# Patient Record
Sex: MALE | Race: WHITE | NOT HISPANIC OR LATINO | Employment: OTHER | URBAN - METROPOLITAN AREA
[De-identification: names, ages, dates, MRNs, and addresses within clinical notes are randomized per-mention and may not be internally consistent; named-entity substitution may affect disease eponyms.]

---

## 2017-05-09 NOTE — PROGRESS NOTES
"  SUBJECTIVE:                                                    Augie Powell is a 81 year old male who presents to clinic today to establish care and  for the following health issues:    Mr. Powell has a history of foot drop and reports intermittent bilateral knee ache with right thigh tenderness with palpation. He wonders if this could be due to arthritis. He admittedly would like to avoid any \"uneccassary\" medications.      Problem list and histories reviewed & adjusted, as indicated.  Additional history: as documented    Current Outpatient Prescriptions   Medication Sig Dispense Refill     magnesium hydroxide (MILK OF MAGNESIA) 400 MG/5ML suspension Take 30 mLs by mouth daily as needed for constipation 105 mL 2     oxyCODONE (ROXICODONE) 5 MG immediate release tablet Take 1-2 tablets (5-10 mg) by mouth every 4 hours as needed for moderate to severe pain 40 tablet 0     AmLODIPine Besylate (NORVASC PO) Take 5 mg by mouth daily.         Benazepril HCl (LOTENSIN PO) Take 20 mg by mouth daily.         Sildenafil Citrate (VIAGRA PO) Take 100 mg by mouth daily.         No Known Allergies    Reviewed and updated as needed this visit by clinical staff       Reviewed and updated as needed this visit by Provider         ROS:  Constitutional, HEENT, cardiovascular, pulmonary, gi and gu systems are negative, except as otherwise noted.    This document serves as a record of the services and decisions personally performed and made by Eusebio Oliveira MD. It was created on his/her behalf by Ayana Crocker, a trained medical scribe. The creation of this document is based the provider's statements to the medical scribe.  Martinaibtonio Crocker 1:33 PM, May 10, 2017     OBJECTIVE:                                                    /69 (BP Location: Right arm, Patient Position: Chair, Cuff Size: Adult Large)  Pulse 66  Temp 98.6  F (37  C) (Oral)  Ht 1.727 m (5' 8\")  Wt 88.9 kg (196 lb)  SpO2 96%  BMI 29.8 " kg/m2  Body mass index is 29.8 kg/(m^2).  Extremities he has a drop foot with a brace in place related to his failed back surgery. His right knee was normal. Left knee has some patellar crepitation, ligaments intac tno tpan along the medial line       ASSESSMENT/PLAN:                                                    1. Benign essential hypertension    2. Degenerative spondylolisthesis    3. Renal cell cancer, unspecified laterality (H)    4. CKD (chronic kidney disease) stage 3, GFR 30-59 ml/min    5. Hyperlipidemia LDL goal <130    Follow up with fasting labs    Eusebio Oliveira MD  [facility]    The information in this document, created by the medical scribe for me, accurately reflects the services I personally performed and the decisions made by me. I have reviewed and approved this document for accuracy prior to leaving the patient care area.  Eusebio Oliveira MD  1:34 PM, 05/10/17

## 2017-05-10 ENCOUNTER — OFFICE VISIT (OUTPATIENT)
Dept: FAMILY MEDICINE | Facility: CLINIC | Age: 82
End: 2017-05-10
Payer: COMMERCIAL

## 2017-05-10 VITALS
OXYGEN SATURATION: 96 % | WEIGHT: 196 LBS | HEIGHT: 68 IN | SYSTOLIC BLOOD PRESSURE: 132 MMHG | BODY MASS INDEX: 29.7 KG/M2 | DIASTOLIC BLOOD PRESSURE: 69 MMHG | TEMPERATURE: 98.6 F | HEART RATE: 66 BPM

## 2017-05-10 DIAGNOSIS — C64.9 RENAL CELL CANCER, UNSPECIFIED LATERALITY (H): ICD-10-CM

## 2017-05-10 DIAGNOSIS — I10 BENIGN ESSENTIAL HYPERTENSION: Primary | ICD-10-CM

## 2017-05-10 DIAGNOSIS — E78.5 HYPERLIPIDEMIA LDL GOAL <130: ICD-10-CM

## 2017-05-10 DIAGNOSIS — N18.30 CKD (CHRONIC KIDNEY DISEASE) STAGE 3, GFR 30-59 ML/MIN (H): ICD-10-CM

## 2017-05-10 DIAGNOSIS — M43.10 DEGENERATIVE SPONDYLOLISTHESIS: ICD-10-CM

## 2017-05-10 PROCEDURE — 99203 OFFICE O/P NEW LOW 30 MIN: CPT | Performed by: INTERNAL MEDICINE

## 2017-05-10 NOTE — MR AVS SNAPSHOT
"              After Visit Summary   5/10/2017    Augie Powell    MRN: [id number]           Patient Information     Date Of Birth          1935        Visit Information        Provider Department      5/10/2017 1:00 PM Eusebio Oliveira MD [facility]        Today's Diagnoses     Benign essential hypertension    -  1    Degenerative spondylolisthesis        Renal cell cancer, unspecified laterality (H)        CKD (chronic kidney disease) stage 3, GFR 30-59 ml/min        Hyperlipidemia LDL goal <130           Follow-ups after your visit        Your next 10 appointments already scheduled     Jun 01, 2017  9:00 AM CDT   PHYSICAL with Eusebio Oliveira MD   [facility]    [address] [facility] [address] [phone]              Who to contact     If you have questions or need follow up information about today's clinic visit or your schedule please contact [facility] directly at [phone].  Normal or non-critical lab and imaging results will be communicated to you by Dream[employer], letter or phone within 4 business days after the clinic has received the results. If you do not hear from us within 7 days, please contact the clinic through Dreamisehart or phone. If you have a critical or abnormal lab result, we will notify you by phone as soon as possible.  Submit refill requests through [employer] or call your pharmacy and they will forward the refill request to us. Please allow 3 business days for your refill to be completed.          Additional Information About Your Visit        Dreamisehart Information     [employer] lets you send messages to your doctor, view your test results, renew your prescriptions, schedule appointments and more. To sign up, go to [email or web address] . Click on \"Log in\" on the left side of the screen, which will take you to the Welcome page. Then click on \"Sign up Now\" on the right side of the page.     You will be asked to enter " "the access code listed below, as well as some personal information. Please follow the directions to create your username and password.     Your access code is: [email or web address]  Expires: 2017  5:39 AM     Your access code will  in 90 days. If you need help or a new code, please call your [facility] clinic or [phone].        Care EveryWhere ID     This is your Care EveryWhere ID. This could be used by other organizations to access your [facility] medical records  [id number]        Your Vitals Were     Pulse Temperature Height Pulse Oximetry BMI (Body Mass Index)       66 98.6  F (37  C) (Oral) 5' 8\" (1.727 m) 96% 29.8 kg/m2        Blood Pressure from Last 3 Encounters:   05/10/17 132/69   14 116/64   10/22/12 118/73    Weight from Last 3 Encounters:   05/10/17 196 lb (88.9 kg)   14 189 lb (85.7 kg)   10/22/12 185 lb (83.9 kg)              Today, you had the following     No orders found for display       Primary Care Provider Office Phone # Fax #    Merlin Emery Brown, [phone]       [facility] [address]  [address] [address]        Thank you!     Thank you for choosing [facility]  for your care. Our goal is always to provide you with excellent care. Hearing back from our patients is one way we can continue to improve our services. Please take a few minutes to complete the written survey that you may receive in the mail after your visit with us. Thank you!             Your Updated Medication List - Protect others around you: Learn how to safely use, store and throw away your medicines at www.disposemymeds.org.          This list is accurate as of: 5/10/17 11:59 PM.  Always use your most recent med list.                   Brand Name Dispense Instructions for use    LOTENSIN PO      Take 20 mg by mouth daily.       magnesium hydroxide 400 MG/5ML suspension    MILK OF MAGNESIA    105 mL    Take 30 mLs by mouth daily as needed for constipation       " NORVASC PO      Take 5 mg by mouth daily.       oxyCODONE 5 MG IR tablet    ROXICODONE    40 tablet    Take 1-2 tablets (5-10 mg) by mouth every 4 hours as needed for moderate to severe pain       VIAGRA PO      Take 100 mg by mouth daily.

## 2017-05-10 NOTE — NURSING NOTE
"Chief Complaint   Patient presents with     Establish Care       Initial /69 (BP Location: Right arm, Patient Position: Chair, Cuff Size: Adult Large)  Pulse 66  Temp 98.6  F (37  C) (Oral)  Ht 5' 8\" (1.727 m)  Wt 196 lb (88.9 kg)  SpO2 96%  BMI 29.8 kg/m2 Estimated body mass index is 29.8 kg/(m^2) as calculated from the following:    Height as of this encounter: 5' 8\" (1.727 m).    Weight as of this encounter: 196 lb (88.9 kg).  Medication Reconciliation: complete   Collette Rogers- CMA      "

## 2017-05-17 DIAGNOSIS — I10 BENIGN ESSENTIAL HYPERTENSION: Primary | ICD-10-CM

## 2017-05-17 RX ORDER — BENAZEPRIL HYDROCHLORIDE 20 MG/1
20 TABLET ORAL DAILY
Qty: 90 TABLET | Refills: 3 | Status: SHIPPED | OUTPATIENT
Start: 2017-05-17 | End: 2017-05-25

## 2017-05-17 RX ORDER — AMLODIPINE BESYLATE 5 MG/1
5 TABLET ORAL DAILY
Qty: 90 TABLET | Refills: 3 | Status: SHIPPED | OUTPATIENT
Start: 2017-05-17 | End: 2017-05-25

## 2017-05-17 NOTE — TELEPHONE ENCOUNTER
Reason for Call:  Medication or medication refill: Amoldipine 5 mg/ benazepril HCI 20 mg     Do you use a [facility]?  Name of the pharmacy and phone number for the current request:  [facility] [phone] Fax number  Id # [id number]    Name of the medication requested:     Other request:     Can we leave a detailed message on this number? YES    Phone number patient can be reached at: Home number on file [phone] (home)    Best Time: any    Call taken on 5/17/2017 at 9:12 AM by Jonna Foster

## 2017-05-17 NOTE — TELEPHONE ENCOUNTER
Pending Prescriptions:                       Disp   Refills    amLODIPine (NORVASC) 5 MG tablet          90 tab*3            Sig: Take 1 tablet (5 mg) by mouth daily    benazepril (LOTENSIN) 20 MG tablet        90 tab*3            Sig: Take 1 tablet (20 mg) by mouth daily          Last Written Prescription Date:    Last Fill Quantity: ,   # refills:   Last Office Visit with [facility] or [facility] prescribing provider: 05/10/17  Future Office visit:    Next 5 appointments (look out 90 days)     Jun 01, 2017  9:00 AM CDT   [facility]    [address] [facility] [address]   [phone]                   Routing refill request to provider for review/approval because:  Medication is reported/historical

## 2017-05-17 NOTE — TELEPHONE ENCOUNTER
Routing refill request to provider for review/approval because:  Medication is reported/historical  Routing to PCP: Please see pended medication. BOTH are reported HISTORICAL    Liz Mtz RN

## 2017-05-25 DIAGNOSIS — I10 BENIGN ESSENTIAL HYPERTENSION: ICD-10-CM

## 2017-05-25 RX ORDER — BENAZEPRIL HYDROCHLORIDE 20 MG/1
20 TABLET ORAL DAILY
Qty: 90 TABLET | Refills: 3 | Status: SHIPPED | OUTPATIENT
Start: 2017-05-25 | End: 2018-04-23

## 2017-05-25 RX ORDER — AMLODIPINE BESYLATE 5 MG/1
5 TABLET ORAL DAILY
Qty: 90 TABLET | Refills: 3 | Status: SHIPPED | OUTPATIENT
Start: 2017-05-25 | End: 2018-04-23

## 2017-05-25 NOTE — TELEPHONE ENCOUNTER
Pending Prescriptions:                       Disp   Refills    benazepril (LOTENSIN) 20 MG tablet        90 tab*3            Sig: Take 1 tablet (20 mg) by mouth daily    amLODIPine (NORVASC) 5 MG tablet          90 tab*3            Sig: Take 1 tablet (5 mg) by mouth daily        Last Written Prescription Date: 5/17/2017  Last Fill Quantity: 90,  refills: 3  Last Office Visit with [facility], [facility] or [facility] prescribing provider: 5/10/2017  Next 5 appointments (look out 90 days)     May 31, 2017 10:00 AM CDT   PHYSICAL with Eusebio [facility]ira MD   [facility]    [address] [facility] [address] [address]   [phone]                   Potassium   Date Value Ref Range Status   02/29/2016 5.0 3.5 - 5.3 mmol/L Final     Creatinine   Date Value Ref Range Status   02/29/2016 1.97 (H) 0.70 - 1.11 mg/dL Final     BP Readings from Last 3 Encounters:   05/10/17 132/69   12/04/14 116/64   10/22/12 118/73

## 2017-05-25 NOTE — TELEPHONE ENCOUNTER
Reason for Call:  Medication or medication refill:    Do you use a [facility]?  Name of the [facility] [address]    Name of the medication requested: did not remember the names one is 5 mg and the other is 20 mg for his blood pressure medicine  [facility] [place] [address]     Other request: please call him when the prescription has been called in     Can we leave a detailed message on this number? YES    Phone number patient can be reached at: Home number on file [phone] (home)    Best Time: anytime    Call taken on 5/25/2017 at 9:18 AM by Kelly Jennings

## 2018-04-23 DIAGNOSIS — I10 BENIGN ESSENTIAL HYPERTENSION: ICD-10-CM

## 2018-04-24 NOTE — TELEPHONE ENCOUNTER
"Requested Prescriptions   Pending Prescriptions Disp Refills     benazepril (LOTENSIN) 20 MG tablet [Pharmacy Med Name: BENAZEPRIL HCL 20 MG Tablet] 90 tablet 3    Last Written Prescription Date:  05/25/17  Last Fill Quantity: 90,  # refills: 3   Last office visit: 5/10/2017 with prescribing provider:  pcp   Future Office Visit:     Sig: TAKE 1 TABLET EVERY DAY    ACE Inhibitors (Including Combos) Protocol Failed    4/23/2018  6:02 PM       Failed - Normal serum creatinine on file in past 12 months    Recent Labs   Lab Test 02/29/16   CR  1.97*            Failed - Normal serum potassium on file in past 12 months    Recent Labs   Lab Test 02/29/16   POTASSIUM  5.0            Passed - Blood pressure under 140/90 in past 12 months    BP Readings from Last 3 Encounters:   05/10/17 132/69   12/04/14 116/64   10/22/12 118/73                Passed - Recent (12 mo) or future (30 days) visit within the authorizing provider's specialty    Patient had office visit in the last 12 months or has a visit in the next 30 days with authorizing provider or within the authorizing provider's specialty.  See \"Patient Info\" tab in inbasket, or \"Choose Columns\" in Meds & Orders section of the refill encounter.           Passed - Patient is age 18 or older        amLODIPine (NORVASC) 5 MG tablet [Pharmacy Med Name: AMLODIPINE BESYLATE 5 MG Tablet] 90 tablet 3    Last Written Prescription Date:  05/25/17  Last Fill Quantity: 90,  # refills: 3   Last office visit: 5/10/2017 with prescribing provider:  pcp   Future Office Visit:     Sig: TAKE 1 TABLET EVERY DAY    Calcium Channel Blockers Protocol  Failed    4/23/2018  6:02 PM       Failed - Normal serum creatinine on file in past 12 months    Recent Labs   Lab Test 02/29/16   CR  1.97*            Passed - Blood pressure under 140/90 in past 12 months    BP Readings from Last 3 Encounters:   05/10/17 132/69   12/04/14 116/64   10/22/12 118/73                Passed - Recent (12 mo) or future " "(30 days) visit within the authorizing provider's specialty    Patient had office visit in the last 12 months or has a visit in the next 30 days with authorizing provider or within the authorizing provider's specialty.  See \"Patient Info\" tab in inbasket, or \"Choose Columns\" in Meds & Orders section of the refill encounter.           Passed - Patient is age 18 or older          "

## 2018-04-24 NOTE — TELEPHONE ENCOUNTER
Routing refill request to provider for review/approval because:  Labs out of range:  Creatinine   Labs not current:  Due for yearly creatinine and potassium  Pended 1 month if you approve  Due for yearly appt May 2018  Shelbie PLASENCIA RN

## 2018-04-27 RX ORDER — AMLODIPINE BESYLATE 5 MG/1
TABLET ORAL
Qty: 30 TABLET | Refills: 0 | Status: SHIPPED | OUTPATIENT
Start: 2018-04-27 | End: 2018-11-05 | Stop reason: DRUGHIGH

## 2018-04-27 RX ORDER — BENAZEPRIL HYDROCHLORIDE 20 MG/1
TABLET ORAL
Qty: 30 TABLET | Refills: 0 | Status: SHIPPED | OUTPATIENT
Start: 2018-04-27 | End: 2019-02-15

## 2018-09-09 ENCOUNTER — APPOINTMENT (OUTPATIENT)
Dept: GENERAL RADIOLOGY | Facility: CLINIC | Age: 83
End: 2018-09-09
Attending: EMERGENCY MEDICINE
Payer: MEDICARE

## 2018-09-09 ENCOUNTER — HOSPITAL ENCOUNTER (EMERGENCY)
Facility: CLINIC | Age: 83
Discharge: HOME | End: 2018-09-09
Attending: EMERGENCY MEDICINE | Admitting: EMERGENCY MEDICINE
Payer: MEDICARE

## 2018-09-09 VITALS
TEMPERATURE: 98.6 F | OXYGEN SATURATION: 99 % | WEIGHT: 190 LBS | SYSTOLIC BLOOD PRESSURE: 149 MMHG | BODY MASS INDEX: 27.2 KG/M2 | DIASTOLIC BLOOD PRESSURE: 82 MMHG | RESPIRATION RATE: 12 BRPM | HEIGHT: 70 IN

## 2018-09-09 DIAGNOSIS — I10 BENIGN ESSENTIAL HYPERTENSION: ICD-10-CM

## 2018-09-09 DIAGNOSIS — R06.09 DYSPNEA ON EXERTION: ICD-10-CM

## 2018-09-09 LAB
ANION GAP SERPL CALCULATED.3IONS-SCNC: 10 MMOL/L (ref 3–14)
BASOPHILS # BLD AUTO: 0 10E9/L (ref 0–0.2)
BASOPHILS NFR BLD AUTO: 0.2 %
BUN SERPL-MCNC: 39 MG/DL (ref 7–30)
CALCIUM SERPL-MCNC: 8.7 MG/DL (ref 8.5–10.1)
CHLORIDE SERPL-SCNC: 106 MMOL/L (ref 94–109)
CO2 SERPL-SCNC: 22 MMOL/L (ref 20–32)
CREAT SERPL-MCNC: 2.24 MG/DL (ref 0.66–1.25)
DIFFERENTIAL METHOD BLD: NORMAL
EOSINOPHIL # BLD AUTO: 0.2 10E9/L (ref 0–0.7)
EOSINOPHIL NFR BLD AUTO: 2.8 %
ERYTHROCYTE [DISTWIDTH] IN BLOOD BY AUTOMATED COUNT: 12.8 % (ref 10–15)
GFR SERPL CREATININE-BSD FRML MDRD: ABNORMAL ML/MIN/1.7M2
GLUCOSE SERPL-MCNC: 101 MG/DL (ref 70–99)
HCT VFR BLD AUTO: 41.2 % (ref 40–53)
HGB BLD-MCNC: 14.3 G/DL (ref 13.3–17.7)
IMM GRANULOCYTES # BLD: 0 10E9/L (ref 0–0.4)
IMM GRANULOCYTES NFR BLD: 0.5 %
INTERPRETATION ECG - MUSE: NORMAL
LYMPHOCYTES # BLD AUTO: 1.5 10E9/L (ref 0.8–5.3)
LYMPHOCYTES NFR BLD AUTO: 17.6 %
MCH RBC QN AUTO: 31.9 PG (ref 26.5–33)
MCHC RBC AUTO-ENTMCNC: 34.7 G/DL (ref 31.5–36.5)
MCV RBC AUTO: 92 FL (ref 78–100)
MONOCYTES # BLD AUTO: 0.6 10E9/L (ref 0–1.3)
MONOCYTES NFR BLD AUTO: 7.8 %
NEUTROPHILS # BLD AUTO: 5.9 10E9/L (ref 1.6–8.3)
NEUTROPHILS NFR BLD AUTO: 71.1 %
NRBC # BLD AUTO: 0 10*3/UL
NRBC BLD AUTO-RTO: 0 /100
PLATELET # BLD AUTO: 176 10E9/L (ref 150–450)
POTASSIUM SERPL-SCNC: 4.7 MMOL/L (ref 3.4–5.3)
RBC # BLD AUTO: 4.48 10E12/L (ref 4.4–5.9)
SODIUM SERPL-SCNC: 138 MMOL/L (ref 133–144)
TROPONIN I SERPL-MCNC: <0.015 UG/L (ref 0–0.04)
WBC # BLD AUTO: 8.2 10E9/L (ref 4–11)

## 2018-09-09 PROCEDURE — 93005 ELECTROCARDIOGRAM TRACING: CPT

## 2018-09-09 PROCEDURE — 80048 BASIC METABOLIC PNL TOTAL CA: CPT | Performed by: EMERGENCY MEDICINE

## 2018-09-09 PROCEDURE — 85025 COMPLETE CBC W/AUTO DIFF WBC: CPT | Performed by: EMERGENCY MEDICINE

## 2018-09-09 PROCEDURE — 99285 EMERGENCY DEPT VISIT HI MDM: CPT | Mod: 25

## 2018-09-09 PROCEDURE — 71046 X-RAY EXAM CHEST 2 VIEWS: CPT

## 2018-09-09 PROCEDURE — 84484 ASSAY OF TROPONIN QUANT: CPT | Performed by: EMERGENCY MEDICINE

## 2018-09-09 ASSESSMENT — ENCOUNTER SYMPTOMS
FATIGUE: 1
DYSURIA: 0
NAUSEA: 0
SHORTNESS OF BREATH: 1
COUGH: 0
FEVER: 0
ABDOMINAL PAIN: 0
CHILLS: 0

## 2018-09-09 NOTE — ED AVS SNAPSHOT
Emergency Department    [address] [place] [address]    Phone:  [phone]    Fax:  [phone]                                       Augie Powell   MRN: [id number]    Department:   Emergency Department   Date of Visit:  9/9/2018           After Visit Summary Signature Page     I have received my discharge instructions, and my questions have been answered. I have discussed any challenges I see with this plan with the nurse or doctor.    ..........................................................................................................................................  Patient/Patient Representative Signature      ..........................................................................................................................................  Patient Representative Print Name and Relationship to Patient    ..................................................               ................................................  Date                                            Time    ..........................................................................................................................................  Reviewed by Signature/Title    ...................................................              ..............................................  Date                                                            Time          22EPIC Rev 08/18

## 2018-09-09 NOTE — ED PROVIDER NOTES
History     Chief Complaint:  Hypertension     HPI   Augie Powell is a 82 year old male who presents to the emergency department for evaluation of hypertension. The patient states that while doing his daily walk Friday morning with his wife he became more fatigued and shortness of breath then usual. He had this feeling once before and it was related to his blood pressure, so the patient checked his blood pressure and found it to be elevated. He has been checking it daily since and found it to be elevated at 170's systolic above his baseline which is around 125-135 typically. He also notes a feeling of chest pressure that comes and goes but has been present since before this recent increase in his blood pressure and believes it may be due stress. He denies any recent changes to his blood pressure medication and has not missed any dose. He has no history of MI. He denies a cough, dysuria, chest pain, nausea, chills, abdominal pain, dysuria, fever or leg swelling.     Allergies:  No known Drug Allergies      Medications:      amlodipine (NORVASC) 5 MG tablet  benazepril (LOTENSIN) 20 MG tablet  magnesium hydroxide (MILK OF MAGNESIA) 400 MG/5ML suspension  oxyCODONE (ROXICODONE) 5 MG immediate release tablet  Sildenafil Citrate (VIAGRA PO)    Past Medical History:    Hyperlipidemia  Hypertension  Renal disease  Spondylosis with myelopathy, lumbar region     Past Surgical History:    Back surgery  Cholecystectomy  Lumbar decompression  Hernia repair  Laminectomy lumbar fusion  Phacoemulsification clear cornea with standard intraocular lens implant  Right nephrectomy     Family History:    History reviewed. No pertinent family history.     Social History:  Marital Status:   [2]  Social History   Substance Use Topics     Smoking status: Former Smoker     Smokeless tobacco: Never Used     Alcohol use Yes      Comment: 3 per week per H & P        Review of Systems   Constitutional: Positive for fatigue.  "Negative for chills and fever.   Respiratory: Positive for shortness of breath. Negative for cough.    Cardiovascular: Negative for chest pain and leg swelling.   Gastrointestinal: Negative for abdominal pain and nausea.   Genitourinary: Negative for dysuria.   All other systems reviewed and are negative.        Physical Exam   First Vitals:  BP: 175/69  Heart Rate: 74  Temp: 98.6  F (37  C)  Resp: 16  Height: 177.8 cm (5' 10\")  Weight: 86.2 kg (190 lb)  SpO2: 100 %      Physical Exam  Nursing note and vitals reviewed.  Constitutional:  Oriented to person, place, and time.      Appears well-developed and well-nourished.   HENT:   Head:    Atraumatic.   Mouth/Throat:   Oropharynx is clear and moist. No oropharyngeal exudate.   Eyes:    EOM are normal. Pupils are equal, round, and reactive to light.   Neck:    Normal range of motion. Neck supple.      No tracheal deviation present. No thyromegaly present.   Cardiovascular:  Normal rate, regular rhythm, normal heart sounds and      intact distal pulses.  Exam reveals no gallop and no friction rub.       No murmur heard.  Pulmonary/Chest: Effort normal and breath sounds normal.      No respiratory distress. No wheezes. No rales.      Exhibits no tenderness.   Abdominal:   Soft. Bowel sounds are normal. Exhibits no distension and      no mass. There is no tenderness.      There is no rebound and no guarding.   Musculoskeletal:  Exhibits no edema.   Lymphadenopathy:  No cervical adenopathy.   Neurological:   Alert and oriented to person, place, and time. GCS 15.  CN 2-12 intact.  and proximal upper extremity strength strong and equal.  Bilateral lower extremity strength strong and equal, including strong dorsiflexion and plantarflexion strength.  Sensation intact and equal to the face, arms and legs.  No facial droop or weakness. Normal speech.  Follows commands and answers questions normally.    Skin:    Skin is warm and dry. No rash noted. No pallor.      Emergency " Department Course     ECG:  ECG taken at 1209, ECG read at 1214  Sinus rhythm with occasional PVC  Normal ECG  Rate 64 bpm. IA interval 174 ms. QRS duration 92 ms. QT/QTc 394/406 ms. P-R-T axes 53 45 53.    Imaging:  Radiology findings were communicated with the patient who voiced understanding of the findings.    Chest XR,  PA & LAT  Final Result  IMPRESSION: Cardiac silhouette is unchanged. Pulmonary vasculature is  distinct. No definite focal airspace opacities. No pleural effusion or  pneumothorax. There are degenerative changes in the spine.    ROSELINE CORDOVA MD  Reading per radiology     Laboratory:  Laboratory findings were communicated with the patient who voiced understanding of the findings.    Troponin: <0.015   CBC: WBC 8.2, HGB 14.3,   BMP: Na 138, K+ 4.7, Cl 106, CO2 22, glucose 101, BUN 39, Ca 8.7, o/w WNL (Creatinine 2.24)    Emergency Department Course:  Nursing notes and vitals reviewed.  I performed an exam of the patient as documented above.   I discussed the treatment plan with the patient. They expressed understanding of this plan and consented to discharge. They will be discharged home with instructions for care and follow up. In addition, the patient will return to the emergency department if their symptoms persist, worsen, if new symptoms arise or if there is any concern.  All questions were answered.     I personally reviewed the imaging and labs results with the patient and answered all related questions prior to discharge.  Impression & Plan      Medical Decision Making:  This patient has been under increased anxiety recently per his own report and has had some increased blood pressure readings over the past 2 days. His blood pressure improved significantly here in the emergency department and was almost normal so I did not feel that any medication change of his blood pressure medicine was indicated. Regarding his dyspnea on exertion that occurred 2 days ago I discussed with him  that I ordered an out patient stress echo for him to have this week and he was told to avoid exercising until after the stress echo. His ECG did not show any acute ischemic changes and his troponin was normal so I felt that he could be safely discharged hoe as well as the fact he has not been having any chest pain or pressure. He was instructed to return if he develops any chest pain or trouble breathing again and to check his blood pressure regularly, record the values and follow up with his primary care doctor this week in clinic to recheck his blood pressure and discuss further medication management. I did not feel there was any concern for aortic dissection, PE, pneumonia or pneumothorax and I felt he could be safely discharged home.    Diagnosis:    ICD-10-CM    1. Dyspnea on exertion R06.09 Exercise Stress Echocardiogram   2. Benign essential hypertension I10      Disposition:   Discharged     Scribe Disclosure:  I, Hardy Zamarripa, am serving as a scribe at 12:52 PM on 9/9/2018 to document services personally performed by Tamika Sauer MD, based on my observations and the provider's statements to me.      EMERGENCY DEPARTMENT       Tamika Sauer MD  09/09/18 [id number]

## 2018-09-09 NOTE — ED AVS SNAPSHOT
Emergency Department    [address] [facility] [address]    Phone:  [phone]    Fax:  [phone]                                       Augie Powell   MRN: [id number]    Department:   Emergency Department   Date of Visit:  9/9/2018           Patient Information     Date Of Birth          1935        Your diagnoses for this visit were:     Dyspnea on exertion     Benign essential hypertension        You were seen by Tamika Sauer MD.      Follow-up Information     Follow up with Eusebio Oliveira MD.    Specialty:  Internal Medicine    Why:  Monday or Tuesday in clinic for follow-up and to recheck your blood pressure    Contact information:    [address] [address] [phone]          Follow up with  Emergency Department.    Specialty:  EMERGENCY MEDICINE    Why:  if you develop chest pain or trouble breathing.    Contact information:    [address] [facility] [place] [address] [phone]        Discharge Instructions       Avoid exercising until after you have your stress echo.    Discharge References/Attachments     DYSPNEA, UNDERSTANDING AND CARING FOR, HOSPICE (ENGLISH)    CHOICES, LOW-SALT (ENGLISH)    HIGH BLOOD PRESSURE, ESTABLISHED, OUT OF CONTROL (ENGLISH)      24 Hour Appointment Hotline       To make an appointment at any [facility], call [phone] ([phone]). If you don't have a family doctor or clinic, we will help you find one. [facility] are conveniently located to serve the needs of you and your family.          ED Discharge Orders     Exercise Stress Echocardiogram       Administration of IV contrast will be tailored to this examination per the appropriate written protocol listed in the Echocardiography department Protocol Book, or by the supervising Cardiologist. This may result in an order change.    Use of contrast is at the discretion of the supervising Cardiologist.                      Review of your medicines      Our records show that you are taking the medicines listed below. If these are incorrect, please call your family doctor or clinic.        Dose / Directions Last dose taken    amLODIPine 5 MG tablet   Commonly known as:  NORVASC   Quantity:  30 tablet        TAKE 1 TABLET EVERY DAY   Refills:  0        benazepril 20 MG tablet   Commonly known as:  LOTENSIN   Quantity:  30 tablet        TAKE 1 TABLET EVERY DAY   Refills:  0        magnesium hydroxide 400 MG/5ML suspension   Commonly known as:  MILK OF MAGNESIA   Dose:  30 mL   Quantity:  105 mL        Take 30 mLs by mouth daily as needed for constipation   Refills:  2        oxyCODONE IR 5 MG tablet   Commonly known as:  ROXICODONE   Dose:  5-10 mg   Quantity:  40 tablet        Take 1-2 tablets (5-10 mg) by mouth every 4 hours as needed for moderate to severe pain   Refills:  0        VIAGRA PO   Dose:  100 mg        Take 100 mg by mouth daily.   Refills:  0                Procedures and tests performed during your visit     Basic metabolic panel    CBC with platelets + differential    Cardiac Continuous Monitoring    Chest XR,  PA & LAT    EKG 12 lead    Troponin I (now)      Orders Needing Specimen Collection     None      Pending Results     Date and Time Order Name Status Description    9/9/2018 1205 Troponin I (now) In process     9/9/2018 1205 Basic metabolic panel In process     9/9/2018 1204 EKG 12 lead Preliminary             Pending Culture Results     No orders found from 9/7/2018 to 9/10/2018.            Pending Results Instructions     If you had any lab results that were not finalized at the time of your Discharge, you can call the ED Lab Result RN at [phone]. You will be contacted by this team for any positive Lab results or changes in treatment. The nurses are available 7 days a week from 10A to 6:30P.  You can leave a message 24 hours per day and they will return your call.        Test Results From Your Hospital  Stay        9/9/2018 12:46 PM      Component Results     Component Value Ref Range & Units Status    WBC 8.2 4.0 - 11.0 10e9/L Final    RBC Count 4.48 4.4 - 5.9 10e12/L Final    Hemoglobin 14.3 13.3 - 17.7 g/dL Final    Hematocrit 41.2 40.0 - 53.0 % Final    MCV 92 78 - 100 fl Final    MCH 31.9 26.5 - 33.0 pg Final    MCHC 34.7 31.5 - 36.5 g/dL Final    RDW 12.8 10.0 - 15.0 % Final    Platelet Count 176 150 - 450 10e9/L Final    Diff Method Automated Method  Final    % Neutrophils 71.1 % Final    % Lymphocytes 17.6 % Final    % Monocytes 7.8 % Final    % Eosinophils 2.8 % Final    % Basophils 0.2 % Final    % Immature Granulocytes 0.5 % Final    Nucleated RBCs 0 0 /100 Final    Absolute Neutrophil 5.9 1.6 - 8.3 10e9/L Final    Absolute Lymphocytes 1.5 0.8 - 5.3 10e9/L Final    Absolute Monocytes 0.6 0.0 - 1.3 10e9/L Final    Absolute Eosinophils 0.2 0.0 - 0.7 10e9/L Final    Absolute Basophils 0.0 0.0 - 0.2 10e9/L Final    Abs Immature Granulocytes 0.0 0 - 0.4 10e9/L Final    Absolute Nucleated RBC 0.0  Final         9/9/2018 12:41 PM         9/9/2018 12:41 PM         9/9/2018 12:50 PM      Narrative     CHEST TWO VIEWS   9/9/2018 12:27 PM     HISTORY: Check for congestive heart failure, shortness of breath and  high blood pressure.     COMPARISON: 10/4/2010.        Impression     IMPRESSION: Cardiac silhouette is unchanged. Pulmonary vasculature is  distinct. No definite focal airspace opacities. No pleural effusion or  pneumothorax. There are degenerative changes in the spine.    ROSELINE CORDOVA MD                Clinical Quality Measure: Blood Pressure Screening     Your blood pressure was checked while you were in the emergency department today. The last reading we obtained was  BP: 149/82 . Please read the guidelines below about what these numbers mean and what you should do about them.  If your systolic blood pressure (the top number) is less than 120 and your diastolic blood pressure (the bottom number) is less  "than 80, then your blood pressure is normal. There is nothing more that you need to do about it.  If your systolic blood pressure (the top number) is 120-139 or your diastolic blood pressure (the bottom number) is 80-89, your blood pressure may be higher than it should be. You should have your blood pressure rechecked within a year by a primary care provider.  If your systolic blood pressure (the top number) is 140 or greater or your diastolic blood pressure (the bottom number) is 90 or greater, you may have high blood pressure. High blood pressure is treatable, but if left untreated over time it can put you at risk for heart attack, stroke, or kidney failure. You should have your blood pressure rechecked by a primary care provider within the next 4 weeks.  If your provider in the emergency department today gave you specific instructions to follow-up with your doctor or provider even sooner than that, you should follow that instruction and not wait for up to 4 weeks for your follow-up visit.        Thank you for choosing [facility]       Thank you for choosing [facility] for your care. Our goal is always to provide you with excellent care. Hearing back from our patients is one way we can continue to improve our services. Please take a few minutes to complete the written survey that you may receive in the mail after you visit with us. Thank you!        [employer] Information     [employer] lets you send messages to your doctor, view your test results, renew your prescriptions, schedule appointments and more. To sign up, go to [email or web address] . Click on \"Log in\" on the left side of the screen, which will take you to the Welcome page. Then click on \"Sign up Now\" on the right side of the page.     You will be asked to enter the access code listed below, as well as some personal information. Please follow the directions to create your username and password.     Your access code is: [email or web address]  Expires: 12/8/2018  2:32 " PM     Your access code will  in 90 days. If you need help or a new code, please call your [facility] clinic or [phone].        Care EveryWhere ID     This is your Care EveryWhere ID. This could be used by other organizations to access your [facility] medical records  [id number]        Equal Access to Services     MAX CHAUDHARY : Alvin mills Sojose, waaxda luqadaha, qaybta kaalmaisrael villa, dejan horn. So [facility] [phone].    ATENCIÓN: Si habla español, tiene a nichole disposición servicios gratuitos de asistencia lingüística. Llame al [phone].    We comply with applicable federal civil rights laws and [place] laws. We do not discriminate on the basis of race, color, national origin, age, disability, sex, sexual orientation, or gender identity.            After Visit Summary       This is your record. Keep this with you and show to your community pharmacist(s) and doctor(s) at your next visit.

## 2018-09-10 ENCOUNTER — TRANSFERRED RECORDS (OUTPATIENT)
Dept: HEALTH INFORMATION MANAGEMENT | Facility: CLINIC | Age: 83
End: 2018-09-10

## 2018-09-10 LAB — EJECTION FRACTION: 63

## 2018-09-17 ENCOUNTER — OFFICE VISIT (OUTPATIENT)
Dept: FAMILY MEDICINE | Facility: CLINIC | Age: 83
End: 2018-09-17
Payer: COMMERCIAL

## 2018-09-17 ENCOUNTER — TELEPHONE (OUTPATIENT)
Dept: FAMILY MEDICINE | Facility: CLINIC | Age: 83
End: 2018-09-17

## 2018-09-17 VITALS
HEIGHT: 70 IN | DIASTOLIC BLOOD PRESSURE: 60 MMHG | HEART RATE: 61 BPM | OXYGEN SATURATION: 97 % | BODY MASS INDEX: 27.77 KG/M2 | WEIGHT: 194 LBS | SYSTOLIC BLOOD PRESSURE: 130 MMHG

## 2018-09-17 DIAGNOSIS — N18.4 CKD (CHRONIC KIDNEY DISEASE) STAGE 4, GFR 15-29 ML/MIN (H): ICD-10-CM

## 2018-09-17 DIAGNOSIS — I10 BENIGN ESSENTIAL HYPERTENSION: Primary | ICD-10-CM

## 2018-09-17 DIAGNOSIS — C64.9 RENAL CELL CANCER, UNSPECIFIED LATERALITY (H): ICD-10-CM

## 2018-09-17 PROCEDURE — 36415 COLL VENOUS BLD VENIPUNCTURE: CPT | Performed by: INTERNAL MEDICINE

## 2018-09-17 PROCEDURE — 80048 BASIC METABOLIC PNL TOTAL CA: CPT | Performed by: INTERNAL MEDICINE

## 2018-09-17 PROCEDURE — 99214 OFFICE O/P EST MOD 30 MIN: CPT | Performed by: INTERNAL MEDICINE

## 2018-09-17 NOTE — TELEPHONE ENCOUNTER
Reason for call:  Patient reporting a symptom    Symptom or request: high blood pressure      Duration (how long have symptoms been present): 1 week     Have you been treated for this before? Yes    Additional comments: TRANSFERRED TO RN    Phone Number patient can be reached at:  Home number on file [phone] (home)    Best Time:  ANYTIME    Can we leave a detailed message on this number:  YES    Call taken on 9/17/2018 at 9:23 AM by Gail Mendiola

## 2018-09-17 NOTE — TELEPHONE ENCOUNTER
Pt calls in with report of /77-     S: Pt continue    B: Pt states normally in 130's     Pt visited Er on 9/9- was increased to amlodipine 10mg (from 5mg). He felt this dropped his BP too low, so only took 5mg this morning, 1 hour ago.     A:   Pt states he is having increased leg weakness and fatigue noted for the last week and a half.   Onset: Week ago Friday while out for a walk   Description: Noticed he     Feels a HA when pressures are elevated  Denies worst HA he's ever had in his life    Denies unilateral weakness    Denies CP   Denies SOB    R: OV with PCP scheduled for today at 11:30. If develops CP, SOB, worsened weakness - go to ER.

## 2018-09-17 NOTE — PROGRESS NOTES
SUBJECTIVE:   Augie Powell is a 82 year old male who presents to clinic today for the following health issues:  ER follow-up for accelerated hypertension and acute dyspnea  Hypertension Recheck    [place] they didn't walk as far as normal, stopping often to sit on benches. As they started walking back to the car he started to feel weaker and weaker. He runs around the 150, systolic, sometimes gets down to 120. His blood pressure has been up and down since this episode. He went in to the ER on Sunday Sept 9th. He had a stress test in the ER and it was normal. Wanted to be sure nothing was going on. Started checking his BP several times this last week. When he first sits down to take his BP at home he gets anxiety and the 1st reading is high, takes it 3 times. On tuesday he increased the Amlodipine to 2 tablets, but then they started getting much lower than normal, so he went back to the one tablet. He has an associated headache that starts in his neck and radiates into the temples.   He didn't recall any reason he would have been anxious about the Friday that he had the episode. He cannot think of any explanation for the episode on Friday. He has checked the accuracy of his blood pressure cuff against the blood pressure machine at [facility]. It was 139/65, and this was after some walking. He does not think that he is retaining more liquid. They had company at their house the week before this. His wife does not think that he was drinking very heavily with his guest.   His wife also notices that he seems to be more mentally foggy lately. He does have some knee and back pain when he walks, but doesn't think that it is enough for him to get high blood pressure from it.       Problem list and histories reviewed & adjusted, as indicated.  Additional history: as documented    Patient Active Problem List   Diagnosis     Degenerative spondylolisthesis      Spondylolisthesis     Benign essential hypertension     Renal cell cancer, unspecified laterality (H)     CKD (chronic kidney disease) stage 3, GFR 30-59 ml/min     Hyperlipidemia LDL goal <130     Past Surgical History:   Procedure Laterality Date     BACK SURGERY       CHOLECYSTECTOMY       DECOMPRESSION LUMBAR TWO LEVELS  12/1/2014    Procedure: DECOMPRESSION LUMBAR TWO LEVELS;  Surgeon: Remy Harmon MD;  Location: [facility] OR     HERNIA REPAIR       LAMINECTOMY, FUSION LUMBAR ONE LEVEL, COMBINED N/A 12/1/2014    Procedure: COMBINED LAMINECTOMY, FUSION LUMBAR ONE LEVEL;  Surgeon: Remy Harmon MD;  Location: [facility] OR     PHACOEMULSIFICATION CLEAR CORNEA WITH STANDARD INTRAOCULAR LENS IMPLANT  10/22/2012    Procedure: PHACOEMULSIFICATION CLEAR CORNEA WITH STANDARD INTRAOCULAR LENS IMPLANT;  RIGHT EYE PHACOEMULSIFICATION CLEAR CORNEA WITH STANDARD INTRAOCULAR LENS IMPLANT ;  Surgeon: Grupo Granger MD;  Location: [facility]     right nephrectomy[         Social History   Substance Use Topics     Smoking status: Former Smoker     Smokeless tobacco: Never Used     Alcohol use Yes      Comment: 3 per week per H & P     History reviewed. No pertinent family history.      Current Outpatient Prescriptions   Medication Sig Dispense Refill     amLODIPine (NORVASC) 5 MG tablet TAKE 1 TABLET EVERY DAY 30 tablet 0     benazepril (LOTENSIN) 20 MG tablet TAKE 1 TABLET EVERY DAY 30 tablet 0     No Known Allergies  BP Readings from Last 3 Encounters:   09/17/18 150/71   09/09/18 149/82   05/10/17 132/69    Wt Readings from Last 3 Encounters:   09/17/18 88 kg (194 lb)   09/09/18 86.2 kg (190 lb)   05/10/17 88.9 kg (196 lb)         Reviewed and updated as needed this visit by clinical staff       Reviewed and updated as needed this visit by Provider       ROS:  Constitutional, HEENT, cardiovascular, pulmonary, gi and gu systems are negative, except as otherwise noted.    This document serves as a record of the services and  "decisions personally performed and made by Eusebio Oliveira MD. It was created on his behalf by Makenzie Benoit, a trained medical scribe. The creation of this document is based on the provider's statements to the medical scribe.  Makenzie Cy September 17, 2018 12:10 PM    OBJECTIVE:     /71 (BP Location: Left arm, Patient Position: Chair, Cuff Size: Adult Large)  Pulse 61  Ht 1.778 m (5' 10\")  Wt 88 kg (194 lb)  SpO2 97%  BMI 27.84 kg/m2  Body mass index is 27.84 kg/(m^2).  Neck was supple without adenopathy or thyromegaly his carotids were normal without bruits  Chest clear to auscultation and percussion  Cardiovascular S1 and S2 are physiologic with grade 2/6 systolic ejection border at the LUSB  Abdomen bowel sounds were normal. There is no palpable mass or organomegaly  Extremities nontender with 2+ pretibial edema Right>Left  Leg brace intact.   Pulses pedal pulses are as described otherwise his pulses are bilaterally symmetrical throughout without bruits  Skin without significant abnormality    Diagnostic Test Results:  No results found for this or any previous visit (from the past 24 hour(s)).     Calibrated home Blood pressure cuff with manual cuff.  Office Cuff  134/65  130/60  Home Cuff  129/66  133/63    ASSESSMENT/PLAN:     1. Benign essential hypertension  Recommended he resume his walking regimen and usual activities. Check blood pressure once a day, but at different times of the day, alternate mornings, afternoons. He does not need to continue to take the 2 tablets of amlodipine. Call if blood pressure gets consistently over 160.   - Basic metabolic panel    2. Renal cell cancer, unspecified laterality (H)  Recheck labs today.   - Basic metabolic panel    3. CKD (chronic kidney disease) stage 4, GFR 15-29 ml/min (H)  As above.   - Basic metabolic panel       Eusebio Oliveira MD  [facility]  The information in this document, created by the medical scribe for me, accurately reflects the " services I personally performed and the decisions made by me. I have reviewed and approved this document for accuracy prior to leaving the patient care area.  Eusebio Oliveira MD  1:11 PM September 17, 2018

## 2018-09-17 NOTE — MR AVS SNAPSHOT
"              After Visit Summary   9/17/2018    Augie Powell    MRN: [id number]           Patient Information     Date Of Birth          1935        Visit Information        Provider Department      9/17/2018 11:30 AM Eusebio Oliveira MD [facility]        Today's Diagnoses     Benign essential hypertension    -  1    Renal cell cancer, unspecified laterality (H)        CKD (chronic kidney disease) stage 4, GFR 15-29 ml/min (H)          Care Instructions    Follow up in I month. Continue to take your blood pressures at home.     Can also start a low dose, baby aspirin regimen, 81 mg daily.               Follow-ups after your visit        Follow-up notes from your care team     Return in about 1 month (around 10/17/2018) for BP/HTN Recheck.      Who to contact     If you have questions or need follow up information about today's clinic visit or your schedule please contact [facility] directly at [phone].  Normal or non-critical lab and imaging results will be communicated to you by [employer], letter or phone within 4 business days after the clinic has received the results. If you do not hear from us within 7 days, please contact the clinic through [employer] or phone. If you have a critical or abnormal lab result, we will notify you by phone as soon as possible.  Submit refill requests through [employer] or call your pharmacy and they will forward the refill request to us. Please allow 3 business days for your refill to be completed.          Additional Information About Your Visit        [employer] Information     [employer] lets you send messages to your doctor, view your test results, renew your prescriptions, schedule appointments and more. To sign up, go to [email or web address] . Click on \"Log in\" on the left side of the screen, which will take you to the Welcome page. Then click on \"Sign up Now\" on the right side of the page.     You will be asked to enter the access code listed " "below, as well as some personal information. Please follow the directions to create your username and password.     Your access code is: [email or web address]  Expires: 2018  2:32 PM     Your access code will  in 90 days. If you need help or a new code, please call your [facility] clinic or [phone].        Care EveryWhere ID     This is your Care EveryWhere ID. This could be used by other organizations to access your [facility] medical records  [id number]        Your Vitals Were     Pulse Height Pulse Oximetry BMI (Body Mass Index)          61 5' 10\" (1.778 m) 97% 27.84 kg/m2         Blood Pressure from Last 3 Encounters:   18 130/60   18 149/82   05/10/17 132/69    Weight from Last 3 Encounters:   18 194 lb (88 kg)   18 190 lb (86.2 kg)   05/10/17 196 lb (88.9 kg)              We Performed the Following     Basic metabolic panel          Today's Medication Changes          These changes are accurate as of 18 11:59 PM.  If you have any questions, ask your nurse or doctor.               Stop taking these medicines if you haven't already. Please contact your care team if you have questions.     magnesium hydroxide 400 MG/5ML suspension   Commonly known as:  MILK OF MAGNESIA   Stopped by:  Eusebio Oliveira MD           oxyCODONE IR 5 MG tablet   Commonly known as:  ROXICODONE   Stopped by:  Eusebio Oliveira MD           VIAGRA PO   Stopped by:  Eusebio Oliveira MD                    Primary Care Provider Office Phone # Fax #    Eusebio Oliveira [phone] [address] [facility] [address]  [facility] [address]        Equal Access to Services     ESTRELLA CHAUDHARY : Hadii aristides Alas, waj carlosda lucandice, qaybta kaalmada allen, dejan horn. So [facility] [phone].    ATENCIÓN: Si habla español, tiene a nichole disposición servicios gratuitos de asistencia lingüística. Llmodesta al [phone].    We comply with applicable federal civil rights laws " and [place] laws. We do not discriminate on the basis of race, color, national origin, age, disability, sex, sexual orientation, or gender identity.            Thank you!     Thank you for choosing [facility]  for your care. Our goal is always to provide you with excellent care. Hearing back from our patients is one way we can continue to improve our services. Please take a few minutes to complete the written survey that you may receive in the mail after your visit with us. Thank you!             Your Updated Medication List - Protect others around you: Learn how to safely use, store and throw away your medicines at www.disposemymeds.org.          This list is accurate as of 9/17/18 11:59 PM.  Always use your most recent med list.                   Brand Name Dispense Instructions for use Diagnosis    amLODIPine 5 MG tablet    NORVASC    30 tablet    TAKE 1 TABLET EVERY DAY    Benign essential hypertension       benazepril 20 MG tablet    LOTENSIN    30 tablet    TAKE 1 TABLET EVERY DAY    Benign essential hypertension

## 2018-09-17 NOTE — LETTER
[place] [address] [facility]  [address]  [place]  [address]  Tel: [phone]    September 25, 2018    Augie KALEY Powell  [address] [facility] [address]        Dear Mr. Powell,    The results of your recent labs were within normal limits.      Resulted Orders   Basic metabolic panel   Result Value Ref Range    Sodium 137 133 - 144 mmol/L    Potassium 5.1 3.4 - 5.3 mmol/L    Chloride 107 94 - 109 mmol/L    Carbon Dioxide 21 20 - 32 mmol/L    Anion Gap 9 3 - 14 mmol/L    Glucose 92 70 - 99 mg/dL    Urea Nitrogen 40 (H) 7 - 30 mg/dL    Creatinine 2.21 (H) 0.66 - 1.25 mg/dL    GFR Estimate 29 (L) >60 mL/min/1.7m2      Comment:      Non  GFR Calc    GFR Estimate If Black 35 (L) >60 mL/min/1.7m2      Comment:       GFR Calc    Calcium 8.9 8.5 - 10.1 mg/dL          If you have any further questions or problems, please contact our office.      Sincerely,    Eusebio Oliveira MD / florian

## 2018-09-17 NOTE — PATIENT INSTRUCTIONS
Follow up in I month. Continue to take your blood pressures at home.     Can also start a low dose, baby aspirin regimen, 81 mg daily.

## 2018-09-18 LAB
ANION GAP SERPL CALCULATED.3IONS-SCNC: 9 MMOL/L (ref 3–14)
BUN SERPL-MCNC: 40 MG/DL (ref 7–30)
CALCIUM SERPL-MCNC: 8.9 MG/DL (ref 8.5–10.1)
CHLORIDE SERPL-SCNC: 107 MMOL/L (ref 94–109)
CO2 SERPL-SCNC: 21 MMOL/L (ref 20–32)
CREAT SERPL-MCNC: 2.21 MG/DL (ref 0.66–1.25)
GFR SERPL CREATININE-BSD FRML MDRD: 29 ML/MIN/1.7M2
GLUCOSE SERPL-MCNC: 92 MG/DL (ref 70–99)
POTASSIUM SERPL-SCNC: 5.1 MMOL/L (ref 3.4–5.3)
SODIUM SERPL-SCNC: 137 MMOL/L (ref 133–144)

## 2018-09-24 ENCOUNTER — TELEPHONE (OUTPATIENT)
Dept: FAMILY MEDICINE | Facility: CLINIC | Age: 83
End: 2018-09-24

## 2018-09-24 NOTE — TELEPHONE ENCOUNTER
Reason for Call:  Request for results:    Name of test or procedure: labs    Date of test of procedure: 9/17    Location of the test or procedure: [facility]    OK to leave the result message on voice mail or with a family member? YES    Phone number Patient can be reached at:  Home number on file [phone] (home)    Additional comments: please call to review    Call taken on 9/24/2018 at 2:24 PM by Chantel Herron.

## 2018-09-26 ENCOUNTER — OFFICE VISIT (OUTPATIENT)
Dept: FAMILY MEDICINE | Facility: CLINIC | Age: 83
End: 2018-09-26
Payer: COMMERCIAL

## 2018-09-26 VITALS
HEIGHT: 70 IN | OXYGEN SATURATION: 96 % | DIASTOLIC BLOOD PRESSURE: 58 MMHG | BODY MASS INDEX: 27.92 KG/M2 | TEMPERATURE: 98.2 F | SYSTOLIC BLOOD PRESSURE: 127 MMHG | HEART RATE: 60 BPM | WEIGHT: 195 LBS

## 2018-09-26 DIAGNOSIS — I10 BENIGN ESSENTIAL HYPERTENSION: Primary | ICD-10-CM

## 2018-09-26 DIAGNOSIS — I25.728 CORONARY ARTERY DISEASE OF AUTOLOGOUS BYPASS GRAFT WITH STABLE ANGINA PECTORIS (H): ICD-10-CM

## 2018-09-26 DIAGNOSIS — C64.9 RENAL CELL CANCER, UNSPECIFIED LATERALITY (H): ICD-10-CM

## 2018-09-26 DIAGNOSIS — N18.4 CKD (CHRONIC KIDNEY DISEASE) STAGE 4, GFR 15-29 ML/MIN (H): ICD-10-CM

## 2018-09-26 DIAGNOSIS — E78.5 HYPERLIPIDEMIA LDL GOAL <130: ICD-10-CM

## 2018-09-26 DIAGNOSIS — M43.10 SPONDYLOLISTHESIS, UNSPECIFIED SPINAL REGION: ICD-10-CM

## 2018-09-26 PROCEDURE — 93000 ELECTROCARDIOGRAM COMPLETE: CPT | Performed by: INTERNAL MEDICINE

## 2018-09-26 PROCEDURE — 99214 OFFICE O/P EST MOD 30 MIN: CPT | Performed by: INTERNAL MEDICINE

## 2018-09-26 RX ORDER — ISOSORBIDE MONONITRATE 30 MG/1
30 TABLET, EXTENDED RELEASE ORAL DAILY
Qty: 30 TABLET | Refills: 1 | Status: ON HOLD | OUTPATIENT
Start: 2018-09-26 | End: 2018-10-02

## 2018-09-26 RX ORDER — NITROGLYCERIN 0.4 MG/1
TABLET SUBLINGUAL
Qty: 25 TABLET | Refills: 3 | Status: SHIPPED | OUTPATIENT
Start: 2018-09-26 | End: 2020-01-30

## 2018-09-26 RX ORDER — ISOSORBIDE MONONITRATE 30 MG/1
30 TABLET, EXTENDED RELEASE ORAL DAILY
Qty: 30 TABLET | Refills: 1 | Status: ON HOLD | OUTPATIENT
Start: 2018-09-26 | End: 2018-09-29

## 2018-09-26 NOTE — MR AVS SNAPSHOT
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)        Renal cell cancer, unspecified laterality (H)        Spondylolisthesis, unspecified spinal region        Hyperlipidemia LDL goal <130        CKD (chronic kidney disease) stage 4, GFR 15-29 ml/min (H)           Follow-ups after your visit        Future tests that were ordered for you today     Open Future Orders        Priority Expected Expires Ordered    **Basic metabolic panel FUTURE 1yr Routine 8/27/2019 9/26/2019 9/26/2018    **CBC with platelets FUTURE 1yr Routine 8/27/2019 9/26/2019 9/26/2018            Who to contact     If you have questions or need follow up information about today's clinic visit or your schedule please contact [facility] directly at [phone].  Normal or non-critical lab and imaging results will be communicated to you by [employer], letter or phone within 4 business days after the clinic has received the results. If you do not hear from us within 7 days, please contact the clinic through [employer] or phone. If you have a critical or abnormal lab result, we will notify you by phone as soon as possible.  Submit refill requests through [employer] or call your pharmacy and they will forward the refill request to us. Please allow 3 business days for your refill to be completed.          Additional Information About Your Visit        MyChart Information     [employer] lets you send messages to your doctor, view your test results, renew your prescriptions, schedule appointments and more. To sign up, go to [email or web address] . Click on \"Log in\" on the left side of " "the screen, which will take you to the Welcome page. Then click on \"Sign up Now\" on the right side of the page.     You will be asked to enter the access code listed below, as well as some personal information. Please follow the directions to create your username and password.     Your access code is: [email or web address]  Expires: 2018  2:32 PM     Your access code will  in 90 days. If you need help or a new code, please call your [facility] clinic or [phone].        Care EveryWhere ID     This is your Care EveryWhere ID. This could be used by other organizations to access your [facility] medical records  [id number]        Your Vitals Were     Pulse Temperature Height Pulse Oximetry BMI (Body Mass Index)       60 98.2  F (36.8  C) (Oral) 5' 10\" (1.778 m) 96% 27.98 kg/m2        Blood Pressure from Last 3 Encounters:   18 127/58   18 130/60   18 149/82    Weight from Last 3 Encounters:   18 195 lb (88.5 kg)   18 194 lb (88 kg)   18 190 lb (86.2 kg)              We Performed the Following     EKG 12-lead complete w/read - Clinics          Today's Medication Changes          These changes are accurate as of 18 11:59 PM.  If you have any questions, ask your nurse or doctor.               Start taking these medicines.        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 If symptoms persist 5 minutes after 1st dose call 911.   Quantity:  25 tablet   Refills:  3       * Notice:  This list has 2 medication(s) that are the same as other medications prescribed for you. Read the directions carefully, and ask your doctor or other care provider to review them with you.         Where to get your medicines      These medications were sent to [facility] - [place], [place] - [address], [address]  [address], [address], [facility] [address]     Phone:  [phone]     isosorbide mononitrate 30 MG 24 hr tablet    nitroGLYcerin 0.4 MG sublingual tablet         These medications were sent to [facility] - [facility] [address]  [address], [facility] [address]     Phone:  [phone]     isosorbide mononitrate 30 MG 24 hr tablet                Primary Care Provider Office Phone # Fax #    Eusebio Oliveira [phone] [address]  [facility] [address]        Equal Access to Services     MAX CHAUDHARY AH: Hadii aad ku hadasho Soomaali, waaxda luqadaha, qaybta kaalmada adeegyada, waxay idiin hayaan seble khmanjit de la cruz . [facility] [phone].    ATENCIÓN: Si habla español, tiene a nichole disposición servicios gratuitos de asistencia lingüística. [facility] [phone].    We comply with applicable federal civil rights laws and [place] laws. We do not discriminate on the basis of race, color, national origin, age, disability, sex, sexual orientation, or gender identity.            Thank you!     Thank you for choosing [facility]  for your care. Our goal is always to provide you with excellent care. Hearing back from our patients is one way we  can continue to improve our services. Please take a few minutes to complete the written survey that you may receive in the mail after your visit with us. Thank you!             Your Updated Medication List - Protect others around you: Learn how to safely use, store and throw away your medicines at www.disposemymeds.org.          This list is accurate as of 9/26/18 11:59 PM.  Always use your most recent med list.                   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 If symptoms persist 5 minutes after 1st dose call 911.    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 Read the directions carefully, and ask your doctor or other care provider to review them with you.

## 2018-09-26 NOTE — PROGRESS NOTES
SUBJECTIVE:   Augie Powell is a 82 year old male who presents to clinic today for the following health issues:    Hypertension Follow-up           Outpatient blood pressures are being checked at home.  Results are 160 or higher over 99 .            Low Salt Diet: no added salt    Amount of exercise or physical activity: 6-7 days/week for an average of 30-45 minutes    Problems taking medications regularly: No    Medication side effects: none    Diet: regular (no restrictions)    Augie is concerned of his elevated BP. He takes home records regularly with results of 160 or higher over 99. He goes to the bathroom every 2-3 hours at night, denies SOB, and sleeps on one pillow.     Chest Pain  Augie reports feeling a burning, heavy pressure when he was walking yesterday (9/25/2018). He states that the symptoms occur when he walks and that they go away when he stops or sits. He has tried walking at different times of the day but symptoms are recurrent regardless of time. He also reports that at the end of his walks with his wife, that his symptoms go away. Denies acid tastes.    Right Calf  Augie complains of a right calf pain that may have been caused from the lack of exercise.     Problem list and histories reviewed & adjusted, as indicated.  Additional history: as documented    Current Outpatient Prescriptions   Medication Sig Dispense Refill     amLODIPine (NORVASC) 5 MG tablet TAKE 1 TABLET EVERY DAY 30 tablet 0     benazepril (LOTENSIN) 20 MG tablet TAKE 1 TABLET EVERY DAY 30 tablet 0     No Known Allergies    Reviewed and updated as needed this visit by clinical staff       Reviewed and updated as needed this visit by Provider         ROS:  CONSTITUTIONAL: NEGATIVE for fever, chills, change in weight  INTEGUMENTARY/SKIN: NEGATIVE for worrisome rashes, moles or lesions  EYES: NEGATIVE for vision changes or irritation  ENT/MOUTH: NEGATIVE for ear, mouth and throat problems  RESP: NEGATIVE for significant cough  "or SOB  BREAST: NEGATIVE for masses, tenderness or discharge  CV: NEGATIVE for palpitations or peripheral edema; POSITIVE for chest pain  GI: NEGATIVE for nausea, abdominal pain, heartburn, or change in bowel habits  : NEGATIVE for frequency, dysuria, or hematuria  MUSCULOSKELETAL: NEGATIVE for significant arthralgias or myalgia; POSITIVE for left leg swelling  NEURO: NEGATIVE for weakness, dizziness or paresthesias  ENDOCRINE: NEGATIVE for temperature intolerance, skin/hair changes  HEME: NEGATIVE for bleeding problems  PSYCHIATRIC: NEGATIVE for changes in mood or affect    This document serves as a record of the services and decisions personally performed and made by Eusebio Oliveira MD. It was created on his behalf by Kal Garcia, a trained medical scribe. The creation of this document is based the provider's statements to the medical scribe.  Scribe Kal Garcia 12:14 PM, September 26, 2018    OBJECTIVE:   /58 (BP Location: Left arm, Cuff Size: Adult Large)  Pulse 60  Temp 98.2  F (36.8  C) (Oral)  Ht 1.778 m (5' 10\")  Wt 88.5 kg (195 lb)  SpO2 96%  BMI 27.98 kg/m2  Body mass index is 27.98 kg/(m^2).  Exam  HEENT funduscopic exam was within normal limits tympanic membranes are normal nose and throat were clear  Neck was supple without adenopathy thyromegaly or masses  Chest clear to auscultation and percussion  Cardiovascular S1 and S2 are physiologic without murmurs or gallop rhythm was regular  Abdomen bowel sounds are normal there are no palpable masses organomegaly or tenderness   Extremities were nontender without any edema  Deep tendon reflexes were intact  Skin was clear  Pulses were two over 4+ bilaterally symmetrical throughout      ASSESSMENT/PLAN:   1. 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 If symptoms persist 5 minutes after 1st dose call 911.  Dispense: 25 tablet; Refill: 3  - isosorbide mononitrate (IMDUR) 30 MG 24 hr tablet; Take 1 tablet (30 mg) by mouth daily  Dispense: 30 tablet; Refill: 1    2. Benign essential hypertension  - Basic metabolic panel  - CBC with platelets    3. Renal cell cancer, unspecified laterality (H)    4. Spondylolisthesis, unspecified spinal region    5. Hyperlipidemia LDL goal <130    6. CKD (chronic kidney disease) stage 4, GFR 15-29 ml/min (H)      Eusebio Oliveira MD  [facility]    The information in this document, created by the medical scribe for me, accurately reflects the services I personally performed and the decisions made by me. I have reviewed and approved this document for accuracy prior to leaving the patient care area.  1:12 PM, 09/26/18

## 2018-09-28 ENCOUNTER — HOSPITAL ENCOUNTER (INPATIENT)
Facility: CLINIC | Age: 83
LOS: 4 days | Discharge: HOME | DRG: 247 | End: 2018-10-02
Attending: EMERGENCY MEDICINE | Admitting: INTERNAL MEDICINE
Payer: MEDICARE

## 2018-09-28 ENCOUNTER — TELEPHONE (OUTPATIENT)
Dept: FAMILY MEDICINE | Facility: CLINIC | Age: 83
End: 2018-09-28

## 2018-09-28 ENCOUNTER — APPOINTMENT (OUTPATIENT)
Dept: GENERAL RADIOLOGY | Facility: CLINIC | Age: 83
DRG: 247 | End: 2018-09-28
Attending: EMERGENCY MEDICINE
Payer: MEDICARE

## 2018-09-28 DIAGNOSIS — I21.4 NSTEMI (NON-ST ELEVATED MYOCARDIAL INFARCTION) (H): ICD-10-CM

## 2018-09-28 DIAGNOSIS — Z98.61 STATUS POST PERCUTANEOUS TRANSLUMINAL CORONARY ANGIOPLASTY: Primary | ICD-10-CM

## 2018-09-28 LAB
ANION GAP SERPL CALCULATED.3IONS-SCNC: 7 MMOL/L (ref 3–14)
BASOPHILS # BLD AUTO: 0 10E9/L (ref 0–0.2)
BASOPHILS NFR BLD AUTO: 0.2 %
BUN SERPL-MCNC: 38 MG/DL (ref 7–30)
CALCIUM SERPL-MCNC: 8.6 MG/DL (ref 8.5–10.1)
CHLORIDE SERPL-SCNC: 104 MMOL/L (ref 94–109)
CO2 SERPL-SCNC: 26 MMOL/L (ref 20–32)
CREAT SERPL-MCNC: 2.37 MG/DL (ref 0.66–1.25)
DIFFERENTIAL METHOD BLD: ABNORMAL
EOSINOPHIL # BLD AUTO: 0.2 10E9/L (ref 0–0.7)
EOSINOPHIL NFR BLD AUTO: 2.2 %
ERYTHROCYTE [DISTWIDTH] IN BLOOD BY AUTOMATED COUNT: 12.9 % (ref 10–15)
GFR SERPL CREATININE-BSD FRML MDRD: 26 ML/MIN/1.7M2
GLUCOSE SERPL-MCNC: 110 MG/DL (ref 70–99)
HCT VFR BLD AUTO: 38.4 % (ref 40–53)
HGB BLD-MCNC: 13.4 G/DL (ref 13.3–17.7)
IMM GRANULOCYTES # BLD: 0 10E9/L (ref 0–0.4)
IMM GRANULOCYTES NFR BLD: 0.4 %
INTERPRETATION ECG - MUSE: NORMAL
LYMPHOCYTES # BLD AUTO: 1.9 10E9/L (ref 0.8–5.3)
LYMPHOCYTES NFR BLD AUTO: 18.7 %
MCH RBC QN AUTO: 32.1 PG (ref 26.5–33)
MCHC RBC AUTO-ENTMCNC: 34.9 G/DL (ref 31.5–36.5)
MCV RBC AUTO: 92 FL (ref 78–100)
MONOCYTES # BLD AUTO: 0.7 10E9/L (ref 0–1.3)
MONOCYTES NFR BLD AUTO: 7 %
NEUTROPHILS # BLD AUTO: 7.2 10E9/L (ref 1.6–8.3)
NEUTROPHILS NFR BLD AUTO: 71.5 %
NRBC # BLD AUTO: 0 10*3/UL
NRBC BLD AUTO-RTO: 0 /100
NT-PROBNP SERPL-MCNC: 4282 PG/ML (ref 0–1800)
PLATELET # BLD AUTO: 168 10E9/L (ref 150–450)
POTASSIUM SERPL-SCNC: 5 MMOL/L (ref 3.4–5.3)
RBC # BLD AUTO: 4.18 10E12/L (ref 4.4–5.9)
SODIUM SERPL-SCNC: 137 MMOL/L (ref 133–144)
TROPONIN I SERPL-MCNC: 2.75 UG/L (ref 0–0.04)
WBC # BLD AUTO: 10.1 10E9/L (ref 4–11)

## 2018-09-28 PROCEDURE — 25000128 H RX IP 250 OP 636: Performed by: EMERGENCY MEDICINE

## 2018-09-28 PROCEDURE — 96365 THER/PROPH/DIAG IV INF INIT: CPT

## 2018-09-28 PROCEDURE — 99285 EMERGENCY DEPT VISIT HI MDM: CPT | Mod: 25

## 2018-09-28 PROCEDURE — 25000132 ZZH RX MED GY IP 250 OP 250 PS 637: Mod: GY | Performed by: EMERGENCY MEDICINE

## 2018-09-28 PROCEDURE — 84484 ASSAY OF TROPONIN QUANT: CPT | Performed by: EMERGENCY MEDICINE

## 2018-09-28 PROCEDURE — 80048 BASIC METABOLIC PNL TOTAL CA: CPT | Performed by: EMERGENCY MEDICINE

## 2018-09-28 PROCEDURE — A9270 NON-COVERED ITEM OR SERVICE: HCPCS | Mod: GY | Performed by: EMERGENCY MEDICINE

## 2018-09-28 PROCEDURE — 99223 1ST HOSP IP/OBS HIGH 75: CPT | Mod: AI | Performed by: INTERNAL MEDICINE

## 2018-09-28 PROCEDURE — 21000001 ZZH R&B HEART CARE

## 2018-09-28 PROCEDURE — 85025 COMPLETE CBC W/AUTO DIFF WBC: CPT | Performed by: EMERGENCY MEDICINE

## 2018-09-28 PROCEDURE — 93005 ELECTROCARDIOGRAM TRACING: CPT

## 2018-09-28 PROCEDURE — 83880 ASSAY OF NATRIURETIC PEPTIDE: CPT | Performed by: EMERGENCY MEDICINE

## 2018-09-28 PROCEDURE — 71046 X-RAY EXAM CHEST 2 VIEWS: CPT

## 2018-09-28 RX ORDER — ASPIRIN 81 MG/1
324 TABLET, CHEWABLE ORAL ONCE
Status: COMPLETED | OUTPATIENT
Start: 2018-09-28 | End: 2018-09-28

## 2018-09-28 RX ADMIN — ASPIRIN 81 MG 324 MG: 81 TABLET ORAL at 21:46

## 2018-09-28 RX ADMIN — HEPARIN SODIUM 950 UNITS/HR: 10000 INJECTION, SOLUTION INTRAVENOUS at 23:25

## 2018-09-28 RX ADMIN — Medication 4000 UNITS: at 23:26

## 2018-09-28 ASSESSMENT — ENCOUNTER SYMPTOMS: SHORTNESS OF BREATH: 1

## 2018-09-28 NOTE — IP AVS SNAPSHOT
MRN:[id number]                      After Visit Summary   9/28/2018    Augie Powell    MRN: [id number]           Thank you!     Thank you for choosing [facility] for your care. Our goal is always to provide you with excellent care. Hearing back from our patients is one way we can continue to improve our services. Please take a few minutes to complete the written survey that you may receive in the mail after you visit with us. Thank you!        Patient Information     Date Of Birth          1935        Designated Caregiver       Most Recent Value    Caregiver    Will someone help with your care after discharge? yes    Name of designated caregiver Khushboo     Phone number of caregiver [phone]    Caregiver address [address] [id number]      About your hospital stay     You were admitted on:  September 28, 2018 You last received care in the:  [facility]    You were discharged on:  October 2, 2018        Reason for your hospital stay       You were admitted with chest pain and underwent Angiogram.                  Who to Call     For medical emergencies, please call 911.  For non-urgent questions about your medical care, please call your primary care provider or clinic, [phone]          Attending Provider     Provider Specialty    Sakshi Pena MD Emergency Medicine    [facility], Alessio Mansfield MD Internal Medicine       Primary Care Provider Office Phone # Fax #    Eusebio Oliveira [phone]       When to contact your care team       Call your primary doctor if you have any of the following:  increased shortness of breath, increased pain.                  After Care Instructions     Activity       Your activity upon discharge: activity as tolerated and no driving for today.  Outpatient cardiac rehab.            Diet       Low-salt, low-fat diet.            Discharge Instructions       Aggressive incentive  spirometry.  Avoid Nephrotoxic drugs            Monitor and record       blood pressure daily  pulse daily, review on provider visit for optimization of medical therapy.                  Follow-up Appointments     Follow-up and recommended labs and tests        Follow-up with PCP in 1 week [post hospital discharge follow-up].  Check kidney function tests in 1 week, optimize dose of ace inhibitor.  Monitor liver function tests in 3 months/symptomatic while on statin therapy.  Consider neurocognitive assessment.                  Your next 10 appointments already scheduled     Oct 02, 2018  2:10 PM CDT   LAB with [facility]   [facility] AT [place] ([facility])    [address] [place] [address] [address]  [facility] [address]   [phone]           Please do not eat 10-12 hours before your appointment if you are coming in fasting for labs on lipids, cholesterol, or glucose (sugar). This does not apply to pregnant women. Water, hot tea and black coffee (with nothing added) are okay. Do not drink other fluids, diet soda or chew gum.            Oct 03, 2018 11:00 AM CDT   CTA ANGIOGRAM CORONARY ARTERY with SCICT1   [facility] (Cardiovascular Imaging at [facility])    [address] [place] [address]  [address]  [facility] [address]   [phone]           How do I prepare for my exam? (Food and drink instructions) Follow the instructions below: The day before your exam, drink extra fluids at least six 8-ounce glasses (unless your doctor wants you to restrict your fluids). No caffeine and no smoking the day of the test.  Do not eat or drink for 2 hours before your exam. You may take your morning medicines with small sips of water.  How do I prepare for my exam? (Other instructions) Follow the instructions below: If you take Viagra, Levitra or Cialis, stop taking it for 48 hours before your test.  For patients with diabetes: You may need a blood test (creatinine test)  within 30 days of your exam; the Cardiologist/Radiologist may require you to get this test prior to your exam  If you are diabetic and take oral hypoglycemics, do not take them on the day of your test.  What should I wear: Please wear loose clothing, such as a sweat suit or jogging clothes. Avoid snaps, zippers and other metal. We may ask you to undress and put on a hospital gown.  How long does the exam take: Please allow two hours for this test.  What should I bring: Bring any scans or X-rays taken at other hospitals, if similar tests were done. Also bring a list of your medicines, including vitamins, minerals and over-the-counter drugs. It is safest to leave personal items at home.  Do I need a : No  is needed.  What do I need to tell my doctor? Be sure to tell your doctor: * If you have any allergies, including any reaction to contrast. * If you are breastfeeding or there is a chance you are pregnant.  What should I do after the exam: No restrictions, You may resume normal activities.  What is this test: This test looks at your heart and heart arteries. A CT (computed tomography) scan is a series of pictures that allows us to look inside your body. Our scanner creates images of the body in cross sections, much like slices of bread. This helps us see any problems more clearly. Before the scan, we will give you contrast fluid (X-ray dye) through an IV (small needle in your arm). The contrast helps your blood vessels show up more clearly on the pictures.  Who should I call with questions: If you have any questions, please call the Imaging Department where you will have your exam. Directions, parking instructions, and other information is available on our website, [email or web address].            Oct 09, 2018  1:00 PM CDT   Office Visit with Eusebio Oliveira MD   [facility]    [address] [facility] [address] [phone]           Bring a current list of  meds and any records pertaining to this visit. For Physicals, please bring immunization records and any forms needing to be filled out. Please arrive 10 minutes early to complete paperwork.            Oct 09, 2018  2:45 PM CDT   Cardiac Evaluation with RH CARDIAC REHAB 4   [facility] ([facility])    [address], [address]  [facility] [id number] [phone]            Oct 12, 2018  2:10 PM CDT   LAB with [facility] LAB   [facility] AT [place] ([facility])    [address] [place] [address] [address]  [facility] [id number] [phone]           Please do not eat 10-12 hours before your appointment if you are coming in fasting for labs on lipids, cholesterol, or glucose (sugar). This does not apply to pregnant women. Water, hot tea and black coffee (with nothing added) are okay. Do not drink other fluids, diet soda or chew gum.            Oct 12, 2018  3:10 PM CDT   Return Visit with Ofe Wheatley PA-C   [facility] ([facility])    [address]  [facility] [id number] [phone] OPT 2              Additional Services     CARDIAC REHAB REFERRAL       Patient may choose their preference of the site for Cardiac Rehab.            CARDIAC REHAB REFERRAL       If you have not heard from the scheduling office within 2 business days, please call [phone] for all locations, with the exception of [place], please call [phone] and [place], please call [phone].    Please be aware that coverage of these services is subject to the terms and limitations of your health insurance plan.  Call member services at your health plan with any benefit or coverage questions.            Med Therapy Manage Referral       Your provider has referred you to: **[facility] Scheduling (numerous locations) [phone]    [email or web address]  [facility]: [facility] [place] [facility] [place] [phone]   [email or web address]    Reason for Referral: NSTEMI with chronic med changes    The [facility] Management department will contact you to schedule an appointment.  You may also schedule the appointment by calling [phone].  For [place] [facility] [place] patients, please call [phone] to confirm/schedule your appointment on the next business day.    This service is designed to help you get the most from your medications.  A specially trained Pharmacist will work closely with you and your providers to solve any questions, concerns, issues or problems related to your medications.    Please bring all of your prescription and non-prescription medications (such as vitamins, over-the-counter medications, and herbals) or a detailed medication list to your appointment.    If you have a glucose meter or other home monitoring information, please also bring this to your appointment (i.e. blood glucose log, blood pressure log, pain log, etc.).            Follow-Up with Cardiac Advanced Practice Provider           Follow-Up with Cardiologist                 Future tests that were ordered for you     Basic metabolic panel                 Further instructions from your care team         Discharge Instructions for Coronary Angioplasty and Stenting  During your angioplasty, a doctor inserts a thin tube called a catheter into a blood vessel in your groin or wrist. The catheter is pushed through your blood vessel to a blocked area in one of your heart s arteries. The doctor inflates a tiny balloon at the tip of the catheter and stretches the blocked vessel so blood can flow freely. The balloon is then deflated and removed with the catheter. The doctor may also insert a metal mesh tube called a stent in the blocked vessel. The stent helps the vessel stay  open. You may get several stents if you have blockages in more than one of your arteries.  Home care    Ask someone to drive you to your appointments for the next few days.    Rest for 2 to 3 days after the procedure. Most people are able to go back to normal activity within a few days.    Take your temperature and check your incision for signs of infection every day for a week. Signs of infection include redness, swelling, drainage, or warmth. It is normal to have a small bruise or bump where the catheter was inserted.    Take your medicines exactly as directed. Don t skip doses. It is important to take aspirin or other similar medicines for as long as your doctor advises. If you were also prescribed clopidogrel, prasugrel, or ticagrelor, it is very important to take these medicines, as well. These medicines prevent clots that could cause a heart attack. If you have a problem with any of your medicines, call healthcare provider right away. Call your provider right away if you have extra bleeding, but go to the emergency room if the bleeding can't be controlled.    Unless told otherwise, drink plenty of fluids to help flush your body of the dye that was used during your angioplasty. Let your healthcare provider know if the color of your urine changes and doesn't return to normal color.    Eat a healthy diet that is low in fat, salt, and cholesterol. Ask your healthcare team for menus and other diet information.    Exercise according to your healthcare team's recommendation. Depending on your case, your team may recommend you start a cardiac rehabilitation program. Cardiac rehab is an exercise program in which trained healthcare staff monitor your progress and stress on your heart while you exercise. Ask how to enroll if your team recommends this program.    Don't swim or take a bath for 5 to 7 days. You may shower the day after the procedure. This keeps the incision site from getting wet and infected until the skin  and artery can heal.  Follow-up care    Make a follow-up appointment as directed by our staff. Follow-up appointments are usually scheduled for 2 to 4 weeks after an angioplasty or coronary stent procedure.    Have a yearly checkup to make sure you are still doing well and not having any new symptoms.    Don't wait for a follow-up appointment if your medicines are not working or you are having heart-related symptoms.     When to call your healthcare provider  Call your healthcare provider right away if you have any of the following:    Chest pain or a return of the symptoms you had prior to the angioplasty    Constant or increasing pain or numbness in your leg, or if your leg looks blue or feels cold    Fever above 100.4 F (38.0 C) or other signs of infection (redness, swelling, drainage, or warmth at the incision site of the leg or wrist)    Shortness of breath    Bleeding, bruising, or a large swelling where the catheter (tube) was inserted    Blood in your urine    Black or tarry stools    Feeling faint    Difficulty speaking or weakness in any muscle   Date Last Reviewed: 10/1/2016    6888-7614 [employer]. [address] [place] [address]. All rights reserved. This information is not intended as a substitute for professional medical care. Always follow your healthcare professional's instructions.            Pending Results     Date and Time Order Name Status Description    10/1/2018 1521 EKG 12-lead, tracing only  Preliminary     9/29/2018 0712 EKG 12-lead, tracing only Preliminary             Statement of Approval     Ordered          10/02/18 1232  I have reviewed and agree with all the recommendations and orders detailed in this document.  EFFECTIVE NOW     Approved and electronically signed by:  Magnolia Rodriguez MD             Admission Information     Date & Time Department Dept. Phone    9/28/2018 [facility] [phone]      Your Vitals Were      "Blood Pressure Pulse Temperature Respirations Height Weight    142/69 58 98.2  F (36.8  C) (Oral) 16 1.778 m (5' 10\") 86.5 kg (190 lb 12.8 oz)    Pulse Oximetry BMI (Body Mass Index)                98% 27.38 kg/m2          [facility] [employer] [facility] Information     [facility] [employer] [facility] lets you send messages to your doctor, view your test results, renew your prescriptions, schedule appointments and more. To sign up, go to [email or web address] . Click on \"Log in\" on the left side of the screen, which will take you to the Welcome page. Then click on \"Sign up Now\" on the right side of the page.     You will be asked to enter the access code listed below, as well as some personal information. Please follow the directions to create your username and password.     Your access code is: [email or web address]  Expires: 2018  2:32 PM     Your access code will  in 90 days. If you need help or a new code, please call your [facility] clinic or [phone].        Care EveryWhere ID     This is your Care EveryWhere ID. This could be used by other organizations to access your [facility] medical records  [id number]        Equal Access to Services     MAX CHAUDHARY AH: Alvin Alas, waj carlosda prashant, qaybta kaalmada adehussainyada, dejan horn. So [facility] [phone].    ATENCIÓN: Si habla español, tiene a nichole disposición servicios gratuitos de asistencia lingüística. Llame al [phone].    We comply with applicable federal civil rights laws and [place] laws. We do not discriminate on the basis of race, color, national origin, age, disability, sex, sexual orientation, or gender identity.               Review of your medicines      START taking        Dose / Directions    aspirin 81 MG EC tablet   Used for:  Status post percutaneous transluminal coronary angioplasty        Dose:  81 mg   Start taking on:  10/3/2018   Take 1 tablet (81 mg) by mouth daily   Quantity:  30 tablet   Refills:  0       atorvastatin 40 MG tablet "   Commonly known as:  LIPITOR   Used for:  NSTEMI (non-ST elevated myocardial infarction) (H)        Dose:  40 mg   Take 1 tablet (40 mg) by mouth every evening   Quantity:  30 tablet   Refills:  0       clopidogrel 75 MG tablet   Commonly known as:  PLAVIX   Used for:  NSTEMI (non-ST elevated myocardial infarction) (H)        Dose:  75 mg   Start taking on:  10/3/2018   Take 1 tablet (75 mg) by mouth daily   Quantity:  30 tablet   Refills:  0       metoprolol tartrate 25 MG tablet   Commonly known as:  LOPRESSOR   Used for:  NSTEMI (non-ST elevated myocardial infarction) (H)        Dose:  12.5 mg   Take 0.5 tablets (12.5 mg) by mouth 2 times daily   Quantity:  30 tablet   Refills:  0         CONTINUE these medicines which have NOT CHANGED        Dose / Directions    amLODIPine 5 MG tablet   Commonly known as:  NORVASC   Used for:  Benign essential hypertension        TAKE 1 TABLET EVERY DAY   Quantity:  30 tablet   Refills:  0       benazepril 20 MG tablet   Commonly known as:  LOTENSIN   Used for:  Benign essential hypertension        TAKE 1 TABLET EVERY DAY   Quantity:  30 tablet   Refills:  0       CALCIUM PO        Dose:  1 tablet   Take 1 tablet by mouth daily   Refills:  0       MAGNESIUM PO        Dose:  1 tablet   Take 1 tablet by mouth daily   Refills:  0       nitroGLYcerin 0.4 MG sublingual tablet   Commonly known as:  NITROSTAT   Used for:  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 If symptoms persist 5 minutes after 1st dose call 911.   Quantity:  25 tablet   Refills:  3         STOP taking     isosorbide mononitrate 30 MG 24 hr tablet   Commonly known as:  IMDUR                Where to get your medicines      These medications were sent to [facility] - [address]  [address], [place] [address]     Phone:  [phone]     aspirin  81 MG EC tablet    atorvastatin 40 MG tablet    clopidogrel 75 MG tablet    metoprolol tartrate 25 MG tablet                Protect others around you: Learn how to safely use, store and throw away your medicines at www.disposemymeds.org.             Medication List: This is a list of all your medications and when to take them. Check marks below indicate your daily home schedule. Keep this list as a reference.      Medications           Morning Afternoon Evening Bedtime As Needed    amLODIPine 5 MG tablet   Commonly known as:  NORVASC   TAKE 1 TABLET EVERY DAY   Last time this was given:  5 mg on 10/2/2018  8:59 AM   Next Dose Due:  Tomorrow 10/3/18                                   aspirin 81 MG EC tablet   Take 1 tablet (81 mg) by mouth daily   Start taking on:  10/3/2018   Last time this was given:  81 mg on 10/2/2018  9:03 AM   Next Dose Due:  Tomorrow 10/3/18                                   atorvastatin 40 MG tablet   Commonly known as:  LIPITOR   Take 1 tablet (40 mg) by mouth every evening   Last time this was given:  40 mg on 10/1/2018  8:50 PM   Next Dose Due:  Today 10/2/18                                   benazepril 20 MG tablet   Commonly known as:  LOTENSIN   TAKE 1 TABLET EVERY DAY   Next Dose Due:  Per routine                                  CALCIUM PO   Take 1 tablet by mouth daily   Next Dose Due:  Per routine                                  clopidogrel 75 MG tablet   Commonly known as:  PLAVIX   Take 1 tablet (75 mg) by mouth daily   Start taking on:  10/3/2018   Last time this was given:  75 mg on 10/2/2018  8:59 AM   Next Dose Due:  Tomorrow 10/3/18                                   MAGNESIUM PO   Take 1 tablet by mouth daily   Next Dose Due:  Return to routine                                metoprolol tartrate 25 MG tablet   Commonly known as:  LOPRESSOR   Take 0.5 tablets (12.5 mg) by mouth 2 times daily   Last time this was given:  12.5 mg on 10/2/2018  9:01 AM   Next Dose Due:  Today  10/2/18                                      nitroGLYcerin 0.4 MG sublingual tablet   Commonly known as:  NITROSTAT   For chest pain place 1 tablet under the tongue every 5 minutes for 3 doses. If symptoms persist 5 minutes after 1st dose call 911.   Next Dose Due:  With chest pain.                                             More Information        Coronary Angiography     The catheter can be placed into the groin, arm, or wrist.   Angiography is a special type of X-ray that lets your doctor view your coronary arteries to see if the blood vessels to your heart are narrowed or blocked.   Before the procedure    Tell your doctor what medicines you take and any allergies you may have.    Tell your doctor if you've had a reaction to contrast dye or have had any kidney problems.    Don t eat or drink anything for at least 6 to 8 hours before the procedure. You will likely be told not to have anything after midnight, the night before the procedure.    A nurse will place an IV catheter in your vein to give fluids, and medicine to relieve pain and help you feel less anxious.    He or she will clean your skin and, if necessary, shave the area where the catheter will be inserted.  During the procedure    Your doctor will place a long, thin tube called a catheter inside an artery in your groin or arm and guide it into your heart.    He or she will inject a contrast dye through the catheter into your blood vessels or heart chambers.    X-rays are taken to show images of the inside of your heart and coronary arteries.  After the procedure      Your doctor or nurse will tell you how long to lie down and keep the insertion site still.    If the insertion site was in your groin, you may need to lie down with your leg still for several hours. If bleeding occurs, a nurse will apply pressure to the area to control it.    A nurse will check your blood pressure and the insertion site frequently to make sure you remain stable after the  procedure.    You may be asked to drink fluid to help flush the contrast liquid out of your system.    Have someone drive you home from the hospital.    If your doctor uses angioplasty to treat a blocked artery, you will stay the night in the hospital.    It s normal to find a small bruise or lump at the insertion site. The lump may be the collagen plug or stitch that you feel, or a small bruise. These common side effects should disappear within a few weeks.  When to call your healthcare provider  Contact your healthcare provider right away if you have any of these:    Chest pain    Pain, swelling, redness, bleeding, or drainage at the insertion site    Severe pain, coldness, or a bluish color in the leg or arm that held the catheter    Fever over 100.4 F (38 C)   Date Last Reviewed: 6/1/2016 2000-2017 [employer]. [address] [place] [address]. All rights reserved. This information is not intended as a substitute for professional medical care. Always follow your healthcare professional's instructions.                Clopidogrel Bisulfate Oral tablet  What is this medicine?  CLOPIDOGREL (kloh PID oh grel) helps to prevent blood clots. This medicine is used to prevent heart attack, stroke, or other vascular events in people who are at high risk.  This medicine may be used for other purposes; ask your health care provider or pharmacist if you have questions.  What should I tell my health care provider before I take this medicine?  They need to know if you have any of the following conditions:    bleeding disorder    bleeding in the brain    planned surgery    stomach or intestinal ulcers    stroke or transient ischemic attack    an unusual or allergic reaction to clopidogrel, other medicines, foods, dyes, or preservatives    pregnant or trying to get pregnant    breast-feeding  How should I use this medicine?  Take this medicine by mouth with a drink of water. Follow the directions on the  prescription label. You may take this medicine with or without food. Take your medicine at regular intervals. Do not take your medicine more often than directed.  Talk to your pediatrician regarding the use of this medicine in children. Special care may be needed.  Overdosage: If you think you have taken too much of this medicine contact a poison control center or emergency room at once.  NOTE: This medicine is only for you. Do not share this medicine with others.  What if I miss a dose?  If you miss a dose, take it as soon as you can. If it is almost time for your next dose, take only that dose. Do not take double or extra doses.  What may interact with this medicine?    aspirin    blood thinners like cilostazol, enoxaparin, ticlopidine, and warfarin    certain medicines for depression like citalopram, fluoxetine, and fluvoxamine    certain medicines for fungal infections like ketoconazole, fluconazole, and voriconazole    certain medicines for HIV infection like delavirdine, efavirenz, and etravirine    certain medicines for seizures like felbamate, oxcarbazepine, and phenytoin    chloramphenicol    fluvastatin    isoniazid, INH    medicines for inflammation like ibuprofen and naproxen    modafinil    nicardipine    over-the counter supplements like echinacea, feverfew, fish oil, garlic, sree, ginkgo, green tea, horse chestnut    quinine    stomach acid blockers like cimetidine, omeprazole, and esomeprazole    tamoxifen    tolbutamide    topiramate    torsemide  This list may not describe all possible interactions. Give your health care provider a list of all the medicines, herbs, non-prescription drugs, or dietary supplements you use. Also tell them if you smoke, drink alcohol, or use illegal drugs. Some items may interact with your medicine.  What should I watch for while using this medicine?  Visit your doctor or health care professional for regular check ups. Do not stop taking your medicine unless your  doctor tells you to.  Notify your doctor or health care professional and seek emergency treatment if you develop breathing problems; changes in vision; chest pain; severe, sudden headache; pain, swelling, warmth in the leg; trouble speaking; sudden numbness or weakness of the face, arm or leg. These can be signs that your condition has gotten worse.  If you are going to have surgery or dental work, tell your doctor or health care professional that you are taking this medicine.  Certain genetic factors may reduce the effect of this medicine. Your doctor may use genetic tests to determine treatment.  What side effects may I notice from receiving this medicine?  Side effects that you should report to your doctor or health care professional as soon as possible:    allergic reactions like skin rash, itching or hives, swelling of the face, lips, or tongue    breathing problems    changes in vision    fever    signs and symptoms of bleeding such as bloody or black, tarry stools; red or dark-brown urine; spitting up blood or brown material that looks like coffee grounds; red spots on the skin; unusual bruising or bleeding from the eye, gums, or nose    sudden weakness    unusual bleeding or bruising  Side effects that usually do not require medical attention (report to your doctor or health care professional if they continue or are bothersome):    constipation or diarrhea    headache    pain in back or joints    stomach upset  This list may not describe all possible side effects. Call your doctor for medical advice about side effects. You may report side effects to FDA at 8-188-FDA-1877.  Where should I keep my medicine?  Keep out of the reach of children.  Store at room temperature of 59 to 86 degrees F (15 to 30 degrees C). Throw away any unused medicine after the expiration date.  NOTE:This sheet is a summary. It may not cover all possible information. If you have questions about this medicine, talk to your doctor,  pharmacist, or health care provider. Copyright  2016 Gold Standard

## 2018-09-28 NOTE — TELEPHONE ENCOUNTER
Reason for call:  Patient reporting a symptom    Symptom or request: heart     Duration (how long have symptoms been present): during the evening (3weeks with heart)  Has been in to ec in the last month.  Dr. Oliveira is [facility].    Have you been treated for this before?  NO    Additional comments:  PT is experiencing chest discomfort, higher /73, constant burning in chest,       Phone Number patient can be reached at:  Home number on file [phone] (home)    Best Time:  ANy     Can we leave a detailed message on this number:  YES    Call taken on 9/28/2018 at 8:16 AM by Sima Park

## 2018-09-28 NOTE — TELEPHONE ENCOUNTER
"  Pt reports having constant burning in chest since yesterday, \"Burning right in the center, upper part of chest.\"  Reports headache yesterday.  Before yesterday, it would only happen if walking or doing something, now it's happening constantly.  During night last night it was terrible. Nothing relieved it/no position change.  Still having pain this morning. Has not eaten anything, has not tried anything for heartburn.  Does report mild weakness.  Denies SOB, nausea, vomiting, dizziness, any referred pain,    Pt has nitroglycerin on hand, and advised to try one, pt had no relief within 5 min.  As sx going on 2 days and more burning, pt has been evaluated for this, advised pt to ER.  Pt will go to ER, and has wife with him.   Raisa Rayo RN           "

## 2018-09-28 NOTE — IP AVS SNAPSHOT
[facility]    [address] [place] [address], [address]    [place] [address]    Phone:  [phone]                                       After Visit Summary   9/28/2018    Augie Powell    MRN: [id number]           After Visit Summary Signature Page     I have received my discharge instructions, and my questions have been answered. I have discussed any challenges I see with this plan with the nurse or doctor.    ..........................................................................................................................................  Patient/Patient Representative Signature      ..........................................................................................................................................  Patient Representative Print Name and Relationship to Patient    ..................................................               ................................................  Date                                   Time    ..........................................................................................................................................  Reviewed by Signature/Title    ...................................................              ..............................................  Date                                               Time          22EPIC Rev 08/18

## 2018-09-29 ENCOUNTER — APPOINTMENT (OUTPATIENT)
Dept: CARDIOLOGY | Facility: CLINIC | Age: 83
DRG: 247 | End: 2018-09-29
Attending: INTERNAL MEDICINE
Payer: MEDICARE

## 2018-09-29 ENCOUNTER — APPOINTMENT (OUTPATIENT)
Dept: ULTRASOUND IMAGING | Facility: CLINIC | Age: 83
DRG: 247 | End: 2018-09-29
Attending: INTERNAL MEDICINE
Payer: MEDICARE

## 2018-09-29 PROBLEM — I24.9 ACS (ACUTE CORONARY SYNDROME) (H): Status: ACTIVE | Noted: 2018-09-29

## 2018-09-29 LAB
ALBUMIN UR-MCNC: NORMAL MG/DL
ANION GAP SERPL CALCULATED.3IONS-SCNC: 7 MMOL/L (ref 3–14)
APPEARANCE UR: NORMAL
BILIRUB UR QL STRIP: NORMAL
BUN SERPL-MCNC: 37 MG/DL (ref 7–30)
CALCIUM SERPL-MCNC: 8.3 MG/DL (ref 8.5–10.1)
CHLORIDE SERPL-SCNC: 109 MMOL/L (ref 94–109)
CHOLEST SERPL-MCNC: 171 MG/DL
CO2 SERPL-SCNC: 25 MMOL/L (ref 20–32)
COLOR UR AUTO: NORMAL
CREAT SERPL-MCNC: 2.25 MG/DL (ref 0.66–1.25)
CREAT UR-MCNC: NORMAL MG/DL
ERYTHROCYTE [DISTWIDTH] IN BLOOD BY AUTOMATED COUNT: 12.8 % (ref 10–15)
GFR SERPL CREATININE-BSD FRML MDRD: 28 ML/MIN/1.7M2
GLUCOSE SERPL-MCNC: 105 MG/DL (ref 70–99)
GLUCOSE UR STRIP-MCNC: NORMAL MG/DL
HBA1C MFR BLD: 5.5 % (ref 0–5.6)
HCT VFR BLD AUTO: 38.9 % (ref 40–53)
HDLC SERPL-MCNC: 40 MG/DL
HGB BLD-MCNC: 13.5 G/DL (ref 13.3–17.7)
HGB UR QL STRIP: NORMAL
KETONES UR STRIP-MCNC: NORMAL MG/DL
LDLC SERPL CALC-MCNC: 118 MG/DL
LEUKOCYTE ESTERASE UR QL STRIP: NORMAL
LMWH PPP CHRO-ACNC: 0.25 IU/ML
LMWH PPP CHRO-ACNC: 0.44 IU/ML
LMWH PPP CHRO-ACNC: 0.53 IU/ML
MCH RBC QN AUTO: 31.6 PG (ref 26.5–33)
MCHC RBC AUTO-ENTMCNC: 34.7 G/DL (ref 31.5–36.5)
MCV RBC AUTO: 91 FL (ref 78–100)
NITRATE UR QL: NORMAL
NONHDLC SERPL-MCNC: 131 MG/DL
PH UR STRIP: NORMAL PH (ref 5–7)
PLATELET # BLD AUTO: 190 10E9/L (ref 150–450)
POTASSIUM SERPL-SCNC: 4.7 MMOL/L (ref 3.4–5.3)
PROT UR-MCNC: NORMAL G/L
PROT/CREAT 24H UR: NORMAL G/G CR (ref 0–0.2)
RBC # BLD AUTO: 4.27 10E12/L (ref 4.4–5.9)
RBC #/AREA URNS AUTO: NORMAL /HPF (ref 0–2)
SODIUM SERPL-SCNC: 141 MMOL/L (ref 133–144)
SOURCE: NORMAL
SP GR UR STRIP: NORMAL (ref 1–1.03)
TRIGL SERPL-MCNC: 67 MG/DL
TROPONIN I SERPL-MCNC: 3.37 UG/L (ref 0–0.04)
TROPONIN I SERPL-MCNC: 3.41 UG/L (ref 0–0.04)
TSH SERPL DL<=0.005 MIU/L-ACNC: 1.82 MU/L (ref 0.4–4)
UROBILINOGEN UR STRIP-MCNC: NORMAL MG/DL (ref 0–2)
WBC # BLD AUTO: 11 10E9/L (ref 4–11)
WBC #/AREA URNS AUTO: NORMAL /HPF

## 2018-09-29 PROCEDURE — 25000128 H RX IP 250 OP 636

## 2018-09-29 PROCEDURE — A9270 NON-COVERED ITEM OR SERVICE: HCPCS | Mod: GY | Performed by: INTERNAL MEDICINE

## 2018-09-29 PROCEDURE — 93005 ELECTROCARDIOGRAM TRACING: CPT

## 2018-09-29 PROCEDURE — 36415 COLL VENOUS BLD VENIPUNCTURE: CPT | Performed by: INTERNAL MEDICINE

## 2018-09-29 PROCEDURE — 93306 TTE W/DOPPLER COMPLETE: CPT | Mod: 26 | Performed by: INTERNAL MEDICINE

## 2018-09-29 PROCEDURE — 25000132 ZZH RX MED GY IP 250 OP 250 PS 637: Mod: GY | Performed by: INTERNAL MEDICINE

## 2018-09-29 PROCEDURE — 84443 ASSAY THYROID STIM HORMONE: CPT | Performed by: INTERNAL MEDICINE

## 2018-09-29 PROCEDURE — 81001 URINALYSIS AUTO W/SCOPE: CPT | Performed by: INTERNAL MEDICINE

## 2018-09-29 PROCEDURE — 85520 HEPARIN ASSAY: CPT | Performed by: INTERNAL MEDICINE

## 2018-09-29 PROCEDURE — 36415 COLL VENOUS BLD VENIPUNCTURE: CPT | Performed by: HOSPITALIST

## 2018-09-29 PROCEDURE — 93010 ELECTROCARDIOGRAM REPORT: CPT | Performed by: INTERNAL MEDICINE

## 2018-09-29 PROCEDURE — 85027 COMPLETE CBC AUTOMATED: CPT | Performed by: INTERNAL MEDICINE

## 2018-09-29 PROCEDURE — 93306 TTE W/DOPPLER COMPLETE: CPT

## 2018-09-29 PROCEDURE — 85520 HEPARIN ASSAY: CPT | Performed by: HOSPITALIST

## 2018-09-29 PROCEDURE — 85520 HEPARIN ASSAY: CPT | Performed by: EMERGENCY MEDICINE

## 2018-09-29 PROCEDURE — 80048 BASIC METABOLIC PNL TOTAL CA: CPT | Performed by: INTERNAL MEDICINE

## 2018-09-29 PROCEDURE — 99222 1ST HOSP IP/OBS MODERATE 55: CPT | Mod: 25 | Performed by: INTERNAL MEDICINE

## 2018-09-29 PROCEDURE — 84484 ASSAY OF TROPONIN QUANT: CPT | Performed by: INTERNAL MEDICINE

## 2018-09-29 PROCEDURE — 25000128 H RX IP 250 OP 636: Performed by: INTERNAL MEDICINE

## 2018-09-29 PROCEDURE — 21000001 ZZH R&B HEART CARE

## 2018-09-29 PROCEDURE — 99233 SBSQ HOSP IP/OBS HIGH 50: CPT | Performed by: HOSPITALIST

## 2018-09-29 PROCEDURE — 83036 HEMOGLOBIN GLYCOSYLATED A1C: CPT | Performed by: INTERNAL MEDICINE

## 2018-09-29 PROCEDURE — 80061 LIPID PANEL: CPT | Performed by: INTERNAL MEDICINE

## 2018-09-29 PROCEDURE — 25500064 ZZH RX 255 OP 636: Performed by: INTERNAL MEDICINE

## 2018-09-29 PROCEDURE — 76775 US EXAM ABDO BACK WALL LIM: CPT

## 2018-09-29 RX ORDER — BISACODYL 10 MG
10 SUPPOSITORY, RECTAL RECTAL DAILY PRN
Status: DISCONTINUED | OUTPATIENT
Start: 2018-09-29 | End: 2018-10-02 | Stop reason: HOSPADM

## 2018-09-29 RX ORDER — ONDANSETRON 4 MG/1
4 TABLET, ORALLY DISINTEGRATING ORAL EVERY 6 HOURS PRN
Status: DISCONTINUED | OUTPATIENT
Start: 2018-09-29 | End: 2018-10-02 | Stop reason: HOSPADM

## 2018-09-29 RX ORDER — ATORVASTATIN CALCIUM 40 MG/1
40 TABLET, FILM COATED ORAL EVERY EVENING
Status: DISCONTINUED | OUTPATIENT
Start: 2018-09-29 | End: 2018-10-02 | Stop reason: HOSPADM

## 2018-09-29 RX ORDER — ONDANSETRON 2 MG/ML
4 INJECTION INTRAMUSCULAR; INTRAVENOUS EVERY 6 HOURS PRN
Status: DISCONTINUED | OUTPATIENT
Start: 2018-09-29 | End: 2018-10-02 | Stop reason: HOSPADM

## 2018-09-29 RX ORDER — POTASSIUM CHLORIDE 1500 MG/1
20 TABLET, EXTENDED RELEASE ORAL
Status: DISCONTINUED | OUTPATIENT
Start: 2018-09-29 | End: 2018-10-01 | Stop reason: HOSPADM

## 2018-09-29 RX ORDER — LIDOCAINE 40 MG/G
CREAM TOPICAL
Status: DISCONTINUED | OUTPATIENT
Start: 2018-09-29 | End: 2018-10-01

## 2018-09-29 RX ORDER — ACETAMINOPHEN 325 MG/1
650 TABLET ORAL EVERY 4 HOURS PRN
Status: DISCONTINUED | OUTPATIENT
Start: 2018-09-29 | End: 2018-10-01

## 2018-09-29 RX ORDER — NITROGLYCERIN 80 MG/1
1 PATCH TRANSDERMAL EVERY MORNING
Status: DISCONTINUED | OUTPATIENT
Start: 2018-09-29 | End: 2018-10-02

## 2018-09-29 RX ORDER — SODIUM CHLORIDE 9 MG/ML
INJECTION, SOLUTION INTRAVENOUS CONTINUOUS
Status: DISCONTINUED | OUTPATIENT
Start: 2018-09-29 | End: 2018-10-02

## 2018-09-29 RX ORDER — NITROGLYCERIN 0.4 MG/1
0.4 TABLET SUBLINGUAL EVERY 5 MIN PRN
Status: DISCONTINUED | OUTPATIENT
Start: 2018-09-29 | End: 2018-10-01

## 2018-09-29 RX ORDER — AMLODIPINE BESYLATE 5 MG/1
5 TABLET ORAL DAILY
Status: DISCONTINUED | OUTPATIENT
Start: 2018-09-29 | End: 2018-10-02 | Stop reason: HOSPADM

## 2018-09-29 RX ORDER — ASPIRIN 81 MG/1
81 TABLET, CHEWABLE ORAL DAILY
Status: DISCONTINUED | OUTPATIENT
Start: 2018-09-29 | End: 2018-09-30

## 2018-09-29 RX ORDER — LORAZEPAM 0.5 MG/1
0.5 TABLET ORAL EVERY 8 HOURS PRN
Status: DISCONTINUED | OUTPATIENT
Start: 2018-09-29 | End: 2018-10-02 | Stop reason: HOSPADM

## 2018-09-29 RX ORDER — AMOXICILLIN 250 MG
2 CAPSULE ORAL 2 TIMES DAILY PRN
Status: DISCONTINUED | OUTPATIENT
Start: 2018-09-29 | End: 2018-10-02 | Stop reason: HOSPADM

## 2018-09-29 RX ORDER — NALOXONE HYDROCHLORIDE 0.4 MG/ML
.1-.4 INJECTION, SOLUTION INTRAMUSCULAR; INTRAVENOUS; SUBCUTANEOUS
Status: DISCONTINUED | OUTPATIENT
Start: 2018-09-29 | End: 2018-10-01

## 2018-09-29 RX ORDER — AMOXICILLIN 250 MG
1 CAPSULE ORAL 2 TIMES DAILY PRN
Status: DISCONTINUED | OUTPATIENT
Start: 2018-09-29 | End: 2018-10-02 | Stop reason: HOSPADM

## 2018-09-29 RX ORDER — ACETAMINOPHEN 650 MG/1
650 SUPPOSITORY RECTAL EVERY 4 HOURS PRN
Status: DISCONTINUED | OUTPATIENT
Start: 2018-09-29 | End: 2018-10-02 | Stop reason: HOSPADM

## 2018-09-29 RX ORDER — MORPHINE SULFATE 2 MG/ML
2-4 INJECTION, SOLUTION INTRAMUSCULAR; INTRAVENOUS
Status: DISCONTINUED | OUTPATIENT
Start: 2018-09-29 | End: 2018-10-02 | Stop reason: HOSPADM

## 2018-09-29 RX ORDER — PRAVASTATIN SODIUM 40 MG
40 TABLET ORAL EVERY EVENING
Status: DISCONTINUED | OUTPATIENT
Start: 2018-09-29 | End: 2018-09-29

## 2018-09-29 RX ADMIN — NITROGLYCERIN 1 PATCH: 0.4 PATCH TRANSDERMAL at 07:09

## 2018-09-29 RX ADMIN — ASPIRIN 325 MG: 325 TABLET, DELAYED RELEASE ORAL at 13:45

## 2018-09-29 RX ADMIN — Medication 12.5 MG: at 08:43

## 2018-09-29 RX ADMIN — Medication 12.5 MG: at 19:57

## 2018-09-29 RX ADMIN — HUMAN ALBUMIN MICROSPHERES AND PERFLUTREN 9 ML: 10; .22 INJECTION, SOLUTION INTRAVENOUS at 15:00

## 2018-09-29 RX ADMIN — ASPIRIN 81 MG 81 MG: 81 TABLET ORAL at 08:43

## 2018-09-29 RX ADMIN — HEPARIN SODIUM 750 UNITS/HR: 10000 INJECTION, SOLUTION INTRAVENOUS at 14:17

## 2018-09-29 RX ADMIN — SODIUM CHLORIDE: 9 INJECTION, SOLUTION INTRAVENOUS at 01:09

## 2018-09-29 RX ADMIN — ATORVASTATIN CALCIUM 40 MG: 40 TABLET, FILM COATED ORAL at 19:57

## 2018-09-29 RX ADMIN — HEPARIN SODIUM 750 UNITS/HR: 10000 INJECTION, SOLUTION INTRAVENOUS at 21:48

## 2018-09-29 RX ADMIN — AMLODIPINE BESYLATE 5 MG: 5 TABLET ORAL at 08:43

## 2018-09-29 RX ADMIN — SODIUM CHLORIDE: 9 INJECTION, SOLUTION INTRAVENOUS at 13:41

## 2018-09-29 ASSESSMENT — ACTIVITIES OF DAILY LIVING (ADL)
WHICH_OF_THE_ABOVE_FUNCTIONAL_RISKS_HAD_A_RECENT_ONSET_OR_CHANGE?: AMBULATION
TOILETING: 0 - INDEPENDENT
FALL_HISTORY_WITHIN_LAST_SIX_MONTHS: NO
CHANGE_IN_FUNCTIONAL_STATUS_SINCE_ONSET_OF_CURRENT_ILLNESS/INJURY: NO
TOILETING: 0-->INDEPENDENT
BATHING: 0-->INDEPENDENT
COGNITION: 0 - NO COGNITION ISSUES REPORTED
ADLS_ACUITY_SCORE: 10
SWALLOWING: 0 - SWALLOWS FOODS/LIQUIDS WITHOUT DIFFICULTY
EATING: 0 - INDEPENDENT
TRANSFERRING: 0 - INDEPENDENT
RETIRED_COMMUNICATION: 0-->UNDERSTANDS/COMMUNICATES WITHOUT DIFFICULTY
AMBULATION: 1 - ASSISTIVE EQUIPMENT
ADLS_ACUITY_SCORE: 10
ADLS_ACUITY_SCORE: 10
COMMUNICATION: 0 - UNDERSTANDS/COMMUNICATES WITHOUT DIFFICULTY
DRESS: 0-->INDEPENDENT
TRANSFERRING: 0-->INDEPENDENT
ADLS_ACUITY_SCORE: 10
AMBULATION: 1-->ASSISTIVE EQUIPMENT
SWALLOWING: 0-->SWALLOWS FOODS/LIQUIDS WITHOUT DIFFICULTY
RETIRED_EATING: 0-->INDEPENDENT
ADLS_ACUITY_SCORE: 10
DRESS: 0 - INDEPENDENT
BATHING: 0 - INDEPENDENT

## 2018-09-29 ASSESSMENT — PAIN DESCRIPTION - DESCRIPTORS: DESCRIPTORS: BURNING

## 2018-09-29 NOTE — PROGRESS NOTES
New admission around midnight.   Pt A/Ox4.  Transfers SBA.  VSS.  Denies pain.  Tele SR.  LS dim.  MCKEON.  Trace edema.  Trop 2.747 & 3.373.  Pt denies CP.  On heparin gtt.  Cardiology to consult.  Plan to have Echo today.  Nephrology to consult-pt has one kidney.  Cr 2.37.  NS 75ml/hr.  Pt voiding w/o difficulty.

## 2018-09-29 NOTE — CONSULTS
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    Patient reports intermittent burning substernal chest pain for the last 2 weeks which can occur with activity and abates with rest.  He denies associated nausea, diaphoresis.  He reports associated shortness of breath.  He does report that the symptoms have become to increase and he did note one episode which occurred at rest.    The patient was evaluated in the emergency department weeks ago due to elevated blood pressure and had a negative stress test for his report.    In the emergency department, patient was noted to have anterior and lateral T wave inversions.  Troponin was elevated at 2.7 and increased to 3.4.  BNP was 4200.  Creatinine was 2.25 which is near his baseline.  He received IV heparin and aspirin.  Due to the resting chest pain he received a nitroglycerin patch which has remained in place and his home Imdur has been held.   He was admitted for further evaluation.    He denies any chest pain today or shortness of breath.   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     Spondylosis with myelopathy, lumbar region        Past Surgical History   Past Surgical History:   Procedure Laterality Date     BACK SURGERY       CHOLECYSTECTOMY       DECOMPRESSION LUMBAR TWO LEVELS  12/1/2014    Procedure: DECOMPRESSION LUMBAR TWO LEVELS;  Surgeon: Remy Harmon MD;  Location: [facility] OR     HERNIA REPAIR       LAMINECTOMY, FUSION LUMBAR ONE LEVEL, COMBINED N/A  12/1/2014    Procedure: COMBINED LAMINECTOMY, FUSION LUMBAR ONE LEVEL;  Surgeon: Remy Harmon MD;  Location: [facility] OR     PHACOEMULSIFICATION CLEAR CORNEA WITH STANDARD INTRAOCULAR LENS IMPLANT  10/22/2012    Procedure: PHACOEMULSIFICATION CLEAR CORNEA WITH STANDARD INTRAOCULAR LENS IMPLANT;  RIGHT EYE PHACOEMULSIFICATION CLEAR CORNEA WITH STANDARD INTRAOCULAR LENS IMPLANT ;  Surgeon: Grupo Granger MD;  Location:  [facility]     right nephrectomy[         Prior to Admission Medications   Prior to Admission Medications   Prescriptions Last Dose Informant Patient Reported? Taking?   CALCIUM PO 9/27/2018 at Unknown time  Yes Yes   Sig: Take 1 tablet by mouth daily   MAGNESIUM PO 9/27/2018  Yes Yes   Sig: Take 1 tablet by mouth daily   amLODIPine (NORVASC) 5 MG tablet 9/28/2018 at am Self No Yes   Sig: TAKE 1 TABLET EVERY DAY   benazepril (LOTENSIN) 20 MG tablet 9/28/2018 at am Self No Yes   Sig: TAKE 1 TABLET EVERY DAY   isosorbide mononitrate (IMDUR) 30 MG 24 hr tablet 9/28/2018 at am Self No Yes   Sig: Take 1 tablet (30 mg) by mouth daily   nitroGLYcerin (NITROSTAT) 0.4 MG sublingual tablet 9/28/2018 at time Self No Yes   Sig: For chest pain place 1 tablet under the tongue every 5 minutes for 3 doses. If symptoms persist 5 minutes after 1st dose call 911.      Facility-Administered Medications: None     Allergies   No Known Allergies    Social History   Social History     Social History     Marital status:      Spouse name: N/A     Number of children: N/A     Years of education: N/A     Occupational History     Not on file.     Social History Main Topics     Smoking status: Former Smoker     Smokeless tobacco: Never Used     Alcohol use 1.2 oz/week     2 Shots of liquor per week     Drug use: No     Sexual activity: Not Currently     Other Topics Concern     Not on file     Social History Narrative       Family History   No family history on file.    Review of Systems   All systems reviewed and negative  except as noted in HPI    Physical Exam   Vital Signs with Ranges  Temp:  [98.1  F (36.7  C)-98.6  F (37  C)] 98.1  F (36.7  C)  Pulse:  [61-74] 61  Heart Rate:  [67-72] 71  Resp:  [10-18] 16  BP: (120-138)/(55-75) 130/61  SpO2:  [95 %-97 %] 96 %  192 lbs 3.2 oz    Constitutional: No apparent distress.   Eyes: No xanthelasma or conjunctivitis  HEENT: Moist oral mucosa  Respiratory: Clear to auscultation bilaterally. No crackles or wheezes.  Cardiovascular: Regular rate and rhythm. Normal S1 and S2.  2 out of 6 systolic ejection murmur. No extra heart sounds. No JVD.  Normal carotid upstrokes  Lymph/Hematologic: No purpura or petechiae.  Skin: No stasis dermatitis. No major rashes.  Extremities: No peripheral edema.  Neurologic: Moving all extremities. No facial assymmetry.  Psychiatric: Alert and oriented. Answers questions appropriately..    Data  creatinine 2.2    Assessment & Plan   Augie Powell is a 82 year old male who was admitted on 9/28/2018 with NSTEMI.    #1 NSTEMI  #2 CKD, stage III  -Continue aspirin, heparin gtt  -Coronary angiogram +/-PCI Monday, risks and benefits discussed  -TTE pending  -Plavix load pending results of TTE  -Continue pravastatin 40 mg daily, metoprolol 12.5 BID and nitroglycerin patch  -Hold benzapril and Imdur    #2 Hypertension  -Continue amlodipine 5 mg daily      Amparo Nava MD    Code Status    Full Code

## 2018-09-29 NOTE — PHARMACY-ADMISSION MEDICATION HISTORY
Admission medication history interview status for the 9/28/2018  admission is complete. See EPIC admission navigator for prior to admission medications     Medication history source reliability:Good    Actions taken by pharmacist (provider contacted, etc): Interviewed patient, checked SureScripts for medication strengths.      Additional medication history information not noted on PTA med list : Patient states that he takes calcium and magnesium tablets daily, OTC, but is unsure of the strength.     Medication reconciliation/reorder completed by provider prior to medication history? Yes    Time spent in this activity: 15 minutes    Prior to Admission medications    Medication Sig Last Dose Taking? Auth Provider   amLODIPine (NORVASC) 5 MG tablet TAKE 1 TABLET EVERY DAY 9/28/2018 at am Yes Eusebio Oliveira MD   benazepril (LOTENSIN) 20 MG tablet TAKE 1 TABLET EVERY DAY 9/28/2018 at am Yes Eusebio Oliveira MD   CALCIUM PO Take 1 tablet by mouth daily 9/27/2018 at Unknown time Yes Unknown, Entered By History   isosorbide mononitrate (IMDUR) 30 MG 24 hr tablet Take 1 tablet (30 mg) by mouth daily 9/28/2018 at am Yes Eusebio Oliveira MD   MAGNESIUM PO Take 1 tablet by mouth daily 9/27/2018 Yes Unknown, Entered By History   nitroGLYcerin (NITROSTAT) 0.4 MG sublingual tablet For chest pain place 1 tablet under the tongue every 5 minutes for 3 doses. If symptoms persist 5 minutes after 1st dose call 911. 9/28/2018 at time Yes Eusebio Oliveira MD

## 2018-09-29 NOTE — PLAN OF CARE
Problem: Cardiac Disease Comorbidity  Goal: Cardiac Disease  Patient comorbidity will be monitored for signs and symptoms of Cardiac Disease.  Problems will be absent, minimized or managed by discharge/transition of care.  Outcome: No Change  BP controlled

## 2018-09-29 NOTE — ED NOTES
[facility]  ED Nurse Handoff Report    ED Chief complaint: Chest Pain (chest pain for a couple weeks, in ED once when first started and doctoring with primary since; pain not getting better and seems to be getting worse, has shortness of breath with activity)      ED Diagnosis:   Final diagnoses:   NSTEMI (non-ST elevated myocardial infarction) (H)       Code Status: Full Code    Allergies: No Known Allergies    Activity level - Baseline/Home:  Independent    Activity Level - Current:   Independent     Needed?: No    Isolation: No  Infection: Not Applicable  Bariatric?: No    Vital Signs:   Vitals:    09/28/18 2200 09/28/18 2210 09/28/18 2315 09/28/18 2327   BP:   129/67    Pulse:    74   Resp:   10 14   Temp:       TempSrc:       SpO2: 96% 95% 96% 95%   Weight:       Height:           Cardiac Rhythm: ,        Pain level: 0-10 Pain Scale: 4    Is this patient confused?: No   [place] - Suicide Severity Rating Scale Completed?  Yes  If yes, what color did the patient score?  White    Patient Report: Initial Complaint: Pt. Was having pain for a week, episode last night was the worst. Pt. States it lasts moments to minutes. Dyspnea on exertion.  Focused Assessment: A/Ox3. Denies dyspnea at this time, pedal edema noted, HR sinus with occ PVC. Chest pain waxes and wains.  Tests Performed: labs, xray  Abnormal Results:   Results for orders placed or performed during the hospital encounter of 09/28/18   XR Chest 2 Views    Narrative    CHEST TWO VIEWS    9/28/2018 9:31 PM     HISTORY: Exertional dyspnea.     COMPARISON: 9/9/2018      Impression    IMPRESSION: Lungs are well-inflated and clear. No evidence of edema.  No pleural effusion. Heart size is normal.    DASHA NORIEGA MD   Nt probnp inpatient (BNP)   Result Value Ref Range    N-Terminal Pro BNP Inpatient 4282 (H) 0 - 1800 pg/mL   Troponin I   Result Value Ref Range    Troponin I ES 2.747 (HH) 0.000 - 0.045 ug/L   Basic metabolic panel    Result Value Ref Range    Sodium 137 133 - 144 mmol/L    Potassium 5.0 3.4 - 5.3 mmol/L    Chloride 104 94 - 109 mmol/L    Carbon Dioxide 26 20 - 32 mmol/L    Anion Gap 7 3 - 14 mmol/L    Glucose 110 (H) 70 - 99 mg/dL    Urea Nitrogen 38 (H) 7 - 30 mg/dL    Creatinine 2.37 (H) 0.66 - 1.25 mg/dL    GFR Estimate 26 (L) >60 mL/min/1.7m2    GFR Estimate If Black 32 (L) >60 mL/min/1.7m2    Calcium 8.6 8.5 - 10.1 mg/dL   CBC with platelets differential   Result Value Ref Range    WBC 10.1 4.0 - 11.0 10e9/L    RBC Count 4.18 (L) 4.4 - 5.9 10e12/L    Hemoglobin 13.4 13.3 - 17.7 g/dL    Hematocrit 38.4 (L) 40.0 - 53.0 %    MCV 92 78 - 100 fl    MCH 32.1 26.5 - 33.0 pg    MCHC 34.9 31.5 - 36.5 g/dL    RDW 12.9 10.0 - 15.0 %    Platelet Count 168 150 - 450 10e9/L    Diff Method Automated Method     % Neutrophils 71.5 %    % Lymphocytes 18.7 %    % Monocytes 7.0 %    % Eosinophils 2.2 %    % Basophils 0.2 %    % Immature Granulocytes 0.4 %    Nucleated RBCs 0 0 /100    Absolute Neutrophil 7.2 1.6 - 8.3 10e9/L    Absolute Lymphocytes 1.9 0.8 - 5.3 10e9/L    Absolute Monocytes 0.7 0.0 - 1.3 10e9/L    Absolute Eosinophils 0.2 0.0 - 0.7 10e9/L    Absolute Basophils 0.0 0.0 - 0.2 10e9/L    Abs Immature Granulocytes 0.0 0 - 0.4 10e9/L    Absolute Nucleated RBC 0.0    EKG 12-lead, tracing only   Result Value Ref Range    Interpretation ECG Click View Image link to view waveform and result      Treatments provided: Heparin, ASA    Family Comments: Wife at bedside    OBS brochure/video discussed/provided to patient/family: N/A              Name of person given brochure if not patient: NA              Relationship to patient: NA    ED Medications:   Medications   heparin infusion 25,000 units in 0.45% NaCl 250 mL (950 Units/hr Intravenous New Bag 9/28/18 2325)   aspirin chewable tablet 324 mg (324 mg Oral Given 9/28/18 2146)   heparin Loading Dose bolus dose from infusion pump 4,000 Units (4,000 Units Intravenous Given 9/28/18 2326)        Drips infusing?:  Yes    For the majority of the shift this patient was Green.   Interventions performed were updated on plan of care.    Severe Sepsis OR Septic Shock Diagnosis Present: No    To be done/followed up on inpatient unit:  None    ED NURSE PHONE NUMBER: RN #[phone]

## 2018-09-29 NOTE — PROGRESS NOTES
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    Non-ST elevated MI.  Dyspnea on exertion.  Patient presented with intermittent substernal chest pain for 2 weeks with activity and improves with rest.  Associated shortness of breath.   EKG which showed normal sinus rhythm without any acute ischemic changes.  However, an EKG from his clinic a couple days ago did show diffuse T-wave inversions in leads V2 through V5 as well as I and aVL.  Chest x-ray no acute pathology.  ProBNP 4200, creatinine 2.25.  Glucose 110, hemoglobin 13.4.  Troponin 2.747 > 3.373 > 3.408.  Hemoglobin A1c 5.5, TSH 1.82.    Loaded with aspirin 325 mg once, started on 81 mg aspirin daily [9/28].  Started on metoprolol 12.5 mg twice daily [9/29]  Started on 40 mg of atorvastatin at bedtime [9/29]  Started on IV heparin drip [9/28]  As needed nitro, nitro patch ordered.  Echocardiogram pending.  Cardiology recommend possible angiogram Monday.    Hyperlipidemia.  , HDL 40, triglycerides 67.  Started on 40 mg atorvastatin at bedtime.    Hypertension.    Continue PTA amlodipine 5 mg oral daily  Started on metoprolol 12.5 mg twice daily [9/29]  Hold PTA benazepril 20 mg oral daily.  Hold PTA imdur 30 mg oral daily until cardiology assessment.      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   Baseline creatinine around 2.25.  GFR around 30.  Renal ultrasound showed Cysts in the LEFT kidney, otherwise normal renal ultrasound following RIGHT nephrectomy.  Hold PTA benazepril.  Gentle hydration with NS at 50 mL/h.  Monitor renal function periodically.  Avoid nephrotoxic drugs.  Strict input-output monitoring.  Nephrology  consult requested given patient possibly will undergo angiogram.    Acute anxiety from acute illness.  Patient expressing anxiety regarding upcoming procedures, chest pain.  As needed 0.5 mg Ativan every 8 hours ordered.  Supportive care.    History of spondylosis, back surgery including laminectomy.  Patient currently denies any back pain.  As needed Tylenol, warm compress.    Active Diet Order      Combination Diet Low Saturated Fat Na <2400mg Diet; No Caffeine for 24 hours (once tests completed, may have caffeine)    DVT Prophylaxis:  Sequential compression device, IV heparin.  Code Status: Full Code  Disposition: Expected discharge likely 2-3 days pending clinical improvement    Patient, his wife by the bedside, interdisciplinary team involved in care and agrees with plan.  Total time - 35 min. More than 50% of time spent in direct patient care, care coordination and formalizing plan of care.     Magnolia Rodriguez MD        Interval History:      Patient appears comfortable sitting up in chair.  Denies any chest pain at this time.  Had shortness of breath on exertion, none on rest at this time.  No palpitations.  No nausea vomiting.  No abdominal pain.  Tolerating oral diet.         Physical Exam:        Physical Exam   Temp:  [98.1  F (36.7  C)-98.6  F (37  C)] 98.1  F (36.7  C)  Pulse:  [61-74] 61  Heart Rate:  [67-72] 71  Resp:  [10-18] 16  BP: (120-138)/(55-75) 130/61  SpO2:  [95 %-97 %] 96 %    Intake/Output Summary (Last 24 hours) at 09/29/18 0731  Last data filed at 09/29/18 0600   Gross per 24 hour   Intake           666.29 ml   Output              900 ml   Net          -233.71 ml     Admission Weight: 89 kg (196 lb 3.4 oz)  Current Weight: 87.2 kg (192 lb 3.2 oz)    PHYSICAL EXAM  GENERAL: Patient is anxious. Alert and oriented.  HEART: Regular rate and rhythm. S1S2. No murmurs  LUNGS: Bilateral slightly decreased breath sounds.  No wheezing no crackles.  ABDOMEN: Soft, no abdominal tenderness, bowel  sounds heard   NEURO: Moving all extremities.  EXTREMITIES: trace pedal edema. 2+ peripheral pulses.  SKIN: Warm, dry. No rash or bruising.  PSYCHIATRY Cooperative       Medications:          amLODIPine  5 mg Oral Daily     aspirin  81 mg Oral Daily     metoprolol tartrate  12.5 mg Oral BID     nitroGLYcerin  1 patch Transdermal QAM     nitroGLYcerin   Transdermal Q8H     nitroGLYcerin   Transdermal QPM     pravastatin  40 mg Oral QPM     sodium chloride (PF)  3 mL Intracatheter Q8H     acetaminophen, acetaminophen, bisacodyl, HOLD MEDICATION, lidocaine 4%, lidocaine (buffered or not buffered), magnesium hydroxide, morphine, naloxone, nitroGLYcerin, ondansetron **OR** ondansetron, - MEDICATION INSTRUCTIONS -, Reason ACE/ARB/ARNI order not selected, senna-docusate **OR** senna-docusate, sodium chloride (PF)         Data:      All new lab and imaging data was reviewed.

## 2018-09-29 NOTE — ED NOTES
DATE:  9/28/2018   TIME OF RECEIPT FROM LAB:  2201  LAB TEST:  Troponin  LAB VALUE:  2.747  RESULTS GIVEN WITH READ-BACK TO (PROVIDER):  Dr. Pena  TIME LAB VALUE REPORTED TO PROVIDER:   2202

## 2018-09-29 NOTE — H&P
Admitted:     09/28/2018      DATE OF ADMISSION: 09/28/2018      PRIMARY CARE PROVIDER: Eusebio Oliveira MD      CHIEF COMPLAINT:  Chest pain, dyspnea on exertion.      HISTORY OF PRESENT ILLNESS:  Augie Powell is a very pleasant 82-year-old  gentleman with prior history of hyperlipidemia and family history of heart disease including a father who had bypass in his early 60s, who himself has not had any documented history of cardiac events in the past, presents to [facility] with substernal chest burning pain.      The patient was seen here about 3 weeks ago due to elevated blood pressure.  After his admission, he went on and apparently had an exercise treadmill stress test that he apparently passed at [facility] [place]. Six days ago, the patient started having substernal burning pain that sounded like he was having reflux disease.  Over the last 6 days, the patient has had progressive and worsening burning pain with activity.  This would be constant while he was doing something active and would dissipate when at rest, and there has been some times when he has had rest pain as well.  It was particularly bad last night.  He presents to [facility] for further evaluation.  Of note, the patient was seen in clinic 2 days ago, at which time Dr. Oliveira started him on Imdur.        In the Emergency Department, the patient had an EKG which showed normal sinus rhythm without any acute ischemic changes.  However, an EKG from his clinic a couple days ago did show diffuse T-wave inversions in leads V2 through V5 as well as I and aVL.  He has isolated Q-wave and some widening QRS complexes in the inferior leads.  Prior to coming here, the patient did end up taking some Zantac and some milk and did seem to help.  Currently, the patient is chest pain-free.      In the emergency room, the patient was seen by Dr. Pena.  The patient was afebrile, vital signs were otherwise stable.   Electrolytes were unremarkable.  Potassium is borderline elevated at 5.  BUN 38, creatinine 2.37 with a calculated GFR of 26, noting the patient has had prior right nephrectomy and has only one functioning kidney.  Calcium is 8.6.  ProBNP 4200.  Troponin 2.747.  Glucose 110.  White count 10.1, hemoglobin 13.4, platelets 168,000.  A 2-view chest x-ray showed lungs well inflated and clear.  No evidence of edema, no effusion.  Heart size is normal.  The patient was started on intravenous heparin and a chewable aspirin and is being admitted for acute coronary syndrome.      PAST MEDICAL HISTORY:   1.  Hypertension.   2.  Chronic kidney disease stage 3-4, with history of right nephrectomy.   3.  History of childhood rheumatic fever.   4.  Spondylosis with myelopathy, lumbar region.      PAST SURGICAL HISTORY:   1.  Back surgery including multiple laminectomies.    2.  Cholecystectomy.     3.  Decompression of lumbar spine at 2 levels.    4.  Hernia repair.   5.  Phacoemulsification of the right eye.   6.  Nephrectomy.      FAMILY HISTORY:  Father had coronary disease at age 60.      SOCIAL HISTORY:  . Was a business owner.  About 2 drinks per week.  Remote history of tobacco in his teen years, but quit about 60 years ago.  HE IS FULL CODE.      ALLERGIES:  NO KNOWN DRUG ALLERGIES.      CURRENT MEDICATIONS:   1.  Norvasc 5 mg once daily.   2.  Lotensin 20 mg once a day.   3.  Imdur 30 mg daily (this was just started yesterday).     4.  The patient has recently started taking baby aspirin and prior to that was taking full-dose aspirin on an intermittent basis.      REVIEW OF SYSTEMS:  A 10-point review of systems reviewed.  Positive for shortness of breath now with chest pain, mild leg swelling.  Denies any orthopnea, PND, black or bloody stools, confusion, syncope or presyncope, or tachycardia.  Denies any weight loss or easy bruising.  Otherwise, 10-point review of systems reviewed and otherwise negative.       PHYSICAL EXAMINATION:   VITAL SIGNS:  Temperature 98.6, heart rate 74, respiration 18, blood pressure 138/56, sats 97% on room air.   GENERAL:  The patient is an 82-year-old  gentleman who is pleasant, cooperative, in no distress.   HEENT:  Pupils equal.  Sclerae are anicteric.  Mucous membranes are moist.   NECK:  Veins not distended.   LUNGS:  Clear to auscultation.   CARDIOVASCULAR:  S1, S2, regular rate and rhythm.  He has a 2/6 systolic murmur.   ABDOMEN:  Soft, nontender, normoactive bowel sounds.   EXTREMITIES:  He has +1 pitting edema of lower extremities.   NEUROLOGIC:  He is grossly nonfocal and cranial nerves grossly intact.      LABORATORY AND STUDIES:  As dictated in history of present illness.      ASSESSMENT:  Augie Powell is an 82-year-old who had a childhood history of rheumatic heart disease, family history of heart disease, history of hypertension but no documented hyperlipidemia, who presented with hypertension about 3 weeks ago and had a negative stress exercise treadmill about 2 weeks ago, now with a 6-day history of intermittent chest burning with diffuse T-wave inversions in the anterior leads a couple days ago.  He comes in with worsening of what sounds like unstable angina, ruled in with positive troponin, and is being admitted as inpatient for further treatment.      PLAN:   1.  Acute coronary syndrome.  The patient will be intravenous heparin.  We will start him on low-dose metoprolol and start him on statin.  Check lipid profile.  Get echocardiogram. Obtain a formal Cardiology consult.  It will be challenging as the patient has a unilateral kidney and stage 3-4 chronic kidney disease.  The patient's IV fluids will be managed by our nephrologist who will be consulted.  For now,  the patient is currently chest pain-free, but does have intermittent chest discomfort that comes and goes, so will put a nitropatch on him and hold Imdur.   2.  Chronic kidney disease with history of  prior nephrectomy.  The patient has stage 3-4 chronic kidney disease.  He is on benazepril chronically.  We are going to hold that in light of starting him on beta blockers.  We will obtain an echocardiogram to look at his heart for any structural heart disease.  We will obtain a formal Nephrology consultation.  Monitor I's and O's and kidney function, and optimize him for possible dye load challenge.   3.  Hypertension.  The patient is currently on Norvasc.  We will continue him on that.  Hold benazepril.  Start him on Toprol-XL.   4.  History of back surgery including laminectomy.  Currently, he is pain-free.  We will treat him with Tylenol as needed.   5.  Deep venous thrombosis prophylaxis:  The patient is anticoagulated with IV heparin.      CODE STATUS:  FULL.         EFREN TAVERA MD             D: 2018   T: 2018   MT:       Name:     TERI BUCHANAN   MRN:      [id number]        Account:      [id number]   :      1935        Admitted:     2018                   Document: [id number]       cc: Eusebio Oliveira MD

## 2018-09-29 NOTE — PLAN OF CARE
Problem: Patient Care Overview  Goal: Plan of Care/Patient Progress Review  OT/CR: orders rec'd and chart reviewed. Pt admitted due to unstable angina and elevated troponins, will hold OT/CR today, until cardiology consult and POC established.

## 2018-09-29 NOTE — PLAN OF CARE
Problem: Patient Care Overview  Goal: Plan of Care/Patient Progress Review  Outcome: No Change  Neuro:intact, r foot drop  CV/Rhythm:0/10 CP, echo done  Resp/02:RA  GI/Diet:cardiac diet  :voiding, UA sent  Skin/Incisions/Sites:intact  Pulses/CMS:intact  Edema:trace  Activity/Falls Risk:sba, fall risk  Lines/Drains/IVs:PIV infusing  Labs/BGM:trops 3.4 H, Cr 2.2  Test/Procedures:echo results pending, Renal US done, L with cysts  VS/Pain:stable, 0/10 pain  DC Plan:angio on monday  Other:wife at bedside, pt c/o anxiety PRN ativan

## 2018-09-29 NOTE — PLAN OF CARE
Problem: Renal Insufficiency Comorbidity  Goal: Renal Insufficiency  Patient comorbidity will be monitored for signs and symptoms of Renal Insufficiency (Chronic) condition.  Problems will be absent, minimized or managed by discharge/transition of care.  Outcome: Improving  Renal US, renal consult, UA and urine protein sent, labs

## 2018-09-29 NOTE — ED PROVIDER NOTES
"  History     Chief Complaint:  Chest Pain     The history is provided by the patient.      Augie Powell is an 82 year old male with a history of hypertension, hyperlipidemia,and CKD s/p nephrectomy who presents for evaluation of chest pain. Patient was seen in this ER about 3 weeks ago for exertional dyspnea and hypertension. Notes indicate he was denying chest pain at that time. However, an outpatient stress test was ordered. Patient states he did have a stress test that was negative through [facility]. He has been seeing his primary care provider including a visit 2 days ago for ongoing exertional dyspnea and now exertional chest pain. Patient reports pain is substernal burning in nature with a pressure sensation as well and was primarily with exertion and resolved after a couple minutes of rest. However, in the last 2 days he has had the same chest pain at rest. He notes mild radiation to the left side of his chest today. This morning he took Zantac and Mylanta with mild relief in pain. It then seemed to slowly dissipate throughout the day such that at rest, currently, he has no chest pain. However, he notified his PCP about chest pain at rest and was prompted to visit the ER. He did have exertional chest pain walking to triage from his car. He notes exertional dyspnea with his exertional chest pain. He notes he has no known cardiac disease and does not follow with cardiology.    Cardiac/PE/DVT Risk Factors:  History of hypertension - Positive  History of hyperlipidemia - Positive  History of diabetes - Negative  History of smoking - Positive  Personal history of PE/DVT - Negative  Family history of heart complications - Positive - Patient tells me his dad had \"heart problems, but I'm not sure which ones\"  Recent travel - Negative  Recent surgery - Negative  Other immobilizations - Negative  Cancer - Negative     Allergies:  No Known Drug Allergies     Medications:    Norvasc   Lotensin " "  Imdur  Nitrostat     Past Medical History:    Hyperlipidemia   Hypertension   Renal disease   Spondylosis with myelopathy, lumbar region     Past Surgical History:    Back surgery  Cholecystectomy  Decompression lumbar two levels   Hernia repair  Laminectomy, fusion lumbar one level, combined  Phacoemulsification clear cornea with standard intraocular lens implant, right  Right nephrectomy     Family History:    Patient tells me his dad had \"heart problems, but I'm not sure which ones\"    Social History:  The patient was accompanied to the ED by wife.  Smoking Status: Former Smoker  Smokeless Tobacco: Never Used  Alcohol Use: Positive   Occasionally    Marital Status:       Review of Systems   Respiratory: Positive for shortness of breath.    Cardiovascular: Positive for chest pain.   All other systems reviewed and are negative.    Physical Exam     Patient Vitals for the past 24 hrs:   BP Temp Temp src Pulse Heart Rate Resp SpO2 Height Weight   09/28/18 2327 - - - 74 72 14 95 % - -   09/28/18 2315 129/67 - - - 67 10 96 % - -   09/28/18 2210 - - - - - - 95 % - -   09/28/18 2200 - - - - - - 96 % - -   09/28/18 2151 - - - - - - 97 % - -   09/28/18 2150 138/66 - - - - - - - -   09/28/18 1950 120/55 98.6  F (37  C) Oral 71 - 18 96 % 1.778 m (5' 10\") 89 kg (196 lb 3.4 oz)     Physical Exam  General: Well-developed and well-nourished. Well appearing elderly  man. Cooperative.  Head:  Atraumatic.  Eyes:  Conjunctivae, lids, and sclerae are normal.  ENT:    Normal nose. Moist mucous membranes.  Neck:  Supple. Normal range of motion.  CV:  Regular rate and rhythm. Normal heart sounds with no murmurs, rubs, or gallops detected. No chest wall tenderness.  Resp:  No respiratory distress. Clear to auscultation bilaterally without decreased breath sounds, wheezing, rales, or rhonchi.  GI:  Soft. Non-distended. Non-tender.    MS:  Normal ROM. No bilateral lower extremity edema.  Skin:  Warm. Non-diaphoretic. No " pallor.  Neuro:  Awake. A&Ox3. Normal strength.  Psych: Normal mood and affect. Normal speech.  Vitals reviewed.    Emergency Department Course     EKG  Indication: Chest Pain   Time: 1942  Rate 69 bpm. OR interval 168. QRS duration 96. QT/QTc 352/377.   Normal sinus rhythm  Normal ECG   No acute ST changes.  ST-T wave changes V2-V5 resolved/improved as compared to prior 9/26/18/. EKG unchanged compared to 9/9/18.      Imaging:  Radiology findings were communicated with the patient who voiced understanding of the findings.    XR Chest 2 Views  Lungs are well-inflated and clear. No evidence of edema.  No pleural effusion. Heart size is normal.  DASHA NORIEGA MD  Reading per radiology     Laboratory:  Laboratory findings were communicated with the patient who voiced understanding of the findings.    Nt probnp inpatient (BNP): 4282(H)  Troponin (Collected 2120): 2.747(HH)   CBC: WBC 10.1, HGB 13.4,   BMP: Glucose 110(H), BUN 38(H), Creatinine 2.37(H), GFR Estimate 26(L) o/w WNL     Interventions:  2146 Aspirin 324 mg Oral   2326 heparin 4,000 Units IV     Emergency Department Course:    1942 EKG taken as noted above.     2044 Nursing notes and vitals reviewed.    2059 I performed an exam on him as documented above.     2120 IV was inserted and blood was drawn for laboratory testing, results above.    2131 He was sent for a XR while in the emergency department, results above.     2258 I spoke with Dr. Villa of the hospitalist service regarding his presentation, findings, and plan of care.    0020 I personally reviewed the lab and imaging results with him and answered all related questions prior to admit.    Impression & Plan      Medical Decision Making:  Augie is an 82 year old man who presents with now resolved substernal chest pain.  Patient states he has had intermittent exertional chest pain and dyspnea for at least 3 weeks.  He was initially evaluated in the emergency department and arranged for  outpatient stress testing which he states he completed through an outside institution and was negative.  I tried to obtain these results unsuccessfully. He is coming in today because he is having chest pain now at rest which started last night and into this morning though he is currently denying active chest pain.  His exam is unremarkable.  EKG performed today is similarly unremarkable without acute ST changes or arrhythmias.  Similar EKG was reviewed from prior ER visit 3 weeks ago.  Interestingly, patient has an abnormal EKG 2 days ago as an outpatient with notable ST to T wave changes in V2 through V5.  Patient has had a right-sided nephrectomy with resultant chronic kidney disease and creatinine is at baseline today at 2.37.  His workup is most notable for a significantly elevated troponin of 2.747 for which he was given aspirin and started on a heparin infusion.  Although his BNP is elevated at 4282, his chest x-ray reveals no evidence of edema and his chest x-ray is otherwise unremarkable without pneumothorax or pneumonia.  No indication for urgent diuresis. No evidence of aortic dissection. No concern for or risk factors for PE. Likely chest pain and troponin elevation are due to ACS. Patient will require admission for his NSTEMI for further troponin trending and cardiology evaluation.  I have discussed the results of his laboratory and imaging studies with him as well as this plan.  All his questions were answered and he verbalized understanding.  I discussed patient's case with Dr. Villa, hospitalist, who accepts admission and has no further orders.    Diagnosis:    ICD-10-CM    1. NSTEMI (non-ST elevated myocardial infarction) (H) I21.4      Disposition:   He will be admitted into the care of Dr. Villa.    Scribe Disclosure:  PHUC, Leanne Rivera, am serving as a scribe at 8:52 PM on 9/28/2018 to document services personally performed by Sakshi Pena MD based on my observations and the provider's  statements to me.       EMERGENCY DEPARTMENT       Sakshi Pena MD  09/29/18 4050

## 2018-09-29 NOTE — CONSULTS
[facility]    Nephrology Consultation     Date of Admission:  9/28/2018    Assessment & Plan      Augie Powell is a 82 year old male who was admitted on 9/28/2018.     1) NSTEMI:  CKD will add a layer of complexity to evaluating his CAD.    2) CKD 3-4:  Presume mostly due to R nephrectomy.  He has been on benazepril 20, which is a good choice in one with CKD and reduced renal mass.  Wise to hold for now given decline in GFR and need for beta blockade given new Dx CAD.  Prior baseline was gfr 30.  It is a little lower now. Slightly better this AM.  Needs evaluation.      3) HTN:  Controlled.    4) Anemia:  HG 13.5.  So no need for JEN.    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    Plan:    Agree with holding ACEi for now  Renal US  UA and U P/C ratio  PTH and Vit D        Augie Sanchez MD  [facility] - Nephrology  [phone]    Reason for Consult      I was asked to see the patient for A/CKD.    Primary Care Physician      Eusebio Oliveira    Chief Complaint      Chest pain & MCKEON.     History is obtained from the patient and chart review.      History of Present Illness      Augie Powell is a 82 year old male who presents with A/CKD.      Mr. Powell was admitted via ED last night after presenting with chest pain and MCKEON.  He has had a pattern of chest pain and dyspnea that has been accelerating over the last few weeks.  Initially only with exertion.    He apparently passed a stress test of late.  However on day of admission he was having pain at rest, prompting the ER visit.     He was found to have a normal appearing EKG, but his troponin was up to 2.747 so he was admitted with NSTEMI.  Trop up further to 3.408 today.    He had R nephrectomy in 2010 by Dr. Melo for renal cell carcinoma. No known recurrence.      He has CKD 3-4 as a result. Baseline cr/gfr was 2/30 in 2016.    Now 2.37/26 on admission.      Past Medical History   I have reviewed this patient's medical history and  updated it with pertinent information if needed.   Past Medical History:   Diagnosis Date     Hyperlipidemia      Hypertension      Renal disease     CRD--creatinine in upper ones to 2, right kdney removed 2000 for canncer     Spondylosis with myelopathy, lumbar region        Past Surgical History   I have reviewed this patient's surgical history and updated it with pertinent information if needed.  Past Surgical History:   Procedure Laterality Date     BACK SURGERY       CHOLECYSTECTOMY       DECOMPRESSION LUMBAR TWO LEVELS  12/1/2014    Procedure: DECOMPRESSION LUMBAR TWO LEVELS;  Surgeon: Remy Harmon MD;  Location: [facility] OR     HERNIA REPAIR       LAMINECTOMY, FUSION LUMBAR ONE LEVEL, COMBINED N/A 12/1/2014    Procedure: COMBINED LAMINECTOMY, FUSION LUMBAR ONE LEVEL;  Surgeon: Remy Harmon MD;  Location: [facility] OR     PHACOEMULSIFICATION CLEAR CORNEA WITH STANDARD INTRAOCULAR LENS IMPLANT  10/22/2012    Procedure: PHACOEMULSIFICATION CLEAR CORNEA WITH STANDARD INTRAOCULAR LENS IMPLANT;  RIGHT EYE PHACOEMULSIFICATION CLEAR CORNEA WITH STANDARD INTRAOCULAR LENS IMPLANT ;  Surgeon: Grupo Granger MD;  Location: [facility] EC     right nephrectomy[         Prior to Admission Medications   Prior to Admission Medications   Prescriptions Last Dose Informant Patient Reported? Taking?   CALCIUM PO 9/27/2018 at Unknown time  Yes Yes   Sig: Take 1 tablet by mouth daily   MAGNESIUM PO 9/27/2018  Yes Yes   Sig: Take 1 tablet by mouth daily   amLODIPine (NORVASC) 5 MG tablet 9/28/2018 at am Self No Yes   Sig: TAKE 1 TABLET EVERY DAY   benazepril (LOTENSIN) 20 MG tablet 9/28/2018 at am Self No Yes   Sig: TAKE 1 TABLET EVERY DAY   isosorbide mononitrate (IMDUR) 30 MG 24 hr tablet 9/28/2018 at am Self No Yes   Sig: Take 1 tablet (30 mg) by mouth daily   nitroGLYcerin (NITROSTAT) 0.4 MG sublingual tablet 9/28/2018 at time Self No Yes   Sig: For chest pain place 1 tablet under the tongue every 5 minutes for 3 doses. If  symptoms persist 5 minutes after 1st dose call 911.      Facility-Administered Medications: None     Allergies   No Known Allergies    Social History   I have reviewed this patient's social history and updated it with pertinent information if needed. Augie Powell  reports that he has quit smoking. He has never used smokeless tobacco. He reports that he drinks about 1.2 oz of alcohol per week  He reports that he does not use illicit drugs.    Family History   I have reviewed this patient's family history and updated it with pertinent information if needed.   Father had CAD with CABG at age 60.    Review of Systems   The 10 point Review of Systems is negative other than noted in the HPI.    Physical Exam   Temp: 97.5  F (36.4  C) Temp src: Oral BP: 113/64 Pulse: 61 Heart Rate: 64 Resp: 16 SpO2: 95 % O2 Device: None (Room air)    Vital Signs with Ranges  Temp:  [97.5  F (36.4  C)-98.6  F (37  C)] 97.5  F (36.4  C)  Pulse:  [61-74] 61  Heart Rate:  [64-72] 64  Resp:  [10-18] 16  BP: (113-138)/(55-75) 113/64  SpO2:  [95 %-97 %] 95 %  192 lbs 3.2 oz    GENERAL: healthy, alert, NAD  HEENT:  Normocephalic. No gross abnormalities.  Pupils equal.  MMM.  Dentition is ok.  CV: RRR, no murmurs, no clicks, gallops, or rubs, no edema, no carotid bruits  RESP: Clear bilaterally with good efforts  GI: Abdomen soft/nt/nd, BS normal. No masses, organomegaly  MUSCULOSKELETAL: extremities nl - no gross deformities noted  SKIN: no suspicious lesions or rashes, dry to touch  NEURO:  Strength normal and symmetric.   PSYCH: mood good, affect appropriate  LYMPH: No palpable ant/post cervical and supraclavicular adenopathy    Data   BMP  Recent Labs  Lab 09/29/18  0605 09/28/18 2120    137   POTASSIUM 4.7 5.0   CHLORIDE 109 104   RICCARDO 8.3* 8.6   CO2 25 26   BUN 37* 38*   CR 2.25* 2.37*   * 110*     Phos@LABRCNTIPR(phos:4)  CBC)  Recent Labs  Lab 09/29/18  0110 09/28/18 2120   WBC 11.0 10.1   HGB 13.5 13.4   HCT 38.9* 38.4*    MCV 91 92    168     No lab results found in last 7 days.    Invalid input(s): BILIRUBININDIRECT  No lab results found in last 7 days.  No results found for: D2VIT, D3VIT, DTOT    Recent Labs  Lab 09/29/18  0110   HGB 13.5   HCT 38.9*   MCV 91     No results for input(s): PTHI in the last 168 hours.

## 2018-09-30 LAB
ALBUMIN SERPL-MCNC: 3.3 G/DL (ref 3.4–5)
ALBUMIN UR-MCNC: NEGATIVE MG/DL
ALP SERPL-CCNC: 41 U/L (ref 40–150)
ALT SERPL W P-5'-P-CCNC: 26 U/L (ref 0–70)
ANION GAP SERPL CALCULATED.3IONS-SCNC: 8 MMOL/L (ref 3–14)
APPEARANCE UR: CLEAR
AST SERPL W P-5'-P-CCNC: 26 U/L (ref 0–45)
BILIRUB SERPL-MCNC: 0.5 MG/DL (ref 0.2–1.3)
BILIRUB UR QL STRIP: NEGATIVE
BUN SERPL-MCNC: 38 MG/DL (ref 7–30)
CALCIUM SERPL-MCNC: 8.1 MG/DL (ref 8.5–10.1)
CHLORIDE SERPL-SCNC: 109 MMOL/L (ref 94–109)
CK SERPL-CCNC: 164 U/L (ref 30–300)
CO2 SERPL-SCNC: 24 MMOL/L (ref 20–32)
COLOR UR AUTO: NORMAL
CREAT SERPL-MCNC: 2.06 MG/DL (ref 0.66–1.25)
CREAT UR-MCNC: 41 MG/DL
GFR SERPL CREATININE-BSD FRML MDRD: 31 ML/MIN/1.7M2
GLUCOSE SERPL-MCNC: 103 MG/DL (ref 70–99)
GLUCOSE UR STRIP-MCNC: NEGATIVE MG/DL
HGB BLD-MCNC: 12.2 G/DL (ref 13.3–17.7)
HGB UR QL STRIP: NEGATIVE
KETONES UR STRIP-MCNC: NEGATIVE MG/DL
LEUKOCYTE ESTERASE UR QL STRIP: NEGATIVE
LMWH PPP CHRO-ACNC: 0.28 IU/ML
NITRATE UR QL: NEGATIVE
PH UR STRIP: 6.5 PH (ref 5–7)
PHOSPHATE SERPL-MCNC: 3.2 MG/DL (ref 2.5–4.5)
POTASSIUM SERPL-SCNC: 4.7 MMOL/L (ref 3.4–5.3)
PROT SERPL-MCNC: 6.4 G/DL (ref 6.8–8.8)
PROT UR-MCNC: 0.07 G/L
PROT/CREAT 24H UR: 0.18 G/G CR (ref 0–0.2)
RBC #/AREA URNS AUTO: <1 /HPF (ref 0–2)
SODIUM SERPL-SCNC: 141 MMOL/L (ref 133–144)
SOURCE: NORMAL
SP GR UR STRIP: 1.01 (ref 1–1.03)
SQUAMOUS #/AREA URNS AUTO: <1 /HPF (ref 0–1)
UROBILINOGEN UR STRIP-MCNC: NORMAL MG/DL (ref 0–2)
WBC #/AREA URNS AUTO: <1 /HPF (ref 0–5)

## 2018-09-30 PROCEDURE — A9270 NON-COVERED ITEM OR SERVICE: HCPCS | Mod: GY | Performed by: INTERNAL MEDICINE

## 2018-09-30 PROCEDURE — 84156 ASSAY OF PROTEIN URINE: CPT | Performed by: INTERNAL MEDICINE

## 2018-09-30 PROCEDURE — 99232 SBSQ HOSP IP/OBS MODERATE 35: CPT | Performed by: HOSPITALIST

## 2018-09-30 PROCEDURE — 82550 ASSAY OF CK (CPK): CPT | Performed by: HOSPITALIST

## 2018-09-30 PROCEDURE — 85018 HEMOGLOBIN: CPT | Performed by: HOSPITALIST

## 2018-09-30 PROCEDURE — 85520 HEPARIN ASSAY: CPT | Performed by: HOSPITALIST

## 2018-09-30 PROCEDURE — 25000132 ZZH RX MED GY IP 250 OP 250 PS 637: Mod: GY | Performed by: HOSPITALIST

## 2018-09-30 PROCEDURE — 84100 ASSAY OF PHOSPHORUS: CPT | Performed by: HOSPITALIST

## 2018-09-30 PROCEDURE — A9270 NON-COVERED ITEM OR SERVICE: HCPCS | Mod: GY | Performed by: HOSPITALIST

## 2018-09-30 PROCEDURE — 36415 COLL VENOUS BLD VENIPUNCTURE: CPT | Performed by: HOSPITALIST

## 2018-09-30 PROCEDURE — 25000132 ZZH RX MED GY IP 250 OP 250 PS 637: Mod: GY | Performed by: INTERNAL MEDICINE

## 2018-09-30 PROCEDURE — 99232 SBSQ HOSP IP/OBS MODERATE 35: CPT | Performed by: INTERNAL MEDICINE

## 2018-09-30 PROCEDURE — 80053 COMPREHEN METABOLIC PANEL: CPT | Performed by: HOSPITALIST

## 2018-09-30 PROCEDURE — 25000128 H RX IP 250 OP 636: Performed by: HOSPITALIST

## 2018-09-30 PROCEDURE — 21000001 ZZH R&B HEART CARE

## 2018-09-30 PROCEDURE — 81001 URINALYSIS AUTO W/SCOPE: CPT | Performed by: INTERNAL MEDICINE

## 2018-09-30 RX ORDER — ASPIRIN 81 MG/1
81 TABLET, CHEWABLE ORAL DAILY
Status: DISCONTINUED | OUTPATIENT
Start: 2018-10-02 | End: 2018-10-02

## 2018-09-30 RX ORDER — LIDOCAINE 40 MG/G
CREAM TOPICAL
Status: DISCONTINUED | OUTPATIENT
Start: 2018-09-30 | End: 2018-10-01 | Stop reason: HOSPADM

## 2018-09-30 RX ORDER — CLOPIDOGREL BISULFATE 75 MG/1
75 TABLET ORAL DAILY
Status: DISCONTINUED | OUTPATIENT
Start: 2018-10-01 | End: 2018-10-02 | Stop reason: HOSPADM

## 2018-09-30 RX ORDER — SACCHAROMYCES BOULARDII 250 MG
250 CAPSULE ORAL 2 TIMES DAILY
Status: DISCONTINUED | OUTPATIENT
Start: 2018-09-30 | End: 2018-10-02 | Stop reason: HOSPADM

## 2018-09-30 RX ORDER — CLOPIDOGREL 300 MG/1
600 TABLET, FILM COATED ORAL ONCE
Status: COMPLETED | OUTPATIENT
Start: 2018-09-30 | End: 2018-09-30

## 2018-09-30 RX ADMIN — SODIUM CHLORIDE: 9 INJECTION, SOLUTION INTRAVENOUS at 09:02

## 2018-09-30 RX ADMIN — LORAZEPAM 0.5 MG: 0.5 TABLET ORAL at 03:43

## 2018-09-30 RX ADMIN — ASPIRIN 325 MG: 325 TABLET, DELAYED RELEASE ORAL at 09:03

## 2018-09-30 RX ADMIN — NITROGLYCERIN 1 PATCH: 0.4 PATCH TRANSDERMAL at 08:02

## 2018-09-30 RX ADMIN — Medication 250 MG: at 20:09

## 2018-09-30 RX ADMIN — Medication 12.5 MG: at 08:03

## 2018-09-30 RX ADMIN — ASPIRIN 81 MG 81 MG: 81 TABLET ORAL at 08:02

## 2018-09-30 RX ADMIN — CLOPIDOGREL BISULFATE 600 MG: 300 TABLET, FILM COATED ORAL at 06:59

## 2018-09-30 RX ADMIN — Medication 12.5 MG: at 20:09

## 2018-09-30 RX ADMIN — AMLODIPINE BESYLATE 5 MG: 5 TABLET ORAL at 08:01

## 2018-09-30 RX ADMIN — ATORVASTATIN CALCIUM 40 MG: 40 TABLET, FILM COATED ORAL at 20:09

## 2018-09-30 RX ADMIN — Medication 250 MG: at 12:30

## 2018-09-30 ASSESSMENT — ACTIVITIES OF DAILY LIVING (ADL)
ADLS_ACUITY_SCORE: 13
ADLS_ACUITY_SCORE: 12
ADLS_ACUITY_SCORE: 13
ADLS_ACUITY_SCORE: 10

## 2018-09-30 NOTE — PLAN OF CARE
Problem: Patient Care Overview  Goal: Plan of Care/Patient Progress Review  Outcome: No Change  Awaiting angio for tomorrow  Neuro:intact, right foot drop  CV/Rhythm:0/10 pain  Resp/02:RA  GI/Diet:diarrhea this am, probiotic, yogurt  :voiding  Skin/Incisions/Sites:intact  Pulses/CMS:intact  Edema:none  Activity/Falls Risk:sba, walker  Lines/Drains/IVs:piv  Labs/BGM:monitor Cr, renal following  Test/Procedures:angio tomorrow  VS/Pain:stable, 0/10 pain  DC Plan:home after angio  Other:family at bedside, anxious

## 2018-09-30 NOTE — PLAN OF CARE
Problem: Renal Insufficiency Comorbidity  Goal: Renal Insufficiency  Patient comorbidity will be monitored for signs and symptoms of Renal Insufficiency (Chronic) condition.  Problems will be absent, minimized or managed by discharge/transition of care.   Outcome: Improving  Cr improving, renal following, IVF pre angio

## 2018-09-30 NOTE — PROGRESS NOTES
[facility]  Cardiology Progress Note     Interval History   No acute events overnight. Telemetry without alarms. Patient denies pain or dyspnea.    Physical Exam   Vital Signs with Ranges  Temp:  [97.5  F (36.4  C)-98.6  F (37  C)] 98.4  F (36.9  C)  Pulse:  [61] 61  Heart Rate:  [52-64] 64  Resp:  [16] 16  BP: (110-138)/(57-76) 138/76  SpO2:  [95 %-97 %] 97 %  I/O last 3 completed shifts:  In: 2206.19 [P.O.:1080; I.V.:1126.19]  Out: 1500 [Urine:1500]  Vitals:    09/28/18 1950 09/29/18 0012 09/30/18 0123   Weight: 89 kg (196 lb 3.4 oz) 87.2 kg (192 lb 3.2 oz) 86.4 kg (190 lb 6.4 oz)       Constitutional: No apparent distress.   Respiratory: Clear to auscultation bilaterally. No crackles or wheezes.  Cardiovascular: Regular rate and rhythm. Normal S1 and S2. No murmurs. No extra heart sounds. No JVD.   Extremities: Trace bilateral peripheral edema.  Psychiatric: Alert and oriented. Answers questions appropriately.     Data   Cr 2.06 from 2.2, Hgb 12.2    Assessment & Plan   Augie Powell is a 82 year old male who was admitted on 9/28/2018 with NSTEMI.     #1 NSTEMI  #2 CKD, stage III (cr 2)  -Continue aspirin, heparin gtt  -Plavix loaded and then 75 mg daily  -Coronary angiogram +/-PCI Monday, risks and benefits discussed- consent obtained  -TTE apical, distal anteroseptal and inferoseptal hypokinesis, EF 50%  -Continue pravastatin 40 mg daily, metoprolol 12.5 BID and nitroglycerin patch  -Hold benzapril and Imdur       #2 Hypertension  -Continue amlodipine 5 mg daily    Amparo Nava MD    Text Page

## 2018-09-30 NOTE — PLAN OF CARE
Up walking in hallway after dinner.  Wife will be staying the night in the pt's room.  Reinforced that there is Ativan if needed for relaxation.  Alyce RAZA ([phone])

## 2018-09-30 NOTE — PLAN OF CARE
Problem: Patient Care Overview  Goal: Plan of Care/Patient Progress Review  Outcome: No Change  Pt A&Ox4.  VSS on RA.  Tele: SB.  Denies chest pain.  R foot drop.  Up w/SBA and walker.  Voiding adequately.  PIV infusing.  Hep 10a recheck in AM.  Cardiac diet.  Lung sounds diminished.  Plan for angio on Monday.  Nursing to continue to monitor.

## 2018-09-30 NOTE — PLAN OF CARE
Problem: Patient Care Overview  Goal: Plan of Care/Patient Progress Review  OT/CR: Per chart review, pt with scheduled angio for Monday. Most appropriate to hold OT/CR eval until post angio.

## 2018-09-30 NOTE — PLAN OF CARE
Problem: Patient Care Overview  Goal: Plan of Care/Patient Progress Review  Outcome: No Change  VSS. Tele SR. Up with SBA and walker, ambulated in downing x1 this evening. Pt has right foot drop and doesn't have his brace. His wife will bring it to hospital tomorrow. Denies CP. Nitroglycerin patch in place. C/o mild MCKEON. Pt's wife at bedside and will stay the night tonight. Plan for angio on Monday, pt would like to watch videos tomorrow. Heparin gtt continues at 750U/hr, 10a recheck in the AM.

## 2018-09-30 NOTE — PROGRESS NOTES
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    Non-ST elevated MI.  Dyspnea on exertion.  Patient presented with intermittent substernal chest pain for 2 weeks with activity and improves with rest.  Associated shortness of breath.   EKG which showed normal sinus rhythm without any acute ischemic changes.  However, an EKG from his clinic a couple days ago did show diffuse T-wave inversions in leads V2 through V5 as well as I and aVL.  Chest x-ray no acute pathology.  ProBNP 4200, creatinine 2.25.  Glucose 110, hemoglobin 13.4.  Troponin 2.747 > 3.373 > 3.408.  Hemoglobin A1c 5.5, TSH 1.82.  Echo . Left ventricular basal function is relatively preserved compared to the mid and apex. [place] is akinetic. Hypokinesis of the inferior septum, anterior septum, inferior and anterior walls (mid and apex). Suggests multivessel CAD. Visually estimated LVEF 40-45%.  No significant valve disease.    Loaded with aspirin 325 mg once and continued on 81 mg aspirin daily [9/28].  Continued on metoprolol 12.5 mg twice daily [9/29]  Continue on 40 mg of atorvastatin at bedtime [9/29]  Continued on IV heparin drip [9/28]  As needed nitro, nitro patch ordered.  Cardiology recommend possible angiogram Monday.    Hyperlipidemia.  , HDL 40, triglycerides 67.  Continued on 40 mg atorvastatin at bedtime.    Hypertension.    Continued PTA amlodipine 5 mg oral daily  Continued on metoprolol 12.5 mg twice daily [9/29]  Hold PTA benazepril 20 mg oral daily.  Hold PTA imdur 30 mg oral daily until cardiology assessment.      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   Baseline creatinine around 2.25.  GFR around  30.  Renal ultrasound showed cysts in the left kidney, otherwise normal renal ultrasound following right nephrectomy.  Hold PTA benazepril.  Gentle hydration with NS at 50 mL/h.  Monitor renal function periodically.  Avoid nephrotoxic drugs.  Strict input-output monitoring.  Nephrology consult requested given patient possibly will undergo angiogram, recommend urine analysis and urine protein creatinine ratio, parathormone and vitamin D levels.    Acute anxiety from acute illness.  Patient expressing anxiety regarding upcoming procedures, chest pain.  As needed 0.5 mg Ativan every 8 hours ordered.  Supportive care.  Had 2 episodes of soft, moderate amount of stool.  No blood - Ordered probiotic, encourage yogurt/soft diet.    History of spondylosis, back surgery including laminectomy.  Patient currently denies any back pain.  As needed Tylenol, warm compress.      Active Diet Order      Combination Diet Low Saturated Fat Na <2400mg Diet; No Caffeine for 24 hours (once tests completed, may have caffeine)    DVT Prophylaxis:  Sequential compression device, IV heparin.  Code Status: Full Code  Disposition: Expected discharge likely 2-3 days pending clinical improvement    Patient, his wife by the bedside, interdisciplinary team involved in care and agrees with plan.  Total time - 25 min. More than 50% of time spent in direct patient care, care coordination and formalizing plan of care.     Magnolia Rodriguez MD        Interval History:      Patient appears comfortable sitting up in chair.    Denies any chest pain at this time.  Had shortness of breath on exertion, none on rest at this time.    No palpitations.  Appears less anxious today.  No nausea vomiting.  No abdominal pain.  Tolerating oral diet.  Had 2 episodes of soft, moderate amount of stool.  No blood         Physical Exam:        Physical Exam   Temp:  [97.5  F (36.4  C)-98.6  F (37  C)] 98.3  F (36.8  C)  Heart Rate:  [52-64] 58  Resp:  [16] 16  BP:  (110-138)/(57-76) 129/59  SpO2:  [95 %-97 %] 96 %    Intake/Output Summary (Last 24 hours) at 09/29/18 0731  Last data filed at 09/29/18 0600   Gross per 24 hour   Intake           666.29 ml   Output              900 ml   Net          -233.71 ml     Admission Weight: 89 kg (196 lb 3.4 oz)  Current Weight: 87.2 kg (192 lb 3.2 oz)    PHYSICAL EXAM  GENERAL: Patient appears comfortable. Alert and oriented.  HEART: Regular rate and rhythm. S1S2. No murmurs  LUNGS: Bilateral slightly decreased breath sounds.  No wheezing no crackles.  ABDOMEN: Soft, no abdominal tenderness, bowel sounds heard   NEURO: Moving all extremities.  EXTREMITIES: trace pedal edema. 2+ peripheral pulses.         Medications:          amLODIPine  5 mg Oral Daily     aspirin  81 mg Oral Daily     aspirin  325 mg Oral Daily     atorvastatin  40 mg Oral QPM     metoprolol tartrate  12.5 mg Oral BID     nitroGLYcerin  1 patch Transdermal QAM     nitroGLYcerin   Transdermal Q8H     nitroGLYcerin   Transdermal QPM     sodium chloride (PF)  3 mL Intracatheter Q8H     acetaminophen, acetaminophen, bisacodyl, HOLD MEDICATION, lidocaine 4%, lidocaine (buffered or not buffered), LORazepam, magnesium hydroxide, morphine, naloxone, nitroGLYcerin, ondansetron **OR** ondansetron, - MEDICATION INSTRUCTIONS -, potassium chloride, Reason ACE/ARB/ARNI order not selected, senna-docusate **OR** senna-docusate, sodium chloride (PF)         Data:      All new lab and imaging data was reviewed.

## 2018-09-30 NOTE — PLAN OF CARE
Problem: Cardiac Disease Comorbidity  Goal: Cardiac Disease  Patient comorbidity will be monitored for signs and symptoms of Cardiac Disease.  Problems will be absent, minimized or managed by discharge/transition of care.   Outcome: No Change  Angio in am

## 2018-10-01 ENCOUNTER — APPOINTMENT (OUTPATIENT)
Dept: CARDIOLOGY | Facility: CLINIC | Age: 83
DRG: 247 | End: 2018-10-01
Attending: INTERNAL MEDICINE
Payer: MEDICARE

## 2018-10-01 LAB
ANION GAP SERPL CALCULATED.3IONS-SCNC: 6 MMOL/L (ref 3–14)
BUN SERPL-MCNC: 30 MG/DL (ref 7–30)
CALCIUM SERPL-MCNC: 8.4 MG/DL (ref 8.5–10.1)
CHLORIDE SERPL-SCNC: 111 MMOL/L (ref 94–109)
CHOLEST SERPL-MCNC: 124 MG/DL
CO2 SERPL-SCNC: 25 MMOL/L (ref 20–32)
CREAT SERPL-MCNC: 1.95 MG/DL (ref 0.66–1.25)
DEPRECATED CALCIDIOL+CALCIFEROL SERPL-MC: 50 UG/L (ref 20–75)
ERYTHROCYTE [DISTWIDTH] IN BLOOD BY AUTOMATED COUNT: 12.9 % (ref 10–15)
GFR SERPL CREATININE-BSD FRML MDRD: 33 ML/MIN/1.7M2
GLUCOSE SERPL-MCNC: 102 MG/DL (ref 70–99)
HCT VFR BLD AUTO: 35.4 % (ref 40–53)
HDLC SERPL-MCNC: 39 MG/DL
HGB BLD-MCNC: 11.8 G/DL (ref 13.3–17.7)
IRON SATN MFR SERPL: 37 % (ref 15–46)
IRON SERPL-MCNC: 90 UG/DL (ref 35–180)
KCT BLD-ACNC: 217 SEC (ref 75–150)
KCT BLD-ACNC: 335 SEC (ref 75–150)
LDLC SERPL CALC-MCNC: 62 MG/DL
LMWH PPP CHRO-ACNC: 0.21 IU/ML
MCH RBC QN AUTO: 31.1 PG (ref 26.5–33)
MCHC RBC AUTO-ENTMCNC: 33.3 G/DL (ref 31.5–36.5)
MCV RBC AUTO: 93 FL (ref 78–100)
NONHDLC SERPL-MCNC: 85 MG/DL
PLATELET # BLD AUTO: 151 10E9/L (ref 150–450)
POTASSIUM SERPL-SCNC: 4.6 MMOL/L (ref 3.4–5.3)
PTH-INTACT SERPL-MCNC: 31 PG/ML (ref 12–64)
RBC # BLD AUTO: 3.8 10E12/L (ref 4.4–5.9)
SODIUM SERPL-SCNC: 142 MMOL/L (ref 133–144)
TIBC SERPL-MCNC: 242 UG/DL (ref 240–430)
TRIGL SERPL-MCNC: 117 MG/DL
WBC # BLD AUTO: 7.3 10E9/L (ref 4–11)

## 2018-10-01 PROCEDURE — 25000128 H RX IP 250 OP 636

## 2018-10-01 PROCEDURE — 25000128 H RX IP 250 OP 636: Performed by: INTERNAL MEDICINE

## 2018-10-01 PROCEDURE — 027034Z DILATION OF CORONARY ARTERY, ONE ARTERY WITH DRUG-ELUTING INTRALUMINAL DEVICE, PERCUTANEOUS APPROACH: ICD-10-PCS | Performed by: INTERNAL MEDICINE

## 2018-10-01 PROCEDURE — 25000132 ZZH RX MED GY IP 250 OP 250 PS 637: Mod: GY | Performed by: INTERNAL MEDICINE

## 2018-10-01 PROCEDURE — 83550 IRON BINDING TEST: CPT | Performed by: INTERNAL MEDICINE

## 2018-10-01 PROCEDURE — 99152 MOD SED SAME PHYS/QHP 5/>YRS: CPT | Mod: GC | Performed by: INTERNAL MEDICINE

## 2018-10-01 PROCEDURE — 27211089 ZZH KIT ACIST INJECTOR CR3

## 2018-10-01 PROCEDURE — C1887 CATHETER, GUIDING: HCPCS

## 2018-10-01 PROCEDURE — 85027 COMPLETE CBC AUTOMATED: CPT | Performed by: INTERNAL MEDICINE

## 2018-10-01 PROCEDURE — C1874 STENT, COATED/COV W/DEL SYS: HCPCS

## 2018-10-01 PROCEDURE — 36415 COLL VENOUS BLD VENIPUNCTURE: CPT | Performed by: INTERNAL MEDICINE

## 2018-10-01 PROCEDURE — 93010 ELECTROCARDIOGRAM REPORT: CPT | Performed by: INTERNAL MEDICINE

## 2018-10-01 PROCEDURE — C1769 GUIDE WIRE: HCPCS

## 2018-10-01 PROCEDURE — C9600 PERC DRUG-EL COR STENT SING: HCPCS | Mod: LD

## 2018-10-01 PROCEDURE — 4A033BC MEASUREMENT OF ARTERIAL PRESSURE, CORONARY, PERCUTANEOUS APPROACH: ICD-10-PCS | Performed by: INTERNAL MEDICINE

## 2018-10-01 PROCEDURE — 25000128 H RX IP 250 OP 636: Performed by: HOSPITALIST

## 2018-10-01 PROCEDURE — 27210787 ZZH MANIFOLD CR2

## 2018-10-01 PROCEDURE — B2111ZZ FLUOROSCOPY OF MULTIPLE CORONARY ARTERIES USING LOW OSMOLAR CONTRAST: ICD-10-PCS | Performed by: INTERNAL MEDICINE

## 2018-10-01 PROCEDURE — 80048 BASIC METABOLIC PNL TOTAL CA: CPT | Performed by: INTERNAL MEDICINE

## 2018-10-01 PROCEDURE — 93458 L HRT ARTERY/VENTRICLE ANGIO: CPT | Mod: 26 | Performed by: INTERNAL MEDICINE

## 2018-10-01 PROCEDURE — 4A023N7 MEASUREMENT OF CARDIAC SAMPLING AND PRESSURE, LEFT HEART, PERCUTANEOUS APPROACH: ICD-10-PCS | Performed by: INTERNAL MEDICINE

## 2018-10-01 PROCEDURE — 93571 IV DOP VEL&/PRESS C FLO 1ST: CPT

## 2018-10-01 PROCEDURE — 27210759 ZZH DEVICE INFLATION CR6

## 2018-10-01 PROCEDURE — 80061 LIPID PANEL: CPT | Performed by: INTERNAL MEDICINE

## 2018-10-01 PROCEDURE — 40001058

## 2018-10-01 PROCEDURE — A9270 NON-COVERED ITEM OR SERVICE: HCPCS | Mod: GY | Performed by: INTERNAL MEDICINE

## 2018-10-01 PROCEDURE — 82306 VITAMIN D 25 HYDROXY: CPT | Performed by: INTERNAL MEDICINE

## 2018-10-01 PROCEDURE — 27210946 ZZH KIT HC TOTES DISP CR8

## 2018-10-01 PROCEDURE — 99152 MOD SED SAME PHYS/QHP 5/>YRS: CPT

## 2018-10-01 PROCEDURE — C1725 CATH, TRANSLUMIN NON-LASER: HCPCS

## 2018-10-01 PROCEDURE — A9270 NON-COVERED ITEM OR SERVICE: HCPCS | Mod: GY | Performed by: HOSPITALIST

## 2018-10-01 PROCEDURE — 25000132 ZZH RX MED GY IP 250 OP 250 PS 637: Mod: GY | Performed by: HOSPITALIST

## 2018-10-01 PROCEDURE — 21000001 ZZH R&B HEART CARE

## 2018-10-01 PROCEDURE — 93005 ELECTROCARDIOGRAM TRACING: CPT

## 2018-10-01 PROCEDURE — 93458 L HRT ARTERY/VENTRICLE ANGIO: CPT

## 2018-10-01 PROCEDURE — 27210914 ZZH SHEATH CR8

## 2018-10-01 PROCEDURE — 83540 ASSAY OF IRON: CPT | Performed by: INTERNAL MEDICINE

## 2018-10-01 PROCEDURE — 93571 IV DOP VEL&/PRESS C FLO 1ST: CPT | Mod: 26 | Performed by: INTERNAL MEDICINE

## 2018-10-01 PROCEDURE — 25000125 ZZHC RX 250: Performed by: INTERNAL MEDICINE

## 2018-10-01 PROCEDURE — 40000852 ZZH STATISTIC HEART CATH LAB OR EP LAB

## 2018-10-01 PROCEDURE — 99153 MOD SED SAME PHYS/QHP EA: CPT

## 2018-10-01 PROCEDURE — 27210795 ZZH PAD DEFIB QUICK CR4

## 2018-10-01 PROCEDURE — 85520 HEPARIN ASSAY: CPT | Performed by: INTERNAL MEDICINE

## 2018-10-01 PROCEDURE — 83970 ASSAY OF PARATHORMONE: CPT | Performed by: INTERNAL MEDICINE

## 2018-10-01 PROCEDURE — 99232 SBSQ HOSP IP/OBS MODERATE 35: CPT | Performed by: HOSPITALIST

## 2018-10-01 PROCEDURE — 99232 SBSQ HOSP IP/OBS MODERATE 35: CPT | Mod: 25 | Performed by: INTERNAL MEDICINE

## 2018-10-01 PROCEDURE — 85347 COAGULATION TIME ACTIVATED: CPT

## 2018-10-01 PROCEDURE — 27210856 ZZH ACCESS HEART CATH CR2

## 2018-10-01 PROCEDURE — 92928 PRQ TCAT PLMT NTRAC ST 1 LES: CPT | Mod: LD | Performed by: INTERNAL MEDICINE

## 2018-10-01 RX ORDER — CLOPIDOGREL 300 MG/1
300-600 TABLET, FILM COATED ORAL
Status: DISCONTINUED | OUTPATIENT
Start: 2018-10-01 | End: 2018-10-01 | Stop reason: HOSPADM

## 2018-10-01 RX ORDER — NALOXONE HYDROCHLORIDE 0.4 MG/ML
0.4 INJECTION, SOLUTION INTRAMUSCULAR; INTRAVENOUS; SUBCUTANEOUS EVERY 5 MIN PRN
Status: DISCONTINUED | OUTPATIENT
Start: 2018-10-01 | End: 2018-10-01 | Stop reason: HOSPADM

## 2018-10-01 RX ORDER — FENTANYL CITRATE 50 UG/ML
25-50 INJECTION, SOLUTION INTRAMUSCULAR; INTRAVENOUS
Status: DISCONTINUED | OUTPATIENT
Start: 2018-10-01 | End: 2018-10-01 | Stop reason: HOSPADM

## 2018-10-01 RX ORDER — DIPHENHYDRAMINE HYDROCHLORIDE 50 MG/ML
25-50 INJECTION INTRAMUSCULAR; INTRAVENOUS
Status: DISCONTINUED | OUTPATIENT
Start: 2018-10-01 | End: 2018-10-01 | Stop reason: HOSPADM

## 2018-10-01 RX ORDER — IOPAMIDOL 755 MG/ML
115 INJECTION, SOLUTION INTRAVASCULAR ONCE
Status: COMPLETED | OUTPATIENT
Start: 2018-10-01 | End: 2018-10-01

## 2018-10-01 RX ORDER — BUPIVACAINE HYDROCHLORIDE 2.5 MG/ML
1-10 INJECTION, SOLUTION EPIDURAL; INFILTRATION; INTRACAUDAL
Status: DISCONTINUED | OUTPATIENT
Start: 2018-10-01 | End: 2018-10-01 | Stop reason: HOSPADM

## 2018-10-01 RX ORDER — PROTAMINE SULFATE 10 MG/ML
1-5 INJECTION, SOLUTION INTRAVENOUS
Status: DISCONTINUED | OUTPATIENT
Start: 2018-10-01 | End: 2018-10-01 | Stop reason: HOSPADM

## 2018-10-01 RX ORDER — NALOXONE HYDROCHLORIDE 0.4 MG/ML
.1-.4 INJECTION, SOLUTION INTRAMUSCULAR; INTRAVENOUS; SUBCUTANEOUS
Status: DISCONTINUED | OUTPATIENT
Start: 2018-10-01 | End: 2018-10-02 | Stop reason: HOSPADM

## 2018-10-01 RX ORDER — ATROPINE SULFATE 0.1 MG/ML
0.5 INJECTION INTRAVENOUS EVERY 5 MIN PRN
Status: ACTIVE | OUTPATIENT
Start: 2018-10-01 | End: 2018-10-02

## 2018-10-01 RX ORDER — EPINEPHRINE 1 MG/ML
0.3 INJECTION, SOLUTION, CONCENTRATE INTRAVENOUS
Status: DISCONTINUED | OUTPATIENT
Start: 2018-10-01 | End: 2018-10-01 | Stop reason: HOSPADM

## 2018-10-01 RX ORDER — CALCIUM CARBONATE 500 MG/1
1000 TABLET, CHEWABLE ORAL
Status: DISCONTINUED | OUTPATIENT
Start: 2018-10-01 | End: 2018-10-02 | Stop reason: HOSPADM

## 2018-10-01 RX ORDER — POTASSIUM CHLORIDE 7.45 MG/ML
10 INJECTION INTRAVENOUS
Status: DISCONTINUED | OUTPATIENT
Start: 2018-10-01 | End: 2018-10-01 | Stop reason: HOSPADM

## 2018-10-01 RX ORDER — ENALAPRILAT 1.25 MG/ML
1.25-2.5 INJECTION INTRAVENOUS
Status: DISCONTINUED | OUTPATIENT
Start: 2018-10-01 | End: 2018-10-01 | Stop reason: HOSPADM

## 2018-10-01 RX ORDER — PRASUGREL 10 MG/1
10-60 TABLET, FILM COATED ORAL
Status: DISCONTINUED | OUTPATIENT
Start: 2018-10-01 | End: 2018-10-01 | Stop reason: HOSPADM

## 2018-10-01 RX ORDER — DEXTROSE MONOHYDRATE 25 G/50ML
12.5-5 INJECTION, SOLUTION INTRAVENOUS EVERY 30 MIN PRN
Status: DISCONTINUED | OUTPATIENT
Start: 2018-10-01 | End: 2018-10-01 | Stop reason: HOSPADM

## 2018-10-01 RX ORDER — PHENYLEPHRINE HCL IN 0.9% NACL 1 MG/10 ML
20-100 SYRINGE (ML) INTRAVENOUS
Status: DISCONTINUED | OUTPATIENT
Start: 2018-10-01 | End: 2018-10-01 | Stop reason: HOSPADM

## 2018-10-01 RX ORDER — ASPIRIN 81 MG/1
81 TABLET ORAL DAILY
Status: DISCONTINUED | OUTPATIENT
Start: 2018-10-02 | End: 2018-10-02 | Stop reason: HOSPADM

## 2018-10-01 RX ORDER — ASPIRIN 81 MG/1
81-324 TABLET, CHEWABLE ORAL
Status: DISCONTINUED | OUTPATIENT
Start: 2018-10-01 | End: 2018-10-01 | Stop reason: HOSPADM

## 2018-10-01 RX ORDER — ATROPINE SULFATE 0.1 MG/ML
.5-1 INJECTION INTRAVENOUS
Status: DISCONTINUED | OUTPATIENT
Start: 2018-10-01 | End: 2018-10-01 | Stop reason: HOSPADM

## 2018-10-01 RX ORDER — NICARDIPINE HYDROCHLORIDE 2.5 MG/ML
100 INJECTION INTRAVENOUS
Status: DISCONTINUED | OUTPATIENT
Start: 2018-10-01 | End: 2018-10-01 | Stop reason: HOSPADM

## 2018-10-01 RX ORDER — LORAZEPAM 2 MG/ML
0.5 INJECTION INTRAMUSCULAR
Status: DISCONTINUED | OUTPATIENT
Start: 2018-10-01 | End: 2018-10-01 | Stop reason: HOSPADM

## 2018-10-01 RX ORDER — VERAPAMIL HYDROCHLORIDE 2.5 MG/ML
1-2.5 INJECTION, SOLUTION INTRAVENOUS
Status: COMPLETED | OUTPATIENT
Start: 2018-10-01 | End: 2018-10-01

## 2018-10-01 RX ORDER — FUROSEMIDE 10 MG/ML
20-100 INJECTION INTRAMUSCULAR; INTRAVENOUS
Status: DISCONTINUED | OUTPATIENT
Start: 2018-10-01 | End: 2018-10-01 | Stop reason: HOSPADM

## 2018-10-01 RX ORDER — NIFEDIPINE 10 MG/1
10 CAPSULE ORAL
Status: DISCONTINUED | OUTPATIENT
Start: 2018-10-01 | End: 2018-10-01 | Stop reason: HOSPADM

## 2018-10-01 RX ORDER — DOPAMINE HYDROCHLORIDE 160 MG/100ML
2-20 INJECTION, SOLUTION INTRAVENOUS CONTINUOUS PRN
Status: DISCONTINUED | OUTPATIENT
Start: 2018-10-01 | End: 2018-10-01 | Stop reason: HOSPADM

## 2018-10-01 RX ORDER — POTASSIUM CHLORIDE 29.8 MG/ML
20 INJECTION INTRAVENOUS
Status: DISCONTINUED | OUTPATIENT
Start: 2018-10-01 | End: 2018-10-01 | Stop reason: HOSPADM

## 2018-10-01 RX ORDER — NITROGLYCERIN 5 MG/ML
100-500 VIAL (ML) INTRAVENOUS
Status: COMPLETED | OUTPATIENT
Start: 2018-10-01 | End: 2018-10-01

## 2018-10-01 RX ORDER — HYDRALAZINE HYDROCHLORIDE 20 MG/ML
10-20 INJECTION INTRAMUSCULAR; INTRAVENOUS
Status: DISCONTINUED | OUTPATIENT
Start: 2018-10-01 | End: 2018-10-01 | Stop reason: HOSPADM

## 2018-10-01 RX ORDER — ADENOSINE 3 MG/ML
12-12000 INJECTION, SOLUTION INTRAVENOUS
Status: DISCONTINUED | OUTPATIENT
Start: 2018-10-01 | End: 2018-10-01 | Stop reason: HOSPADM

## 2018-10-01 RX ORDER — NITROGLYCERIN 20 MG/100ML
.07-2 INJECTION INTRAVENOUS CONTINUOUS PRN
Status: DISCONTINUED | OUTPATIENT
Start: 2018-10-01 | End: 2018-10-01 | Stop reason: HOSPADM

## 2018-10-01 RX ORDER — SODIUM CHLORIDE 9 MG/ML
INJECTION, SOLUTION INTRAVENOUS CONTINUOUS
Status: ACTIVE | OUTPATIENT
Start: 2018-10-01 | End: 2018-10-01

## 2018-10-01 RX ORDER — HEPARIN SODIUM 1000 [USP'U]/ML
1000-10000 INJECTION, SOLUTION INTRAVENOUS; SUBCUTANEOUS EVERY 5 MIN PRN
Status: DISCONTINUED | OUTPATIENT
Start: 2018-10-01 | End: 2018-10-01 | Stop reason: HOSPADM

## 2018-10-01 RX ORDER — NITROGLYCERIN 0.4 MG/1
0.4 TABLET SUBLINGUAL EVERY 5 MIN PRN
Status: DISCONTINUED | OUTPATIENT
Start: 2018-10-01 | End: 2018-10-02 | Stop reason: HOSPADM

## 2018-10-01 RX ORDER — LORAZEPAM 2 MG/ML
.5-2 INJECTION INTRAMUSCULAR EVERY 4 HOURS PRN
Status: DISCONTINUED | OUTPATIENT
Start: 2018-10-01 | End: 2018-10-01 | Stop reason: HOSPADM

## 2018-10-01 RX ORDER — NITROGLYCERIN 5 MG/ML
100-200 VIAL (ML) INTRAVENOUS
Status: DISCONTINUED | OUTPATIENT
Start: 2018-10-01 | End: 2018-10-01 | Stop reason: HOSPADM

## 2018-10-01 RX ORDER — FLUMAZENIL 0.1 MG/ML
0.2 INJECTION, SOLUTION INTRAVENOUS
Status: ACTIVE | OUTPATIENT
Start: 2018-10-01 | End: 2018-10-02

## 2018-10-01 RX ORDER — FENTANYL CITRATE 50 UG/ML
25-50 INJECTION, SOLUTION INTRAMUSCULAR; INTRAVENOUS
Status: ACTIVE | OUTPATIENT
Start: 2018-10-01 | End: 2018-10-02

## 2018-10-01 RX ORDER — NITROGLYCERIN 0.4 MG/1
0.4 TABLET SUBLINGUAL EVERY 5 MIN PRN
Status: DISCONTINUED | OUTPATIENT
Start: 2018-10-01 | End: 2018-10-01 | Stop reason: HOSPADM

## 2018-10-01 RX ORDER — SODIUM CHLORIDE 9 MG/ML
INJECTION, SOLUTION INTRAVENOUS CONTINUOUS
Status: DISCONTINUED | OUTPATIENT
Start: 2018-10-01 | End: 2018-10-01 | Stop reason: HOSPADM

## 2018-10-01 RX ORDER — DOBUTAMINE HYDROCHLORIDE 200 MG/100ML
2-20 INJECTION INTRAVENOUS CONTINUOUS PRN
Status: DISCONTINUED | OUTPATIENT
Start: 2018-10-01 | End: 2018-10-01 | Stop reason: HOSPADM

## 2018-10-01 RX ORDER — HYDROCODONE BITARTRATE AND ACETAMINOPHEN 5; 325 MG/1; MG/1
1-2 TABLET ORAL EVERY 4 HOURS PRN
Status: DISCONTINUED | OUTPATIENT
Start: 2018-10-01 | End: 2018-10-02 | Stop reason: HOSPADM

## 2018-10-01 RX ORDER — METOPROLOL TARTRATE 1 MG/ML
5 INJECTION, SOLUTION INTRAVENOUS EVERY 5 MIN PRN
Status: DISCONTINUED | OUTPATIENT
Start: 2018-10-01 | End: 2018-10-01 | Stop reason: HOSPADM

## 2018-10-01 RX ORDER — PROTAMINE SULFATE 10 MG/ML
25-100 INJECTION, SOLUTION INTRAVENOUS EVERY 5 MIN PRN
Status: DISCONTINUED | OUTPATIENT
Start: 2018-10-01 | End: 2018-10-01 | Stop reason: HOSPADM

## 2018-10-01 RX ORDER — ONDANSETRON 2 MG/ML
4 INJECTION INTRAMUSCULAR; INTRAVENOUS EVERY 4 HOURS PRN
Status: DISCONTINUED | OUTPATIENT
Start: 2018-10-01 | End: 2018-10-01 | Stop reason: HOSPADM

## 2018-10-01 RX ORDER — LORAZEPAM 0.5 MG/1
0.5 TABLET ORAL
Status: DISCONTINUED | OUTPATIENT
Start: 2018-10-01 | End: 2018-10-01 | Stop reason: HOSPADM

## 2018-10-01 RX ORDER — NALOXONE HYDROCHLORIDE 0.4 MG/ML
.2-.4 INJECTION, SOLUTION INTRAMUSCULAR; INTRAVENOUS; SUBCUTANEOUS
Status: ACTIVE | OUTPATIENT
Start: 2018-10-01 | End: 2018-10-02

## 2018-10-01 RX ORDER — ASPIRIN 325 MG
325 TABLET ORAL
Status: DISCONTINUED | OUTPATIENT
Start: 2018-10-01 | End: 2018-10-01 | Stop reason: HOSPADM

## 2018-10-01 RX ORDER — MORPHINE SULFATE 2 MG/ML
1-2 INJECTION, SOLUTION INTRAMUSCULAR; INTRAVENOUS EVERY 5 MIN PRN
Status: DISCONTINUED | OUTPATIENT
Start: 2018-10-01 | End: 2018-10-01 | Stop reason: HOSPADM

## 2018-10-01 RX ORDER — SODIUM NITROPRUSSIDE 25 MG/ML
100-200 INJECTION INTRAVENOUS
Status: DISCONTINUED | OUTPATIENT
Start: 2018-10-01 | End: 2018-10-01 | Stop reason: HOSPADM

## 2018-10-01 RX ORDER — LIDOCAINE HYDROCHLORIDE 10 MG/ML
1-10 INJECTION, SOLUTION EPIDURAL; INFILTRATION; INTRACAUDAL; PERINEURAL
Status: COMPLETED | OUTPATIENT
Start: 2018-10-01 | End: 2018-10-01

## 2018-10-01 RX ORDER — ACETAMINOPHEN 325 MG/1
325-650 TABLET ORAL EVERY 4 HOURS PRN
Status: DISCONTINUED | OUTPATIENT
Start: 2018-10-01 | End: 2018-10-02 | Stop reason: HOSPADM

## 2018-10-01 RX ORDER — METHYLPREDNISOLONE SODIUM SUCCINATE 125 MG/2ML
125 INJECTION, POWDER, LYOPHILIZED, FOR SOLUTION INTRAMUSCULAR; INTRAVENOUS
Status: DISCONTINUED | OUTPATIENT
Start: 2018-10-01 | End: 2018-10-01 | Stop reason: HOSPADM

## 2018-10-01 RX ORDER — LIDOCAINE HYDROCHLORIDE 10 MG/ML
30 INJECTION, SOLUTION EPIDURAL; INFILTRATION; INTRACAUDAL; PERINEURAL
Status: DISCONTINUED | OUTPATIENT
Start: 2018-10-01 | End: 2018-10-01 | Stop reason: HOSPADM

## 2018-10-01 RX ORDER — CLOPIDOGREL BISULFATE 75 MG/1
75 TABLET ORAL
Status: DISCONTINUED | OUTPATIENT
Start: 2018-10-01 | End: 2018-10-01 | Stop reason: HOSPADM

## 2018-10-01 RX ORDER — FLUMAZENIL 0.1 MG/ML
0.2 INJECTION, SOLUTION INTRAVENOUS
Status: DISCONTINUED | OUTPATIENT
Start: 2018-10-01 | End: 2018-10-01 | Stop reason: HOSPADM

## 2018-10-01 RX ADMIN — LORAZEPAM 0.5 MG: 0.5 TABLET ORAL at 08:24

## 2018-10-01 RX ADMIN — FENTANYL CITRATE 50 MCG: 50 INJECTION, SOLUTION INTRAMUSCULAR; INTRAVENOUS at 15:00

## 2018-10-01 RX ADMIN — HEPARIN SODIUM 2000 UNITS: 1000 INJECTION, SOLUTION INTRAVENOUS; SUBCUTANEOUS at 14:52

## 2018-10-01 RX ADMIN — LIDOCAINE HYDROCHLORIDE 2 ML: 10 INJECTION, SOLUTION EPIDURAL; INFILTRATION; INTRACAUDAL; PERINEURAL at 13:57

## 2018-10-01 RX ADMIN — Medication 12.5 MG: at 08:24

## 2018-10-01 RX ADMIN — Medication 250 MG: at 08:24

## 2018-10-01 RX ADMIN — CLOPIDOGREL 75 MG: 75 TABLET, FILM COATED ORAL at 08:24

## 2018-10-01 RX ADMIN — HEPARIN SODIUM 750 UNITS/HR: 10000 INJECTION, SOLUTION INTRAVENOUS at 08:58

## 2018-10-01 RX ADMIN — HEPARIN SODIUM 5000 UNITS: 1000 INJECTION, SOLUTION INTRAVENOUS; SUBCUTANEOUS at 14:01

## 2018-10-01 RX ADMIN — ATORVASTATIN CALCIUM 40 MG: 40 TABLET, FILM COATED ORAL at 20:50

## 2018-10-01 RX ADMIN — NITROGLYCERIN 200 MCG: 5 INJECTION, SOLUTION INTRAVENOUS at 14:33

## 2018-10-01 RX ADMIN — IOPAMIDOL 115 ML: 755 INJECTION, SOLUTION INTRAVASCULAR at 15:30

## 2018-10-01 RX ADMIN — SODIUM CHLORIDE: 9 INJECTION, SOLUTION INTRAVENOUS at 04:53

## 2018-10-01 RX ADMIN — ASPIRIN 325 MG: 325 TABLET, DELAYED RELEASE ORAL at 08:23

## 2018-10-01 RX ADMIN — NITROGLYCERIN 200 MCG: 5 INJECTION, SOLUTION INTRAVENOUS at 13:58

## 2018-10-01 RX ADMIN — Medication 12.5 MG: at 20:50

## 2018-10-01 RX ADMIN — NITROGLYCERIN 1 PATCH: 0.4 PATCH TRANSDERMAL at 08:24

## 2018-10-01 RX ADMIN — FENTANYL CITRATE 50 MCG: 50 INJECTION, SOLUTION INTRAMUSCULAR; INTRAVENOUS at 13:43

## 2018-10-01 RX ADMIN — MIDAZOLAM 1 MG: 1 INJECTION INTRAMUSCULAR; INTRAVENOUS at 13:43

## 2018-10-01 RX ADMIN — SODIUM CHLORIDE: 9 INJECTION, SOLUTION INTRAVENOUS at 19:28

## 2018-10-01 RX ADMIN — VERAPAMIL HYDROCHLORIDE 2.5 MG: 2.5 INJECTION, SOLUTION INTRAVENOUS at 13:58

## 2018-10-01 RX ADMIN — LORAZEPAM 0.5 MG: 0.5 TABLET ORAL at 00:29

## 2018-10-01 RX ADMIN — CALCIUM CARBONATE (ANTACID) CHEW TAB 500 MG 1000 MG: 500 CHEW TAB at 23:42

## 2018-10-01 RX ADMIN — AMLODIPINE BESYLATE 5 MG: 5 TABLET ORAL at 08:23

## 2018-10-01 RX ADMIN — MIDAZOLAM 1 MG: 1 INJECTION INTRAMUSCULAR; INTRAVENOUS at 14:47

## 2018-10-01 ASSESSMENT — ACTIVITIES OF DAILY LIVING (ADL)
ADLS_ACUITY_SCORE: 10
ADLS_ACUITY_SCORE: 13
ADLS_ACUITY_SCORE: 11
ADLS_ACUITY_SCORE: 13
ADLS_ACUITY_SCORE: 11
ADLS_ACUITY_SCORE: 10

## 2018-10-01 ASSESSMENT — PAIN DESCRIPTION - DESCRIPTORS: DESCRIPTORS: BURNING

## 2018-10-01 NOTE — PROGRESS NOTES
1305 Pt arrived to [facility]. A/O. IV site - good blood return. IV flds infusing. Pulses marked. VSS, Family in lobby.   1315 Dr Lo at bedside to speak with pt. Consent signed.  1330 Pt to cath lab at this time.

## 2018-10-01 NOTE — PLAN OF CARE
Problem: Patient Care Overview  Goal: Plan of Care/Patient Progress Review  Outcome: No Change  Pt is alert/oriented x4, denies pain/discomfort. Tele NSR, heparin gtt running at 7.5 ml/hr with IVF. Tolerating PO diet. SBA when OOB. VSS, plan for angio tomorrow, NPO past midnight. Will continue to monitor.

## 2018-10-01 NOTE — PLAN OF CARE
Problem: Cardiac: ACS (Acute Coronary Syndrome) (Adult)  Goal: Signs and Symptoms of Listed Potential Problems Will be Absent, Minimized or Managed (Cardiac: ACS)  Signs and symptoms of listed potential problems will be absent, minimized or managed by discharge/transition of care (reference Cardiac: ACS (Acute Coronary Syndrome) (Adult) CPG).   Outcome: No Change  Pt back from cath lab, TR band on left radial artery with 15cc of air. CMS intact. Pt denies pain.

## 2018-10-01 NOTE — PLAN OF CARE
Problem: Patient Care Overview  Goal: Plan of Care/Patient Progress Review  Outcome: No Change  AVSS; tele SB; pt denied pain overnight; NPO after midnight for planned angio; Heparin drip at 750 units/hr; IV maintenance 0.9 NS at 50 ml/hr; pt up with 1 standby assist to void; voiding in good amounts; lung sounds clear; ativan x 1 for anxiety/restlessness per pt request with relief; continue to monitor.

## 2018-10-01 NOTE — PROGRESS NOTES
[facility]    Cardiology Progress Note    Date of Service: 10/01/2018   pt seen and examined by me, all observations and plans are mine. Discussed with pt and family today   Time 35min  Assessment & Plan   Augie Powell is a 82 year old male with past medical history of 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  This patient was admitted on 9/28/2018 with symptoms of chest pain /sob for 2 weeks, trop elevation and reduced EF  PT/FAMILY SAYS HE HAD TM TEST AT ?[facility] RECENTLY AND NORMAL?--I DON'T SEE THAT RESULT OR COMMENT ON ONE FROM DR MARTEL OFFICE NOTES  1. Nonstemi  trops 3.4 peak  EF 40-45% with inferoseptal, anteroseptal, inferior and anterior HK  Flipped T wave anterolateral; ?old septal infarct  Asa/plavix  Nitropatch  Metoprolol  Amlodipine  IV Heparin--platelets normal  Hgb 11.8 (13.5)    Plan for angiogram today-SUSPECT MULTIVESSEL CAD BASED ON ECHO--GIVEN CRF EVEN IF PCI OPTION MAY NEED TO BE STAGED DUE TO DYE LOAD--FAMILY AWARE  PT OK WITH DAPT FOR 1 YR    2. CKD  Solitary kidney on left; s/p right nephrectomy (ca)  Benazepril on hold for now  Creat at baseline is 2.25--1.95 today  GFR baseline 30; 33 today  Nephrology following      3. Dyslipidemia-Atorvastatin 40mg daily      HDL 40    4. HTN  Controlled  Benazepril currently on hold as noted  5. IFG          Interval History   No chest pain; chronic low back pain    Review of Systems:  The Review of Systems is negative other than noted in the HPI    Physical Exam   Temp: 98.3  F (36.8  C) Temp src: Oral BP: 131/67 Pulse: 61 Heart Rate: 56 Resp: 16 SpO2: 98 % O2 Device: None (Room air)    Vitals:    09/29/18 0012 09/30/18 0123 10/01/18 0256   Weight: 87.2 kg (192 lb 3.2 oz) 86.4 kg (190 lb 6.4 oz) 86.7 kg (191 lb 3.2 oz)     Vital Signs with Ranges  Temp:  [97.6  F (36.4  C)-98.3  F (36.8  C)] 98.3  F (36.8  C)  Pulse:  [61] 61  Heart Rate:  [54-64] 56  Resp:  [16] 16  BP: (112-131)/(59-68)  131/67  SpO2:  [96 %-98 %] 98 %  I/O last 3 completed shifts:  In: 1440 [P.O.:180; I.V.:1260]  Out: 975 [Urine:975]    Constitutional     alert and oriented, in no acute distress.     Skin     warm and dry to touch    ENT     no pallor or cyanosis    Neck    Supple, JVP normal, no carotid bruit    Chest     no tenderness to palpation     Lungs  clear to auscultation     Cardiac  regular rhythm, S1 normal, S2 normal, No S3 or S4, , no rubs--distant tones  MIN SYST MURMUR    Abdomen     abdomen soft, bowel sounds normoactive, no hepatosplenomegaly    Extremities and Back     no clubbing, cyanosis. No edema observed.        Neurological     no gross motor deficits noted, affect appropriate, oriented to time, person and place.  ALMOST LOOKS LIKE [place] BUT FAMILY SAYS NO PRIOR DX OF THAT        Medications     HEParin 750 Units/hr (10/01/18 0858)     - MEDICATION INSTRUCTIONS -       - MEDICATION INSTRUCTIONS -       Reason ACE/ARB/ARNI order not selected       sodium chloride       sodium chloride 50 mL/hr at 10/01/18 0453       amLODIPine  5 mg Oral Daily     [START ON 10/2/2018] aspirin  81 mg Oral Daily     atorvastatin  40 mg Oral QPM     clopidogrel  75 mg Oral Daily     metoprolol tartrate  12.5 mg Oral BID     nitroGLYcerin  1 patch Transdermal QAM     nitroGLYcerin   Transdermal Q8H     nitroGLYcerin   Transdermal QPM     saccharomyces boulardii  250 mg Oral BID     sodium chloride (PF)  3 mL Intracatheter Q8H     sodium chloride (PF)  3 mL Intracatheter Q8H          Data:     ROUTINE IP LABS (Last four results)  BMP  Recent Labs  Lab 10/01/18  0635 09/30/18  0535 09/29/18  0605 09/28/18  2120    141 141 137   POTASSIUM 4.6 4.7 4.7 5.0   CHLORIDE 111* 109 109 104   RICCARDO 8.4* 8.1* 8.3* 8.6   CO2 25 24 25 26   BUN 30 38* 37* 38*   CR 1.95* 2.06* 2.25* 2.37*   * 103* 105* 110*     CHOLESTEROL/HEPATIC  Recent Labs  Lab 09/30/18  0535 09/29/18  0110   CHOL  --  171   TRIG  --  67   LDL  --  118*   HDL   --  40   ALT 26  --    AST 26  --      CBC  Recent Labs  Lab 10/01/18  0635  09/29/18  0110 09/28/18 2120   WBC 7.3  --  11.0 10.1   RBC 3.80*  --  4.27* 4.18*   HGB 11.8*  < > 13.5 13.4   HCT 35.4*  --  38.9* 38.4*   MCV 93  --  91 92   MCH 31.1  --  31.6 32.1   MCHC 33.3  --  34.7 34.9   RDW 12.9  --  12.8 12.9     --  190 168   < > = values in this interval not displayed.  TROP:   Recent Labs  Lab 09/29/18  0605 09/29/18 0110 09/28/18 2120   TROPI 3.408* 3.373* 2.747*      BNP:    Recent Labs  Lab 09/28/18 2120   NTBNPI 4282*     INRNo lab results found in last 7 days.  TSH   Date Value Ref Range Status   09/29/2018 1.82 0.40 - 4.00 mU/L Final       EKG results:  Reviewed if available     Imaging:  No results found for this or any previous visit (from the past 24 hour(s)).  Telemetry:  sinus

## 2018-10-01 NOTE — PROGRESS NOTES
[facility]  Hospitalist Progress Note   10/01/2018          Assessment and Plan:       Augie Powell is a 82 year old male with medical history of CKD secondary to solitary kidney secondary to nephrectomy, hypertension, hyperlipidemia admitted on 9/28/2018 with exertional chest pain and exertional shortness of breath.    Non-ST elevated MI.  Dyspnea on exertion.  Patient presented with intermittent substernal chest pain for 2 weeks with activity and improves with rest.  Associated shortness of breath.  EKG which showed normal sinus rhythm without any acute ischemic changes.  However, an EKG from his clinic a couple days ago did show diffuse T-wave inversions in leads V2 through V5 as well as I and aVL.  Chest x-ray no acute pathology.  ProBNP 4200, creatinine 2.25.  Glucose 110, hemoglobin 13.4.  Troponin 2.747 > 3.373 > 3.408.  Hemoglobin A1c 5.5, TSH 1.82.  Echo Left ventricular basal function is relatively preserved compared to the mid and apex. [place] is akinetic. Hypokinesis of the inferior septum, anterior septum, inferior and anterior walls (mid and apex). Suggests multivessel CAD. Visually estimated LVEF 40-45%.  No significant valve disease.    Loaded with aspirin 325 mg once and continued on 81 mg aspirin daily [9/28].  Continue Plavix 75 mg oral daily [10/1]  Continued on metoprolol 12.5 mg twice daily [9/29]  Continue on 40 mg of atorvastatin at bedtime [9/29]  Continued on IV heparin drip [9/28]  As needed nitro, nitro patch ordered.  Cardiology planning for angiogram today.    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   Baseline creatinine around 2.25.  GFR around 30.  Creatinine improved from 2.25-1.95.  UA clear.  Renal ultrasound showed cysts in the left kidney, otherwise normal renal ultrasound following right nephrectomy.  Hold PTA benazepril as planned for angiogram.  Gentle hydration with NS at 50 mL/h.  Monitor renal function  periodically.  Avoid nephrotoxic drugs.  Strict input-output monitoring.  Nephrology team following along, appreciate recommendations.    Hyperlipidemia.  , HDL 40, triglycerides 67.  Continued on 40 mg atorvastatin at bedtime.    Hypertension.    Continued PTA amlodipine 5 mg oral daily  Continued on metoprolol 12.5 mg twice daily [9/29]  Hold PTA benazepril 20 mg oral daily.  Hold PTA imdur 30 mg oral daily until cardiology assessment.      Acute anxiety from acute illness.  Patient expressed anxiety regarding upcoming procedures, chest pain.  As needed 0.5 mg Ativan every 8 hours ordered.  Supportive care.    Acute anemia likely dilutional.  Patient presented with a hemoglobin of 13.4 > 11.8.    Has been receiving gentle hydration.  No acute blood loss noted.  Monitor hemoglobin levels periodically.  Iron studies.    History of spondylosis, back surgery including laminectomy.  Patient currently has pain in his back.    No focal weakness.  No tingling or numbness.  As needed Tylenol, warm compress.    Active Diet Order      NPO for Medical/Clinical Reasons Except for: Meds      Combination Diet Low Saturated Fat Na <2400mg Diet    DVT Prophylaxis:  Sequential compression device, IV heparin.  Code Status: Full Code  Disposition: Expected discharge likely 1-2 days pending cardiac workup.    Patient, his wife by the bedside, interdisciplinary team involved in care and agrees with plan.  Total time - 25 min. More than 50% of time spent in direct patient care, care coordination and formalizing plan of care.     Magnolia Rodriguez MD        Interval History:      Patient sitting up in chair.    Denies any chest pain at this time.  Had shortness of breath on exertion, none on rest at this time.    Complains of pain in his back, chronic.  No nausea vomiting.  No abdominal pain.  Tolerating oral diet.         Physical Exam:        Physical Exam   Temp:  [97.6  F (36.4  C)-98.3  F (36.8  C)] 98.3  F (36.8  C)  Pulse:   [61] 61  Heart Rate:  [56-64] 56  Resp:  [16] 16  BP: (112-131)/(59-68) 131/67  SpO2:  [96 %-98 %] 98 %    Intake/Output Summary (Last 24 hours) at 09/29/18 0731  Last data filed at 09/29/18 0600   Gross per 24 hour   Intake           666.29 ml   Output              900 ml   Net          -233.71 ml     Admission Weight: 89 kg (196 lb 3.4 oz)  Current Weight: 87.2 kg (192 lb 3.2 oz)    PHYSICAL EXAM  GENERAL: Patient appears comfortable. Alert and oriented.  HEART: Regular rate and rhythm. S1S2. No murmurs  LUNGS: Bilateral slightly decreased breath sounds.  No wheezing no crackles.  ABDOMEN: Soft, no abdominal tenderness, bowel sounds heard   NEURO: Moving all extremities.  EXTREMITIES: trace pedal edema. 2+ peripheral pulses.         Medications:          amLODIPine  5 mg Oral Daily     [START ON 10/2/2018] aspirin  81 mg Oral Daily     atorvastatin  40 mg Oral QPM     clopidogrel  75 mg Oral Daily     metoprolol tartrate  12.5 mg Oral BID     nitroGLYcerin  1 patch Transdermal QAM     nitroGLYcerin   Transdermal Q8H     nitroGLYcerin   Transdermal QPM     saccharomyces boulardii  250 mg Oral BID     sodium chloride (PF)  3 mL Intracatheter Q8H     sodium chloride (PF)  3 mL Intracatheter Q8H     acetaminophen, acetaminophen, bisacodyl, HOLD MEDICATION, lidocaine 4%, lidocaine (buffered or not buffered), LORazepam **OR** LORazepam, LORazepam, magnesium hydroxide, morphine, naloxone, nitroGLYcerin, ondansetron **OR** ondansetron, - MEDICATION INSTRUCTIONS -, - MEDICATION INSTRUCTIONS -, potassium chloride, Reason ACE/ARB/ARNI order not selected, senna-docusate **OR** senna-docusate, sodium chloride (PF), sodium chloride (PF)         Data:      All new lab and imaging data was reviewed.

## 2018-10-01 NOTE — PLAN OF CARE
Problem: Cardiac: ACS (Acute Coronary Syndrome) (Adult)  Goal: Signs and Symptoms of Listed Potential Problems Will be Absent, Minimized or Managed (Cardiac: ACS)  Signs and symptoms of listed potential problems will be absent, minimized or managed by discharge/transition of care (reference Cardiac: ACS (Acute Coronary Syndrome) (Adult) CPG).   Outcome: No Change  Pt remains pain free, heparin infusing at 750 units/hr, stable vitals, rhythm sinus,  awaiting coronary catheterization.

## 2018-10-01 NOTE — PLAN OF CARE
Problem: Patient Care Overview  Goal: Plan of Care/Patient Progress Review  [id number] Pt A/O x 4. No complaints of pain. TR band off. CMS intact. Lungs clear. Tele in place, bradycardia. Voiding in the urinal. Tolerating low NA diet. Up with SBA. Will continue to monitor.

## 2018-10-01 NOTE — PLAN OF CARE
Problem: Patient Care Overview  Goal: Plan of Care/Patient Progress Review  OT/cardiac rehab orders received, chart reviewed and noted pt to have angio today. Therefore, will hold and initiate tomorrow.

## 2018-10-02 ENCOUNTER — APPOINTMENT (OUTPATIENT)
Dept: OCCUPATIONAL THERAPY | Facility: CLINIC | Age: 83
DRG: 247 | End: 2018-10-02
Attending: INTERNAL MEDICINE
Payer: MEDICARE

## 2018-10-02 VITALS
TEMPERATURE: 98.2 F | WEIGHT: 190.8 LBS | BODY MASS INDEX: 27.32 KG/M2 | HEIGHT: 70 IN | HEART RATE: 58 BPM | OXYGEN SATURATION: 98 % | RESPIRATION RATE: 16 BRPM | SYSTOLIC BLOOD PRESSURE: 142 MMHG | DIASTOLIC BLOOD PRESSURE: 69 MMHG

## 2018-10-02 LAB
ANION GAP SERPL CALCULATED.3IONS-SCNC: 8 MMOL/L (ref 3–14)
BUN SERPL-MCNC: 28 MG/DL (ref 7–30)
CALCIUM SERPL-MCNC: 8.4 MG/DL (ref 8.5–10.1)
CHLORIDE SERPL-SCNC: 110 MMOL/L (ref 94–109)
CO2 SERPL-SCNC: 23 MMOL/L (ref 20–32)
CREAT SERPL-MCNC: 1.8 MG/DL (ref 0.66–1.25)
ERYTHROCYTE [DISTWIDTH] IN BLOOD BY AUTOMATED COUNT: 12.9 % (ref 10–15)
GFR SERPL CREATININE-BSD FRML MDRD: 36 ML/MIN/1.7M2
GLUCOSE SERPL-MCNC: 98 MG/DL (ref 70–99)
HCT VFR BLD AUTO: 34.5 % (ref 40–53)
HGB BLD-MCNC: 11.8 G/DL (ref 13.3–17.7)
MCH RBC QN AUTO: 31.7 PG (ref 26.5–33)
MCHC RBC AUTO-ENTMCNC: 34.2 G/DL (ref 31.5–36.5)
MCV RBC AUTO: 93 FL (ref 78–100)
PLATELET # BLD AUTO: 140 10E9/L (ref 150–450)
POTASSIUM SERPL-SCNC: 4.4 MMOL/L (ref 3.4–5.3)
RBC # BLD AUTO: 3.72 10E12/L (ref 4.4–5.9)
SODIUM SERPL-SCNC: 141 MMOL/L (ref 133–144)
WBC # BLD AUTO: 7.1 10E9/L (ref 4–11)

## 2018-10-02 PROCEDURE — 85027 COMPLETE CBC AUTOMATED: CPT | Performed by: INTERNAL MEDICINE

## 2018-10-02 PROCEDURE — 99232 SBSQ HOSP IP/OBS MODERATE 35: CPT | Performed by: INTERNAL MEDICINE

## 2018-10-02 PROCEDURE — 36415 COLL VENOUS BLD VENIPUNCTURE: CPT | Performed by: INTERNAL MEDICINE

## 2018-10-02 PROCEDURE — 25000132 ZZH RX MED GY IP 250 OP 250 PS 637: Mod: GY | Performed by: STUDENT IN AN ORGANIZED HEALTH CARE EDUCATION/TRAINING PROGRAM

## 2018-10-02 PROCEDURE — A9270 NON-COVERED ITEM OR SERVICE: HCPCS | Mod: GY | Performed by: INTERNAL MEDICINE

## 2018-10-02 PROCEDURE — 80048 BASIC METABOLIC PNL TOTAL CA: CPT | Performed by: INTERNAL MEDICINE

## 2018-10-02 PROCEDURE — A9270 NON-COVERED ITEM OR SERVICE: HCPCS | Mod: GY | Performed by: STUDENT IN AN ORGANIZED HEALTH CARE EDUCATION/TRAINING PROGRAM

## 2018-10-02 PROCEDURE — 40000133 ZZH STATISTIC OT WARD VISIT: Performed by: OCCUPATIONAL THERAPIST

## 2018-10-02 PROCEDURE — 99239 HOSP IP/OBS DSCHRG MGMT >30: CPT | Performed by: HOSPITALIST

## 2018-10-02 PROCEDURE — 97165 OT EVAL LOW COMPLEX 30 MIN: CPT | Mod: GO | Performed by: OCCUPATIONAL THERAPIST

## 2018-10-02 PROCEDURE — 25000132 ZZH RX MED GY IP 250 OP 250 PS 637: Mod: GY | Performed by: INTERNAL MEDICINE

## 2018-10-02 PROCEDURE — 97110 THERAPEUTIC EXERCISES: CPT | Mod: GO | Performed by: OCCUPATIONAL THERAPIST

## 2018-10-02 PROCEDURE — 97535 SELF CARE MNGMENT TRAINING: CPT | Mod: GO | Performed by: OCCUPATIONAL THERAPIST

## 2018-10-02 PROCEDURE — 82565 ASSAY OF CREATININE: CPT | Performed by: INTERNAL MEDICINE

## 2018-10-02 RX ORDER — ATORVASTATIN CALCIUM 40 MG/1
40 TABLET, FILM COATED ORAL EVERY EVENING
Qty: 30 TABLET | Refills: 0 | Status: SHIPPED | OUTPATIENT
Start: 2018-10-02 | End: 2018-10-24

## 2018-10-02 RX ORDER — BENAZEPRIL HYDROCHLORIDE 20 MG/1
20 TABLET ORAL DAILY
Status: DISCONTINUED | OUTPATIENT
Start: 2018-10-02 | End: 2018-10-02 | Stop reason: HOSPADM

## 2018-10-02 RX ORDER — CLOPIDOGREL BISULFATE 75 MG/1
75 TABLET ORAL DAILY
Qty: 30 TABLET | Refills: 0 | Status: SHIPPED | OUTPATIENT
Start: 2018-10-03 | End: 2018-10-24

## 2018-10-02 RX ORDER — METOPROLOL TARTRATE 25 MG/1
12.5 TABLET, FILM COATED ORAL 2 TIMES DAILY
Qty: 30 TABLET | Refills: 0 | Status: SHIPPED | OUTPATIENT
Start: 2018-10-02 | End: 2018-10-24

## 2018-10-02 RX ADMIN — Medication 12.5 MG: at 09:01

## 2018-10-02 RX ADMIN — CLOPIDOGREL 75 MG: 75 TABLET, FILM COATED ORAL at 08:59

## 2018-10-02 RX ADMIN — SENNOSIDES AND DOCUSATE SODIUM 1 TABLET: 8.6; 5 TABLET ORAL at 10:26

## 2018-10-02 RX ADMIN — ASPIRIN 81 MG: 81 TABLET, COATED ORAL at 09:03

## 2018-10-02 RX ADMIN — AMLODIPINE BESYLATE 5 MG: 5 TABLET ORAL at 08:59

## 2018-10-02 ASSESSMENT — ACTIVITIES OF DAILY LIVING (ADL)
ADLS_ACUITY_SCORE: 11
ADLS_ACUITY_SCORE: 10
PREVIOUS_RESPONSIBILITIES: MEAL PREP;HOUSEKEEPING;LAUNDRY;SHOPPING;MEDICATION MANAGEMENT;FINANCES;DRIVING
ADLS_ACUITY_SCORE: 10
ADLS_ACUITY_SCORE: 10

## 2018-10-02 NOTE — PROGRESS NOTES
10/02/18 0911   Quick Adds   Type of Visit Initial Occupational Therapy Evaluation   Living Environment   Lives With spouse   Living Arrangements house  (town house)   Home Accessibility stairs to enter home;stairs within home   Number of Stairs to Enter Home 2   Number of Stairs Within Home 15  (basement, office downstairs.)   Transportation Available family or friend will provide;car   Self-Care   Usual Activity Tolerance good   Current Activity Tolerance moderate   Regular Exercise yes   Activity/Exercise Type walking  (mini trampoline, )   Exercise Amount/Frequency daily  (30-40 mins)   Equipment Currently Used at Home cane, straight   Activity/Exercise/Self-Care Comment pt has a drop foot and when brace off uses a SEC   Functional Level Prior   Ambulation 1-->assistive equipment   Transferring 0-->independent   Toileting 0-->independent   Bathing 0-->independent   Dressing 0-->independent   Eating 0-->independent   Communication 0-->understands/communicates without difficulty   Swallowing 0-->swallows foods/liquids without difficulty   Cognition 0 - no cognition issues reported   Fall history within last six months no   Prior Functional Level Comment Pt reports I with all ADL.IADL's   General Information   Onset of Illness/Injury or Date of Surgery - Date 09/28/18   Referring Physician Noel Villarreal MD   Patient/Family Goals Statement home   Additional Occupational Profile Info/Pertinent History of Current Problem Augie Powell is a 82 year old male with past medical history of 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  This patient was admitted on 9/28/2018 with symptoms of chest pain /sob for 2 weeks, trop elevation and reduced EF, angio, CORBIN to LAD, troponin peaked to 3.408, EF 40-45%   Precautions/Limitations (MI precautions)   Cognitive Status Examination   Orientation orientation to person, place and time   Level of Consciousness alert   Able to Follow Commands WNL/WFL  "  Personal Safety (Cognitive) WNL/WFL   Memory intact   Attention No deficits were identified   Organization/Problem Solving No deficits were identified   Executive Function No deficits were identified   Sensory Examination   Sensory Quick Adds No deficits were identified   Pain Assessment   Patient Currently in Pain No   Transfer Skills   Transfer Comments Pt I with ADL's with AE (reacher and sock aide), pt does not have AFO for foot drop and superivison for functioanl mobility with ww    Instrumental Activities of Daily Living (IADL)   Previous Responsibilities meal prep;housekeeping;laundry;shopping;medication management;finances;driving   Activities of Daily Living Analysis   Impairments Contributing to Impaired Activities of Daily Living balance impaired;post surgical precautions  (MI precautions)   General Therapy Interventions   Planned Therapy Interventions home program guidelines;progressive activity/exercise;risk factor education   Clinical Impression   Criteria for Skilled Therapeutic Interventions Met yes, treatment indicated   OT Diagnosis decreased IADL's   Influenced by the following impairments impaired balance without AFO, MI precautions   Assessment of Occupational Performance 1-3 Performance Deficits   Identified Performance Deficits impaired IADL's ie heavier IADL's ie vacuuming, driving per MD   Clinical Decision Making (Complexity) Low complexity   Therapy Frequency (Eval and treatment only)   Anticipated Discharge Disposition Home with Outpatient Therapy  (OP CR at [facility])   Risks and Benefits of Treatment have been explained. Yes   Patient, Family & other staff in agreement with plan of care Yes   [facility] AM-PAC  \"6 Clicks\" Daily Activity Inpatient Short Form   1. Putting on and taking off regular lower body clothing? 3 - A Little   2. Bathing (including washing, rinsing, drying)? 4 - None   3. Toileting, which includes using toilet, bedpan or urinal? 4 - None   4. Putting on and " taking off regular upper body clothing? 4 - None   5. Taking care of personal grooming such as brushing teeth? 4 - None   6. Eating meals? 4 - None   Daily Activity Raw Score (Score out of 24.Lower scores equate to lower levels of function) 23   Total Evaluation Time   Total Evaluation Time (Minutes) 10

## 2018-10-02 NOTE — DISCHARGE SUMMARY
Discharge Summary  Hospitalist    Date of Admission:  9/28/2018  Date of Discharge:  10/2/2018  Discharging Provider: Magnolia Rodriguez MD    Primary Care Physician   Eusebio Oliveira  Primary Care Provider Phone Number: [phone]  Primary Care Provider Fax Number: [phone]    PRINCIPAL DIAGNOSIS  Non-ST elevated MI.  Dyspnea on exertion.  Acute anemia likely dilutional.    Past Medical History:   Diagnosis Date     Hyperlipidemia      Hypertension      Renal disease     CRD--creatinine in upper ones to 2, right kdney removed 2000 for canncer     Spondylosis with myelopathy, lumbar region        History of Present Illness   Augie Powell is an 82 year old male who presented with chest pain    Hospital Course   Augie Powell is a 82 year old male with medical history of CKD secondary to solitary kidney secondary to nephrectomy, hypertension, hyperlipidemia admitted on 9/28/2018 with exertional chest pain and exertional shortness of breath.     Non-ST elevated MI.  Dyspnea on exertion.  Patient presented with intermittent substernal chest pain for 2 weeks with activity and improves with rest.  Associated shortness of breath.  EKG which showed normal sinus rhythm without any acute ischemic changes.  However, an EKG from his clinic a couple days ago did show diffuse T-wave inversions in leads V2 through V5 as well as I and aVL.  Chest x-ray no acute pathology.  ProBNP 4200, creatinine 2.25.  Glucose 110, hemoglobin 13.4.  Troponin 2.747 > 3.373 > 3.408.  Hemoglobin A1c 5.5, TSH 1.82.  Echo Left ventricular basal function is relatively preserved compared to the mid and apex. [place] is akinetic. Hypokinesis of the inferior septum, anterior septum, inferior and anterior walls (mid and apex). Suggests multivessel CAD. Visually estimated LVEF 40-45%.  No significant valve disease.   Cardiac cath - Non-ST elevation myocardial infarction secondary to 90% stenosis of the mid LAD. Single vessel obstructive coronary  artery disease of the mid LAD without left main involvement. 40-50% stenosis of the mid RCA which is not flow-limiting by Pd/Pa (0.95). Successful PCI of the mid LAD with a 3.0X 20 mm Synergy drug eluting stent.    Continued on IV heparin drip [9/28 - 10/3]  Continued on 81 mg aspirin daily [9/28].  Continued Plavix 75 mg oral daily [10/1]  Continued on metoprolol 12.5 mg twice daily [9/29]  Continue on 40 mg of atorvastatin at bedtime [9/29]  Goal systolic blood pressure less than 140.     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   Baseline creatinine around 2.25.  GFR around 30.  Creatinine improved from 2.25-1.95.  UA clear.  Renal ultrasound showed cysts in the left kidney, otherwise normal renal ultrasound following right nephrectomy.  Gentle hydration with NS at 50 mL/h.  Monitor renal function periodically.  Avoid nephrotoxic drugs.  Held PTA benzapril and restarted at the time of discharge.  Nephrology team followed along during hospitalization.     Hyperlipidemia.  , HDL 40, triglycerides 67.  Continued on 40 mg atorvastatin at bedtime.     Hypertension.    Continued PTA amlodipine 5 mg oral daily  Continued on metoprolol 12.5 mg twice daily [9/29]  Hold PTA benazepril 20 mg oral daily during hospitalization and restarted at the time of discharge.  Discontinued PTA imdur 30 mg oral daily    Acute anxiety from acute illness.  Patient expressed anxiety regarding upcoming procedures, chest pain.  Received as needed Ativan 0.5 mg oral  Supportive care.     Acute anemia likely dilutional.  Patient presented with a hemoglobin of 13.4 > 11.8.    Serum iron 90, iron saturation index 37.  No acute blood loss noted.  Monitor hemoglobin levels periodically.       History of spondylosis, back surgery including laminectomy.  Patient currently has pain in his back.    No focal weakness.  No tingling or numbness.  As needed Tylenol, warm compress.    Magnolia Rodriguez,  MD    Pending Results   Unresulted Labs Ordered in the Past 30 Days of this Admission     No orders found from 7/30/2018 to 9/29/2018.             Physical Exam   Vitals:    09/30/18 0123 10/01/18 0256 10/02/18 0238   Weight: 86.4 kg (190 lb 6.4 oz) 86.7 kg (191 lb 3.2 oz) 86.5 kg (190 lb 12.8 oz)     Vital Signs with Ranges  Temp:  [97.5  F (36.4  C)-98.7  F (37.1  C)] 98.4  F (36.9  C)  Heart Rate:  [49-70] 70  Resp:  [8-20] 18  BP: ()/() 148/65  SpO2:  [95 %-100 %] 97 %  I/O last 3 completed shifts:  In: 786.25 [I.V.:786.25]  Out: 1645 [Urine:1645]  PHYSICAL EXAM  GENERAL: Patient appears comfortable. Alert and oriented.  HEART: Regular rate and rhythm. S1S2. No murmurs  LUNGS: Bilateral slightly decreased breath sounds.  No wheezing no crackles.  ABDOMEN: Soft, no abdominal tenderness, bowel sounds heard   NEURO: Moving all extremities.  EXTREMITIES: trace pedal edema. 2+ peripheral pulses.  Cath site no bleeding or oozing noted.    )Consultations This Hospital Stay   PHARMACY TO DOSE HEPARIN  CARDIAC REHAB IP CONSULT  CARDIOLOGY IP CONSULT  PHARMACY TO DOSE HEPARIN  NEPHROLOGY IP CONSULT  CARDIAC REHAB IP CONSULT  NUTRITION SERVICES ADULT IP CONSULT  PHARMACY IP CONSULT  PHARMACY IP CONSULT  CARDIAC REHAB IP CONSULT  SMOKING CESSATION PROGRAM IP CONSULT  SMOKING CESSATION PROGRAM IP CONSULT    Time Spent on this Encounter   IMagnolia, personally saw the patient today and spent greater than 30 minutes discharging this patient.  More than 50% of time spent in direct patient care, care coordination, patient/caregiver counseling, and formalizing plan of care.    Discharge Orders     CARDIAC REHAB REFERRAL     CARDIAC REHAB REFERRAL     Reason for your hospital stay   You were admitted with chest pain and underwent Angiogram.     Follow-up and recommended labs and tests    Follow-up with PCP in 1 week [post hospital discharge follow-up].  Check kidney function tests in 1 week, optimize dose of  ace inhibitor.  Monitor liver function tests in 3 months/symptomatic while on statin therapy.  Consider neurocognitive assessment.     Monitor and record   blood pressure daily  pulse daily, review on provider visit for optimization of medical therapy.     When to contact your care team   Call your primary doctor if you have any of the following:  increased shortness of breath, increased pain.     Discharge Instructions   Aggressive incentive spirometry.  Avoid Nephrotoxic drugs     Activity   Your activity upon discharge: activity as tolerated and no driving for today.  Outpatient cardiac rehab.     Diet   Low-salt, low-fat diet.       Discharge Medications   Current Discharge Medication List      START taking these medications    Details   aspirin 81 MG EC tablet Take 1 tablet (81 mg) by mouth daily  Qty: 30 tablet, Refills: 0    Associated Diagnoses: Status post percutaneous transluminal coronary angioplasty      atorvastatin (LIPITOR) 40 MG tablet Take 1 tablet (40 mg) by mouth every evening  Qty: 30 tablet, Refills: 0    Associated Diagnoses: NSTEMI (non-ST elevated myocardial infarction) (H)      clopidogrel (PLAVIX) 75 MG tablet Take 1 tablet (75 mg) by mouth daily  Qty: 30 tablet, Refills: 0    Associated Diagnoses: NSTEMI (non-ST elevated myocardial infarction) (H)      metoprolol tartrate (LOPRESSOR) 25 MG tablet Take 0.5 tablets (12.5 mg) by mouth 2 times daily  Qty: 30 tablet, Refills: 0    Associated Diagnoses: NSTEMI (non-ST elevated myocardial infarction) (H)         CONTINUE these medications which have NOT CHANGED    Details   amLODIPine (NORVASC) 5 MG tablet TAKE 1 TABLET EVERY DAY  Qty: 30 tablet, Refills: 0    Comments: 1 month refill only; patient due for appointment  Associated Diagnoses: Benign essential hypertension      benazepril (LOTENSIN) 20 MG tablet TAKE 1 TABLET EVERY DAY  Qty: 30 tablet, Refills: 0    Comments: 1 month refill only; patient due for appointment  Associated Diagnoses:  Benign essential hypertension      CALCIUM PO Take 1 tablet by mouth daily      MAGNESIUM PO Take 1 tablet by mouth daily      nitroGLYcerin (NITROSTAT) 0.4 MG sublingual tablet For chest pain place 1 tablet under the tongue every 5 minutes for 3 doses. If symptoms persist 5 minutes after 1st dose call 911.  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)         STOP taking these medications       isosorbide mononitrate (IMDUR) 30 MG 24 hr tablet Comments:   Reason for Stopping:             Allergies   No Known Allergies    Discharge Disposition   Discharged to home  Condition at discharge: Stable    DATA  Most Recent 3 CBC's:  Recent Labs   Lab Test  10/02/18   0633  10/01/18   0635  09/30/18   0535  09/29/18   0110   WBC  7.1  7.3   --   11.0   HGB  11.8*  11.8*  12.2*  13.5   MCV  93  93   --   91   PLT  140*  151   --   190      Most Recent 3 BMP's:  Recent Labs   Lab Test  10/02/18   0633  10/01/18   0635  09/30/18   0535   NA  141  142  141   POTASSIUM  4.4  4.6  4.7   CHLORIDE  110*  111*  109   CO2  23  25  24   BUN  28  30  38*   CR  1.80*  1.95*  2.06*   ANIONGAP  8  6  8   RICCARDO  8.4*  8.4*  8.1*   GLC  98  102*  103*     Most Recent 2 LFT's:  Recent Labs   Lab Test  09/30/18   0535 02/04/16   10/04/10   0120   AST  26  23   < >  34   ALT  26  22   --   29   ALKPHOS  41   --    --   102   BILITOTAL  0.5   --    --   0.3    < > = values in this interval not displayed.       Most Recent 3 Troponin's:  Recent Labs   Lab Test  09/29/18   0605  09/29/18   0110  09/28/18   2120   TROPI  3.408*  3.373*  2.747*     Most Recent Cholesterol Panel:  Recent Labs   Lab Test  10/01/18   0635   CHOL  124   LDL  62   HDL  39*   TRIG  117     Most Recent TSH, T4 and A1c Labs:  Recent Labs   Lab Test  09/29/18   0605  09/29/18   0110   TSH   --   1.82   A1C  5.5   --      Results for orders placed or performed during the hospital  encounter of 09/28/18   XR Chest 2 Views    Narrative    CHEST TWO VIEWS    9/28/2018 9:31 PM     HISTORY: Exertional dyspnea.     COMPARISON: 9/9/2018      Impression    IMPRESSION: Lungs are well-inflated and clear. No evidence of edema.  No pleural effusion. Heart size is normal.    DASHA NORIEGA MD   US Renal Limited    Narrative    EXAMINATION: US RENAL LIMITED, 9/29/2018 12:49 PM     COMPARISON: None.    HISTORY: Acute on chronic renal disease, status post RIGHT  nephrectomy.    FINDINGS:    Right kidney: Surgically absent    Left kidney: Measures 12.3 x 6.6 x 7.0 cm in length. Parenchyma is of  normal thickness and echogenicity. No focal mass. No hydronephrosis. 2  cysts are seen, the largest measures 6.3 cm in maximal dimension.    Bladder: Unremarkable.        Impression    IMPRESSION:  1.  Cysts in the LEFT kidney, otherwise normal renal ultrasound  following RIGHT nephrectomy.    ALINE MO MD

## 2018-10-02 NOTE — DISCHARGE INSTRUCTIONS
Discharge Instructions for Coronary Angioplasty and Stenting  During your angioplasty, a doctor inserts a thin tube called a catheter into a blood vessel in your groin or wrist. The catheter is pushed through your blood vessel to a blocked area in one of your heart s arteries. The doctor inflates a tiny balloon at the tip of the catheter and stretches the blocked vessel so blood can flow freely. The balloon is then deflated and removed with the catheter. The doctor may also insert a metal mesh tube called a stent in the blocked vessel. The stent helps the vessel stay open. You may get several stents if you have blockages in more than one of your arteries.  Home care    Ask someone to drive you to your appointments for the next few days.    Rest for 2 to 3 days after the procedure. Most people are able to go back to normal activity within a few days.    Take your temperature and check your incision for signs of infection every day for a week. Signs of infection include redness, swelling, drainage, or warmth. It is normal to have a small bruise or bump where the catheter was inserted.    Take your medicines exactly as directed. Don t skip doses. It is important to take aspirin or other similar medicines for as long as your doctor advises. If you were also prescribed clopidogrel, prasugrel, or ticagrelor, it is very important to take these medicines, as well. These medicines prevent clots that could cause a heart attack. If you have a problem with any of your medicines, call healthcare provider right away. Call your provider right away if you have extra bleeding, but go to the emergency room if the bleeding can't be controlled.    Unless told otherwise, drink plenty of fluids to help flush your body of the dye that was used during your angioplasty. Let your healthcare provider know if the color of your urine changes and doesn't return to normal color.    Eat a healthy diet that is low in fat, salt, and cholesterol.  Ask your healthcare team for menus and other diet information.    Exercise according to your healthcare team's recommendation. Depending on your case, your team may recommend you start a cardiac rehabilitation program. Cardiac rehab is an exercise program in which trained healthcare staff monitor your progress and stress on your heart while you exercise. Ask how to enroll if your team recommends this program.    Don't swim or take a bath for 5 to 7 days. You may shower the day after the procedure. This keeps the incision site from getting wet and infected until the skin and artery can heal.  Follow-up care    Make a follow-up appointment as directed by our staff. Follow-up appointments are usually scheduled for 2 to 4 weeks after an angioplasty or coronary stent procedure.    Have a yearly checkup to make sure you are still doing well and not having any new symptoms.    Don't wait for a follow-up appointment if your medicines are not working or you are having heart-related symptoms.     When to call your healthcare provider  Call your healthcare provider right away if you have any of the following:    Chest pain or a return of the symptoms you had prior to the angioplasty    Constant or increasing pain or numbness in your leg, or if your leg looks blue or feels cold    Fever above 100.4 F (38.0 C) or other signs of infection (redness, swelling, drainage, or warmth at the incision site of the leg or wrist)    Shortness of breath    Bleeding, bruising, or a large swelling where the catheter (tube) was inserted    Blood in your urine    Black or tarry stools    Feeling faint    Difficulty speaking or weakness in any muscle   Date Last Reviewed: 10/1/2016    3944-8871 [employer]. [address] [place] [address]. All rights reserved. This information is not intended as a substitute for professional medical care. Always follow your healthcare professional's instructions.

## 2018-10-02 NOTE — PLAN OF CARE
Problem: Patient Care Overview  Goal: Plan of Care/Patient Progress Review  Outcome: Improving  VSS. A&Ox4. Up with SBA. Tele SB with HR 50s. Pt complained of 1/10 burning chest pain at approx 2330. Pt states this was different than his anginal equivalent, felt more like indigestion. Tums given with relief. No other complaints of pain. IV: NS infusing at 50 ml/hr.    L radial access site with no bleeding, hematoma, or bruit noted. CMS intact. Bandaid in place.

## 2018-10-02 NOTE — PLAN OF CARE
Problem: Patient Care Overview  Goal: Plan of Care/Patient Progress Review  Outcome: No Change  [id number]: pt alert and oriented x4. Tele SB.  VSS on RA. Up with SBA. Denies any pain/sob. NS infusing at 50 mL/hr. L radial procedural site wdl, cms intact. Will continue to monitor.

## 2018-10-02 NOTE — PROGRESS NOTES
[facility]    Nephrology Progress Note     Assessment & Plan     Augie Powell is a 82 year old male who was admitted on 9/28/2018.       1) NSTEMI:  S/P CORBIN to LAD.      2) CKD 3-4:  Presume mostly due to R nephrectomy.  He has been on benazepril 20, which is a good choice in one with CKD and reduced renal mass.  It was held for angio.  BP now 148/65.  Cr down to 1.8.  I will resume benazepril.       3) HTN:  BP borderline.        4) Anemia:  HG 13.5.  So no need for JEN.      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  Vit D 50.        Plan:      Resume benazepril 20 mg daily  OK for discharge.  He has follow up in heart clinic next week.     MD Augie Galicia MD  [facility] - Nephrology  [phone]    Interval History     Feels well.  No complaint  Hoping to go home.   Pt denies f/c/n/v.  No cp/sob/cough.  No dysuria/hematuria/abd or flank pain.  No dizziness, lightheadedness, headaches, or focal neuro sx.      Physical Exam   Temp: 98.4  F (36.9  C) Temp src: Oral BP: 148/65   Heart Rate: 70 (83-88 during ex) Resp: 18 SpO2: 97 % O2 Device: None (Room air)    Vitals:    09/30/18 0123 10/01/18 0256 10/02/18 0238   Weight: 86.4 kg (190 lb 6.4 oz) 86.7 kg (191 lb 3.2 oz) 86.5 kg (190 lb 12.8 oz)     Vital Signs with Ranges  Temp:  [97.5  F (36.4  C)-98.7  F (37.1  C)] 98.4  F (36.9  C)  Heart Rate:  [49-70] 70  Resp:  [8-20] 18  BP: ()/() 148/65  SpO2:  [95 %-100 %] 97 %  I/O last 3 completed shifts:  In: 786.25 [I.V.:786.25]  Out: 1645 [Urine:1645]    GENERAL APPEARANCE: pleasant, NAD, a & o  HEENT:  Eyes/ears/nose/neck grossly normal  RESP: lungs cta b c good efforts, no crackles, rhonchi or wheezes  CV: RRR, nl S1/S2, no m/r/g   ABDOMEN: o/s/nt/nd, bs present  EXTREMITIES/SKIN: no rashes/lesions; tr edema    Medications     - MEDICATION INSTRUCTIONS -       - MEDICATION INSTRUCTIONS -       - MEDICATION INSTRUCTIONS -       - MEDICATION INSTRUCTIONS -       Percutaneous  Coronary Intervention orders placed (this is information for BPA alerting)       Reason ACE/ARB/ARNI order not selected         amLODIPine  5 mg Oral Daily     aspirin  81 mg Oral Daily     atorvastatin  40 mg Oral QPM     benazepril  20 mg Oral Daily     clopidogrel  75 mg Oral Daily     metoprolol tartrate  12.5 mg Oral BID     saccharomyces boulardii  250 mg Oral BID     sodium chloride (PF)  3 mL Intracatheter Q8H       Data   BMP  Recent Labs  Lab 10/02/18  0633 10/01/18  0635 09/30/18  0535 09/29/18  0605    142 141 141   POTASSIUM 4.4 4.6 4.7 4.7   CHLORIDE 110* 111* 109 109   RICCARDO 8.4* 8.4* 8.1* 8.3*   CO2 23 25 24 25   BUN 28 30 38* 37*   CR 1.80* 1.95* 2.06* 2.25*   GLC 98 102* 103* 105*     Phos@LABNTIPR(phos:4)  CBC)  Recent Labs  Lab 10/02/18  0633 10/01/18  0635 09/30/18  0535 09/29/18  0110 09/28/18  2120   WBC 7.1 7.3  --  11.0 10.1   HGB 11.8* 11.8* 12.2* 13.5 13.4   HCT 34.5* 35.4*  --  38.9* 38.4*   MCV 93 93  --  91 92   * 151  --  190 168       Recent Labs  Lab 09/30/18  0535   AST 26   ALT 26   ALKPHOS 41   BILITOTAL 0.5     No lab results found in last 7 days.  No results found for: D2VIT, D3VIT, DTOT    Recent Labs  Lab 10/02/18  0633 10/01/18  0635   HGB 11.8* 11.8*   HCT 34.5* 35.4*   MCV 93 93   IRON  --  90   IRONSAT  --  37   FEB  --  242       Recent Labs  Lab 10/01/18  0635   PTHI 31       Attestation:   I have reviewed today's relevant vital signs, notes, medications, labs and imaging.

## 2018-10-02 NOTE — CONSULTS
"NUTRITION EDUCATION    REASON FOR ASSESSMENT:  Nutrition education on American Heart Association (AHA) Heart Healthy Diet.    NUTRITION HISTORY:  Information obtained from patient and wife Khushboo.  Patient feels like they do a pretty good job of eating.  The like eggs and probably eat 2-3 total per week, use avocado oil, limited salt, and lots of fruits and veggeis.  Probably eat red meat a few times per week, like organic whole milk (use only on cereal), and will occasionally eat cheese.  For snacks likes ice cream (1/2 cup for serving), limited potato chips - \"we gave those up\".  Wife will make smoothies for patient.  They both eat a salad every night before dinner.     CURRENT DIET ORDER:  [facility]     NUTRITION DIAGNOSIS:  Food- and nutrition-related knowledge deficit R/t no recent diet education AEB above history      INTERVENTIONS:  Nutrition Prescription:    Recommended AHA Heart Healthy Diet    Implementation:     Nutrition Education (Content):  a) reviewed Heart Healthy Diet guidelines  b) provided heart healthy diet handout    Nutrition Education (Application):  a) Discussed current eating habits and recommended alternative food choices    Anticipate good compliance    Diet Education - refer to Education flowsheet    Goals:    Patient verbalizes understanding of diet by asking appropriate questions     All of the above goals met during education session    Follow Up/Monitoring:    Provided RD contact information for future questions    Kiesha Willoughby RD, LD, CNSC   Clinical Dietitian - [facility]           "

## 2018-10-02 NOTE — PLAN OF CARE
"Problem: Patient Care Overview  Goal: Plan of Care/Patient Progress Review  Outcome: No Change  VSS. Tele shows SR/SB. Pt denies any CP or SOB. Discharge instructions, medication indications/side effects, and follow up instructions discussed w/ pt and wife. Handouts given. All questions answered. All pt belongings are accounted for and sent home w/ pt. Pt left floor via wheelchair and was driven home by wife.      /69  Pulse 58  Temp 98.2  F (36.8  C) (Oral)  Resp 16  Ht 1.778 m (5' 10\")  Wt 86.5 kg (190 lb 12.8 oz)  SpO2 98%  BMI 27.38 kg/m2        "

## 2018-10-02 NOTE — PLAN OF CARE
Problem: Patient Care Overview  Goal: Plan of Care/Patient Progress Review  Discharge Planner OT   Patient plan for discharge: home  Current status: Eval completed and treatment initiated. Pt lives in a townhouse with his wife and reports I with ADL./IADL's walks daily with his wife. Pt I with ADL/IADLs, has foot drop and uses a brace for foot, but not here. Pt admitted due to NSTEMI, s/p angio with CORBIN to LAD. Pt does not have brace here so used ww, ambulated approx 650 ft and completed 15 steps with no reported symptoms and stable CV response, see vital sign flow sheet for details. Pt educated in CAD risk factors with focus on low sodium and low cholesterol diet and exercise with OP CR etc and [facility]. Pt and wife receptive to info.   Barriers to return to prior living situation: MI precautions.   Recommendations for discharge: home with OP CR at [facility] and A with IADL's as needed ie vacuuming, driving per MD.   Rationale for recommendations: OP CR for monitored progressive exercise and risk factor education and modification.        Entered by: Daria Hunter 10/02/2018 9:53 AM         Occupational Therapy Discharge Summary    Reason for therapy discharge:    Discharged to home with outpatient therapy.    Progress towards therapy goal(s). See goals on Care Plan in [facility] electronic health record for goal details.  Goals partially met.  Barriers to achieving goals:   discharge from facility.    Therapy recommendation(s):    Continued therapy is recommended.  Rationale/Recommendations:  home with OP CR at [facility] for monitored progressive exercise and risk factor education and modification. .

## 2018-10-02 NOTE — PROGRESS NOTES
[facility] [place] [facility]    Cardiology Progress Note    Date of Service: 10/02/2018   pt seen and examined by me, all observations and plans are mine. Wife present. 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  This patient was admitted on 9/28/2018 with symptoms of chest pain /sob for 2 weeks, trop elevation and reduced EF      1. Nonstemi  trops 3.4 peak  EF 40-45% with inferoseptal, anteroseptal, inferior and anterior HK  Flipped T wave anterolateral; ?old septal infarct  Asa/plavix  Nitropatch  Metoprolol  Amlodipine    S/p angiogram:  RCA mid PDA/PA FFR 0.96  S/p Synergy CORBIN to LAD,with mild diffuse Lad dz elsewhere  Lcx is small with approx 50% prox dz    2. CKD  Solitary kidney on left; s/p right nephrectomy (ca)  Benazepril on hold for now  Creat at baseline is 2.25--1.95--1.8    Nephrology following      3. Dyslipidemia-Atorvastatin 40mg daily (upgraded from prava on admit)      HDL 40    4. HTN  Controlled  Benazepril currently on hold as noted  Nephro and IM can decide if restart (creat better today)    5. IFG          Interval History   No chest pain; chronic low back pain  Doing well can go to rehab then home, We will see as OP     Review of Systems:  The Review of Systems is negative other than noted in the HPI    Physical Exam   Temp: 98.4  F (36.9  C) Temp src: Oral BP: 133/60 Pulse: 58 Heart Rate: 66 Resp: 18 SpO2: 97 % O2 Device: None (Room air)    Vitals:    09/30/18 0123 10/01/18 0256 10/02/18 0238   Weight: 86.4 kg (190 lb 6.4 oz) 86.7 kg (191 lb 3.2 oz) 86.5 kg (190 lb 12.8 oz)     Vital Signs with Ranges  Temp:  [97.5  F (36.4  C)-98.7  F (37.1  C)] 98.4  F (36.9  C)  Pulse:  [58] 58  Heart Rate:  [49-67] 66  Resp:  [8-20] 18  BP: ()/() 133/60  SpO2:  [95 %-100  %] 97 %  I/O last 3 completed shifts:  In: 786.25 [I.V.:786.25]  Out: 1645 [Urine:1645]    Constitutional     alert and oriented, in no acute distress.     Skin     warm and dry to touch    ENT     no pallor or cyanosis    Neck    Supple, JVP normal, no carotid bruit    Chest     no tenderness to palpation     Lungs  clear to auscultation     Cardiac  regular rhythm, S1 normal, S2 normal, No S3 or S4, , no rubs--distant tones  MIN SYST MURMUR    Abdomen     abdomen soft, bowel sounds normoactive, no hepatosplenomegaly    Extremities and Back     no clubbing, cyanosis. No edema observed.    Left radial access site ok      Neurological     no gross motor deficits noted, affect appropriate, oriented to time, person and place.  ALMOST LOOKS LIKE PARKINSON BUT FAMILY SAYS NO PRIOR DX OF THAT-less prominent today        Medications     - MEDICATION INSTRUCTIONS -       - MEDICATION INSTRUCTIONS -       - MEDICATION INSTRUCTIONS -       - MEDICATION INSTRUCTIONS -       Percutaneous Coronary Intervention orders placed (this is information for BPA alerting)       Reason ACE/ARB/ARNI order not selected       sodium chloride 50 mL/hr at 10/01/18 1928       amLODIPine  5 mg Oral Daily     aspirin  81 mg Oral Daily     aspirin  81 mg Oral Daily     atorvastatin  40 mg Oral QPM     clopidogrel  75 mg Oral Daily     metoprolol tartrate  12.5 mg Oral BID     nitroGLYcerin  1 patch Transdermal QAM     nitroGLYcerin   Transdermal Q8H     nitroGLYcerin   Transdermal QPM     saccharomyces boulardii  250 mg Oral BID     sodium chloride (PF)  3 mL Intracatheter Q8H          Data:     ROUTINE IP LABS (Last four results)  BMP    Recent Labs  Lab 10/02/18  0633 10/01/18  0635 09/30/18  0535 09/29/18  0605    142 141 141   POTASSIUM 4.4 4.6 4.7 4.7   CHLORIDE 110* 111* 109 109   RICCARDO 8.4* 8.4* 8.1* 8.3*   CO2 23 25 24 25   BUN 28 30 38* 37*   CR 1.80* 1.95* 2.06* 2.25*   GLC 98 102* 103* 105*     CHOLESTEROL/HEPATIC    Recent Labs  Lab  10/01/18  0635 09/30/18  0535 09/29/18  0110   CHOL 124  --  171   TRIG 117  --  67   LDL 62  --  118*   HDL 39*  --  40   ALT  --  26  --    AST  --  26  --      CBC  Recent Labs  Lab 10/02/18  0633 10/01/18  0635  09/29/18  0110 09/28/18 2120   WBC 7.1 7.3  --  11.0 10.1   RBC 3.72* 3.80*  --  4.27* 4.18*   HGB 11.8* 11.8*  < > 13.5 13.4   HCT 34.5* 35.4*  --  38.9* 38.4*   MCV 93 93  --  91 92   MCH 31.7 31.1  --  31.6 32.1   MCHC 34.2 33.3  --  34.7 34.9   RDW 12.9 12.9  --  12.8 12.9   * 151  --  190 168   < > = values in this interval not displayed.  TROP:     Recent Labs  Lab 09/29/18  0605 09/29/18 0110 09/28/18 2120   TROPI 3.408* 3.373* 2.747*      BNP:      Recent Labs  Lab 09/28/18 2120   NTBNPI 4282*     INRNo lab results found in last 7 days.  TSH   Date Value Ref Range Status   09/29/2018 1.82 0.40 - 4.00 mU/L Final       EKG results:  Reviewed if available     Imaging:  No results found for this or any previous visit (from the past 24 hour(s)).  Telemetry:  sinus

## 2018-10-03 ENCOUNTER — TELEPHONE (OUTPATIENT)
Dept: CARDIOLOGY | Facility: CLINIC | Age: 83
End: 2018-10-03

## 2018-10-03 ENCOUNTER — TELEPHONE (OUTPATIENT)
Dept: FAMILY MEDICINE | Facility: CLINIC | Age: 83
End: 2018-10-03

## 2018-10-03 LAB — INTERPRETATION ECG - MUSE: NORMAL

## 2018-10-03 NOTE — TELEPHONE ENCOUNTER
Status Post Percutaneous Transluminal Coronary Angioplasty, Nstemi (Non-St Elevated Myocardial Infarction) (H) 10/02/2018 6 mo ED/IP 1/1  [phone] (home) NONE (work)

## 2018-10-04 ENCOUNTER — ALLIED HEALTH/NURSE VISIT (OUTPATIENT)
Dept: PHARMACY | Facility: CLINIC | Age: 83
End: 2018-10-04
Payer: COMMERCIAL

## 2018-10-04 DIAGNOSIS — I24.9 ACS (ACUTE CORONARY SYNDROME) (H): Primary | ICD-10-CM

## 2018-10-04 DIAGNOSIS — E63.9 NUTRITIONAL DEFICIENCY: ICD-10-CM

## 2018-10-04 DIAGNOSIS — E78.5 HYPERLIPIDEMIA LDL GOAL <130: ICD-10-CM

## 2018-10-04 PROCEDURE — 99605 MTMS BY PHARM NP 15 MIN: CPT | Performed by: PHARMACIST

## 2018-10-04 PROCEDURE — 99607 MTMS BY PHARM ADDL 15 MIN: CPT | Performed by: PHARMACIST

## 2018-10-04 NOTE — MR AVS SNAPSHOT
After Visit Summary   10/4/2018    Augie Powell    MRN: [id number]           Patient Information     Date Of Birth          1935        Visit Information        Provider Department      10/4/2018 11:00 AM Vee Pritchett, [facility] Rheumatology MTM        Today's Diagnoses     ACS (acute coronary syndrome) (H)    -  1    Hyperlipidemia LDL goal <130        Nutritional deficiency          Care Instructions    Recommendations from today's MTM visit:                                                    MTM (medication therapy management) is a service provided by a clinical pharmacist designed to help you get the most of out of your medicines.   Today we reviewed what your medicines are for, how to know if they are working, that your medicines are safe and how to make your medicine regimen as easy as possible.     1. Continue current medications and follow-up as directed.    Next MTM visit:  10/18 at 9AM for a phone call     To schedule another MTM appointment, please call the clinic directly or you may call the MTM scheduling line at [phone] or toll-free at [phone].     My Clinical Pharmacist's contact information:                                                      It was a pleasure speaking with you!  Please feel free to contact me with any questions or concerns you have.      Vee Pritchett PharmD   MTM Pharmacist   Adult Rheumatology and IBD  Phone: [phone]    You may receive a survey about the MTM services you received.  I would appreciate your feedback to help me serve you better in the future. Please fill it out and return it when you can. Your comments will be anonymous.                Follow-ups after your visit        Your next 10 appointments already scheduled     Oct 09, 2018  1:00 PM CDT   [facility]    [address] [facility] [address] [phone]            Bring a current list of meds and any records pertaining to this visit. For Physicals, please bring immunization records and any forms needing to be filled out. Please arrive 10 minutes early to complete paperwork.            Oct 09, 2018  2:45 PM CDT   Cardiac Evaluation with RH CARDIAC REHAB 4   [facility] ([facility])    [address], [address]  [place] [address]   [phone]            Oct 12, 2018  2:10 PM CDT   LAB with [facility]   [facility] [place] ([facility])    [address]  [place] [address]   [phone]           Please do not eat 10-12 hours before your appointment if you are coming in fasting for labs on lipids, cholesterol, or glucose (sugar). This does not apply to pregnant women. Water, hot tea and black coffee (with nothing added) are okay. Do not drink other fluids, diet soda or chew gum.            Oct 12, 2018  3:10 PM CDT   Return Visit with Ofe Wheatley PA-C   [facility] ([facility])    [address]  [place] [address]   [phone] OPT 2            Oct 18, 2018  9:00 AM CDT   TELEMEDICINE with Vee Pritchett [facility] ([facility])    [address]  [address]  [place] [address] [phone]           Note: this is not an onsite visit; there is no need to come to the facility.              Who to contact     If you have questions or need follow up information about today's clinic visit or your schedule please contact [facility]UMATOLOG[facility] directly at [phone].  Normal or non-critical lab and imaging results will be communicated to you by MyChart, letter or phone within 4 business days after the clinic has received the results. If you do not hear from us within 7 days, please contact the clinic through  "[facility] or phone. If you have a critical or abnormal lab result, we will notify you by phone as soon as possible.  Submit refill requests through [facility] or call your pharmacy and they will forward the refill request to us. Please allow 3 business days for your refill to be completed.          Additional Information About Your Visit        [facility] Information     [facility] lets you send messages to your doctor, view your test results, renew your prescriptions, schedule appointments and more. To sign up, go to [email or web address] . Click on \"Log in\" on the left side of the screen, which will take you to the Welcome page. Then click on \"Sign up Now\" on the right side of the page.     You will be asked to enter the access code listed below, as well as some personal information. Please follow the directions to create your username and password.     Your access code is: [email or web address]  Expires: 2018  2:32 PM     Your access code will  in 90 days. If you need help or a new code, please call your [place] clinic or [phone].        Care EveryWhere ID     This is your Care EveryWhere ID. This could be used by other organizations to access your [place] medical records  [id number]         Blood Pressure from Last 3 Encounters:   10/02/18 142/69   18 127/58   18 130/60    Weight from Last 3 Encounters:   10/02/18 190 lb 12.8 oz (86.5 kg)   18 195 lb (88.5 kg)   18 194 lb (88 kg)              Today, you had the following     No orders found for display         Today's Medication Changes          These changes are accurate as of 10/4/18 11:50 AM.  If you have any questions, ask your nurse or doctor.               Stop taking these medicines if you haven't already. Please contact your care team if you have questions.     CALCIUM PO                    Primary Care Provider Office Phone # Fax #    Eusebio Oliveira [phone] [address]  [place] " [address]        Equal Access to Services     [facility]: Hadii aristides schmidt meseretkem Karthikali, waj carlosda luqnicoleha, qaybta kaalmaisrael villa, dejan horn. So [facility] [phone].    ATENCIÓN: Si habla español, tiene a nichole disposición servicios gratuitos de asistencia lingüística. Lulyame al [phone].    We comply with applicable federal civil rights laws and [place] laws. We do not discriminate on the basis of race, color, national origin, age, disability, sex, sexual orientation, or gender identity.            Thank you!     Thank you for choosing [facility]HEUMATOLOGY [place]  for your care. Our goal is always to provide you with excellent care. Hearing back from our patients is one way we can continue to improve our services. Please take a few minutes to complete the written survey that you may receive in the mail after your visit with us. Thank you!             Your Updated Medication List - Protect others around you: Learn how to safely use, store and throw away your medicines at www.disposemymeds.org.          This list is accurate as of 10/4/18 11:50 AM.  Always use your most recent med list.                   Brand Name Dispense Instructions for use Diagnosis    amLODIPine 5 MG tablet    NORVASC    30 tablet    TAKE 1 TABLET EVERY DAY    Benign essential hypertension       aspirin 81 MG EC tablet     30 tablet    Take 1 tablet (81 mg) by mouth daily    Status post percutaneous transluminal coronary angioplasty       atorvastatin 40 MG tablet    LIPITOR    30 tablet    Take 1 tablet (40 mg) by mouth every evening    NSTEMI (non-ST elevated myocardial infarction) (H)       benazepril 20 MG tablet    LOTENSIN    30 tablet    TAKE 1 TABLET EVERY DAY    Benign essential hypertension       Calcium-Magnesium-Zinc 500-250-12.5 MG Tabs      Take 1 tablet by mouth daily        clopidogrel 75 MG tablet    PLAVIX    30 tablet    Take 1 tablet (75 mg) by mouth daily    NSTEMI (non-ST  elevated myocardial infarction) (H)       MAGNESIUM PO      Take 200 mg by mouth daily        metoprolol tartrate 25 MG tablet    LOPRESSOR    30 tablet    Take 0.5 tablets (12.5 mg) by mouth 2 times daily    NSTEMI (non-ST elevated myocardial infarction) (H)       nitroGLYcerin 0.4 MG sublingual tablet    NITROSTAT    25 tablet    For chest pain place 1 tablet under the tongue every 5 minutes for 3 doses. If symptoms persist 5 minutes after 1st dose call 911.    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

## 2018-10-04 NOTE — TELEPHONE ENCOUNTER
"Hospital/TCU/ED for chronic condition Discharge Protocol    \"Hi, my name is Raisa Perri, a registered nurse, and I am calling from [facility].  I am calling to follow up and see how things are going for you after your recent emergency visit/hospital/TCU stay.\"    Tell me how you are doing now that you are home?\" Doing good at home        Discharge Instructions    \"Let's review your discharge instructions.  What is/are the follow-up recommendations?  Pt. Response: See Dr. Oliveira, tele appt today as well    \"Has an appointment with your primary care provider been scheduled?\"   Yes. (confirm)    \"When you see the provider, I would recommend that you bring your medications with you.\"    Medications    \"Tell me what changed about your medicines when you discharged?\"    Changes to chronic meds?    2 or more - Epic MTM referral needed    \"What questions do you have about your medications?\"    None     New diagnoses of heart failure, COPD, diabetes, or MI?    Yes, pt is already referred to mtm and seeing pcp            Medication reconciliation completed? Yes  Was MTM referral placed (*Make sure to put transitions as reason for referral)?   No    Call Summary    \"What questions or concerns do you have about your recent visit and your follow-up care?\"     none    \"If you have questions or things don't continue to improve, we encourage you contact us through the main clinic number (give number).  Even if the clinic is not open, triage nurses are available 24/7 to help you.     We would like you to know that our clinic has extended hours (provide information).  We also have urgent care (provide details on closest location and hours/contact info)\"      \"Thank you for your time and take care!\"             "

## 2018-10-04 NOTE — PROGRESS NOTES
SUBJECTIVE/OBJECTIVE:                Augie Powell is a 82 year old male called for a transitions of care visit.  He was discharged from [facility] on 10/2 for NSTEMI. His wife Khushboo was on the phone call as well.    Chief Complaint: They do not have any questions today - general transitions review    Allergies/ADRs: Reviewed in Epic  Tobacco: No tobacco use   Alcohol: 1-3 beverages / week  Caffeine: 2 cups/day of coffee    Medication Adherence/Access:  Patient takes medications directly from bottles.  Medication barriers: none.   The patient fills medications at [facility]: YES.    NSTEMI: Current therapy includes amlodipine 5 mg daily, aspirin 81 mg daily (new), atorvastatin 40 mg daily (new), benazepril 20 mg daily, metoprolol 12.5 mg BID (new), and NTG SL tabs PRN. He has only had to use the SL nitroglycerin twice ever, once was last week. Reports some stomach upset. No palpitations or chest pain and denies significant dizziness or lightheadedness. Did have some diarrhea this morning. No reported concerns for bruising or bleeding.   Is checking 144/85 mmHg (before medication) and 143/70 mmHg after medications, yesterday morning 150/74 mmHg and came down after he took his medications    Hyperlipidemia: Current therapy includes atorvastatin 40 mg once daily.  Pt reports no significant myalgias or other side effects. History of NSTEMI. His wife inquires if he can still have a 1/2 grapefruit with his breakfast.     Nutritional Deficiency: Current therapy includes calcium daily, he prefers to stay on this, as well as magnesium 200 mg daily. He usually gets a glass of milk and some cheese daily. He reports his calcium is a calcium 1000, magnesium 500 mg, and Zn 15 mg. No reported side effects.     ASSESSMENT:                 Current medications were reviewed today.      Medication Adherence: excellent, no issues identified    NSTEMI: Improving. Pt is appropriately on aspirin (and Plavix), BB, statin, and ACE- inhibitor.  "F/up with PCP and cardiology in place. Provided medication counseling for new medications included monitoring and expected side effects. Described mechanism of medications.    Hyperlipidemia: Stable. Discussed interaction. Encouraged monitoring for side effects, however, this interaction is less significant for atorvastatin as compared to other statins such as lovastatin or simvastatin. Additionally, significant interactions occurred with intake of above 1.2 L/day. Given small amount of grapefruit, likely okay to continue. \"Daily ingestion of typical amounts of grapefruit juice (300 mL or 10 ounces) with atorvastatin can result in a detectable increase of plasma levels of atorvastatin without increases in toxicity (Bakari et al, 2011).\"    Nutritional Deficiency: Stable.     PLAN:                1. Continue current medications and follow-up as planned    I spent 25 minutes with this patient today. I offer these suggestions for consideration by his PCP. A copy of the visit note was provided to the patient's primary care provider.    Will follow up on October 18th at 9AM.    The patient was mailed a summary of these recommendations as an after visit summary.    Vee Pritchett PharmD   [facility] Pharmacist   Adult Rheumatology and IBD  Phone: [phone]  "

## 2018-10-04 NOTE — PATIENT INSTRUCTIONS
Recommendations from today's MTM visit:                                                    MTM (medication therapy management) is a service provided by a clinical pharmacist designed to help you get the most of out of your medicines.   Today we reviewed what your medicines are for, how to know if they are working, that your medicines are safe and how to make your medicine regimen as easy as possible.     1. Continue current medications and follow-up as directed.    Next MTM visit:  10/18 at 9AM for a phone call     To schedule another MTM appointment, please call the clinic directly or you may call the MTM scheduling line at [phone] or toll-free at [phone].     My Clinical Pharmacist's contact information:                                                      It was a pleasure speaking with you!  Please feel free to contact me with any questions or concerns you have.      Vee NyeD   MTM Pharmacist   Adult Rheumatology and IBD  Phone: [phone]    You may receive a survey about the MTM services you received.  I would appreciate your feedback to help me serve you better in the future. Please fill it out and return it when you can. Your comments will be anonymous.

## 2018-10-05 LAB — INTERPRETATION ECG - MUSE: NORMAL

## 2018-10-09 ENCOUNTER — OFFICE VISIT (OUTPATIENT)
Dept: FAMILY MEDICINE | Facility: CLINIC | Age: 83
End: 2018-10-09
Payer: COMMERCIAL

## 2018-10-09 ENCOUNTER — HOSPITAL ENCOUNTER (OUTPATIENT)
Dept: CARDIAC REHAB | Facility: CLINIC | Age: 83
End: 2018-10-09
Attending: STUDENT IN AN ORGANIZED HEALTH CARE EDUCATION/TRAINING PROGRAM
Payer: MEDICARE

## 2018-10-09 VITALS
OXYGEN SATURATION: 98 % | HEART RATE: 56 BPM | SYSTOLIC BLOOD PRESSURE: 120 MMHG | DIASTOLIC BLOOD PRESSURE: 62 MMHG | HEIGHT: 70 IN | TEMPERATURE: 98.7 F | WEIGHT: 191 LBS | BODY MASS INDEX: 27.35 KG/M2

## 2018-10-09 DIAGNOSIS — I10 BENIGN ESSENTIAL HYPERTENSION: ICD-10-CM

## 2018-10-09 DIAGNOSIS — N18.4 CKD (CHRONIC KIDNEY DISEASE) STAGE 4, GFR 15-29 ML/MIN (H): Primary | ICD-10-CM

## 2018-10-09 DIAGNOSIS — I24.9 ACS (ACUTE CORONARY SYNDROME) (H): ICD-10-CM

## 2018-10-09 DIAGNOSIS — C64.9 RENAL CELL CANCER, UNSPECIFIED LATERALITY (H): ICD-10-CM

## 2018-10-09 DIAGNOSIS — Z98.61 STATUS POST PERCUTANEOUS TRANSLUMINAL CORONARY ANGIOPLASTY: ICD-10-CM

## 2018-10-09 DIAGNOSIS — F43.22 ADJUSTMENT DISORDER WITH ANXIOUS MOOD: ICD-10-CM

## 2018-10-09 PROBLEM — N18.30 CKD (CHRONIC KIDNEY DISEASE) STAGE 3, GFR 30-59 ML/MIN (H): Status: RESOLVED | Noted: 2017-05-10 | Resolved: 2018-10-09

## 2018-10-09 LAB
ERYTHROCYTE [DISTWIDTH] IN BLOOD BY AUTOMATED COUNT: 12.7 % (ref 10–15)
HCT VFR BLD AUTO: 37.1 % (ref 40–53)
HGB BLD-MCNC: 12.6 G/DL (ref 13.3–17.7)
MCH RBC QN AUTO: 32 PG (ref 26.5–33)
MCHC RBC AUTO-ENTMCNC: 34 G/DL (ref 31.5–36.5)
MCV RBC AUTO: 94 FL (ref 78–100)
PLATELET # BLD AUTO: 198 10E9/L (ref 150–450)
RBC # BLD AUTO: 3.94 10E12/L (ref 4.4–5.9)
WBC # BLD AUTO: 8.8 10E9/L (ref 4–11)

## 2018-10-09 PROCEDURE — 93798 PHYS/QHP OP CAR RHAB W/ECG: CPT

## 2018-10-09 PROCEDURE — 40000116 ZZH STATISTIC OP CR VISIT

## 2018-10-09 PROCEDURE — 80048 BASIC METABOLIC PNL TOTAL CA: CPT | Performed by: INTERNAL MEDICINE

## 2018-10-09 PROCEDURE — 85027 COMPLETE CBC AUTOMATED: CPT | Performed by: INTERNAL MEDICINE

## 2018-10-09 PROCEDURE — 40000575 ZZH STATISTIC OP CARDIAC VISIT #2

## 2018-10-09 PROCEDURE — 93797 PHYS/QHP OP CAR RHAB WO ECG: CPT

## 2018-10-09 PROCEDURE — 99496 TRANSJ CARE MGMT HIGH F2F 7D: CPT | Performed by: INTERNAL MEDICINE

## 2018-10-09 PROCEDURE — 36415 COLL VENOUS BLD VENIPUNCTURE: CPT | Performed by: INTERNAL MEDICINE

## 2018-10-09 NOTE — PROGRESS NOTES
SUBJECTIVE:   Augie Powell is a 83 year old male who presents to clinic today for the following health issues:  He is accompanied by his wife.      Hospital Follow-up Visit:    Hospital/Nursing Home/IP Rehab Facility: [facility]  Date of Admission: 09/28/2018  Date of Discharge: 10/02/2018  Reason(s) for Admission: NSTEMI w/PCI to mid-LAD            Problems taking medications regularly:  None       Medication changes since discharge: Updated in medication list       Problems adhering to non-medication therapy:  None    Summary of hospitalization:  [facility] discharge summary reviewed  [facility] discharge summary reviewed  Diagnostic Tests/Treatments reviewed.  Follow up needed: none  Other Healthcare Providers Involved in Patient s Care:           Update since discharge: improved.     Post Discharge Medication Reconciliation: discharge medications reconciled, continue medications without change.  Plan of care communicated with patient and family     Coding guidelines for this visit:  Type of Medical   Decision Making Face-to-Face Visit       within 7 Days of discharge Face-to-Face Visit        within 14 days of discharge   Moderate Complexity [id number]   High Complexity 13325 75963              HPI  Patient is here for check up after his angiogram that was done through his left arm. He also reports that his anxiousness is getting better compared to the last time. However, he is planning to start cardiac rehab. Augie's wife reports that his arm that the angiogram was done at, seems weaker than before. The patient agrees with his wife. He also reports that his hand and knee joints lock up sometimes in cold weather. The patient says that the issue usually resolves itself.    He reports that he drinks three glasses of milk and eats low fat yogurt. Augie reports that he takes the atorvastatin medication right after he eats breakfast. Reviewed patient's lab result concerning  "his cholesterol. He also says that he might have a cold and he has runny nose.      Problem list and histories reviewed & adjusted, as indicated.  Additional history: as documented    Current Outpatient Prescriptions   Medication Sig Dispense Refill     amLODIPine (NORVASC) 5 MG tablet TAKE 1 TABLET EVERY DAY 30 tablet 0     aspirin 81 MG EC tablet Take 1 tablet (81 mg) by mouth daily 30 tablet 0     atorvastatin (LIPITOR) 40 MG tablet Take 1 tablet (40 mg) by mouth every evening 30 tablet 0     benazepril (LOTENSIN) 20 MG tablet TAKE 1 TABLET EVERY DAY 30 tablet 0     Calcium-Magnesium-Zinc 500-250-12.5 MG TABS Take 1 tablet by mouth daily       clopidogrel (PLAVIX) 75 MG tablet Take 1 tablet (75 mg) by mouth daily 30 tablet 0     MAGNESIUM PO Take 200 mg by mouth daily        metoprolol tartrate (LOPRESSOR) 25 MG tablet Take 0.5 tablets (12.5 mg) by mouth 2 times daily 30 tablet 0     nitroGLYcerin (NITROSTAT) 0.4 MG sublingual tablet For chest pain place 1 tablet under the tongue every 5 minutes for 3 doses. If symptoms persist 5 minutes after 1st dose call 911. 25 tablet 3     No Known Allergies    Reviewed and updated as needed this visit by clinical staff       Reviewed and updated as needed this visit by Provider         ROS:  Constitutional, HEENT, cardiovascular, pulmonary, gi and gu systems are negative, except as otherwise noted.  This document serves as a record of the services and decisions personally performed and made by Eusebio Oliveira MD. It was created on his behalf by Li Cartwright, a trained medical scribe. The creation of this document is based on the observations of the medical scribe, and the provider's statements to the medical scribe.  Li Cartwright October 9, 2018 1:17 PM      OBJECTIVE:     /62 (BP Location: Left arm, Patient Position: Sitting, Cuff Size: Adult Regular)  Pulse 56  Temp 98.7  F (37.1  C) (Oral)  Ht 1.778 m (5' 10\")  Wt 86.6 kg (191 lb)  SpO2 98%  BMI 27.41 kg/m2  Body " mass index is 27.41 kg/(m^2).  HEENT throat was clear without eryhtema   Neck was supple without adenopathy or thyromegaly his carotids were normal without bruits  Chest clear to auscultation and percussion  Cardiovascular S1 and S2 are physiologic with a grade 3/ 6 Grade 2/6 systolic ejection murmur    radating to the carotids   Abdomen bowel sounds were normal.  There is no palpable mass or organomegaly  Extremities traced 1+ pretibial edema  Pulses pedal pulses are as described otherwise his pulses are bilaterally symmetrical throughout without bruits  Skin without significant abnormality      Diagnostic Test Results:  No results found for this or any previous visit (from the past 24 hour(s)).     Review his hospital reports including a detailed in lowering his salt and fat intake. Also reviewed his lipo profile     ASSESSMENT/PLAN:     Recommended to increase his aerobic activity and decrease the carbohydrates intake.   Was also recommended to take Claritin for his cold symptoms.   Recheck kidney function   Comeback to me after cardiac rehab or if there are any other problems before or after that      1. ACS (acute coronary syndrome) (H)    2. CKD (chronic kidney disease) stage 4, GFR 15-29 ml/min (H)    3. Renal cell cancer, unspecified laterality (H)    4. Benign essential hypertension  Good control bp    5. Adjustment disorder with anxious mood  Anxiety is improving       There are no Patient Instructions on file for this visit.   The information in this document, created by the medical scribe for me, accurately reflects the services I personally performed and the decisions made by me. I have reviewed and approved this document for accuracy prior to leaving the patient care area.  Eusebio Oliveira MD  2:19 PM October 9, 2018      Eusebio Oliveira MD  [facility]

## 2018-10-09 NOTE — MR AVS SNAPSHOT
After Visit Summary   10/9/2018    Augie Powell    MRN: [id number]           Patient Information     Date Of Birth          1935        Visit Information        Provider Department      10/9/2018 1:00 PM Eusebio Oliveira MD [facility]        Today's Diagnoses     CKD (chronic kidney disease) stage 4, GFR 15-29 ml/min (H)    -  1    ACS (acute coronary syndrome) (H)        Renal cell cancer, unspecified laterality (H)        Benign essential hypertension        Adjustment disorder with anxious mood           Follow-ups after your visit        Your next 10 appointments already scheduled     Oct 11, 2018  9:30 AM CDT   Cardiac Treatment with [facility] [address] [place] [address] ([facility])    [address], [address]  [facility] [address]   [phone]            Oct 12, 2018  2:10 PM CDT   LAB with [facility]   [facility] [place] ([facility])    [address] [place] [address] [address]  [facility] [address] [phone]           Please do not eat 10-12 hours before your appointment if you are coming in fasting for labs on lipids, cholesterol, or glucose (sugar). This does not apply to pregnant women. Water, hot tea and black coffee (with nothing added) are okay. Do not drink other fluids, diet soda or chew gum.            Oct 12, 2018  3:10 PM CDT   Return Visit with Ofe Wheatley PA-C   [facility] ([facility])    [address]  [facility] [address]   [phone] OPT 2            Oct 16, 2018  9:30 AM CDT   Cardiac [facility] [address] [place] [address] ([facility])    [address], [address]  [facility] [address]   [phone]            Oct 18, 2018  9:00 AM CDT   TELEMEDICINE with Vee Pritchett [facility] Rheumat[facility] M[facility] ([facility])     [address]  [address]  [facility] [address] [phone]           Note: this is not an onsite visit; there is no need to come to the facility.            Oct 18, 2018  2:30 PM CDT   Cardiac Treatment with [facility] 3   [facility] ([facility])    [address], [address]  [facility] [address]   [phone]            Oct 19, 2018  3:00 PM CDT   Cardiac Treatment with [facility] 1   [facility] ([facility])    [address], [address]  [facility] [address]   [phone]            Oct 22, 2018  3:00 PM CDT   Cardiac Treatment with [facility] 1   [facility] ([facility])    [address], [address]  [facility] [address]   [phone]            Oct 24, 2018  1:00 PM CDT   Cardiac Treatment with [facility] 1   [facility] ([facility])    [address], [address]  [facility] [address]   [phone]            Oct 26, 2018  1:00 PM CDT   Cardiac Treatment with [facility] 1   [facility] ([facility])    [address], [address]  [facility] [address]   [phone]              Who to contact     If you have questions or need follow up information about today's clinic visit or your schedule please contact [facility] directly at [phone].  Normal or non-critical lab and imaging results will be communicated to you by MyChart, letter or phone within 4 business days after the clinic has received the results. If you do not hear from us within 7 days, please contact the clinic through MyChart or phone. If you have a critical or abnormal lab result, we will notify you by phone as soon as possible.  Submit refill requests through [employer] or call your pharmacy and they will forward the refill request to us. Please allow 3 business days for your refill to be  "completed.          Additional Information About Your Visit        PulseharUbookoo Information     [employer] Elite lets you send messages to your doctor, view your test results, renew your prescriptions, schedule appointments and more. To sign up, go to [email or web address] . Click on \"Log in\" on the left side of the screen, which will take you to the Welcome page. Then click on \"Sign up Now\" on the right side of the page.     You will be asked to enter the access code listed below, as well as some personal information. Please follow the directions to create your username and password.     Your access code is: [email or web address]  Expires: 2018  2:32 PM     Your access code will  in 90 days. If you need help or a new code, please call your [place] clinic or [phone].        Care EveryWhere ID     This is your Care EveryWhere ID. This could be used by other organizations to access your [place] medical records  [id number]        Your Vitals Were     Pulse Temperature Height Pulse Oximetry BMI (Body Mass Index)       56 98.7  F (37.1  C) (Oral) 5' 10\" (1.778 m) 98% 27.41 kg/m2        Blood Pressure from Last 3 Encounters:   10/09/18 120/62   10/02/18 142/69   18 127/58    Weight from Last 3 Encounters:   10/09/18 191 lb (86.6 kg)   10/02/18 190 lb 12.8 oz (86.5 kg)   18 195 lb (88.5 kg)              We Performed the Following     Basic metabolic panel     CBC with platelets        Primary Care Provider Office Phone # Fax #    Eusebio Oliveira [phone] [address]  [place] [address]        Equal Access to Services     MAX CHAUDHARY : Alvin Alas, kathleen cerda, low villa, dejan horn. So [facility] [phone].    ATENCIÓN: Si habla español, tiene a nichole disposición servicios gratuitos de asistencia lingüística. Llame al [phone].    We comply with applicable federal civil rights laws and [place] laws. We " do not discriminate on the basis of race, color, national origin, age, disability, sex, sexual orientation, or gender identity.            Thank you!     Thank you for choosing [facility]  for your care. Our goal is always to provide you with excellent care. Hearing back from our patients is one way we can continue to improve our services. Please take a few minutes to complete the written survey that you may receive in the mail after your visit with us. Thank you!             Your Updated Medication List - Protect others around you: Learn how to safely use, store and throw away your medicines at www.disposemymeds.org.          This list is accurate as of 10/9/18 11:59 PM.  Always use your most recent med list.                   Brand Name Dispense Instructions for use Diagnosis    amLODIPine 5 MG tablet    NORVASC    30 tablet    TAKE 1 TABLET EVERY DAY    Benign essential hypertension       aspirin 81 MG EC tablet     30 tablet    Take 1 tablet (81 mg) by mouth daily    Status post percutaneous transluminal coronary angioplasty       atorvastatin 40 MG tablet    LIPITOR    30 tablet    Take 1 tablet (40 mg) by mouth every evening    NSTEMI (non-ST elevated myocardial infarction) (H)       benazepril 20 MG tablet    LOTENSIN    30 tablet    TAKE 1 TABLET EVERY DAY    Benign essential hypertension       Calcium-Magnesium-Zinc 500-250-12.5 MG Tabs      Take 1 tablet by mouth daily        clopidogrel 75 MG tablet    PLAVIX    30 tablet    Take 1 tablet (75 mg) by mouth daily    NSTEMI (non-ST elevated myocardial infarction) (H)       MAGNESIUM PO      Take 200 mg by mouth daily        metoprolol tartrate 25 MG tablet    LOPRESSOR    30 tablet    Take 0.5 tablets (12.5 mg) by mouth 2 times daily    NSTEMI (non-ST elevated myocardial infarction) (H)       nitroGLYcerin 0.4 MG sublingual tablet    NITROSTAT    25 tablet    For chest pain place 1 tablet under the tongue every 5 minutes for 3 doses. If symptoms  persist 5 minutes after 1st dose call 911.    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

## 2018-10-10 LAB
ANION GAP SERPL CALCULATED.3IONS-SCNC: 7 MMOL/L (ref 3–14)
BUN SERPL-MCNC: 43 MG/DL (ref 7–30)
CALCIUM SERPL-MCNC: 8.7 MG/DL (ref 8.5–10.1)
CHLORIDE SERPL-SCNC: 100 MMOL/L (ref 94–109)
CO2 SERPL-SCNC: 24 MMOL/L (ref 20–32)
CREAT SERPL-MCNC: 2.45 MG/DL (ref 0.66–1.25)
GFR SERPL CREATININE-BSD FRML MDRD: 25 ML/MIN/1.7M2
GLUCOSE SERPL-MCNC: 97 MG/DL (ref 70–99)
POTASSIUM SERPL-SCNC: 4.7 MMOL/L (ref 3.4–5.3)
SODIUM SERPL-SCNC: 131 MMOL/L (ref 133–144)

## 2018-10-11 ENCOUNTER — HOSPITAL ENCOUNTER (OUTPATIENT)
Dept: CARDIAC REHAB | Facility: CLINIC | Age: 83
End: 2018-10-11
Attending: STUDENT IN AN ORGANIZED HEALTH CARE EDUCATION/TRAINING PROGRAM
Payer: MEDICARE

## 2018-10-11 PROCEDURE — 93798 PHYS/QHP OP CAR RHAB W/ECG: CPT | Performed by: REHABILITATION PRACTITIONER

## 2018-10-11 PROCEDURE — 40000116 ZZH STATISTIC OP CR VISIT: Performed by: REHABILITATION PRACTITIONER

## 2018-10-16 ENCOUNTER — HOSPITAL ENCOUNTER (OUTPATIENT)
Dept: CARDIAC REHAB | Facility: CLINIC | Age: 83
End: 2018-10-16
Attending: STUDENT IN AN ORGANIZED HEALTH CARE EDUCATION/TRAINING PROGRAM
Payer: MEDICARE

## 2018-10-16 PROCEDURE — 40000116 ZZH STATISTIC OP CR VISIT: Performed by: OCCUPATIONAL THERAPIST

## 2018-10-16 PROCEDURE — 93798 PHYS/QHP OP CAR RHAB W/ECG: CPT | Performed by: OCCUPATIONAL THERAPIST

## 2018-10-18 ENCOUNTER — ALLIED HEALTH/NURSE VISIT (OUTPATIENT)
Dept: PHARMACY | Facility: CLINIC | Age: 83
End: 2018-10-18
Payer: COMMERCIAL

## 2018-10-18 DIAGNOSIS — I24.9 ACS (ACUTE CORONARY SYNDROME) (H): Primary | ICD-10-CM

## 2018-10-18 DIAGNOSIS — E78.5 HYPERLIPIDEMIA LDL GOAL <130: ICD-10-CM

## 2018-10-18 PROCEDURE — 99606 MTMS BY PHARM EST 15 MIN: CPT | Performed by: PHARMACIST

## 2018-10-18 NOTE — PROGRESS NOTES
SUBJECTIVE/OBJECTIVE:                Augie Powell is a 82 year old male called for a transitions of care visit.  [facility] on 10/2 for NSTEMI. His wife Khushboo was on the phone call as well.    Chief Complaint: Question today is if a plant based diet will also affect his blood pressure  F/up from our visit on 10/4    Allergies/ADRs: Reviewed in Epic  Tobacco: No tobacco use   Alcohol: 1-3 beverages / week  Caffeine: 2 cups/day of coffee    Medication Adherence/Access:  Patient takes medications directly from bottles.  Medication barriers: none.   The patient fills medications at [facility]: YES.    NSTEMI: Current therapy includes amlodipine 5 mg daily, aspirin 81 mg daily (new), atorvastatin 40 mg daily (new), benazepril 20 mg daily, metoprolol 12.5 mg BID (new), and NTG SL tabs PRN. He has only had to use the SL nitroglycerin twice ever, once with this last episode. Reports BP in the range of 111-129/56-68 mmHg mostly. Did have a BP of 90/50 mmHg one time last week, no associated symptoms. Denies dizziness or lightheadedness. Reports some fatigue with low BP. We discussed monitoring parameters and blood pressures of symptoms of concern. No reported side effects. Says he is doing pretty well over all.     Hyperlipidemia: Current therapy includes atorvastatin 40 mg once daily.  Pt reports no significant myalgias or other side effects. History of NSTEMI.     ASSESSMENT:                 Current medications were reviewed today.      Medication Adherence: excellent, no issues identified    NSTEMI: Improving. Pt is appropriately on aspirin (and Plavix), BB, statin, and ACE- inhibitor. F/up with PCP and cardiology in place. Patient appears to be tolerating combination. Discussed to continue to monitor frequency of systolic in the 90s, or if he starts having symptoms associated with low BP, contact cardiology or PCP.    Hyperlipidemia: Stable.     PLAN:                1. Continue current medications and  follow-up as planned    I spent 10 minutes with this patient today. I offer these suggestions for consideration by his PCP. A copy of the visit note was provided to the patient's primary care provider.    Will follow up as needed based on patient preference.     The patient was mailed a summary of these recommendations as an after visit summary.    Vee Pritchett PharmD   MTM Pharmacist   Adult Rheumatology and IBD  Phone: [phone]

## 2018-10-18 NOTE — MR AVS SNAPSHOT
After Visit Summary   10/18/2018    Augie Powell    MRN: [id number]           Patient Information     Date Of Birth          1935        Visit Information        Provider Department      10/18/2018 9:00 AM Vee Pritchett, [facility] Rheumatology MTM        Today's Diagnoses     ACS (acute coronary syndrome) (H)    -  1    Hyperlipidemia LDL goal <130          Care Instructions    Recommendations from today's MTM visit:                                                    1. Continue current medications and follow-up as planned.     2. Please reach out if you have questions.    Next MTM visit: as needed, based on patient preference    To schedule another MTM appointment, please call the clinic directly or you may call the MTM scheduling line at [phone] or toll-free at [phone].     My Clinical Pharmacist's contact information:                                                      It was a pleasure speaking with you you today!  Please feel free to contact me with any questions or concerns you have.      Vee Pritchett PharmD   MTM Pharmacist   Adult Rheumatology and IBD  Phone: [phone]    You may receive a survey about the MTM services you received.  I would appreciate your feedback to help me serve you better in the future. Please fill it out and return it when you can. Your comments will be anonymous.                  Follow-ups after your visit        Your next 10 appointments already scheduled     Oct 22, 2018  3:00 PM CDT   Cardiac Treatment with Rh Cardiac Rehab 1   [facility] ([facility])    [address] [place] [address], [address] [address]   [phone]            Oct 24, 2018  1:00 PM CDT   Cardiac Treatment with Rh Cardiac Rehab 1   [facility] ([facility])    [address] [place] [address], [address] [address]   [phone]            Oct 24, 2018  2:30 PM CDT   Return  Discharge with [place] Rm PA-C   [facility]   [place] ([facility])    [address]  [place] [address]   [phone] OPT 2            Oct 26, 2018  1:00 PM CDT   Cardiac Treatment with Rh Cardiac Rehab 1   [facility] ([facility])    [address], [address]  [facility] [address]   [phone]            Oct 29, 2018  1:00 PM CDT   Cardiac Treatment with Rh Cardiac Rehab 1   [facility] ([facility])    [address], [address]  [facility] [address]   [phone]            Oct 31, 2018  1:00 PM CDT   Cardiac Treatment with Rh Cardiac Rehab 1   [facility] ([facility])    [address], [address]  [facility] [address]   [phone]            Nov 02, 2018  1:00 PM CDT   Cardiac Treatment with Rh Cardiac Rehab 1   [facility] ([facility])    [address], [address]  [facility] [address]   [phone]            Nov 02, 2018  2:00 PM CDT   CONSULT with Rh Cardiac Rehab 1   [facility] ([facility])    [address], [address]  [facility] [address]   [phone]            Nov 05, 2018  1:00 PM CST   Cardiac Treatment with Rh Cardiac Rehab 1   [facility] ([facility])    [address], [address]  [facility] [address]   [phone]            Nov 07, 2018  1:00 PM CST   Cardiac Treatment with Rh Cardiac Rehab 1   [facility] ([facility])    [address], [address]  [facility] [address]   [phone]              Who to contact     If you have questions or need follow up information about today's clinic visit or your schedule please contact [facility] RHEUMATOLOGY MTM directly at [phone].  Normal or non-critical lab and imaging results  will be communicated to you by MyChart, letter or phone within 4 business days after the clinic has received the results. If you do not hear from us within 7 days, please contact the clinic through MyChart or phone. If you have a critical or abnormal lab result, we will notify you by phone as soon as possible.  Submit refill requests through [employer] or call your pharmacy and they will forward the refill request to us. Please allow 3 business days for your refill to be completed.          Additional Information About Your Visit        Care EveryWhere ID     This is your Care EveryWhere ID. This could be used by other organizations to access your [facility] medical records  [id number]         Blood Pressure from Last 3 Encounters:   10/09/18 120/62   10/02/18 142/69   09/26/18 127/58    Weight from Last 3 Encounters:   10/09/18 191 lb (86.6 kg)   10/02/18 190 lb 12.8 oz (86.5 kg)   09/26/18 195 lb (88.5 kg)              Today, you had the following     No orders found for display       Primary Care Provider Office Phone # Fax #    Eusebio Oliveira [phone] [address]  [place] [address]        Equal Access to Services     MAX CHAUDHARY : Hadii aad ku hadasho Soomaali, waaxda luqadaha, qaybta kaalmada adeegyada, dejan montoyain haykiko de la cruz . So [facility] [phone].    ATENCIÓN: Si habla español, tiene a nichole disposición servicios gratuitos de asistencia lingüística. Llame al [phone].    We comply with applicable federal civil rights laws and [place] laws. We do not discriminate on the basis of race, color, national origin, age, disability, sex, sexual orientation, or gender identity.            Thank you!     Thank you for choosing [facility] RHEUMATOLOGY [facility]  for your care. Our goal is always to provide you with excellent care. Hearing back from our patients is one way we can continue to improve our services. Please take a few minutes to complete the  written survey that you may receive in the mail after your visit with us. Thank you!             Your Updated Medication List - Protect others around you: Learn how to safely use, store and throw away your medicines at www.disposemymeds.org.          This list is accurate as of 10/18/18  9:18 AM.  Always use your most recent med list.                   Brand Name Dispense Instructions for use Diagnosis    amLODIPine 5 MG tablet    NORVASC    30 tablet    TAKE 1 TABLET EVERY DAY    Benign essential hypertension       aspirin 81 MG EC tablet     30 tablet    Take 1 tablet (81 mg) by mouth daily    Status post percutaneous transluminal coronary angioplasty       atorvastatin 40 MG tablet    LIPITOR    30 tablet    Take 1 tablet (40 mg) by mouth every evening    NSTEMI (non-ST elevated myocardial infarction) (H)       benazepril 20 MG tablet    LOTENSIN    30 tablet    TAKE 1 TABLET EVERY DAY    Benign essential hypertension       Calcium-Magnesium-Zinc 500-250-12.5 MG Tabs      Take 1 tablet by mouth daily        clopidogrel 75 MG tablet    PLAVIX    30 tablet    Take 1 tablet (75 mg) by mouth daily    NSTEMI (non-ST elevated myocardial infarction) (H)       MAGNESIUM PO      Take 200 mg by mouth daily        metoprolol tartrate 25 MG tablet    LOPRESSOR    30 tablet    Take 0.5 tablets (12.5 mg) by mouth 2 times daily    NSTEMI (non-ST elevated myocardial infarction) (H)       nitroGLYcerin 0.4 MG sublingual tablet    NITROSTAT    25 tablet    For chest pain place 1 tablet under the tongue every 5 minutes for 3 doses. If symptoms persist 5 minutes after 1st dose call 911.    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

## 2018-10-22 ENCOUNTER — HOSPITAL ENCOUNTER (OUTPATIENT)
Dept: CARDIAC REHAB | Facility: CLINIC | Age: 83
End: 2018-10-22
Attending: STUDENT IN AN ORGANIZED HEALTH CARE EDUCATION/TRAINING PROGRAM
Payer: MEDICARE

## 2018-10-22 PROCEDURE — 93798 PHYS/QHP OP CAR RHAB W/ECG: CPT

## 2018-10-22 PROCEDURE — 40000116 ZZH STATISTIC OP CR VISIT

## 2018-10-24 ENCOUNTER — OFFICE VISIT (OUTPATIENT)
Dept: CARDIOLOGY | Facility: CLINIC | Age: 83
End: 2018-10-24
Attending: HOSPITALIST
Payer: COMMERCIAL

## 2018-10-24 VITALS
OXYGEN SATURATION: 97 % | SYSTOLIC BLOOD PRESSURE: 128 MMHG | DIASTOLIC BLOOD PRESSURE: 62 MMHG | WEIGHT: 190 LBS | BODY MASS INDEX: 27.2 KG/M2 | HEIGHT: 70 IN | HEART RATE: 50 BPM

## 2018-10-24 DIAGNOSIS — I21.4 NSTEMI (NON-ST ELEVATED MYOCARDIAL INFARCTION) (H): ICD-10-CM

## 2018-10-24 DIAGNOSIS — Z98.61 STATUS POST PERCUTANEOUS TRANSLUMINAL CORONARY ANGIOPLASTY: ICD-10-CM

## 2018-10-24 PROCEDURE — 99214 OFFICE O/P EST MOD 30 MIN: CPT | Performed by: PHYSICIAN ASSISTANT

## 2018-10-24 RX ORDER — METOPROLOL SUCCINATE 25 MG/1
12.5 TABLET, EXTENDED RELEASE ORAL DAILY
Qty: 15 TABLET | Refills: 3 | Status: SHIPPED | OUTPATIENT
Start: 2018-10-24 | End: 2018-12-28

## 2018-10-24 RX ORDER — ATORVASTATIN CALCIUM 40 MG/1
40 TABLET, FILM COATED ORAL EVERY EVENING
Qty: 90 TABLET | Refills: 3 | Status: SHIPPED | OUTPATIENT
Start: 2018-10-24 | End: 2019-01-18

## 2018-10-24 RX ORDER — CLOPIDOGREL BISULFATE 75 MG/1
75 TABLET ORAL DAILY
Qty: 90 TABLET | Refills: 3 | Status: SHIPPED | OUTPATIENT
Start: 2018-10-24 | End: 2019-01-18

## 2018-10-24 NOTE — LETTER
10/24/2018    Eusebio Oliveira MD  [address]  [place] [address]    RE: Augie Powell       Dear Colleague,    I had the pleasure of seeing Augie Powell in the [facility].      Cardiology Progress Note    Date of Service: 10/24/2018  Patient seen today in follow up of: post hospital  Primary cardiologist: jenni f/u with Dr. Villarreal    HPI:  Augie Powell is a very pleasant 83 year old male with a history of hypertension, renal cell carcinoma s/p nephrectomy, CKD stage IV, hyperlipidemia who is here today for post hospital follow up.     He presented to the ED on 9/28 with complaints of intermittent chest discomfort for two weeks with exertion and associated shortness of breath. Troponins were elevated up to 3. NTproBNP was > 4000. An echocardiogram showed a relatively preserved LVEF. The apex was akinetic. There was hypokinesis of the inferior septum, anterior septum, inferior and anterior walls (mid and apex). Coronary angiogram was done which showed a 90% stenosis of the mid LAD. There was 40 - 50% stenosis of the mid RCA which is not  flow limiting. He underwent successful PCI of the mid LAD with a CORBIN. LVEDP was 27 mmHg at the time of the angiogram. He was started on aspirin and plavix for dual antiplatelet therapy. Low dose beta blocker was initiated as well as high intensity statin therapy. Nephrology was involved during his hospitalization given his CKD and solitary kidney. It appears his renal function stayed relatively stable during his hospitalization. Benazepril was held during his hospitalization and was resumed at discharge. He was discharged home on 10/2.    He is here today for post hospital follow up. Since his discharge, he has not had any further chest discomfort or shortness of breath. He has been participating in cardiac rehab since then about once a week although has not been going as often as scheduled due to cost. He does endorse some  ongoing fatigue.     ASSESSMENT/PLAN:  1.  Coronary artery disease. with recent NSTEMI s/p stenting of the mid LAD with a CORBIN. He is now on aspirin and plavix which he will continue for one year uninterrupted. He is also on low dose metoprolol, benazepril, as well as high intensity statin therapy. We reviewed his medications and their importance. Due to his ongoing fatigue, I suggested changing his metoprolol to Metoprolol XL. I will start a low dose of 12.5 mg and have him take it once a day just at night. His echocardiogram showed mild LV dysfunction with an LVEF of 40 - 45% with wall motion abnormalities. He will return to see Dr. Villarreal in January. We will repeat a lipid profile and an echocardiogram at that time. I, of coursed, asked him to contact us sooner with any concerns.   2.  Renal cell carcinoma s/p nephrectomy.  3.  CKD stage IV.   4.  Hypertension.  Well controlled.     This note was completed in part using Dragon voice recognition software. Although reviewed after completion, some word and grammatical errors may occur.    Orders this Visit:  Orders Placed This Encounter   Procedures     Lipid Profile     ALT     Orders Placed This Encounter   Medications     metoprolol succinate (TOPROL-XL) 25 MG 24 hr tablet     Sig: Take 0.5 tablets (12.5 mg) by mouth daily     Dispense:  15 tablet     Refill:  3     clopidogrel (PLAVIX) 75 MG tablet     Sig: Take 1 tablet (75 mg) by mouth daily     Dispense:  90 tablet     Refill:  3     aspirin 81 MG EC tablet     Sig: Take 1 tablet (81 mg) by mouth daily     Dispense:  90 tablet     Refill:  3     atorvastatin (LIPITOR) 40 MG tablet     Sig: Take 1 tablet (40 mg) by mouth every evening     Dispense:  90 tablet     Refill:  3     Medications Discontinued During This Encounter   Medication Reason     metoprolol tartrate (LOPRESSOR) 25 MG tablet      clopidogrel (PLAVIX) 75 MG tablet Reorder     aspirin 81 MG EC tablet Reorder     atorvastatin (LIPITOR) 40 MG  tablet Reorder       CURRENT MEDICATIONS:  Current Outpatient Prescriptions   Medication Sig Dispense Refill     amLODIPine (NORVASC) 5 MG tablet TAKE 1 TABLET EVERY DAY 30 tablet 0     aspirin 81 MG EC tablet Take 1 tablet (81 mg) by mouth daily 90 tablet 3     atorvastatin (LIPITOR) 40 MG tablet Take 1 tablet (40 mg) by mouth every evening 90 tablet 3     benazepril (LOTENSIN) 20 MG tablet TAKE 1 TABLET EVERY DAY 30 tablet 0     Calcium-Magnesium-Zinc 500-250-12.5 MG TABS Take 1 tablet by mouth daily       clopidogrel (PLAVIX) 75 MG tablet Take 1 tablet (75 mg) by mouth daily 90 tablet 3     MAGNESIUM PO Take 200 mg by mouth daily        metoprolol succinate (TOPROL-XL) 25 MG 24 hr tablet Take 0.5 tablets (12.5 mg) by mouth daily 15 tablet 3     nitroGLYcerin (NITROSTAT) 0.4 MG sublingual tablet For chest pain place 1 tablet under the tongue every 5 minutes for 3 doses. If symptoms persist 5 minutes after 1st dose call 911. 25 tablet 3     [DISCONTINUED] atorvastatin (LIPITOR) 40 MG tablet Take 1 tablet (40 mg) by mouth every evening 30 tablet 0     [DISCONTINUED] clopidogrel (PLAVIX) 75 MG tablet Take 1 tablet (75 mg) by mouth daily 30 tablet 0     ALLERGIES  No Known Allergies  PAST MEDICAL HISTORY:  Past Medical History:   Diagnosis Date     Hyperlipidemia      Hypertension      Renal disease     CRD--creatinine in upper ones to 2, right kdney removed 2000 for canncer     Spondylosis with myelopathy, lumbar region      PAST SURGICAL HISTORY:  Past Surgical History:   Procedure Laterality Date     BACK SURGERY       CHOLECYSTECTOMY       DECOMPRESSION LUMBAR TWO LEVELS  12/1/2014    Procedure: DECOMPRESSION LUMBAR TWO LEVELS;  Surgeon: Remy Harmon MD;  Location:  OR     HERNIA REPAIR       LAMINECTOMY, FUSION LUMBAR ONE LEVEL, COMBINED N/A 12/1/2014    Procedure: COMBINED LAMINECTOMY, FUSION LUMBAR ONE LEVEL;  Surgeon: Remy Harmon MD;  Location:  OR     PHACOEMULSIFICATION CLEAR CORNEA  "WITH STANDARD INTRAOCULAR LENS IMPLANT  10/22/2012    Procedure: PHACOEMULSIFICATION CLEAR CORNEA WITH STANDARD INTRAOCULAR LENS IMPLANT;  RIGHT EYE PHACOEMULSIFICATION CLEAR CORNEA WITH STANDARD INTRAOCULAR LENS IMPLANT ;  Surgeon: Bárbara, Grupo NEVAREZ MD;  Location:  [facility]     right nephrectomy[       FAMILY HISTORY:  History reviewed. No pertinent family history.  SOCIAL HISTORY:  Social History     Social History     Marital status:      Spouse name: N/A     Number of children: N/A     Years of education: N/A     Social History Main Topics     Smoking status: Former Smoker     Smokeless tobacco: Never Used     Alcohol use 1.2 oz/week     2 Shots of liquor per week     Drug use: No     Sexual activity: Not Currently     Other Topics Concern     None     Social History Narrative     Review of Systems:  Skin:  Negative     Eyes:  Negative    ENT:  Negative    Respiratory:  Positive for dyspnea on exertion  Cardiovascular:    Positive for;fatigue  Gastroenterology: Positive for diarrhea  Genitourinary:  Negative    Musculoskeletal:  Positive for nocturnal cramping  Neurologic:  Negative    Psychiatric:  Negative    Heme/Lymph/Imm:  Negative    Endocrine:  Negative       Physical Exam:  Vitals: /62  Pulse 50  Ht 1.778 m (5' 10\")  Wt 86.2 kg (190 lb)  SpO2 97%  BMI 27.26 kg/m2   Wt Readings from Last 4 Encounters:   10/24/18 86.2 kg (190 lb)   10/09/18 86.6 kg (191 lb)   10/02/18 86.5 kg (190 lb 12.8 oz)   09/26/18 88.5 kg (195 lb)     GEN: well nourished, in no acute distress.  HEENT:  Pupils equal, round. Sclerae nonicteric.   NECK: Supple, no masses appreciated.   C/V:  Regular rate and rhythm, soft systolic murmur at the LLSB.  L radial access site is well healed. There is no hematoma or bruit.   RESP: Respirations are unlabored. Clear to auscultation bilaterally without wheezing, rales, or rhonchi.  GI: Abdomen soft, nontender.  EXTREM: no LE edema.  NEURO: Alert and oriented, cooperative.  SKIN: " Warm and dry.     Recent Lab Results:  LIPID RESULTS:  Lab Results   Component Value Date    CHOL 124 10/01/2018    HDL 39 (L) 10/01/2018    LDL 62 10/01/2018    TRIG 117 10/01/2018     LIVER ENZYME RESULTS:  Lab Results   Component Value Date    AST 26 09/30/2018    ALT 26 09/30/2018     CBC RESULTS:  Lab Results   Component Value Date    WBC 8.8 10/09/2018    RBC 3.94 (L) 10/09/2018    HGB 12.6 (L) 10/09/2018    HCT 37.1 (L) 10/09/2018    MCV 94 10/09/2018    MCH 32.0 10/09/2018    MCHC 34.0 10/09/2018    RDW 12.7 10/09/2018     10/09/2018     BMP RESULTS:  Lab Results   Component Value Date     (L) 10/09/2018    POTASSIUM 4.7 10/09/2018    CHLORIDE 100 10/09/2018    CO2 24 10/09/2018    ANIONGAP 7 10/09/2018    GLC 97 10/09/2018    BUN 43 (H) 10/09/2018    CR 2.45 (H) 10/09/2018    GFRESTIMATED 25 (L) 10/09/2018    GFRESTBLACK 31 (L) 10/09/2018    RICCARDO 8.7 10/09/2018      A1C RESULTS:  Lab Results   Component Value Date    A1C 5.5 09/29/2018     INR RESULTS:  Lab Results   Component Value Date    INR 0.89 10/04/2010       New/Pertinent imaging results since last visit:  None        Thank you for allowing me to participate in the care of your patient.    Sincerely,     [place] Rm PA-C     [facility]    cc:   Magnolia Rodriguez MD  [address] [id number]

## 2018-10-24 NOTE — LETTER
10/24/2018    Eusebio Oliveira MD  [address]  [place] [address]    RE: Augie Powell       Dear Colleague,    I had the pleasure of seeing Augie Powell in the [facility].      Cardiology Progress Note    Date of Service: 10/24/2018  Patient seen today in follow up of: post hospital  Primary cardiologist: jenni f/u with Dr. Villarreal    HPI:  Augie Powell is a very pleasant 83 year old male with a history of hypertension, renal cell carcinoma s/p nephrectomy, CKD stage IV, hyperlipidemia who is here today for post hospital follow up.     He presented to the ED on 9/28 with complaints of intermittent chest discomfort for two weeks with exertion and associated shortness of breath. Troponins were elevated up to 3. NTproBNP was > 4000. An echocardiogram showed a relatively preserved LVEF. The apex was akinetic. There was hypokinesis of the inferior septum, anterior septum, inferior and anterior walls (mid and apex). Coronary angiogram was done which showed a 90% stenosis of the mid LAD. There was 40 - 50% stenosis of the mid RCA which is not  flow limiting. He underwent successful PCI of the mid LAD with a CORBIN. LVEDP was 27 mmHg at the time of the angiogram. He was started on aspirin and plavix for dual antiplatelet therapy. Low dose beta blocker was initiated as well as high intensity statin therapy. Nephrology was involved during his hospitalization given his CKD and solitary kidney. It appears his renal function stayed relatively stable during his hospitalization. Benazepril was held during his hospitalization and was resumed at discharge. He was discharged home on 10/2.    He is here today for post hospital follow up. Since his discharge, he has not had any further chest discomfort or shortness of breath. He has been participating in cardiac rehab since then about once a week although has not been going as often as scheduled due to cost. He does endorse some  ongoing fatigue.     ASSESSMENT/PLAN:  1.  Coronary artery disease. with recent NSTEMI s/p stenting of the mid LAD with a CORBIN. He is now on aspirin and plavix which he will continue for one year uninterrupted. He is also on low dose metoprolol, benazepril, as well as high intensity statin therapy. We reviewed his medications and their importance. Due to his ongoing fatigue, I suggested changing his metoprolol to Metoprolol XL. I will start a low dose of 12.5 mg and have him take it once a day just at night. His echocardiogram showed mild LV dysfunction with an LVEF of 40 - 45% with wall motion abnormalities. He will return to see Dr. Villarreal in January. We will repeat a lipid profile and an echocardiogram at that time. I, of coursed, asked him to contact us sooner with any concerns.   2.  Renal cell carcinoma s/p nephrectomy.  3.  CKD stage IV.   4.  Hypertension.  Well controlled.     This note was completed in part using Dragon voice recognition software. Although reviewed after completion, some word and grammatical errors may occur.    Orders this Visit:  Orders Placed This Encounter   Procedures     Lipid Profile     ALT     Orders Placed This Encounter   Medications     metoprolol succinate (TOPROL-XL) 25 MG 24 hr tablet     Sig: Take 0.5 tablets (12.5 mg) by mouth daily     Dispense:  15 tablet     Refill:  3     clopidogrel (PLAVIX) 75 MG tablet     Sig: Take 1 tablet (75 mg) by mouth daily     Dispense:  90 tablet     Refill:  3     aspirin 81 MG EC tablet     Sig: Take 1 tablet (81 mg) by mouth daily     Dispense:  90 tablet     Refill:  3     atorvastatin (LIPITOR) 40 MG tablet     Sig: Take 1 tablet (40 mg) by mouth every evening     Dispense:  90 tablet     Refill:  3     Medications Discontinued During This Encounter   Medication Reason     metoprolol tartrate (LOPRESSOR) 25 MG tablet      clopidogrel (PLAVIX) 75 MG tablet Reorder     aspirin 81 MG EC tablet Reorder     atorvastatin (LIPITOR) 40 MG  tablet Reorder       CURRENT MEDICATIONS:  Current Outpatient Prescriptions   Medication Sig Dispense Refill     amLODIPine (NORVASC) 5 MG tablet TAKE 1 TABLET EVERY DAY 30 tablet 0     aspirin 81 MG EC tablet Take 1 tablet (81 mg) by mouth daily 90 tablet 3     atorvastatin (LIPITOR) 40 MG tablet Take 1 tablet (40 mg) by mouth every evening 90 tablet 3     benazepril (LOTENSIN) 20 MG tablet TAKE 1 TABLET EVERY DAY 30 tablet 0     Calcium-Magnesium-Zinc 500-250-12.5 MG TABS Take 1 tablet by mouth daily       clopidogrel (PLAVIX) 75 MG tablet Take 1 tablet (75 mg) by mouth daily 90 tablet 3     MAGNESIUM PO Take 200 mg by mouth daily        metoprolol succinate (TOPROL-XL) 25 MG 24 hr tablet Take 0.5 tablets (12.5 mg) by mouth daily 15 tablet 3     nitroGLYcerin (NITROSTAT) 0.4 MG sublingual tablet For chest pain place 1 tablet under the tongue every 5 minutes for 3 doses. If symptoms persist 5 minutes after 1st dose call 911. 25 tablet 3     [DISCONTINUED] atorvastatin (LIPITOR) 40 MG tablet Take 1 tablet (40 mg) by mouth every evening 30 tablet 0     [DISCONTINUED] clopidogrel (PLAVIX) 75 MG tablet Take 1 tablet (75 mg) by mouth daily 30 tablet 0     ALLERGIES  No Known Allergies  PAST MEDICAL HISTORY:  Past Medical History:   Diagnosis Date     Hyperlipidemia      Hypertension      Renal disease     CRD--creatinine in upper ones to 2, right kdney removed 2000 for canncer     Spondylosis with myelopathy, lumbar region      PAST SURGICAL HISTORY:  Past Surgical History:   Procedure Laterality Date     BACK SURGERY       CHOLECYSTECTOMY       DECOMPRESSION LUMBAR TWO LEVELS  12/1/2014    Procedure: DECOMPRESSION LUMBAR TWO LEVELS;  Surgeon: Remy Harmon MD;  Location:  OR     HERNIA REPAIR       LAMINECTOMY, FUSION LUMBAR ONE LEVEL, COMBINED N/A 12/1/2014    Procedure: COMBINED LAMINECTOMY, FUSION LUMBAR ONE LEVEL;  Surgeon: Remy Harmon MD;  Location:  OR     PHACOEMULSIFICATION CLEAR CORNEA  "WITH STANDARD INTRAOCULAR LENS IMPLANT  10/22/2012    Procedure: PHACOEMULSIFICATION CLEAR CORNEA WITH STANDARD INTRAOCULAR LENS IMPLANT;  RIGHT EYE PHACOEMULSIFICATION CLEAR CORNEA WITH STANDARD INTRAOCULAR LENS IMPLANT ;  Surgeon: Bárbara, Grupo NEVAREZ MD;  Location:  [facility]     right nephrectomy[       FAMILY HISTORY:  History reviewed. No pertinent family history.  SOCIAL HISTORY:  Social History     Social History     Marital status:      Spouse name: N/A     Number of children: N/A     Years of education: N/A     Social History Main Topics     Smoking status: Former Smoker     Smokeless tobacco: Never Used     Alcohol use 1.2 oz/week     2 Shots of liquor per week     Drug use: No     Sexual activity: Not Currently     Other Topics Concern     None     Social History Narrative     Review of Systems:  Skin:  Negative     Eyes:  Negative    ENT:  Negative    Respiratory:  Positive for dyspnea on exertion  Cardiovascular:    Positive for;fatigue  Gastroenterology: Positive for diarrhea  Genitourinary:  Negative    Musculoskeletal:  Positive for nocturnal cramping  Neurologic:  Negative    Psychiatric:  Negative    Heme/Lymph/Imm:  Negative    Endocrine:  Negative       Physical Exam:  Vitals: /62  Pulse 50  Ht 1.778 m (5' 10\")  Wt 86.2 kg (190 lb)  SpO2 97%  BMI 27.26 kg/m2   Wt Readings from Last 4 Encounters:   10/24/18 86.2 kg (190 lb)   10/09/18 86.6 kg (191 lb)   10/02/18 86.5 kg (190 lb 12.8 oz)   09/26/18 88.5 kg (195 lb)     GEN: well nourished, in no acute distress.  HEENT:  Pupils equal, round. Sclerae nonicteric.   NECK: Supple, no masses appreciated.   C/V:  Regular rate and rhythm, soft systolic murmur at the LLSB.  L radial access site is well healed. There is no hematoma or bruit.   RESP: Respirations are unlabored. Clear to auscultation bilaterally without wheezing, rales, or rhonchi.  GI: Abdomen soft, nontender.  EXTREM: no LE edema.  NEURO: Alert and oriented, cooperative.  SKIN: " Warm and dry.     Recent Lab Results:  LIPID RESULTS:  Lab Results   Component Value Date    CHOL 124 10/01/2018    HDL 39 (L) 10/01/2018    LDL 62 10/01/2018    TRIG 117 10/01/2018     LIVER ENZYME RESULTS:  Lab Results   Component Value Date    AST 26 09/30/2018    ALT 26 09/30/2018     CBC RESULTS:  Lab Results   Component Value Date    WBC 8.8 10/09/2018    RBC 3.94 (L) 10/09/2018    HGB 12.6 (L) 10/09/2018    HCT 37.1 (L) 10/09/2018    MCV 94 10/09/2018    MCH 32.0 10/09/2018    MCHC 34.0 10/09/2018    RDW 12.7 10/09/2018     10/09/2018     BMP RESULTS:  Lab Results   Component Value Date     (L) 10/09/2018    POTASSIUM 4.7 10/09/2018    CHLORIDE 100 10/09/2018    CO2 24 10/09/2018    ANIONGAP 7 10/09/2018    GLC 97 10/09/2018    BUN 43 (H) 10/09/2018    CR 2.45 (H) 10/09/2018    GFRESTIMATED 25 (L) 10/09/2018    GFRESTBLACK 31 (L) 10/09/2018    RICCARDO 8.7 10/09/2018      A1C RESULTS:  Lab Results   Component Value Date    A1C 5.5 09/29/2018     INR RESULTS:  Lab Results   Component Value Date    INR 0.89 10/04/2010       New/Pertinent imaging results since last visit:  None    Martha Rm PA-C  [facility]      Thank you for allowing me to participate in the care of your patient.      Sincerely,     Martha Rm PA-C     [facility]    cc:   Magnolia Rodriguez MD  [address] [address]

## 2018-10-24 NOTE — MR AVS SNAPSHOT
After Visit Summary   10/24/2018    Augie Powell    MRN: [id number]           Patient Information     Date Of Birth          1935        Visit Information        Provider Department      10/24/2018 2:30 PM Martha Rm PA-C [facility]   Martha        Today's Diagnoses     NSTEMI (non-ST elevated myocardial infarction) (H)        Status post percutaneous transluminal coronary angioplasty          Care Instructions    Thank you for your [facility] visit today. Your provider has recommended the following:    Medication Changes:  Stop the metoprolol tartrate. START Metoprolol succinate (metoprolol XL) 1/2 tablet at bedtime ONCE daily. Hopefully this will help with some of your fatigue.   Recommendations:  Call us with any concerns. If your fatigue is no better with the medication adjustment above, please let us know.  Follow-up:  See Dr. Villarreal for cardiology follow up in January with a repeat echocardiogram prior.  Reminder:  Please bring in all current medications, over the counter supplements and vitamin bottles to your next appointment.            [facility]            Follow-ups after your visit        Your next 10 appointments already scheduled     Oct 26, 2018  1:00 PM CDT   Cardiac Treatment with Rh Cardiac Rehab 1   [facility] ([facility])    [address]  [facility] [address]   [phone]            Oct 29, 2018  1:00 PM CDT   Cardiac Treatment with Rh Cardiac Rehab 1   [facility] ([facility])    [address], [address]  [facility] [address]   [phone]            Oct 31, 2018  1:00 PM CDT   Cardiac Treatment with Rh Cardiac Rehab 1   [facility] ([facility])    [address]  [facility] [address]   [phone]            Nov 02, 2018  1:00 PM CDT   Cardiac  Treatment with Rh Cardiac Rehab 1   [facility] ([facility])    [address]  [facility] [address]   [phone]            Nov 02, 2018  2:00 PM CDT   CONSULT with Rh Cardiac Rehab 1   [facility] ([facility])    [address]  [facility] [address]   [phone]            Nov 05, 2018  1:00 PM CST   Cardiac Treatment with Rh Cardiac Rehab 1   [facility] ([facility])    [address]  [facility] [address]   [phone]            Nov 07, 2018  1:00 PM CST   Cardiac Treatment with Rh Cardiac Rehab 1   [facility] ([facility])    [address]  [facility] [address]   [phone]            Nov 09, 2018  1:00 PM CST   Cardiac Treatment with Rh Cardiac Rehab 1   [facility] ([facility])    [address]  [facility] [address]   [phone]            Nov 12, 2018  1:00 PM CST   Cardiac Treatment with Rh Cardiac Rehab 1   [facility] ([facility])    [address]  [facility] [address]   [phone]            Nov 14, 2018  1:00 PM CST   Cardiac Treatment with Rh Cardiac Rehab 1   [facility] ([facility])    [address] [place] [address], [address]  [facility] [address]   [phone]              Who to contact     If you have questions or need follow up information about today's clinic visit or your schedule please contact [facility] directly at [phone].  Normal or non-critical lab and imaging results will be communicated to you by MyChart, letter or phone within 4 business days after the clinic has received the results. If you do not hear from us within 7 days, please contact the clinic through MyChart or phone. If you have a  "critical or abnormal lab result, we will notify you by phone as soon as possible.  Submit refill requests through [employer] or call your pharmacy and they will forward the refill request to us. Please allow 3 business days for your refill to be completed.          Additional Information About Your Visit        Care EveryWhere ID     This is your Care EveryWhere ID. This could be used by other organizations to access your [place] medical records  [id number]        Your Vitals Were     Pulse Height Pulse Oximetry BMI (Body Mass Index)          50 1.778 m (5' 10\") 97% 27.26 kg/m2         Blood Pressure from Last 3 Encounters:   10/24/18 128/62   10/09/18 120/62   10/02/18 142/69    Weight from Last 3 Encounters:   10/24/18 86.2 kg (190 lb)   10/09/18 86.6 kg (191 lb)   10/02/18 86.5 kg (190 lb 12.8 oz)              We Performed the Following     Follow-Up with Cardiac Advanced Practice Provider          Today's Medication Changes          These changes are accurate as of 10/24/18  2:50 PM.  If you have any questions, ask your nurse or doctor.               Start taking these medicines.        Dose/Directions    metoprolol succinate 25 MG 24 hr tablet   Commonly known as:  TOPROL-XL   Used for:  NSTEMI (non-ST elevated myocardial infarction) (H)   Started by:  Martha Rm PA-C        Dose:  12.5 mg   Take 0.5 tablets (12.5 mg) by mouth daily   Quantity:  15 tablet   Refills:  3         Stop taking these medicines if you haven't already. Please contact your care team if you have questions.     metoprolol tartrate 25 MG tablet   Commonly known as:  LOPRESSOR   Stopped by:  Martha Rm PA-C                Where to get your medicines      These medications were sent to [facility] - [place], [place] - [address]   [address], [facility] [address]     Phone:  [phone]     aspirin 81 MG EC tablet    atorvastatin 40 MG tablet    clopidogrel 75 MG tablet    " metoprolol succinate 25 MG 24 hr tablet                Primary Care Provider Office Phone # Fax #    Eusebio Oliveira [phone] [address] [facility] [address]  [facility] [address]        Equal Access to Services     MAX CHAUDHARY : Hadii aad ku hadlavono Soomaali, waaxda luqadaha, qaybta kaalmada adeegyada, waxay idiin haychandrakantn adehussain ibanezyesi horn. So [facility] [phone].    ATENCIÓN: Si habla español, tiene a nichole disposición servicios gratuitos de asistencia lingüística. Llame al [phone].    We comply with applicable federal civil rights laws and [place] laws. We do not discriminate on the basis of race, color, national origin, age, disability, sex, sexual orientation, or gender identity.            Thank you!     Thank you for choosing [facility]  for your care. Our goal is always to provide you with excellent care. Hearing back from our patients is one way we can continue to improve our services. Please take a few minutes to complete the written survey that you may receive in the mail after your visit with us. Thank you!             Your Updated Medication List - Protect others around you: Learn how to safely use, store and throw away your medicines at www.disposemymeds.org.          This list is accurate as of 10/24/18  2:50 PM.  Always use your most recent med list.                   Brand Name Dispense Instructions for use Diagnosis    amLODIPine 5 MG tablet    NORVASC    30 tablet    TAKE 1 TABLET EVERY DAY    Benign essential hypertension       aspirin 81 MG EC tablet     90 tablet    Take 1 tablet (81 mg) by mouth daily    Status post percutaneous transluminal coronary angioplasty       atorvastatin 40 MG tablet    LIPITOR    90 tablet    Take 1 tablet (40 mg) by mouth every evening    NSTEMI (non-ST elevated myocardial infarction) (H)       benazepril 20 MG tablet    LOTENSIN    30 tablet    TAKE 1 TABLET EVERY DAY    Benign essential hypertension        Calcium-Magnesium-Zinc 500-250-12.5 MG Tabs      Take 1 tablet by mouth daily        clopidogrel 75 MG tablet    PLAVIX    90 tablet    Take 1 tablet (75 mg) by mouth daily    NSTEMI (non-ST elevated myocardial infarction) (H)       MAGNESIUM PO      Take 200 mg by mouth daily        metoprolol succinate 25 MG 24 hr tablet    TOPROL-XL    15 tablet    Take 0.5 tablets (12.5 mg) by mouth daily    NSTEMI (non-ST elevated myocardial infarction) (H)       nitroGLYcerin 0.4 MG sublingual tablet    NITROSTAT    25 tablet    For chest pain place 1 tablet under the tongue every 5 minutes for 3 doses. If symptoms persist 5 minutes after 1st dose call 911.    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

## 2018-10-24 NOTE — PATIENT INSTRUCTIONS
Thank you for your [facility] visit today. Your provider has recommended the following:    Medication Changes:  Stop the metoprolol tartrate. START Metoprolol succinate (metoprolol XL) 1/2 tablet at bedtime ONCE daily. Hopefully this will help with some of your fatigue.   Recommendations:  Call us with any concerns. If your fatigue is no better with the medication adjustment above, please let us know.  Follow-up:  See Dr. Villarreal for cardiology follow up in January with a repeat echocardiogram prior.  Reminder:  Please bring in all current medications, over the counter supplements and vitamin bottles to your next appointment.            [facility]

## 2018-10-24 NOTE — PROGRESS NOTES
Cardiology Progress Note    Date of Service: 10/24/2018  Patient seen today in follow up of: post hospital  Primary cardiologist: jenni f/u with Dr. Villarreal    HPI:  Augie Powell is a very pleasant 83 year old male with a history of hypertension, renal cell carcinoma s/p nephrectomy, CKD stage IV, hyperlipidemia who is here today for post hospital follow up.     He presented to the ED on 9/28 with complaints of intermittent chest discomfort for two weeks with exertion and associated shortness of breath. Troponins were elevated up to 3. NTproBNP was > 4000. An echocardiogram showed a relatively preserved LVEF. The apex was akinetic. There was hypokinesis of the inferior septum, anterior septum, inferior and anterior walls (mid and apex). Coronary angiogram was done which showed a 90% stenosis of the mid LAD. There was 40 - 50% stenosis of the mid RCA which is not  flow limiting. He underwent successful PCI of the mid LAD with a CORBIN. LVEDP was 27 mmHg at the time of the angiogram. He was started on aspirin and plavix for dual antiplatelet therapy. Low dose beta blocker was initiated as well as high intensity statin therapy. Nephrology was involved during his hospitalization given his CKD and solitary kidney. It appears his renal function stayed relatively stable during his hospitalization. Benazepril was held during his hospitalization and was resumed at discharge. He was discharged home on 10/2.    He is here today for post hospital follow up. Since his discharge, he has not had any further chest discomfort or shortness of breath. He has been participating in cardiac rehab since then about once a week although has not been going as often as scheduled due to cost. He does endorse some ongoing fatigue.     ASSESSMENT/PLAN:  1.  Coronary artery disease. with recent NSTEMI s/p stenting of the mid LAD with a CORBIN. He is now on aspirin and plavix which he will continue for one year uninterrupted. He is also on low  dose metoprolol, benazepril, as well as high intensity statin therapy. We reviewed his medications and their importance. Due to his ongoing fatigue, I suggested changing his metoprolol to Metoprolol XL. I will start a low dose of 12.5 mg and have him take it once a day just at night. His echocardiogram showed mild LV dysfunction with an LVEF of 40 - 45% with wall motion abnormalities. He will return to see Dr. Villarreal in January. We will repeat a lipid profile and an echocardiogram at that time. I, of coursed, asked him to contact us sooner with any concerns.   2.  Renal cell carcinoma s/p nephrectomy.  3.  CKD stage IV.   4.  Hypertension.  Well controlled.     This note was completed in part using Dragon voice recognition software. Although reviewed after completion, some word and grammatical errors may occur.    Orders this Visit:  Orders Placed This Encounter   Procedures     Lipid Profile     ALT     Orders Placed This Encounter   Medications     metoprolol succinate (TOPROL-XL) 25 MG 24 hr tablet     Sig: Take 0.5 tablets (12.5 mg) by mouth daily     Dispense:  15 tablet     Refill:  3     clopidogrel (PLAVIX) 75 MG tablet     Sig: Take 1 tablet (75 mg) by mouth daily     Dispense:  90 tablet     Refill:  3     aspirin 81 MG EC tablet     Sig: Take 1 tablet (81 mg) by mouth daily     Dispense:  90 tablet     Refill:  3     atorvastatin (LIPITOR) 40 MG tablet     Sig: Take 1 tablet (40 mg) by mouth every evening     Dispense:  90 tablet     Refill:  3     Medications Discontinued During This Encounter   Medication Reason     metoprolol tartrate (LOPRESSOR) 25 MG tablet      clopidogrel (PLAVIX) 75 MG tablet Reorder     aspirin 81 MG EC tablet Reorder     atorvastatin (LIPITOR) 40 MG tablet Reorder       CURRENT MEDICATIONS:  Current Outpatient Prescriptions   Medication Sig Dispense Refill     amLODIPine (NORVASC) 5 MG tablet TAKE 1 TABLET EVERY DAY 30 tablet 0     aspirin 81 MG EC tablet Take 1 tablet (81 mg)  by mouth daily 90 tablet 3     atorvastatin (LIPITOR) 40 MG tablet Take 1 tablet (40 mg) by mouth every evening 90 tablet 3     benazepril (LOTENSIN) 20 MG tablet TAKE 1 TABLET EVERY DAY 30 tablet 0     Calcium-Magnesium-Zinc 500-250-12.5 MG TABS Take 1 tablet by mouth daily       clopidogrel (PLAVIX) 75 MG tablet Take 1 tablet (75 mg) by mouth daily 90 tablet 3     MAGNESIUM PO Take 200 mg by mouth daily        metoprolol succinate (TOPROL-XL) 25 MG 24 hr tablet Take 0.5 tablets (12.5 mg) by mouth daily 15 tablet 3     nitroGLYcerin (NITROSTAT) 0.4 MG sublingual tablet For chest pain place 1 tablet under the tongue every 5 minutes for 3 doses. If symptoms persist 5 minutes after 1st dose call 911. 25 tablet 3     [DISCONTINUED] atorvastatin (LIPITOR) 40 MG tablet Take 1 tablet (40 mg) by mouth every evening 30 tablet 0     [DISCONTINUED] clopidogrel (PLAVIX) 75 MG tablet Take 1 tablet (75 mg) by mouth daily 30 tablet 0     ALLERGIES  No Known Allergies  PAST MEDICAL HISTORY:  Past Medical History:   Diagnosis Date     Hyperlipidemia      Hypertension      Renal disease     CRD--creatinine in upper ones to 2, right kdney removed 2000 for canncer     Spondylosis with myelopathy, lumbar region      PAST SURGICAL HISTORY:  Past Surgical History:   Procedure Laterality Date     BACK SURGERY       CHOLECYSTECTOMY       DECOMPRESSION LUMBAR TWO LEVELS  12/1/2014    Procedure: DECOMPRESSION LUMBAR TWO LEVELS;  Surgeon: Remy Harmon MD;  Location:  OR     HERNIA REPAIR       LAMINECTOMY, FUSION LUMBAR ONE LEVEL, COMBINED N/A 12/1/2014    Procedure: COMBINED LAMINECTOMY, FUSION LUMBAR ONE LEVEL;  Surgeon: Remy Harmon MD;  Location:  OR     PHACOEMULSIFICATION CLEAR CORNEA WITH STANDARD INTRAOCULAR LENS IMPLANT  10/22/2012    Procedure: PHACOEMULSIFICATION CLEAR CORNEA WITH STANDARD INTRAOCULAR LENS IMPLANT;  RIGHT EYE PHACOEMULSIFICATION CLEAR CORNEA WITH STANDARD INTRAOCULAR LENS IMPLANT ;  Surgeon:  "Prichard, Grupo NEVAREZ MD;  Location:  [facility]     right nephrectomy[       FAMILY HISTORY:  History reviewed. No pertinent family history.  SOCIAL HISTORY:  Social History     Social History     Marital status:      Spouse name: N/A     Number of children: N/A     Years of education: N/A     Social History Main Topics     Smoking status: Former Smoker     Smokeless tobacco: Never Used     Alcohol use 1.2 oz/week     2 Shots of liquor per week     Drug use: No     Sexual activity: Not Currently     Other Topics Concern     None     Social History Narrative     Review of Systems:  Skin:  Negative     Eyes:  Negative    ENT:  Negative    Respiratory:  Positive for dyspnea on exertion  Cardiovascular:    Positive for;fatigue  Gastroenterology: Positive for diarrhea  Genitourinary:  Negative    Musculoskeletal:  Positive for nocturnal cramping  Neurologic:  Negative    Psychiatric:  Negative    Heme/Lymph/Imm:  Negative    Endocrine:  Negative       Physical Exam:  Vitals: /62  Pulse 50  Ht 1.778 m (5' 10\")  Wt 86.2 kg (190 lb)  SpO2 97%  BMI 27.26 kg/m2   Wt Readings from Last 4 Encounters:   10/24/18 86.2 kg (190 lb)   10/09/18 86.6 kg (191 lb)   10/02/18 86.5 kg (190 lb 12.8 oz)   09/26/18 88.5 kg (195 lb)     GEN: well nourished, in no acute distress.  HEENT:  Pupils equal, round. Sclerae nonicteric.   NECK: Supple, no masses appreciated.   C/V:  Regular rate and rhythm, soft systolic murmur at the LLSB.  L radial access site is well healed. There is no hematoma or bruit.   RESP: Respirations are unlabored. Clear to auscultation bilaterally without wheezing, rales, or rhonchi.  GI: Abdomen soft, nontender.  EXTREM: no LE edema.  NEURO: Alert and oriented, cooperative.  SKIN: Warm and dry.     Recent Lab Results:  LIPID RESULTS:  Lab Results   Component Value Date    CHOL 124 10/01/2018    HDL 39 (L) 10/01/2018    LDL 62 10/01/2018    TRIG 117 10/01/2018     LIVER ENZYME RESULTS:  Lab Results   Component " Value Date    AST 26 09/30/2018    ALT 26 09/30/2018     CBC RESULTS:  Lab Results   Component Value Date    WBC 8.8 10/09/2018    RBC 3.94 (L) 10/09/2018    HGB 12.6 (L) 10/09/2018    HCT 37.1 (L) 10/09/2018    MCV 94 10/09/2018    MCH 32.0 10/09/2018    MCHC 34.0 10/09/2018    RDW 12.7 10/09/2018     10/09/2018     BMP RESULTS:  Lab Results   Component Value Date     (L) 10/09/2018    POTASSIUM 4.7 10/09/2018    CHLORIDE 100 10/09/2018    CO2 24 10/09/2018    ANIONGAP 7 10/09/2018    GLC 97 10/09/2018    BUN 43 (H) 10/09/2018    CR 2.45 (H) 10/09/2018    GFRESTIMATED 25 (L) 10/09/2018    GFRESTBLACK 31 (L) 10/09/2018    RICCARDO 8.7 10/09/2018      A1C RESULTS:  Lab Results   Component Value Date    A1C 5.5 09/29/2018     INR RESULTS:  Lab Results   Component Value Date    INR 0.89 10/04/2010       New/Pertinent imaging results since last visit:  None    [place]a Rm PA-C  [facility]

## 2018-10-29 ENCOUNTER — HOSPITAL ENCOUNTER (OUTPATIENT)
Dept: CARDIAC REHAB | Facility: CLINIC | Age: 83
End: 2018-10-29
Attending: STUDENT IN AN ORGANIZED HEALTH CARE EDUCATION/TRAINING PROGRAM
Payer: MEDICARE

## 2018-10-29 PROCEDURE — 40000116 ZZH STATISTIC OP CR VISIT: Performed by: OCCUPATIONAL THERAPIST

## 2018-10-29 PROCEDURE — 93798 PHYS/QHP OP CAR RHAB W/ECG: CPT | Performed by: OCCUPATIONAL THERAPIST

## 2018-11-01 ENCOUNTER — TELEPHONE (OUTPATIENT)
Dept: CARDIOLOGY | Facility: CLINIC | Age: 83
End: 2018-11-01

## 2018-11-01 NOTE — TELEPHONE ENCOUNTER
[facility] called patient is out of his Atorvastatin 40mg waiting for [employer] Mail order RX to arrive ordered #10 for short term refill.

## 2018-11-05 ENCOUNTER — HOSPITAL ENCOUNTER (OUTPATIENT)
Dept: CARDIAC REHAB | Facility: CLINIC | Age: 83
End: 2018-11-05
Attending: STUDENT IN AN ORGANIZED HEALTH CARE EDUCATION/TRAINING PROGRAM
Payer: MEDICARE

## 2018-11-05 ENCOUNTER — TELEPHONE (OUTPATIENT)
Dept: CARDIOLOGY | Facility: CLINIC | Age: 83
End: 2018-11-05

## 2018-11-05 DIAGNOSIS — I10 BENIGN ESSENTIAL HYPERTENSION: ICD-10-CM

## 2018-11-05 PROCEDURE — 93798 PHYS/QHP OP CAR RHAB W/ECG: CPT

## 2018-11-05 PROCEDURE — 40000116 ZZH STATISTIC OP CR VISIT

## 2018-11-05 RX ORDER — AMLODIPINE BESYLATE 5 MG/1
5 TABLET ORAL DAILY
COMMUNITY
Start: 2018-11-05 | End: 2019-01-21

## 2018-11-05 NOTE — TELEPHONE ENCOUNTER
Pt calling- he has noticed his BP running 160/60 and today at [facility] it was 184/64.  Pt states he feels well but anxious regarding increased BP.  He denies headache, no lightheadedness or syncope.  Denies chest pain or SOB  Pt was hospitalized Oct 2018 for NSTEMI with Isis to mid LAD.  He had chest pain with that episode.  Present meds include:  Norvasc 5mg daily, lotensin 20mg daily, metoprolol XL  25mg one half tablet daily.  This was decreased at OV 10/24/2018 w/ Yessica SEVERINO with Heart rate 50 bpm and pt c/0 fatigue.  Pt states his HR is 40-50 daily.     Reviewed with Dr. Villarreal- add additional 5 mg norvasc so take norvasc 5mg twice a day.  Other meds same.  Pt with hx of renal cell cancer - s/p right nephrectomy  Pt does not follow up with nephrologist - states he was not told to do so and it is not in discharge follow up plan.  GFR =25.  Reminded pt to avoid nephrotoxic drugs I.e. Ibuprofen, aleve, advil etc.  Pt to take tylenol for pain  Pt anxious regarding this BP. Discussed sxs to watch for- headache, visual changes especially field cut, weakness especially one side greater than other.  Pt wishes to see Dr. Villarreal sooner than jan 2019  Scheduled follow up this week.

## 2018-11-07 ENCOUNTER — DOCUMENTATION ONLY (OUTPATIENT)
Dept: CARDIOLOGY | Facility: CLINIC | Age: 83
End: 2018-11-07

## 2018-11-07 ENCOUNTER — TELEPHONE (OUTPATIENT)
Dept: CARDIOLOGY | Facility: CLINIC | Age: 83
End: 2018-11-07

## 2018-11-07 ENCOUNTER — HOSPITAL ENCOUNTER (OUTPATIENT)
Dept: CARDIAC REHAB | Facility: CLINIC | Age: 83
End: 2018-11-07
Attending: STUDENT IN AN ORGANIZED HEALTH CARE EDUCATION/TRAINING PROGRAM
Payer: MEDICARE

## 2018-11-07 ENCOUNTER — TELEPHONE (OUTPATIENT)
Dept: FAMILY MEDICINE | Facility: CLINIC | Age: 83
End: 2018-11-07

## 2018-11-07 DIAGNOSIS — I10 BENIGN ESSENTIAL HYPERTENSION: Primary | ICD-10-CM

## 2018-11-07 DIAGNOSIS — I25.10 CORONARY ARTERY DISEASE INVOLVING NATIVE CORONARY ARTERY OF NATIVE HEART WITHOUT ANGINA PECTORIS: ICD-10-CM

## 2018-11-07 PROCEDURE — 40000116 ZZH STATISTIC OP CR VISIT: Performed by: REHABILITATION PRACTITIONER

## 2018-11-07 PROCEDURE — 93798 PHYS/QHP OP CAR RHAB W/ECG: CPT | Performed by: REHABILITATION PRACTITIONER

## 2018-11-07 NOTE — TELEPHONE ENCOUNTER
"Pt calling - his BP is running 160-170 /70's HR 60bpm  He is very anxious regarding this.  Denies any sxs: no headache, no weakness, no visual changes.    Pt did increase amlodipine to 5mg bid yesterday as instructed by Dr. Villarreal.  Pt also took a ntg sl last night when he was very anxious. Denies sxs other than an \"upset stomach\" but no diarrhea or vomitting.   Recommended he ask Dr. Oliveira if he needs something for anxiety.  Pt states he went to cardiac rehab Monday 11/5/2018- BP was 160/75 then dropped to 130/70 after exercise.  He will take his BP machine with to rehab today to check for accuracy  Pt has appt tomorrow with Dr. Villarreal - encouraged him to keep that appt or he can certainly see Dr. Oliveira today if he wants to.  Instructed him to ER if sxs occur.  Lab work tomorrow also.  "

## 2018-11-07 NOTE — TELEPHONE ENCOUNTER
Reason for call:  Patient reporting a symptom    Symptom or request: High BP  This morning 177/72 , 143/57 after meds 162/75    Duration (how long have symptoms been present): about 3-4 days     Have you been treated for this before? Yes    Additional comments: Wants to speak with Dr Oliveira    Phone Number patient can be reached at:  Home number on file [phone] (home)    Best Time:  anytime    Can we leave a detailed message on this number:  YES    Call taken on 11/7/2018 at 8:56 AM by Farhana Ha

## 2018-11-07 NOTE — TELEPHONE ENCOUNTER
Spoke with patient and his wife   Patient already placed call to cardiology - (see telephone encounter, was advised to call our office)   States BP has been elevated since Sunday, previously was well controlled   Nausea - a little last night (none now)  Was unable to sleep - has anxiety/insomia. Some anxiety was related to the election last night   Amlodipine increased recently - Increased to 5mg twice a day   Denies weakness, lightheadedness, dizziness, chest pain, or SOB   BP went down after coffee, then up after taking BP meds and waiting 30 mins (see readings below)   Patient declined to schedule OV as he is seeing cardiology tomorrow. He agreed to call back if any further increase in BP readings or develops symptoms.     Pt asking if he should make any further adjustments with BP meds today    Please advise     Shannon SUBRAMANIAN RN

## 2018-11-08 ENCOUNTER — OFFICE VISIT (OUTPATIENT)
Dept: CARDIOLOGY | Facility: CLINIC | Age: 83
End: 2018-11-08
Payer: COMMERCIAL

## 2018-11-08 VITALS
DIASTOLIC BLOOD PRESSURE: 62 MMHG | BODY MASS INDEX: 26.57 KG/M2 | SYSTOLIC BLOOD PRESSURE: 144 MMHG | HEIGHT: 70 IN | WEIGHT: 185.6 LBS | HEART RATE: 64 BPM

## 2018-11-08 DIAGNOSIS — I10 BENIGN ESSENTIAL HYPERTENSION: Primary | ICD-10-CM

## 2018-11-08 DIAGNOSIS — I10 BENIGN ESSENTIAL HYPERTENSION: ICD-10-CM

## 2018-11-08 LAB
ANION GAP SERPL CALCULATED.3IONS-SCNC: 6 MMOL/L (ref 3–14)
BUN SERPL-MCNC: 26 MG/DL (ref 7–30)
CALCIUM SERPL-MCNC: 8.6 MG/DL (ref 8.5–10.1)
CHLORIDE SERPL-SCNC: 100 MMOL/L (ref 94–109)
CO2 SERPL-SCNC: 24 MMOL/L (ref 20–32)
CREAT SERPL-MCNC: 1.95 MG/DL (ref 0.66–1.25)
GFR SERPL CREATININE-BSD FRML MDRD: 33 ML/MIN/1.7M2
GLUCOSE SERPL-MCNC: 163 MG/DL (ref 70–99)
POTASSIUM SERPL-SCNC: 4.2 MMOL/L (ref 3.4–5.3)
SODIUM SERPL-SCNC: 130 MMOL/L (ref 133–144)

## 2018-11-08 PROCEDURE — 99215 OFFICE O/P EST HI 40 MIN: CPT | Performed by: INTERNAL MEDICINE

## 2018-11-08 PROCEDURE — 80048 BASIC METABOLIC PNL TOTAL CA: CPT | Performed by: INTERNAL MEDICINE

## 2018-11-08 PROCEDURE — 36415 COLL VENOUS BLD VENIPUNCTURE: CPT | Performed by: INTERNAL MEDICINE

## 2018-11-08 RX ORDER — HYDRALAZINE HYDROCHLORIDE 10 MG/1
10 TABLET, FILM COATED ORAL 3 TIMES DAILY
Qty: 90 TABLET | Refills: 3 | Status: SHIPPED | OUTPATIENT
Start: 2018-11-08 | End: 2018-11-29

## 2018-11-08 NOTE — LETTER
11/8/2018      Eusebio Oliveira MD  [address]  [place] [address]      RE: Auige Goldworth       Dear Colleague,    I had the pleasure of seeing Augie Powell in the [facility].    Service Date: 11/08/2018      HISTORY OF PRESENT ILLNESS:  This is a brief note regarding Augie Powell.  Today's visit was much longer; it was a 30 minute visit.  I picked up this patient in the hospital last month; he presented with a non-STEMI.  He had a heart catheterization done by Dr. Lo.  He actually has 3-vessel coronary disease which I do not know if he was aware of previously.  I reviewed the actual pictures with him today.  The LAD was 99% occluded in the midportion.  It was stented with good result.  The left circumflex was a small artery with a 50%-60% narrowing.  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  The patient's ejection fraction is approximately 45%.  The patient did have some history of hyperlipidemia in the past.  He has renal insufficiency primarily because I believe he had a nephrectomy for renal cancer.  He called our office - apparently he was complaining about fatigue and he was running lower heart rates in the 50s so our nurse practitioner decreased the Toprol-XL from 25 down to 12.5.  After that, his blood pressure looks like it got worse.  Both the patient and his wife are commenting that they think anxiety is driving the problem and it is circular i.e., he checks his blood pressure and if it is high, he gets anxious which makes his blood pressure go up higher, which makes him check his blood pressure and is even higher again, etc. etc.  Ideally for these patients, I would use Bystolic.  Bystolic is a little bit less likely to lower heart rate than metoprolol and because it has vasodilating  properties, it is actually a little bit stronger blood pressure medicine and it is much less likely to cause anxiety itself.   I am wondering if the effect of anxiety got worse when the metoprolol was decreased.   Bystolic, however, is non-generic so I will leave that as an afterthought.  He is on high-dose amlodipine 5 b.i.d. and with renal insufficiency, I am surprised he does not have any ankle swelling at this point.   He is on Lotensin with a stable creatinine of around 2.  He is not currently on a diuretic.  If he needs a diuretic, it would probably have to be a loop diuretic.  I do not think the thiazides are going to work at this low of a GFR.  I am going to instead add hydralazine at 10 mg t.i.d. and I am going to ask him to check his blood pressure 3 times a day and I wrote separate instructions not on the label that says if his blood pressure is below 100-110, he will hold that hydralazine 10.  If the blood pressure is greater than 150-160, he will take 2 tablets of hydralazine.  I have him do this 3 times a day for the first week or two.  We will have him come back and review his chart and determine what hydralazine dose is going to be best for him.      I have asked him to see you to determine if you think anxiety might be part of the problem here contributing to the high blood pressure.  We also note that the sodium level was actually low at 130 which is unusual because it was normal in the hospital.  He is drinking a lot of free water and he is watching his salt.  I asked him to decrease the free water a little bit.  He will probably need another electrolyte panel next month.  We should probably plan on doing a stress test sometime in the next 6 months.  I know the IFR flow reserve was normal but the right coronary artery looks like it could be potentially causing ischemia and as I mentioned, the echo suggests previous inferior infarct.  He is not having any angina now.  In addition, because of cost,  he wants to drop his rehabilitation down to once a week instead of 3 times a week because he is a $50 copay.  I asked him to discuss that with the rehab people.        Today's visit ended up being 40 minutes, all counseling including the time to discuss all the medicines, why we are making the changes, why I think the blood pressure got worse after the metoprolol was changed and we reviewed the film pictures and our plans for followup with him.      Noel eL MD      cc:   Eusebio Oliveira MD    [facility]   [address]    [place]  [address]         NOEL LE MD             D: 2018   T: 2018   MT: HEAVENLY      Name:     TERI BUCHANAN   MRN:      [id number]        Account:      [id number]   :      1935           Service Date: 2018      Document: [id number]         Outpatient Encounter Prescriptions as of 2018   Medication Sig Dispense Refill     amLODIPine (NORVASC) 5 MG tablet Take 1 tablet (5 mg) by mouth 2 times daily       aspirin 81 MG EC tablet Take 1 tablet (81 mg) by mouth daily 90 tablet 3     atorvastatin (LIPITOR) 40 MG tablet Take 1 tablet (40 mg) by mouth every evening 90 tablet 3     benazepril (LOTENSIN) 20 MG tablet TAKE 1 TABLET EVERY DAY 30 tablet 0     Calcium-Magnesium-Zinc 500-250-12.5 MG TABS Take 1 tablet by mouth daily       clopidogrel (PLAVIX) 75 MG tablet Take 1 tablet (75 mg) by mouth daily 90 tablet 3     hydrALAZINE (APRESOLINE) 10 MG tablet Take 1 tablet (10 mg) by mouth 3 times daily 90 tablet 3     MAGNESIUM PO Take 200 mg by mouth daily        metoprolol succinate (TOPROL-XL) 25 MG 24 hr tablet Take 0.5 tablets (12.5 mg) by mouth daily 15 tablet 3     nitroGLYcerin (NITROSTAT) 0.4 MG sublingual tablet For chest pain place 1 tablet under the tongue every 5 minutes for 3 doses. If symptoms persist 5 minutes after 1st dose call 911. 25 tablet 3     No facility-administered encounter medications on file as of  11/8/2018.        Again, thank you for allowing me to participate in the care of your patient.      Sincerely,    Noel Villarreal MD     [facility]

## 2018-11-08 NOTE — PROGRESS NOTES
HPI and Plan:   See dictation    Orders Placed This Encounter   Procedures     Follow-Up with Cardiac Advanced Practice Provider     Orders Placed This Encounter   Medications     hydrALAZINE (APRESOLINE) 10 MG tablet     Sig: Take 1 tablet (10 mg) by mouth 3 times daily     Dispense:  90 tablet     Refill:  3     There are no discontinued medications.      Encounter Diagnosis   Name Primary?     Benign essential hypertension Yes       CURRENT MEDICATIONS:  Current Outpatient Prescriptions   Medication Sig Dispense Refill     amLODIPine (NORVASC) 5 MG tablet Take 1 tablet (5 mg) by mouth 2 times daily       aspirin 81 MG EC tablet Take 1 tablet (81 mg) by mouth daily 90 tablet 3     atorvastatin (LIPITOR) 40 MG tablet Take 1 tablet (40 mg) by mouth every evening 90 tablet 3     benazepril (LOTENSIN) 20 MG tablet TAKE 1 TABLET EVERY DAY 30 tablet 0     Calcium-Magnesium-Zinc 500-250-12.5 MG TABS Take 1 tablet by mouth daily       clopidogrel (PLAVIX) 75 MG tablet Take 1 tablet (75 mg) by mouth daily 90 tablet 3     hydrALAZINE (APRESOLINE) 10 MG tablet Take 1 tablet (10 mg) by mouth 3 times daily 90 tablet 3     MAGNESIUM PO Take 200 mg by mouth daily        metoprolol succinate (TOPROL-XL) 25 MG 24 hr tablet Take 0.5 tablets (12.5 mg) by mouth daily 15 tablet 3     nitroGLYcerin (NITROSTAT) 0.4 MG sublingual tablet For chest pain place 1 tablet under the tongue every 5 minutes for 3 doses. If symptoms persist 5 minutes after 1st dose call 911. 25 tablet 3       ALLERGIES   No Known Allergies    PAST MEDICAL HISTORY:  Past Medical History:   Diagnosis Date     Coronary artery disease 10/2018    nSTEMI- Isis to mLAD     Hyperlipidemia      Hypertension      Renal disease     CRD--creatinine in upper ones to 2, right kdney removed 2000 for canncer     Spondylosis with myelopathy, lumbar region        PAST SURGICAL HISTORY:  Past Surgical History:   Procedure Laterality Date     BACK SURGERY       CHOLECYSTECTOMY        "DECOMPRESSION LUMBAR TWO LEVELS  12/1/2014    Procedure: DECOMPRESSION LUMBAR TWO LEVELS;  Surgeon: Remy Harmon MD;  Location: [facility]     HERNIA REPAIR       LAMINECTOMY, FUSION LUMBAR ONE LEVEL, COMBINED N/A 12/1/2014    Procedure: COMBINED LAMINECTOMY, FUSION LUMBAR ONE LEVEL;  Surgeon: Remy Harmon MD;  Location: [facility]     PHACOEMULSIFICATION CLEAR CORNEA WITH STANDARD INTRAOCULAR LENS IMPLANT  10/22/2012    Procedure: PHACOEMULSIFICATION CLEAR CORNEA WITH STANDARD INTRAOCULAR LENS IMPLANT;  RIGHT EYE PHACOEMULSIFICATION CLEAR CORNEA WITH STANDARD INTRAOCULAR LENS IMPLANT ;  Surgeon: Grupo Granger MD;  Location: [facility]     right nephrectomy[       STENT,CORONARY, S660 24/30  10/2018    mLAD       FAMILY HISTORY:  Family History   Problem Relation Age of Onset     No Known Problems Mother      Heart Surgery Father      No Known Problems Sister        SOCIAL HISTORY:  Social History     Social History     Marital status:      Spouse name: N/A     Number of children: N/A     Years of education: N/A     Social History Main Topics     Smoking status: Former Smoker     Smokeless tobacco: Never Used     Alcohol use 1.2 oz/week     2 Shots of liquor per week      Comment: rare     Drug use: No     Sexual activity: Not Currently     Other Topics Concern     None     Social History Narrative       Review of Systems:  Skin:  Positive for bruising   Eyes:  Positive for glasses  ENT:  Negative    Respiratory:  Positive for dyspnea on exertion  Cardiovascular:    Positive for  Gastroenterology: Negative    Genitourinary:  Negative    Musculoskeletal:  Positive for nocturnal cramping  Neurologic:  Positive for numbness or tingling of feet  Psychiatric:  Positive for anxiety  Heme/Lymph/Imm:  Negative    Endocrine:  Negative      Physical Exam:  Vitals: /62 (BP Location: Right arm, Patient Position: Chair, Cuff Size: Adult Regular)  Pulse 64  Ht 1.778 m (5' 10\")  Wt 84.2 kg (185 lb 9.6 oz) "  BMI 26.63 kg/m2    Constitutional:           Skin:             Head:           Eyes:           Lymph:      ENT:           Neck:           Respiratory:            Cardiac:                                                           GI:           Extremities and Muscular Skeletal:                 Neurological:           Psych:         Recent Lab Results:  LIPID RESULTS:  Lab Results   Component Value Date    CHOL 124 10/01/2018    HDL 39 (L) 10/01/2018    LDL 62 10/01/2018    TRIG 117 10/01/2018       LIVER ENZYME RESULTS:  Lab Results   Component Value Date    AST 26 09/30/2018    ALT 26 09/30/2018       CBC RESULTS:  Lab Results   Component Value Date    WBC 8.8 10/09/2018    RBC 3.94 (L) 10/09/2018    HGB 12.6 (L) 10/09/2018    HCT 37.1 (L) 10/09/2018    MCV 94 10/09/2018    MCH 32.0 10/09/2018    MCHC 34.0 10/09/2018    RDW 12.7 10/09/2018     10/09/2018       BMP RESULTS:  Lab Results   Component Value Date     (L) 11/08/2018    POTASSIUM 4.2 11/08/2018    CHLORIDE 100 11/08/2018    CO2 24 11/08/2018    ANIONGAP 6 11/08/2018     (H) 11/08/2018    BUN 26 11/08/2018    CR 1.95 (H) 11/08/2018    GFRESTIMATED 33 (L) 11/08/2018    GFRESTBLACK 40 (L) 11/08/2018    RICCARDO 8.6 11/08/2018        A1C RESULTS:  Lab Results   Component Value Date    A1C 5.5 09/29/2018       INR RESULTS:  Lab Results   Component Value Date    INR 0.89 10/04/2010           CC  No referring provider defined for this encounter.

## 2018-11-08 NOTE — PROGRESS NOTES
Service Date: 11/08/2018      HISTORY OF PRESENT ILLNESS:  This is a brief note regarding Augie Powell.  Today's visit was much longer; it was a 30 minute visit.  I picked up this patient in the hospital last month; he presented with a non-STEMI.  He had a heart catheterization done by Dr. Lo.  He actually has 3-vessel coronary disease which I do not know if he was aware of previously.  I reviewed the actual pictures with him today.  The LAD was 99% occluded in the midportion.  It was stented with good result.  The left circumflex was a small artery with a 50%-60% narrowing.  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  The patient's ejection fraction is approximately 45%.  The patient did have some history of hyperlipidemia in the past.  He has renal insufficiency primarily because I believe he had a nephrectomy for renal cancer.  He called our office - apparently he was complaining about fatigue and he was running lower heart rates in the 50s so our nurse practitioner decreased the Toprol-XL from 25 down to 12.5.  After that, his blood pressure looks like it got worse.  Both the patient and his wife are commenting that they think anxiety is driving the problem and it is circular i.e., he checks his blood pressure and if it is high, he gets anxious which makes his blood pressure go up higher, which makes him check his blood pressure and is even higher again, etc. etc.  Ideally for these patients, I would use Bystolic.  Bystolic is a little bit less likely to lower heart rate than metoprolol and because it has vasodilating properties, it is actually a little bit stronger blood pressure medicine and it is much less likely to cause anxiety itself.   I am wondering if the effect of anxiety got worse when the metoprolol was decreased.   Bystolic, however, is  non-generic so I will leave that as an afterthought.  He is on high-dose amlodipine 5 b.i.d. and with renal insufficiency, I am surprised he does not have any ankle swelling at this point.   He is on Lotensin with a stable creatinine of around 2.  He is not currently on a diuretic.  If he needs a diuretic, it would probably have to be a loop diuretic.  I do not think the thiazides are going to work at this low of a GFR.  I am going to instead add hydralazine at 10 mg t.i.d. and I am going to ask him to check his blood pressure 3 times a day and I wrote separate instructions not on the label that says if his blood pressure is below 100-110, he will hold that hydralazine 10.  If the blood pressure is greater than 150-160, he will take 2 tablets of hydralazine.  I have him do this 3 times a day for the first week or two.  We will have him come back and review his chart and determine what hydralazine dose is going to be best for him.      I have asked him to see you to determine if you think anxiety might be part of the problem here contributing to the high blood pressure.  We also note that the sodium level was actually low at 130 which is unusual because it was normal in the hospital.  He is drinking a lot of free water and he is watching his salt.  I asked him to decrease the free water a little bit.  He will probably need another electrolyte panel next month.  We should probably plan on doing a stress test sometime in the next 6 months.  I know the IFR flow reserve was normal but the right coronary artery looks like it could be potentially causing ischemia and as I mentioned, the echo suggests previous inferior infarct.  He is not having any angina now.  In addition, because of cost, he wants to drop his rehabilitation down to once a week instead of 3 times a week because he is a $50 copay.  I asked him to discuss that with the rehab people.        Today's visit ended up being 40 minutes, all counseling including  the time to discuss all the medicines, why we are making the changes, why I think the blood pressure got worse after the metoprolol was changed and we reviewed the film pictures and our plans for followup with him.      Noel Le MD      cc:   Eusebio Oliveira MD    [facility]   [address]    [place]  [address]         NOEL LE MD             D: 2018   T: 2018   MT: HEAVENLY      Name:     TERI BUCHANAN   MRN:      [id number]        Account:      [id number]   :      1935           Service Date: 2018      Document: [id number]

## 2018-11-08 NOTE — LETTER
11/8/2018    Eusebio Oliveira MD  [address]  [place] [address]    RE: Augie Powell       Dear Colleague,    I had the pleasure of seeing Augie Powell in the [facility].    HPI and Plan:   See dictation    Orders Placed This Encounter   Procedures     Follow-Up with Cardiac Advanced Practice Provider     Orders Placed This Encounter   Medications     hydrALAZINE (APRESOLINE) 10 MG tablet     Sig: Take 1 tablet (10 mg) by mouth 3 times daily     Dispense:  90 tablet     Refill:  3     There are no discontinued medications.      Encounter Diagnosis   Name Primary?     Benign essential hypertension Yes       CURRENT MEDICATIONS:  Current Outpatient Prescriptions   Medication Sig Dispense Refill     amLODIPine (NORVASC) 5 MG tablet Take 1 tablet (5 mg) by mouth 2 times daily       aspirin 81 MG EC tablet Take 1 tablet (81 mg) by mouth daily 90 tablet 3     atorvastatin (LIPITOR) 40 MG tablet Take 1 tablet (40 mg) by mouth every evening 90 tablet 3     benazepril (LOTENSIN) 20 MG tablet TAKE 1 TABLET EVERY DAY 30 tablet 0     Calcium-Magnesium-Zinc 500-250-12.5 MG TABS Take 1 tablet by mouth daily       clopidogrel (PLAVIX) 75 MG tablet Take 1 tablet (75 mg) by mouth daily 90 tablet 3     hydrALAZINE (APRESOLINE) 10 MG tablet Take 1 tablet (10 mg) by mouth 3 times daily 90 tablet 3     MAGNESIUM PO Take 200 mg by mouth daily        metoprolol succinate (TOPROL-XL) 25 MG 24 hr tablet Take 0.5 tablets (12.5 mg) by mouth daily 15 tablet 3     nitroGLYcerin (NITROSTAT) 0.4 MG sublingual tablet For chest pain place 1 tablet under the tongue every 5 minutes for 3 doses. If symptoms persist 5 minutes after 1st dose call 911. 25 tablet 3       ALLERGIES   No Known Allergies    PAST MEDICAL HISTORY:  Past Medical History:   Diagnosis Date     Coronary artery disease 10/2018    nSTEMI- Isis to mLAD     Hyperlipidemia      Hypertension      Renal disease     CRD--creatinine  in upper ones to 2, right karol removed 2000 for canncer     Spondylosis with myelopathy, lumbar region        PAST SURGICAL HISTORY:  Past Surgical History:   Procedure Laterality Date     BACK SURGERY       CHOLECYSTECTOMY       DECOMPRESSION LUMBAR TWO LEVELS  12/1/2014    Procedure: DECOMPRESSION LUMBAR TWO LEVELS;  Surgeon: Remy Harmon MD;  Location: [facility] OR     HERNIA REPAIR       LAMINECTOMY, FUSION LUMBAR ONE LEVEL, COMBINED N/A 12/1/2014    Procedure: COMBINED LAMINECTOMY, FUSION LUMBAR ONE LEVEL;  Surgeon: Remy Harmon MD;  Location: [facility] OR     PHACOEMULSIFICATION CLEAR CORNEA WITH STANDARD INTRAOCULAR LENS IMPLANT  10/22/2012    Procedure: PHACOEMULSIFICATION CLEAR CORNEA WITH STANDARD INTRAOCULAR LENS IMPLANT;  RIGHT EYE PHACOEMULSIFICATION CLEAR CORNEA WITH STANDARD INTRAOCULAR LENS IMPLANT ;  Surgeon: Grupo Granger MD;  Location:  [facility]     right nephrectomy[       STENT,CORONARY, S660 24/30  10/2018    mLAD       FAMILY HISTORY:  Family History   Problem Relation Age of Onset     No Known Problems Mother      Heart Surgery Father      No Known Problems Sister        SOCIAL HISTORY:  Social History     Social History     Marital status:      Spouse name: N/A     Number of children: N/A     Years of education: N/A     Social History Main Topics     Smoking status: Former Smoker     Smokeless tobacco: Never Used     Alcohol use 1.2 oz/week     2 Shots of liquor per week      Comment: rare     Drug use: No     Sexual activity: Not Currently     Other Topics Concern     None     Social History Narrative       Review of Systems:  Skin:  Positive for bruising   Eyes:  Positive for glasses  ENT:  Negative    Respiratory:  Positive for dyspnea on exertion  Cardiovascular:    Positive for  Gastroenterology: Negative    Genitourinary:  Negative    Musculoskeletal:  Positive for nocturnal cramping  Neurologic:  Positive for numbness or tingling of feet  Psychiatric:  Positive for  "anxiety  Heme/Lymph/Imm:  Negative    Endocrine:  Negative      Physical Exam:  Vitals: /62 (BP Location: Right arm, Patient Position: Chair, Cuff Size: Adult Regular)  Pulse 64  Ht 1.778 m (5' 10\")  Wt 84.2 kg (185 lb 9.6 oz)  BMI 26.63 kg/m2    Constitutional:           Skin:             Head:           Eyes:           Lymph:      ENT:           Neck:           Respiratory:            Cardiac:                                                           GI:           Extremities and Muscular Skeletal:                 Neurological:           Psych:         Recent Lab Results:  LIPID RESULTS:  Lab Results   Component Value Date    CHOL 124 10/01/2018    HDL 39 (L) 10/01/2018    LDL 62 10/01/2018    TRIG 117 10/01/2018       LIVER ENZYME RESULTS:  Lab Results   Component Value Date    AST 26 09/30/2018    ALT 26 09/30/2018       CBC RESULTS:  Lab Results   Component Value Date    WBC 8.8 10/09/2018    RBC 3.94 (L) 10/09/2018    HGB 12.6 (L) 10/09/2018    HCT 37.1 (L) 10/09/2018    MCV 94 10/09/2018    MCH 32.0 10/09/2018    MCHC 34.0 10/09/2018    RDW 12.7 10/09/2018     10/09/2018       BMP RESULTS:  Lab Results   Component Value Date     (L) 11/08/2018    POTASSIUM 4.2 11/08/2018    CHLORIDE 100 11/08/2018    CO2 24 11/08/2018    ANIONGAP 6 11/08/2018     (H) 11/08/2018    BUN 26 11/08/2018    CR 1.95 (H) 11/08/2018    GFRESTIMATED 33 (L) 11/08/2018    GFRESTBLACK 40 (L) 11/08/2018    RICCARDO 8.6 11/08/2018        A1C RESULTS:  Lab Results   Component Value Date    A1C 5.5 09/29/2018       INR RESULTS:  Lab Results   Component Value Date    INR 0.89 10/04/2010           CC  No referring provider defined for this encounter.    Thank you for allowing me to participate in the care of your patient.      Sincerely,     Noel Villarreal MD     [facility]    cc:   No referring provider defined for this encounter.        "

## 2018-11-08 NOTE — MR AVS SNAPSHOT
After Visit Summary   11/8/2018    Augie Powell    MRN: [id number]           Patient Information     Date Of Birth          1935        Visit Information        Provider Department      11/8/2018 9:30 AM Noel Villarreal MD [facility]        Today's Diagnoses     Benign essential hypertension    -  1       Follow-ups after your visit        Additional Services     Follow-Up with Cardiac Advanced Practice Provider                 Your next 10 appointments already scheduled     Nov 12, 2018  1:00 PM CST   Cardiac Treatment with Rh Cardiac Rehab 1   [facility])    [address], [address]  [place] [address]   [phone]            Nov 14, 2018  1:00 PM CST   Cardiac Treatment with Rh Cardiac Rehab 1   [facility]    [address], [address]  [place] [address]   [phone]            Nov 16, 2018  1:00 PM CST   Cardiac Treatment with Rh Cardiac Rehab 1   [facility]    [address], [address]  [place] [address]   [phone]            Nov 19, 2018  1:00 PM CST   Cardiac Treatment with Rh Cardiac Rehab 1   [facility]    [address], [address]  [place] [address]   [phone]            Nov 21, 2018  1:00 PM CST   Cardiac Treatment with Rh Cardiac Rehab 1   [facility]    [address], [address]  [place] [address]   [phone]            Nov 23, 2018  1:00 PM CST   Cardiac Treatment with Rh Cardiac Rehab 1   [facility]    [address], [address]  [place] [address]   [phone]            Nov 26, 2018  1:00 PM CST   Cardiac Treatment with Rh Cardiac Rehab 1   [facility]  ([facility])    [address], [address]  [place] [address]   [phone]            Nov 28, 2018  1:00 PM CST   Cardiac Treatment with Rh Cardiac Rehab 1   [facility] ([facility])    [address], [address]  [place] [address]   [phone]            Nov 29, 2018 12:30 PM CST   Return Visit with Becky Dunlap PA-C   [facility] ([facility])    [address]  [place] [address]   [phone]            Nov 30, 2018  1:00 PM CST   Cardiac Treatment with Rh Cardiac Rehab 1   [facility] ([facility])    [address], [address]  [place] [address]   [phone]              Future tests that were ordered for you today     Open Future Orders        Priority Expected Expires Ordered    Follow-Up with Cardiac Advanced Practice Provider Routine 11/29/2018 11/29/2019 11/8/2018            Who to contact     If you have questions or need follow up information about today's clinic visit or your schedule please contact [facility] directly at [phone].  Normal or non-critical lab and imaging results will be communicated to you by MyChart, letter or phone within 4 business days after the clinic has received the results. If you do not hear from us within 7 days, please contact the clinic through MyChart or phone. If you have a critical or abnormal lab result, we will notify you by phone as soon as possible.  Submit refill requests through [employer] or call your pharmacy and they will forward the refill request to us. Please allow 3 business days for your refill to be completed.          Additional Information About Your Visit        Care EveryWhere ID     This is your Care EveryWhere ID. This could be used by other organizations to access your [facility] medical records  [id number]        Your  "Vitals Were     Pulse Height BMI (Body Mass Index)             64 1.778 m (5' 10\") 26.63 kg/m2          Blood Pressure from Last 3 Encounters:   11/08/18 144/62   10/24/18 128/62   10/09/18 120/62    Weight from Last 3 Encounters:   11/08/18 84.2 kg (185 lb 9.6 oz)   10/24/18 86.2 kg (190 lb)   10/09/18 86.6 kg (191 lb)                 Today's Medication Changes          These changes are accurate as of 11/8/18 10:26 AM.  If you have any questions, ask your nurse or doctor.               Start taking these medicines.        Dose/Directions    hydrALAZINE 10 MG tablet   Commonly known as:  APRESOLINE   Used for:  Benign essential hypertension   Started by:  Noel Villarreal MD        Dose:  10 mg   Take 1 tablet (10 mg) by mouth 3 times daily   Quantity:  90 tablet   Refills:  3            Where to get your medicines      These [facility] [address] [place] [address] [address] [place] [address] [facility] [address]    Hours:  24-hours Phone:  [phone]     hydrALAZINE 10 MG tablet                Primary Care Provider Office Phone # Fax #    Eusebio Oliveira [phone] [address] [facility] [address]  [facility] [address]        Equal Access to Services     [facility] AH: Hadii aad ku hadasho Soomaali, waaxda luqadaha, qaybta kaalmada adeegyada, dejan lang haykiko de la cruz . So [facility] [phone].    ATENCIÓN: Si habla español, tiene a nichole disposición servicios gratuitos de asistencia lingüística. Llame al [phone].    We comply with applicable federal civil rights laws and [place] laws. We do not discriminate on the basis of race, color, national origin, age, disability, sex, sexual orientation, or gender identity.            Thank you!     Thank you for choosing [facility]  for your care. Our goal is always to provide you with excellent care. Hearing back from our patients is " one way we can continue to improve our services. Please take a few minutes to complete the written survey that you may receive in the mail after your visit with us. Thank you!             Your Updated Medication List - Protect others around you: Learn how to safely use, store and throw away your medicines at www.disposemymeds.org.          This list is accurate as of 11/8/18 10:26 AM.  Always use your most recent med list.                   Brand Name Dispense Instructions for use Diagnosis    amLODIPine 5 MG tablet    NORVASC     Take 1 tablet (5 mg) by mouth 2 times daily        aspirin 81 MG EC tablet     90 tablet    Take 1 tablet (81 mg) by mouth daily    Status post percutaneous transluminal coronary angioplasty       atorvastatin 40 MG tablet    LIPITOR    90 tablet    Take 1 tablet (40 mg) by mouth every evening    NSTEMI (non-ST elevated myocardial infarction) (H)       benazepril 20 MG tablet    LOTENSIN    30 tablet    TAKE 1 TABLET EVERY DAY    Benign essential hypertension       Calcium-Magnesium-Zinc 500-250-12.5 MG Tabs      Take 1 tablet by mouth daily        clopidogrel 75 MG tablet    PLAVIX    90 tablet    Take 1 tablet (75 mg) by mouth daily    NSTEMI (non-ST elevated myocardial infarction) (H)       hydrALAZINE 10 MG tablet    APRESOLINE    90 tablet    Take 1 tablet (10 mg) by mouth 3 times daily    Benign essential hypertension       MAGNESIUM PO      Take 200 mg by mouth daily        metoprolol succinate 25 MG 24 hr tablet    TOPROL-XL    15 tablet    Take 0.5 tablets (12.5 mg) by mouth daily    NSTEMI (non-ST elevated myocardial infarction) (H)       nitroGLYcerin 0.4 MG sublingual tablet    NITROSTAT    25 tablet    For chest pain place 1 tablet under the tongue every 5 minutes for 3 doses. If symptoms persist 5 minutes after 1st dose call 911.    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

## 2018-11-12 ENCOUNTER — HOSPITAL ENCOUNTER (OUTPATIENT)
Dept: CARDIAC REHAB | Facility: CLINIC | Age: 83
End: 2018-11-12
Attending: STUDENT IN AN ORGANIZED HEALTH CARE EDUCATION/TRAINING PROGRAM
Payer: MEDICARE

## 2018-11-12 PROCEDURE — 93798 PHYS/QHP OP CAR RHAB W/ECG: CPT | Performed by: REHABILITATION PRACTITIONER

## 2018-11-12 PROCEDURE — 40000116 ZZH STATISTIC OP CR VISIT: Performed by: REHABILITATION PRACTITIONER

## 2018-11-29 ENCOUNTER — OFFICE VISIT (OUTPATIENT)
Dept: CARDIOLOGY | Facility: CLINIC | Age: 83
End: 2018-11-29
Attending: INTERNAL MEDICINE
Payer: COMMERCIAL

## 2018-11-29 VITALS
HEIGHT: 70 IN | SYSTOLIC BLOOD PRESSURE: 124 MMHG | BODY MASS INDEX: 25.7 KG/M2 | OXYGEN SATURATION: 98 % | DIASTOLIC BLOOD PRESSURE: 66 MMHG | WEIGHT: 179.5 LBS | HEART RATE: 64 BPM

## 2018-11-29 DIAGNOSIS — I10 BENIGN ESSENTIAL HYPERTENSION: ICD-10-CM

## 2018-11-29 DIAGNOSIS — E87.1 HYPONATREMIA: Primary | ICD-10-CM

## 2018-11-29 PROCEDURE — 99213 OFFICE O/P EST LOW 20 MIN: CPT | Performed by: PHYSICIAN ASSISTANT

## 2018-11-29 RX ORDER — HYDRALAZINE HYDROCHLORIDE 10 MG/1
10 TABLET, FILM COATED ORAL 3 TIMES DAILY
Qty: 90 TABLET | Refills: 3 | COMMUNITY
Start: 2018-11-29 | End: 2019-03-07

## 2018-11-29 NOTE — PROGRESS NOTES
CARDIOLOGY CLINIC PROGRESS NOTE    DOS: 2018      Augie Powell  : 1935, 83 year old  MRN: [id number]      History:   Augie Powell is a very pleasant 83 year old male with a history of hypertension, renal cell carcinoma s/p nephrectomy, CKD stage IV, hyperlipidemia, more recently diagnosed CAD.      He presented to the ED on 18 with 2 weeks of intermittent chest discomfort with exertion and associated shortness of breath. Troponins were elevated up to 3. NTproBNP was > 4000. An echocardiogram showed a relatively preserved LVEF. The apex was akinetic. There was hypokinesis of the inferior septum, anterior septum, inferior and anterior walls (mid and apex). Coronary angiogram was done which showed a 90% stenosis of the mid LAD. There was 40 - 50% stenosis of the mid RCA which is not  flow limiting. He underwent successful PCI of the mid LAD with a CORBIN. LVEDP was 27 mmHg at the time of the angiogram. He was started on aspirin and Plavix for dual antiplatelet therapy. Low dose beta blocker was initiated as well as high intensity statin therapy. Nephrology was involved during his hospitalization given his CKD and solitary kidney. It appears his renal function stayed relatively stable during his hospitalization. Benazepril was held during his hospitalization and was resumed at discharge.      Due to some ongoing fatigue and bradycardia, Toprol XL was decreased from 25 mg to 12.5 mg.      Later, he was noted to have some elevated BPs.  He called in and amlodipine was increased to 5 mg BID.     He saw Dr. estevez 18.  His BP was still 144/62.  Dr. Estevez added in hydralazine 10 mg TID.       Interval History:   He presents today for follow up.   He said initially with meds his BPs were good.  The more recently, they were running 120s before any medications.  He was having a little LH with walking about.  He stopped hydralazine and decreased amlodipine back to 5 mg once daily.  The LH  lessened.   Currently at home, BPs are running 110s-130s on amlodipine 5 mg, benazepril 20 mg, Toprol XL 12.5 mg.     DBP can be into 40s after working out.  HR can be in the 40s, but mostly upper 50s and low 60s.  No sxs of LH, dizziness, near syncope, syncope.    Eating plant based diet.  Getting more exercise.  Down 15 lbs.       ROS:  Skin:  Positive for bruising;itching   Eyes:  Positive for glasses  ENT:  Negative    Respiratory:  Positive for dyspnea on exertion  Cardiovascular:    Positive for;lightheadedness;fatigue  Gastroenterology: Negative    Genitourinary:  Negative    Musculoskeletal:  Positive for nocturnal cramping  Neurologic:  Positive for numbness or tingling of feet  Psychiatric:  Negative    Heme/Lymph/Imm:  Negative    Endocrine:  Negative      PAST MEDICAL HISTORY:  Past Medical History:   Diagnosis Date     Coronary artery disease 10/2018    nSTEMI- Isis to mLAD, distal lad mild, Lcx-small 50-60%, RCA 60-70% normal ifR (despite echo showing poss old IMI)     Hyperlipidemia      Hypertension      Renal disease     CRD--creatinine in upper ones to 2, right kdney removed 2000 for canncer     Spondylosis with myelopathy, lumbar region        PAST SURGICAL HISTORY:  Past Surgical History:   Procedure Laterality Date     BACK SURGERY       CHOLECYSTECTOMY       DECOMPRESSION LUMBAR TWO LEVELS  12/1/2014    Procedure: DECOMPRESSION LUMBAR TWO LEVELS;  Surgeon: Remy Harmon MD;  Location:  OR     HERNIA REPAIR       LAMINECTOMY, FUSION LUMBAR ONE LEVEL, COMBINED N/A 12/1/2014    Procedure: COMBINED LAMINECTOMY, FUSION LUMBAR ONE LEVEL;  Surgeon: Remy Harmon MD;  Location:  OR     PHACOEMULSIFICATION CLEAR CORNEA WITH STANDARD INTRAOCULAR LENS IMPLANT  10/22/2012    Procedure: PHACOEMULSIFICATION CLEAR CORNEA WITH STANDARD INTRAOCULAR LENS IMPLANT;  RIGHT EYE PHACOEMULSIFICATION CLEAR CORNEA WITH STANDARD INTRAOCULAR LENS IMPLANT ;  Surgeon: Grupo Granger MD;  Location:   "[facility]     right nephrectomy[       STENT,CORONARY, S660 24/30  10/2018    mLAD       SOCIAL HISTORY:  Social History     Social History     Marital status:      Spouse name: N/A     Number of children: N/A     Years of education: N/A     Social History Main Topics     Smoking status: Former Smoker     Smokeless tobacco: Never Used     Alcohol use 1.2 oz/week     2 Shots of liquor per week      Comment: rare     Drug use: No     Sexual activity: Not Currently     Other Topics Concern     None     Social History Narrative       FAMILY HISTORY:  Family History   Problem Relation Age of Onset     No Known Problems Mother      Heart Surgery Father      No Known Problems Sister        MEDS:   Current Outpatient Prescriptions on File Prior to Visit:  amLODIPine (NORVASC) 5 MG tablet Take 5 mg by mouth daily    aspirin 81 MG EC tablet Take 1 tablet (81 mg) by mouth daily   atorvastatin (LIPITOR) 40 MG tablet Take 1 tablet (40 mg) by mouth every evening   benazepril (LOTENSIN) 20 MG tablet TAKE 1 TABLET EVERY DAY   Calcium-Magnesium-Zinc 500-250-12.5 MG TABS Take 1 tablet by mouth daily   clopidogrel (PLAVIX) 75 MG tablet Take 1 tablet (75 mg) by mouth daily   MAGNESIUM PO Take 200 mg by mouth daily    metoprolol succinate (TOPROL-XL) 25 MG 24 hr tablet Take 0.5 tablets (12.5 mg) by mouth daily   nitroGLYcerin (NITROSTAT) 0.4 MG sublingual tablet For chest pain place 1 tablet under the tongue every 5 minutes for 3 doses. If symptoms persist 5 minutes after 1st dose call 911.   [DISCONTINUED] hydrALAZINE (APRESOLINE) 10 MG tablet Take 1 tablet (10 mg) by mouth 3 times daily (Patient not taking: Reported on 11/29/2018)     No current facility-administered medications on file prior to visit.     ALLERGIES: No Known Allergies    PHYSICAL EXAM:  Vitals: /66 (BP Location: Right arm, Patient Position: Chair, Cuff Size: Adult Regular)  Pulse 64  Ht 1.778 m (5' 10\")  Wt 81.4 kg (179 lb 8 oz)  SpO2 98%  BMI 25.76 " kg/m2  Constitutional:  cooperative, alert and oriented, well developed, well nourished, in no acute distress        Skin:  warm and dry to the touch, no apparent skin lesions or masses noted        Head:  normocephalic;no masses or lesions        Eyes:  pupils equal and round;conjunctivae and lids unremarkable;sclera white        ENT:  no pallor or cyanosis        Neck:  JVP normal        Respiratory:  normal breath sounds, clear to auscultation, normal A-P diameter, normal symmetry, normal respiratory excursion, no use of accessory muscles        Cardiac: regular rhythm;normal S1 and S2       systolic murmur;grade 1          GI:  abdomen soft;BS normoactive        Vascular:                                        Extremities and Musculoskeletal:  no edema;no deformities, clubbing, cyanosis, erythema observed;no spinal abnormalities noted;normal muscle strength and tone        Neurological:  no gross motor deficits;affect appropriate            ASSESSMENT/PLAN:  1.  Coronary artery disease.   - With recent NSTEMI 9/28/18.  Cor angio showed LAD was 99% occluded in the midportion.  It was stented with good result.  The left circumflex was a small artery with a 50%-60% narrowing.  The right coronary artery was a large vessel with 60%-70% narrowing.   - Echo showed LVEF 40-45% with WMAs  - no angina at this time  - ASA, Plavix, low dose Toprol XL, benazepril, as well as high intensity statin therapy  - He will return to see Dr. Villarreal in January. We will repeat a lipid profile and an echocardiogram at that time.  Dr. Villarreal may consider a stress test.   - Continued lifestyle modifications    2.  Renal cell carcinoma s/p nephrectomy.    3.  CKD stage IV.     4.  Hypertension.  Well controlled now on amlodipine 5 mg, benazepril 20 mg, Toprol XL 12.5 mg.   We reviewed a plan for when to use the hydralazine.      5.  Bradycardia.  Asymptomatic at this point.  Rates drop into 40s at times, but mostly upper 50s and lower  60s.  With exercise he gets his HR up to 110s.  Continue Toprol XL 12.5 mg at this time.  Ideally, we could consider Bystolic.     6.  Hyponatremia.  Dr. Villarreal had asked him to decrease free water.  We will repeat a BMP in the next couple weeks.     Becky Dunlap PA-C

## 2018-11-29 NOTE — MR AVS SNAPSHOT
After Visit Summary   11/29/2018    Augie Powell    MRN: [id number]           Patient Information     Date Of Birth          1935        Visit Information        Provider Department      11/29/2018 12:30 PM Becky Dunlap PA-C [facility]        Today's Diagnoses     Benign essential hypertension           Follow-ups after your visit        Your next 10 appointments already scheduled     Nov 30, 2018  1:00 PM CST   Cardiac Treatment with Rh Cardiac Rehab 1   [facility] ([facility])    [address], [address]  [facility] [address]   [phone]            Dec 03, 2018  1:00 PM CST   Cardiac Treatment with Rh Cardiac Rehab 1   [facility] ([facility])    [address], [address]  [facility] [address]   [phone]            Jan 16, 2019 10:45 AM CST   Ech Complete with [id number]   [facility] ([facility])    [address]  [facility] [id number] [phone]           1.  Please bring or wear a comfortable two-piece outfit. 2.  You may eat, drink and take your normal medicines. 3.  For any questions that cannot be answered, please contact the ordering physician 4.  Please do not wear perfumes or scented lotions on the day of your exam. ***Please check-in at the [facility] located in [address] in the [facility] building. When you are finished registering, please go to [address] and have a seat. The technician will call your name for the test.            Jan 21, 2019  9:40 AM CST   LAB with [facility]   [facility] [place] ([facility])    [address] [place] [address] [address]  [facility] [id number] [phone]           Please do not eat 10-12 hours before your appointment if you are coming in fasting for labs on lipids, cholesterol, or  "glucose (sugar). This does not apply to pregnant women. Water, hot tea and black coffee (with nothing added) are okay. Do not drink other fluids, diet soda or chew gum.            Jan 21, 2019 10:45 AM CST   Return Visit with Noel Villarreal MD   [facility] ([facility])    [address] [place] [address] [address]  [facility] [address] [phone] OPT 2              Who to contact     If you have questions or need follow up information about today's clinic visit or your schedule please contact [facility] directly at [phone].  Normal or non-critical lab and imaging results will be communicated to you by MyChart, letter or phone within 4 business days after the clinic has received the results. If you do not hear from us within 7 days, please contact the clinic through MyChart or phone. If you have a critical or abnormal lab result, we will notify you by phone as soon as possible.  Submit refill requests through [employer] or call your pharmacy and they will forward the refill request to us. Please allow 3 business days for your refill to be completed.          Additional Information About Your Visit        Care EveryWhere ID     This is your Care EveryWhere ID. This could be used by other organizations to access your [facility] medical records  [id number]        Your Vitals Were     Pulse Height Pulse Oximetry BMI (Body Mass Index)          64 1.778 m (5' 10\") 98% 25.76 kg/m2         Blood Pressure from Last 3 Encounters:   11/29/18 124/66   11/08/18 144/62   10/24/18 128/62    Weight from Last 3 Encounters:   11/29/18 81.4 kg (179 lb 8 oz)   11/08/18 84.2 kg (185 lb 9.6 oz)   10/24/18 86.2 kg (190 lb)              We Performed the Following     Follow-Up with Cardiac Advanced Practice Provider          Today's Medication Changes          These changes are accurate as of 11/29/18  1:14 PM.  If you have any questions, ask your nurse " or doctor.               These medicines have changed or have updated prescriptions.        Dose/Directions    hydrALAZINE 10 MG tablet   Commonly known as:  APRESOLINE   This may have changed:  additional instructions   Used for:  Benign essential hypertension   Changed by:  Becky Dunlap PA-C        Dose:  10 mg   Take 1 tablet (10 mg) by mouth 3 times daily if BPs running higher than 140, if > 160 then take two tabs at a time   Quantity:  90 tablet   Refills:  3                Primary Care Provider Office Phone # Fax #    Eusebio ROCHE MD Roxanne [phone] [address]  [facility] [address]        Equal Access to Services     [facility]: Hadii aad ku hadasho Sojose, waaxda luqadaha, qaybta kaalmada adeegyada, dejan lang haykiko de la cruz . So [facility] [place] [facility] [phone].    ATENCIÓN: Si habla español, tiene a nichole disposición servicios gratuitos de asistencia lingüística. [facility] [phone].    We comply with applicable federal civil rights laws and [place] laws. We do not discriminate on the basis of race, color, national origin, age, disability, sex, sexual orientation, or gender identity.            Thank you!     Thank you for choosing [facility]  for your care. Our goal is always to provide you with excellent care. Hearing back from our patients is one way we can continue to improve our services. Please take a few minutes to complete the written survey that you may receive in the mail after your visit with us. Thank you!             Your Updated Medication List - Protect others around you: Learn how to safely use, store and throw away your medicines at www.disposemymeds.org.          This list is accurate as of 11/29/18  1:14 PM.  Always use your most recent med list.                   Brand Name Dispense Instructions for use Diagnosis    amLODIPine 5 MG tablet    NORVASC     Take 5 mg by mouth daily        aspirin 81 MG EC  tablet    ASA    90 tablet    Take 1 tablet (81 mg) by mouth daily    Status post percutaneous transluminal coronary angioplasty       atorvastatin 40 MG tablet    LIPITOR    90 tablet    Take 1 tablet (40 mg) by mouth every evening    NSTEMI (non-ST elevated myocardial infarction) (H)       benazepril 20 MG tablet    LOTENSIN    30 tablet    TAKE 1 TABLET EVERY DAY    Benign essential hypertension       Calcium-Magnesium-Zinc 500-250-12.5 MG Tabs      Take 1 tablet by mouth daily        clopidogrel 75 MG tablet    PLAVIX    90 tablet    Take 1 tablet (75 mg) by mouth daily    NSTEMI (non-ST elevated myocardial infarction) (H)       hydrALAZINE 10 MG tablet    APRESOLINE    90 tablet    Take 1 tablet (10 mg) by mouth 3 times daily if BPs running higher than 140, if > 160 then take two tabs at a time    Benign essential hypertension       MAGNESIUM PO      Take 200 mg by mouth daily        metoprolol succinate ER 25 MG 24 hr tablet    TOPROL-XL    15 tablet    Take 0.5 tablets (12.5 mg) by mouth daily    NSTEMI (non-ST elevated myocardial infarction) (H)       nitroGLYcerin 0.4 MG sublingual tablet    NITROSTAT    25 tablet    For chest pain place 1 tablet under the tongue every 5 minutes for 3 doses. If symptoms persist 5 minutes after 1st dose call 911.    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

## 2018-11-29 NOTE — LETTER
2018    Eusebio Oliveira MD  [address]  [facility] [address]    RE: Augie Goldworth       Dear Colleague,    I had the pleasure of seeing Augie Powell in the [facility].    CARDIOLOGY CLINIC PROGRESS NOTE    DOS: 2018      Augie Powell  : 1935, 83 year old  MRN: [id number]      History:   Augie Powell is a very pleasant 83 year old male with a history of hypertension, renal cell carcinoma s/p nephrectomy, CKD stage IV, hyperlipidemia, more recently diagnosed CAD.      He presented to the ED on 18 with 2 weeks of intermittent chest discomfort with exertion and associated shortness of breath. Troponins were elevated up to 3. NTproBNP was > 4000. An echocardiogram showed a relatively preserved LVEF. The apex was akinetic. There was hypokinesis of the inferior septum, anterior septum, inferior and anterior walls (mid and apex). Coronary angiogram was done which showed a 90% stenosis of the mid LAD. There was 40 - 50% stenosis of the mid RCA which is not  flow limiting. He underwent successful PCI of the mid LAD with a CORBIN. LVEDP was 27 mmHg at the time of the angiogram. He was started on aspirin and Plavix for dual antiplatelet therapy. Low dose beta blocker was initiated as well as high intensity statin therapy. Nephrology was involved during his hospitalization given his CKD and solitary kidney. It appears his renal function stayed relatively stable during his hospitalization. Benazepril was held during his hospitalization and was resumed at discharge.      Due to some ongoing fatigue and bradycardia, Toprol XL was decreased from 25 mg to 12.5 mg.      Later, he was noted to have some elevated BPs.  He called in and amlodipine was increased to 5 mg BID.     He saw Dr. villarreal 18.  His BP was still 144/62.  Dr. Villarreal added in hydralazine 10 mg TID.       Interval History:   He presents today for follow up.   He said initially  with meds his BPs were good.  The more recently, they were running 120s before any medications.  He was having a little LH with walking about.  He stopped hydralazine and decreased amlodipine back to 5 mg once daily.  The LH lessened.   Currently at home, BPs are running 110s-130s on amlodipine 5 mg, benazepril 20 mg, Toprol XL 12.5 mg.     DBP can be into 40s after working out.  HR can be in the 40s, but mostly upper 50s and low 60s.  No sxs of LH, dizziness, near syncope, syncope.    Eating plant based diet.  Getting more exercise.  Down 15 lbs.       ROS:  Skin:  Positive for bruising;itching   Eyes:  Positive for glasses  ENT:  Negative    Respiratory:  Positive for dyspnea on exertion  Cardiovascular:    Positive for;lightheadedness;fatigue  Gastroenterology: Negative    Genitourinary:  Negative    Musculoskeletal:  Positive for nocturnal cramping  Neurologic:  Positive for numbness or tingling of feet  Psychiatric:  Negative    Heme/Lymph/Imm:  Negative    Endocrine:  Negative      PAST MEDICAL HISTORY:  Past Medical History:   Diagnosis Date     Coronary artery disease 10/2018    nSTEMI- Isis to mLAD, distal lad mild, Lcx-small 50-60%, RCA 60-70% normal ifR (despite echo showing poss old IMI)     Hyperlipidemia      Hypertension      Renal disease     CRD--creatinine in upper ones to 2, right kdney removed 2000 for canncer     Spondylosis with myelopathy, lumbar region        PAST SURGICAL HISTORY:  Past Surgical History:   Procedure Laterality Date     BACK SURGERY       CHOLECYSTECTOMY       DECOMPRESSION LUMBAR TWO LEVELS  12/1/2014    Procedure: DECOMPRESSION LUMBAR TWO LEVELS;  Surgeon: Remy Harmon MD;  Location:  OR     HERNIA REPAIR       LAMINECTOMY, FUSION LUMBAR ONE LEVEL, COMBINED N/A 12/1/2014    Procedure: COMBINED LAMINECTOMY, FUSION LUMBAR ONE LEVEL;  Surgeon: Remy Harmon MD;  Location:  OR     PHACOEMULSIFICATION CLEAR CORNEA WITH STANDARD INTRAOCULAR LENS IMPLANT   10/22/2012    Procedure: PHACOEMULSIFICATION CLEAR CORNEA WITH STANDARD INTRAOCULAR LENS IMPLANT;  RIGHT EYE PHACOEMULSIFICATION CLEAR CORNEA WITH STANDARD INTRAOCULAR LENS IMPLANT ;  Surgeon: [place], Grupo NEVAREZ MD;  Location:  EC     right nephrectomy[       STENT,CORONARY, S660 24/30  10/2018    mLAD       SOCIAL HISTORY:  Social History     Social History     Marital status:      Spouse name: N/A     Number of children: N/A     Years of education: N/A     Social History Main Topics     Smoking status: Former Smoker     Smokeless tobacco: Never Used     Alcohol use 1.2 oz/week     2 Shots of liquor per week      Comment: rare     Drug use: No     Sexual activity: Not Currently     Other Topics Concern     None     Social History Narrative       FAMILY HISTORY:  Family History   Problem Relation Age of Onset     No Known Problems Mother      Heart Surgery Father      No Known Problems Sister        MEDS:   Current Outpatient Prescriptions on File Prior to Visit:  amLODIPine (NORVASC) 5 MG tablet Take 5 mg by mouth daily    aspirin 81 MG EC tablet Take 1 tablet (81 mg) by mouth daily   atorvastatin (LIPITOR) 40 MG tablet Take 1 tablet (40 mg) by mouth every evening   benazepril (LOTENSIN) 20 MG tablet TAKE 1 TABLET EVERY DAY   Calcium-Magnesium-Zinc 500-250-12.5 MG TABS Take 1 tablet by mouth daily   clopidogrel (PLAVIX) 75 MG tablet Take 1 tablet (75 mg) by mouth daily   MAGNESIUM PO Take 200 mg by mouth daily    metoprolol succinate (TOPROL-XL) 25 MG 24 hr tablet Take 0.5 tablets (12.5 mg) by mouth daily   nitroGLYcerin (NITROSTAT) 0.4 MG sublingual tablet For chest pain place 1 tablet under the tongue every 5 minutes for 3 doses. If symptoms persist 5 minutes after 1st dose call 911.   [DISCONTINUED] hydrALAZINE (APRESOLINE) 10 MG tablet Take 1 tablet (10 mg) by mouth 3 times daily (Patient not taking: Reported on 11/29/2018)     No current facility-administered medications on file prior to visit.  "    ALLERGIES: No Known Allergies    PHYSICAL EXAM:  Vitals: /66 (BP Location: Right arm, Patient Position: Chair, Cuff Size: Adult Regular)  Pulse 64  Ht 1.778 m (5' 10\")  Wt 81.4 kg (179 lb 8 oz)  SpO2 98%  BMI 25.76 kg/m2  Constitutional:  cooperative, alert and oriented, well developed, well nourished, in no acute distress        Skin:  warm and dry to the touch, no apparent skin lesions or masses noted        Head:  normocephalic;no masses or lesions        Eyes:  pupils equal and round;conjunctivae and lids unremarkable;sclera white        ENT:  no pallor or cyanosis        Neck:  JVP normal        Respiratory:  normal breath sounds, clear to auscultation, normal A-P diameter, normal symmetry, normal respiratory excursion, no use of accessory muscles        Cardiac: regular rhythm;normal S1 and S2       systolic murmur;grade 1          GI:  abdomen soft;BS normoactive        Vascular:                                        Extremities and Musculoskeletal:  no edema;no deformities, clubbing, cyanosis, erythema observed;no spinal abnormalities noted;normal muscle strength and tone        Neurological:  no gross motor deficits;affect appropriate            ASSESSMENT/PLAN:  1.  Coronary artery disease.   - With recent NSTEMI 9/28/18.  Cor angio showed LAD was 99% occluded in the midportion.  It was stented with good result.  The left circumflex was a small artery with a 50%-60% narrowing.  The right coronary artery was a large vessel with 60%-70% narrowing.   - Echo showed LVEF 40-45% with WMAs  - no angina at this time  - ASA, Plavix, low dose Toprol XL, benazepril, as well as high intensity statin therapy  - He will return to see Dr. Villarreal in January. We will repeat a lipid profile and an echocardiogram at that time.  Dr. Villarreal may consider a stress test.   - Continued lifestyle modifications    2.  Renal cell carcinoma s/p nephrectomy.    3.  CKD stage IV.     4.  Hypertension.  Well controlled " now on amlodipine 5 mg, benazepril 20 mg, Toprol XL 12.5 mg.   We reviewed a plan for when to use the hydralazine.      5.  Bradycardia.  Asymptomatic at this point.  Rates drop into 40s at times, but mostly upper 50s and lower 60s.  With exercise he gets his HR up to 110s.  Continue Toprol XL 12.5 mg at this time.  Ideally, we could consider Bystolic.     6.  Hyponatremia.  Dr. Villarreal had asked him to decrease free water.  We will repeat a BMP in the next couple weeks.       Thank you for allowing me to participate in the care of your patient.    Sincerely,     Becky Dunlap PA-C     [facility]

## 2018-11-29 NOTE — LETTER
2018    Eusebio Oliveira MD  [address]  [facility] [address]    RE: Augie Goldworth       Dear Colleague,    I had the pleasure of seeing Augie Powell in the [facility].    CARDIOLOGY CLINIC PROGRESS NOTE    DOS: 2018      Augie Powell  : 1935, 83 year old  MRN: [id number]      History:   Augie Powell is a very pleasant 83 year old male with a history of hypertension, renal cell carcinoma s/p nephrectomy, CKD stage IV, hyperlipidemia, more recently diagnosed CAD.      He presented to the ED on 18 with 2 weeks of intermittent chest discomfort with exertion and associated shortness of breath. Troponins were elevated up to 3. NTproBNP was > 4000. An echocardiogram showed a relatively preserved LVEF. The apex was akinetic. There was hypokinesis of the inferior septum, anterior septum, inferior and anterior walls (mid and apex). Coronary angiogram was done which showed a 90% stenosis of the mid LAD. There was 40 - 50% stenosis of the mid RCA which is not  flow limiting. He underwent successful PCI of the mid LAD with a CORBIN. LVEDP was 27 mmHg at the time of the angiogram. He was started on aspirin and Plavix for dual antiplatelet therapy. Low dose beta blocker was initiated as well as high intensity statin therapy. Nephrology was involved during his hospitalization given his CKD and solitary kidney. It appears his renal function stayed relatively stable during his hospitalization. Benazepril was held during his hospitalization and was resumed at discharge.      Due to some ongoing fatigue and bradycardia, Toprol XL was decreased from 25 mg to 12.5 mg.      Later, he was noted to have some elevated BPs.  He called in and amlodipine was increased to 5 mg BID.     He saw Dr. villarreal 18.  His BP was still 144/62.  Dr. Villarreal added in hydralazine 10 mg TID.       Interval History:   He presents today for follow up.   He said initially  with meds his BPs were good.  The more recently, they were running 120s before any medications.  He was having a little LH with walking about.  He stopped hydralazine and decreased amlodipine back to 5 mg once daily.  The LH lessened.   Currently at home, BPs are running 110s-130s on amlodipine 5 mg, benazepril 20 mg, Toprol XL 12.5 mg.     DBP can be into 40s after working out.  HR can be in the 40s, but mostly upper 50s and low 60s.  No sxs of LH, dizziness, near syncope, syncope.    Eating plant based diet.  Getting more exercise.  Down 15 lbs.       ROS:  Skin:  Positive for bruising;itching   Eyes:  Positive for glasses  ENT:  Negative    Respiratory:  Positive for dyspnea on exertion  Cardiovascular:    Positive for;lightheadedness;fatigue  Gastroenterology: Negative    Genitourinary:  Negative    Musculoskeletal:  Positive for nocturnal cramping  Neurologic:  Positive for numbness or tingling of feet  Psychiatric:  Negative    Heme/Lymph/Imm:  Negative    Endocrine:  Negative      PAST MEDICAL HISTORY:  Past Medical History:   Diagnosis Date     Coronary artery disease 10/2018    nSTEMI- Isis to mLAD, distal lad mild, Lcx-small 50-60%, RCA 60-70% normal ifR (despite echo showing poss old IMI)     Hyperlipidemia      Hypertension      Renal disease     CRD--creatinine in upper ones to 2, right kdney removed 2000 for canncer     Spondylosis with myelopathy, lumbar region        PAST SURGICAL HISTORY:  Past Surgical History:   Procedure Laterality Date     BACK SURGERY       CHOLECYSTECTOMY       DECOMPRESSION LUMBAR TWO LEVELS  12/1/2014    Procedure: DECOMPRESSION LUMBAR TWO LEVELS;  Surgeon: Remy Harmon MD;  Location:  OR     HERNIA REPAIR       LAMINECTOMY, FUSION LUMBAR ONE LEVEL, COMBINED N/A 12/1/2014    Procedure: COMBINED LAMINECTOMY, FUSION LUMBAR ONE LEVEL;  Surgeon: Remy Harmon MD;  Location:  OR     PHACOEMULSIFICATION CLEAR CORNEA WITH STANDARD INTRAOCULAR LENS IMPLANT   10/22/2012    Procedure: PHACOEMULSIFICATION CLEAR CORNEA WITH STANDARD INTRAOCULAR LENS IMPLANT;  RIGHT EYE PHACOEMULSIFICATION CLEAR CORNEA WITH STANDARD INTRAOCULAR LENS IMPLANT ;  Surgeon: [place], Grupo NEVAREZ MD;  Location:  EC     right nephrectomy[       STENT,CORONARY, S660 24/30  10/2018    mLAD       SOCIAL HISTORY:  Social History     Social History     Marital status:      Spouse name: N/A     Number of children: N/A     Years of education: N/A     Social History Main Topics     Smoking status: Former Smoker     Smokeless tobacco: Never Used     Alcohol use 1.2 oz/week     2 Shots of liquor per week      Comment: rare     Drug use: No     Sexual activity: Not Currently     Other Topics Concern     None     Social History Narrative       FAMILY HISTORY:  Family History   Problem Relation Age of Onset     No Known Problems Mother      Heart Surgery Father      No Known Problems Sister        MEDS:   Current Outpatient Prescriptions on File Prior to Visit:  amLODIPine (NORVASC) 5 MG tablet Take 5 mg by mouth daily    aspirin 81 MG EC tablet Take 1 tablet (81 mg) by mouth daily   atorvastatin (LIPITOR) 40 MG tablet Take 1 tablet (40 mg) by mouth every evening   benazepril (LOTENSIN) 20 MG tablet TAKE 1 TABLET EVERY DAY   Calcium-Magnesium-Zinc 500-250-12.5 MG TABS Take 1 tablet by mouth daily   clopidogrel (PLAVIX) 75 MG tablet Take 1 tablet (75 mg) by mouth daily   MAGNESIUM PO Take 200 mg by mouth daily    metoprolol succinate (TOPROL-XL) 25 MG 24 hr tablet Take 0.5 tablets (12.5 mg) by mouth daily   nitroGLYcerin (NITROSTAT) 0.4 MG sublingual tablet For chest pain place 1 tablet under the tongue every 5 minutes for 3 doses. If symptoms persist 5 minutes after 1st dose call 911.   [DISCONTINUED] hydrALAZINE (APRESOLINE) 10 MG tablet Take 1 tablet (10 mg) by mouth 3 times daily (Patient not taking: Reported on 11/29/2018)     No current facility-administered medications on file prior to visit.  "    ALLERGIES: No Known Allergies    PHYSICAL EXAM:  Vitals: /66 (BP Location: Right arm, Patient Position: Chair, Cuff Size: Adult Regular)  Pulse 64  Ht 1.778 m (5' 10\")  Wt 81.4 kg (179 lb 8 oz)  SpO2 98%  BMI 25.76 kg/m2  Constitutional:  cooperative, alert and oriented, well developed, well nourished, in no acute distress        Skin:  warm and dry to the touch, no apparent skin lesions or masses noted        Head:  normocephalic;no masses or lesions        Eyes:  pupils equal and round;conjunctivae and lids unremarkable;sclera white        ENT:  no pallor or cyanosis        Neck:  JVP normal        Respiratory:  normal breath sounds, clear to auscultation, normal A-P diameter, normal symmetry, normal respiratory excursion, no use of accessory muscles        Cardiac: regular rhythm;normal S1 and S2       systolic murmur;grade 1          GI:  abdomen soft;BS normoactive        Vascular:                                        Extremities and Musculoskeletal:  no edema;no deformities, clubbing, cyanosis, erythema observed;no spinal abnormalities noted;normal muscle strength and tone        Neurological:  no gross motor deficits;affect appropriate            ASSESSMENT/PLAN:  1.  Coronary artery disease.   - With recent NSTEMI 9/28/18.  Cor angio showed LAD was 99% occluded in the midportion.  It was stented with good result.  The left circumflex was a small artery with a 50%-60% narrowing.  The right coronary artery was a large vessel with 60%-70% narrowing.   - Echo showed LVEF 40-45% with WMAs  - no angina at this time  - ASA, Plavix, low dose Toprol XL, benazepril, as well as high intensity statin therapy  - He will return to see Dr. Villarreal in January. We will repeat a lipid profile and an echocardiogram at that time.  Dr. Villarreal may consider a stress test.   - Continued lifestyle modifications    2.  Renal cell carcinoma s/p nephrectomy.    3.  CKD stage IV.     4.  Hypertension.  Well controlled " now on amlodipine 5 mg, benazepril 20 mg, Toprol XL 12.5 mg.   We reviewed a plan for when to use the hydralazine.      5.  Bradycardia.  Asymptomatic at this point.  Rates drop into 40s at times, but mostly upper 50s and lower 60s.  With exercise he gets his HR up to 110s.  Continue Toprol XL 12.5 mg at this time.  Ideally, we could consider Bystolic.     6.  Hyponatremia.  Dr. Villarreal had asked him to decrease free water.  We will repeat a BMP in the next couple weeks.     Becky Dunlap PA-C    Thank you for allowing me to participate in the care of your patient.      Sincerely,     Becky Dunlap PA-C     [facility]    cc:   Noel Villarreal MD  [address]  [place] [address]

## 2018-12-04 ENCOUNTER — TELEPHONE (OUTPATIENT)
Dept: CARDIOLOGY | Facility: CLINIC | Age: 83
End: 2018-12-04

## 2018-12-04 NOTE — TELEPHONE ENCOUNTER
Pt having high BP's. Pt now says he had stopped his hydralazine for 5 days, and had informed NP at last OV 11/29/18. Pt says he has now restart med but afternoon BP's are 160's systolic. Informed Pt it is best to take Hydralazine 10 mg three times a day and not stop. Per DR Villarreal note he can take extra 10 mg with the original dosing when BP's are 150-160.  Pt says he has some itching with medications. Informed Pt if itching increases to call RN and inform her. BERKLEY Howard RN

## 2018-12-05 ENCOUNTER — TELEPHONE (OUTPATIENT)
Dept: FAMILY MEDICINE | Facility: CLINIC | Age: 83
End: 2018-12-05

## 2018-12-05 NOTE — TELEPHONE ENCOUNTER
"BP readings:  Takes BP many times daily. Patient and spouse on conference call. Very nervous about occasional elevated readings. Restarted Hydralazine recently after off for 5 days.    Yesterday:127/64   112/53  159/75   170/99  (took about 6 Hydralazine yesterday per parameters listed on medication: 1 tab if >140, 2 tabs if > 160 systolic.)     Today:  150/80 (1 hydralazine at 7 am)  142/68   After treadmill: 150/56  142/42 around 10 am.  Now 168/88 (2 hydralazine now)    Light headed on and off; \"worries terribly about high blood pressure\"    Reinforced cardiology recommendation yesterday:  (copied from phone call to cardiology nurse)    12/4/18 2:05 PM   Note      Pt having high BP's. Pt now says he had stopped his hydralazine for 5 days, and had informed NP at last OV 11/29/18. Pt says he has now restart med but afternoon BP's are 160's systolic. Informed Pt it is best to take Hydralazine 10 mg three times a day and not stop. Per DR Villarreal note he can take extra 10 mg with the original dosing when BP's are 150-160.  Pt says he has some itching with medications. Informed Pt if itching increases to call RN and inform her. BERKLEY Howard RN        TO PCP TO ADVISE IF ANYTHING FURTHER RECOMMENDED.  Encourage patient to follow the plan above.  Also advised spreading out his Hydralazine doses a bit more.  He takes at 7a-12n-7p;  Recommended 7 am, 2 pm, 9 pm for better overall coverage in 24 hour period.  OV if desired.  What is \"limit\" of Hydralazine in 24 hour period??  Oma Whitaker RN      12/7/2018  Follow-up blood pressure reports for the last 36 hours blood pressure on 12 6 was 122/50 9 in the morning 142/60 5 in the afternoon and 142/70 1 in the evening blood pressure today was 126/58    Recommended that if his blood pressure was in the 140s this afternoon that he should take 20 mg of hydralazine for the afternoon and if over the weekend his blood pressures in the afternoon continue to be that range he should " increase his morning hydralazine to 20 mg as well. ie hydralazine dosing 10/20/10 or 20/20/10.  New    Will reevaluate on Monday.

## 2018-12-05 NOTE — TELEPHONE ENCOUNTER
"Reason for call:  Patient reporting a symptom    Symptom or request: light headed, BP is \"all over the place\"  Just not feeling good.    Duration (how long have symptoms been present): 2 days     Have you been treated for this before? No    Additional comments: pt. Wife wondering if he needs to be seen today?      Transferred to triage    Phone Number patient can be reached at:  Home number on file [phone] (home)    Best Time:  asap    Can we leave a detailed message on this number:  YES    Call taken on 12/5/2018 at 12:34 PM by Chantel Herron.  "

## 2018-12-05 NOTE — TELEPHONE ENCOUNTER
"Spoke to PCP who agrees with spacing his \"scheduled\" hydralazine doses to 7am/2p/9pm.  Also, take additional 10 mg IF systolic > 160.  Take BP tid at times of Hydralazine.  Keep log of BP's and report to clinic on Friday. (also indicate if additional Hydralazine was taken)    Patient states he is feeling much better this afternoon. No light-headednwaa. Current /72.    He is encouraged/reassured that PCP saw the BP readings below.    Oma Whitaker RN on 12/5/2018 at 3:46 PM    "

## 2018-12-06 NOTE — PROGRESS NOTES
Cardiac Rehab Discharge Summary    Reason for therapy discharge:    Patient/family request discontinuation of services.    Progress towards therapy goal(s). See goals on Care Plan in [facility] electronic health record for goal details.  Goals partially met.  Barriers to achieving goals:   discharge from facility.    Therapy recommendation(s):    Continue home exercise program.

## 2018-12-06 NOTE — ADDENDUM NOTE
Encounter addended by: Demi Leyva EP on: 12/6/2018  2:13 PM<BR>     Actions taken: Sign clinical note, Episode resolved

## 2018-12-07 ENCOUNTER — DOCUMENTATION ONLY (OUTPATIENT)
Dept: CARDIOLOGY | Facility: CLINIC | Age: 83
End: 2018-12-07

## 2018-12-07 DIAGNOSIS — E87.1 HYPONATREMIA: ICD-10-CM

## 2018-12-07 DIAGNOSIS — I10 BENIGN ESSENTIAL HYPERTENSION: ICD-10-CM

## 2018-12-07 LAB
ANION GAP SERPL CALCULATED.3IONS-SCNC: 6 MMOL/L (ref 3–14)
BUN SERPL-MCNC: 24 MG/DL (ref 7–30)
CALCIUM SERPL-MCNC: 8.4 MG/DL (ref 8.5–10.1)
CHLORIDE SERPL-SCNC: 106 MMOL/L (ref 94–109)
CO2 SERPL-SCNC: 23 MMOL/L (ref 20–32)
CREAT SERPL-MCNC: 1.96 MG/DL (ref 0.66–1.25)
GFR SERPL CREATININE-BSD FRML MDRD: 33 ML/MIN/1.7M2
GLUCOSE SERPL-MCNC: 96 MG/DL (ref 70–99)
POTASSIUM SERPL-SCNC: 4.7 MMOL/L (ref 3.4–5.3)
SODIUM SERPL-SCNC: 135 MMOL/L (ref 133–144)

## 2018-12-07 PROCEDURE — 36415 COLL VENOUS BLD VENIPUNCTURE: CPT | Performed by: PHYSICIAN ASSISTANT

## 2018-12-07 PROCEDURE — 80048 BASIC METABOLIC PNL TOTAL CA: CPT | Performed by: PHYSICIAN ASSISTANT

## 2018-12-07 NOTE — PROGRESS NOTES
Pt in clinic for BMP.  He states he was told to bring his home cuff for comparison.    Office BP - 165/73, HR 65   Home Cuff - 166/75, HR 64  Message to Team 6 as FYI as they have been working with pt.  KMortimer RN

## 2018-12-11 ENCOUNTER — TELEPHONE (OUTPATIENT)
Dept: CARDIOLOGY | Facility: CLINIC | Age: 83
End: 2018-12-11

## 2018-12-11 ASSESSMENT — ENCOUNTER SYMPTOMS: HYPERTENSION: 1

## 2018-12-11 NOTE — TELEPHONE ENCOUNTER
Pt called - left message for call back regarding BP and medications.  Called pt - he states his BP is 130-150 systolic.  He feels well until he takes extra hydralazine.  He states them he gets very anxious.  Pt will keep diary of BP's- if >150 consistently give a call.  Plan for echo01/16/2019 and Ov w/Dr. Villarreal 01/21/2019

## 2018-12-11 NOTE — TELEPHONE ENCOUNTER
"Called pt w/ results of labs done 12/7/18. Per Becky Dunlap PA-C \"Please let him know Na level is improved and WNL. Creat is stable\".   Pt understood, appreciated the call.   Codi ALLEN   "

## 2018-12-11 NOTE — PROGRESS NOTES
Spoke with Pt regarding BP's. Pt is taking them numerous times a day and numbers keep going high from stress and multiple retakes of BP. When he did this he then took extra Hydralazine and said he felt fuzzy and blurry eyed thru out day. Pt went on to say that yesterday he ignored taking BP had a good day and checked BP after morning coffee and it was in 140 systolic range. Asked Pt to take BP minimally, and try to go about his day normally, and told him to call if day went badly or to call tomorrow AM to say how he is doing. If things do not improve will bring him in for OV. BERKLEY Howard RN      Hypertension            ROS      Physical Exam

## 2018-12-13 ENCOUNTER — TELEPHONE (OUTPATIENT)
Dept: FAMILY MEDICINE | Facility: CLINIC | Age: 83
End: 2018-12-13

## 2018-12-13 ENCOUNTER — OFFICE VISIT (OUTPATIENT)
Dept: FAMILY MEDICINE | Facility: CLINIC | Age: 83
End: 2018-12-13
Payer: COMMERCIAL

## 2018-12-13 VITALS
HEIGHT: 70 IN | SYSTOLIC BLOOD PRESSURE: 151 MMHG | DIASTOLIC BLOOD PRESSURE: 61 MMHG | BODY MASS INDEX: 25.34 KG/M2 | HEART RATE: 63 BPM | OXYGEN SATURATION: 98 % | TEMPERATURE: 97.2 F | WEIGHT: 177 LBS

## 2018-12-13 DIAGNOSIS — S91.209A AVULSION OF TOENAIL, INITIAL ENCOUNTER: Primary | ICD-10-CM

## 2018-12-13 PROCEDURE — 99207 ZZC DROP WITH A PROCEDURE: CPT | Performed by: NURSE PRACTITIONER

## 2018-12-13 PROCEDURE — 64455 NJX AA&/STRD PLTR COM DG NRV: CPT | Performed by: NURSE PRACTITIONER

## 2018-12-13 ASSESSMENT — MIFFLIN-ST. JEOR: SCORE: 1504.12

## 2018-12-13 NOTE — PROGRESS NOTES
HPI      SUBJECTIVE:   Augie Powell is a 83 year old male who presents to clinic today for the following health issues:      Chief Complaint   Patient presents with     Toenail     Pt tripped this morning and tore toenail, right great toe.       This morning when he was getting out of the shower he tripped and tore most of his great toenail off. He has drop foot so has little control of it. He always has pain in the foot, but is currently is very painful   He is on plavix and it has bled a lot     Past Medical History:   Diagnosis Date     Coronary artery disease 10/2018    nSTEMI- Isis to mLAD, distal lad mild, Lcx-small 50-60%, RCA 60-70% normal ifR (despite echo showing poss old IMI)     Hyperlipidemia      Hypertension      Renal disease     CRD--creatinine in upper ones to 2, right kdney removed 2000 for canncer     Spondylosis with myelopathy, lumbar region      Family History   Problem Relation Age of Onset     No Known Problems Mother      Heart Surgery Father      No Known Problems Sister      Past Surgical History:   Procedure Laterality Date     BACK SURGERY       CHOLECYSTECTOMY       DECOMPRESSION LUMBAR TWO LEVELS  12/1/2014    Procedure: DECOMPRESSION LUMBAR TWO LEVELS;  Surgeon: Remy Harmon MD;  Location:  OR     HERNIA REPAIR       LAMINECTOMY, FUSION LUMBAR ONE LEVEL, COMBINED N/A 12/1/2014    Procedure: COMBINED LAMINECTOMY, FUSION LUMBAR ONE LEVEL;  Surgeon: Remy Harmon MD;  Location:  OR     PHACOEMULSIFICATION CLEAR CORNEA WITH STANDARD INTRAOCULAR LENS IMPLANT  10/22/2012    Procedure: PHACOEMULSIFICATION CLEAR CORNEA WITH STANDARD INTRAOCULAR LENS IMPLANT;  RIGHT EYE PHACOEMULSIFICATION CLEAR CORNEA WITH STANDARD INTRAOCULAR LENS IMPLANT ;  Surgeon: Grupo Granger MD;  Location:  [facility]     right nephrectomy[       STENT,CORONARY, S660 24/30  10/2018    mLAD     Social History     Tobacco Use     Smoking status: Former Smoker     Smokeless tobacco: Never Used  "  Substance Use Topics     Alcohol use: Yes     Alcohol/week: 1.2 oz     Types: 2 Shots of liquor per week     Comment: rare     Current Outpatient Medications   Medication Sig Dispense Refill     amLODIPine (NORVASC) 5 MG tablet Take 5 mg by mouth daily        aspirin 81 MG EC tablet Take 1 tablet (81 mg) by mouth daily 90 tablet 3     atorvastatin (LIPITOR) 40 MG tablet Take 1 tablet (40 mg) by mouth every evening 90 tablet 3     benazepril (LOTENSIN) 20 MG tablet TAKE 1 TABLET EVERY DAY 30 tablet 0     Calcium-Magnesium-Zinc 500-250-12.5 MG TABS Take 1 tablet by mouth daily       clopidogrel (PLAVIX) 75 MG tablet Take 1 tablet (75 mg) by mouth daily 90 tablet 3     hydrALAZINE (APRESOLINE) 10 MG tablet Take 1 tablet (10 mg) by mouth 3 times daily if BPs running higher than 140, if > 160 then take two tabs at a time 90 tablet 3     MAGNESIUM PO Take 200 mg by mouth daily        metoprolol succinate (TOPROL-XL) 25 MG 24 hr tablet Take 0.5 tablets (12.5 mg) by mouth daily 15 tablet 3     nitroGLYcerin (NITROSTAT) 0.4 MG sublingual tablet For chest pain place 1 tablet under the tongue every 5 minutes for 3 doses. If symptoms persist 5 minutes after 1st dose call 911. 25 tablet 3     Allergies   Allergen Reactions     No Known Allergies        Reviewed and updated as needed this visit by clinical staff and provider     ROS  Detailed as above       /61 (BP Location: Left arm, Patient Position: Sitting, Cuff Size: Adult Large)   Pulse 63   Temp 97.2  F (36.2  C) (Oral)   Ht 1.778 m (5' 10\")   Wt 80.3 kg (177 lb)   SpO2 98%   BMI 25.40 kg/m      Physical Exam   Constitutional: He appears well-developed.   Pulmonary/Chest: Effort normal.   Musculoskeletal:   Right drop foot   Neurological: He is alert.   Skin: Skin is warm and dry.   Near complete avulsion of right great toenail with underlying hematoma   Psychiatric: He has a normal mood and affect. Judgment normal.       Digital Block:  Medication: 0.25% " Bupivicaine 4mL  Location: right great toe  Toe was cleansed. I injected the medication at the base of the toe. Optimal anesthesia was obtained. Pt tolerated well.       Assessment and Plan:       ICD-10-CM    1. Avulsion of toenail, initial encounter S91.209A N BLOCK INJ,  PLANTAR DIGIT       Digital block was performed then toenail was trimmed a significant amount to decrease chance of getting caught. Nail left in place as it was mostly still attached at the base and this would provide some protection to the nailbed. Discussed home cares. Keep wrapped considering his higher risk of bleeding. He will lose the nail. Monitor for infection. He should f/u prn       Chris Laguna APRN, CNP  [facility]

## 2018-12-13 NOTE — TELEPHONE ENCOUNTER
Spoke with patient's wife Khushboo:     Pt tripped and tore toenail when getting out of the shower this morning, was bleeding heavily but has stopped. Wrapped foot     Not faint or lightheaded, is feeling fine     Right great toe - toenail is sticking up in the air and they are unsure what to do. Worried about it becoming infected.     Advised OV to have this evaluated. If return of heavy bleeding or feels faint/lightheaded then go to ER. Scheduled with TEAM for this morning     Shannon SUBRAMANIAN RN

## 2018-12-28 DIAGNOSIS — I21.4 NSTEMI (NON-ST ELEVATED MYOCARDIAL INFARCTION) (H): ICD-10-CM

## 2018-12-28 RX ORDER — METOPROLOL SUCCINATE 25 MG/1
12.5 TABLET, EXTENDED RELEASE ORAL DAILY
Qty: 45 TABLET | Refills: 1 | Status: SHIPPED | OUTPATIENT
Start: 2018-12-28 | End: 2019-04-09

## 2018-12-28 NOTE — TELEPHONE ENCOUNTER
Received refill request for:  Metoprolol succinate  Last OV was: 11/29/2018 with JANINE Montanez  Labs/EKG: n/a  F/U scheduled: 1/21/2019 with Dr. Villarreal  New script sent to: [facility]

## 2019-01-16 ENCOUNTER — HOSPITAL ENCOUNTER (OUTPATIENT)
Dept: CARDIOLOGY | Facility: CLINIC | Age: 84
Discharge: HOME | End: 2019-01-16
Attending: PHYSICIAN ASSISTANT | Admitting: PHYSICIAN ASSISTANT
Payer: COMMERCIAL

## 2019-01-16 DIAGNOSIS — Z98.61 STATUS POST PERCUTANEOUS TRANSLUMINAL CORONARY ANGIOPLASTY: ICD-10-CM

## 2019-01-16 DIAGNOSIS — I21.4 NSTEMI (NON-ST ELEVATED MYOCARDIAL INFARCTION) (H): ICD-10-CM

## 2019-01-16 PROCEDURE — 93306 TTE W/DOPPLER COMPLETE: CPT

## 2019-01-16 PROCEDURE — 93306 TTE W/DOPPLER COMPLETE: CPT | Mod: 26 | Performed by: INTERNAL MEDICINE

## 2019-01-18 DIAGNOSIS — Z98.61 STATUS POST PERCUTANEOUS TRANSLUMINAL CORONARY ANGIOPLASTY: ICD-10-CM

## 2019-01-18 DIAGNOSIS — I21.4 NSTEMI (NON-ST ELEVATED MYOCARDIAL INFARCTION) (H): ICD-10-CM

## 2019-01-18 RX ORDER — ATORVASTATIN CALCIUM 40 MG/1
40 TABLET, FILM COATED ORAL EVERY EVENING
Qty: 90 TABLET | Refills: 3 | Status: SHIPPED | OUTPATIENT
Start: 2019-01-18 | End: 2020-02-07

## 2019-01-18 RX ORDER — CLOPIDOGREL BISULFATE 75 MG/1
75 TABLET ORAL DAILY
Qty: 90 TABLET | Refills: 3 | Status: SHIPPED | OUTPATIENT
Start: 2019-01-18 | End: 2019-12-17

## 2019-01-21 ENCOUNTER — OFFICE VISIT (OUTPATIENT)
Dept: CARDIOLOGY | Facility: CLINIC | Age: 84
End: 2019-01-21
Payer: COMMERCIAL

## 2019-01-21 VITALS
WEIGHT: 175 LBS | HEART RATE: 56 BPM | HEIGHT: 70 IN | BODY MASS INDEX: 25.05 KG/M2 | DIASTOLIC BLOOD PRESSURE: 62 MMHG | SYSTOLIC BLOOD PRESSURE: 136 MMHG

## 2019-01-21 DIAGNOSIS — I10 BENIGN ESSENTIAL HYPERTENSION: Primary | ICD-10-CM

## 2019-01-21 DIAGNOSIS — Z98.61 STATUS POST PERCUTANEOUS TRANSLUMINAL CORONARY ANGIOPLASTY: ICD-10-CM

## 2019-01-21 DIAGNOSIS — I21.4 NSTEMI (NON-ST ELEVATED MYOCARDIAL INFARCTION) (H): ICD-10-CM

## 2019-01-21 DIAGNOSIS — I25.10 CORONARY ARTERY DISEASE INVOLVING NATIVE CORONARY ARTERY OF NATIVE HEART WITHOUT ANGINA PECTORIS: ICD-10-CM

## 2019-01-21 LAB
ALT SERPL W P-5'-P-CCNC: 10 U/L (ref 5–30)
CHOLEST SERPL-MCNC: 86 MG/DL
HDLC SERPL-MCNC: 38 MG/DL
LDLC SERPL CALC-MCNC: 37 MG/DL
NONHDLC SERPL-MCNC: 48 MG/DL
TRIGL SERPL-MCNC: 57 MG/DL

## 2019-01-21 PROCEDURE — 80061 LIPID PANEL: CPT | Performed by: INTERNAL MEDICINE

## 2019-01-21 PROCEDURE — 36415 COLL VENOUS BLD VENIPUNCTURE: CPT | Performed by: INTERNAL MEDICINE

## 2019-01-21 PROCEDURE — 84460 ALANINE AMINO (ALT) (SGPT): CPT | Performed by: INTERNAL MEDICINE

## 2019-01-21 PROCEDURE — 99214 OFFICE O/P EST MOD 30 MIN: CPT | Performed by: INTERNAL MEDICINE

## 2019-01-21 RX ORDER — AMLODIPINE BESYLATE 5 MG/1
5 TABLET ORAL AT BEDTIME
Qty: 90 TABLET | Refills: 3 | COMMUNITY
Start: 2019-01-21 | End: 2019-02-15

## 2019-01-21 ASSESSMENT — MIFFLIN-ST. JEOR: SCORE: 1495.04

## 2019-01-21 NOTE — PROGRESS NOTES
Service Date: 01/21/2019      HISTORY OF PRESENT ILLNESS:  I had the pleasure of following up on our mutual patient, Augie Powell.  He is a delightful 83-year-old gentleman who is quite fit and in fact after his heart attack he has lost 15 pounds.  He is to be commended.  He changed his diet and he is exercising regularly.  He also sees a nephrologist.  As you know, he has a single kidney.  His creatinine is elevated.  His blood pressure he said was running low in the 110s, so he cut out his hydralazine.  Now, it is in the 150s to 160s.  The pattern is interesting.  The morning ones are always high.  It gets better around early and mid afternoon and then starts to creep up again.  We thought he was taking amlodipine 5 mg b.i.d.  He is actually only taking 5 mg in the morning and he is back to taking hydralazine 10 t.i.d.  It turns out he takes his hydralazine at 9:30 p.m. and by 9:00 a.m. the next day his blood pressure is high.  The half-life of hydralazine is short, so it is probably from that.  I am going to have him switch his amlodipine from 5 mg in the morning to 5 mg at bedtime.  We will watch what his blood pressure numbers do and we can certainly have him self-adjust his hydralazine up or down based on his blood pressure numbers with the option of going to 10 mg of amlodipine if necessary, although that would not be my preference with a single kidney.  I would be concerned about fluid retention.  We had to cut his beta blocker dose down because of relative bradycardia.  He is exercising regularly with heart rates in the high 90s to no more than 100, which is ideal.  I reviewed his angiogram pictures with him today.  He had an anterior infarct with a stent to his LAD, 50% circ and 50-70% right coronary artery.  It was slightly hazy, but the flow reserve of the right coronary was normal.  We also reviewed his echocardiogram.  He has mild aortic stenosis.  As I mentioned, his ejection fraction normalized  "and the wall motion is no longer abnormal.  We will have him come back in 6 months for a repeat lipid check.  His LDL may be actually \"too good\" and we may lower the dose of his statin if it is much lower on his followup visit.  We will recheck electrolytes in 6 months unless his nephrologist does it to make sure that we have not affected any kidney function.  He is on benazepril, according to his nephrologist.  I have answered all of the family's questions.  There were several about coronary disease, the other arteries, exercise, diet, etc.      Today's visit was 25 minutes, all counseling.       cc:   [place] [address]         ISSA LE MD             D: 2019   T: 2019   MT: COSTA      Name:     TERI BUCHANAN   MRN:      [id number]        Account:      [id number]   :      1935           Service Date: 2019      Document: [id number]    "

## 2019-01-21 NOTE — PROGRESS NOTES
HPI and Plan:   See dictation    Orders Placed This Encounter   Procedures     Basic metabolic panel     Lipid Profile     Follow-Up with Cardiac Advanced Practice Provider     Orders Placed This Encounter   Medications     amLODIPine (NORVASC) 5 MG tablet     Sig: Take 1 tablet (5 mg) by mouth At Bedtime     Dispense:  90 tablet     Refill:  3     Medications Discontinued During This Encounter   Medication Reason     amLODIPine (NORVASC) 5 MG tablet Reorder         Encounter Diagnoses   Name Primary?     Coronary artery disease involving native coronary artery of native heart without angina pectoris      Benign essential hypertension Yes       CURRENT MEDICATIONS:  Current Outpatient Medications   Medication Sig Dispense Refill     amLODIPine (NORVASC) 5 MG tablet Take 1 tablet (5 mg) by mouth At Bedtime 90 tablet 3     aspirin (ASA) 81 MG EC tablet Take 1 tablet (81 mg) by mouth daily 90 tablet 3     atorvastatin (LIPITOR) 40 MG tablet Take 1 tablet (40 mg) by mouth every evening 90 tablet 3     benazepril (LOTENSIN) 20 MG tablet TAKE 1 TABLET EVERY DAY 30 tablet 0     Calcium-Magnesium-Zinc 500-250-12.5 MG TABS Take 1 tablet by mouth daily       clopidogrel (PLAVIX) 75 MG tablet Take 1 tablet (75 mg) by mouth daily 90 tablet 3     hydrALAZINE (APRESOLINE) 10 MG tablet Take 1 tablet (10 mg) by mouth 3 times daily if BPs running higher than 140, if > 160 then take two tabs at a time 90 tablet 3     MAGNESIUM PO Take 200 mg by mouth daily        metoprolol succinate ER (TOPROL-XL) 25 MG 24 hr tablet Take 0.5 tablets (12.5 mg) by mouth daily 45 tablet 1     nitroGLYcerin (NITROSTAT) 0.4 MG sublingual tablet For chest pain place 1 tablet under the tongue every 5 minutes for 3 doses. If symptoms persist 5 minutes after 1st dose call 911. 25 tablet 3       ALLERGIES     Allergies   Allergen Reactions     No Known Allergies        PAST MEDICAL HISTORY:  Past Medical History:   Diagnosis Date     Aortic stenosis, mild       Coronary artery disease 10/2018    nSTEMI- Isis to mLAD, distal lad mild, Lcx-small 50-60%, RCA 60-70% normal ifR (despite echo showing poss old IMI)     Hyperlipidemia      Hypertension      Renal disease     CRD--creatinine in upper ones to 2, right kdney removed 2000 for canncer     Spondylosis with myelopathy, lumbar region        PAST SURGICAL HISTORY:  Past Surgical History:   Procedure Laterality Date     BACK SURGERY       CHOLECYSTECTOMY       DECOMPRESSION LUMBAR TWO LEVELS  12/1/2014    Procedure: DECOMPRESSION LUMBAR TWO LEVELS;  Surgeon: Remy Harmon MD;  Location: [facility]     HERNIA REPAIR       LAMINECTOMY, FUSION LUMBAR ONE LEVEL, COMBINED N/A 12/1/2014    Procedure: COMBINED LAMINECTOMY, FUSION LUMBAR ONE LEVEL;  Surgeon: Remy Harmon MD;  Location: [facility]     PHACOEMULSIFICATION CLEAR CORNEA WITH STANDARD INTRAOCULAR LENS IMPLANT  10/22/2012    Procedure: PHACOEMULSIFICATION CLEAR CORNEA WITH STANDARD INTRAOCULAR LENS IMPLANT;  RIGHT EYE PHACOEMULSIFICATION CLEAR CORNEA WITH STANDARD INTRAOCULAR LENS IMPLANT ;  Surgeon: Grupo Granger MD;  Location:  [facility]     right nephrectomy[       STENT,CORONARY, S660 24/30  10/2018    mLAD       FAMILY HISTORY:  Family History   Problem Relation Age of Onset     No Known Problems Mother      Heart Surgery Father      No Known Problems Sister        SOCIAL HISTORY:  Social History     Socioeconomic History     Marital status:      Spouse name: None     Number of children: None     Years of education: None     Highest education level: None   Social Needs     Financial resource strain: None     Food insecurity - worry: None     Food insecurity - inability: None     Transportation needs - medical: None     Transportation needs - non-medical: None   Occupational History     None   Tobacco Use     Smoking status: Former Smoker     Smokeless tobacco: Never Used   Substance and Sexual Activity     Alcohol use: Yes     Alcohol/week: 1.2 oz  "    Types: 2 Shots of liquor per week     Comment: rare     Drug use: No     Sexual activity: Not Currently   Other Topics Concern     None   Social History Narrative     None       Review of Systems:  Skin:  Positive for bruising;itching   Eyes:  Positive for glasses  ENT:  Negative    Respiratory:  Positive for dyspnea on exertion  Cardiovascular:    Positive for;lightheadedness;fatigue  Gastroenterology: Negative    Genitourinary:  Negative    Musculoskeletal:  Positive for nocturnal cramping  Neurologic:  Positive for numbness or tingling of feet  Psychiatric:  Negative    Heme/Lymph/Imm:  Negative    Endocrine:  Negative      Physical Exam:  Vitals: /62   Pulse 56   Ht 1.778 m (5' 10\")   Wt 79.4 kg (175 lb)   BMI 25.11 kg/m      Constitutional:           Skin:             Head:           Eyes:           Lymph:      ENT:           Neck:           Respiratory:            Cardiac:                                                           GI:           Extremities and Muscular Skeletal:                 Neurological:           Psych:         Recent Lab Results:  LIPID RESULTS:  Lab Results   Component Value Date    CHOL 86 01/21/2019    HDL 38 (L) 01/21/2019    LDL 37 01/21/2019    TRIG 57 01/21/2019       LIVER ENZYME RESULTS:  Lab Results   Component Value Date    AST 26 09/30/2018    ALT 10 01/21/2019       CBC RESULTS:  Lab Results   Component Value Date    WBC 8.8 10/09/2018    RBC 3.94 (L) 10/09/2018    HGB 12.6 (L) 10/09/2018    HCT 37.1 (L) 10/09/2018    MCV 94 10/09/2018    MCH 32.0 10/09/2018    MCHC 34.0 10/09/2018    RDW 12.7 10/09/2018     10/09/2018       BMP RESULTS:  Lab Results   Component Value Date     12/07/2018    POTASSIUM 4.7 12/07/2018    CHLORIDE 106 12/07/2018    CO2 23 12/07/2018    ANIONGAP 6 12/07/2018    GLC 96 12/07/2018    BUN 24 12/07/2018    CR 1.96 (H) 12/07/2018    GFRESTIMATED 33 (L) 12/07/2018    GFRESTBLACK 40 (L) 12/07/2018    RICCARDO 8.4 (L) 12/07/2018    "     A1C RESULTS:  Lab Results   Component Value Date    A1C 5.5 09/29/2018       INR RESULTS:  Lab Results   Component Value Date    INR 0.89 10/04/2010           CC  No referring provider defined for this encounter.

## 2019-01-21 NOTE — LETTER
1/21/2019      Eusebio Oliveira MD  [address]  [facility] [address]      RE: Augie Powell       Dear Colleague,    I had the pleasure of seeing Augie Powell in the [facility].    Service Date: 01/21/2019      HISTORY OF PRESENT ILLNESS:  I had the pleasure of following up on our mutual patient, Augie Powell.  He is a delightful 83-year-old gentleman who is quite fit and in fact after his heart attack he has lost 15 pounds.  He is to be commended.  He changed his diet and he is exercising regularly.  He also sees a nephrologist.  As you know, he has a single kidney.  His creatinine is elevated.  His blood pressure he said was running low in the 110s, so he cut out his hydralazine.  Now, it is in the 150s to 160s.  The pattern is interesting.  The morning ones are always high.  It gets better around early and mid afternoon and then starts to creep up again.  We thought he was taking amlodipine 5 mg b.i.d.  He is actually only taking 5 mg in the morning and he is back to taking hydralazine 10 t.i.d.  It turns out he takes his hydralazine at 9:30 p.m. and by 9:00 a.m. the next day his blood pressure is high.  The half-life of hydralazine is short, so it is probably from that.  I am going to have him switch his amlodipine from 5 mg in the morning to 5 mg at bedtime.  We will watch what his blood pressure numbers do and we can certainly have him self-adjust his hydralazine up or down based on his blood pressure numbers with the option of going to 10 mg of amlodipine if necessary, although that would not be my preference with a single kidney.  I would be concerned about fluid retention.  We had to cut his beta blocker dose down because of relative bradycardia.  He is exercising regularly with heart rates in the high 90s to no more than 100, which is ideal.  I reviewed his angiogram pictures with him today.  He had an anterior infarct with a stent to his LAD, 50%  "circ and 50-70% right coronary artery.  It was slightly hazy, but the flow reserve of the right coronary was normal.  We also reviewed his echocardiogram.  He has mild aortic stenosis.  As I mentioned, his ejection fraction normalized and the wall motion is no longer abnormal.  We will have him come back in 6 months for a repeat lipid check.  His LDL may be actually \"too good\" and we may lower the dose of his statin if it is much lower on his followup visit.  We will recheck electrolytes in 6 months unless his nephrologist does it to make sure that we have not affected any kidney function.  He is on benazepril, according to his nephrologist.  I have answered all of the family's questions.  There were several about coronary disease, the other arteries, exercise, diet, etc.      Today's visit was 25 minutes, all counseling.       cc:   [place] [address]         ISSA LE MD             D: 2019   T: 2019   MT: COSTA      Name:     TERI BUCHANAN   MRN:      [id number]        Account:      [id number]   :      1935           Service Date: 2019      Document: [id number]         Outpatient Encounter Medications as of 2019   Medication Sig Dispense Refill     amLODIPine (NORVASC) 5 MG tablet Take 1 tablet (5 mg) by mouth At Bedtime 90 tablet 3     aspirin (ASA) 81 MG EC tablet Take 1 tablet (81 mg) by mouth daily 90 tablet 3     atorvastatin (LIPITOR) 40 MG tablet Take 1 tablet (40 mg) by mouth every evening 90 tablet 3     benazepril (LOTENSIN) 20 MG tablet TAKE 1 TABLET EVERY DAY 30 tablet 0     Calcium-Magnesium-Zinc 500-250-12.5 MG TABS Take 1 tablet by mouth daily       clopidogrel (PLAVIX) 75 MG tablet Take 1 tablet (75 mg) by mouth daily 90 tablet 3     hydrALAZINE (APRESOLINE) 10 MG tablet Take 1 tablet (10 mg) by mouth 3 times daily if BPs running higher than 140, if > 160 then take two " tabs at a time 90 tablet 3     MAGNESIUM PO Take 200 mg by mouth daily        metoprolol succinate ER (TOPROL-XL) 25 MG 24 hr tablet Take 0.5 tablets (12.5 mg) by mouth daily 45 tablet 1     nitroGLYcerin (NITROSTAT) 0.4 MG sublingual tablet For chest pain place 1 tablet under the tongue every 5 minutes for 3 doses. If symptoms persist 5 minutes after 1st dose call 911. 25 tablet 3     [DISCONTINUED] amLODIPine (NORVASC) 5 MG tablet Take 5 mg by mouth daily        No facility-administered encounter medications on file as of 1/21/2019.        Again, thank you for allowing me to participate in the care of your patient.      Sincerely,    Noel Villarreal MD     [facility]

## 2019-01-21 NOTE — LETTER
1/21/2019    Eusebio Oliveira MD  [address]  [place] [address]    RE: Augie Powell       Dear Colleague,    I had the pleasure of seeing Augie Powell in the [facility].    HPI and Plan:   See dictation    Orders Placed This Encounter   Procedures     Basic metabolic panel     Lipid Profile     Follow-Up with Cardiac Advanced Practice Provider     Orders Placed This Encounter   Medications     amLODIPine (NORVASC) 5 MG tablet     Sig: Take 1 tablet (5 mg) by mouth At Bedtime     Dispense:  90 tablet     Refill:  3     Medications Discontinued During This Encounter   Medication Reason     amLODIPine (NORVASC) 5 MG tablet Reorder         Encounter Diagnoses   Name Primary?     Coronary artery disease involving native coronary artery of native heart without angina pectoris      Benign essential hypertension Yes       CURRENT MEDICATIONS:  Current Outpatient Medications   Medication Sig Dispense Refill     amLODIPine (NORVASC) 5 MG tablet Take 1 tablet (5 mg) by mouth At Bedtime 90 tablet 3     aspirin (ASA) 81 MG EC tablet Take 1 tablet (81 mg) by mouth daily 90 tablet 3     atorvastatin (LIPITOR) 40 MG tablet Take 1 tablet (40 mg) by mouth every evening 90 tablet 3     benazepril (LOTENSIN) 20 MG tablet TAKE 1 TABLET EVERY DAY 30 tablet 0     Calcium-Magnesium-Zinc 500-250-12.5 MG TABS Take 1 tablet by mouth daily       clopidogrel (PLAVIX) 75 MG tablet Take 1 tablet (75 mg) by mouth daily 90 tablet 3     hydrALAZINE (APRESOLINE) 10 MG tablet Take 1 tablet (10 mg) by mouth 3 times daily if BPs running higher than 140, if > 160 then take two tabs at a time 90 tablet 3     MAGNESIUM PO Take 200 mg by mouth daily        metoprolol succinate ER (TOPROL-XL) 25 MG 24 hr tablet Take 0.5 tablets (12.5 mg) by mouth daily 45 tablet 1     nitroGLYcerin (NITROSTAT) 0.4 MG sublingual tablet For chest pain place 1 tablet under the tongue every 5 minutes for 3 doses. If  symptoms persist 5 minutes after 1st dose call 911. 25 tablet 3       ALLERGIES     Allergies   Allergen Reactions     No Known Allergies        PAST MEDICAL HISTORY:  Past Medical History:   Diagnosis Date     Aortic stenosis, mild      Coronary artery disease 10/2018    nSTEMI- Isis to mLAD, distal lad mild, Lcx-small 50-60%, RCA 60-70% normal ifR (despite echo showing poss old IMI)     Hyperlipidemia      Hypertension      Renal disease     CRD--creatinine in upper ones to 2, right kdney removed 2000 for canncer     Spondylosis with myelopathy, lumbar region        PAST SURGICAL HISTORY:  Past Surgical History:   Procedure Laterality Date     BACK SURGERY       CHOLECYSTECTOMY       DECOMPRESSION LUMBAR TWO LEVELS  12/1/2014    Procedure: DECOMPRESSION LUMBAR TWO LEVELS;  Surgeon: Remy Harmon MD;  Location: [facility] OR     HERNIA REPAIR       LAMINECTOMY, FUSION LUMBAR ONE LEVEL, COMBINED N/A 12/1/2014    Procedure: COMBINED LAMINECTOMY, FUSION LUMBAR ONE LEVEL;  Surgeon: Remy Harmon MD;  Location:  OR     PHACOEMULSIFICATION CLEAR CORNEA WITH STANDARD INTRAOCULAR LENS IMPLANT  10/22/2012    Procedure: PHACOEMULSIFICATION CLEAR CORNEA WITH STANDARD INTRAOCULAR LENS IMPLANT;  RIGHT EYE PHACOEMULSIFICATION CLEAR CORNEA WITH STANDARD INTRAOCULAR LENS IMPLANT ;  Surgeon: Grupo Granger MD;  Location:  [facility]     right nephrectomy[       STENT,CORONARY, S660 24/30  10/2018    mLAD       FAMILY HISTORY:  Family History   Problem Relation Age of Onset     No Known Problems Mother      Heart Surgery Father      No Known Problems Sister        SOCIAL HISTORY:  Social History     Socioeconomic History     Marital status:      Spouse name: None     Number of children: None     Years of education: None     Highest education level: None   Social Needs     Financial resource strain: None     Food insecurity - worry: None     Food insecurity - inability: None     Transportation needs - medical: None      "Transportation needs - non-medical: None   Occupational History     None   Tobacco Use     Smoking status: Former Smoker     Smokeless tobacco: Never Used   Substance and Sexual Activity     Alcohol use: Yes     Alcohol/week: 1.2 oz     Types: 2 Shots of liquor per week     Comment: rare     Drug use: No     Sexual activity: Not Currently   Other Topics Concern     None   Social History Narrative     None       Review of Systems:  Skin:  Positive for bruising;itching   Eyes:  Positive for glasses  ENT:  Negative    Respiratory:  Positive for dyspnea on exertion  Cardiovascular:    Positive for;lightheadedness;fatigue  Gastroenterology: Negative    Genitourinary:  Negative    Musculoskeletal:  Positive for nocturnal cramping  Neurologic:  Positive for numbness or tingling of feet  Psychiatric:  Negative    Heme/Lymph/Imm:  Negative    Endocrine:  Negative      Physical Exam:  Vitals: /62   Pulse 56   Ht 1.778 m (5' 10\")   Wt 79.4 kg (175 lb)   BMI 25.11 kg/m       Constitutional:           Skin:             Head:           Eyes:           Lymph:      ENT:           Neck:           Respiratory:            Cardiac:                                                           GI:           Extremities and Muscular Skeletal:                 Neurological:           Psych:         Recent Lab Results:  LIPID RESULTS:  Lab Results   Component Value Date    CHOL 86 01/21/2019    HDL 38 (L) 01/21/2019    LDL 37 01/21/2019    TRIG 57 01/21/2019       LIVER ENZYME RESULTS:  Lab Results   Component Value Date    AST 26 09/30/2018    ALT 10 01/21/2019       CBC RESULTS:  Lab Results   Component Value Date    WBC 8.8 10/09/2018    RBC 3.94 (L) 10/09/2018    HGB 12.6 (L) 10/09/2018    HCT 37.1 (L) 10/09/2018    MCV 94 10/09/2018    MCH 32.0 10/09/2018    MCHC 34.0 10/09/2018    RDW 12.7 10/09/2018     10/09/2018       BMP RESULTS:  Lab Results   Component Value Date     12/07/2018    POTASSIUM 4.7 12/07/2018    " CHLORIDE 106 12/07/2018    CO2 23 12/07/2018    ANIONGAP 6 12/07/2018    GLC 96 12/07/2018    BUN 24 12/07/2018    CR 1.96 (H) 12/07/2018    GFRESTIMATED 33 (L) 12/07/2018    GFRESTBLACK 40 (L) 12/07/2018    RICCARDO 8.4 (L) 12/07/2018        A1C RESULTS:  Lab Results   Component Value Date    A1C 5.5 09/29/2018       INR RESULTS:  Lab Results   Component Value Date    INR 0.89 10/04/2010           CC  No referring provider defined for this encounter.    Thank you for allowing me to participate in the care of your patient.      Sincerely,     Noel Villarreal MD     [facility]    cc:   No referring provider defined for this encounter.

## 2019-02-11 ENCOUNTER — HOSPITAL ENCOUNTER (EMERGENCY)
Facility: CLINIC | Age: 84
Discharge: HOME | End: 2019-02-11
Admitting: PHYSICIAN ASSISTANT
Payer: COMMERCIAL

## 2019-02-11 ENCOUNTER — APPOINTMENT (OUTPATIENT)
Dept: CT IMAGING | Facility: CLINIC | Age: 84
End: 2019-02-11
Attending: PHYSICIAN ASSISTANT
Payer: COMMERCIAL

## 2019-02-11 ENCOUNTER — TELEPHONE (OUTPATIENT)
Dept: CARDIOLOGY | Facility: CLINIC | Age: 84
End: 2019-02-11

## 2019-02-11 VITALS
WEIGHT: 176 LBS | DIASTOLIC BLOOD PRESSURE: 53 MMHG | SYSTOLIC BLOOD PRESSURE: 106 MMHG | BODY MASS INDEX: 25.25 KG/M2 | TEMPERATURE: 96.8 F | HEART RATE: 71 BPM | RESPIRATION RATE: 12 BRPM | OXYGEN SATURATION: 96 %

## 2019-02-11 DIAGNOSIS — R10.9 RIGHT FLANK PAIN: ICD-10-CM

## 2019-02-11 LAB
ALBUMIN SERPL-MCNC: 4.1 G/DL (ref 3.4–5)
ALBUMIN UR-MCNC: NEGATIVE MG/DL
ALP SERPL-CCNC: 73 U/L (ref 40–150)
ALT SERPL W P-5'-P-CCNC: 29 U/L (ref 0–70)
ANION GAP SERPL CALCULATED.3IONS-SCNC: 8 MMOL/L (ref 3–14)
APPEARANCE UR: CLEAR
AST SERPL W P-5'-P-CCNC: 21 U/L (ref 0–45)
BASOPHILS # BLD AUTO: 0 10E9/L (ref 0–0.2)
BASOPHILS NFR BLD AUTO: 0.4 %
BILIRUB SERPL-MCNC: 0.6 MG/DL (ref 0.2–1.3)
BILIRUB UR QL STRIP: NEGATIVE
BUN SERPL-MCNC: 35 MG/DL (ref 7–30)
CALCIUM SERPL-MCNC: 9.2 MG/DL (ref 8.5–10.1)
CHLORIDE SERPL-SCNC: 104 MMOL/L (ref 94–109)
CO2 SERPL-SCNC: 25 MMOL/L (ref 20–32)
COLOR UR AUTO: ABNORMAL
CREAT SERPL-MCNC: 1.98 MG/DL (ref 0.66–1.25)
DIFFERENTIAL METHOD BLD: ABNORMAL
EOSINOPHIL # BLD AUTO: 0.1 10E9/L (ref 0–0.7)
EOSINOPHIL NFR BLD AUTO: 0.7 %
ERYTHROCYTE [DISTWIDTH] IN BLOOD BY AUTOMATED COUNT: 12.4 % (ref 10–15)
GFR SERPL CREATININE-BSD FRML MDRD: 30 ML/MIN/{1.73_M2}
GLUCOSE SERPL-MCNC: 106 MG/DL (ref 70–99)
GLUCOSE UR STRIP-MCNC: NEGATIVE MG/DL
HCT VFR BLD AUTO: 39.6 % (ref 40–53)
HGB BLD-MCNC: 13.1 G/DL (ref 13.3–17.7)
HGB UR QL STRIP: NEGATIVE
IMM GRANULOCYTES # BLD: 0 10E9/L (ref 0–0.4)
IMM GRANULOCYTES NFR BLD: 0.4 %
INTERPRETATION ECG - MUSE: NORMAL
KETONES UR STRIP-MCNC: NEGATIVE MG/DL
LEUKOCYTE ESTERASE UR QL STRIP: NEGATIVE
LIPASE SERPL-CCNC: 124 U/L (ref 73–393)
LYMPHOCYTES # BLD AUTO: 1 10E9/L (ref 0.8–5.3)
LYMPHOCYTES NFR BLD AUTO: 11.2 %
MAGNESIUM SERPL-MCNC: 2.3 MG/DL (ref 1.6–2.3)
MCH RBC QN AUTO: 32.5 PG (ref 26.5–33)
MCHC RBC AUTO-ENTMCNC: 33.1 G/DL (ref 31.5–36.5)
MCV RBC AUTO: 98 FL (ref 78–100)
MONOCYTES # BLD AUTO: 0.7 10E9/L (ref 0–1.3)
MONOCYTES NFR BLD AUTO: 7.8 %
MUCOUS THREADS #/AREA URNS LPF: PRESENT /LPF
NEUTROPHILS # BLD AUTO: 7.2 10E9/L (ref 1.6–8.3)
NEUTROPHILS NFR BLD AUTO: 79.5 %
NITRATE UR QL: NEGATIVE
NRBC # BLD AUTO: 0 10*3/UL
NRBC BLD AUTO-RTO: 0 /100
PH UR STRIP: 7 PH (ref 5–7)
PLATELET # BLD AUTO: 242 10E9/L (ref 150–450)
POTASSIUM SERPL-SCNC: 4.7 MMOL/L (ref 3.4–5.3)
PROT SERPL-MCNC: 8.4 G/DL (ref 6.8–8.8)
RBC # BLD AUTO: 4.03 10E12/L (ref 4.4–5.9)
RBC #/AREA URNS AUTO: <1 /HPF (ref 0–2)
SODIUM SERPL-SCNC: 137 MMOL/L (ref 133–144)
SOURCE: ABNORMAL
SP GR UR STRIP: 1 (ref 1–1.03)
TROPONIN I SERPL-MCNC: <0.015 UG/L (ref 0–0.04)
UROBILINOGEN UR STRIP-MCNC: 0 MG/DL (ref 0–2)
WBC # BLD AUTO: 9.1 10E9/L (ref 4–11)
WBC #/AREA URNS AUTO: <1 /HPF (ref 0–5)

## 2019-02-11 PROCEDURE — 99285 EMERGENCY DEPT VISIT HI MDM: CPT | Mod: 25

## 2019-02-11 PROCEDURE — 25000128 H RX IP 250 OP 636: Performed by: PHYSICIAN ASSISTANT

## 2019-02-11 PROCEDURE — 74176 CT ABD & PELVIS W/O CONTRAST: CPT

## 2019-02-11 PROCEDURE — 93005 ELECTROCARDIOGRAM TRACING: CPT

## 2019-02-11 PROCEDURE — 81001 URINALYSIS AUTO W/SCOPE: CPT | Performed by: PHYSICIAN ASSISTANT

## 2019-02-11 PROCEDURE — 85025 COMPLETE CBC W/AUTO DIFF WBC: CPT | Performed by: PHYSICIAN ASSISTANT

## 2019-02-11 PROCEDURE — 84484 ASSAY OF TROPONIN QUANT: CPT | Performed by: PHYSICIAN ASSISTANT

## 2019-02-11 PROCEDURE — 83735 ASSAY OF MAGNESIUM: CPT | Performed by: PHYSICIAN ASSISTANT

## 2019-02-11 PROCEDURE — 80053 COMPREHEN METABOLIC PANEL: CPT | Performed by: PHYSICIAN ASSISTANT

## 2019-02-11 PROCEDURE — 96360 HYDRATION IV INFUSION INIT: CPT

## 2019-02-11 PROCEDURE — 83690 ASSAY OF LIPASE: CPT | Performed by: PHYSICIAN ASSISTANT

## 2019-02-11 PROCEDURE — 25000132 ZZH RX MED GY IP 250 OP 250 PS 637: Performed by: PHYSICIAN ASSISTANT

## 2019-02-11 RX ORDER — OXYCODONE HYDROCHLORIDE 5 MG/1
5 TABLET ORAL ONCE
Status: COMPLETED | OUTPATIENT
Start: 2019-02-11 | End: 2019-02-11

## 2019-02-11 RX ORDER — OXYCODONE HYDROCHLORIDE 5 MG/1
5 TABLET ORAL EVERY 6 HOURS PRN
Qty: 10 TABLET | Refills: 0 | Status: SHIPPED | OUTPATIENT
Start: 2019-02-11 | End: 2019-02-15

## 2019-02-11 RX ADMIN — OXYCODONE HYDROCHLORIDE 5 MG: 5 TABLET ORAL at 13:36

## 2019-02-11 RX ADMIN — SODIUM CHLORIDE 1000 ML: 9 INJECTION, SOLUTION INTRAVENOUS at 12:37

## 2019-02-11 ASSESSMENT — ENCOUNTER SYMPTOMS
SHORTNESS OF BREATH: 0
DYSURIA: 0
DIARRHEA: 0
HEMATURIA: 0
CHILLS: 0
BLOOD IN STOOL: 0
FEVER: 0
FLANK PAIN: 1
CONSTIPATION: 1
ABDOMINAL PAIN: 1
PALPITATIONS: 0
VOMITING: 0

## 2019-02-11 NOTE — ED AVS SNAPSHOT
[facility]  [address]  [facility] [address]  Phone:  [phone]  Fax:  [phone]                                    Augie Powell   MRN: [id number]    Department:  [facility]   Date of Visit:  2/11/2019           After Visit Summary Signature Page    I have received my discharge instructions, and my questions have been answered. I have discussed any challenges I see with this plan with the nurse or doctor.    ..........................................................................................................................................  Patient/Patient Representative Signature      ..........................................................................................................................................  Patient Representative Print Name and Relationship to Patient    ..................................................               ................................................  Date                                   Time    ..........................................................................................................................................  Reviewed by Signature/Title    ...................................................              ..............................................  Date                                               Time          22EPIC Rev 08/18

## 2019-02-11 NOTE — ED NOTES
Assisted Pt. To BR for UA, Pt. Gait slightly unsteady due to pain. One person assist needed. Pt. Not lightheaded or dizzy.

## 2019-02-11 NOTE — ED TRIAGE NOTES
Heart rate was initially 38 on monitor and palpated however after sitting for a couple minutes heart rate went up to 69.

## 2019-02-11 NOTE — ED TRIAGE NOTES
83-year-old male presents to the ER with complaints of right flank pain. Pt states it started last Sunday. Denies urinary symptoms. No nausea, vomiting or diarrhea. Also concerned about his blood pressure being too high.

## 2019-02-11 NOTE — ED PROVIDER NOTES
History     Chief Complaint:  Flank Pain    HPI   Augie Powell is a 83 year old male status post right nephrectomy, cholecystectomy, and coronary angiography who presents with right flank and right lower quadrant pain. The patient states that approximately 8 days ago he began experiencing a right lower abdominal and flank pain, seemingly just over his right hip, that has been progressive but waxing and waning over the past several days.  He notes that he fell on his other side approximately 6 days ago which did seem to make the symptoms worse  And feels as though he may have strained a muscle. He feels he has been somewhat constipated. Last night around 0030 the patient was woke up from sleep with worsening pain, and sweat.  His pain is significantly worse with positional change and twisting. Since that time, he has had constant severe pain prompting him to come here today for evaluation. The patient notes that his symptoms had started while driving home from a family member's home. He also states he suffered a slip and fall down onto his left side exactly a week ago today. The patient otherwise denies fevers, chills, nausea, vomiting, diarrhea, urinary symptoms. He notes some persistently high blood pressures over the past 7 days, being around 150 to 170 (highest last night) throughout the week.  He denies any chest pain, increased shortness of breath, or upper back pain.    Allergies:  No Known Allergies      Medications:    Norvasc  Aspirin  Lipitor  Lotensin  Plavix  Apresoline  Magnesium  Toprol-XL  Nitroglycerin     Past Medical History:    Acute coronary syndrome  CKD stage IV  Hypertension  Renal cell cancer  Hyperlipidemia   Degenerative spondylolisthesis   CAD    Past Surgical History:    Cholecystectomy  Decompression lumbar two levels  Back surgery  Laminectomy  Hernia repair  Coronary stent - 10/2018  Cataract surgery     Family History:    History reviewed. No pertinent family history.       Social History:  Smoking Status: Former Smoker  Alcohol Use: Yes  Patient presents with wife.   Marital Status:        Review of Systems   Constitutional: Negative for chills and fever.   Respiratory: Negative for shortness of breath.    Cardiovascular: Negative for chest pain and palpitations.   Gastrointestinal: Positive for abdominal pain and constipation. Negative for blood in stool, diarrhea and vomiting.   Genitourinary: Positive for flank pain. Negative for dysuria, hematuria, scrotal swelling and testicular pain.   All other systems reviewed and are negative.      Physical Exam     Patient Vitals for the past 24 hrs:   BP Temp Temp src Pulse Heart Rate Resp SpO2 Weight   02/11/19 1400 -- -- -- -- 61 -- 96 % --   02/11/19 1345 106/53 -- -- 71 60 -- 97 % --   02/11/19 1330 116/64 -- -- 73 60 -- 97 % --   02/11/19 1315 (!) 147/106 -- -- 75 68 -- 97 % --   02/11/19 1300 115/72 -- -- 74 68 -- 98 % --   02/11/19 1230 -- -- -- -- 68 12 98 % --   02/11/19 1215 126/76 -- -- 63 68 12 98 % --   02/11/19 1200 125/67 -- -- 58 62 11 97 % --   02/11/19 1145 132/49 -- -- 65 62 13 97 % --   02/11/19 1130 -- -- -- 65 73 18 97 % --   02/11/19 1115 141/66 -- -- 62 65 14 97 % --   02/11/19 1100 129/71 -- -- (!) 49 65 27 97 % --   02/11/19 1040 -- -- -- 69 -- -- -- --   02/11/19 1038 160/57 96.8  F (36  C) Temporal (!) 38 -- 18 98 % 79.8 kg (176 lb)      Physical Exam  General: Alert and cooperative with exam. Mildly uncomfortable appearing.  Head:  Scalp is NC/AT  Eyes:  No scleral icterus, PERRL,   ENT:  The external nose and ears are normal.   Neck:  Normal range of motion without rigidity.  CV:  Regular rate and rhythm    No pathologic murmur, rubs, or gallops.  Resp:  Breath sounds are clear bilaterally.  No crackles, wheezes, rhonchi.    Non-labored, no retractions or accessory muscle use  GI:  Abdomen is soft, no distension, no masses. No peritoneal signs.  Bowel sounds present in all quadrants  :  No  suprapubic tenderness.   Minimal right sided flank tenderness.  MS:  No lower extremity edema or asymmetric calf swelling.    No midline cervical, thoracic, or lumbar tenderness  Skin:  Warm and dry, No rash or lesions noted.  Neuro: Oriented x 3. No gross motor deficits.  Psych: Awake. Alert. Normal affect. Appropriate interactions.    Emergency Department Course     ECG (10:44:53):  Rate 70 bpm. DE interval 172. QRS duration 90. QT/QTc 378/408. P-R-T axes 73 57 56. Sinus rhythm with frequent premature ventricular complexes. Otherwise normal ECG. Interpreted by  and Dami Mnoterroso PA-C    Imaging:  Radiographic findings were communicated with the patient who voiced understanding of the findings.    CT-scan Abdomen/Pelvis w/o contrast:  No acute abnormality in the abdomen or pelvis. No cause  for right-sided pain is identified.    Preliminary result per radiology.      Laboratory:  CBC: HGB 13.1(L), o/w WNL (WBC 9.1, )   CMP: Cr 1.98 (H), Glucose 106 (H), BUN 35 (H), o/w WNL  Lipase: 124  1137 Troponin: <0.015   UA: Mucous present,o/w negative  Magnesium: 2.3    Interventions:  1237 - NS 1L IV Bolus   1336 - Oxycodone 5 mg PO    Emergency Department Course:  Past medical records, nursing notes, and vitals reviewed.  1032: I performed an exam of the patient and obtained history, as documented above.     IV inserted and blood drawn.     The patient was sent for a CT-scan while in the emergency department, findings above.     1315: I rechecked the patient. Explained findings to patient.     1430: I rechecked the patient. He was roadtested here and did well. Findings and plan explained to the Patient. Patient discharged home with instructions regarding supportive care, medications, and reasons to return. The importance of close follow-up was reviewed.      Impression & Plan      Medical Decision Makin-year-old male who presents with right flank pain.  Patient history and records reviewed.  On  examination, the patient is well-appearing with  stable vitals.  Lab work obtained as above demonstrates stable chronic kidney disease, otherwise unremarkable.  Urinalysis not suggestive of infection.  EKG obtained as above which shows no signs of ischemia.  CT scan of the abdomen and pelvis was performed as above which showed no acute abnormalities.    I suspect the patient's flank pain is likely musculoskeletal in nature given that it is worse with movement and positional change, and his recent history of trauma.  As noted above the CT showed no abnormalities, and the patient does not have a kidney on the right side.  He has no testicular symptoms to suggest referred pain.  The abdominal aorta was normal in diameter.  He does not have any chest pain or shortness of breath and EKG and troponin are both unremarkable despite symptoms for many days.  I think referred pain from other emergent cardiac or pulmonary cause such as pulmonary embolism or aortic dissection is exceedingly unlikely. LFT's and lipase normal and patient status post cholecystectomy.  He has no midline spinal tenderness, and is afebrile without neurologic deficits to suggest other emergent cause of back pain such as cauda equina syndrome, spinal epidural abscess, osteomyelitis, etc.  As patient is unable to have NSAIDs secondary to chronic kidney disease will prescribe a short course of pain medication as below and recommended to take Tylenol with this and apply ice/heat.  Discussed precautions for opioid pain medications including avoiding driving or operating machinery and increased risk of falls.  He will follow-up with his primary care provider in 1-2 days.  I recommended that the patient return to the emergency department if he has any new or worsening symptoms, or if he develops pain higher up in the thoracic back area, fever, chills, chest pain, or shortness of breath.    Diagnosis:    ICD-10-CM    1. Right flank pain R10.9      Discharge  Medications:     oxyCODONE 5 MG tablet  Commonly known as:  ROXICODONE  5 mg, Oral, EVERY 6 HOURS PRN          Nitin Abbott  2/11/2019   [facility] EMERGENCY DEPARTMENT  I, Nitin Abbott, am serving as a scribe at 3:43 PM on 2/11/2019 to document services personally performed by Dami Monterroso PA-C based on my observations and the provider's statements to me.       Dami Monterroso PA-C  02/11/19 1904

## 2019-02-11 NOTE — TELEPHONE ENCOUNTER
Pt called in he fell a few days ago and arm is all black and blue. Pt says he is having pain on opposite side of body in abdomin, and BP has been up since fall.  Pt also reports he awoke last night very hot , but did not take his temperature. Informed Pt he should have injury evalutaed and see PMD. Pt said PMD has no openings. Informed Pt he should then go to Urgent care. Pt said that is not open until 1:00. Discussed with Pt if he felt this was an emergency?  Pt said he had pain on his side 5-6 out of 10. Informed Pt if he is feeling that bad then he should be seen, and he can go to emergency. BERKLEY Howard RN

## 2019-02-15 ENCOUNTER — TELEPHONE (OUTPATIENT)
Dept: CARDIOLOGY | Facility: CLINIC | Age: 84
End: 2019-02-15

## 2019-02-15 ENCOUNTER — OFFICE VISIT (OUTPATIENT)
Dept: FAMILY MEDICINE | Facility: CLINIC | Age: 84
End: 2019-02-15
Payer: COMMERCIAL

## 2019-02-15 VITALS
HEART RATE: 54 BPM | HEIGHT: 70 IN | DIASTOLIC BLOOD PRESSURE: 68 MMHG | OXYGEN SATURATION: 100 % | SYSTOLIC BLOOD PRESSURE: 132 MMHG | BODY MASS INDEX: 25.2 KG/M2 | WEIGHT: 176 LBS | TEMPERATURE: 97.7 F

## 2019-02-15 DIAGNOSIS — I10 BENIGN ESSENTIAL HYPERTENSION: ICD-10-CM

## 2019-02-15 DIAGNOSIS — R10.9 FLANK PAIN: Primary | ICD-10-CM

## 2019-02-15 PROCEDURE — 99214 OFFICE O/P EST MOD 30 MIN: CPT | Performed by: INTERNAL MEDICINE

## 2019-02-15 RX ORDER — OXYCODONE HYDROCHLORIDE 5 MG/1
5 TABLET ORAL DAILY PRN
Qty: 20 TABLET | Refills: 0 | Status: SHIPPED | OUTPATIENT
Start: 2019-02-15 | End: 2020-01-09

## 2019-02-15 RX ORDER — AMLODIPINE BESYLATE 5 MG/1
5 TABLET ORAL AT BEDTIME
Qty: 90 TABLET | Refills: 3 | Status: SHIPPED | OUTPATIENT
Start: 2019-02-15 | End: 2019-12-17

## 2019-02-15 RX ORDER — BENAZEPRIL HYDROCHLORIDE 20 MG/1
20 TABLET ORAL DAILY
Qty: 90 TABLET | Refills: 3 | Status: SHIPPED | OUTPATIENT
Start: 2019-02-15 | End: 2020-03-17

## 2019-02-15 RX ORDER — BENAZEPRIL HYDROCHLORIDE 20 MG/1
20 TABLET ORAL DAILY
Qty: 90 TABLET | Refills: 3 | Status: SHIPPED | OUTPATIENT
Start: 2019-02-15 | End: 2019-02-15

## 2019-02-15 RX ORDER — AMLODIPINE BESYLATE 5 MG/1
5 TABLET ORAL AT BEDTIME
Qty: 90 TABLET | Refills: 3 | Status: SHIPPED | OUTPATIENT
Start: 2019-02-15 | End: 2019-02-15

## 2019-02-15 ASSESSMENT — MIFFLIN-ST. JEOR: SCORE: 1499.58

## 2019-02-15 NOTE — PROGRESS NOTES
SUBJECTIVE:   Augie Powell is a 83 year old male who presents to clinic today for the following health issues:      ED/UC Followup:    Facility:  [facility] Emergency Department  Date of visit: 02/11/19  Reason for visit: Right flank pain  Current Status: Pain has been up and down, patient states not taking as much pain medication. Pain is fluctuating       Flank Pain    Duration:     Since: recent           Specific cause: recent fall    Description:      Location of pain: right flank     Character of pain: sharp     Pain radiation: none    Intensity: moderate    History:      Pain interferes with job: N/A     History of similar pain problems: yes     Any previous MRI or X-rays: yes     Therapies tried without relief: none    Alleviating factors:      Improved by: oxycodone pain medication    Precipitating factors:    Worsened by: none    Accompanying Signs & Symptoms: none            Current Medications:     Current Outpatient Medications   Medication Sig Dispense Refill     amLODIPine (NORVASC) 5 MG tablet Take 1 tablet (5 mg) by mouth At Bedtime 90 tablet 3     aspirin (ASA) 81 MG EC tablet Take 1 tablet (81 mg) by mouth daily 90 tablet 3     atorvastatin (LIPITOR) 40 MG tablet Take 1 tablet (40 mg) by mouth every evening 90 tablet 3     benazepril (LOTENSIN) 20 MG tablet Take 1 tablet (20 mg) by mouth daily 90 tablet 3     Calcium-Magnesium-Zinc 500-250-12.5 MG TABS Take 1 tablet by mouth daily       clopidogrel (PLAVIX) 75 MG tablet Take 1 tablet (75 mg) by mouth daily 90 tablet 3     hydrALAZINE (APRESOLINE) 10 MG tablet Take 1 tablet (10 mg) by mouth 3 times daily if BPs running higher than 140, if > 160 then take two tabs at a time 90 tablet 3     MAGNESIUM PO Take 200 mg by mouth daily        metoprolol succinate ER (TOPROL-XL) 25 MG 24 hr tablet Take 0.5 tablets (12.5 mg) by mouth daily 45 tablet 1     nitroGLYcerin (NITROSTAT) 0.4 MG sublingual tablet For chest pain place 1 tablet  under the tongue every 5 minutes for 3 doses. If symptoms persist 5 minutes after 1st dose call 911. 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     Spondylosis with myelopathy, lumbar region          Past Surgical History:     Past Surgical History:   Procedure Laterality Date     BACK SURGERY       CHOLECYSTECTOMY       DECOMPRESSION LUMBAR TWO LEVELS  12/1/2014    Procedure: DECOMPRESSION LUMBAR TWO LEVELS;  Surgeon: Remy Harmon MD;  Location: [facility] OR     HERNIA REPAIR       LAMINECTOMY, FUSION LUMBAR ONE LEVEL, COMBINED N/A 12/1/2014    Procedure: COMBINED LAMINECTOMY, FUSION LUMBAR ONE LEVEL;  Surgeon: eRmy Harmon MD;  Location:  OR     PHACOEMULSIFICATION CLEAR CORNEA WITH STANDARD INTRAOCULAR LENS IMPLANT  10/22/2012    Procedure: PHACOEMULSIFICATION CLEAR CORNEA WITH STANDARD INTRAOCULAR LENS IMPLANT;  RIGHT EYE PHACOEMULSIFICATION CLEAR CORNEA WITH STANDARD INTRAOCULAR LENS IMPLANT ;  Surgeon: Grupo Granger MD;  Location:  [facility]     right nephrectomy[       STENT,CORONARY, S660 24/30  10/2018    mLAD         Family Medical History:     Family History   Problem Relation Age of Onset     No Known Problems Mother      Heart Surgery Father      No Known Problems Sister          Social History:     Social History     Socioeconomic History     Marital status:      Spouse name: Not on file     Number of children: Not on file     Years of education:  "Not on file     Highest education level: Not on file   Social Needs     Financial resource strain: Not on file     Food insecurity - worry: Not on file     Food insecurity - inability: Not on file     Transportation needs - medical: Not on file     Transportation needs - non-medical: Not on file   Occupational History     Not on file   Tobacco Use     Smoking status: Former Smoker     Smokeless tobacco: Never Used   Substance and Sexual Activity     Alcohol use: Yes     Alcohol/week: 1.2 oz     Types: 2 Shots of liquor per week     Comment: rare     Drug use: No     Sexual activity: Not Currently   Other Topics Concern     Not on file   Social History Narrative     Not on file           Review of System:     Constitutional: Negative for fever or chills  Skin: Negative for rashes  Ears/Nose/Throat: Negative for nasal congestion, sore throat  Respiratory: No shortness of breath, dyspnea on exertion, cough, or hemoptysis  Cardiovascular: Negative for chest pain  Gastrointestinal: Negative for nausea, vomiting  Genitourinary: Negative for dysuria, hematuria  Musculoskeletal: Positive for right flank pains  Neurologic: Negative for headaches  Psychiatric: Negative for depression, anxiety  Hematologic/Lymphatic/Immunologic: Negative  Endocrine: Negative  Behavioral: Negative for tobacco use       Physical Exam:   /68 (BP Location: Right arm, Patient Position: Sitting, Cuff Size: Adult Regular)   Pulse 54   Temp 97.7  F (36.5  C) (Oral)   Ht 1.778 m (5' 10\")   Wt 79.8 kg (176 lb)   SpO2 100%   BMI 25.25 kg/m      GENERAL: alert and no distress  EYES: eyes grossly normal to inspection, and conjunctivae and sclerae normal  HENT: Normocephalic atraumatic. Nose and mouth without ulcers or lesions  NECK: supple  RESP: lungs clear to auscultation   CV: regular rate and rhythm, normal S1 S2  LYMPH: no peripheral edema   ABDOMEN: nondistended  MS: right flank pains noted  SKIN: no suspicious lesions or rashes  NEURO: " Alert & Oriented x 3.   PSYCH: mentation appears normal, affect normal        Diagnostic Test Results:     Diagnostic Test Results:  Results for orders placed or performed during the hospital encounter of 02/11/19   CT Abdomen Pelvis w/o Contrast    Narrative    CT ABDOMEN/PELVIS WITHOUT CONTRAST February 11, 2019 12:35 PM     HISTORY: Right flank pain.    TECHNIQUE: Volumetric helical sections were acquired from the lung  bases through the ischial tuberosities without IV contrast. Coronal  images were also reconstructed. Radiation dose for this scan was  reduced using automated exposure control, adjustment of the mA and/or  kV according to patient size, or iterative reconstruction technique.    COMPARISON: None.    FINDINGS: Postoperative changes of prior right nephrectomy. No  evidence for recurrent neoplasm in the right nephrectomy bed. Multiple  left renal cysts are noted, with the largest in the lower pole  measuring 7.2 cm. No hydronephrosis in the left kidney. Prior  cholecystectomy. The liver, spleen, left adrenal gland, and pancreas  have unremarkable noncontrast appearances. Mild atherosclerotic  aortoiliac calcification. No bowel obstruction. Unremarkable appendix.  No evidence for colitis or diverticulitis. No free fluid in the  pelvis. Small hiatal hernia. Coronary artery calcification. Mild  fibrotic changes about the periphery of both lung bases. Degenerative  changes are noted throughout the visualized thoracolumbar spine.  Postoperative changes of posterior braden and pedicle screw fusion at  L4-L5.        Impression    IMPRESSION: No acute abnormality in the abdomen or pelvis. No cause  for right-sided pain is identified.            CLARKE SHELTON MD   CBC with platelets differential   Result Value Ref Range    WBC 9.1 4.0 - 11.0 10e9/L    RBC Count 4.03 (L) 4.4 - 5.9 10e12/L    Hemoglobin 13.1 (L) 13.3 - 17.7 g/dL    Hematocrit 39.6 (L) 40.0 - 53.0 %    MCV 98 78 - 100 fl    MCH 32.5 26.5 - 33.0 pg     MCHC 33.1 31.5 - 36.5 g/dL    RDW 12.4 10.0 - 15.0 %    Platelet Count 242 150 - 450 10e9/L    Diff Method Automated Method     % Neutrophils 79.5 %    % Lymphocytes 11.2 %    % Monocytes 7.8 %    % Eosinophils 0.7 %    % Basophils 0.4 %    % Immature Granulocytes 0.4 %    Nucleated RBCs 0 0 /100    Absolute Neutrophil 7.2 1.6 - 8.3 10e9/L    Absolute Lymphocytes 1.0 0.8 - 5.3 10e9/L    Absolute Monocytes 0.7 0.0 - 1.3 10e9/L    Absolute Eosinophils 0.1 0.0 - 0.7 10e9/L    Absolute Basophils 0.0 0.0 - 0.2 10e9/L    Abs Immature Granulocytes 0.0 0 - 0.4 10e9/L    Absolute Nucleated RBC 0.0    Comprehensive metabolic panel   Result Value Ref Range    Sodium 137 133 - 144 mmol/L    Potassium 4.7 3.4 - 5.3 mmol/L    Chloride 104 94 - 109 mmol/L    Carbon Dioxide 25 20 - 32 mmol/L    Anion Gap 8 3 - 14 mmol/L    Glucose 106 (H) 70 - 99 mg/dL    Urea Nitrogen 35 (H) 7 - 30 mg/dL    Creatinine 1.98 (H) 0.66 - 1.25 mg/dL    GFR Estimate 30 (L) >60 mL/min/[1.73_m2]    GFR Estimate If Black 35 (L) >60 mL/min/[1.73_m2]    Calcium 9.2 8.5 - 10.1 mg/dL    Bilirubin Total 0.6 0.2 - 1.3 mg/dL    Albumin 4.1 3.4 - 5.0 g/dL    Protein Total 8.4 6.8 - 8.8 g/dL    Alkaline Phosphatase 73 40 - 150 U/L    ALT 29 0 - 70 U/L    AST 21 0 - 45 U/L   Lipase   Result Value Ref Range    Lipase 124 73 - 393 U/L   Troponin I   Result Value Ref Range    Troponin I ES <0.015 0.000 - 0.045 ug/L   UA with Microscopic   Result Value Ref Range    Color Urine Straw     Appearance Urine Clear     Glucose Urine Negative NEG^Negative mg/dL    Bilirubin Urine Negative NEG^Negative    Ketones Urine Negative NEG^Negative mg/dL    Specific Gravity Urine 1.005 1.003 - 1.035    Blood Urine Negative NEG^Negative    pH Urine 7.0 5.0 - 7.0 pH    Protein Albumin Urine Negative NEG^Negative mg/dL    Urobilinogen mg/dL 0.0 0.0 - 2.0 mg/dL    Nitrite Urine Negative NEG^Negative    Leukocyte Esterase Urine Negative NEG^Negative    Source Midstream Urine     WBC  Urine <1 0 - 5 /HPF    RBC Urine <1 0 - 2 /HPF    Mucous Urine Present (A) NEG^Negative /LPF   Magnesium   Result Value Ref Range    Magnesium 2.3 1.6 - 2.3 mg/dL   EKG 12 lead   Result Value Ref Range    Interpretation ECG Click View Image link to view waveform and result        ASSESSMENT/PLAN:       (R10.9) Flank pain  (primary encounter diagnosis)  Comment: flank pains of likely musculoskeletal etiology, currently well controlled on low dose oxycodone pain medication. Patient is requesting a refill of the Oxycodone medication at this time.  Plan: oxyCODONE (ROXICODONE) 5 MG tablet      (I10) Benign essential hypertension  Comment: BP is at an acceptable range for the patient's age. Patient is due for a refill of his BP medications at this time.  Plan: amLODIPine (NORVASC) 5 MG tablet, benazepril         (LOTENSIN) 20 MG tablet              Follow Up Plan:     Patient is instructed to return to Internal Medicine clinic for follow-up visit in 1 week.        Rosi Yoo MD  Internal Medicine  [facility]

## 2019-02-15 NOTE — TELEPHONE ENCOUNTER
RN called and spoke with wife in response to voicemail regarding fill scripts for Amlodipine and benazepril. Informed wife scripts were sent to pharmacy.

## 2019-03-05 ENCOUNTER — TELEPHONE (OUTPATIENT)
Dept: CARDIOLOGY | Facility: CLINIC | Age: 84
End: 2019-03-05

## 2019-03-05 NOTE — TELEPHONE ENCOUNTER
Pt called in he needs refill of Hydralazine. Pt has prescription for hydralazine 10 mg TID. Per Pt he is on his own taking 5 mg at 5 am in morning, and 10 mg 900, 300, and 930 and BP's are well controlled. Informed Pt would message DR If this dosing is fine to refill his Rx at. BERKLEY Howard RN

## 2019-03-07 DIAGNOSIS — I10 BENIGN ESSENTIAL HYPERTENSION: ICD-10-CM

## 2019-03-07 RX ORDER — HYDRALAZINE HYDROCHLORIDE 10 MG/1
TABLET, FILM COATED ORAL
Qty: 30 TABLET | Refills: 0 | Status: SHIPPED | OUTPATIENT
Start: 2019-03-07 | End: 2019-04-02

## 2019-04-02 DIAGNOSIS — I10 BENIGN ESSENTIAL HYPERTENSION: ICD-10-CM

## 2019-04-02 RX ORDER — HYDRALAZINE HYDROCHLORIDE 10 MG/1
TABLET, FILM COATED ORAL
Qty: 100 TABLET | Refills: 3 | Status: SHIPPED | OUTPATIENT
Start: 2019-04-02 | End: 2019-04-02

## 2019-04-02 RX ORDER — HYDRALAZINE HYDROCHLORIDE 10 MG/1
TABLET, FILM COATED ORAL
Qty: 100 TABLET | Refills: 3 | Status: SHIPPED | OUTPATIENT
Start: 2019-04-02 | End: 2019-04-09

## 2019-04-09 DIAGNOSIS — I10 BENIGN ESSENTIAL HYPERTENSION: ICD-10-CM

## 2019-04-09 DIAGNOSIS — I21.4 NSTEMI (NON-ST ELEVATED MYOCARDIAL INFARCTION) (H): ICD-10-CM

## 2019-04-09 RX ORDER — METOPROLOL SUCCINATE 25 MG/1
12.5 TABLET, EXTENDED RELEASE ORAL DAILY
Qty: 45 TABLET | Refills: 1 | Status: SHIPPED | OUTPATIENT
Start: 2019-04-09 | End: 2019-08-29

## 2019-04-09 RX ORDER — HYDRALAZINE HYDROCHLORIDE 10 MG/1
TABLET, FILM COATED ORAL
Qty: 120 TABLET | Refills: 3 | Status: SHIPPED | OUTPATIENT
Start: 2019-04-09 | End: 2019-07-31

## 2019-04-29 ENCOUNTER — TELEPHONE (OUTPATIENT)
Dept: FAMILY MEDICINE | Facility: CLINIC | Age: 84
End: 2019-04-29

## 2019-04-29 ENCOUNTER — OFFICE VISIT (OUTPATIENT)
Dept: FAMILY MEDICINE | Facility: CLINIC | Age: 84
End: 2019-04-29
Payer: COMMERCIAL

## 2019-04-29 VITALS
DIASTOLIC BLOOD PRESSURE: 78 MMHG | TEMPERATURE: 97.8 F | WEIGHT: 176 LBS | SYSTOLIC BLOOD PRESSURE: 153 MMHG | HEIGHT: 70 IN | BODY MASS INDEX: 25.2 KG/M2 | HEART RATE: 59 BPM | OXYGEN SATURATION: 98 %

## 2019-04-29 DIAGNOSIS — M43.10 SPONDYLOLISTHESIS, UNSPECIFIED SPINAL REGION: ICD-10-CM

## 2019-04-29 DIAGNOSIS — C64.9 RENAL CELL CANCER, UNSPECIFIED LATERALITY (H): ICD-10-CM

## 2019-04-29 DIAGNOSIS — N18.4 CKD (CHRONIC KIDNEY DISEASE) STAGE 4, GFR 15-29 ML/MIN (H): ICD-10-CM

## 2019-04-29 DIAGNOSIS — E78.5 HYPERLIPIDEMIA LDL GOAL <130: ICD-10-CM

## 2019-04-29 DIAGNOSIS — M43.10 DEGENERATIVE SPONDYLOLISTHESIS: ICD-10-CM

## 2019-04-29 DIAGNOSIS — I10 BENIGN ESSENTIAL HYPERTENSION: Primary | ICD-10-CM

## 2019-04-29 DIAGNOSIS — I24.9 ACS (ACUTE CORONARY SYNDROME) (H): ICD-10-CM

## 2019-04-29 PROCEDURE — 99214 OFFICE O/P EST MOD 30 MIN: CPT | Performed by: INTERNAL MEDICINE

## 2019-04-29 RX ORDER — CYCLOBENZAPRINE HCL 10 MG
TABLET ORAL
Qty: 21 TABLET | Refills: 0 | Status: SHIPPED | OUTPATIENT
Start: 2019-04-29 | End: 2019-05-20

## 2019-04-29 ASSESSMENT — MIFFLIN-ST. JEOR: SCORE: 1499.58

## 2019-04-29 NOTE — PROGRESS NOTES
SUBJECTIVE:   Augie Powell is a 83 year old male who presents to clinic today for the following   health issues:    Augie Powell is accompanied today by wife.     Hypertension  Augie provides a record of his BP which shows moderate elevations of his BP.     Neck and Left Leg Pain, Headache  Left leg pain Saturday night, and had taken an oxycodone. Had fell two months ago on ice which caused pain in his lower back and legs. Usually happens after he walks around, but Sat occurred while he was sitting and watching TV. Pain was moderately improved, currently feels like he has a strained neck with no notable causes that causes headaches.         Additional history: as documented    Reviewed  and updated as needed this visit by clinical staff  Tobacco  Allergies  Meds  Problems  Med Hx  Surg Hx  Fam Hx  Soc Hx          Reviewed and updated as needed this visit by Provider         Patient Active Problem List   Diagnosis     Degenerative spondylolisthesis     Spondylolisthesis     Benign essential hypertension     Renal cell cancer, unspecified laterality (H)     Hyperlipidemia LDL goal <130     CKD (chronic kidney disease) stage 4, GFR 15-29 ml/min (H)     ACS (acute coronary syndrome) (H)     Past Surgical History:   Procedure Laterality Date     BACK SURGERY       CHOLECYSTECTOMY       DECOMPRESSION LUMBAR TWO LEVELS  12/1/2014    Procedure: DECOMPRESSION LUMBAR TWO LEVELS;  Surgeon: Remy Harmon MD;  Location: [facility] OR     HERNIA REPAIR       LAMINECTOMY, FUSION LUMBAR ONE LEVEL, COMBINED N/A 12/1/2014    Procedure: COMBINED LAMINECTOMY, FUSION LUMBAR ONE LEVEL;  Surgeon: Remy Harmon MD;  Location: [facility] OR     PHACOEMULSIFICATION CLEAR CORNEA WITH STANDARD INTRAOCULAR LENS IMPLANT  10/22/2012    Procedure: PHACOEMULSIFICATION CLEAR CORNEA WITH STANDARD INTRAOCULAR LENS IMPLANT;  RIGHT EYE PHACOEMULSIFICATION CLEAR CORNEA WITH STANDARD INTRAOCULAR LENS IMPLANT ;  Surgeon: Grupo Granger  MD ROQUE;  Location:  [facility]     right nephrectomy[       STENT,CORONARY, S660 24/30  10/2018    mLAD       Social History     Tobacco Use     Smoking status: Former Smoker     Smokeless tobacco: Never Used   Substance Use Topics     Alcohol use: Yes     Alcohol/week: 1.2 oz     Types: 2 Shots of liquor per week     Comment: rare     Family History   Problem Relation Age of Onset     No Known Problems Mother      Heart Surgery Father      No Known Problems Sister          Current Outpatient Medications   Medication Sig Dispense Refill     amLODIPine (NORVASC) 5 MG tablet Take 1 tablet (5 mg) by mouth At Bedtime 90 tablet 3     aspirin (ASA) 81 MG EC tablet Take 1 tablet (81 mg) by mouth daily 90 tablet 3     atorvastatin (LIPITOR) 40 MG tablet Take 1 tablet (40 mg) by mouth every evening 90 tablet 3     benazepril (LOTENSIN) 20 MG tablet Take 1 tablet (20 mg) by mouth daily 90 tablet 3     Calcium-Magnesium-Zinc 500-250-12.5 MG TABS Take 1 tablet by mouth daily       clopidogrel (PLAVIX) 75 MG tablet Take 1 tablet (75 mg) by mouth daily 90 tablet 3     cyclobenzaprine (FLEXERIL) 10 MG tablet Si/2-1 po tid prn 21 tablet 0     hydrALAZINE (APRESOLINE) 10 MG tablet Pt taking 5 mg at 530 am, and then 10 mg 3 more times daily if BPs running higher than 140, if > 160 then take two tabs at a time 120 tablet 3     MAGNESIUM PO Take 200 mg by mouth daily        metoprolol succinate ER (TOPROL-XL) 25 MG 24 hr tablet Take 0.5 tablets (12.5 mg) by mouth daily 45 tablet 1     nitroGLYcerin (NITROSTAT) 0.4 MG sublingual tablet For chest pain place 1 tablet under the tongue every 5 minutes for 3 doses. If symptoms persist 5 minutes after 1st dose call 911. 25 tablet 3     oxyCODONE (ROXICODONE) 5 MG tablet Take 1 tablet (5 mg) by mouth daily as needed for pain 20 tablet 0     Allergies   Allergen Reactions     No Known Allergies      Labs reviewed in EPIC    ROS:  CONSTITUTIONAL: NEGATIVE for fever, chills, change in  "weight  INTEGUMENTARY/SKIN: NEGATIVE for worrisome rashes, moles or lesions  EYES: NEGATIVE for vision changes or irritation  ENT/MOUTH: NEGATIVE for ear, mouth and throat problems  RESP: NEGATIVE for significant cough or SOB  BREAST: NEGATIVE for masses, tenderness or discharge  CV: NEGATIVE for chest pain, palpitations or peripheral edema  GI: NEGATIVE for nausea, abdominal pain, heartburn, or change in bowel habits  : NEGATIVE for frequency, dysuria, or hematuria  MUSCULOSKELETAL: NEGATIVE for significant arthralgias or myalgia  NEURO: NEGATIVE for weakness, dizziness or paresthesias  ENDOCRINE: NEGATIVE for temperature intolerance, skin/hair changes  HEME: NEGATIVE for bleeding problems  PSYCHIATRIC: NEGATIVE for changes in mood or affect  This document serves as a record of the services and decisions personally performed and made by Eusebio Oliveira MD. It was created on his behalf by Kal Garcia, a trained medical scribe. The creation of this document is based the provider's statements to the medical scribe.  Scribe Kal Garcia 2:15 PM, April 29, 2019    OBJECTIVE:   /78 (BP Location: Right arm, Patient Position: Chair, Cuff Size: Adult Large)   Pulse 59   Temp 97.8  F (36.6  C) (Oral)   Ht 1.778 m (5' 10\")   Wt 79.8 kg (176 lb)   SpO2 98%   BMI 25.25 kg/m    Body mass index is 25.25 kg/m .  Neck was supple without adenopathy or thyromegaly his carotids were normal without bruits  Chest clear to auscultation and percussion  Cardiovascular S1 and S2 are physiologic without murmurs or gallops  Abdomen bowel sounds were normal, no CVAT  There is no palpable mass or organomegaly  Extremities nontender without any edema  Legs; straight leg was negative but aggravated his low back pain   DTR normal in the patella, absent in the achilles   Back; tightness of both of trapezius muscle, reduced rotation 30 degrees bilaterally. Para-spinous tightness, and pain at L2 that radiates across his back   Pulses pedal pulses " are as described otherwise his pulses are bilaterally symmetrical throughout without bruits  Skin without significant abnormality    BP Recheck: 134/80    ASSESSMENT/PLAN:   Augie was seen today for hypertension.    Diagnoses and all orders for this visit:    Benign essential hypertension  Acute neck and back pain aggravating his BP which causes him to be anxious. Begin Flexeril prn, Tylenol 325mg three tablets at bedtime.   Patient continues to monitor blood pressure closely and if his blood pressure continues to be above 140/90  We will continue to adjust his amlodipine  Renal cell cancer, unspecified laterality (H)    CKD (chronic kidney disease) stage 4, GFR 15-29 ml/min (H)  Continue to monitor closely especially in the face of his hypertension  ACS (acute coronary syndrome) (H)    Degenerative spondylolisthesis  See above  -     cyclobenzaprine (FLEXERIL) 10 MG tablet; Si/2-1 po tid prn    Spondylolisthesis, unspecified spinal region    Hyperlipidemia LDL goal <130    The information in this document, created by the medical scribe for me, accurately reflects the services I personally performed and the decisions made by me. I have reviewed and approved this document for accuracy prior to leaving the patient care area.  2:13 PM, 19    Eusebio Oliveira MD  [facility]

## 2019-04-29 NOTE — TELEPHONE ENCOUNTER
Reason for call:  Symptom   Symptom or request: High BP past 2 days, headache    Duration (how long have symptoms been present): 2 days  Have you been treated for this before? Yes    Additional comments: Would like to be seen today    Phone number to reach patient:  Home number on file [phone] (home)    Best Time:  Anytime    Can we leave a detailed message on this number?  YES

## 2019-04-29 NOTE — TELEPHONE ENCOUNTER
Reports home readings of 157/x, 159/73, 162/x  2 days ago was in 120s.  Has had headache since yesterday. Huddled with PCP, and scheduled today w PCP.  Denies any SOB/Difficulty breathing/Chest Pain/Confusion      BP Readings from Last 6 Encounters:   02/15/19 132/68   02/11/19 106/53   01/21/19 136/62   12/13/18 151/61   11/29/18 124/66   11/08/18 144/62

## 2019-05-20 DIAGNOSIS — M43.10 DEGENERATIVE SPONDYLOLISTHESIS: ICD-10-CM

## 2019-05-20 NOTE — TELEPHONE ENCOUNTER
cyclobenzaprine (FLEXERIL) 10 MG tablet    Last Written Prescription Date:  04/29/2019  Last Fill Quantity: 21,  # refills: 0   Last office visit: 4/29/2019 with prescribing provider:  Roxanne    Future Office Visit:  Unknown     Requested Prescriptions   Pending Prescriptions Disp Refills     cyclobenzaprine (FLEXERIL) 10 MG tablet [Pharmacy Med Name: CYCLOBENZAPRINE 10 MG TABLET] 21 tablet 0     Sig: TAKE 1/2 TO 1 TABLET BY MOUTH 3 TIMES DAILY AS NEEDED       There is no refill protocol information for this order

## 2019-05-23 RX ORDER — CYCLOBENZAPRINE HCL 10 MG
TABLET ORAL
Qty: 21 TABLET | Refills: 0 | Status: SHIPPED | OUTPATIENT
Start: 2019-05-23 | End: 2019-07-03

## 2019-07-03 DIAGNOSIS — M43.10 DEGENERATIVE SPONDYLOLISTHESIS: ICD-10-CM

## 2019-07-03 NOTE — TELEPHONE ENCOUNTER
Pending Prescriptions:                       Disp   Refills    cyclobenzaprine (FLEXERIL) 10 MG tablet [*21 tab*0            Sig: TAKE 1/2 TO 1 TABLET BY MOUTH 3 TIMES DAILY AS           NEEDED        Last Written Prescription Date:  05/23/2019  Last Fill Quantity: 21,   # refills: 0  Last Office Visit: 04/29/2019  Future Office visit:    Next 5 appointments (look out 90 days)    Jul 31, 2019 10:50 AM CDT  Return Visit with Martha Rm PA-C  [facility] ([facility] [address] [place] [id number] [phone] OPT 2           Routing refill request to provider for review/approval because:  Drug not on the [facility] or [facility] refill protocol or controlled substance

## 2019-07-05 RX ORDER — CYCLOBENZAPRINE HCL 10 MG
TABLET ORAL
Qty: 21 TABLET | Refills: 0 | Status: ON HOLD | OUTPATIENT
Start: 2019-07-05 | End: 2024-01-01

## 2019-07-19 ENCOUNTER — TELEPHONE (OUTPATIENT)
Dept: CARDIOLOGY | Facility: CLINIC | Age: 84
End: 2019-07-19

## 2019-07-19 NOTE — TELEPHONE ENCOUNTER
Called patient to check in prior to his scheduled visit with Jessica Rm PA-C on 7/31/19 to see where he has followed with Nephrology so that records can be requested for review before the visit. Unable to reach him, but left a message requesting that he call back to discuss this. MORIS Benton RN - 07/19/19, 9:25 AM

## 2019-07-31 ENCOUNTER — OFFICE VISIT (OUTPATIENT)
Dept: CARDIOLOGY | Facility: CLINIC | Age: 84
End: 2019-07-31
Attending: INTERNAL MEDICINE
Payer: COMMERCIAL

## 2019-07-31 VITALS
HEIGHT: 70 IN | DIASTOLIC BLOOD PRESSURE: 68 MMHG | SYSTOLIC BLOOD PRESSURE: 122 MMHG | BODY MASS INDEX: 24.91 KG/M2 | HEART RATE: 50 BPM | WEIGHT: 174 LBS

## 2019-07-31 DIAGNOSIS — N18.4 CKD (CHRONIC KIDNEY DISEASE) STAGE 4, GFR 15-29 ML/MIN (H): ICD-10-CM

## 2019-07-31 DIAGNOSIS — I10 BENIGN ESSENTIAL HYPERTENSION: ICD-10-CM

## 2019-07-31 DIAGNOSIS — I25.10 CORONARY ARTERY DISEASE INVOLVING NATIVE CORONARY ARTERY OF NATIVE HEART WITHOUT ANGINA PECTORIS: ICD-10-CM

## 2019-07-31 DIAGNOSIS — I25.10 CORONARY ARTERY DISEASE INVOLVING NATIVE CORONARY ARTERY OF NATIVE HEART WITHOUT ANGINA PECTORIS: Primary | ICD-10-CM

## 2019-07-31 LAB
ANION GAP SERPL CALCULATED.3IONS-SCNC: 15.2 MMOL/L (ref 6–17)
BUN SERPL-MCNC: 35 MG/DL (ref 7–30)
CALCIUM SERPL-MCNC: 9.2 MG/DL (ref 8.5–10.5)
CHLORIDE SERPL-SCNC: 104 MMOL/L (ref 98–107)
CHOLEST SERPL-MCNC: 100 MG/DL
CO2 SERPL-SCNC: 24 MMOL/L (ref 23–29)
CREAT SERPL-MCNC: 2.33 MG/DL (ref 0.7–1.3)
GFR SERPL CREATININE-BSD FRML MDRD: 27 ML/MIN/{1.73_M2}
GLUCOSE SERPL-MCNC: 107 MG/DL (ref 70–105)
HDLC SERPL-MCNC: 37 MG/DL
LDLC SERPL CALC-MCNC: 44 MG/DL
NONHDLC SERPL-MCNC: 63 MG/DL
POTASSIUM SERPL-SCNC: 5.2 MMOL/L (ref 3.5–5.1)
SODIUM SERPL-SCNC: 138 MMOL/L (ref 136–145)
TRIGL SERPL-MCNC: 94 MG/DL

## 2019-07-31 PROCEDURE — 80061 LIPID PANEL: CPT | Performed by: INTERNAL MEDICINE

## 2019-07-31 PROCEDURE — 99214 OFFICE O/P EST MOD 30 MIN: CPT | Performed by: PHYSICIAN ASSISTANT

## 2019-07-31 PROCEDURE — 80048 BASIC METABOLIC PNL TOTAL CA: CPT | Performed by: INTERNAL MEDICINE

## 2019-07-31 PROCEDURE — 36415 COLL VENOUS BLD VENIPUNCTURE: CPT | Performed by: INTERNAL MEDICINE

## 2019-07-31 RX ORDER — HYDRALAZINE HYDROCHLORIDE 10 MG/1
10 TABLET, FILM COATED ORAL 3 TIMES DAILY
Qty: 120 TABLET | Refills: 3 | COMMUNITY
Start: 2019-07-31 | End: 2019-09-10

## 2019-07-31 ASSESSMENT — MIFFLIN-ST. JEOR: SCORE: 1490.51

## 2019-07-31 NOTE — PATIENT INSTRUCTIONS
Thank you for your [facility] visit today. Your provider has recommended the following:    Medication Changes:  No medication changes for now.  Recommendations:  Repeat labs in 3 -4 weeks to recheck your kidneys.   Call us with shortness of breath, chest pain or other concerns.   Follow-up:  See Dr. Villarreal for cardiology follow up in January.  Reminder:  Please bring in all current medications, over the counter supplements and vitamin bottles to your next appointment.            [facility]

## 2019-07-31 NOTE — PROGRESS NOTES
Cardiology Progress Note    Date of Service: 07/31/2019  Patient seen today in follow up of: CAD  Primary cardiologist: Dr. Villarreal    HPI:  Augie Powell is a very pleasant 83 year old male with a history of hypertension, hyperlipidemia, coronary artery disease with stenting of the mid LAD in October of 2018, CKD stage III, renal cell carcinoma s/p nephrectomy who is here today routine cardiology follow up.     He presented in September of 2018 with a NSTEMI. His symptoms at that time were chest discomfort and shortness of breath. Coronary angiogram showed a 90% stenosis of the mid LAD. There was 40 - 50% stenosis of the mid RCA which was not  flow limiting. He underwent successful PCI of the mid LAD with a CORBIN. An echocardiogram showed a LVEF of 40 - 45%. The apex was akinetic. There was hypokinesis of the inferior septum, anterior septum, inferior and anterior walls (mid and apex).  A follow up echocardiogram in January of 2019 showed his LVEF had normalized and his wall motion abnormalities had resolved.     He has followed regularly in cardiology clinic since that time. His antihypertensive regimen has been adjusted.     He is back today for routine follow up.  Overall, over the last 6 months he has been doing well from a cardiac standpoint.  He denies any shortness of breath or chest discomfort.  He continues to exercise regularly.  He and his wife walk around [facility] typically for a half an hour to hour a day.  He has no symptoms while doing this.  In the winter, he walks on the treadmill.  His blood pressure readings at home have been very well controlled in the 100s to 130 systolic.  Has had some ongoing neck discomfort which he saw Dr. Oliveira for and is taking Flexeril as needed.    ASSESSMENT/PLAN:  1. Coronary artery disease. S/p stenting of the mid LAD in September of 2018. He remains on DAPT with aspirin and plavix. He also has moderate RCA disease. He is also on high intensity statin  therapy with Lipitor 40 mg daily and very low dose metoprolol XL at 12.5 mg daily due to bradycardia.  He has no complaints of chest discomfort or shortness of breath.  We will continue with medical management.  I asked him to contact us with any new symptoms.  2.  Hypertension. On amlodipine 5 mg daily, benazepril 20 mg daily, andhydralazine 10 mg three times daily.  His blood pressure appears very well controlled today.  I made no changes.  3.  Hyperlipidemia. This has been very well controlled. LDL on last check in January was 37. A repeat lipid profile today shows good control with an LDL of 44, HDL is 37 and total cholesterol is 100.  I reviewed this result with him in detail today.  He will continue on Lipitor 40 mg daily.  4.  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   5.  CKD stage III-IV. GFR today is 27. It has been running between 25 -35 over the last year. His potassium is very mildly elevated at 5.2. I reviewed these results with him today. I recommended he limit the potassium is his diet for now. He remains on benazepril 20 mg daily. He does not regularly follow with nephrology. I recommended we repeat his labs in a few weeks. He was fasting today. Pending these, I would consider a referral to nephrology.    Augie overall seems to be doing well from a cardiac standpoint. I will have him return to see Dr. Villarreal in 6 months.  Of course, I asked him to contact us sooner with any symptoms, and we will see him sooner.    Orders this Visit:  No orders of the defined types were placed in this encounter.    No orders of the defined types were placed in this encounter.    There are no discontinued medications.    CURRENT MEDICATIONS:  Current Outpatient Medications   Medication Sig Dispense Refill     amLODIPine (NORVASC) 5 MG tablet Take 1 tablet (5 mg) by mouth At Bedtime 90 tablet 3     aspirin (ASA) 81 MG EC tablet Take 1 tablet (81 mg) by mouth daily 90 tablet 3     atorvastatin (LIPITOR) 40 MG  tablet Take 1 tablet (40 mg) by mouth every evening 90 tablet 3     benazepril (LOTENSIN) 20 MG tablet Take 1 tablet (20 mg) by mouth daily 90 tablet 3     Calcium-Magnesium-Zinc 500-250-12.5 MG TABS Take 1 tablet by mouth daily       clopidogrel (PLAVIX) 75 MG tablet Take 1 tablet (75 mg) by mouth daily 90 tablet 3     cyclobenzaprine (FLEXERIL) 10 MG tablet TAKE 1/2 TO 1 TABLET BY MOUTH 3 TIMES DAILY AS NEEDED 21 tablet 0     hydrALAZINE (APRESOLINE) 10 MG tablet Pt taking 5 mg at 530 am, and then 10 mg 3 more times daily if BPs running higher than 140, if > 160 then take two tabs at a time 120 tablet 3     MAGNESIUM PO Take 200 mg by mouth daily        metoprolol succinate ER (TOPROL-XL) 25 MG 24 hr tablet Take 0.5 tablets (12.5 mg) by mouth daily 45 tablet 1     nitroGLYcerin (NITROSTAT) 0.4 MG sublingual tablet For chest pain place 1 tablet under the tongue every 5 minutes for 3 doses. If symptoms persist 5 minutes after 1st dose call 911. 25 tablet 3     oxyCODONE (ROXICODONE) 5 MG tablet Take 1 tablet (5 mg) by mouth daily as needed for pain 20 tablet 0     ALLERGIES  Allergies   Allergen Reactions     No Known Allergies      PAST MEDICAL HISTORY:  Past Medical History:   Diagnosis Date     Aortic stenosis, mild      Coronary artery disease 10/2018    nSTEMI- Isis to mLAD, distal lad mild, Lcx-small 50-60%, RCA 60-70% normal ifR (despite echo showing poss old IMI)     Hyperlipidemia      Hypertension      Renal disease     CRD--creatinine in upper ones to 2, right kdney removed 2000 for canncer     Spondylosis with myelopathy, lumbar region      PAST SURGICAL HISTORY:  Past Surgical History:   Procedure Laterality Date     BACK SURGERY       CHOLECYSTECTOMY       DECOMPRESSION LUMBAR TWO LEVELS  12/1/2014    Procedure: DECOMPRESSION LUMBAR TWO LEVELS;  Surgeon: Remy Hramon MD;  Location: [facility]     HERNIA REPAIR       LAMINECTOMY, FUSION LUMBAR ONE LEVEL, COMBINED N/A 12/1/2014    Procedure: COMBINED  LAMINECTOMY, FUSION LUMBAR ONE LEVEL;  Surgeon: Remy Harmon MD;  Location: [facility]     PHACOEMULSIFICATION CLEAR CORNEA WITH STANDARD INTRAOCULAR LENS IMPLANT  10/22/2012    Procedure: PHACOEMULSIFICATION CLEAR CORNEA WITH STANDARD INTRAOCULAR LENS IMPLANT;  RIGHT EYE PHACOEMULSIFICATION CLEAR CORNEA WITH STANDARD INTRAOCULAR LENS IMPLANT ;  Surgeon: Grupo Granger MD;  Location: [facility]     right nephrectomy[       STENT,CORONARY, S660 24/30  10/2018    mLAD     FAMILY HISTORY:  Family History   Problem Relation Age of Onset     No Known Problems Mother      Heart Surgery Father      No Known Problems Sister      SOCIAL HISTORY:  Social History     Socioeconomic History     Marital status:      Spouse name: None     Number of children: None     Years of education: None     Highest education level: None   Occupational History     None   Social Needs     Financial resource strain: None     Food insecurity:     Worry: None     Inability: None     Transportation needs:     Medical: None     Non-medical: None   Tobacco Use     Smoking status: Former Smoker     Smokeless tobacco: Never Used   Substance and Sexual Activity     Alcohol use: Yes     Alcohol/week: 1.2 oz     Types: 2 Shots of liquor per week     Comment: rare     Drug use: No     Sexual activity: Not Currently   Lifestyle     Physical activity:     Days per week: None     Minutes per session: None     Stress: None   Relationships     Social connections:     Talks on phone: None     Gets together: None     Attends Evangelical service: None     Active member of club or organization: None     Attends meetings of clubs or organizations: None     Relationship status: None     Intimate partner violence:     Fear of current or ex partner: None     Emotionally abused: None     Physically abused: None     Forced sexual activity: None   Other Topics Concern     None   Social History Narrative     None     Review of Systems:  Skin:  Positive for  "bruising;itching   Eyes:  Positive for glasses  ENT:  Negative    Respiratory:  Positive for dyspnea on exertion  Cardiovascular:    lightheadedness;dizziness;Positive for  Gastroenterology: Negative    Genitourinary:  Negative    Musculoskeletal:  Positive for nocturnal cramping  Neurologic:  Negative    Psychiatric:  Negative    Heme/Lymph/Imm:  Negative    Endocrine:  Negative       Physical Exam:  Vitals: /68   Pulse 50   Ht 1.778 m (5' 10\")   Wt 78.9 kg (174 lb)   BMI 24.97 kg/m     Wt Readings from Last 4 Encounters:   07/31/19 78.9 kg (174 lb)   04/29/19 79.8 kg (176 lb)   02/15/19 79.8 kg (176 lb)   02/11/19 79.8 kg (176 lb)     GEN: well nourished, in no acute distress.  HEENT:  Pupils equal, round. Sclerae nonicteric.   C/V:  Regular rate and rhythm, no murmur, rub or gallop.    RESP: Respirations are unlabored. Clear to auscultation bilaterally without wheezing, rales, or rhonchi.  GI: Abdomen soft, nontender.  EXTREM: no LE edema.  NEURO: Alert and oriented, cooperative.  SKIN: Warm and dry.     Recent Lab Results:  LIPID RESULTS:  Lab Results   Component Value Date    CHOL 100 07/31/2019    HDL 37 (L) 07/31/2019    LDL 44 07/31/2019    TRIG 94 07/31/2019     LIVER ENZYME RESULTS:  Lab Results   Component Value Date    AST 21 02/11/2019    ALT 29 02/11/2019     CBC RESULTS:  Lab Results   Component Value Date    WBC 9.1 02/11/2019    RBC 4.03 (L) 02/11/2019    HGB 13.1 (L) 02/11/2019    HCT 39.6 (L) 02/11/2019    MCV 98 02/11/2019    MCH 32.5 02/11/2019    MCHC 33.1 02/11/2019    RDW 12.4 02/11/2019     02/11/2019     BMP RESULTS:  Lab Results   Component Value Date     07/31/2019    POTASSIUM 5.2 (H) 07/31/2019    CHLORIDE 104 07/31/2019    CO2 24 07/31/2019    ANIONGAP 15.2 07/31/2019     (H) 07/31/2019    BUN 35 (H) 07/31/2019    CR 2.33 (H) 07/31/2019    GFRESTIMATED 27 (L) 07/31/2019    GFRESTBLACK 33 (L) 07/31/2019    RICCARDO 9.2 07/31/2019      A1C RESULTS:  Lab Results "   Component Value Date    A1C 5.5 09/29/2018     INR RESULTS:  Lab Results   Component Value Date    INR 0.89 10/04/2010       New/Pertinent imaging results since last visit:  None    Martha Rm PA-C  [facility]

## 2019-07-31 NOTE — LETTER
7/31/2019    Eusebio Oliveira MD  [address]  [facility] [address]    RE: Augie Goldworth       Dear Colleague,    I had the pleasure of seeing Augie Powell in the [facility].      Cardiology Progress Note    Date of Service: 07/31/2019  Patient seen today in follow up of: CAD  Primary cardiologist: Dr. Villarreal    HPI:  Augie Powell is a very pleasant 83 year old male with a history of hypertension, hyperlipidemia, coronary artery disease with stenting of the mid LAD in October of 2018, CKD stage III, renal cell carcinoma s/p nephrectomy who is here today routine cardiology follow up.     He presented in September of 2018 with a NSTEMI. His symptoms at that time were chest discomfort and shortness of breath. Coronary angiogram showed a 90% stenosis of the mid LAD. There was 40 - 50% stenosis of the mid RCA which was not  flow limiting. He underwent successful PCI of the mid LAD with a CORBIN. An echocardiogram showed a LVEF of 40 - 45%. The apex was akinetic. There was hypokinesis of the inferior septum, anterior septum, inferior and anterior walls (mid and apex).  A follow up echocardiogram in January of 2019 showed his LVEF had normalized and his wall motion abnormalities had resolved.     He has followed regularly in cardiology clinic since that time. His antihypertensive regimen has been adjusted.     He is back today for routine follow up.  Overall, over the last 6 months he has been doing well from a cardiac standpoint.  He denies any shortness of breath or chest discomfort.  He continues to exercise regularly.  He and his wife walk around [facility] typically for a half an hour to hour a day.  He has no symptoms while doing this.  In the winter, he walks on the treadmill.  His blood pressure readings at home have been very well controlled in the 100s to 130 systolic.  Has had some ongoing neck discomfort which he saw Dr. Oliveira for and is taking Flexeril  as needed.    ASSESSMENT/PLAN:  1. Coronary artery disease. S/p stenting of the mid LAD in September of 2018. He remains on DAPT with aspirin and plavix. He also has moderate RCA disease. He is also on high intensity statin therapy with Lipitor 40 mg daily and very low dose metoprolol XL at 12.5 mg daily due to bradycardia.  He has no complaints of chest discomfort or shortness of breath.  We will continue with medical management.  I asked him to contact us with any new symptoms.  2.  Hypertension. On amlodipine 5 mg daily, benazepril 20 mg daily, andhydralazine 10 mg three times daily.  His blood pressure appears very well controlled today.  I made no changes.  3.  Hyperlipidemia. This has been very well controlled. LDL on last check in January was 37. A repeat lipid profile today shows good control with an LDL of 44, HDL is 37 and total cholesterol is 100.  I reviewed this result with him in detail today.  He will continue on Lipitor 40 mg daily.  4.  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   5.  CKD stage III-IV. GFR today is 27. It has been running between 25 -35 over the last year. His potassium is very mildly elevated at 5.2. I reviewed these results with him today. I recommended he limit the potassium is his diet for now. He remains on benazepril 20 mg daily. He does not regularly follow with nephrology. I recommended we repeat his labs in a few weeks. He was fasting today. Pending these, I would consider a referral to nephrology.    Augie overall seems to be doing well from a cardiac standpoint. I will have him return to see Dr. Villarreal in 6 months.  Of course, I asked him to contact us sooner with any symptoms, and we will see him sooner.    Orders this Visit:  No orders of the defined types were placed in this encounter.    No orders of the defined types were placed in this encounter.    There are no discontinued medications.    CURRENT MEDICATIONS:  Current Outpatient Medications   Medication  Sig Dispense Refill     amLODIPine (NORVASC) 5 MG tablet Take 1 tablet (5 mg) by mouth At Bedtime 90 tablet 3     aspirin (ASA) 81 MG EC tablet Take 1 tablet (81 mg) by mouth daily 90 tablet 3     atorvastatin (LIPITOR) 40 MG tablet Take 1 tablet (40 mg) by mouth every evening 90 tablet 3     benazepril (LOTENSIN) 20 MG tablet Take 1 tablet (20 mg) by mouth daily 90 tablet 3     Calcium-Magnesium-Zinc 500-250-12.5 MG TABS Take 1 tablet by mouth daily       clopidogrel (PLAVIX) 75 MG tablet Take 1 tablet (75 mg) by mouth daily 90 tablet 3     cyclobenzaprine (FLEXERIL) 10 MG tablet TAKE 1/2 TO 1 TABLET BY MOUTH 3 TIMES DAILY AS NEEDED 21 tablet 0     hydrALAZINE (APRESOLINE) 10 MG tablet Pt taking 5 mg at 530 am, and then 10 mg 3 more times daily if BPs running higher than 140, if > 160 then take two tabs at a time 120 tablet 3     MAGNESIUM PO Take 200 mg by mouth daily        metoprolol succinate ER (TOPROL-XL) 25 MG 24 hr tablet Take 0.5 tablets (12.5 mg) by mouth daily 45 tablet 1     nitroGLYcerin (NITROSTAT) 0.4 MG sublingual tablet For chest pain place 1 tablet under the tongue every 5 minutes for 3 doses. If symptoms persist 5 minutes after 1st dose call 911. 25 tablet 3     oxyCODONE (ROXICODONE) 5 MG tablet Take 1 tablet (5 mg) by mouth daily as needed for pain 20 tablet 0     ALLERGIES  Allergies   Allergen Reactions     No Known Allergies      PAST MEDICAL HISTORY:  Past Medical History:   Diagnosis Date     Aortic stenosis, mild      Coronary artery disease 10/2018    nSTEMI- Isis to mLAD, distal lad mild, Lcx-small 50-60%, RCA 60-70% normal ifR (despite echo showing poss old IMI)     Hyperlipidemia      Hypertension      Renal disease     CRD--creatinine in upper ones to 2, right kdney removed 2000 for canncer     Spondylosis with myelopathy, lumbar region      PAST SURGICAL HISTORY:  Past Surgical History:   Procedure Laterality Date     BACK SURGERY       CHOLECYSTECTOMY       DECOMPRESSION LUMBAR TWO  LEVELS  12/1/2014    Procedure: DECOMPRESSION LUMBAR TWO LEVELS;  Surgeon: Remy Harmon MD;  Location: [facility]     HERNIA REPAIR       LAMINECTOMY, FUSION LUMBAR ONE LEVEL, COMBINED N/A 12/1/2014    Procedure: COMBINED LAMINECTOMY, FUSION LUMBAR ONE LEVEL;  Surgeon: Remy Harmon MD;  Location: [facility]     PHACOEMULSIFICATION CLEAR CORNEA WITH STANDARD INTRAOCULAR LENS IMPLANT  10/22/2012    Procedure: PHACOEMULSIFICATION CLEAR CORNEA WITH STANDARD INTRAOCULAR LENS IMPLANT;  RIGHT EYE PHACOEMULSIFICATION CLEAR CORNEA WITH STANDARD INTRAOCULAR LENS IMPLANT ;  Surgeon: Grupo Granger MD;  Location: [facility]     right nephrectomy[       STENT,CORONARY, S660 24/30  10/2018    mLAD     FAMILY HISTORY:  Family History   Problem Relation Age of Onset     No Known Problems Mother      Heart Surgery Father      No Known Problems Sister      SOCIAL HISTORY:  Social History     Socioeconomic History     Marital status:      Spouse name: None     Number of children: None     Years of education: None     Highest education level: None   Occupational History     None   Social Needs     Financial resource strain: None     Food insecurity:     Worry: None     Inability: None     Transportation needs:     Medical: None     Non-medical: None   Tobacco Use     Smoking status: Former Smoker     Smokeless tobacco: Never Used   Substance and Sexual Activity     Alcohol use: Yes     Alcohol/week: 1.2 oz     Types: 2 Shots of liquor per week     Comment: rare     Drug use: No     Sexual activity: Not Currently   Lifestyle     Physical activity:     Days per week: None     Minutes per session: None     Stress: None   Relationships     Social connections:     Talks on phone: None     Gets together: None     Attends Hinduism service: None     Active member of club or organization: None     Attends meetings of clubs or organizations: None     Relationship status: None     Intimate partner violence:     Fear of current or  "ex partner: None     Emotionally abused: None     Physically abused: None     Forced sexual activity: None   Other Topics Concern     None   Social History Narrative     None     Review of Systems:  Skin:  Positive for bruising;itching   Eyes:  Positive for glasses  ENT:  Negative    Respiratory:  Positive for dyspnea on exertion  Cardiovascular:    lightheadedness;dizziness;Positive for  Gastroenterology: Negative    Genitourinary:  Negative    Musculoskeletal:  Positive for nocturnal cramping  Neurologic:  Negative    Psychiatric:  Negative    Heme/Lymph/Imm:  Negative    Endocrine:  Negative       Physical Exam:  Vitals: /68   Pulse 50   Ht 1.778 m (5' 10\")   Wt 78.9 kg (174 lb)   BMI 24.97 kg/m      Wt Readings from Last 4 Encounters:   07/31/19 78.9 kg (174 lb)   04/29/19 79.8 kg (176 lb)   02/15/19 79.8 kg (176 lb)   02/11/19 79.8 kg (176 lb)     GEN: well nourished, in no acute distress.  HEENT:  Pupils equal, round. Sclerae nonicteric.   C/V:  Regular rate and rhythm, no murmur, rub or gallop.    RESP: Respirations are unlabored. Clear to auscultation bilaterally without wheezing, rales, or rhonchi.  GI: Abdomen soft, nontender.  EXTREM: no LE edema.  NEURO: Alert and oriented, cooperative.  SKIN: Warm and dry.     Recent Lab Results:  LIPID RESULTS:  Lab Results   Component Value Date    CHOL 100 07/31/2019    HDL 37 (L) 07/31/2019    LDL 44 07/31/2019    TRIG 94 07/31/2019     LIVER ENZYME RESULTS:  Lab Results   Component Value Date    AST 21 02/11/2019    ALT 29 02/11/2019     CBC RESULTS:  Lab Results   Component Value Date    WBC 9.1 02/11/2019    RBC 4.03 (L) 02/11/2019    HGB 13.1 (L) 02/11/2019    HCT 39.6 (L) 02/11/2019    MCV 98 02/11/2019    MCH 32.5 02/11/2019    MCHC 33.1 02/11/2019    RDW 12.4 02/11/2019     02/11/2019     BMP RESULTS:  Lab Results   Component Value Date     07/31/2019    POTASSIUM 5.2 (H) 07/31/2019    CHLORIDE 104 07/31/2019    CO2 24 07/31/2019    " ANIONGAP 15.2 07/31/2019     (H) 07/31/2019    BUN 35 (H) 07/31/2019    CR 2.33 (H) 07/31/2019    GFRESTIMATED 27 (L) 07/31/2019    GFRESTBLACK 33 (L) 07/31/2019    RICCARDO 9.2 07/31/2019      A1C RESULTS:  Lab Results   Component Value Date    A1C 5.5 09/29/2018     INR RESULTS:  Lab Results   Component Value Date    INR 0.89 10/04/2010       New/Pertinent imaging results since last visit:  None    Martha Rm PA-C  [facility]      Thank you for allowing me to participate in the care of your patient.      Sincerely,     Martha Rm PA-C     [facility]

## 2019-08-29 DIAGNOSIS — I21.4 NSTEMI (NON-ST ELEVATED MYOCARDIAL INFARCTION) (H): ICD-10-CM

## 2019-08-29 RX ORDER — METOPROLOL SUCCINATE 25 MG/1
12.5 TABLET, EXTENDED RELEASE ORAL DAILY
Qty: 45 TABLET | Refills: 3 | Status: SHIPPED | OUTPATIENT
Start: 2019-08-29 | End: 2020-10-19

## 2019-08-30 DIAGNOSIS — N18.4 CKD (CHRONIC KIDNEY DISEASE) STAGE 4, GFR 15-29 ML/MIN (H): ICD-10-CM

## 2019-08-30 LAB
ANION GAP SERPL CALCULATED.3IONS-SCNC: 4 MMOL/L (ref 3–14)
BUN SERPL-MCNC: 38 MG/DL (ref 7–30)
CALCIUM SERPL-MCNC: 8.8 MG/DL (ref 8.5–10.1)
CHLORIDE SERPL-SCNC: 108 MMOL/L (ref 94–109)
CO2 SERPL-SCNC: 25 MMOL/L (ref 20–32)
CREAT SERPL-MCNC: 2.04 MG/DL (ref 0.66–1.25)
GFR SERPL CREATININE-BSD FRML MDRD: 29 ML/MIN/{1.73_M2}
GLUCOSE SERPL-MCNC: 85 MG/DL (ref 70–99)
POTASSIUM SERPL-SCNC: 4.8 MMOL/L (ref 3.4–5.3)
SODIUM SERPL-SCNC: 137 MMOL/L (ref 133–144)

## 2019-08-30 PROCEDURE — 36415 COLL VENOUS BLD VENIPUNCTURE: CPT | Performed by: INTERNAL MEDICINE

## 2019-08-30 PROCEDURE — 80048 BASIC METABOLIC PNL TOTAL CA: CPT | Performed by: INTERNAL MEDICINE

## 2019-09-05 ENCOUNTER — TELEPHONE (OUTPATIENT)
Dept: CARDIOLOGY | Facility: CLINIC | Age: 84
End: 2019-09-05

## 2019-09-05 NOTE — TELEPHONE ENCOUNTER
Return Pt call left message BMP inmproved potassium back to normal Cr 2.04. Asked Pt call with questions. BERKLEY Howard RN

## 2019-09-10 DIAGNOSIS — I10 BENIGN ESSENTIAL HYPERTENSION: ICD-10-CM

## 2019-09-10 RX ORDER — HYDRALAZINE HYDROCHLORIDE 10 MG/1
10 TABLET, FILM COATED ORAL 3 TIMES DAILY
Qty: 24 TABLET | Refills: 0 | Status: SHIPPED | OUTPATIENT
Start: 2019-09-10 | End: 2019-09-10

## 2019-09-10 RX ORDER — HYDRALAZINE HYDROCHLORIDE 10 MG/1
10 TABLET, FILM COATED ORAL 3 TIMES DAILY
Qty: 120 TABLET | Refills: 3 | Status: SHIPPED | OUTPATIENT
Start: 2019-09-10 | End: 2020-01-23

## 2019-11-14 NOTE — TELEPHONE ENCOUNTER
"Patient was evaluated by cardiology while inpatient for exertional chest pain and SOB. 10/1/18 PCI with CORBIN to mLAD. Called patient to discuss any post hospital d/c questions he may have, review medication changes, and confirm f/u appts. RN confirmed with patient that they were d/c with their antiplatelet Plavix. Patient denied any questions regarding new medications or changes with their PTA medications. Patient denied any SOB, chest pain, or light headedness. LRA cardiac cath site is without bleeding, swelling, redness or tenderness. Does c/o mild tingling to left 5 th finger that started today. Instructed pt to call us if this does not resolve over the next few days, or worsens. Verbalized understanding. Denies fever. RN confirmed with patient that he has an apt scheduled on 10/12/1/ with ANA Ofe Beltran, with labs prior. Cardiac rehab scheduled on 10/9/18. Pt does report show feelings of anxiousness \"since all of this started a few weeks ago.\" Asking if anything can be prescribed for this. Encouraged pt to call his PMD for further recommendations for treatment of anxiety. Patient advised to call clinic with any cardiac related questions or concerns prior to this ana't. Patient verbalized understanding and agreed with plan. AYAKA Nixon RN.  "
Additional Notes: He will follow up in December as long as the “ski” schedule allows.
Detail Level: Simple

## 2019-12-17 ENCOUNTER — TELEPHONE (OUTPATIENT)
Dept: CARDIOLOGY | Facility: CLINIC | Age: 84
End: 2019-12-17

## 2019-12-17 DIAGNOSIS — I10 BENIGN ESSENTIAL HYPERTENSION: ICD-10-CM

## 2019-12-17 RX ORDER — AMLODIPINE BESYLATE 5 MG/1
5 TABLET ORAL AT BEDTIME
Qty: 90 TABLET | Refills: 3 | Status: SHIPPED | OUTPATIENT
Start: 2019-12-17 | End: 2020-11-05

## 2019-12-17 RX ORDER — AMLODIPINE BESYLATE 5 MG/1
5 TABLET ORAL AT BEDTIME
Qty: 90 TABLET | Refills: 3 | Status: SHIPPED | OUTPATIENT
Start: 2019-12-17 | End: 2019-12-17

## 2019-12-17 NOTE — TELEPHONE ENCOUNTER
Return Pt's call he needs refills. Pt had stenting 9/2018:  Coronary angiogram was done which showed a 90% stenosis of the mid LAD. There was 40 - 50% stenosis of the mid RCA which is not  flow limiting. He underwent successful PCI of the mid LAD with a CORBIN.      Pt asking if he can stop Plavix now it is over year from stenting. Pt has bruising all over hands, and most of time does not know what he did to bruise. Informed Pt would message DR Vlilarreal's for recommendations? Pt on aspirin 81 mg , and Plavix 75 MG a day. BERKLEY Howard RN

## 2019-12-17 NOTE — TELEPHONE ENCOUNTER
Pt informed per DR Villarreal he can stop his Plavix it has been over a yr since stenting, and continue 81 mg aspirin. BERKLEY Howard RN

## 2020-01-07 ENCOUNTER — TELEPHONE (OUTPATIENT)
Dept: CARDIOLOGY | Facility: CLINIC | Age: 85
End: 2020-01-07

## 2020-01-07 NOTE — TELEPHONE ENCOUNTER
Pt called in asking if he needs any testing?  Pt had lipids done in aug 2019, and last echo 1/16/19. Pt denies any issues or symptoms related to cardiac. Pt says his BP has been elevated, but he is also having back pain issues.  Informed Pt DR Villarreal out of office. Informed Pt if DR Villarreal feels Pt needs some testing it will need to be discussed at OV. BERKLEY Howard RN

## 2020-01-09 ENCOUNTER — OFFICE VISIT (OUTPATIENT)
Dept: FAMILY MEDICINE | Facility: CLINIC | Age: 85
End: 2020-01-09
Payer: COMMERCIAL

## 2020-01-09 VITALS
HEIGHT: 70 IN | BODY MASS INDEX: 25.62 KG/M2 | TEMPERATURE: 97.7 F | SYSTOLIC BLOOD PRESSURE: 145 MMHG | OXYGEN SATURATION: 97 % | HEART RATE: 68 BPM | DIASTOLIC BLOOD PRESSURE: 74 MMHG | WEIGHT: 179 LBS

## 2020-01-09 DIAGNOSIS — I10 BENIGN ESSENTIAL HYPERTENSION: Primary | ICD-10-CM

## 2020-01-09 DIAGNOSIS — R10.9 FLANK PAIN: ICD-10-CM

## 2020-01-09 LAB
ALBUMIN UR-MCNC: NEGATIVE MG/DL
APPEARANCE UR: CLEAR
BILIRUB UR QL STRIP: NEGATIVE
COLOR UR AUTO: YELLOW
GLUCOSE UR STRIP-MCNC: NEGATIVE MG/DL
HGB UR QL STRIP: NEGATIVE
KETONES UR STRIP-MCNC: NEGATIVE MG/DL
LEUKOCYTE ESTERASE UR QL STRIP: NEGATIVE
NITRATE UR QL: NEGATIVE
PH UR STRIP: 6 PH (ref 5–7)
SOURCE: NORMAL
SP GR UR STRIP: 1.01 (ref 1–1.03)
UROBILINOGEN UR STRIP-ACNC: 0.2 EU/DL (ref 0.2–1)

## 2020-01-09 PROCEDURE — 80053 COMPREHEN METABOLIC PANEL: CPT | Performed by: INTERNAL MEDICINE

## 2020-01-09 PROCEDURE — 99214 OFFICE O/P EST MOD 30 MIN: CPT | Performed by: INTERNAL MEDICINE

## 2020-01-09 PROCEDURE — 81003 URINALYSIS AUTO W/O SCOPE: CPT | Performed by: INTERNAL MEDICINE

## 2020-01-09 PROCEDURE — 36415 COLL VENOUS BLD VENIPUNCTURE: CPT | Performed by: INTERNAL MEDICINE

## 2020-01-09 RX ORDER — OXYCODONE HYDROCHLORIDE 5 MG/1
5 TABLET ORAL DAILY PRN
Qty: 20 TABLET | Refills: 0 | Status: SHIPPED | OUTPATIENT
Start: 2020-01-09 | End: 2022-12-12

## 2020-01-09 ASSESSMENT — MIFFLIN-ST. JEOR: SCORE: 1508.19

## 2020-01-09 NOTE — PROGRESS NOTES
Subjective     Augie Powell is a 84 year old male who presents to clinic today for the following health issues:    HPI   Genitourinary symptoms      Duration: x 2 weeks    Description:  nocturia x 3 and back pain    Intensity:  moderate, severe    Accompanying signs and symptoms (fever/discharge/nausea/vomiting/back or abdominal pain):  None    History (frequent UTI's/kidney stones/prostate problems): 1 kidney  Sexually active: not at moment     Precipitating or alleviating factors: None    Therapies tried and outcome: none   Outcome: na    The pt notes that his back pain radiates into his right side and right leg (anterior aspect). The pt indicates that the pain which radiates into his right leg follows the L3 dermatome. He also complains of right knee pain in which he recently received a cortisone injection. He has similar left sided back pain which radiates into his left side, but does not radiate into his left leg.     Patient Active Problem List   Diagnosis     Degenerative spondylolisthesis     Spondylolisthesis     Benign essential hypertension     Renal cell cancer, unspecified laterality (H)     Hyperlipidemia LDL goal <130     CKD (chronic kidney disease) stage 4, GFR 15-29 ml/min (H)     ACS (acute coronary syndrome) (H)     Past Surgical History:   Procedure Laterality Date     BACK SURGERY       CHOLECYSTECTOMY       DECOMPRESSION LUMBAR TWO LEVELS  12/1/2014    Procedure: DECOMPRESSION LUMBAR TWO LEVELS;  Surgeon: Remy Harmon MD;  Location: [facility] OR     HERNIA REPAIR       LAMINECTOMY, FUSION LUMBAR ONE LEVEL, COMBINED N/A 12/1/2014    Procedure: COMBINED LAMINECTOMY, FUSION LUMBAR ONE LEVEL;  Surgeon: Remy Harmon MD;  Location:  OR     PHACOEMULSIFICATION CLEAR CORNEA WITH STANDARD INTRAOCULAR LENS IMPLANT  10/22/2012    Procedure: PHACOEMULSIFICATION CLEAR CORNEA WITH STANDARD INTRAOCULAR LENS IMPLANT;  RIGHT EYE PHACOEMULSIFICATION CLEAR CORNEA WITH STANDARD INTRAOCULAR  LENS IMPLANT ;  Surgeon: Grupo Granger MD;  [facility]     right nephrectomy[       STENT,CORONARY, S660 24/30  10/2018    mLAD       Social History     Tobacco Use     Smoking status: Former Smoker     Smokeless tobacco: Never Used   Substance Use Topics     Alcohol use: Yes     Alcohol/week: 2.0 standard drinks     Types: 2 Shots of liquor per week     Comment: rare     Family History   Problem Relation Age of Onset     No Known Problems Mother      Heart Surgery Father      No Known Problems Sister          Current Outpatient Medications   Medication Sig Dispense Refill     amLODIPine (NORVASC) 5 MG tablet Take 1 tablet (5 mg) by mouth At Bedtime 90 tablet 3     aspirin (ASA) 81 MG EC tablet Take 1 tablet (81 mg) by mouth daily 90 tablet 3     atorvastatin (LIPITOR) 40 MG tablet Take 1 tablet (40 mg) by mouth every evening 90 tablet 3     benazepril (LOTENSIN) 20 MG tablet Take 1 tablet (20 mg) by mouth daily 90 tablet 3     Calcium-Magnesium-Zinc 500-250-12.5 MG TABS Take 1 tablet by mouth daily       cyclobenzaprine (FLEXERIL) 10 MG tablet TAKE 1/2 TO 1 TABLET BY MOUTH 3 TIMES DAILY AS NEEDED 21 tablet 0     hydrALAZINE (APRESOLINE) 10 MG tablet Take 1 tablet (10 mg) by mouth 3 times daily 120 tablet 3     MAGNESIUM PO Take 200 mg by mouth daily        metoprolol succinate ER (TOPROL-XL) 25 MG 24 hr tablet Take 0.5 tablets (12.5 mg) by mouth daily 45 tablet 3     nitroGLYcerin (NITROSTAT) 0.4 MG sublingual tablet For chest pain place 1 tablet under the tongue every 5 minutes for 3 doses. If symptoms persist 5 minutes after 1st dose call 911. 25 tablet 3     oxyCODONE (ROXICODONE) 5 MG tablet Take 1 tablet (5 mg) by mouth daily as needed for pain 20 tablet 0     Allergies   Allergen Reactions     No Known Allergies        Reviewed and updated as needed this visit by Provider         Review of Systems   ROS COMP: Constitutional, HEENT, cardiovascular, pulmonary, gi and gu systems are negative, except as  "otherwise noted.    This document serves as a record of the services and decisions personally performed and made by Eusebio Oliveira MD. It was created on his behalf by Miguelito Shah, a trained medical scribe. The creation of this document is based on the provider's statements to the medical scribe.  Miguelito Shah January 9, 2020 2:06 PM          Objective    BP (!) 145/74 (BP Location: Left arm, Patient Position: Sitting, Cuff Size: Adult Regular)   Pulse 68   Temp 97.7  F (36.5  C) (Oral)   Ht 1.778 m (5' 10\")   Wt 81.2 kg (179 lb)   SpO2 97%   BMI 25.68 kg/m    Body mass index is 25.68 kg/m .       Physical Exam   Neck was supple without adenopathy or thyromegaly his carotids were normal without bruits  Chest clear to auscultation and percussion  Cardiovascular S1 and S2 are physiologic without murmurs or gallops  Abdomen bowel sounds were normal.  There is no palpable mass or organomegaly, No CVAT  Extremities nontender without any edema  Pulses pedal pulses are as described otherwise his pulses are bilaterally symmetrical throughout without bruits  Skin without significant abnormality  Back straight leg negative, right flank discomfort with hip flexion      Diagnostic Test Results:  Labs reviewed in Epic  Results for orders placed or performed in visit on 01/09/20 (from the past 24 hour(s))   *UA reflex to Microscopic and Culture ([place] [facility] (except [place] and [place])   Result Value Ref Range    Color Urine Yellow     Appearance Urine Clear     Glucose Urine Negative NEG^Negative mg/dL    Bilirubin Urine Negative NEG^Negative    Ketones Urine Negative NEG^Negative mg/dL    Specific Gravity Urine 1.015 1.003 - 1.035    Blood Urine Negative NEG^Negative    pH Urine 6.0 5.0 - 7.0 pH    Protein Albumin Urine Negative NEG^Negative mg/dL    Urobilinogen Urine 0.2 0.2 - 1.0 EU/dL    Nitrite Urine Negative NEG^Negative    Leukocyte Esterase Urine Negative NEG^Negative    Source Midstream Urine  " "          Assessment & Plan     Flank pain  Recommended PT for back/flank pain. Discussed with the pt and agreed to continue wearing the Ace back brace while walking on the treadmill. The pt should continue with frequent treadmill walking. Reevaluating kidney function test today in lab.   - *UA reflex to Microscopic and Culture ([facility] (except [place] and [place])  - oxyCODONE (ROXICODONE) 5 MG tablet  Dispense: 20 tablet; Refill: 0    Benign essential hypertension  Recommended 1 tablet amlodipine (5 mg) in the AM and a half tablet at dinner. He should continue with his hydralazine 3 tablets per usual.            BMI:   Estimated body mass index is 25.68 kg/m  as calculated from the following:    Height as of this encounter: 1.778 m (5' 10\").    Weight as of this encounter: 81.2 kg (179 lb).           FUTURE APPOINTMENTS:       - Follow-up phone call Monday or Tuesday (1/13/2020 or 1/14/2020)    No follow-ups on file.    The information in this document, created by the medical scribe for me, accurately reflects the services I personally performed and the decisions made by me. I have reviewed and approved this document for accuracy prior to leaving the patient care area.  January 9, 2020 3:10 PM  20 minutes spent during this encounter with greater than 50% of the time spent in counseling and coordinating care  Eusebio Oliveira MD  [facility]    "

## 2020-01-10 LAB
ALBUMIN SERPL-MCNC: 3.9 G/DL (ref 3.4–5)
ALP SERPL-CCNC: 64 U/L (ref 40–150)
ALT SERPL W P-5'-P-CCNC: 36 U/L (ref 0–70)
ANION GAP SERPL CALCULATED.3IONS-SCNC: 7 MMOL/L (ref 3–14)
AST SERPL W P-5'-P-CCNC: 18 U/L (ref 0–45)
BILIRUB SERPL-MCNC: 0.4 MG/DL (ref 0.2–1.3)
BUN SERPL-MCNC: 54 MG/DL (ref 7–30)
CALCIUM SERPL-MCNC: 8.9 MG/DL (ref 8.5–10.1)
CHLORIDE SERPL-SCNC: 104 MMOL/L (ref 94–109)
CO2 SERPL-SCNC: 23 MMOL/L (ref 20–32)
CREAT SERPL-MCNC: 1.89 MG/DL (ref 0.66–1.25)
GFR SERPL CREATININE-BSD FRML MDRD: 32 ML/MIN/{1.73_M2}
GLUCOSE SERPL-MCNC: 113 MG/DL (ref 70–99)
POTASSIUM SERPL-SCNC: 4.9 MMOL/L (ref 3.4–5.3)
PROT SERPL-MCNC: 7.2 G/DL (ref 6.8–8.8)
SODIUM SERPL-SCNC: 134 MMOL/L (ref 133–144)

## 2020-01-23 DIAGNOSIS — I10 BENIGN ESSENTIAL HYPERTENSION: ICD-10-CM

## 2020-01-23 RX ORDER — HYDRALAZINE HYDROCHLORIDE 10 MG/1
10 TABLET, FILM COATED ORAL 3 TIMES DAILY
Qty: 120 TABLET | Refills: 3 | Status: SHIPPED | OUTPATIENT
Start: 2020-01-23 | End: 2020-06-04

## 2020-01-27 ENCOUNTER — TELEPHONE (OUTPATIENT)
Dept: FAMILY MEDICINE | Facility: CLINIC | Age: 85
End: 2020-01-27

## 2020-01-27 DIAGNOSIS — M43.10 DEGENERATIVE SPONDYLOLISTHESIS: Primary | ICD-10-CM

## 2020-01-27 NOTE — TELEPHONE ENCOUNTER
Dr Oliveira you gave patient a referral for Physicial Therapy for his Back pain to [facility]. Patient would like to have his therapy done at [facility] physical therapy. Patient can go there with his insurance. Pending new referral for you to sign. Thanks    Crystal Mae  Referral Coordinator

## 2020-01-29 NOTE — TELEPHONE ENCOUNTER
Phone staff note: [facility] called again and said they need this by 1/30/2020. They said they have been waiting for a week.    Dr. Oliveira - please see below, new referral pended for [facility] physical therapy as pt does not want to go to [facility]     Please sign and route back to triage for oscar SUBRAMANIAN RN

## 2020-01-29 NOTE — TELEPHONE ENCOUNTER
Triage [facility]- can you help me get this referral completed by Dr. Oliveira. Patient was given an order for PT at [facility]. Patient wants to go to [facility] in [place]. Fax [phone] Attn: Tangela. I have pended a new referral for him to sign. Patient can go to [facility] PT with his insurance. Thanks    Crystal Mae  Referral Coordinator

## 2020-01-30 ENCOUNTER — TRANSFERRED RECORDS (OUTPATIENT)
Dept: HEALTH INFORMATION MANAGEMENT | Facility: CLINIC | Age: 85
End: 2020-01-30

## 2020-01-30 ENCOUNTER — OFFICE VISIT (OUTPATIENT)
Dept: CARDIOLOGY | Facility: CLINIC | Age: 85
End: 2020-01-30
Attending: PHYSICIAN ASSISTANT
Payer: COMMERCIAL

## 2020-01-30 VITALS
BODY MASS INDEX: 25.67 KG/M2 | DIASTOLIC BLOOD PRESSURE: 60 MMHG | HEART RATE: 68 BPM | SYSTOLIC BLOOD PRESSURE: 138 MMHG | WEIGHT: 179.3 LBS | HEIGHT: 70 IN

## 2020-01-30 DIAGNOSIS — I25.728 CORONARY ARTERY DISEASE OF AUTOLOGOUS BYPASS GRAFT WITH STABLE ANGINA PECTORIS (H): ICD-10-CM

## 2020-01-30 DIAGNOSIS — I25.10 CORONARY ARTERY DISEASE INVOLVING NATIVE CORONARY ARTERY OF NATIVE HEART WITHOUT ANGINA PECTORIS: ICD-10-CM

## 2020-01-30 DIAGNOSIS — I35.0 NONRHEUMATIC AORTIC VALVE STENOSIS: Primary | ICD-10-CM

## 2020-01-30 DIAGNOSIS — N18.4 CKD (CHRONIC KIDNEY DISEASE) STAGE 4, GFR 15-29 ML/MIN (H): ICD-10-CM

## 2020-01-30 PROCEDURE — 99215 OFFICE O/P EST HI 40 MIN: CPT | Performed by: INTERNAL MEDICINE

## 2020-01-30 RX ORDER — NITROGLYCERIN 0.4 MG/1
TABLET SUBLINGUAL
Qty: 25 TABLET | Refills: 3 | Status: SHIPPED | OUTPATIENT
Start: 2020-01-30 | End: 2023-02-21

## 2020-01-30 ASSESSMENT — MIFFLIN-ST. JEOR: SCORE: 1509.55

## 2020-01-30 NOTE — PROGRESS NOTES
HPI and Plan:   See dictation    Orders Placed This Encounter   Procedures     Follow-Up with Cardiologist     Echocardiogram Complete     No orders of the defined types were placed in this encounter.    There are no discontinued medications.      Encounter Diagnoses   Name Primary?     Coronary artery disease involving native coronary artery of native heart without angina pectoris      CKD (chronic kidney disease) stage 4, GFR 15-29 ml/min (H)      Nonrheumatic aortic valve stenosis Yes       CURRENT MEDICATIONS:  Current Outpatient Medications   Medication Sig Dispense Refill     amLODIPine (NORVASC) 5 MG tablet Take 1 tablet (5 mg) by mouth At Bedtime 90 tablet 3     aspirin (ASA) 81 MG EC tablet Take 1 tablet (81 mg) by mouth daily 90 tablet 3     atorvastatin (LIPITOR) 40 MG tablet Take 1 tablet (40 mg) by mouth every evening 90 tablet 3     benazepril (LOTENSIN) 20 MG tablet Take 1 tablet (20 mg) by mouth daily 90 tablet 3     Calcium-Magnesium-Zinc 500-250-12.5 MG TABS Take 1 tablet by mouth daily       cyclobenzaprine (FLEXERIL) 10 MG tablet TAKE 1/2 TO 1 TABLET BY MOUTH 3 TIMES DAILY AS NEEDED 21 tablet 0     hydrALAZINE (APRESOLINE) 10 MG tablet Take 1 tablet (10 mg) by mouth 3 times daily 120 tablet 3     MAGNESIUM PO Take 200 mg by mouth daily        metoprolol succinate ER (TOPROL-XL) 25 MG 24 hr tablet Take 0.5 tablets (12.5 mg) by mouth daily 45 tablet 3     nitroGLYcerin (NITROSTAT) 0.4 MG sublingual tablet For chest pain place 1 tablet under the tongue every 5 minutes for 3 doses. If symptoms persist 5 minutes after 1st dose call 911. 25 tablet 3     oxyCODONE (ROXICODONE) 5 MG tablet Take 1 tablet (5 mg) by mouth daily as needed for pain 20 tablet 0       ALLERGIES     Allergies   Allergen Reactions     No Known Allergies        PAST MEDICAL HISTORY:  Past Medical History:   Diagnosis Date     Aortic stenosis, mild      CKD (chronic kidney disease) stage 4, GFR 15-29 ml/min (H) 9/17/2018      Coronary artery disease 10/2018    nSTEMI- [facility] to mLAD, distal lad mild, Lcx-small 50-60%, RCA 60-70% normal ifR (despite echo showing poss old IMI)     Hyperlipidemia      Hypertension      Renal cell cancer, unspecified laterality (H) 5/10/2017     Renal disease     CRD--creatinine in upper ones to 2, right kdney removed 2000 for canncer     Spondylosis with myelopathy, lumbar region        PAST SURGICAL HISTORY:  Past Surgical History:   Procedure Laterality Date     BACK SURGERY       CHOLECYSTECTOMY       DECOMPRESSION LUMBAR TWO LEVELS  12/1/2014    Procedure: DECOMPRESSION LUMBAR TWO LEVELS;  Surgeon: Remy Harmon MD;  Location: [facility] OR     HERNIA REPAIR       LAMINECTOMY, FUSION LUMBAR ONE LEVEL, COMBINED N/A 12/1/2014    Procedure: COMBINED LAMINECTOMY, FUSION LUMBAR ONE LEVEL;  Surgeon: Remy Harmon MD;  Location:  OR     PHACOEMULSIFICATION CLEAR CORNEA WITH STANDARD INTRAOCULAR LENS IMPLANT  10/22/2012    Procedure: PHACOEMULSIFICATION CLEAR CORNEA WITH STANDARD INTRAOCULAR LENS IMPLANT;  RIGHT EYE PHACOEMULSIFICATION CLEAR CORNEA WITH STANDARD INTRAOCULAR LENS IMPLANT ;  Surgeon: Grupo Granger MD;  Location:  [facility]     right nephrectomy[       STENT,CORONARY, S660 24/30  10/2018    mLAD       FAMILY HISTORY:  Family History   Problem Relation Age of Onset     No Known Problems Mother      Heart Surgery Father      No Known Problems Sister        SOCIAL HISTORY:  Social History     Socioeconomic History     Marital status:      Spouse name: None     Number of children: None     Years of education: None     Highest education level: None   Occupational History     None   Social Needs     Financial resource strain: None     Food insecurity:     Worry: None     Inability: None     Transportation needs:     Medical: None     Non-medical: None   Tobacco Use     Smoking status: Former Smoker     Smokeless tobacco: Never Used   Substance and Sexual Activity     Alcohol use: Yes      "Alcohol/week: 2.0 standard drinks     Types: 2 Shots of liquor per week     Comment: rare     Drug use: No     Sexual activity: Not Currently   Lifestyle     Physical activity:     Days per week: None     Minutes per session: None     Stress: None   Relationships     Social connections:     Talks on phone: None     Gets together: None     Attends [employer] service: None     Active member of club or organization: None     Attends meetings of clubs or organizations: None     Relationship status: None     Intimate partner violence:     Fear of current or ex partner: None     Emotionally abused: None     Physically abused: None     Forced sexual activity: None   Other Topics Concern     None   Social History Narrative     None       Review of Systems:  Skin:  Positive for bruising   Eyes:  Positive for glasses  ENT:  Negative    Respiratory:  Positive for dyspnea on exertion  Cardiovascular:  Negative    Gastroenterology: Negative    Genitourinary:  Negative    Musculoskeletal:  Positive for back pain;joint pain;arthritis  Neurologic:  Negative    Psychiatric:  Negative    Heme/Lymph/Imm:  Negative    Endocrine:  Negative      Physical Exam:  Vitals: /60 (BP Location: Right arm, Patient Position: Sitting, Cuff Size: Adult Large)   Pulse 68   Ht 1.778 m (5' 10\")   Wt 81.3 kg (179 lb 4.8 oz)   BMI 25.73 kg/m      Constitutional:  cooperative, alert and oriented, well developed, well nourished, in no acute distress   fit, looks younger than stated age    Skin:  warm and dry to the touch, no apparent skin lesions or masses noted          Head:  normocephalic, no masses or lesions        Eyes:  pupils equal and round, conjunctivae and lids unremarkable, sclera white, no xanthalasma, EOMS intact, no nystagmus        Lymph:No Cervical lymphadenopathy present;No thyromegaly     ENT:  no pallor or cyanosis, dentition good        Neck:  carotid pulses are full and equal bilaterally, JVP normal, no carotid bruit    "     Respiratory:       fibrotic bibasilar crackles    Cardiac: regular rhythm;normal S1 and S2       systolic murmur;grade 1     known AS mild  pulses full and equal, no bruits auscultated                                        GI:  abdomen soft, non-tender, BS normoactive, no mass, no HSM, no bruits        Extremities and Muscular Skeletal:  no deformities, clubbing, cyanosis, erythema observed;no edema         drop foot, ankle brace R    Neurological:  no gross motor deficits        Psych:  Alert and Oriented x 3      Recent Lab Results:  LIPID RESULTS:  Lab Results   Component Value Date    CHOL 100 07/31/2019    HDL 37 (L) 07/31/2019    LDL 44 07/31/2019    TRIG 94 07/31/2019       LIVER ENZYME RESULTS:  Lab Results   Component Value Date    AST 18 01/09/2020    ALT 36 01/09/2020       CBC RESULTS:  Lab Results   Component Value Date    WBC 9.1 02/11/2019    RBC 4.03 (L) 02/11/2019    HGB 13.1 (L) 02/11/2019    HCT 39.6 (L) 02/11/2019    MCV 98 02/11/2019    MCH 32.5 02/11/2019    MCHC 33.1 02/11/2019    RDW 12.4 02/11/2019     02/11/2019       BMP RESULTS:  Lab Results   Component Value Date     01/09/2020    POTASSIUM 4.9 01/09/2020    CHLORIDE 104 01/09/2020    CO2 23 01/09/2020    ANIONGAP 7 01/09/2020     (H) 01/09/2020    BUN 54 (H) 01/09/2020    CR 1.89 (H) 01/09/2020    GFRESTIMATED 32 (L) 01/09/2020    GFRESTBLACK 37 (L) 01/09/2020    RICCARDO 8.9 01/09/2020        A1C RESULTS:  Lab Results   Component Value Date    A1C 5.5 09/29/2018       INR RESULTS:  Lab Results   Component Value Date    INR 0.89 10/04/2010           CHENG Rm PA-C  [id number]  [id number]  [place] [id number]

## 2020-01-30 NOTE — LETTER
1/30/2020      Eusebio Oliveira MD  [address]  [place] [address]      RE: Augie Powell       Dear Colleague,    I had the pleasure of seeing Augie Powell in the [facility].    Service Date: 01/30/2020      HISTORY OF PRESENT ILLNESS:  I had the pleasure of following up on our mutual patient, Augie Powell.  As you know, he is an 84-year-old gentleman, quite fit and looks much younger than stated age.  He exercises regularly, although with arthritis he is now a little bit limited.  Cardiac-wise, he has coronary disease.  As you know, in 2018, he presented with a myocardial infarction.  He had a drug-eluting stent placed to the mid LAD.  The left circumflex was small with 50%-60% narrowing and the right coronary artery had 60%-70% narrowing but normal flow reserve.  The ejection fraction had dropped into the 40%-45% range.  Followup echo showed normal heart function.  He also has aortic valve stenosis.  Fortunately, it is mild.  He is exercising for the most part with no cardiovascular complaints, just the arthritic component.  He has a single kidney, and so we of course are going to try to avoid dye studies, etc.  His creatinine from your office shows that it is actually somewhat better than it was a few months ago.  For the first time I am hearing fibrotic-sounding crackles in both bases.  I do not know where that is coming from.  He does not have any symptoms.  He had remote pneumonia.  A chest x-ray a year and a half ago was reportedly normal, so he is going to have an office visit with you next month, he told me, so I will ask if you could weigh in on if any investigation of the fibrotic crackles should be performed, i.e., repeat chest x-ray, pulmonary function tests or high-resolution CT.  From a cardiac standpoint, I am simply going to renew his medications, and I will have him come in next year for a nuclear stress test to make sure there is no  progression of those multivessel narrowings separate from the LAD that I mentioned, since that will be 3 years from the last look, and I will get another echocardiogram to look at the aortic valve stenosis and see if it has progressed.  Today we went through the actual angiogram pictures.  He had numerous questions about his exercise ability.  We gave him a tentative heart rate formula to use [facility] because he asked, although I explained to him with the beta blocker on board it may blunt the numbers.  Today's visit ended up being 40 minutes, greater than 50% counseling.      We will see him back next year, and again, thank you in advance for commenting on the fibrotic lung crackles and determine if any additional workup is necessary.      Lauro Le MD      cc:   Eusebio Oliveira MD    [address], [address] [place] [address]         ISSA LE MD             D: 2020   T: 2020   MT: DUONG      Name:     TERI BUCHANAN   MRN:      [id number]        Account:      [id number]   :      1935           Service Date: 2020      Document: [id number]         Outpatient Encounter Medications as of 2020   Medication Sig Dispense Refill     amLODIPine (NORVASC) 5 MG tablet Take 1 tablet (5 mg) by mouth At Bedtime 90 tablet 3     aspirin (ASA) 81 MG EC tablet Take 1 tablet (81 mg) by mouth daily 90 tablet 3     atorvastatin (LIPITOR) 40 MG tablet Take 1 tablet (40 mg) by mouth every evening 90 tablet 3     benazepril (LOTENSIN) 20 MG tablet Take 1 tablet (20 mg) by mouth daily 90 tablet 3     Calcium-Magnesium-Zinc 500-250-12.5 MG TABS Take 1 tablet by mouth daily       cyclobenzaprine (FLEXERIL) 10 MG tablet TAKE 1/2 TO 1 TABLET BY MOUTH 3 TIMES DAILY AS NEEDED 21 tablet 0     hydrALAZINE (APRESOLINE) 10 MG tablet Take 1 tablet (10 mg) by mouth 3 times daily 120 tablet 3     MAGNESIUM PO Take 200 mg by mouth daily        metoprolol  succinate ER (TOPROL-XL) 25 MG 24 hr tablet Take 0.5 tablets (12.5 mg) by mouth daily 45 tablet 3     nitroGLYcerin (NITROSTAT) 0.4 MG sublingual tablet For chest pain place 1 tablet under the tongue every 5 minutes for 3 doses. If symptoms persist 5 minutes after 1st dose call 911. 25 tablet 3     oxyCODONE (ROXICODONE) 5 MG tablet Take 1 tablet (5 mg) by mouth daily as needed for pain 20 tablet 0     [DISCONTINUED] nitroGLYcerin (NITROSTAT) 0.4 MG sublingual tablet For chest pain place 1 tablet under the tongue every 5 minutes for 3 doses. If symptoms persist 5 minutes after 1st dose call 911. 25 tablet 3     No facility-administered encounter medications on file as of 1/30/2020.        Again, thank you for allowing me to participate in the care of your patient.      Sincerely,    Noel Villarreal MD     [facility]

## 2020-01-30 NOTE — LETTER
1/30/2020    Eusebio Oliveira MD  [address]  [place] [address]    RE: Augie Powell       Dear Colleague,    I had the pleasure of seeing Augie Powell in the [facility].    HPI and Plan:   See dictation    Orders Placed This Encounter   Procedures     Follow-Up with Cardiologist     Echocardiogram Complete     No orders of the defined types were placed in this encounter.    There are no discontinued medications.      Encounter Diagnoses   Name Primary?     Coronary artery disease involving native coronary artery of native heart without angina pectoris      CKD (chronic kidney disease) stage 4, GFR 15-29 ml/min (H)      Nonrheumatic aortic valve stenosis Yes       CURRENT MEDICATIONS:  Current Outpatient Medications   Medication Sig Dispense Refill     amLODIPine (NORVASC) 5 MG tablet Take 1 tablet (5 mg) by mouth At Bedtime 90 tablet 3     aspirin (ASA) 81 MG EC tablet Take 1 tablet (81 mg) by mouth daily 90 tablet 3     atorvastatin (LIPITOR) 40 MG tablet Take 1 tablet (40 mg) by mouth every evening 90 tablet 3     benazepril (LOTENSIN) 20 MG tablet Take 1 tablet (20 mg) by mouth daily 90 tablet 3     Calcium-Magnesium-Zinc 500-250-12.5 MG TABS Take 1 tablet by mouth daily       cyclobenzaprine (FLEXERIL) 10 MG tablet TAKE 1/2 TO 1 TABLET BY MOUTH 3 TIMES DAILY AS NEEDED 21 tablet 0     hydrALAZINE (APRESOLINE) 10 MG tablet Take 1 tablet (10 mg) by mouth 3 times daily 120 tablet 3     MAGNESIUM PO Take 200 mg by mouth daily        metoprolol succinate ER (TOPROL-XL) 25 MG 24 hr tablet Take 0.5 tablets (12.5 mg) by mouth daily 45 tablet 3     nitroGLYcerin (NITROSTAT) 0.4 MG sublingual tablet For chest pain place 1 tablet under the tongue every 5 minutes for 3 doses. If symptoms persist 5 minutes after 1st dose call 911. 25 tablet 3     oxyCODONE (ROXICODONE) 5 MG tablet Take 1 tablet (5 mg) by mouth daily as needed for pain 20 tablet 0       ALLERGIES      Allergies   Allergen Reactions     No Known Allergies        PAST MEDICAL HISTORY:  Past Medical History:   Diagnosis Date     Aortic stenosis, mild      CKD (chronic kidney disease) stage 4, GFR 15-29 ml/min (H) 9/17/2018     Coronary artery disease 10/2018    nSTEMI- Isis to mLAD, distal lad mild, Lcx-small 50-60%, RCA 60-70% normal ifR (despite echo showing poss old IMI)     Hyperlipidemia      Hypertension      Renal cell cancer, unspecified laterality (H) 5/10/2017     Renal disease     CRD--creatinine in upper ones to 2, right kdney removed 2000 for canncer     Spondylosis with myelopathy, lumbar region        PAST SURGICAL HISTORY:  Past Surgical History:   Procedure Laterality Date     BACK SURGERY       CHOLECYSTECTOMY       DECOMPRESSION LUMBAR TWO LEVELS  12/1/2014    Procedure: DECOMPRESSION LUMBAR TWO LEVELS;  Surgeon: Remy Harmon MD;  Location: [facility]     HERNIA REPAIR       LAMINECTOMY, FUSION LUMBAR ONE LEVEL, COMBINED N/A 12/1/2014    Procedure: COMBINED LAMINECTOMY, FUSION LUMBAR ONE LEVEL;  Surgeon: Remy Harmon MD;  Location:  OR     PHACOEMULSIFICATION CLEAR CORNEA WITH STANDARD INTRAOCULAR LENS IMPLANT  10/22/2012    Procedure: PHACOEMULSIFICATION CLEAR CORNEA WITH STANDARD INTRAOCULAR LENS IMPLANT;  RIGHT EYE PHACOEMULSIFICATION CLEAR CORNEA WITH STANDARD INTRAOCULAR LENS IMPLANT ;  Surgeon: Grupo Granger MD;  Location:  [facility]     right nephrectomy[       STENT,CORONARY, S660 24/30  10/2018    mLAD       FAMILY HISTORY:  Family History   Problem Relation Age of Onset     No Known Problems Mother      Heart Surgery Father      No Known Problems Sister        SOCIAL HISTORY:  Social History     Socioeconomic History     Marital status:      Spouse name: None     Number of children: None     Years of education: None     Highest education level: None   Occupational History     None   Social Needs     Financial resource strain: None     Food insecurity:     Worry:  "None     Inability: None     Transportation needs:     Medical: None     Non-medical: None   Tobacco Use     Smoking status: Former Smoker     Smokeless tobacco: Never Used   Substance and Sexual Activity     Alcohol use: Yes     Alcohol/week: 2.0 standard drinks     Types: 2 Shots of liquor per week     Comment: rare     Drug use: No     Sexual activity: Not Currently   Lifestyle     Physical activity:     Days per week: None     Minutes per session: None     Stress: None   Relationships     Social connections:     Talks on phone: None     Gets together: None     Attends Confucianist service: None     Active member of club or organization: None     Attends meetings of clubs or organizations: None     Relationship status: None     Intimate partner violence:     Fear of current or ex partner: None     Emotionally abused: None     Physically abused: None     Forced sexual activity: None   Other Topics Concern     None   Social History Narrative     None       Review of Systems:  Skin:  Positive for bruising   Eyes:  Positive for glasses  ENT:  Negative    Respiratory:  Positive for dyspnea on exertion  Cardiovascular:  Negative    Gastroenterology: Negative    Genitourinary:  Negative    Musculoskeletal:  Positive for back pain;joint pain;arthritis  Neurologic:  Negative    Psychiatric:  Negative    Heme/Lymph/Imm:  Negative    Endocrine:  Negative      Physical Exam:  Vitals: /60 (BP Location: Right arm, Patient Position: Sitting, Cuff Size: Adult Large)   Pulse 68   Ht 1.778 m (5' 10\")   Wt 81.3 kg (179 lb 4.8 oz)   BMI 25.73 kg/m       Constitutional:  cooperative, alert and oriented, well developed, well nourished, in no acute distress   fit, looks younger than stated age    Skin:  warm and dry to the touch, no apparent skin lesions or masses noted          Head:  normocephalic, no masses or lesions        Eyes:  pupils equal and round, conjunctivae and lids unremarkable, sclera white, no xanthalasma, EOMS " intact, no nystagmus        Lymph:No Cervical lymphadenopathy present;No thyromegaly     ENT:  no pallor or cyanosis, dentition good        Neck:  carotid pulses are full and equal bilaterally, JVP normal, no carotid bruit        Respiratory:       fibrotic bibasilar crackles    Cardiac: regular rhythm;normal S1 and S2       systolic murmur;grade 1     known AS mild  pulses full and equal, no bruits auscultated                                        GI:  abdomen soft, non-tender, BS normoactive, no mass, no HSM, no bruits        Extremities and Muscular Skeletal:  no deformities, clubbing, cyanosis, erythema observed;no edema         drop foot, ankle brace R    Neurological:  no gross motor deficits        Psych:  Alert and Oriented x 3      Recent Lab Results:  LIPID RESULTS:  Lab Results   Component Value Date    CHOL 100 07/31/2019    HDL 37 (L) 07/31/2019    LDL 44 07/31/2019    TRIG 94 07/31/2019       LIVER ENZYME RESULTS:  Lab Results   Component Value Date    AST 18 01/09/2020    ALT 36 01/09/2020       CBC RESULTS:  Lab Results   Component Value Date    WBC 9.1 02/11/2019    RBC 4.03 (L) 02/11/2019    HGB 13.1 (L) 02/11/2019    HCT 39.6 (L) 02/11/2019    MCV 98 02/11/2019    MCH 32.5 02/11/2019    MCHC 33.1 02/11/2019    RDW 12.4 02/11/2019     02/11/2019       BMP RESULTS:  Lab Results   Component Value Date     01/09/2020    POTASSIUM 4.9 01/09/2020    CHLORIDE 104 01/09/2020    CO2 23 01/09/2020    ANIONGAP 7 01/09/2020     (H) 01/09/2020    BUN 54 (H) 01/09/2020    CR 1.89 (H) 01/09/2020    GFRESTIMATED 32 (L) 01/09/2020    GFRESTBLACK 37 (L) 01/09/2020    RICCARDO 8.9 01/09/2020        A1C RESULTS:  Lab Results   Component Value Date    A1C 5.5 09/29/2018       INR RESULTS:  Lab Results   Component Value Date    INR 0.89 10/04/2010           CC  Martha Rm PA-C  [address]  [place] [id number]    Thank you for allowing me to participate in the care of your  patient.      Sincerely,     Noel Villarreal MD     [facility]    cc:   Martha Rm PA-C  [address]  [place] [address]

## 2020-01-30 NOTE — PROGRESS NOTES
Service Date: 01/30/2020      HISTORY OF PRESENT ILLNESS:  I had the pleasure of following up on our mutual patient, Augie Powell.  As you know, he is an 84-year-old gentleman, quite fit and looks much younger than stated age.  He exercises regularly, although with arthritis he is now a little bit limited.  Cardiac-wise, he has coronary disease.  As you know, in 2018, he presented with a myocardial infarction.  He had a drug-eluting stent placed to the mid LAD.  The left circumflex was small with 50%-60% narrowing and the right coronary artery had 60%-70% narrowing but normal flow reserve.  The ejection fraction had dropped into the 40%-45% range.  Followup echo showed normal heart function.  He also has aortic valve stenosis.  Fortunately, it is mild.  He is exercising for the most part with no cardiovascular complaints, just the arthritic component.  He has a single kidney, and so we of course are going to try to avoid dye studies, etc.  His creatinine from your office shows that it is actually somewhat better than it was a few months ago.  For the first time I am hearing fibrotic-sounding crackles in both bases.  I do not know where that is coming from.  He does not have any symptoms.  He had remote pneumonia.  A chest x-ray a year and a half ago was reportedly normal, so he is going to have an office visit with you next month, he told me, so I will ask if you could weigh in on if any investigation of the fibrotic crackles should be performed, i.e., repeat chest x-ray, pulmonary function tests or high-resolution CT.  From a cardiac standpoint, I am simply going to renew his medications, and I will have him come in next year for a nuclear stress test to make sure there is no progression of those multivessel narrowings separate from the LAD that I mentioned, since that will be 3 years from the last look, and I will get another echocardiogram to look at the aortic valve stenosis and see if it has progressed.   Today we went through the actual angiogram pictures.  He had numerous questions about his exercise ability.  We gave him a tentative heart rate formula to use [facility] the health club because he asked, although I explained to him with the beta blocker on board it may blunt the numbers.  Today's visit ended up being 40 minutes, greater than 50% counseling.  We will see him back next year, and again, thank you in advance for commenting on the fibrotic lung crackles and determine if any additional workup is necessary.      Lauro Le MD      cc:   Eusebio Oliveira MD    [address], [address]    [place] [address]         ISSA LE MD             D: 2020   T: 2020   MT: DUONG      Name:     TERI BUCHANAN   MRN:      [id number]        Account:      [id number]   :      1935           Service Date: 2020      Document: [id number]

## 2020-01-31 NOTE — TELEPHONE ENCOUNTER
Dimitri Ortega can you please see if Dr Oliveira will sign the PT referral to [facility] that I have pended. Thanks    Crystal Mae  Referral Coordinator

## 2020-02-07 DIAGNOSIS — I21.4 NSTEMI (NON-ST ELEVATED MYOCARDIAL INFARCTION) (H): ICD-10-CM

## 2020-02-07 RX ORDER — ATORVASTATIN CALCIUM 40 MG/1
40 TABLET, FILM COATED ORAL EVERY EVENING
Qty: 90 TABLET | Refills: 3 | Status: SHIPPED | OUTPATIENT
Start: 2020-02-07 | End: 2021-04-22

## 2020-02-19 ENCOUNTER — OFFICE VISIT (OUTPATIENT)
Dept: FAMILY MEDICINE | Facility: CLINIC | Age: 85
End: 2020-02-19
Payer: COMMERCIAL

## 2020-02-19 ENCOUNTER — ANCILLARY PROCEDURE (OUTPATIENT)
Dept: GENERAL RADIOLOGY | Facility: CLINIC | Age: 85
End: 2020-02-19
Attending: INTERNAL MEDICINE
Payer: COMMERCIAL

## 2020-02-19 VITALS
TEMPERATURE: 97.4 F | WEIGHT: 179 LBS | HEART RATE: 57 BPM | DIASTOLIC BLOOD PRESSURE: 70 MMHG | BODY MASS INDEX: 25.68 KG/M2 | OXYGEN SATURATION: 99 % | SYSTOLIC BLOOD PRESSURE: 133 MMHG

## 2020-02-19 DIAGNOSIS — I10 BENIGN ESSENTIAL HYPERTENSION: ICD-10-CM

## 2020-02-19 DIAGNOSIS — J44.9 CHRONIC OBSTRUCTIVE PULMONARY DISEASE, UNSPECIFIED COPD TYPE (H): ICD-10-CM

## 2020-02-19 DIAGNOSIS — I25.728 CORONARY ARTERY DISEASE OF AUTOLOGOUS BYPASS GRAFT WITH STABLE ANGINA PECTORIS (H): ICD-10-CM

## 2020-02-19 DIAGNOSIS — E78.5 HYPERLIPIDEMIA LDL GOAL <130: ICD-10-CM

## 2020-02-19 DIAGNOSIS — N18.4 CKD (CHRONIC KIDNEY DISEASE) STAGE 4, GFR 15-29 ML/MIN (H): Primary | ICD-10-CM

## 2020-02-19 DIAGNOSIS — C64.9 RENAL CELL CANCER, UNSPECIFIED LATERALITY (H): ICD-10-CM

## 2020-02-19 DIAGNOSIS — M43.10 DEGENERATIVE SPONDYLOLISTHESIS: ICD-10-CM

## 2020-02-19 DIAGNOSIS — F43.22 ADJUSTMENT DISORDER WITH ANXIOUS MOOD: ICD-10-CM

## 2020-02-19 DIAGNOSIS — I35.0 AORTIC VALVE STENOSIS, ETIOLOGY OF CARDIAC VALVE DISEASE UNSPECIFIED: ICD-10-CM

## 2020-02-19 LAB
FEF 25/75: NORMAL
FEV-1: NORMAL
FEV1/FVC: NORMAL
FVC: NORMAL

## 2020-02-19 PROCEDURE — 71046 X-RAY EXAM CHEST 2 VIEWS: CPT

## 2020-02-19 PROCEDURE — 99214 OFFICE O/P EST MOD 30 MIN: CPT | Mod: 25 | Performed by: INTERNAL MEDICINE

## 2020-02-19 PROCEDURE — 94010 BREATHING CAPACITY TEST: CPT | Performed by: INTERNAL MEDICINE

## 2020-02-19 NOTE — PROGRESS NOTES
Subjective     Augie Powell is a 84 year old male who presents to clinic today for the following health issues:    HPI   Recheck  Pt with a hx of HTN presents to the clinic for a f/u exam. The pt reports that he visited his cardiologist on 01/30/2020. The report from this visit states that fibrotic crackles can be heard in both bases. The pt has a hx of PNA. The pt denies SOB, CP, palpitations, sinus congestion, post nasal drip or heartburn/acid indigestion. The pt notes that he has smoked a cigarette since he was ~30 years old.     The pt is very pleased to report that his back pain has improved.     Patient Active Problem List   Diagnosis     Degenerative spondylolisthesis     Spondylolisthesis     Benign essential hypertension     Renal cell cancer, unspecified laterality (H)     Hyperlipidemia LDL goal <130     CKD (chronic kidney disease) stage 4, GFR 15-29 ml/min (H)     ACS (acute coronary syndrome) (H)     Past Surgical History:   Procedure Laterality Date     BACK SURGERY       CHOLECYSTECTOMY       DECOMPRESSION LUMBAR TWO LEVELS  12/1/2014    Procedure: DECOMPRESSION LUMBAR TWO LEVELS;  Surgeon: Remy Harmon MD;  Location:  OR     HERNIA REPAIR       LAMINECTOMY, FUSION LUMBAR ONE LEVEL, COMBINED N/A 12/1/2014    Procedure: COMBINED LAMINECTOMY, FUSION LUMBAR ONE LEVEL;  Surgeon: Remy Harmon MD;  Location: [facility]     PHACOEMULSIFICATION CLEAR CORNEA WITH STANDARD INTRAOCULAR LENS IMPLANT  10/22/2012    Procedure: PHACOEMULSIFICATION CLEAR CORNEA WITH STANDARD INTRAOCULAR LENS IMPLANT;  RIGHT EYE PHACOEMULSIFICATION CLEAR CORNEA WITH STANDARD INTRAOCULAR LENS IMPLANT ;  Surgeon: Grupo Granger MD;  Location:  [facility]     right nephrectomy[       STENT,CORONARY, S660 24/30  10/2018    mLAD       Social History     Tobacco Use     Smoking status: Former Smoker     Smokeless tobacco: Never Used   Substance Use Topics     Alcohol use: Yes     Alcohol/week: 2.0 standard drinks      Types: 2 Shots of liquor per week     Comment: rare     Family History   Problem Relation Age of Onset     No Known Problems Mother      Heart Surgery Father      No Known Problems Sister          Current Outpatient Medications   Medication Sig Dispense Refill     amLODIPine (NORVASC) 5 MG tablet Take 1 tablet (5 mg) by mouth At Bedtime 90 tablet 3     aspirin (ASA) 81 MG EC tablet Take 1 tablet (81 mg) by mouth daily 90 tablet 3     atorvastatin (LIPITOR) 40 MG tablet Take 1 tablet (40 mg) by mouth every evening 90 tablet 3     benazepril (LOTENSIN) 20 MG tablet Take 1 tablet (20 mg) by mouth daily 90 tablet 3     Calcium-Magnesium-Zinc 500-250-12.5 MG TABS Take 1 tablet by mouth daily       cyclobenzaprine (FLEXERIL) 10 MG tablet TAKE 1/2 TO 1 TABLET BY MOUTH 3 TIMES DAILY AS NEEDED 21 tablet 0     hydrALAZINE (APRESOLINE) 10 MG tablet Take 1 tablet (10 mg) by mouth 3 times daily 120 tablet 3     MAGNESIUM PO Take 200 mg by mouth daily        metoprolol succinate ER (TOPROL-XL) 25 MG 24 hr tablet Take 0.5 tablets (12.5 mg) by mouth daily 45 tablet 3     nitroGLYcerin (NITROSTAT) 0.4 MG sublingual tablet For chest pain place 1 tablet under the tongue every 5 minutes for 3 doses. If symptoms persist 5 minutes after 1st dose call 911. 25 tablet 3     oxyCODONE (ROXICODONE) 5 MG tablet Take 1 tablet (5 mg) by mouth daily as needed for pain 20 tablet 0     Allergies   Allergen Reactions     No Known Allergies        Reviewed and updated as needed this visit by Provider         Review of Systems   ROS COMP: Constitutional, HEENT, cardiovascular, pulmonary, gi and gu systems are negative, except as otherwise noted.    This document serves as a record of the services and decisions personally performed and made by Eusebio Oliveira MD. It was created on his behalf by Miguelito Shah, a trained medical scribe. The creation of this document is based on the provider's statements to the medical scribe.  Miguelito Shah February 19, 2020  11:51 AM          Objective    /70 (BP Location: Right arm, Cuff Size: Adult Regular)   Pulse 57   Temp 97.4  F (36.3  C) (Oral)   Wt 81.2 kg (179 lb)   SpO2 99%   BMI 25.68 kg/m    Body mass index is 25.68 kg/m .     Physical Exam   Neck was supple without adenopathy or thyromegaly his carotids were normal without bruits  Chest bibasilar crackles, clear to auscultation and percussion  Cardiovascular S1 and S2 are physiologic without murmurs or gallops  Abdomen bowel sounds were normal.  There is no palpable mass or organomegaly  Extremities nontender without any edema  Pulses pedal pulses are as described otherwise his pulses are bilaterally symmetrical throughout without bruits  Skin without significant abnormality      Diagnostic Test Results:  Labs reviewed in Epic  Results for orders placed or performed in visit on 02/19/20 (from the past 24 hour(s))   Spirometry, Breathing Capacity: Normal Order, Clinic Performed   Result Value Ref Range    FEV-1      FVC      FEV1/FVC      FEF 25/75             Assessment & Plan     CKD (chronic kidney disease) stage 4, GFR 15-29 ml/min (H)   Continue close monitoring to avoid nephrotoxic drugs and avoiding prerenal azotemia  Renal cell cancer, unspecified laterality (H)      Benign essential hypertension  Well controlled with current therapies     Adjustment disorder with anxious mood      Degenerative spondylolisthesis      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 Scheduled the pt for a CT scan.   - Spirometry, Breathing Capacity: Normal Order, Clinic Performed  - XR Chest 2 Views           FUTURE APPOINTMENTS:       - Follow-up visit in 1 month     Return in about 1 month (around 3/19/2020) for  Follow Up.    The information in this document, created by the medical scribe for me, accurately reflects the services I personally performed and the decisions made by me. I have reviewed and approved this document for accuracy prior to leaving the patient care area.  February 19, 2020 12:57 PM  28 minutes were spent during this encounter with greater than 50% of the time spent in counseling and coordinating care  Eusebio Oliveira MD  [facility]

## 2020-03-17 DIAGNOSIS — I10 BENIGN ESSENTIAL HYPERTENSION: ICD-10-CM

## 2020-03-17 RX ORDER — BENAZEPRIL HYDROCHLORIDE 20 MG/1
20 TABLET ORAL DAILY
Qty: 90 TABLET | Refills: 3 | Status: SHIPPED | OUTPATIENT
Start: 2020-03-17 | End: 2021-02-12

## 2020-04-29 ENCOUNTER — TELEPHONE (OUTPATIENT)
Dept: FAMILY MEDICINE | Facility: CLINIC | Age: 85
End: 2020-04-29

## 2020-04-29 NOTE — TELEPHONE ENCOUNTER
Panel Management Review      Patient has the following on his problem list: None      Composite cancer screening  Chart review shows that this patient is due/due soon for the following influenza vaccination   Summary:    Patient is due/failing the following:   Influenza vaccine     Action needed:   Patient declined on 2/19/2020    Type of outreach:    declined at visit    Questions for provider review:    None                                                                                                                                    Jonna Bernal MA on 4/29/2020 at 11:31 AM

## 2020-06-04 DIAGNOSIS — I10 BENIGN ESSENTIAL HYPERTENSION: ICD-10-CM

## 2020-06-04 RX ORDER — HYDRALAZINE HYDROCHLORIDE 10 MG/1
10 TABLET, FILM COATED ORAL 3 TIMES DAILY
Qty: 270 TABLET | Refills: 3 | Status: SHIPPED | OUTPATIENT
Start: 2020-06-04 | End: 2021-04-22

## 2020-08-07 ENCOUNTER — TRANSFERRED RECORDS (OUTPATIENT)
Dept: HEALTH INFORMATION MANAGEMENT | Facility: CLINIC | Age: 85
End: 2020-08-07

## 2020-08-08 ENCOUNTER — TRANSFERRED RECORDS (OUTPATIENT)
Dept: HEALTH INFORMATION MANAGEMENT | Facility: CLINIC | Age: 85
End: 2020-08-08

## 2020-08-11 ENCOUNTER — TRANSFERRED RECORDS (OUTPATIENT)
Dept: HEALTH INFORMATION MANAGEMENT | Facility: CLINIC | Age: 85
End: 2020-08-11

## 2020-08-11 LAB — EJECTION FRACTION: 52 %

## 2020-08-14 ENCOUNTER — MEDICAL CORRESPONDENCE (OUTPATIENT)
Dept: HEALTH INFORMATION MANAGEMENT | Facility: CLINIC | Age: 85
End: 2020-08-14

## 2020-08-14 ENCOUNTER — TRANSFERRED RECORDS (OUTPATIENT)
Dept: HEALTH INFORMATION MANAGEMENT | Facility: CLINIC | Age: 85
End: 2020-08-14

## 2020-08-14 LAB
CHOLEST SERPL-MCNC: 91 MG/DL
CREAT SERPL-MCNC: 2.35 MG/DL (ref 0.7–1.11)
GFR SERPL CREATININE-BSD FRML MDRD: 20 ML/MIN/1.73M2
GLUCOSE SERPL-MCNC: 99 MG/DL (ref 65–99)
HDLC SERPL-MCNC: 39 MG/DL
LDLC SERPL CALC-MCNC: 34 MG/DL
NONHDLC SERPL-MCNC: 52 MG/DL
POTASSIUM SERPL-SCNC: 4.9 MMOL/L (ref 3.5–5.3)
TRIGL SERPL-MCNC: 101 MG/DL

## 2020-09-09 ENCOUNTER — TRANSFERRED RECORDS (OUTPATIENT)
Dept: HEALTH INFORMATION MANAGEMENT | Facility: CLINIC | Age: 85
End: 2020-09-09

## 2020-09-21 ENCOUNTER — OFFICE VISIT (OUTPATIENT)
Dept: FAMILY MEDICINE | Facility: CLINIC | Age: 85
End: 2020-09-21
Payer: COMMERCIAL

## 2020-09-21 VITALS
OXYGEN SATURATION: 96 % | HEART RATE: 86 BPM | BODY MASS INDEX: 26.48 KG/M2 | HEIGHT: 70 IN | SYSTOLIC BLOOD PRESSURE: 145 MMHG | WEIGHT: 185 LBS | DIASTOLIC BLOOD PRESSURE: 71 MMHG | TEMPERATURE: 97.5 F

## 2020-09-21 DIAGNOSIS — N18.4 CKD (CHRONIC KIDNEY DISEASE) STAGE 4, GFR 15-29 ML/MIN (H): ICD-10-CM

## 2020-09-21 DIAGNOSIS — I10 BENIGN ESSENTIAL HYPERTENSION: ICD-10-CM

## 2020-09-21 DIAGNOSIS — I35.0 AORTIC VALVE STENOSIS, ETIOLOGY OF CARDIAC VALVE DISEASE UNSPECIFIED: ICD-10-CM

## 2020-09-21 DIAGNOSIS — E78.5 HYPERLIPIDEMIA LDL GOAL <130: ICD-10-CM

## 2020-09-21 DIAGNOSIS — C64.9 RENAL CELL CANCER, UNSPECIFIED LATERALITY (H): ICD-10-CM

## 2020-09-21 DIAGNOSIS — M43.10 DEGENERATIVE SPONDYLOLISTHESIS: Primary | ICD-10-CM

## 2020-09-21 PROCEDURE — 99214 OFFICE O/P EST MOD 30 MIN: CPT | Performed by: INTERNAL MEDICINE

## 2020-09-21 ASSESSMENT — MIFFLIN-ST. JEOR: SCORE: 1535.4

## 2020-09-21 NOTE — PROGRESS NOTES
Subjective     Augie Powell is a 84 year old male who presents to clinic today for the following health issues:    HPI   Patient reports that his serial follow-up has been interrupted by the pandemic.  As he was scheduled for follow-up in March he is unable to follow-up because of the pandemic.    Hypertension Follow-up      Do you check your blood pressure regularly outside of the clinic? Yes     Are you following a low salt diet? Yes    Are your blood pressures ever more than 140 on the top number (systolic) OR more   than 90 on the bottom number (diastolic), for example 140/90? Yes  Patient notes that his blood pressures first thing in the morning are in the 140s over 50s and later in the day in the 110s to 120s over 50s to 60s.  He is significantly concerned about the low diastolic readings.  I pointed out to him that the elevated systolic readings in the morning are of more concern and of need of further treatment    6 weeks ago he is feeling fatigued with malaise and was seen in follow-up by  who noted that he is in congestive heart failure and started on torsemide with notable improvement in his peripheral edema and his fatigue.  Follow-up BMP showed his creatinine to be 2.35 which is higher than 1.89 noticed last January.  An echocardiogram reported that his aortic valve was not critical.  His medications were adjusted and hydralazine was discontinued and he was recommended to return to clinic in 3 months to see .  With the change in his renal function one has to wonder whether the benazepril may be playing some role in whether hydralazine might be an alternative as well as prerenal azotemia.  Patient is noted venous stasis to be slightly improved      Review of Systems   Constitutional, HEENT, cardiovascular no change in orthopnea, no PND angina or noted arrhythmias, pulmonary, gi and gu systems are negative, except as otherwise noted.  Low level of activity as compared to usual  "and he is looking to resume walking on the treadmill when he finds a safe athletic facility      Objective    BP (!) 145/71 (BP Location: Right arm, Patient Position: Sitting, Cuff Size: Adult Regular)   Pulse 86   Temp 97.5  F (36.4  C) (Skin)   Ht 1.778 m (5' 10\")   Wt 83.9 kg (185 lb)   SpO2 96%   BMI 26.54 kg/m    Body mass index is 26.54 kg/m .  Physical Exam   GENERAL: healthy, alert and no distress  NECK: no adenopathy, no asymmetry, masses, or scars and thyroid normal to palpation  RESP: lungs clear to auscultation - no rales, rhonchi or wheezes  CV: regular rate and rhythm, grade 2/6 systolic ejection murmur murmur, 1+ pretibial peripheral edema and peripheral pulses strong, neck veins not evaluated able currently adequately controlled  ABDOMEN: soft, nontender, no hepatosplenomegaly, no masses and bowel sounds normal  MS: no gross musculoskeletal defects noted, no edema            Assessment & Plan     Degenerative spondylolisthesis      Renal cell cancer, unspecified laterality (H)  Follow-up ultrasound to reevaluate his progressive renal insufficiency    CKD (chronic kidney disease) stage 4, GFR 15-29 ml/min (H)  Watch closely to avoid prerenal azotemia    Benign essential hypertension  Increase amlodipine to 2-1/2 mg twice daily with serial blood pressure checks twice daily    Hyperlipidemia LDL goal <130  Reevaluate in a timely fashion    Aortic valve stenosis, etiology of cardiac valve disease unspecified  Reevaluate recent echocardiogram when the report is available       BMI:   Estimated body mass index is 26.54 kg/m  as calculated from the following:    Height as of this encounter: 1.778 m (5' 10\").    Weight as of this encounter: 83.9 kg (185 lb).           FUTURE APPOINTMENTS:       - Follow-up visit in 1 mo    No follow-ups on file.    Eusebio Oliveira MD  [facility]    "

## 2020-09-24 ENCOUNTER — TELEPHONE (OUTPATIENT)
Dept: FAMILY MEDICINE | Facility: CLINIC | Age: 85
End: 2020-09-24

## 2020-09-24 NOTE — TELEPHONE ENCOUNTER
Reason for Call:  Other call back    Detailed comments: Pt called and reported that on Tuesday Dr. Oliveira was supposed to call him to F/U about his recent visit. The PT said that he hasn't heard anything from him. He would like a call back from Dr. Oliveira.      Phone Number Patient can be reached at: Home number on file [phone] (home)    Best Time: Any    Can we leave a detailed message on this number? YES    Call taken on 9/24/2020 at 2:09 PM by Nathaly Mccoy

## 2020-10-07 ENCOUNTER — OFFICE VISIT (OUTPATIENT)
Dept: FAMILY MEDICINE | Facility: CLINIC | Age: 85
End: 2020-10-07
Payer: COMMERCIAL

## 2020-10-07 VITALS
HEIGHT: 70 IN | HEART RATE: 71 BPM | TEMPERATURE: 96.9 F | OXYGEN SATURATION: 97 % | DIASTOLIC BLOOD PRESSURE: 74 MMHG | WEIGHT: 187.1 LBS | SYSTOLIC BLOOD PRESSURE: 146 MMHG | BODY MASS INDEX: 26.79 KG/M2

## 2020-10-07 DIAGNOSIS — I35.0 AORTIC VALVE STENOSIS, ETIOLOGY OF CARDIAC VALVE DISEASE UNSPECIFIED: Primary | ICD-10-CM

## 2020-10-07 DIAGNOSIS — R10.9 FLANK PAIN: ICD-10-CM

## 2020-10-07 DIAGNOSIS — J44.9 CHRONIC OBSTRUCTIVE PULMONARY DISEASE, UNSPECIFIED COPD TYPE (H): ICD-10-CM

## 2020-10-07 DIAGNOSIS — N18.4 CKD (CHRONIC KIDNEY DISEASE) STAGE 4, GFR 15-29 ML/MIN (H): ICD-10-CM

## 2020-10-07 DIAGNOSIS — I10 BENIGN ESSENTIAL HYPERTENSION: ICD-10-CM

## 2020-10-07 DIAGNOSIS — I50.9 OTHER CONGESTIVE HEART FAILURE (H): ICD-10-CM

## 2020-10-07 LAB
ERYTHROCYTE [DISTWIDTH] IN BLOOD BY AUTOMATED COUNT: 12.7 % (ref 10–15)
HCT VFR BLD AUTO: 39.7 % (ref 40–53)
HGB BLD-MCNC: 13.7 G/DL (ref 13.3–17.7)
MCH RBC QN AUTO: 32.9 PG (ref 26.5–33)
MCHC RBC AUTO-ENTMCNC: 34.5 G/DL (ref 31.5–36.5)
MCV RBC AUTO: 95 FL (ref 78–100)
NT-PROBNP SERPL-MCNC: 153 PG/ML (ref 0–450)
PLATELET # BLD AUTO: 201 10E9/L (ref 150–450)
RBC # BLD AUTO: 4.17 10E12/L (ref 4.4–5.9)
WBC # BLD AUTO: 8.3 10E9/L (ref 4–11)

## 2020-10-07 PROCEDURE — 36415 COLL VENOUS BLD VENIPUNCTURE: CPT | Performed by: INTERNAL MEDICINE

## 2020-10-07 PROCEDURE — 99214 OFFICE O/P EST MOD 30 MIN: CPT | Performed by: INTERNAL MEDICINE

## 2020-10-07 PROCEDURE — 80048 BASIC METABOLIC PNL TOTAL CA: CPT | Performed by: INTERNAL MEDICINE

## 2020-10-07 PROCEDURE — 85027 COMPLETE CBC AUTOMATED: CPT | Performed by: INTERNAL MEDICINE

## 2020-10-07 PROCEDURE — 83880 ASSAY OF NATRIURETIC PEPTIDE: CPT | Performed by: INTERNAL MEDICINE

## 2020-10-07 ASSESSMENT — MIFFLIN-ST. JEOR: SCORE: 1544.93

## 2020-10-07 NOTE — PROGRESS NOTES
"Subjective     Augie Powell is a 84 year old male who presents to clinic today for the following health issues:    HPI         Follow up hypertension  Patient noticing improvement in his blood pressure control in the afternoons however in the morning there is still in the 140s over 80s range.  He decided to take amlodipine in the morning and hydralazine in the afternoon.  Contacted cardiology for their opinion on his blood pressure control and he has an appointment next week.          Review of Systems   Constitutional, HEENT, cardiovascular, pulmonary, gi and gu systems are negative, except as otherwise noted.      Objective    BP (!) 146/74 (BP Location: Right arm, Patient Position: Sitting, Cuff Size: Adult Regular)   Pulse 71   Temp 96.9  F (36.1  C) (Temporal)   Ht 1.778 m (5' 10\")   Wt 84.9 kg (187 lb 1.6 oz)   SpO2 97%   BMI 26.85 kg/m    Body mass index is 26.85 kg/m .  Physical Exam   GENERAL: healthy, alert and no distress  NECK: no adenopathy, no asymmetry, masses, or scars and thyroid normal to palpation  RESP: lungs clear to auscultation - no rales, rhonchi or wheezes  CV: regular rate and rhythm, normal grade 2/6 systolic ejection murmur radiating across the precordium to the carotids,  and peripheral pulses strong  ABDOMEN: soft, nontender, no hepatosplenomegaly, no masses and bowel sounds normal  MS: no gross musculoskeletal defects noted, 1-2+ pretibial edema  Neck veins not easily evaluated          Assessment & Plan     Aortic valve stenosis, etiology of cardiac valve disease unspecified    - Basic metabolic panel  - CBC with platelets    Chronic obstructive pulmonary disease, unspecified COPD type (H)  Well-controlled with current therapy    Flank pain  Scheduled for follow-up renal ultrasound    Benign essential hypertension  Recommend taking amlodipine 5 mg twice daily and continue serial blood pressure checks twice daily  - Basic metabolic panel  - CBC with platelets    CKD (chronic " "kidney disease) stage 4, GFR 15-29 ml/min (H)  Continue to follow closely avoiding nephrotoxic drugs and prerenal azotemia  - Basic metabolic panel  - CBC with platelets    Other congestive heart failure (H)  No symptoms but follow-up noted weight gain  - BNP-N terminal pro  - Basic metabolic panel  - CBC with platelets     BMI:   Estimated body mass index is 26.85 kg/m  as calculated from the following:    Height as of this encounter: 1.778 m (5' 10\").    Weight as of this encounter: 84.9 kg (187 lb 1.6 oz).            FUTURE APPOINTMENTS:       - Follow-up visit in 1 mo    No follow-ups on file.    Eusebio Oliveira MD  [facility]    "

## 2020-10-07 NOTE — TELEPHONE ENCOUNTER
Received call from Pt. Per Pt he is having labile BP's, and reporting numbers as high as 170 systolic and as low as 90 systolic. Per Pt he had seen DR Finn at [facility] who had stopped his hydralazine, and Pt is now on water pill he says also. Pt had labs and last CR done was 2.35 in august with no recheck.  Informed Pt the only appointment at heart clinic is video and Pt has had to many changes for that. Asked Pt to contact DR Oliveira's office to hopefully be seen and have labs done. Pt may need also to see renal.  BERKLEY Howard RN

## 2020-10-08 LAB
ANION GAP SERPL CALCULATED.3IONS-SCNC: 7 MMOL/L (ref 3–14)
BUN SERPL-MCNC: 46 MG/DL (ref 7–30)
CALCIUM SERPL-MCNC: 9 MG/DL (ref 8.5–10.1)
CHLORIDE SERPL-SCNC: 107 MMOL/L (ref 94–109)
CO2 SERPL-SCNC: 23 MMOL/L (ref 20–32)
CREAT SERPL-MCNC: 2.13 MG/DL (ref 0.66–1.25)
GFR SERPL CREATININE-BSD FRML MDRD: 27 ML/MIN/{1.73_M2}
GLUCOSE SERPL-MCNC: 88 MG/DL (ref 70–99)
POTASSIUM SERPL-SCNC: 5.1 MMOL/L (ref 3.4–5.3)
SODIUM SERPL-SCNC: 137 MMOL/L (ref 133–144)

## 2020-10-09 ENCOUNTER — TRANSFERRED RECORDS (OUTPATIENT)
Dept: HEALTH INFORMATION MANAGEMENT | Facility: CLINIC | Age: 85
End: 2020-10-09

## 2020-10-09 NOTE — RESULT ENCOUNTER NOTE
I called the patient and informed of results. No changes made to treatment. Patient voices understanding and will contact me with any further questions.     Eusebio Oliveira MD

## 2020-10-12 DIAGNOSIS — I10 BENIGN ESSENTIAL HYPERTENSION: Primary | ICD-10-CM

## 2020-10-12 RX ORDER — TORSEMIDE 5 MG/1
10 TABLET ORAL DAILY
Qty: 1 TABLET | Refills: 0 | COMMUNITY
Start: 2020-10-12 | End: 2021-04-22

## 2020-10-12 NOTE — PROGRESS NOTES
Spoke with Pt went over medicatoins, and BP's. Pt still waiting for final on testing from DR Oliveira. Pt doing well at present and BP's being delt with. Pt was concerned about low BP's. BERKLEY Howard rN

## 2020-10-19 DIAGNOSIS — I21.4 NSTEMI (NON-ST ELEVATED MYOCARDIAL INFARCTION) (H): ICD-10-CM

## 2020-10-19 RX ORDER — METOPROLOL SUCCINATE 25 MG/1
12.5 TABLET, EXTENDED RELEASE ORAL DAILY
Qty: 45 TABLET | Refills: 1 | Status: SHIPPED | OUTPATIENT
Start: 2020-10-19 | End: 2020-11-05

## 2020-11-05 DIAGNOSIS — I10 BENIGN ESSENTIAL HYPERTENSION: ICD-10-CM

## 2020-11-05 DIAGNOSIS — I21.4 NSTEMI (NON-ST ELEVATED MYOCARDIAL INFARCTION) (H): ICD-10-CM

## 2020-11-05 RX ORDER — METOPROLOL SUCCINATE 25 MG/1
12.5 TABLET, EXTENDED RELEASE ORAL DAILY
Qty: 45 TABLET | Refills: 1 | Status: SHIPPED | OUTPATIENT
Start: 2020-11-05 | End: 2021-04-19

## 2020-11-05 RX ORDER — AMLODIPINE BESYLATE 5 MG/1
TABLET ORAL
Qty: 90 TABLET | Refills: 3 | Status: SHIPPED | DISCHARGE
Start: 2020-11-05 | End: 2021-04-22

## 2020-11-22 ENCOUNTER — HEALTH MAINTENANCE LETTER (OUTPATIENT)
Age: 85
End: 2020-11-22

## 2021-02-12 DIAGNOSIS — I10 BENIGN ESSENTIAL HYPERTENSION: ICD-10-CM

## 2021-02-12 RX ORDER — BENAZEPRIL HYDROCHLORIDE 20 MG/1
20 TABLET ORAL DAILY
Qty: 90 TABLET | Refills: 0 | Status: SHIPPED | OUTPATIENT
Start: 2021-02-12 | End: 2021-05-12

## 2021-03-17 ENCOUNTER — TELEPHONE (OUTPATIENT)
Dept: CARDIOLOGY | Facility: CLINIC | Age: 86
End: 2021-03-17

## 2021-03-17 NOTE — TELEPHONE ENCOUNTER
Patient called today to advise RN that his BP is elevated 150/78. Patient advised RN he contributes this to high anxiety. Patient reports he has not been taking his anxiety medication and needs a refill. RN recommended patient call his PMD to obtain refill of anxiety medication. RN did advise patient that if BP still elevated despite anxiety medication he should call our office to provide update. RN also advised patient he was due for f/u with Dr. Villarreal with NM stress and echo to be completed prior. Patient verbalized understanding and is in agreement with plan. RN transferred patient to scheduling to arrange f/u with Dr. Villarreal along with NM lexiscan stress and ECHO.

## 2021-03-23 ENCOUNTER — TELEPHONE (OUTPATIENT)
Dept: CARDIOLOGY | Facility: CLINIC | Age: 86
End: 2021-03-23

## 2021-03-23 NOTE — TELEPHONE ENCOUNTER
Return Pt call went over his testing do be done 4/16/21, and he would be at clinic all morning. BERKLEY Howard RN  
Yes

## 2021-04-13 LAB
ALBUMIN SERPL-MCNC: 4.3 G/DL
ALP SERPL-CCNC: 52 U/L
ALT SERPL-CCNC: 19 U/L
ANION GAP SERPL CALCULATED.3IONS-SCNC: NORMAL MMOL/L
AST SERPL-CCNC: 21 U/L
BILIRUB SERPL-MCNC: 0.6 MG/DL
BUN SERPL-MCNC: 42 MG/DL
CALCIUM SERPL-MCNC: 9.3 MG/DL
CHLORIDE SERPLBLD-SCNC: 103 MMOL/L
CHOLEST SERPL-MCNC: 102 MG/DL
CO2 SERPL-SCNC: 25 MMOL/L
CREAT SERPL-MCNC: 2.23 MG/DL
DIFFERENTIAL: NORMAL
ERYTHROCYTE [DISTWIDTH] IN BLOOD BY AUTOMATED COUNT: 12.4 %
GFR SERPL CREATININE-BSD FRML MDRD: 26 ML/MIN/1.73M2
GLUCOSE SERPL-MCNC: 97 MG/DL (ref 70–99)
HCT VFR BLD AUTO: 39.1 %
HDLC SERPL-MCNC: 39 MG/DL
HEMOGLOBIN: 13.5 G/DL (ref 13.3–17.7)
LDLC SERPL CALC-MCNC: 45 MG/DL
MCH RBC QN AUTO: 32.6 PG
MCHC RBC AUTO-ENTMCNC: 34.5 G/DL
MCV RBC AUTO: 94.4 FL
NONHDLC SERPL-MCNC: 63 MG/DL
PLATELET # BLD AUTO: 196 10^9/L
POTASSIUM SERPL-SCNC: 5 MMOL/L
PROT SERPL-MCNC: 7.3 G/DL
RBC # BLD AUTO: 4.14 10^12/L
SODIUM SERPL-SCNC: 136 MMOL/L
TRIGL SERPL-MCNC: 95 MG/DL
VITAMIN D TOTAL: 59 NG/ML
WBC # BLD AUTO: NORMAL 10^9/L

## 2021-04-16 ENCOUNTER — HOSPITAL ENCOUNTER (OUTPATIENT)
Dept: CARDIOLOGY | Facility: CLINIC | Age: 86
Discharge: HOME | End: 2021-04-16
Attending: INTERNAL MEDICINE | Admitting: INTERNAL MEDICINE
Payer: COMMERCIAL

## 2021-04-16 DIAGNOSIS — I35.0 NONRHEUMATIC AORTIC VALVE STENOSIS: ICD-10-CM

## 2021-04-16 DIAGNOSIS — I25.10 CORONARY ARTERY DISEASE INVOLVING NATIVE CORONARY ARTERY OF NATIVE HEART WITHOUT ANGINA PECTORIS: ICD-10-CM

## 2021-04-16 PROCEDURE — 93306 TTE W/DOPPLER COMPLETE: CPT

## 2021-04-16 PROCEDURE — 93306 TTE W/DOPPLER COMPLETE: CPT | Mod: 26 | Performed by: INTERNAL MEDICINE

## 2021-04-19 DIAGNOSIS — I21.4 NSTEMI (NON-ST ELEVATED MYOCARDIAL INFARCTION) (H): ICD-10-CM

## 2021-04-19 RX ORDER — METOPROLOL SUCCINATE 25 MG/1
12.5 TABLET, EXTENDED RELEASE ORAL DAILY
Qty: 45 TABLET | Refills: 0 | Status: SHIPPED | OUTPATIENT
Start: 2021-04-19 | End: 2021-05-13

## 2021-04-22 ENCOUNTER — OFFICE VISIT (OUTPATIENT)
Dept: CARDIOLOGY | Facility: CLINIC | Age: 86
End: 2021-04-22
Attending: INTERNAL MEDICINE
Payer: COMMERCIAL

## 2021-04-22 VITALS
SYSTOLIC BLOOD PRESSURE: 137 MMHG | WEIGHT: 190 LBS | DIASTOLIC BLOOD PRESSURE: 66 MMHG | HEART RATE: 68 BPM | BODY MASS INDEX: 27.2 KG/M2 | HEIGHT: 70 IN

## 2021-04-22 DIAGNOSIS — I77.810 ASCENDING AORTA DILATION (H): Primary | ICD-10-CM

## 2021-04-22 DIAGNOSIS — I25.10 CORONARY ARTERY DISEASE INVOLVING NATIVE CORONARY ARTERY OF NATIVE HEART WITHOUT ANGINA PECTORIS: ICD-10-CM

## 2021-04-22 DIAGNOSIS — I10 BENIGN ESSENTIAL HYPERTENSION: ICD-10-CM

## 2021-04-22 DIAGNOSIS — I21.4 NSTEMI (NON-ST ELEVATED MYOCARDIAL INFARCTION) (H): ICD-10-CM

## 2021-04-22 DIAGNOSIS — I35.0 NONRHEUMATIC AORTIC VALVE STENOSIS: ICD-10-CM

## 2021-04-22 PROCEDURE — 99215 OFFICE O/P EST HI 40 MIN: CPT | Performed by: INTERNAL MEDICINE

## 2021-04-22 RX ORDER — TORSEMIDE 5 MG/1
5 TABLET ORAL DAILY
Qty: 1 TABLET | Refills: 0 | Status: ON HOLD | COMMUNITY
Start: 2021-04-22 | End: 2024-01-01

## 2021-04-22 RX ORDER — AMLODIPINE BESYLATE 5 MG/1
5 TABLET ORAL EVERY MORNING
Qty: 90 TABLET | Refills: 3 | Status: ON HOLD | COMMUNITY
Start: 2021-04-22 | End: 2024-01-01

## 2021-04-22 RX ORDER — HYDRALAZINE HYDROCHLORIDE 10 MG/1
10 TABLET, FILM COATED ORAL 3 TIMES DAILY
Qty: 270 TABLET | Refills: 3 | COMMUNITY
Start: 2021-04-22 | End: 2022-05-31

## 2021-04-22 RX ORDER — ATORVASTATIN CALCIUM 20 MG/1
20 TABLET, FILM COATED ORAL EVERY EVENING
Qty: 1 TABLET | Refills: 0 | COMMUNITY
Start: 2021-04-22 | End: 2021-04-23

## 2021-04-22 ASSESSMENT — MIFFLIN-ST. JEOR: SCORE: 1553.08

## 2021-04-22 NOTE — LETTER
4/22/2021    Merlin Emery Brown, MD  [facility] [address]  [facility] [address]    RE: Augie Powell       Dear Colleague,    I had the pleasure of seeing Augie Powell in the [facility].    HPI and Plan:   See dictation    Orders Placed This Encounter   Procedures     Follow-Up with Cardiologist     Exercise Stress Echocardiogram     Echocardiogram Complete       Orders Placed This Encounter   Medications     cholecalciferol (VITAMIN D3) 1250 mcg (90330 units) capsule     Sig: Take 1,250 mcg by mouth every 7 days     hydrALAZINE (APRESOLINE) 10 MG tablet     Sig: Take 1 tablet (10 mg) by mouth 3 times daily     Dispense:  270 tablet     Refill:  3     amLODIPine (NORVASC) 5 MG tablet     Sig: Take 1/2 tablet 2.5 in the AM and 5 mg 1 po in the pm per Pt made his own changes he is to call PMD who he said told him to do this for refill     Dispense:  90 tablet     Refill:  3     torsemide (DEMADEX) 5 MG tablet     Sig: Take 1 tablet (5 mg) by mouth daily     Dispense:  1 tablet     Refill:  0     atorvastatin (LIPITOR) 20 MG tablet     Sig: Take 1 tablet (20 mg) by mouth every evening     Dispense:  1 tablet     Refill:  0       Medications Discontinued During This Encounter   Medication Reason     hydrALAZINE (APRESOLINE) 10 MG tablet      atorvastatin (LIPITOR) 40 MG tablet Reorder     torsemide (DEMADEX) 5 MG tablet Reorder     amLODIPine (NORVASC) 5 MG tablet Reorder         Encounter Diagnoses   Name Primary?     Coronary artery disease involving native coronary artery of native heart without angina pectoris      Nonrheumatic aortic valve stenosis      Benign essential hypertension      NSTEMI (non-ST elevated myocardial infarction) (H)      Ascending aorta dilation (H) Yes       CURRENT MEDICATIONS:  Current Outpatient Medications   Medication Sig Dispense Refill     amLODIPine (NORVASC) 5 MG tablet Take 1/2 tablet 2.5 in the AM  and 5 mg 1 po in the pm per Pt made his own changes he is to call PMD who he said told him to do this for refill 90 tablet 3     aspirin (ASA) 81 MG EC tablet Take 1 tablet (81 mg) by mouth daily 90 tablet 3     atorvastatin (LIPITOR) 20 MG tablet Take 1 tablet (20 mg) by mouth every evening 1 tablet 0     benazepril (LOTENSIN) 20 MG tablet Take 1 tablet (20 mg) by mouth daily Needs an appointment for refills. 90 tablet 0     Calcium-Magnesium-Zinc 500-250-12.5 MG TABS Take 1 tablet by mouth daily       cholecalciferol (VITAMIN D3) 1250 mcg (65709 units) capsule Take 1,250 mcg by mouth every 7 days       cyclobenzaprine (FLEXERIL) 10 MG tablet TAKE 1/2 TO 1 TABLET BY MOUTH 3 TIMES DAILY AS NEEDED 21 tablet 0     hydrALAZINE (APRESOLINE) 10 MG tablet Take 1 tablet (10 mg) by mouth 3 times daily 270 tablet 3     metoprolol succinate ER (TOPROL-XL) 25 MG 24 hr tablet Take 0.5 tablets (12.5 mg) by mouth daily 45 tablet 0     torsemide (DEMADEX) 5 MG tablet Take 1 tablet (5 mg) by mouth daily 1 tablet 0     MAGNESIUM PO Take 200 mg by mouth daily        nitroGLYcerin (NITROSTAT) 0.4 MG sublingual tablet For chest pain place 1 tablet under the tongue every 5 minutes for 3 doses. If symptoms persist 5 minutes after 1st dose call 911. 25 tablet 3     oxyCODONE (ROXICODONE) 5 MG tablet Take 1 tablet (5 mg) by mouth daily as needed for pain (Patient not taking: Reported on 4/22/2021) 20 tablet 0       ALLERGIES     Allergies   Allergen Reactions     No Known Allergies        PAST MEDICAL HISTORY:  Past Medical History:   Diagnosis Date     Aortic stenosis, mild      Ascending aorta dilatation (H)      CKD (chronic kidney disease) stage 4, GFR 15-29 ml/min (H) 09/17/2018     Coronary artery disease 10/2018    nSTEMI- Isis to mLAD, distal lad mild, Lcx-small 50-60%, RCA 60-70% normal ifR (despite echo showing poss old IMI)     Hyperlipidemia      Hypertension      Renal cell cancer, unspecified laterality (H) 05/10/2017      Renal disease     CRD--creatinine in upper ones to 2, right kdney removed 2000 for canncer     Rheumatic fever      Spondylosis with myelopathy, lumbar region        PAST SURGICAL HISTORY:  Past Surgical History:   Procedure Laterality Date     BACK SURGERY       CHOLECYSTECTOMY       DECOMPRESSION LUMBAR TWO LEVELS  12/01/2014    Procedure: DECOMPRESSION LUMBAR TWO LEVELS;  Surgeon: Remy Harmon MD;  Location:  OR     HERNIA REPAIR       LAMINECTOMY, FUSION LUMBAR ONE LEVEL, COMBINED N/A 12/01/2014    Procedure: COMBINED LAMINECTOMY, FUSION LUMBAR ONE LEVEL;  Surgeon: Remy Harmon MD;  Location:  OR     PHACOEMULSIFICATION CLEAR CORNEA WITH STANDARD INTRAOCULAR LENS IMPLANT  10/22/2012    Procedure: PHACOEMULSIFICATION CLEAR CORNEA WITH STANDARD INTRAOCULAR LENS IMPLANT;  RIGHT EYE PHACOEMULSIFICATION CLEAR CORNEA WITH STANDARD INTRAOCULAR LENS IMPLANT ;  Surgeon: Grupo Granger MD;  Location:  EC     right nephrectomy[       STENT,CORONARY, S660 24/30  10/2018    mLAD  mod RCA, Lcx       FAMILY HISTORY:  Family History   Problem Relation Age of Onset     No Known Problems Mother      Heart Surgery Father      No Known Problems Sister        SOCIAL HISTORY:  Social History     Socioeconomic History     Marital status:      Spouse name: None     Number of children: None     Years of education: None     Highest education level: None   Occupational History     None   Social Needs     Financial resource strain: None     Food insecurity     Worry: None     Inability: None     Transportation needs     Medical: None     Non-medical: None   Tobacco Use     Smoking status: Former Smoker     Smokeless tobacco: Never Used   Substance and Sexual Activity     Alcohol use: Yes     Alcohol/week: 2.0 standard drinks     Types: 2 Shots of liquor per week     Comment: rare     Drug use: No     Sexual activity: Not Currently   Lifestyle     Physical activity     Days per week: None     Minutes per  "session: None     Stress: None   Relationships     Social connections     Talks on phone: None     Gets together: None     Attends [employer] service: None     Active member of club or organization: None     Attends meetings of clubs or organizations: None     Relationship status: None     Intimate partner violence     Fear of current or ex partner: None     Emotionally abused: None     Physically abused: None     Forced sexual activity: None   Other Topics Concern     None   Social History Narrative     None       Review of Systems:  Skin:  Positive for bruising     Eyes:  Positive for glasses    ENT:  Negative      Respiratory:  Positive for dyspnea on exertion \"varies\"   Cardiovascular:  lightheadedness;Negative for;palpitations;chest pain Positive for;edema lower extrenity edema  Gastroenterology: Negative diarrhea last week  Genitourinary:  Negative      Musculoskeletal:  Positive for back pain;joint pain;arthritis occ  Neurologic:  Negative numbness or tingling of feet of the right foot - drop foot  Psychiatric:  Negative anxiety    Heme/Lymph/Imm:  Negative      Endocrine:  Negative        Physical Exam:  Vitals: /66   Pulse 68   Ht 1.778 m (5' 10\")   Wt 86.2 kg (190 lb)   BMI 27.26 kg/m      Constitutional:           Skin:             Head:           Eyes:           Lymph:      ENT:           Neck:           Respiratory:            Cardiac:                                                           GI:           Extremities and Muscular Skeletal:                 Neurological:           Psych:         Thank you for allowing me to participate in the care of your patient.      Sincerely,     Noel Villarreal MD     [facility]    cc:   Noel Villarreal MD  [address]  [place] [address]        "

## 2021-04-22 NOTE — PROGRESS NOTES
Service Date: 04/22/2021    Cem Powell is an 85-year-old gentleman, very active and looks younger than stated age.  He had coronary disease.  He had a stent placed to a severe mid LAD lesion following a non-STEMI in 2018.  At that time, he had roughly 40%-50% left circumflex and right coronary disease.  We also identified aortic valve stenosis, but fortunately, it is only mild and he has mild aortic root aneurysm.  He was seeing Dr. Yeboah. There was some question about if he was allergic to hydralazine or not because he is having swelling.  It is much more likely that the swelling is from the amlodipine.    At this point, the story becomes very confusing.  He also saw Dr. Finn and now I am getting a little confused who is his cardiologist and who is managing his medications because Dr. Yeboah certainly manages his blood pressure pills well.  Dr. Finn saw him and I saw him.  The patient came in today with a typed list of his medications. However, he wrote things like 1/2 tablet, but we do not know what size the tablet was and he was not sure. I have tried to clean up the chart as best I can so the medicine list that follows this note I am hoping is correct, but in fact, we do not really know.  He also showed me his lab tests but they turned out it was his wife's labs test and not his.  Then additionally, we reviewed his echocardiogram today and then I found out that Dr. Finn had done one a few months ago.  This is fraught with danger to have this going on.  I explained to the patient that Dr. Yeboah should be the main quarterback here making the final decisions.  At this point, the patient feels well with no symptoms.  He complained about some ecchymosis of the back of his hand.  Indeed, it is classic ecchymosis.  He is on aspirin only.  He is not on fish oil or other blood thinners.  I explained to him that as one gets older the skin gets a little thinner and the capillaries get a little more  fragile.  There is not much we can do, and again, it is classic being in the back of his hand.  I reviewed his echocardiogram.  His aortic stenosis is still mild.  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  It is mild to mildly moderate.  His aortic root is slightly larger than it was a couple years ago.  His creatinine he tells me is somewhere in the mid 2 range and that is roughly where it was last time.  At this juncture, I made no changes then. He asked me to check his home blood pressure machine.  He has 2 of them. One was a [employer] brand one was an [employer] brand.  The [employer] one matched almost perfectly with the sphygmo here in the office.  He also was concerned about low diastolic blood pressure.  He said Dr. Finn was concerned. We are getting the high 50s to low 60s for the diastolic.  The systolic is in the 130s.  I explained to him that this most likely represents noncompliant arteries.  I explained that concept with aging the vessel becomes noncompliant and the systolic goes up and the diastolic tends to go down because of issues with reflected wave and non-compressibility.  I answered all of his questions.  I will see him back in 1 year.  I plan on doing an echocardiogram to follow up on the aortic valve stenosis and the aortic root enlargement and the mitral valve.  We will also do a stress echo test to follow up on his coronary disease since it will be 4 years from the last time we looked.      Total visit time today is 42 minutes.     cc:  Merlin E. Brown, MD  [facility]  [address]  [place]  [address]    Noel Villarreal MD        D: 2021   T: 2021   MT: beth    Name:     TERI BUCHANAN  MRN:      [id number]        Account:      [id number]   :      1935           Service Date: 2021       Document: [id number]

## 2021-04-22 NOTE — PROGRESS NOTES
HPI and Plan:   See dictation    Orders Placed This Encounter   Procedures     Follow-Up with Cardiologist     Exercise Stress Echocardiogram     Echocardiogram Complete       Orders Placed This Encounter   Medications     cholecalciferol (VITAMIN D3) 1250 mcg (85400 units) capsule     Sig: Take 1,250 mcg by mouth every 7 days     hydrALAZINE (APRESOLINE) 10 MG tablet     Sig: Take 1 tablet (10 mg) by mouth 3 times daily     Dispense:  270 tablet     Refill:  3     amLODIPine (NORVASC) 5 MG tablet     Sig: Take 1/2 tablet 2.5 in the AM and 5 mg 1 po in the pm per Pt made his own changes he is to call PMD who he said told him to do this for refill     Dispense:  90 tablet     Refill:  3     torsemide (DEMADEX) 5 MG tablet     Sig: Take 1 tablet (5 mg) by mouth daily     Dispense:  1 tablet     Refill:  0     atorvastatin (LIPITOR) 20 MG tablet     Sig: Take 1 tablet (20 mg) by mouth every evening     Dispense:  1 tablet     Refill:  0       Medications Discontinued During This Encounter   Medication Reason     hydrALAZINE (APRESOLINE) 10 MG tablet      atorvastatin (LIPITOR) 40 MG tablet Reorder     torsemide (DEMADEX) 5 MG tablet Reorder     amLODIPine (NORVASC) 5 MG tablet Reorder         Encounter Diagnoses   Name Primary?     Coronary artery disease involving native coronary artery of native heart without angina pectoris      Nonrheumatic aortic valve stenosis      Benign essential hypertension      NSTEMI (non-ST elevated myocardial infarction) (H)      Ascending aorta dilation (H) Yes       CURRENT MEDICATIONS:  Current Outpatient Medications   Medication Sig Dispense Refill     amLODIPine (NORVASC) 5 MG tablet Take 1/2 tablet 2.5 in the AM and 5 mg 1 po in the pm per Pt made his own changes he is to call PMD who he said told him to do this for refill 90 tablet 3     aspirin (ASA) 81 MG EC tablet Take 1 tablet (81 mg) by mouth daily 90 tablet 3     atorvastatin (LIPITOR) 20 MG tablet Take 1 tablet (20 mg) by  mouth every evening 1 tablet 0     benazepril (LOTENSIN) 20 MG tablet Take 1 tablet (20 mg) by mouth daily Needs an appointment for refills. 90 tablet 0     Calcium-Magnesium-Zinc 500-250-12.5 MG TABS Take 1 tablet by mouth daily       cholecalciferol (VITAMIN D3) 1250 mcg (95981 units) capsule Take 1,250 mcg by mouth every 7 days       cyclobenzaprine (FLEXERIL) 10 MG tablet TAKE 1/2 TO 1 TABLET BY MOUTH 3 TIMES DAILY AS NEEDED 21 tablet 0     hydrALAZINE (APRESOLINE) 10 MG tablet Take 1 tablet (10 mg) by mouth 3 times daily 270 tablet 3     metoprolol succinate ER (TOPROL-XL) 25 MG 24 hr tablet Take 0.5 tablets (12.5 mg) by mouth daily 45 tablet 0     torsemide (DEMADEX) 5 MG tablet Take 1 tablet (5 mg) by mouth daily 1 tablet 0     MAGNESIUM PO Take 200 mg by mouth daily        nitroGLYcerin (NITROSTAT) 0.4 MG sublingual tablet For chest pain place 1 tablet under the tongue every 5 minutes for 3 doses. If symptoms persist 5 minutes after 1st dose call 911. 25 tablet 3     oxyCODONE (ROXICODONE) 5 MG tablet Take 1 tablet (5 mg) by mouth daily as needed for pain (Patient not taking: Reported on 4/22/2021) 20 tablet 0       ALLERGIES     Allergies   Allergen Reactions     No Known Allergies        PAST MEDICAL HISTORY:  Past Medical History:   Diagnosis Date     Aortic stenosis, mild      Ascending aorta dilatation (H)      CKD (chronic kidney disease) stage 4, GFR 15-29 ml/min (H) 09/17/2018     Coronary artery disease 10/2018    nSTEMI- Isis to mLAD, distal lad mild, Lcx-small 50-60%, RCA 60-70% normal ifR (despite echo showing poss old IMI)     Hyperlipidemia      Hypertension      Renal cell cancer, unspecified laterality (H) 05/10/2017     Renal disease     CRD--creatinine in upper ones to 2, right kdney removed 2000 for canncer     Rheumatic fever      Spondylosis with myelopathy, lumbar region        PAST SURGICAL HISTORY:  Past Surgical History:   Procedure Laterality Date     BACK SURGERY        CHOLECYSTECTOMY       DECOMPRESSION LUMBAR TWO LEVELS  12/01/2014    Procedure: DECOMPRESSION LUMBAR TWO LEVELS;  Surgeon: Remy Harmon MD;  Location: [facility]     HERNIA REPAIR       LAMINECTOMY, FUSION LUMBAR ONE LEVEL, COMBINED N/A 12/01/2014    Procedure: COMBINED LAMINECTOMY, FUSION LUMBAR ONE LEVEL;  Surgeon: Remy Harmon MD;  Location:  OR     PHACOEMULSIFICATION CLEAR CORNEA WITH STANDARD INTRAOCULAR LENS IMPLANT  10/22/2012    Procedure: PHACOEMULSIFICATION CLEAR CORNEA WITH STANDARD INTRAOCULAR LENS IMPLANT;  RIGHT EYE PHACOEMULSIFICATION CLEAR CORNEA WITH STANDARD INTRAOCULAR LENS IMPLANT ;  Surgeon: Grupo Granger MD;  Location:  [facility]     right nephrectomy[       STENT,CORONARY, S660 24/30  10/2018    mLAD  mod RCA, Lcx       FAMILY HISTORY:  Family History   Problem Relation Age of Onset     No Known Problems Mother      Heart Surgery Father      No Known Problems Sister        SOCIAL HISTORY:  Social History     Socioeconomic History     Marital status:      Spouse name: None     Number of children: None     Years of education: None     Highest education level: None   Occupational History     None   Social Needs     Financial resource strain: None     Food insecurity     Worry: None     Inability: None     Transportation needs     Medical: None     Non-medical: None   Tobacco Use     Smoking status: Former Smoker     Smokeless tobacco: Never Used   Substance and Sexual Activity     Alcohol use: Yes     Alcohol/week: 2.0 standard drinks     Types: 2 Shots of liquor per week     Comment: rare     Drug use: No     Sexual activity: Not Currently   Lifestyle     Physical activity     Days per week: None     Minutes per session: None     Stress: None   Relationships     Social connections     Talks on phone: None     Gets together: None     Attends [employer] service: None     Active member of club or organization: None     Attends meetings of clubs or organizations: None      "Relationship status: None     Intimate partner violence     Fear of current or ex partner: None     Emotionally abused: None     Physically abused: None     Forced sexual activity: None   Other Topics Concern     None   Social History Narrative     None       Review of Systems:  Skin:  Positive for bruising     Eyes:  Positive for glasses    ENT:  Negative      Respiratory:  Positive for dyspnea on exertion \"varies\"   Cardiovascular:  lightheadedness;Negative for;palpitations;chest pain Positive for;edema lower extrenity edema  Gastroenterology: Negative diarrhea last week  Genitourinary:  Negative      Musculoskeletal:  Positive for back pain;joint pain;arthritis occ  Neurologic:  Negative numbness or tingling of feet of the right foot - drop foot  Psychiatric:  Negative anxiety    Heme/Lymph/Imm:  Negative      Endocrine:  Negative        Physical Exam:  Vitals: /66   Pulse 68   Ht 1.778 m (5' 10\")   Wt 86.2 kg (190 lb)   BMI 27.26 kg/m      Constitutional:           Skin:             Head:           Eyes:           Lymph:      ENT:           Neck:           Respiratory:            Cardiac:                                                           GI:           Extremities and Muscular Skeletal:                 Neurological:           Psych:           CC  Noel Villarreal MD  [address]  [place] [id number]              "

## 2021-04-23 ENCOUNTER — TELEPHONE (OUTPATIENT)
Dept: CARDIOLOGY | Facility: CLINIC | Age: 86
End: 2021-04-23

## 2021-04-23 DIAGNOSIS — I21.4 NSTEMI (NON-ST ELEVATED MYOCARDIAL INFARCTION) (H): ICD-10-CM

## 2021-04-23 RX ORDER — ATORVASTATIN CALCIUM 20 MG/1
20 TABLET, FILM COATED ORAL EVERY EVENING
Qty: 90 TABLET | Refills: 1 | Status: SHIPPED | OUTPATIENT
Start: 2021-04-23 | End: 2021-05-06

## 2021-04-23 NOTE — TELEPHONE ENCOUNTER
LOV 4/22/21. RN refilled rx per RN refill protocol.       Patient advised RN he would also mail us his recent lab work per Dr. Villarreal request. Will await official copy in the mail, but patient report the following numbers below.     102 cholesterol  39 HDL  45 LDL  95 Triglycerides

## 2021-05-06 DIAGNOSIS — I21.4 NSTEMI (NON-ST ELEVATED MYOCARDIAL INFARCTION) (H): ICD-10-CM

## 2021-05-06 RX ORDER — ATORVASTATIN CALCIUM 20 MG/1
20 TABLET, FILM COATED ORAL EVERY EVENING
Qty: 90 TABLET | Refills: 3 | Status: SHIPPED | OUTPATIENT
Start: 2021-05-06 | End: 2022-07-26

## 2021-05-12 DIAGNOSIS — I10 BENIGN ESSENTIAL HYPERTENSION: ICD-10-CM

## 2021-05-12 RX ORDER — BENAZEPRIL HYDROCHLORIDE 20 MG/1
20 TABLET ORAL DAILY
Qty: 90 TABLET | Refills: 3 | Status: SHIPPED | OUTPATIENT
Start: 2021-05-12 | End: 2022-04-05

## 2021-05-13 DIAGNOSIS — I21.4 NSTEMI (NON-ST ELEVATED MYOCARDIAL INFARCTION) (H): ICD-10-CM

## 2021-05-13 RX ORDER — METOPROLOL SUCCINATE 25 MG/1
12.5 TABLET, EXTENDED RELEASE ORAL DAILY
Qty: 45 TABLET | Refills: 3 | Status: SHIPPED | OUTPATIENT
Start: 2021-05-13 | End: 2021-07-28

## 2021-07-28 DIAGNOSIS — I21.4 NSTEMI (NON-ST ELEVATED MYOCARDIAL INFARCTION) (H): ICD-10-CM

## 2021-07-28 RX ORDER — METOPROLOL SUCCINATE 25 MG/1
12.5 TABLET, EXTENDED RELEASE ORAL DAILY
Qty: 45 TABLET | Refills: 3 | Status: SHIPPED | OUTPATIENT
Start: 2021-07-28 | End: 2022-09-22

## 2021-09-18 ENCOUNTER — HEALTH MAINTENANCE LETTER (OUTPATIENT)
Age: 86
End: 2021-09-18

## 2022-01-08 ENCOUNTER — HEALTH MAINTENANCE LETTER (OUTPATIENT)
Age: 87
End: 2022-01-08

## 2022-01-10 ENCOUNTER — TELEPHONE (OUTPATIENT)
Dept: CARDIOLOGY | Facility: CLINIC | Age: 87
End: 2022-01-10
Payer: COMMERCIAL

## 2022-01-10 NOTE — TELEPHONE ENCOUNTER
Pt called in he was asking for testing and to see DR Villarreal. Informed Pt he was not due until 4/16/2022 for testing and OV. Pt says his BP has increased and he is concerned 120 to 140 systolic at times. Pt says he is asymptomatic just concerned BP higher. Pt denies weight gain , but unable to get any real exercise in because of weather. Gave Pt earliest appt could find in march did tell him PMD can also manage BP. Informed him he would need to see  to do testing early. Pt took appointment and said he would call if any changes in health.   BERKLEY Howard RN

## 2022-01-11 ENCOUNTER — TRANSFERRED RECORDS (OUTPATIENT)
Dept: HEALTH INFORMATION MANAGEMENT | Facility: CLINIC | Age: 87
End: 2022-01-11
Payer: COMMERCIAL

## 2022-01-11 LAB
ALT SERPL-CCNC: 17 U/L (ref 9–46)
AST SERPL-CCNC: 17 U/L (ref 10–35)
CHOLESTEROL (EXTERNAL): 82 MG/DL
CREATININE (EXTERNAL): 2.01 MG/DL (ref 0.7–1.11)
GFR ESTIMATED (EXTERNAL): 29 ML/MIN/1.73M2
GFR ESTIMATED (IF AFRICAN AMERICAN) (EXTERNAL): 34 ML/MIN/1.73M2
GLUCOSE (EXTERNAL): 96 MG/DL (ref 65–99)
HDLC SERPL-MCNC: 35 MG/DL
LDL CHOLESTEROL CALCULATED (EXTERNAL): 28 MG/DL
NON HDL CHOLESTEROL (EXTERNAL): 47 MG/DL
POTASSIUM (EXTERNAL): 5.2 MMOL/L (ref 3.5–5.3)
TRIGLYCERIDES (EXTERNAL): 105 MG/DL
TSH SERPL-ACNC: 1.45 MLU/L (ref 0.4–4.5)

## 2022-01-14 ENCOUNTER — TRANSFERRED RECORDS (OUTPATIENT)
Dept: HEALTH INFORMATION MANAGEMENT | Facility: CLINIC | Age: 87
End: 2022-01-14
Payer: COMMERCIAL

## 2022-03-03 ENCOUNTER — TELEPHONE (OUTPATIENT)
Dept: CARDIOLOGY | Facility: CLINIC | Age: 87
End: 2022-03-03
Payer: COMMERCIAL

## 2022-03-03 NOTE — TELEPHONE ENCOUNTER
Called Pt and explained the difference between echo checking valves and heart wall motion, and stress test checking for CAD or coronary artery disease. Pt understood and asked that scheduling call Pt back to schedule testing. Did update orders in chart. BERKLEY Howard RN

## 2022-03-03 NOTE — TELEPHONE ENCOUNTER
[facility] Call Center    Phone Message    May a detailed message be left on voicemail: yes     Reason for Call: Other: Per pt wants to declined ECHO stress is this ok? but might do ECHO - no avail ECHO prior to appt please assist.     Action Taken: Message routed to:  Other: Cardiology    Travel Screening: Not Applicable

## 2022-03-07 ENCOUNTER — TELEPHONE (OUTPATIENT)
Dept: CARDIOLOGY | Facility: CLINIC | Age: 87
End: 2022-03-07
Payer: COMMERCIAL

## 2022-03-07 NOTE — TELEPHONE ENCOUNTER
Pt called in stating he is having numerous issues with mobility and does not feel he is able to do a stress test that requires any treadmill activity. Pt does not really want to do a stress test at all. Pt says he has some SOB when in house because  of his decreased mobility and struggling to get around but does not use walker in house. He uses his walker on walks outside and says he does fine without SOB when not struggling so much.  Informed Pt would message DR Villarreal regarding need for stress test and changing from stress echo. BERKLEY Howard RN

## 2022-03-08 NOTE — TELEPHONE ENCOUNTER
Informed Pt would do no stress testing, but he can set up echo and will have scheduling call him. BERKLEY Howard RN

## 2022-03-25 ENCOUNTER — TELEPHONE (OUTPATIENT)
Dept: CARDIOLOGY | Facility: CLINIC | Age: 87
End: 2022-03-25
Payer: COMMERCIAL

## 2022-03-28 ENCOUNTER — TRANSFERRED RECORDS (OUTPATIENT)
Dept: HEALTH INFORMATION MANAGEMENT | Facility: CLINIC | Age: 87
End: 2022-03-28
Payer: COMMERCIAL

## 2022-03-28 LAB
CREATININE (EXTERNAL): 1.76 MG/DL (ref 0.7–1.11)
GFR ESTIMATED (EXTERNAL): 34 ML/MIN/1.73M2
GFR ESTIMATED (IF AFRICAN AMERICAN) (EXTERNAL): 40 ML/MIN/1.73M2
GLUCOSE (EXTERNAL): 103 MG/DL (ref 65–99)
POTASSIUM (EXTERNAL): 4.9 MMOL/L (ref 3.5–5.3)

## 2022-04-04 ENCOUNTER — HOSPITAL ENCOUNTER (OUTPATIENT)
Dept: CARDIOLOGY | Facility: CLINIC | Age: 87
Discharge: HOME | End: 2022-04-04
Attending: INTERNAL MEDICINE | Admitting: INTERNAL MEDICINE
Payer: COMMERCIAL

## 2022-04-04 DIAGNOSIS — I25.10 CORONARY ARTERY DISEASE INVOLVING NATIVE CORONARY ARTERY OF NATIVE HEART WITHOUT ANGINA PECTORIS: ICD-10-CM

## 2022-04-04 DIAGNOSIS — I35.0 NONRHEUMATIC AORTIC VALVE STENOSIS: ICD-10-CM

## 2022-04-04 DIAGNOSIS — I77.810 ASCENDING AORTA DILATION (H): ICD-10-CM

## 2022-04-04 LAB — LVEF ECHO: NORMAL

## 2022-04-04 PROCEDURE — 999N000208 ECHOCARDIOGRAM COMPLETE

## 2022-04-04 PROCEDURE — 93306 TTE W/DOPPLER COMPLETE: CPT | Mod: 26 | Performed by: INTERNAL MEDICINE

## 2022-04-04 PROCEDURE — 255N000002 HC RX 255 OP 636: Performed by: INTERNAL MEDICINE

## 2022-04-04 RX ADMIN — HUMAN ALBUMIN MICROSPHERES AND PERFLUTREN 9 ML: 10; .22 INJECTION, SOLUTION INTRAVENOUS at 10:37

## 2022-04-05 ENCOUNTER — OFFICE VISIT (OUTPATIENT)
Dept: CARDIOLOGY | Facility: CLINIC | Age: 87
End: 2022-04-05
Payer: COMMERCIAL

## 2022-04-05 ENCOUNTER — TELEPHONE (OUTPATIENT)
Dept: CARDIOLOGY | Facility: CLINIC | Age: 87
End: 2022-04-05

## 2022-04-05 VITALS
WEIGHT: 189 LBS | HEIGHT: 70 IN | DIASTOLIC BLOOD PRESSURE: 54 MMHG | SYSTOLIC BLOOD PRESSURE: 150 MMHG | HEART RATE: 66 BPM | BODY MASS INDEX: 27.06 KG/M2 | OXYGEN SATURATION: 96 %

## 2022-04-05 DIAGNOSIS — I50.810 RIGHT-SIDED CONGESTIVE HEART FAILURE, UNSPECIFIED HF CHRONICITY (H): ICD-10-CM

## 2022-04-05 DIAGNOSIS — J84.9 ILD (INTERSTITIAL LUNG DISEASE) (H): Primary | ICD-10-CM

## 2022-04-05 DIAGNOSIS — I10 BENIGN ESSENTIAL HYPERTENSION: Primary | ICD-10-CM

## 2022-04-05 DIAGNOSIS — I25.10 CORONARY ARTERY DISEASE INVOLVING NATIVE CORONARY ARTERY OF NATIVE HEART WITHOUT ANGINA PECTORIS: ICD-10-CM

## 2022-04-05 PROCEDURE — 99214 OFFICE O/P EST MOD 30 MIN: CPT | Performed by: INTERNAL MEDICINE

## 2022-04-05 RX ORDER — LOSARTAN POTASSIUM 25 MG/1
25 TABLET ORAL DAILY
Qty: 60 TABLET | Refills: 1 | Status: SHIPPED | OUTPATIENT
Start: 2022-04-05 | End: 2022-05-31

## 2022-04-05 NOTE — LETTER
4/5/2022    Merlin Emery Brown, MD  [facility] [address]  [facility] [address]    RE: Augie Powell       Dear Colleague,     I had the pleasure of seeing Augie Powell in the [facility].  HPI and Plan:   See dictation    No orders of the defined types were placed in this encounter.    Orders Placed This Encounter   Medications     losartan (COZAAR) 25 MG tablet     Sig: Take 1 tablet (25 mg) by mouth daily Take 1 tablet daily for 1 week, then 1 pill twice a day if systolic BP >140-150     Dispense:  60 tablet     Refill:  1     Medications Discontinued During This Encounter   Medication Reason     benazepril (LOTENSIN) 20 MG tablet          Encounter Diagnosis   Name Primary?     Benign essential hypertension Yes       CURRENT MEDICATIONS:  Current Outpatient Medications   Medication Sig Dispense Refill     amLODIPine (NORVASC) 5 MG tablet Take 5 mg by mouth every morning  90 tablet 3     aspirin (ASA) 81 MG EC tablet Take 1 tablet (81 mg) by mouth daily 90 tablet 3     atorvastatin (LIPITOR) 20 MG tablet Take 1 tablet (20 mg) by mouth every evening 90 tablet 3     Calcium-Magnesium-Zinc 500-250-12.5 MG TABS Take 1 tablet by mouth daily       cholecalciferol (VITAMIN D3) 1250 mcg (69826 units) capsule Take 1,250 mcg by mouth daily        cyclobenzaprine (FLEXERIL) 10 MG tablet TAKE 1/2 TO 1 TABLET BY MOUTH 3 TIMES DAILY AS NEEDED 21 tablet 0     losartan (COZAAR) 25 MG tablet Take 1 tablet (25 mg) by mouth daily Take 1 tablet daily for 1 week, then 1 pill twice a day if systolic BP >140-150 60 tablet 1     metoprolol succinate ER (TOPROL-XL) 25 MG 24 hr tablet Take 0.5 tablets (12.5 mg) by mouth daily 45 tablet 3     nitroGLYcerin (NITROSTAT) 0.4 MG sublingual tablet For chest pain place 1 tablet under the tongue every 5 minutes for 3 doses. If symptoms persist 5 minutes after 1st dose call 911. 25 tablet 3     oxyCODONE (ROXICODONE) 5 MG tablet Take 1 tablet (5 mg) by  mouth daily as needed for pain 20 tablet 0     hydrALAZINE (APRESOLINE) 10 MG tablet Take 1 tablet (10 mg) by mouth 3 times daily (Patient not taking: Reported on 4/5/2022) 270 tablet 3     MAGNESIUM PO Take 200 mg by mouth daily  (Patient not taking: Reported on 4/5/2022)       torsemide (DEMADEX) 5 MG tablet Take 1 tablet (5 mg) by mouth daily (Patient not taking: Reported on 4/5/2022) 1 tablet 0       ALLERGIES     Allergies   Allergen Reactions     No Known Allergies        PAST MEDICAL HISTORY:  Past Medical History:   Diagnosis Date     Aortic stenosis, mild      Ascending aorta dilatation (H)      CKD (chronic kidney disease) stage 4, GFR 15-29 ml/min (H) 09/17/2018     Coronary artery disease 10/2018    nSTEMI- Isis to mLAD, distal lad mild, Lcx-small 50-60%, RCA 60-70% normal ifR (despite echo showing poss old IMI)     Hyperlipidemia      Hypertension      Renal cell cancer, unspecified laterality (H) 05/10/2017     Renal disease     CRD--creatinine in upper ones to 2, right kdney removed 2000 for canncer     Rheumatic fever      Spondylosis with myelopathy, lumbar region        PAST SURGICAL HISTORY:  Past Surgical History:   Procedure Laterality Date     BACK SURGERY       CHOLECYSTECTOMY       DECOMPRESSION LUMBAR TWO LEVELS  12/01/2014    Procedure: DECOMPRESSION LUMBAR TWO LEVELS;  Surgeon: Remy Harmon MD;  Location:  OR     HERNIA REPAIR       LAMINECTOMY, FUSION LUMBAR ONE LEVEL, COMBINED N/A 12/01/2014    Procedure: COMBINED LAMINECTOMY, FUSION LUMBAR ONE LEVEL;  Surgeon: Remy Harmon MD;  Location:  OR     PHACOEMULSIFICATION CLEAR CORNEA WITH STANDARD INTRAOCULAR LENS IMPLANT  10/22/2012    Procedure: PHACOEMULSIFICATION CLEAR CORNEA WITH STANDARD INTRAOCULAR LENS IMPLANT;  RIGHT EYE PHACOEMULSIFICATION CLEAR CORNEA WITH STANDARD INTRAOCULAR LENS IMPLANT ;  Surgeon: Grupo Granger MD;  Location:  [facility]     right nephrectomy[       STENT,CORONARY, S660 24/30  10/2018     "mLAD  mod RCA, Lcx       FAMILY HISTORY:  Family History   Problem Relation Age of Onset     No Known Problems Mother      Heart Surgery Father      No Known Problems Sister        SOCIAL HISTORY:  Social History     Socioeconomic History     Marital status:      Spouse name: Not on file     Number of children: Not on file     Years of education: Not on file     Highest education level: Not on file   Occupational History     Not on file   Tobacco Use     Smoking status: Former Smoker     Smokeless tobacco: Never Used   Substance and Sexual Activity     Alcohol use: Yes     Alcohol/week: 2.0 standard drinks     Types: 2 Shots of liquor per week     Comment: rare     Drug use: No     Sexual activity: Not Currently   Other Topics Concern     Not on file   Social History Narrative     Not on file     Social Determinants of Health     Financial Resource Strain: Not on file   Food Insecurity: Not on file   Transportation Needs: Not on file   Physical Activity: Not on file   Stress: Not on file   Social Connections: Not on file   Intimate Partner Violence: Not on file   Housing Stability: Not on file       Review of Systems:  Skin:  Positive for bruising   Eyes:  Positive for glasses  ENT:  Negative    Respiratory:  Positive for cough  Cardiovascular:  Negative    Gastroenterology: Negative    Genitourinary:  Negative    Musculoskeletal:  Positive for back pain;joint pain;arthritis  Neurologic:  Negative numbness or tingling of feet  Psychiatric:  Positive for anxiety  Heme/Lymph/Imm:  Negative    Endocrine:  Negative      Physical Exam:  Vitals: BP (!) 150/54 (BP Location: Right arm, Patient Position: Sitting, Cuff Size: Adult Regular)   Pulse 66   Ht 1.778 m (5' 10\")   Wt 85.7 kg (189 lb)   SpO2 96%   BMI 27.12 kg/m      Constitutional:           Skin:             Head:           Eyes:           Lymph:      ENT:           Neck:           Respiratory:            Cardiac:                                         "                   GI:           Extremities and Muscular Skeletal:                 Neurological:           Psych:         Recent Lab Results:  LIPID RESULTS:  Lab Results   Component Value Date    CHOL 102 04/13/2021    HDL 39 04/13/2021    LDL 45 04/13/2021    TRIG 95 04/13/2021       LIVER ENZYME RESULTS:  Lab Results   Component Value Date    AST 21 04/13/2021    ALT 19 04/13/2021       CBC RESULTS:  Lab Results   Component Value Date    WBC n/a 04/13/2021    RBC 4.14 04/13/2021    HGB 13.5 04/13/2021    HCT 39.1 04/13/2021    MCV 94.4 04/13/2021    MCH 32.6 04/13/2021    MCHC 34.5 04/13/2021    RDW 12.4 04/13/2021     04/13/2021       BMP RESULTS:  Lab Results   Component Value Date     04/13/2021    POTASSIUM 5.0 04/13/2021    CHLORIDE 103 04/13/2021    CO2 25 04/13/2021    ANIONGAP n/a 04/13/2021    GLC 97 04/13/2021    BUN 42 04/13/2021    CR 2.23 04/13/2021    GFRESTIMATED 26 04/13/2021    GFRESTBLACK 30 04/13/2021    RICCARDO 9.3 04/13/2021        A1C RESULTS:  Lab Results   Component Value Date    A1C 5.5 09/29/2018       INR RESULTS:  Lab Results   Component Value Date    INR 0.89 10/04/2010           CC  No referring provider defined for this encounter.  HPI and Plan:   See dictation    No orders of the defined types were placed in this encounter.    Orders Placed This Encounter   Medications     losartan (COZAAR) 25 MG tablet     Sig: Take 1 tablet (25 mg) by mouth daily Take 1 tablet daily for 1 week, then 1 pill twice a day if systolic BP >140-150     Dispense:  60 tablet     Refill:  1     Medications Discontinued During This Encounter   Medication Reason     benazepril (LOTENSIN) 20 MG tablet          Encounter Diagnosis   Name Primary?     Benign essential hypertension Yes       CURRENT MEDICATIONS:  Current Outpatient Medications   Medication Sig Dispense Refill     amLODIPine (NORVASC) 5 MG tablet Take 5 mg by mouth every morning  90 tablet 3     aspirin (ASA) 81 MG EC tablet Take 1  tablet (81 mg) by mouth daily 90 tablet 3     atorvastatin (LIPITOR) 20 MG tablet Take 1 tablet (20 mg) by mouth every evening 90 tablet 3     Calcium-Magnesium-Zinc 500-250-12.5 MG TABS Take 1 tablet by mouth daily       cholecalciferol (VITAMIN D3) 1250 mcg (27999 units) capsule Take 1,250 mcg by mouth daily        cyclobenzaprine (FLEXERIL) 10 MG tablet TAKE 1/2 TO 1 TABLET BY MOUTH 3 TIMES DAILY AS NEEDED 21 tablet 0     losartan (COZAAR) 25 MG tablet Take 1 tablet (25 mg) by mouth daily Take 1 tablet daily for 1 week, then 1 pill twice a day if systolic BP >140-150 60 tablet 1     metoprolol succinate ER (TOPROL-XL) 25 MG 24 hr tablet Take 0.5 tablets (12.5 mg) by mouth daily 45 tablet 3     nitroGLYcerin (NITROSTAT) 0.4 MG sublingual tablet For chest pain place 1 tablet under the tongue every 5 minutes for 3 doses. If symptoms persist 5 minutes after 1st dose call 911. 25 tablet 3     oxyCODONE (ROXICODONE) 5 MG tablet Take 1 tablet (5 mg) by mouth daily as needed for pain 20 tablet 0     hydrALAZINE (APRESOLINE) 10 MG tablet Take 1 tablet (10 mg) by mouth 3 times daily (Patient not taking: Reported on 4/5/2022) 270 tablet 3     MAGNESIUM PO Take 200 mg by mouth daily  (Patient not taking: Reported on 4/5/2022)       torsemide (DEMADEX) 5 MG tablet Take 1 tablet (5 mg) by mouth daily (Patient not taking: Reported on 4/5/2022) 1 tablet 0       ALLERGIES     Allergies   Allergen Reactions     No Known Allergies        PAST MEDICAL HISTORY:  Past Medical History:   Diagnosis Date     Aortic stenosis, mild      Ascending aorta dilatation (H)      CKD (chronic kidney disease) stage 4, GFR 15-29 ml/min (H) 09/17/2018     Coronary artery disease 10/2018    nSTEMI- Isis to mLAD, distal lad mild, Lcx-small 50-60%, RCA 60-70% normal ifR (despite echo showing poss old IMI)     Hyperlipidemia      Hypertension      Renal cell cancer, unspecified laterality (H) 05/10/2017     Renal disease     CRD--creatinine in upper ones  to 2, right kdney removed 2000 for canncer     Rheumatic fever      Spondylosis with myelopathy, lumbar region        PAST SURGICAL HISTORY:  Past Surgical History:   Procedure Laterality Date     BACK SURGERY       CHOLECYSTECTOMY       DECOMPRESSION LUMBAR TWO LEVELS  12/01/2014    Procedure: DECOMPRESSION LUMBAR TWO LEVELS;  Surgeon: Remy Harmon MD;  Location: [facility] OR     HERNIA REPAIR       LAMINECTOMY, FUSION LUMBAR ONE LEVEL, COMBINED N/A 12/01/2014    Procedure: COMBINED LAMINECTOMY, FUSION LUMBAR ONE LEVEL;  Surgeon: Remy Harmon MD;  Location: [facility] OR     PHACOEMULSIFICATION CLEAR CORNEA WITH STANDARD INTRAOCULAR LENS IMPLANT  10/22/2012    Procedure: PHACOEMULSIFICATION CLEAR CORNEA WITH STANDARD INTRAOCULAR LENS IMPLANT;  RIGHT EYE PHACOEMULSIFICATION CLEAR CORNEA WITH STANDARD INTRAOCULAR LENS IMPLANT ;  Surgeon: Grupo Granger MD;  Location:  [facility]     right nephrectomy[       STENT,CORONARY, S660 24/30  10/2018    mLAD  mod RCA, Lcx       FAMILY HISTORY:  Family History   Problem Relation Age of Onset     No Known Problems Mother      Heart Surgery Father      No Known Problems Sister        SOCIAL HISTORY:  Social History     Socioeconomic History     Marital status:      Spouse name: Not on file     Number of children: Not on file     Years of education: Not on file     Highest education level: Not on file   Occupational History     Not on file   Tobacco Use     Smoking status: Former Smoker     Smokeless tobacco: Never Used   Substance and Sexual Activity     Alcohol use: Yes     Alcohol/week: 2.0 standard drinks     Types: 2 Shots of liquor per week     Comment: rare     Drug use: No     Sexual activity: Not Currently   Other Topics Concern     Not on file   Social History Narrative     Not on file     Social Determinants of Health     Financial Resource Strain: Not on file   Food Insecurity: Not on file   Transportation Needs: Not on file   Physical Activity: Not on file  "  Stress: Not on file   Social Connections: Not on file   Intimate Partner Violence: Not on file   Housing Stability: Not on file       Review of Systems:  Skin:  Positive for bruising   Eyes:  Positive for glasses  ENT:  Negative    Respiratory:  Positive for cough  Cardiovascular:  Negative    Gastroenterology: Negative    Genitourinary:  Negative    Musculoskeletal:  Positive for back pain;joint pain;arthritis  Neurologic:  Negative numbness or tingling of feet  Psychiatric:  Positive for anxiety  Heme/Lymph/Imm:  Negative    Endocrine:  Negative      Physical Exam:  Vitals: BP (!) 150/54 (BP Location: Right arm, Patient Position: Sitting, Cuff Size: Adult Regular)   Pulse 66   Ht 1.778 m (5' 10\")   Wt 85.7 kg (189 lb)   SpO2 96%   BMI 27.12 kg/m      Constitutional:  cooperative, alert and oriented, well developed, well nourished, in no acute distress        Skin:  warm and dry to the touch, no apparent skin lesions or masses noted     ecchymosis of back of hands    Head:  normocephalic, no masses or lesions        Eyes:  pupils equal and round, conjunctivae and lids unremarkable, sclera white, no xanthalasma, EOMS intact, no nystagmus        Lymph:      ENT:           Neck:           Respiratory:       popping sounds and ?fibrotic crackles both bases  more on left    Cardiac:                                                           GI:           Extremities and Muscular Skeletal:  no deformities, clubbing, cyanosis, erythema observed;no edema              Neurological:  no gross motor deficits        Psych:  Alert and Oriented x 3      Recent Lab Results:  LIPID RESULTS:  Lab Results   Component Value Date    CHOL 102 04/13/2021    HDL 39 04/13/2021    LDL 45 04/13/2021    TRIG 95 04/13/2021       LIVER ENZYME RESULTS:  Lab Results   Component Value Date    AST 21 04/13/2021    ALT 19 04/13/2021       CBC RESULTS:  Lab Results   Component Value Date    WBC n/a 04/13/2021    RBC 4.14 04/13/2021    HGB 13.5 " 04/13/2021    HCT 39.1 04/13/2021    MCV 94.4 04/13/2021    MCH 32.6 04/13/2021    MCHC 34.5 04/13/2021    RDW 12.4 04/13/2021     04/13/2021       BMP RESULTS:  Lab Results   Component Value Date     04/13/2021    POTASSIUM 5.0 04/13/2021    CHLORIDE 103 04/13/2021    CO2 25 04/13/2021    ANIONGAP n/a 04/13/2021    GLC 97 04/13/2021    BUN 42 04/13/2021    CR 2.23 04/13/2021    GFRESTIMATED 26 04/13/2021    GFRESTBLACK 30 04/13/2021    RICCARDO 9.3 04/13/2021        A1C RESULTS:  Lab Results   Component Value Date    A1C 5.5 09/29/2018       INR RESULTS:  Lab Results   Component Value Date    INR 0.89 10/04/2010         CC  No referring provider defined for this encounter.    Thank you for allowing me to participate in the care of your patient.      Sincerely,   Noel Villarreal MD   [facility]  cc: No referring provider defined for this encounter.

## 2022-04-05 NOTE — PROGRESS NOTES
Service Date: 04/05/2022    HISTORY OF PRESENT ILLNESS:  I had the pleasure of following up on our mutual patient, Augie Powell.  He is a delightful 86-year-old gentleman accompanied by his wife.  I will ask your input.  If you could please read this note, and I would appreciate any of your thoughts.    As you know, from a cardiac standpoint, this patient has a history of acute coronary syndrome.  He had vessel coronary disease.  His infarct vessel was the LAD.  It was stented with good result.  There was 50% narrowing left circumflex, which is a small vessel, and 60%-70% in the right coronary artery, which is a large vessel, and he had normal flow reserve.  I am happy to report his new echocardiogram dated 04/04/2022 shows normal LV systolic function, EF 55%-60%, normal wall thickness, apical wall is hypokinetic and that is residual from his LAD infarct, so that is a vast improvement.  His mitral valve has mild thickening.  His tricuspid valve has mild tricuspid valve insufficiency, but he does have probably mild pulmonary hypertension and he has mild aortic valve stenosis, barely different than it was last year.  As you know, he has a wide pulse pressure, probably from atherosclerotic noncompliant arteries.  Of note, apparently in December he had some type of a cold or flu, and since then he has been short of breath and coughing.  He did not initially volunteer that when I asked him if anything was new, but when I listened to his lungs, I heard intermittent popping sounds and what sounds like fibrotic crackles, and I told him I was concerned that he might have interstitial lung disease, and then he told me that you sent him for some type of imaging test of his lung but he did not know what it was, and because it was not done through the [facility] system I cannot see it.  He has no neck vein distention, he has no ankle swelling, so I do not think it is congestive heart failure, but you might want to consider a  trial of increased diuretic.  He was supposed to be on Demadex 5 mg a day, but he lists that he is not taking it.  Obviously, there is an issue here because you also are evaluating him for renal insufficiency, and so empiric diuretic, if it is started, would require followup electrolytes.  I also noticed on your tests, he brought all your blood work from January and March, his sodium level was running a little bit low.  We rarely see this.  He is not on a diuretic.  He is on benazepril, which I am going to come back to in a moment, but it is a little odd that on 2 tests his sodium level would be running a little bit low.  I cannot help but wonder, and I certainly value your input, if there is a lung condition going on and lung conditions can be associated with SIADH, should we be investigating the lungs further, and it sounds like you have actually already started doing that.  I just bring it up as a thought, and I wonder if he has interstitial lung disease and depending on what you found on the CAT scan or whatever test, should he see a lung doctor.  On the very off chance it is the benazepril causing his cough, I am going to give him a trial of losartan 25 mg once a day, and then after a week go to 25 mg b.i.d. and follow his blood pressure numbers.  If the cough does not go away after a couple of weeks of losartan, then I think he can go back to the benazepril that he was on.  I am going to have him follow up in our office with a video visit in about a month just to see how he is doing.  By then, I am hoping he will have called you to determine if there is more going on in the lung that I am starting to wonder about.  We will see if he felt better on the losartan versus the benazepril.  He will certainly need a repeat electrolyte panel, including a creatinine, and then again one has to wonder why is his creatinine going up, is there a lung condition, could he have unusual vasculitides of the lung and kidney.   This might affect Churg-Agatha, basement membrane disease, ANCA diseases, which I will leave to you.  Again, I suspect that I am overcalling this.  It is just an odd collection of new hyponatremia, new crackles and popping sounds with a wheeze and somewhat new decrease in GFR, and I am trying to see if we can put all of this together with one diagnosis.  Cardiac-wise, I think the aortic stenosis is trivial.  He would not need another echo for at least a couple years.  Again, if you want to trial empirically a diuretic or get a BNP test, then I will go ahead and order one of those to start, just on the off chance that this is congestive heart failure.    Today's visit was a prolonged visit, it was 50 minutes.    Lauro Villarreal MD    cc:  Merlin Brown, MD  [address], [address] [place] [address]    Noel Villarreal MD        D: 2022   T: 2022   MT: leonor    Name:     TERI BUCHANANReed  MRN:      [id number]        Account:      [id number]   :      1935           Service Date: 2022       Document: [id number]

## 2022-04-05 NOTE — PROGRESS NOTES
HPI and Plan:   See dictation    No orders of the defined types were placed in this encounter.    Orders Placed This Encounter   Medications     losartan (COZAAR) 25 MG tablet     Sig: Take 1 tablet (25 mg) by mouth daily Take 1 tablet daily for 1 week, then 1 pill twice a day if systolic BP >140-150     Dispense:  60 tablet     Refill:  1     Medications Discontinued During This Encounter   Medication Reason     benazepril (LOTENSIN) 20 MG tablet          Encounter Diagnosis   Name Primary?     Benign essential hypertension Yes       CURRENT MEDICATIONS:  Current Outpatient Medications   Medication Sig Dispense Refill     amLODIPine (NORVASC) 5 MG tablet Take 5 mg by mouth every morning  90 tablet 3     aspirin (ASA) 81 MG EC tablet Take 1 tablet (81 mg) by mouth daily 90 tablet 3     atorvastatin (LIPITOR) 20 MG tablet Take 1 tablet (20 mg) by mouth every evening 90 tablet 3     Calcium-Magnesium-Zinc 500-250-12.5 MG TABS Take 1 tablet by mouth daily       cholecalciferol (VITAMIN D3) 1250 mcg (55589 units) capsule Take 1,250 mcg by mouth daily        cyclobenzaprine (FLEXERIL) 10 MG tablet TAKE 1/2 TO 1 TABLET BY MOUTH 3 TIMES DAILY AS NEEDED 21 tablet 0     losartan (COZAAR) 25 MG tablet Take 1 tablet (25 mg) by mouth daily Take 1 tablet daily for 1 week, then 1 pill twice a day if systolic BP >140-150 60 tablet 1     metoprolol succinate ER (TOPROL-XL) 25 MG 24 hr tablet Take 0.5 tablets (12.5 mg) by mouth daily 45 tablet 3     nitroGLYcerin (NITROSTAT) 0.4 MG sublingual tablet For chest pain place 1 tablet under the tongue every 5 minutes for 3 doses. If symptoms persist 5 minutes after 1st dose call 911. 25 tablet 3     oxyCODONE (ROXICODONE) 5 MG tablet Take 1 tablet (5 mg) by mouth daily as needed for pain 20 tablet 0     hydrALAZINE (APRESOLINE) 10 MG tablet Take 1 tablet (10 mg) by mouth 3 times daily (Patient not taking: Reported on 4/5/2022) 270 tablet 3     MAGNESIUM PO Take 200 mg by mouth daily   (Patient not taking: Reported on 4/5/2022)       torsemide (DEMADEX) 5 MG tablet Take 1 tablet (5 mg) by mouth daily (Patient not taking: Reported on 4/5/2022) 1 tablet 0       ALLERGIES     Allergies   Allergen Reactions     No Known Allergies        PAST MEDICAL HISTORY:  Past Medical History:   Diagnosis Date     Aortic stenosis, mild      Ascending aorta dilatation (H)      CKD (chronic kidney disease) stage 4, GFR 15-29 ml/min (H) 09/17/2018     Coronary artery disease 10/2018    nSTEMI- Isis to mLAD, distal lad mild, Lcx-small 50-60%, RCA 60-70% normal ifR (despite echo showing poss old IMI)     Hyperlipidemia      Hypertension      Renal cell cancer, unspecified laterality (H) 05/10/2017     Renal disease     CRD--creatinine in upper ones to 2, right kdney removed 2000 for canncer     Rheumatic fever      Spondylosis with myelopathy, lumbar region        PAST SURGICAL HISTORY:  Past Surgical History:   Procedure Laterality Date     BACK SURGERY       CHOLECYSTECTOMY       DECOMPRESSION LUMBAR TWO LEVELS  12/01/2014    Procedure: DECOMPRESSION LUMBAR TWO LEVELS;  Surgeon: Remy aHrmon MD;  Location: [facility] OR     HERNIA REPAIR       LAMINECTOMY, FUSION LUMBAR ONE LEVEL, COMBINED N/A 12/01/2014    Procedure: COMBINED LAMINECTOMY, FUSION LUMBAR ONE LEVEL;  Surgeon: Remy Harmon MD;  Location:  OR     PHACOEMULSIFICATION CLEAR CORNEA WITH STANDARD INTRAOCULAR LENS IMPLANT  10/22/2012    Procedure: PHACOEMULSIFICATION CLEAR CORNEA WITH STANDARD INTRAOCULAR LENS IMPLANT;  RIGHT EYE PHACOEMULSIFICATION CLEAR CORNEA WITH STANDARD INTRAOCULAR LENS IMPLANT ;  Surgeon: Grupo Granger MD;  Location:  [facility]     right nephrectomy[       STENT,CORONARY, S660 24/30  10/2018    mLAD  mod RCA, Lcx       FAMILY HISTORY:  Family History   Problem Relation Age of Onset     No Known Problems Mother      Heart Surgery Father      No Known Problems Sister        SOCIAL HISTORY:  Social History     Socioeconomic  "History     Marital status:      Spouse name: Not on file     Number of children: Not on file     Years of education: Not on file     Highest education level: Not on file   Occupational History     Not on file   Tobacco Use     Smoking status: Former Smoker     Smokeless tobacco: Never Used   Substance and Sexual Activity     Alcohol use: Yes     Alcohol/week: 2.0 standard drinks     Types: 2 Shots of liquor per week     Comment: rare     Drug use: No     Sexual activity: Not Currently   Other Topics Concern     Not on file   Social History Narrative     Not on file     Social Determinants of Health     Financial Resource Strain: Not on file   Food Insecurity: Not on file   Transportation Needs: Not on file   Physical Activity: Not on file   Stress: Not on file   Social Connections: Not on file   Intimate Partner Violence: Not on file   Housing Stability: Not on file       Review of Systems:  Skin:  Positive for bruising   Eyes:  Positive for glasses  ENT:  Negative    Respiratory:  Positive for cough  Cardiovascular:  Negative    Gastroenterology: Negative    Genitourinary:  Negative    Musculoskeletal:  Positive for back pain;joint pain;arthritis  Neurologic:  Negative numbness or tingling of feet  Psychiatric:  Positive for anxiety  Heme/Lymph/Imm:  Negative    Endocrine:  Negative      Physical Exam:  Vitals: BP (!) 150/54 (BP Location: Right arm, Patient Position: Sitting, Cuff Size: Adult Regular)   Pulse 66   Ht 1.778 m (5' 10\")   Wt 85.7 kg (189 lb)   SpO2 96%   BMI 27.12 kg/m      Constitutional:           Skin:             Head:           Eyes:           Lymph:      ENT:           Neck:           Respiratory:            Cardiac:                                                           GI:           Extremities and Muscular Skeletal:                 Neurological:           Psych:         Recent Lab Results:  LIPID RESULTS:  Lab Results   Component Value Date    CHOL 102 04/13/2021    HDL 39 " 04/13/2021    LDL 45 04/13/2021    TRIG 95 04/13/2021       LIVER ENZYME RESULTS:  Lab Results   Component Value Date    AST 21 04/13/2021    ALT 19 04/13/2021       CBC RESULTS:  Lab Results   Component Value Date    WBC n/a 04/13/2021    RBC 4.14 04/13/2021    HGB 13.5 04/13/2021    HCT 39.1 04/13/2021    MCV 94.4 04/13/2021    MCH 32.6 04/13/2021    MCHC 34.5 04/13/2021    RDW 12.4 04/13/2021     04/13/2021       BMP RESULTS:  Lab Results   Component Value Date     04/13/2021    POTASSIUM 5.0 04/13/2021    CHLORIDE 103 04/13/2021    CO2 25 04/13/2021    ANIONGAP n/a 04/13/2021    GLC 97 04/13/2021    BUN 42 04/13/2021    CR 2.23 04/13/2021    GFRESTIMATED 26 04/13/2021    GFRESTBLACK 30 04/13/2021    RICCARDO 9.3 04/13/2021        A1C RESULTS:  Lab Results   Component Value Date    A1C 5.5 09/29/2018       INR RESULTS:  Lab Results   Component Value Date    INR 0.89 10/04/2010           CC  No referring provider defined for this encounter.  HPI and Plan:   See dictation    No orders of the defined types were placed in this encounter.    Orders Placed This Encounter   Medications     losartan (COZAAR) 25 MG tablet     Sig: Take 1 tablet (25 mg) by mouth daily Take 1 tablet daily for 1 week, then 1 pill twice a day if systolic BP >140-150     Dispense:  60 tablet     Refill:  1     Medications Discontinued During This Encounter   Medication Reason     benazepril (LOTENSIN) 20 MG tablet          Encounter Diagnosis   Name Primary?     Benign essential hypertension Yes       CURRENT MEDICATIONS:  Current Outpatient Medications   Medication Sig Dispense Refill     amLODIPine (NORVASC) 5 MG tablet Take 5 mg by mouth every morning  90 tablet 3     aspirin (ASA) 81 MG EC tablet Take 1 tablet (81 mg) by mouth daily 90 tablet 3     atorvastatin (LIPITOR) 20 MG tablet Take 1 tablet (20 mg) by mouth every evening 90 tablet 3     Calcium-Magnesium-Zinc 500-250-12.5 MG TABS Take 1 tablet by mouth daily        cholecalciferol (VITAMIN D3) 1250 mcg (39035 units) capsule Take 1,250 mcg by mouth daily        cyclobenzaprine (FLEXERIL) 10 MG tablet TAKE 1/2 TO 1 TABLET BY MOUTH 3 TIMES DAILY AS NEEDED 21 tablet 0     losartan (COZAAR) 25 MG tablet Take 1 tablet (25 mg) by mouth daily Take 1 tablet daily for 1 week, then 1 pill twice a day if systolic BP >140-150 60 tablet 1     metoprolol succinate ER (TOPROL-XL) 25 MG 24 hr tablet Take 0.5 tablets (12.5 mg) by mouth daily 45 tablet 3     nitroGLYcerin (NITROSTAT) 0.4 MG sublingual tablet For chest pain place 1 tablet under the tongue every 5 minutes for 3 doses. If symptoms persist 5 minutes after 1st dose call 911. 25 tablet 3     oxyCODONE (ROXICODONE) 5 MG tablet Take 1 tablet (5 mg) by mouth daily as needed for pain 20 tablet 0     hydrALAZINE (APRESOLINE) 10 MG tablet Take 1 tablet (10 mg) by mouth 3 times daily (Patient not taking: Reported on 4/5/2022) 270 tablet 3     MAGNESIUM PO Take 200 mg by mouth daily  (Patient not taking: Reported on 4/5/2022)       torsemide (DEMADEX) 5 MG tablet Take 1 tablet (5 mg) by mouth daily (Patient not taking: Reported on 4/5/2022) 1 tablet 0       ALLERGIES     Allergies   Allergen Reactions     No Known Allergies        PAST MEDICAL HISTORY:  Past Medical History:   Diagnosis Date     Aortic stenosis, mild      Ascending aorta dilatation (H)      CKD (chronic kidney disease) stage 4, GFR 15-29 ml/min (H) 09/17/2018     Coronary artery disease 10/2018    nSTEMI- Isis to mLAD, distal lad mild, Lcx-small 50-60%, RCA 60-70% normal ifR (despite echo showing poss old IMI)     Hyperlipidemia      Hypertension      Renal cell cancer, unspecified laterality (H) 05/10/2017     Renal disease     CRD--creatinine in upper ones to 2, right kdney removed 2000 for canncer     Rheumatic fever      Spondylosis with myelopathy, lumbar region        PAST SURGICAL HISTORY:  Past Surgical History:   Procedure Laterality Date     BACK SURGERY        CHOLECYSTECTOMY       DECOMPRESSION LUMBAR TWO LEVELS  12/01/2014    Procedure: DECOMPRESSION LUMBAR TWO LEVELS;  Surgeon: Remy Harmon MD;  Location: [facility] OR     HERNIA REPAIR       LAMINECTOMY, FUSION LUMBAR ONE LEVEL, COMBINED N/A 12/01/2014    Procedure: COMBINED LAMINECTOMY, FUSION LUMBAR ONE LEVEL;  Surgeon: Remy Harmon MD;  Location:  OR     PHACOEMULSIFICATION CLEAR CORNEA WITH STANDARD INTRAOCULAR LENS IMPLANT  10/22/2012    Procedure: PHACOEMULSIFICATION CLEAR CORNEA WITH STANDARD INTRAOCULAR LENS IMPLANT;  RIGHT EYE PHACOEMULSIFICATION CLEAR CORNEA WITH STANDARD INTRAOCULAR LENS IMPLANT ;  Surgeon: Grupo Granger MD;  Location:  [facility]     right nephrectomy[       STENT,CORONARY, S660 24/30  10/2018    mLAD  mod RCA, Lcx       FAMILY HISTORY:  Family History   Problem Relation Age of Onset     No Known Problems Mother      Heart Surgery Father      No Known Problems Sister        SOCIAL HISTORY:  Social History     Socioeconomic History     Marital status:      Spouse name: Not on file     Number of children: Not on file     Years of education: Not on file     Highest education level: Not on file   Occupational History     Not on file   Tobacco Use     Smoking status: Former Smoker     Smokeless tobacco: Never Used   Substance and Sexual Activity     Alcohol use: Yes     Alcohol/week: 2.0 standard drinks     Types: 2 Shots of liquor per week     Comment: rare     Drug use: No     Sexual activity: Not Currently   Other Topics Concern     Not on file   Social History Narrative     Not on file     Social Determinants of Health     Financial Resource Strain: Not on file   Food Insecurity: Not on file   Transportation Needs: Not on file   Physical Activity: Not on file   Stress: Not on file   Social Connections: Not on file   Intimate Partner Violence: Not on file   Housing Stability: Not on file       Review of Systems:  Skin:  Positive for bruising   Eyes:  Positive for  "glasses  ENT:  Negative    Respiratory:  Positive for cough  Cardiovascular:  Negative    Gastroenterology: Negative    Genitourinary:  Negative    Musculoskeletal:  Positive for back pain;joint pain;arthritis  Neurologic:  Negative numbness or tingling of feet  Psychiatric:  Positive for anxiety  Heme/Lymph/Imm:  Negative    Endocrine:  Negative      Physical Exam:  Vitals: BP (!) 150/54 (BP Location: Right arm, Patient Position: Sitting, Cuff Size: Adult Regular)   Pulse 66   Ht 1.778 m (5' 10\")   Wt 85.7 kg (189 lb)   SpO2 96%   BMI 27.12 kg/m      Constitutional:  cooperative, alert and oriented, well developed, well nourished, in no acute distress        Skin:  warm and dry to the touch, no apparent skin lesions or masses noted     ecchymosis of back of hands    Head:  normocephalic, no masses or lesions        Eyes:  pupils equal and round, conjunctivae and lids unremarkable, sclera white, no xanthalasma, EOMS intact, no nystagmus        Lymph:      ENT:           Neck:           Respiratory:       popping sounds and ?fibrotic crackles both bases  more on left    Cardiac:                                                           GI:           Extremities and Muscular Skeletal:  no deformities, clubbing, cyanosis, erythema observed;no edema              Neurological:  no gross motor deficits        Psych:  Alert and Oriented x 3      Recent Lab Results:  LIPID RESULTS:  Lab Results   Component Value Date    CHOL 102 04/13/2021    HDL 39 04/13/2021    LDL 45 04/13/2021    TRIG 95 04/13/2021       LIVER ENZYME RESULTS:  Lab Results   Component Value Date    AST 21 04/13/2021    ALT 19 04/13/2021       CBC RESULTS:  Lab Results   Component Value Date    WBC n/a 04/13/2021    RBC 4.14 04/13/2021    HGB 13.5 04/13/2021    HCT 39.1 04/13/2021    MCV 94.4 04/13/2021    MCH 32.6 04/13/2021    MCHC 34.5 04/13/2021    RDW 12.4 04/13/2021     04/13/2021       BMP RESULTS:  Lab Results   Component Value Date    "  04/13/2021    POTASSIUM 5.0 04/13/2021    CHLORIDE 103 04/13/2021    CO2 25 04/13/2021    ANIONGAP n/a 04/13/2021    GLC 97 04/13/2021    BUN 42 04/13/2021    CR 2.23 04/13/2021    GFRESTIMATED 26 04/13/2021    GFRESTBLACK 30 04/13/2021    RICCARDO 9.3 04/13/2021        A1C RESULTS:  Lab Results   Component Value Date    A1C 5.5 09/29/2018       INR RESULTS:  Lab Results   Component Value Date    INR 0.89 10/04/2010           CC  No referring provider defined for this encounter.

## 2022-04-14 ENCOUNTER — LAB (OUTPATIENT)
Dept: LAB | Facility: CLINIC | Age: 87
End: 2022-04-14
Payer: COMMERCIAL

## 2022-04-14 DIAGNOSIS — I50.810 RIGHT-SIDED CONGESTIVE HEART FAILURE, UNSPECIFIED HF CHRONICITY (H): ICD-10-CM

## 2022-04-14 LAB — NT-PROBNP SERPL-MCNC: 167 PG/ML (ref 0–450)

## 2022-04-14 PROCEDURE — 83880 ASSAY OF NATRIURETIC PEPTIDE: CPT | Performed by: INTERNAL MEDICINE

## 2022-04-14 PROCEDURE — 36415 COLL VENOUS BLD VENIPUNCTURE: CPT | Performed by: INTERNAL MEDICINE

## 2022-05-30 NOTE — PROGRESS NOTES
HISTORY OF PRESENT ILLNESS:    This is a 86 year old male who follows with Dr Villarreal at [facility]  His past medical history includes:  Coronary artery disease, hypertension, ascending aorta dilatation, aortic stenosis, solitary kidney due to prior cancer, and CKD.    Mr Powell suffered a NSTEMI (2018) and underwent mid LAD stenting  He was also noted to have moderate disease in a small CFX, and moderate RCA disease  His ECHO then showed LVEF 40-45% with apical akinesis  Fortunately, his LVEF normalized in 2019. He has only tolerated low dose Metoprolol due to bradycardia.    He has a wide pulse pressure likely from his noncompliant arteries    ECHO (4/4/22) showed LVEF 55-60% with apical hypokinesis, normal RV function, mild aortic stenosis (mean gradient 16 mmHg), trace MR, RVSP 38 mmHg, and borderline ascending aorta enlargement    His ACE-I was changed to Losartan due to cough  He is also being worked up by his PMD for lung issues       Our visit today is for further review.    Mr Powell comes in with his wife today who assists with his history and symptoms  He recently received a steroid injection in his knee but still has to use a walker for far distances  He denies any chest pain or significant shortness of breath  He states that his cough is gone since his medication change.  He admits to frequent nocturia and states that he is taking his Torsemide in the evening.  We talked about moving his Torsemide to the am.  I reviewed several BP readings from home  His SBP is staying < 140 mmHg on a regular basis only on Losartan 25 mg once a day  He denies any palpitations, orthopnea, or significant peripheral edema.          VITAL SIGNS:  BP: 138/58  (126/55 at home)  Pulse: 60  Weight:  182 lbs (stable)  BMI: 26      IMPRESSION AND PLAN:    Coronary Artery Disease:  -s/p NSTEMI and mid LAD stenting (2018)  -remaining moderate RCA disease  -LVEF 55%  -denies angina    Hypertension:  -on   Amlodipine 5 mg, Losartan 25 mg, Metoprolol XR 12.5 mg  -BP controlled    Hyperlipidemia:  -Atorvastatin 20 mg  -LDL 45    CKD:  Solitary kidney, due to prior cancer  -baseline creatinine >2 past 2 yrs    The total time for the visit today was 30 minutes which includes patient visit, reviewing of records, discussion, and placing of orders of the outpatient coordination of cardiovascular care as described.  The level of medical decision making during this visit was of moderate complexity.  Thank you for allowing me to participate in their care.    Orders Placed This Encounter   Procedures     Follow-Up with Cardiology       Orders Placed This Encounter   Medications     losartan (COZAAR) 25 MG tablet     Sig: Take 1 tablet (25 mg) by mouth daily Take 1 tablet daily for 1 week, then 1 pill twice a day if systolic BP >140-150     Dispense:  90 tablet     Refill:  3       Medications Discontinued During This Encounter   Medication Reason     MAGNESIUM PO Medication Reconciliation Clean Up     hydrALAZINE (APRESOLINE) 10 MG tablet      losartan (COZAAR) 25 MG tablet          Encounter Diagnoses   Name Primary?     Benign essential hypertension      Coronary artery disease involving native coronary artery of native heart without angina pectoris      Right-sided congestive heart failure, unspecified HF chronicity (H)        CURRENT MEDICATIONS:  Current Outpatient Medications   Medication Sig Dispense Refill     amLODIPine (NORVASC) 5 MG tablet Take 5 mg by mouth every morning  90 tablet 3     aspirin (ASA) 81 MG EC tablet Take 1 tablet (81 mg) by mouth daily 90 tablet 3     atorvastatin (LIPITOR) 20 MG tablet Take 1 tablet (20 mg) by mouth every evening 90 tablet 3     Calcium-Magnesium-Zinc 500-250-12.5 MG TABS Take 1 tablet by mouth daily       cholecalciferol (VITAMIN D3) 1250 mcg (01438 units) capsule Take 1,250 mcg by mouth daily        cyclobenzaprine (FLEXERIL) 10 MG tablet TAKE 1/2 TO 1 TABLET BY MOUTH 3 TIMES  DAILY AS NEEDED 21 tablet 0     losartan (COZAAR) 25 MG tablet Take 1 tablet (25 mg) by mouth daily Take 1 tablet daily for 1 week, then 1 pill twice a day if systolic BP >140-150 90 tablet 3     metoprolol succinate ER (TOPROL-XL) 25 MG 24 hr tablet Take 0.5 tablets (12.5 mg) by mouth daily 45 tablet 3     nitroGLYcerin (NITROSTAT) 0.4 MG sublingual tablet For chest pain place 1 tablet under the tongue every 5 minutes for 3 doses. If symptoms persist 5 minutes after 1st dose call 911. 25 tablet 3     oxyCODONE (ROXICODONE) 5 MG tablet Take 1 tablet (5 mg) by mouth daily as needed for pain 20 tablet 0     torsemide (DEMADEX) 5 MG tablet Take 5 mg by mouth daily 1 tablet 0       ALLERGIES     Allergies   Allergen Reactions     No Known Allergies        PAST MEDICAL HISTORY:  Past Medical History:   Diagnosis Date     Aortic stenosis, mild      Ascending aorta dilatation (H)      CKD (chronic kidney disease) stage 4, GFR 15-29 ml/min (H) 09/17/2018     Coronary artery disease 10/2018    nSTEMI- Isis to mLAD, distal lad mild, Lcx-small 50-60%, RCA 60-70% normal ifR (despite echo showing poss old IMI)     Hyperlipidemia      Hypertension      Renal cell cancer, unspecified laterality (H) 05/10/2017     Renal disease     CRD--creatinine in upper ones to 2, right kdney removed 2000 for canncer     Rheumatic fever      Spondylosis with myelopathy, lumbar region        PAST SURGICAL HISTORY:  Past Surgical History:   Procedure Laterality Date     BACK SURGERY       CHOLECYSTECTOMY       DECOMPRESSION LUMBAR TWO LEVELS  12/01/2014    Procedure: DECOMPRESSION LUMBAR TWO LEVELS;  Surgeon: Remy Harmon MD;  Location:  OR     HERNIA REPAIR       LAMINECTOMY, FUSION LUMBAR ONE LEVEL, COMBINED N/A 12/01/2014    Procedure: COMBINED LAMINECTOMY, FUSION LUMBAR ONE LEVEL;  Surgeon: Remy Harmon MD;  Location:  OR     PHACOEMULSIFICATION CLEAR CORNEA WITH STANDARD INTRAOCULAR LENS IMPLANT  10/22/2012     "Procedure: PHACOEMULSIFICATION CLEAR CORNEA WITH STANDARD INTRAOCULAR LENS IMPLANT;  RIGHT EYE PHACOEMULSIFICATION CLEAR CORNEA WITH STANDARD INTRAOCULAR LENS IMPLANT ;  Surgeon: [place], Grupo NEVAREZ MD;  Location:  [facility]     right nephrectomy[       STENT,CORONARY, S660 24/30  10/2018    mLAD  mod RCA, Lcx       FAMILY HISTORY:  Family History   Problem Relation Age of Onset     No Known Problems Mother      Heart Surgery Father      No Known Problems Sister        SOCIAL HISTORY:  Social History     Socioeconomic History     Marital status:      Spouse name: None     Number of children: None     Years of education: None     Highest education level: None   Tobacco Use     Smoking status: Former Smoker     Smokeless tobacco: Never Used   Substance and Sexual Activity     Alcohol use: Yes     Alcohol/week: 2.0 standard drinks     Types: 2 Shots of liquor per week     Comment: rare     Drug use: No     Sexual activity: Not Currently       Review of Systems:  Skin:  Negative       Eyes:  Positive for glasses    ENT:  Negative      Respiratory:  Positive for dyspnea on exertion;shortness of breath     Cardiovascular:    edema;Positive for    Gastroenterology: Negative      Genitourinary:  Negative      Musculoskeletal:  Negative      Neurologic:  Negative      Psychiatric:  Negative      Heme/Lymph/Imm:  Negative      Endocrine:  Negative        Physical Exam:  Vitals: /58 (BP Location: Right arm, Patient Position: Sitting, Cuff Size: Adult Regular)   Pulse 60   Ht 1.778 m (5' 10\")   Wt 82.9 kg (182 lb 11.2 oz)   SpO2 96%   BMI 26.21 kg/m      Constitutional:  cooperative;well nourished        Skin:  warm and dry to the touch     ecchymosis of back of hands    Head:  normocephalic        Eyes:  pupils equal and round        Lymph:      ENT:           Neck:  JVP normal        Respiratory:       popping sounds and ?fibrotic crackles both bases  more on left    Cardiac: regular rhythm;normal S1 and S2       " systolic murmur;grade 1          pulses below the femoral arteries are diminished                                      GI:  abdomen soft        Extremities and Muscular Skeletal:  no edema              Neurological:  no gross motor deficits        Psych:  Alert and Oriented x 3          CC  Noel Villarreal MD  [id number]  [place] [id number]

## 2022-05-31 ENCOUNTER — OFFICE VISIT (OUTPATIENT)
Dept: CARDIOLOGY | Facility: CLINIC | Age: 87
End: 2022-05-31
Attending: INTERNAL MEDICINE
Payer: COMMERCIAL

## 2022-05-31 VITALS
DIASTOLIC BLOOD PRESSURE: 58 MMHG | HEIGHT: 70 IN | OXYGEN SATURATION: 96 % | HEART RATE: 60 BPM | SYSTOLIC BLOOD PRESSURE: 138 MMHG | BODY MASS INDEX: 26.16 KG/M2 | WEIGHT: 182.7 LBS

## 2022-05-31 DIAGNOSIS — I25.10 CORONARY ARTERY DISEASE INVOLVING NATIVE CORONARY ARTERY OF NATIVE HEART WITHOUT ANGINA PECTORIS: ICD-10-CM

## 2022-05-31 DIAGNOSIS — I10 BENIGN ESSENTIAL HYPERTENSION: ICD-10-CM

## 2022-05-31 DIAGNOSIS — I50.810 RIGHT-SIDED CONGESTIVE HEART FAILURE, UNSPECIFIED HF CHRONICITY (H): ICD-10-CM

## 2022-05-31 PROCEDURE — 99214 OFFICE O/P EST MOD 30 MIN: CPT | Performed by: NURSE PRACTITIONER

## 2022-05-31 RX ORDER — LOSARTAN POTASSIUM 25 MG/1
25 TABLET ORAL DAILY
Qty: 90 TABLET | Refills: 3 | Status: SHIPPED | OUTPATIENT
Start: 2022-05-31 | End: 2023-01-31

## 2022-05-31 NOTE — LETTER
5/31/2022    Merlin Emery Brown, MD  [facility] [address]  [facility] [address]    RE: Augie Powell       Dear Colleague,     I had the pleasure of seeing Augie Powell in the [facility].  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    Mr Powell suffered a NSTEMI (2018) and underwent mid LAD stenting  He was also noted to have moderate disease in a small CFX, and moderate RCA disease  His ECHO then showed LVEF 40-45% with apical akinesis  Fortunately, his LVEF normalized in 2019. He has only tolerated low dose Metoprolol due to bradycardia.    He has a wide pulse pressure likely from his noncompliant arteries    ECHO (4/4/22) showed LVEF 55-60% with apical hypokinesis, normal RV function, mild aortic stenosis (mean gradient 16 mmHg), trace MR, RVSP 38 mmHg, and borderline ascending aorta enlargement    His ACE-I was changed to Losartan due to cough  He is also being worked up by his PMD for lung issues       Our visit today is for further review.    Mr Powell comes in with his wife today who assists with his history and symptoms  He recently received a steroid injection in his knee but still has to use a walker for far distances  He denies any chest pain or significant shortness of breath  He states that his cough is gone since his medication change.  He admits to frequent nocturia and states that he is taking his Torsemide in the evening.  We talked about moving his Torsemide to the am.  I reviewed several BP readings from home  His SBP is staying < 140 mmHg on a regular basis only on Losartan 25 mg once a day  He denies any palpitations, orthopnea, or significant peripheral edema.          VITAL SIGNS:  BP: 138/58  (126/55  at home)  Pulse: 60  Weight:  182 lbs (stable)  BMI: 26      IMPRESSION AND PLAN:    Coronary Artery Disease:  -s/p NSTEMI and mid LAD stenting (2018)  -remaining moderate RCA disease  -LVEF 55%  -denies angina    Hypertension:  -on  Amlodipine 5 mg, Losartan 25 mg, Metoprolol XR 12.5 mg  -BP controlled    Hyperlipidemia:  -Atorvastatin 20 mg  -LDL 45    CKD:  Solitary kidney, due to prior cancer  -baseline creatinine >2 past 2 yrs    The total time for the visit today was 30 minutes which includes patient visit, reviewing of records, discussion, and placing of orders of the outpatient coordination of cardiovascular care as described.  The level of medical decision making during this visit was of moderate complexity.  Thank you for allowing me to participate in their care.    Orders Placed This Encounter   Procedures     Follow-Up with Cardiology       Orders Placed This Encounter   Medications     losartan (COZAAR) 25 MG tablet     Sig: Take 1 tablet (25 mg) by mouth daily Take 1 tablet daily for 1 week, then 1 pill twice a day if systolic BP >140-150     Dispense:  90 tablet     Refill:  3       Medications Discontinued During This Encounter   Medication Reason     MAGNESIUM PO Medication Reconciliation Clean Up     hydrALAZINE (APRESOLINE) 10 MG tablet      losartan (COZAAR) 25 MG tablet          Encounter Diagnoses   Name Primary?     Benign essential hypertension      Coronary artery disease involving native coronary artery of native heart without angina pectoris      Right-sided congestive heart failure, unspecified HF chronicity (H)        CURRENT MEDICATIONS:  Current Outpatient Medications   Medication Sig Dispense Refill     amLODIPine (NORVASC) 5 MG tablet Take 5 mg by mouth every morning  90 tablet 3     aspirin (ASA) 81 MG EC tablet Take 1 tablet (81 mg) by mouth daily 90 tablet 3     atorvastatin (LIPITOR) 20 MG tablet Take 1 tablet (20 mg) by mouth every evening 90 tablet 3     Calcium-Magnesium-Zinc  500-250-12.5 MG TABS Take 1 tablet by mouth daily       cholecalciferol (VITAMIN D3) 1250 mcg (49501 units) capsule Take 1,250 mcg by mouth daily        cyclobenzaprine (FLEXERIL) 10 MG tablet TAKE 1/2 TO 1 TABLET BY MOUTH 3 TIMES DAILY AS NEEDED 21 tablet 0     losartan (COZAAR) 25 MG tablet Take 1 tablet (25 mg) by mouth daily Take 1 tablet daily for 1 week, then 1 pill twice a day if systolic BP >140-150 90 tablet 3     metoprolol succinate ER (TOPROL-XL) 25 MG 24 hr tablet Take 0.5 tablets (12.5 mg) by mouth daily 45 tablet 3     nitroGLYcerin (NITROSTAT) 0.4 MG sublingual tablet For chest pain place 1 tablet under the tongue every 5 minutes for 3 doses. If symptoms persist 5 minutes after 1st dose call 911. 25 tablet 3     oxyCODONE (ROXICODONE) 5 MG tablet Take 1 tablet (5 mg) by mouth daily as needed for pain 20 tablet 0     torsemide (DEMADEX) 5 MG tablet Take 5 mg by mouth daily 1 tablet 0       ALLERGIES     Allergies   Allergen Reactions     No Known Allergies        PAST MEDICAL HISTORY:  Past Medical History:   Diagnosis Date     Aortic stenosis, mild      Ascending aorta dilatation (H)      CKD (chronic kidney disease) stage 4, GFR 15-29 ml/min (H) 09/17/2018     Coronary artery disease 10/2018    nSTEMI- Isis to mLAD, distal lad mild, Lcx-small 50-60%, RCA 60-70% normal ifR (despite echo showing poss old IMI)     Hyperlipidemia      Hypertension      Renal cell cancer, unspecified laterality (H) 05/10/2017     Renal disease     CRD--creatinine in upper ones to 2, right kdney removed 2000 for canncer     Rheumatic fever      Spondylosis with myelopathy, lumbar region        PAST SURGICAL HISTORY:  Past Surgical History:   Procedure Laterality Date     BACK SURGERY       CHOLECYSTECTOMY       DECOMPRESSION LUMBAR TWO LEVELS  12/01/2014    Procedure: DECOMPRESSION LUMBAR TWO LEVELS;  Surgeon: Remy Harmon MD;  Location: [facility] OR     HERNIA REPAIR       LAMINECTOMY, FUSION LUMBAR ONE LEVEL, COMBINED  "N/A 12/01/2014    Procedure: COMBINED LAMINECTOMY, FUSION LUMBAR ONE LEVEL;  Surgeon: Remy Harmon MD;  Location: [facility] OR     PHACOEMULSIFICATION CLEAR CORNEA WITH STANDARD INTRAOCULAR LENS IMPLANT  10/22/2012    Procedure: PHACOEMULSIFICATION CLEAR CORNEA WITH STANDARD INTRAOCULAR LENS IMPLANT;  RIGHT EYE PHACOEMULSIFICATION CLEAR CORNEA WITH STANDARD INTRAOCULAR LENS IMPLANT ;  Surgeon: Grupo Granger MD;  Location: [facility]     right nephrectomy[       STENT,CORONARY, S660 24/30  10/2018    mLAD  mod RCA, Lcx       FAMILY HISTORY:  Family History   Problem Relation Age of Onset     No Known Problems Mother      Heart Surgery Father      No Known Problems Sister        SOCIAL HISTORY:  Social History     Socioeconomic History     Marital status:      Spouse name: None     Number of children: None     Years of education: None     Highest education level: None   Tobacco Use     Smoking status: Former Smoker     Smokeless tobacco: Never Used   Substance and Sexual Activity     Alcohol use: Yes     Alcohol/week: 2.0 standard drinks     Types: 2 Shots of liquor per week     Comment: rare     Drug use: No     Sexual activity: Not Currently       Review of Systems:  Skin:  Negative       Eyes:  Positive for glasses    ENT:  Negative      Respiratory:  Positive for dyspnea on exertion;shortness of breath     Cardiovascular:    edema;Positive for    Gastroenterology: Negative      Genitourinary:  Negative      Musculoskeletal:  Negative      Neurologic:  Negative      Psychiatric:  Negative      Heme/Lymph/Imm:  Negative      Endocrine:  Negative        Physical Exam:  Vitals: /58 (BP Location: Right arm, Patient Position: Sitting, Cuff Size: Adult Regular)   Pulse 60   Ht 1.778 m (5' 10\")   Wt 82.9 kg (182 lb 11.2 oz)   SpO2 96%   BMI 26.21 kg/m      Constitutional:  cooperative;well nourished        Skin:  warm and dry to the touch     ecchymosis of back of hands    Head:  normocephalic    "     Eyes:  pupils equal and round        Lymph:      ENT:           Neck:  JVP normal        Respiratory:       popping sounds and ?fibrotic crackles both bases  more on left    Cardiac: regular rhythm;normal S1 and S2       systolic murmur;grade 1          pulses below the femoral arteries are diminished                                      GI:  abdomen soft        Extremities and Muscular Skeletal:  no edema              Neurological:  no gross motor deficits        Psych:  Alert and Oriented x 3          CC  Noel Villarreal MD  [address]  [place] [address]    Thank you for allowing me to participate in the care of your patient.      Sincerely,     BHANU Martinez [facility]

## 2022-07-26 DIAGNOSIS — I21.4 NSTEMI (NON-ST ELEVATED MYOCARDIAL INFARCTION) (H): ICD-10-CM

## 2022-07-26 RX ORDER — ATORVASTATIN CALCIUM 20 MG/1
20 TABLET, FILM COATED ORAL EVERY EVENING
Qty: 90 TABLET | Refills: 1 | Status: SHIPPED | OUTPATIENT
Start: 2022-07-26 | End: 2023-01-25

## 2022-07-26 NOTE — PROGRESS NOTES
[facility] Refill Guideline reviewed.  Medication meets criteria for refill. BERKLEY Howard RN

## 2022-09-22 DIAGNOSIS — I21.4 NSTEMI (NON-ST ELEVATED MYOCARDIAL INFARCTION) (H): ICD-10-CM

## 2022-09-22 RX ORDER — METOPROLOL SUCCINATE 25 MG/1
12.5 TABLET, EXTENDED RELEASE ORAL DAILY
Qty: 45 TABLET | Refills: 3 | Status: SHIPPED | OUTPATIENT
Start: 2022-09-22 | End: 2023-01-01

## 2022-11-20 ENCOUNTER — HEALTH MAINTENANCE LETTER (OUTPATIENT)
Age: 87
End: 2022-11-20

## 2022-12-12 ENCOUNTER — HOSPITAL ENCOUNTER (OUTPATIENT)
Facility: CLINIC | Age: 87
Setting detail: OBSERVATION
Discharge: HOME | End: 2022-12-13
Attending: EMERGENCY MEDICINE | Admitting: HOSPITALIST
Payer: COMMERCIAL

## 2022-12-12 ENCOUNTER — OFFICE VISIT (OUTPATIENT)
Dept: URGENT CARE | Facility: URGENT CARE | Age: 87
End: 2022-12-12
Payer: COMMERCIAL

## 2022-12-12 ENCOUNTER — APPOINTMENT (OUTPATIENT)
Dept: GENERAL RADIOLOGY | Facility: CLINIC | Age: 87
End: 2022-12-12
Attending: EMERGENCY MEDICINE
Payer: COMMERCIAL

## 2022-12-12 VITALS — DIASTOLIC BLOOD PRESSURE: 62 MMHG | HEART RATE: 80 BPM | SYSTOLIC BLOOD PRESSURE: 160 MMHG

## 2022-12-12 DIAGNOSIS — R07.9 CHEST PAIN, UNSPECIFIED TYPE: ICD-10-CM

## 2022-12-12 DIAGNOSIS — R07.9 CHEST PAIN, UNSPECIFIED TYPE: Primary | ICD-10-CM

## 2022-12-12 DIAGNOSIS — R06.00 DYSPNEA, UNSPECIFIED TYPE: ICD-10-CM

## 2022-12-12 DIAGNOSIS — M43.10 SPONDYLOLISTHESIS, UNSPECIFIED SPINAL REGION: Primary | ICD-10-CM

## 2022-12-12 LAB
ANION GAP SERPL CALCULATED.3IONS-SCNC: 8 MMOL/L (ref 3–14)
ATRIAL RATE - MUSE: 78 BPM
BASOPHILS # BLD AUTO: 0 10E3/UL (ref 0–0.2)
BASOPHILS NFR BLD AUTO: 0 %
BUN SERPL-MCNC: 48 MG/DL (ref 7–30)
CALCIUM SERPL-MCNC: 10.4 MG/DL (ref 8.5–10.1)
CHLORIDE BLD-SCNC: 103 MMOL/L (ref 94–109)
CO2 SERPL-SCNC: 22 MMOL/L (ref 20–32)
CREAT SERPL-MCNC: 1.7 MG/DL (ref 0.66–1.25)
D DIMER PPP FEU-MCNC: 0.29 UG/ML FEU (ref 0–0.5)
DIASTOLIC BLOOD PRESSURE - MUSE: NORMAL MMHG
EOSINOPHIL # BLD AUTO: 0 10E3/UL (ref 0–0.7)
EOSINOPHIL NFR BLD AUTO: 0 %
ERYTHROCYTE [DISTWIDTH] IN BLOOD BY AUTOMATED COUNT: 13 % (ref 10–15)
GFR SERPL CREATININE-BSD FRML MDRD: 39 ML/MIN/1.73M2
GLUCOSE BLD-MCNC: 116 MG/DL (ref 70–99)
HCT VFR BLD AUTO: 32.9 % (ref 40–53)
HGB BLD-MCNC: 11.5 G/DL (ref 13.3–17.7)
IMM GRANULOCYTES # BLD: 0.1 10E3/UL
IMM GRANULOCYTES NFR BLD: 1 %
INTERPRETATION ECG - MUSE: NORMAL
LYMPHOCYTES # BLD AUTO: 1.1 10E3/UL (ref 0.8–5.3)
LYMPHOCYTES NFR BLD AUTO: 9 %
MAGNESIUM SERPL-MCNC: 2.5 MG/DL (ref 1.6–2.3)
MCH RBC QN AUTO: 33 PG (ref 26.5–33)
MCHC RBC AUTO-ENTMCNC: 35 G/DL (ref 31.5–36.5)
MCV RBC AUTO: 95 FL (ref 78–100)
MONOCYTES # BLD AUTO: 0.8 10E3/UL (ref 0–1.3)
MONOCYTES NFR BLD AUTO: 7 %
NEUTROPHILS # BLD AUTO: 10.4 10E3/UL (ref 1.6–8.3)
NEUTROPHILS NFR BLD AUTO: 83 %
NRBC # BLD AUTO: 0 10E3/UL
NRBC BLD AUTO-RTO: 0 /100
P AXIS - MUSE: 65 DEGREES
PLATELET # BLD AUTO: 178 10E3/UL (ref 150–450)
POTASSIUM BLD-SCNC: 4 MMOL/L (ref 3.4–5.3)
PR INTERVAL - MUSE: 192 MS
QRS DURATION - MUSE: 96 MS
QT - MUSE: 356 MS
QTC - MUSE: 405 MS
R AXIS - MUSE: 22 DEGREES
RBC # BLD AUTO: 3.48 10E6/UL (ref 4.4–5.9)
SODIUM SERPL-SCNC: 133 MMOL/L (ref 133–144)
SYSTOLIC BLOOD PRESSURE - MUSE: NORMAL MMHG
T AXIS - MUSE: 57 DEGREES
TROPONIN I SERPL HS-MCNC: 12 NG/L
TROPONIN I SERPL HS-MCNC: 13 NG/L
TROPONIN I SERPL HS-MCNC: 13 NG/L
TSH SERPL DL<=0.005 MIU/L-ACNC: 1.19 MU/L (ref 0.4–4)
VENTRICULAR RATE- MUSE: 78 BPM
WBC # BLD AUTO: 12.5 10E3/UL (ref 4–11)

## 2022-12-12 PROCEDURE — 80048 BASIC METABOLIC PNL TOTAL CA: CPT | Performed by: EMERGENCY MEDICINE

## 2022-12-12 PROCEDURE — 258N000003 HC RX IP 258 OP 636: Performed by: HOSPITALIST

## 2022-12-12 PROCEDURE — 36415 COLL VENOUS BLD VENIPUNCTURE: CPT | Performed by: HOSPITALIST

## 2022-12-12 PROCEDURE — 93005 ELECTROCARDIOGRAM TRACING: CPT

## 2022-12-12 PROCEDURE — 99204 OFFICE O/P NEW MOD 45 MIN: CPT | Performed by: PHYSICIAN ASSISTANT

## 2022-12-12 PROCEDURE — 99285 EMERGENCY DEPT VISIT HI MDM: CPT | Mod: 25

## 2022-12-12 PROCEDURE — 250N000013 HC RX MED GY IP 250 OP 250 PS 637: Performed by: HOSPITALIST

## 2022-12-12 PROCEDURE — G0378 HOSPITAL OBSERVATION PER HR: HCPCS

## 2022-12-12 PROCEDURE — 99220 PR INITIAL OBSERVATION CARE,LEVEL III: CPT | Performed by: HOSPITALIST

## 2022-12-12 PROCEDURE — 71046 X-RAY EXAM CHEST 2 VIEWS: CPT

## 2022-12-12 PROCEDURE — 36415 COLL VENOUS BLD VENIPUNCTURE: CPT | Performed by: EMERGENCY MEDICINE

## 2022-12-12 PROCEDURE — 83735 ASSAY OF MAGNESIUM: CPT | Performed by: EMERGENCY MEDICINE

## 2022-12-12 PROCEDURE — 250N000013 HC RX MED GY IP 250 OP 250 PS 637: Performed by: EMERGENCY MEDICINE

## 2022-12-12 PROCEDURE — 84484 ASSAY OF TROPONIN QUANT: CPT | Performed by: HOSPITALIST

## 2022-12-12 PROCEDURE — 85379 FIBRIN DEGRADATION QUANT: CPT | Performed by: EMERGENCY MEDICINE

## 2022-12-12 PROCEDURE — 84443 ASSAY THYROID STIM HORMONE: CPT | Performed by: EMERGENCY MEDICINE

## 2022-12-12 PROCEDURE — 85004 AUTOMATED DIFF WBC COUNT: CPT | Performed by: EMERGENCY MEDICINE

## 2022-12-12 PROCEDURE — 82306 VITAMIN D 25 HYDROXY: CPT | Performed by: HOSPITALIST

## 2022-12-12 PROCEDURE — 93000 ELECTROCARDIOGRAM COMPLETE: CPT | Performed by: PHYSICIAN ASSISTANT

## 2022-12-12 PROCEDURE — 84484 ASSAY OF TROPONIN QUANT: CPT | Performed by: EMERGENCY MEDICINE

## 2022-12-12 RX ORDER — NITROGLYCERIN 0.4 MG/1
0.4 TABLET SUBLINGUAL EVERY 5 MIN PRN
Status: DISCONTINUED | OUTPATIENT
Start: 2022-12-12 | End: 2022-12-12

## 2022-12-12 RX ORDER — SODIUM CHLORIDE 9 MG/ML
INJECTION, SOLUTION INTRAVENOUS CONTINUOUS
Status: DISCONTINUED | OUTPATIENT
Start: 2022-12-13 | End: 2022-12-13 | Stop reason: HOSPADM

## 2022-12-12 RX ORDER — ACETAMINOPHEN 650 MG/1
650 SUPPOSITORY RECTAL EVERY 6 HOURS PRN
Status: DISCONTINUED | OUTPATIENT
Start: 2022-12-12 | End: 2022-12-13 | Stop reason: HOSPADM

## 2022-12-12 RX ORDER — CYCLOBENZAPRINE HCL 5 MG
5 TABLET ORAL EVERY 8 HOURS PRN
Status: DISCONTINUED | OUTPATIENT
Start: 2022-12-12 | End: 2022-12-13 | Stop reason: HOSPADM

## 2022-12-12 RX ORDER — AMLODIPINE BESYLATE 5 MG/1
5 TABLET ORAL EVERY MORNING
Status: DISCONTINUED | OUTPATIENT
Start: 2022-12-13 | End: 2022-12-13 | Stop reason: HOSPADM

## 2022-12-12 RX ORDER — ACETAMINOPHEN 325 MG/1
650 TABLET ORAL EVERY 6 HOURS PRN
Status: DISCONTINUED | OUTPATIENT
Start: 2022-12-12 | End: 2022-12-13 | Stop reason: HOSPADM

## 2022-12-12 RX ORDER — ASPIRIN 81 MG/1
81 TABLET ORAL DAILY
Status: DISCONTINUED | OUTPATIENT
Start: 2022-12-13 | End: 2022-12-13 | Stop reason: HOSPADM

## 2022-12-12 RX ORDER — NITROGLYCERIN 0.4 MG/1
0.4 TABLET SUBLINGUAL EVERY 5 MIN PRN
Status: DISCONTINUED | OUTPATIENT
Start: 2022-12-12 | End: 2022-12-13 | Stop reason: HOSPADM

## 2022-12-12 RX ORDER — MAGNESIUM HYDROXIDE/ALUMINUM HYDROXICE/SIMETHICONE 120; 1200; 1200 MG/30ML; MG/30ML; MG/30ML
30 SUSPENSION ORAL EVERY 4 HOURS PRN
Status: DISCONTINUED | OUTPATIENT
Start: 2022-12-12 | End: 2022-12-13 | Stop reason: HOSPADM

## 2022-12-12 RX ORDER — LOSARTAN POTASSIUM 25 MG/1
25 TABLET ORAL DAILY
Status: DISCONTINUED | OUTPATIENT
Start: 2022-12-13 | End: 2022-12-13 | Stop reason: HOSPADM

## 2022-12-12 RX ORDER — ASPIRIN 81 MG/1
81 TABLET ORAL DAILY
Status: DISCONTINUED | OUTPATIENT
Start: 2022-12-12 | End: 2022-12-12

## 2022-12-12 RX ORDER — ASPIRIN 81 MG/1
243 TABLET, CHEWABLE ORAL ONCE
Status: COMPLETED | OUTPATIENT
Start: 2022-12-12 | End: 2022-12-12

## 2022-12-12 RX ADMIN — SODIUM CHLORIDE: 9 INJECTION, SOLUTION INTRAVENOUS at 23:34

## 2022-12-12 RX ADMIN — ACETAMINOPHEN 650 MG: 325 TABLET, FILM COATED ORAL at 21:17

## 2022-12-12 RX ADMIN — NITROGLYCERIN 0.4 MG: 0.4 TABLET SUBLINGUAL at 17:25

## 2022-12-12 RX ADMIN — NITROGLYCERIN 0.4 MG: 0.4 TABLET SUBLINGUAL at 16:32

## 2022-12-12 RX ADMIN — ASPIRIN 81 MG CHEWABLE TABLET 243 MG: 81 TABLET CHEWABLE at 15:01

## 2022-12-12 RX ADMIN — METOPROLOL SUCCINATE 12.5 MG: 25 TABLET, EXTENDED RELEASE ORAL at 21:17

## 2022-12-12 ASSESSMENT — ACTIVITIES OF DAILY LIVING (ADL)
ADLS_ACUITY_SCORE: 25
ADLS_ACUITY_SCORE: 37
ADLS_ACUITY_SCORE: 25
ADLS_ACUITY_SCORE: 37

## 2022-12-12 ASSESSMENT — ENCOUNTER SYMPTOMS
SORE THROAT: 0
SHORTNESS OF BREATH: 0
FEVER: 1
COUGH: 0

## 2022-12-12 NOTE — PATIENT INSTRUCTIONS
Sent to ER for evaluation of chest pain sob since yesterday. It is sternal and feels sharp. EKG showed normal sinus rhythm. He has CAD and had a NSTEMI years ago which puts him at a higher cardiac risk. Ddx includes MI, MSK etiology, PE, GERD, pleurisy, among others.

## 2022-12-12 NOTE — ED PROVIDER NOTES
History   Chief Complaint:  Irregular Heart Beat       The history is provided by the patient.      Augie Powell is a 87 year old male with history of Hypertension, renal cell cancer, CKD stage 4, and ACS who presents with centralized chest pain that has been constant since last night. He had a cardiac stent placed around 6 years ago. He does not think this pain feels like he felt before getting his stent placed. Patient says he is chronically short of breath that has not changed. He denies cough, fever, sore throat. They were at urgent care in [place] earlier today and had EKG done. He is taking baby aspirin daily, cholesterol and blood pressure meds. Denies use of a blood thinner.     Review of Systems   Constitutional: Positive for fever.   HENT: Negative for sore throat.    Respiratory: Negative for cough and shortness of breath.    Cardiovascular: Positive for chest pain.   All other systems reviewed and are negative.    Allergies:  The patient has no known allergies.     Medications:  Amlodipine  Aspirin  Atorvastatin  Cyclobenzaprine  Losartan  Metoprolol succinate  Nitroglycerin  Oxycodone  Torsemide  Ivermectin  methylprednisolone    Past Medical History:     Degenerative spondylolisthesis  Hypertension   Renal cell cancer  CKD stage 4  ACS     Past Surgical History:    Back surgery  Cholecystectomy   Hernia repair  Laminectomy  Phacoemulsification  Right nephrectomy  Coronary stent, mLAD mod RCA     Social History:  The patient presents to the ED with his wife.     Physical Exam     Patient Vitals for the past 24 hrs:   BP Temp Temp src Pulse Resp SpO2   12/12/22 1852 (!) 141/61 98.4  F (36.9  C) Oral 69 16 95 %   12/12/22 1830 (!) 153/75 -- -- 82 19 97 %   12/12/22 1820 (!) 140/69 -- -- 72 -- 100 %   12/12/22 1810 (!) 148/78 -- -- 77 -- 98 %   12/12/22 1800 (!) 146/71 -- -- 74 18 99 %   12/12/22 1750 (!) 157/98 -- -- 75 21 97 %   12/12/22 1720 (!) 140/70 -- -- 78 (!) 8 98 %   12/12/22 1702 -- --  -- -- -- 98 %   12/12/22 1700 (!) 148/79 -- -- 78 12 97 %   12/12/22 1446 (!) 169/70 98  F (36.7  C) Temporal 78 14 --     Physical Exam    General:          Alert, appears well-developed and well-nourished. Cooperative.                           In mild distress  HEENT:  Head:               Atraumatic  Ears:                External ears are normal  Mouth/Throat:  Oropharynx is without erythema or exudate and mucous membranes are moist.   Eyes:               Conjunctivae normal and EOM are normal. No scleral icterus.  CV:                  Normal rate, regular rhythm, normal heart sounds and radial pulses are 2+ and symmetric.  Systolic murmur.  Resp:              Breath sounds are clear bilaterally                          Non-labored, no retractions or accessory muscle use  GI:                   Abdomen is soft, no distension, no tenderness. No rebound or guarding.  No CVA tenderness bilaterally  MS:                  Normal range of motion. No edema.                          Normal strength in all 4 extremities.   Skin:               Warm and dry.  No rash or lesions noted.  Neuro: Alert. Normal strength.  GCS: 15  Psych:                        Normal mood and affect.    Emergency Department Course   ECG  ECG results from 12/12/22   EKG 12-lead, tracing only     Value    Systolic Blood Pressure     Diastolic Blood Pressure     Ventricular Rate 78    Atrial Rate 78    NV Interval 192    QRS Duration 96        QTc 405    P Axis 65    R AXIS 22    T Axis 57    Interpretation ECG      Sinus rhythm  Normal ECG  When compared with ECG of 11-FEB-2019 10:44,  Premature ventricular complexes are no longer Present  Confirmed by GENERATED REPORT, COMPUTER ([id number]),  Bette Lim ([id number]) on 12/12/2022 3:34:33 PM       Imaging:  XR Chest 2 Views   Final Result   IMPRESSION: Stable chest with again seen minimal fine fibrosis/atelectasis seen in the lung bases. Partially calcified thoracic aorta. Mild to moderate  hypertrophic changes most marked in the AC joints. No active CHF or inflammatory infiltrates    identified.      NM Lexiscan stress test (nuc card)    (Results Pending)     Report per radiology    Laboratory:  Labs Ordered and Resulted from Time of ED Arrival to Time of ED Departure   BASIC METABOLIC PANEL - Abnormal       Result Value    Sodium 133      Potassium 4.0      Chloride 103      Carbon Dioxide (CO2) 22      Anion Gap 8      Urea Nitrogen 48 (*)     Creatinine 1.70 (*)     Calcium 10.4 (*)     Glucose 116 (*)     GFR Estimate 39 (*)    MAGNESIUM - Abnormal    Magnesium 2.5 (*)    CBC WITH PLATELETS AND DIFFERENTIAL - Abnormal    WBC Count 12.5 (*)     RBC Count 3.48 (*)     Hemoglobin 11.5 (*)     Hematocrit 32.9 (*)     MCV 95      MCH 33.0      MCHC 35.0      RDW 13.0      Platelet Count 178      % Neutrophils 83      % Lymphocytes 9      % Monocytes 7      % Eosinophils 0      % Basophils 0      % Immature Granulocytes 1      NRBCs per 100 WBC 0      Absolute Neutrophils 10.4 (*)     Absolute Lymphocytes 1.1      Absolute Monocytes 0.8      Absolute Eosinophils 0.0      Absolute Basophils 0.0      Absolute Immature Granulocytes 0.1      Absolute NRBCs 0.0     D DIMER QUANTITATIVE - Normal    D-Dimer Quantitative 0.29     TROPONIN I - Normal    Troponin I High Sensitivity 13     TSH WITH FREE T4 REFLEX - Normal    TSH 1.19     TROPONIN I - Normal    Troponin I High Sensitivity 12        Emergency Department Course:     Reviewed:  I reviewed nursing notes, vitals, past medical history and Care Everywhere    Assessments:  1447 I obtained history and examined the patient as noted above from triage  1711 I rechecked the patient and explained findings when he arrived in the main ED.    Consults:  1730 I spoke with Dr. Kelley, hospitalist, regarding admission of the patient.     Interventions:  Medications   aspirin EC tablet 81 mg (has no administration in time range)   nitroGLYcerin (NITROSTAT) sublingual  tablet 0.4 mg (has no administration in time range)   alum & mag hydroxide-simethicone (MAALOX) suspension 30 mL (has no administration in time range)   sodium chloride (PF) 0.9% PF flush 1-10 mL (has no administration in time range)   HOLD: Caffeine containing medications 12 hours prior to the procedure (has no administration in time range)   HOLD: dipyridamole (PERSANTINE) or aspirin/dipyridamole (AGGRENOX) 48 hours prior to the procedure (has no administration in time range)   HOLD: theophylline or aminophylline 12 hours prior to the procedure (has no administration in time range)   IF patient diabetic - HOLD: ALL ORAL HYPOGLYCEMICS and include: glipizide, glyburide, glimepiride, gliclazide, metformin, any metformin containing medication, on day of the procedure (has no administration in time range)   acetaminophen (TYLENOL) tablet 650 mg (650 mg Oral Given 12/12/22 2117)     Or   acetaminophen (TYLENOL) Suppository 650 mg ( Rectal See Alternative 12/12/22 2117)   sodium chloride 0.9% infusion (has no administration in time range)   amLODIPine (NORVASC) tablet 5 mg (has no administration in time range)   cyclobenzaprine (FLEXERIL) tablet 5 mg (has no administration in time range)   losartan (COZAAR) tablet 25 mg (has no administration in time range)   metoprolol succinate ER (TOPROL-XL) 24 hr half-tab 12.5 mg (12.5 mg Oral Given 12/12/22 2117)   aspirin (ASA) chewable tablet 243 mg (243 mg Oral Given 12/12/22 1501)   sodium chloride (PF) 0.9% PF flush 10 mL (10 mLs Intravenous Given 12/12/22 2128)     Disposition:  The patient was admitted to the hospital under the care of Dr. Kelley.     Impression & Plan     HEART Score  Background  Calculates the overall risk of adverse event in patient's presenting with chest pain.  Based on 5 criteria (each assigned 0-2 points) including suspiciousness of history, EKG, age, risk factors and troponin.    Data  87 year old male  has Degenerative spondylolisthesis;  Spondylolisthesis; Benign essential hypertension; Renal cell cancer, unspecified laterality (H); Hyperlipidemia LDL goal <130; CKD (chronic kidney disease) stage 4, GFR 15-29 ml/min (H); and ACS (acute coronary syndrome) (H) on their problem list.   reports that he has quit smoking. He has never used smokeless tobacco.  family history includes Heart Surgery in his father; No Known Problems in his mother and sister.  Lab Results   Component Value Date    TROPI <0.015 02/11/2019     Criteria   0-2 points for each of 5 items (maximum of 10 points):  Score 1- History moderately suspicious for coronary syndrome  Score 1- EKG with Non-specific repolarization disturbance  Score 2- Age 65 years or older  Score 2- Three or more risk factors for or history of atherosclerotic disease  Score 0- Within normal limits for troponin levels  Interpretation  Risk of adverse outcome  Heart Score: 6  Total Score 4-6- Adverse Outcome Risk 20.3% - Supports admission with standard rule-out management -serial troponins and stress testing        Medical Decision Making:  Augie Powell is a 87 year old male who presents for evaluation of chest pain.    A broad differential was of course considered.  Certainly ischemia is in differential. I doubt pulmonary embolism, aortic dissection, pneumonia or pneumothorax.  Other etiologies of chest pain considered in this patient included chest wall source, esophageal spasm or GI source, pleuritis, referred pain, etc.      My suspicion of unstable angina at this point is very low and given risk/benefit ratio would not start lovenox or heparin. Given the nature and timing of the patient's symptoms, I will admit the patient  to the hospital for further workup and treatment.  Patient's HEART score is 6.   The patient agrees to be admitted and all questions were answered.      He is pain free at this time after given nitroglycerin SL tab and aspirin medications.      Diagnosis:    ICD-10-CM    1. Chest  pain, unspecified type  R07.9         Scribe Disclosure:  I, Yasmany Abbott, am serving as a scribe at 5:11 PM on 12/12/2022 to document services personally performed by Dov Leija MD based on my observations and the provider's statements to me.            Dov Leija MD  12/12/22 2141       Dov Leija MD  12/12/22 2143

## 2022-12-12 NOTE — PROGRESS NOTES
URGENT CARE VISIT:    SUBJECTIVE:   Augie Powell is a 87 year old male with history of CAD and NSTEMI who presents for sternal chest pain and sob. Chest pain started yesterday and has been intermittent. Pain is sharp and worsens with movements. He feels more sob with activity. That has been going on for a few months.He denies cough or fever. Treatments tried include none with no relief of symptoms. Of note, he did shovel some snow yesterday which he does not usually do.    PMH:   Past Medical History:   Diagnosis Date     Aortic stenosis, mild      Ascending aorta dilatation (H)      CKD (chronic kidney disease) stage 4, GFR 15-29 ml/min (H) 09/17/2018     Coronary artery disease 10/2018    nSTEMI- Isis to mLAD, distal lad mild, Lcx-small 50-60%, RCA 60-70% normal ifR (despite echo showing poss old IMI)     Hyperlipidemia      Hypertension      Renal cell cancer, unspecified laterality (H) 05/10/2017     Renal disease     CRD--creatinine in upper ones to 2, right kdney removed 2000 for canncer     Rheumatic fever      Spondylosis with myelopathy, lumbar region      Allergies: No known allergies  Medications:   Current Outpatient Medications   Medication Sig Dispense Refill     amLODIPine (NORVASC) 5 MG tablet Take 5 mg by mouth every morning  90 tablet 3     aspirin (ASA) 81 MG EC tablet Take 1 tablet (81 mg) by mouth daily 90 tablet 3     atorvastatin (LIPITOR) 20 MG tablet Take 1 tablet (20 mg) by mouth every evening 90 tablet 1     Calcium-Magnesium-Zinc 500-250-12.5 MG TABS Take 1 tablet by mouth daily       cholecalciferol (VITAMIN D3) 1250 mcg (25737 units) capsule Take 1,250 mcg by mouth daily        cyclobenzaprine (FLEXERIL) 10 MG tablet TAKE 1/2 TO 1 TABLET BY MOUTH 3 TIMES DAILY AS NEEDED 21 tablet 0     losartan (COZAAR) 25 MG tablet Take 1 tablet (25 mg) by mouth daily Take 1 tablet daily for 1 week, then 1 pill twice a day if systolic BP >140-150 90 tablet 3     metoprolol succinate ER (TOPROL XL)  25 MG 24 hr tablet Take 0.5 tablets (12.5 mg) by mouth daily 45 tablet 3     nitroGLYcerin (NITROSTAT) 0.4 MG sublingual tablet For chest pain place 1 tablet under the tongue every 5 minutes for 3 doses. If symptoms persist 5 minutes after 1st dose call 911. 25 tablet 3     oxyCODONE (ROXICODONE) 5 MG tablet Take 1 tablet (5 mg) by mouth daily as needed for pain 20 tablet 0     torsemide (DEMADEX) 5 MG tablet Take 5 mg by mouth daily 1 tablet 0     Social History:   Social History     Tobacco Use     Smoking status: Former     Smokeless tobacco: Never   Substance Use Topics     Alcohol use: Yes     Alcohol/week: 2.0 standard drinks     Types: 2 Shots of liquor per week     Comment: rare     Family History:  I have reviewed this patient's family history and updated it with pertinent information if needed.  Family History   Problem Relation Age of Onset     No Known Problems Mother      Heart Surgery Father      No Known Problems Sister      ROS:   General: negative  Skin: negative  Eyes: negative  Ears/Nose/Throat: negative  Respiratory: Shortness of breath  Cardiovascular: chest pain  Gastrointestinal: negative  Genitourinary: negative  Musculoskeletal: negative  Neurologic: negative  Psychiatric: negative  Hematologic/Lymphatic/Immunologic: negative  Endocrine: negative     OBJECTIVE:  BP (!) 160/62   Pulse 80   General: WDWN in NAD  Head: atraumatic  Eyes: EOMI,  PERRL, conjunctiva clear  HENT: Nose and mouth without ulcers, erythema or lesions  Neck: Supple, non-tender  Cardiac: RRR without murmurs, rubs, or gallops.  Respiratory: LCTAB without adventitious sounds. Non-labored breathing.  Extremities: No peripheral edema or tenderness, peripheral pulses normal  Musculoskeletal: No gross deformities noted, no erythema, FROM noted in all extremities  Neuro: Normal strength and tone, sensory exam grossly normal,  normal speech and mentation  Integumentary: No suspicious rashes or lesions.   Psych: normal mood and  affect      ASSESSMENT:     ICD-10-CM    1. Chest pain  R07.9 EKG 12-lead complete w/read - Clinics           PLAN:  Patient Instructions   Sent to ER for evaluation of chest pain sob since yesterday. It is sternal and feels sharp. EKG showed normal sinus rhythm. He has CAD and had a NSTEMI years ago which puts him at a higher cardiac risk. Ddx includes MI, MSK etiology, PE, GERD, pleurisy, among others. Patient verbalized understanding and is agreeable to plan. The patient was discharged ambulatory and in stable condition.    Nadja Francis PA-C ....................  12/12/2022   1:55 PM

## 2022-12-12 NOTE — ED NOTES
PIT/Triage Evaluation    Patient presented with centralized chest pain that has been constant since last night. He had a cardiac stent placed around 6 years ago. He does not think this pain feels like he felt before getting his stent placed. Patient says he is chronically shortness of breath that has not changed. He denies cough, fever, sore throat. They were at urgent care in [place] earlier today and had EKG done. He is taking baby aspirin daily, cholesterol and blood pressure meds. Denies use of a blood thinner.     Exam is notable for:  BP (!) 169/70   Pulse 78   Temp 98  F (36.7  C) (Temporal)   Resp 14   General: Alert, appears well-developed and well-nourished. Cooperative.     In mild distress  HEENT:  Head:  Atraumatic  Ears:  External ears are normal  Mouth/Throat:  Oropharynx is without erythema or exudate and mucous membranes are moist.   Eyes:   Conjunctivae normal and EOM are normal. No scleral icterus.  CV:  Normal rate, regular rhythm, normal heart sounds and radial pulses are 2+ and symmetric.  Systolic murmur.  Resp:  Breath sounds are clear bilaterally    Non-labored, no retractions or accessory muscle use  GI:  Abdomen is soft, no distension, no tenderness. No rebound or guarding.  No CVA tenderness bilaterally  MS:  Normal range of motion. No edema.    Normal strength in all 4 extremities.   Skin:  Warm and dry.  No rash or lesions noted.  Neuro: Alert. Normal strength.  GCS: 15  Psych:  Normal mood and affect.    Appropriate interventions for symptom management were initiated if applicable.  Appropriate diagnostic tests were initiated if indicated.    Important information for subsequent clinician:  Patient is 87-year-old male with a history of CAD with historic stent placement who presents with midsternal chest pain constant since about midnight this morning.  He notes he went to urgent care and was referred here for further investigation.  He does take a baby aspirin daily.  EKG has been  ordered along with laboratory work including D-dimer.  Imaging studies pending D-dimer results.  Patient is high risk given history of cardiac disease, age, and other past medical history.  Awaits available room for further investigation    I briefly evaluated the patient and developed an initial plan of care. I discussed this plan and explained that this brief interaction does not constitute a full evaluation. Patient/family understands that they should wait to be fully evaluated and discuss any test results with another clinician prior to leaving the hospital.    Scribe Disclosure:  I, Yasmany Abbott, am serving as a scribe at 2:47 PM on 12/12/2022 to document services personally performed by Dov Leija MD based on my observations and the provider's statements to me.        Dov Leija MD  12/12/22 3039

## 2022-12-12 NOTE — ED NOTES
[facility]  ED Nurse Handoff Report    ED Chief complaint: Irregular Heart Beat      ED Diagnosis:   Final diagnoses:   None       Code Status: admitting physician to determine    Allergies:   Allergies   Allergen Reactions     No Known Allergies        Patient Story:    Patient presented with centralized chest pain that has been constant since last night. He had a cardiac stent placed around 6 years ago. He does not think this pain feels like he felt before getting his stent placed. Patient says he is chronically shortness of breath that has not changed. He denies cough, fever, sore throat. They were at urgent care in [place] earlier today and had EKG done. He is taking baby aspirin daily, cholesterol and blood pressure meds. Denies use of a blood thinner.   Focused Assessment:      Treatments and/or interventions provided:   Labs Ordered and Resulted from Time of ED Arrival to Time of ED Departure   BASIC METABOLIC PANEL - Abnormal       Result Value    Sodium 133      Potassium 4.0      Chloride 103      Carbon Dioxide (CO2) 22      Anion Gap 8      Urea Nitrogen 48 (*)     Creatinine 1.70 (*)     Calcium 10.4 (*)     Glucose 116 (*)     GFR Estimate 39 (*)    MAGNESIUM - Abnormal    Magnesium 2.5 (*)    CBC WITH PLATELETS AND DIFFERENTIAL - Abnormal    WBC Count 12.5 (*)     RBC Count 3.48 (*)     Hemoglobin 11.5 (*)     Hematocrit 32.9 (*)     MCV 95      MCH 33.0      MCHC 35.0      RDW 13.0      Platelet Count 178      % Neutrophils 83      % Lymphocytes 9      % Monocytes 7      % Eosinophils 0      % Basophils 0      % Immature Granulocytes 1      NRBCs per 100 WBC 0      Absolute Neutrophils 10.4 (*)     Absolute Lymphocytes 1.1      Absolute Monocytes 0.8      Absolute Eosinophils 0.0      Absolute Basophils 0.0      Absolute Immature Granulocytes 0.1      Absolute NRBCs 0.0     D DIMER QUANTITATIVE - Normal    D-Dimer Quantitative 0.29     TROPONIN I - Normal    Troponin I High  Sensitivity 13     TSH WITH FREE T4 REFLEX - Normal    TSH 1.19     TROPONIN I     amLODIPine (NORVASC) 5 MG tablet  aspirin (ASA) 81 MG EC tablet  atorvastatin (LIPITOR) 20 MG tablet  Calcium-Magnesium-Zinc 500-250-12.5 MG TABS  cholecalciferol (VITAMIN D3) 1250 mcg (26613 units) capsule  cyclobenzaprine (FLEXERIL) 10 MG tablet  losartan (COZAAR) 25 MG tablet  metoprolol succinate ER (TOPROL XL) 25 MG 24 hr tablet  nitroGLYcerin (NITROSTAT) 0.4 MG sublingual tablet  oxyCODONE (ROXICODONE) 5 MG tablet  torsemide (DEMADEX) 5 MG tablet        Patient's response to treatments and/or interventions:     To be done/followed up on inpatient unit:  monitor    Does this patient have any cognitive concerns?: Na    Activity level - Baseline/Home:  Independent  Activity Level - Current:   Independent    Patient's Preferred language: English   Needed?: No    Isolation: None  Infection: Not Applicable  Patient tested for COVID 19 prior to admission: YES  Bariatric?: No    Vital Signs:   Vitals:    12/12/22 1446 12/12/22 1702   BP: (!) 169/70    Pulse: 78    Resp: 14    Temp: 98  F (36.7  C)    TempSrc: Temporal    SpO2:  98%       Cardiac Rhythm:     Was the PSS-3 completed:   Yes  What interventions are required if any?               Family Comments: given to patient  OBS brochure/video discussed/provided to patient/family: N/A              Name of person given brochure if not patient:                 Relationship to patient:     For the majority of the shift this patient's behavior was Green.   Behavioral interventions performed were NA.    ED NURSE PHONE NUMBER: *[phone]

## 2022-12-13 ENCOUNTER — APPOINTMENT (OUTPATIENT)
Dept: NUCLEAR MEDICINE | Facility: CLINIC | Age: 87
End: 2022-12-13
Attending: HOSPITALIST
Payer: COMMERCIAL

## 2022-12-13 ENCOUNTER — APPOINTMENT (OUTPATIENT)
Dept: PHYSICAL THERAPY | Facility: CLINIC | Age: 87
End: 2022-12-13
Attending: HOSPITALIST
Payer: COMMERCIAL

## 2022-12-13 VITALS
TEMPERATURE: 98.4 F | OXYGEN SATURATION: 98 % | RESPIRATION RATE: 16 BRPM | SYSTOLIC BLOOD PRESSURE: 163 MMHG | DIASTOLIC BLOOD PRESSURE: 74 MMHG | HEART RATE: 86 BPM

## 2022-12-13 LAB
ALBUMIN SERPL-MCNC: 2.8 G/DL (ref 3.4–5)
ALP SERPL-CCNC: 48 U/L (ref 40–150)
ALT SERPL W P-5'-P-CCNC: 17 U/L (ref 0–70)
ANION GAP SERPL CALCULATED.3IONS-SCNC: 9 MMOL/L (ref 3–14)
AST SERPL W P-5'-P-CCNC: 18 U/L (ref 0–45)
BASOPHILS # BLD AUTO: 0 10E3/UL (ref 0–0.2)
BASOPHILS NFR BLD AUTO: 0 %
BILIRUB SERPL-MCNC: 0.6 MG/DL (ref 0.2–1.3)
BUN SERPL-MCNC: 41 MG/DL (ref 7–30)
CALCIUM SERPL-MCNC: 10 MG/DL (ref 8.5–10.1)
CHLORIDE BLD-SCNC: 106 MMOL/L (ref 94–109)
CO2 SERPL-SCNC: 22 MMOL/L (ref 20–32)
CREAT SERPL-MCNC: 1.69 MG/DL (ref 0.66–1.25)
CV STRESS MAX HR HE: 93
DEPRECATED CALCIDIOL+CALCIFEROL SERPL-MC: 70 UG/L (ref 20–75)
EOSINOPHIL # BLD AUTO: 0.1 10E3/UL (ref 0–0.7)
EOSINOPHIL NFR BLD AUTO: 1 %
ERYTHROCYTE [DISTWIDTH] IN BLOOD BY AUTOMATED COUNT: 13 % (ref 10–15)
GFR SERPL CREATININE-BSD FRML MDRD: 39 ML/MIN/1.73M2
GLUCOSE BLD-MCNC: 113 MG/DL (ref 70–99)
HCT VFR BLD AUTO: 34.5 % (ref 40–53)
HGB BLD-MCNC: 11.7 G/DL (ref 13.3–17.7)
IMM GRANULOCYTES # BLD: 0 10E3/UL
IMM GRANULOCYTES NFR BLD: 1 %
LYMPHOCYTES # BLD AUTO: 1.1 10E3/UL (ref 0.8–5.3)
LYMPHOCYTES NFR BLD AUTO: 14 %
MCH RBC QN AUTO: 33.2 PG (ref 26.5–33)
MCHC RBC AUTO-ENTMCNC: 33.9 G/DL (ref 31.5–36.5)
MCV RBC AUTO: 98 FL (ref 78–100)
MONOCYTES # BLD AUTO: 0.6 10E3/UL (ref 0–1.3)
MONOCYTES NFR BLD AUTO: 7 %
NEUTROPHILS # BLD AUTO: 6.2 10E3/UL (ref 1.6–8.3)
NEUTROPHILS NFR BLD AUTO: 77 %
NRBC # BLD AUTO: 0 10E3/UL
NRBC BLD AUTO-RTO: 0 /100
PLATELET # BLD AUTO: 180 10E3/UL (ref 150–450)
POTASSIUM BLD-SCNC: 4.1 MMOL/L (ref 3.4–5.3)
PROT SERPL-MCNC: 7.3 G/DL (ref 6.8–8.8)
RATE PRESSURE PRODUCT: NORMAL
RBC # BLD AUTO: 3.52 10E6/UL (ref 4.4–5.9)
SODIUM SERPL-SCNC: 137 MMOL/L (ref 133–144)
STRESS ECHO BASELINE DIASTOLIC HE: 69
STRESS ECHO BASELINE HR: 70 BPM
STRESS ECHO BASELINE SYSTOLIC BP: 150
STRESS ECHO CALCULATED PERCENT HR: 70 %
STRESS ECHO LAST STRESS DIASTOLIC BP: 65
STRESS ECHO LAST STRESS SYSTOLIC BP: 150
STRESS ECHO TARGET HR: 133
TROPONIN I SERPL HS-MCNC: 13 NG/L
WBC # BLD AUTO: 8 10E3/UL (ref 4–11)

## 2022-12-13 PROCEDURE — 93017 CV STRESS TEST TRACING ONLY: CPT

## 2022-12-13 PROCEDURE — A9502 TC99M TETROFOSMIN: HCPCS | Performed by: HOSPITALIST

## 2022-12-13 PROCEDURE — 250N000013 HC RX MED GY IP 250 OP 250 PS 637: Performed by: HOSPITALIST

## 2022-12-13 PROCEDURE — 99217 PR OBSERVATION CARE DISCHARGE: CPT | Performed by: PHYSICIAN ASSISTANT

## 2022-12-13 PROCEDURE — G0378 HOSPITAL OBSERVATION PER HR: HCPCS

## 2022-12-13 PROCEDURE — 258N000003 HC RX IP 258 OP 636: Performed by: HOSPITALIST

## 2022-12-13 PROCEDURE — 343N000001 HC RX 343: Performed by: HOSPITALIST

## 2022-12-13 PROCEDURE — 80053 COMPREHEN METABOLIC PANEL: CPT | Performed by: PHYSICIAN ASSISTANT

## 2022-12-13 PROCEDURE — 78452 HT MUSCLE IMAGE SPECT MULT: CPT

## 2022-12-13 PROCEDURE — 97161 PT EVAL LOW COMPLEX 20 MIN: CPT | Mod: GP

## 2022-12-13 PROCEDURE — 999N000049 HC STATISTIC ECHO STRESS OR NM NPI

## 2022-12-13 PROCEDURE — 250N000011 HC RX IP 250 OP 636: Performed by: INTERNAL MEDICINE

## 2022-12-13 PROCEDURE — 36415 COLL VENOUS BLD VENIPUNCTURE: CPT | Performed by: HOSPITALIST

## 2022-12-13 PROCEDURE — 78452 HT MUSCLE IMAGE SPECT MULT: CPT | Mod: 26 | Performed by: INTERNAL MEDICINE

## 2022-12-13 PROCEDURE — 93016 CV STRESS TEST SUPVJ ONLY: CPT | Performed by: INTERNAL MEDICINE

## 2022-12-13 PROCEDURE — 93018 CV STRESS TEST I&R ONLY: CPT | Performed by: INTERNAL MEDICINE

## 2022-12-13 PROCEDURE — 85025 COMPLETE CBC W/AUTO DIFF WBC: CPT | Performed by: PHYSICIAN ASSISTANT

## 2022-12-13 PROCEDURE — 84484 ASSAY OF TROPONIN QUANT: CPT | Performed by: HOSPITALIST

## 2022-12-13 PROCEDURE — 36415 COLL VENOUS BLD VENIPUNCTURE: CPT | Performed by: PHYSICIAN ASSISTANT

## 2022-12-13 RX ORDER — REGADENOSON 0.08 MG/ML
0.4 INJECTION, SOLUTION INTRAVENOUS ONCE
Status: COMPLETED | OUTPATIENT
Start: 2022-12-13 | End: 2022-12-13

## 2022-12-13 RX ORDER — CAFFEINE CITRATE 20 MG/ML
60 SOLUTION INTRAVENOUS
Status: DISCONTINUED | OUTPATIENT
Start: 2022-12-13 | End: 2022-12-13 | Stop reason: HOSPADM

## 2022-12-13 RX ORDER — ALBUTEROL SULFATE 90 UG/1
2 AEROSOL, METERED RESPIRATORY (INHALATION) EVERY 5 MIN PRN
Status: DISCONTINUED | OUTPATIENT
Start: 2022-12-13 | End: 2022-12-13 | Stop reason: HOSPADM

## 2022-12-13 RX ORDER — AMINOPHYLLINE 25 MG/ML
50-100 INJECTION, SOLUTION INTRAVENOUS
Status: DISCONTINUED | OUTPATIENT
Start: 2022-12-13 | End: 2022-12-13 | Stop reason: HOSPADM

## 2022-12-13 RX ORDER — ACYCLOVIR 200 MG/1
0-1 CAPSULE ORAL
Status: DISCONTINUED | OUTPATIENT
Start: 2022-12-13 | End: 2022-12-13 | Stop reason: HOSPADM

## 2022-12-13 RX ADMIN — TETROFOSMIN 23.2 MCI.: 1.38 INJECTION, POWDER, LYOPHILIZED, FOR SOLUTION INTRAVENOUS at 11:50

## 2022-12-13 RX ADMIN — SODIUM CHLORIDE: 9 INJECTION, SOLUTION INTRAVENOUS at 12:20

## 2022-12-13 RX ADMIN — ASPIRIN 81 MG: 81 TABLET, COATED ORAL at 14:56

## 2022-12-13 RX ADMIN — REGADENOSON 0.4 MG: 0.08 INJECTION, SOLUTION INTRAVENOUS at 11:51

## 2022-12-13 RX ADMIN — TETROFOSMIN 7.9 MCI.: 1.38 INJECTION, POWDER, LYOPHILIZED, FOR SOLUTION INTRAVENOUS at 08:56

## 2022-12-13 RX ADMIN — LOSARTAN POTASSIUM 25 MG: 25 TABLET, FILM COATED ORAL at 14:56

## 2022-12-13 RX ADMIN — METOPROLOL SUCCINATE 12.5 MG: 25 TABLET, EXTENDED RELEASE ORAL at 14:56

## 2022-12-13 RX ADMIN — AMLODIPINE BESYLATE 5 MG: 5 TABLET ORAL at 14:56

## 2022-12-13 ASSESSMENT — ACTIVITIES OF DAILY LIVING (ADL)
ADLS_ACUITY_SCORE: 27
ADLS_ACUITY_SCORE: 25
ADLS_ACUITY_SCORE: 25
ADLS_ACUITY_SCORE: 27
ADLS_ACUITY_SCORE: 25
ADLS_ACUITY_SCORE: 25
ADLS_ACUITY_SCORE: 27

## 2022-12-13 NOTE — PROGRESS NOTES
RECEIVING UNIT ED HANDOFF REVIEW    ED Nurse Handoff Report was reviewed by: Darren Montemayor RN on December 12, 2022 at 6:27 PM

## 2022-12-13 NOTE — DISCHARGE SUMMARY
[facility]  Hospitalist Discharge Summary      Date of Admission:  12/12/2022  Date of Discharge:  No discharge date for patient encounter.  Discharging Provider: Monika Barone PA-C  Discharge Service: Hospitalist Service    Discharge Diagnoses   Chest pain, ACS ruled out, resolved  Hypercalcemia, improved  Chronic anemia, suspect related to AOCD/CKD    Follow-ups Needed After Discharge   Follow-up Appointments     Follow-up and recommended labs and tests       Follow up with primary care provider, Merlin Emery Brown, within 2-4   weeks for follow up. Recommend recheck calcium level after decreasing   calcium and vitamin D doses as well as rechecking hemoglobin level, any   further anemia workup as needed.   Follow up with Cardiology.             Discharge Disposition   Discharged to home  Condition at discharge: Stable    Hospital Course    Augie Powell is a 87 year old male with medical history significant for CAD s/p PCI, HTN, HL, CKD stage IV, RCC s/p right nephrectomy was sent to the ER from urgent care for evaluation of chest pain and was registered under observation on 12/12/2022 for further evaluation of chest pain. For full HPI please see admission H&P from Dr. Viola Kelley.      Chest pain, ACS ruled out  Patient with somewhat atypical chest pain, associated fatigue and somewhat better with nitroglycerine in ER.  Has underlying history of CAD as below with residual diseases.  No recent stress test noted.  EKG shows sinus rhythm, no acutely ischemic ST-T changes.  CXR normal. Ddimer wnl. He was registered under observation. Serial troponin negative x3, telemetry unremarkable. Nuclear stress test negative for ineducable ischemia. Resumed PTA cardiac regimen including aspirin, metoprolol, losartan, amlodipine. Resume statin at discharge due to observation status. Day of discharge, feeling back to normal, no further chest pain. Patient and wife attribute the pain to  musculoskeletal pain from shoveling/physical activity. No N/V. No palpitations, lightheadedness, dizziness. He will follow up with PCP to recheck calcium level as below and hemoglobin. He will decrease his calcium and vitamin tablets from twice daily to once daily.      CAD, H/o MI in 2018, s/p PCI  HTN, HL  Mild aortic stenosis and ascending aortic dilatation  Grade 1 diastolic CHF  Patient had coronary angiogram in 2018 and CORBIN was placed to mid LAD.  Noted residual diseases of 50% in left circumflex and 60 to 70% in RCA.  Patient follows with Dr. Floyd. Continue PTA regimen including aspirin, losartan, metoprolol and amlodipine. Resume statin at discharge. Tolerated gentle IV hydration overnight and hold torsemide, resume on discharge. No signs of fluid overload.      CKD stage III  Patient is s/p unilateral nephrectomy on right due to RCC.  Creatinine is 1.7 which is at his baseline. Monitor and avoid nephrotoxic medication     Hypercalcemia, improved  Calcium 10.4 on admission. Patient is on both vitamin D and calcium supplement and this could be causing this. Vitamin D level 70. Received IV hydration overnight. As above, instructed to decrease calcium and vitamin tabs from twice daily to once daily. He reports currently he is taking them both BID. He will have level rechecked with PCP.     Chronic anemia, suspect related to AOCD/CKD  Hemoglobin 11.5. Recommend outpatient follow-up with PCP.     Chronic back pain and knee pain  Resume PTA Flexeril.  As needed Tylenol as well ordered. He was evaluated by PT and recommended to return to home with family support.      Consultations This Hospital Stay   PHYSICAL THERAPY ADULT IP CONSULT  CARE MANAGEMENT / SOCIAL WORK IP CONSULT    Code Status   Full Code    Time Spent on this Encounter   I, Monika Barone PA-C, personally saw the patient today and spent greater than 30 minutes discharging this patient.       Monika Barone PA-C  [facility]  [facility] EXTENDED RECOVERY AND SHORT STAY  [address] [facility] [address]  Phone: [phone]  ______________________________________________________________________    Physical Exam   Vital Signs: Temp: 98.4  F (36.9  C) Temp src: Oral BP: (!) 163/74 Pulse: 86   Resp: 16 SpO2: 98 % O2 Device: None (Room air)    Weight: 0 lbs 0 oz    General: Awake, alert, very pleasant man who appears stated age. Looks comfortable sitting up in bed. No acute distress.  HEENT: Normocephalic, atraumatic. Extraocular movements intact.   Respiratory: Clear to auscultation bilaterally, no rales, wheezing, or rhonchi.  Cardiovascular: Regular rate and rhythm, +S1 and S2, systolic murmur auscultated to RUSB. Trace peripheral edema.   Gastrointestinal: Soft, non-tender, non-distended. Bowel sounds present.  Skin: Warm, dry. No obvious rashes or lesions on exposed skin. Dorsalis pedis pulses palpable bilaterally.  Musculoskeletal: No joint swelling, erythema or tenderness. Moves all extremities.  Neurologic: AAO x3.   Psychiatric: Appropriate mood and affect. No obvious anxiety or depression.       Primary Care Physician   Merlin Emery Brown    Discharge Orders      Reason for your hospital stay    You were admitted with chest pain, your heart enzymes and stress test were normal.     Follow-up and recommended labs and tests     Follow up with primary care provider, Merlin Emery Brown, within 2-4 weeks for follow up. Recommend recheck calcium level after decreasing calcium and vitamin D doses as well as rechecking hemoglobin level, any further anemia workup as needed.   Follow up with Cardiology.     Activity    Your activity upon discharge: activity as tolerated     Discharge Instructions    Decrease your calcium and vitamin D tablets to only once daily instead of twice daily.     Diet    Follow this diet upon discharge: Orders Placed This Encounter      Low Saturated Fat Na <2400 mg       Significant Results and Procedures    Results for orders placed or performed during the hospital encounter of 12/12/22   XR Chest 2 Views    Narrative    EXAM: XR CHEST 2 VIEWS  LOCATION: [facility]  DATE/TIME: 12/12/2022 4:58 PM    INDICATION: Chest pain  COMPARISON: 02/19/2020      Impression    IMPRESSION: Stable chest with again seen minimal fine fibrosis/atelectasis seen in the lung bases. Partially calcified thoracic aorta. Mild to moderate hypertrophic changes most marked in the AC joints. No active CHF or inflammatory infiltrates   identified.   NM Lexiscan stress test (nuc card)     Value    Target     Baseline Systolic     Baseline Diastolic BP 69    Last Stress Systolic     Last Stress Diastolic BP 65    Baseline HR 70    Max HR  93    Max Predicted HR  70    Rate Pressure Product 13,950.0    Narrative       The nuclear stress test is negative for inducible myocardial ischemia   or infarction.     Left ventricular function is normal.     There is no prior study for comparison.           Discharge Medications   Current Discharge Medication List      CONTINUE these medications which have CHANGED    Details   Vitamin D3 (CHOLECALCIFEROL) 125 MCG (5000 UT) tablet Take 1 tablet (125 mcg) by mouth daily    Associated Diagnoses: Spondylolisthesis, unspecified spinal region         CONTINUE these medications which have NOT CHANGED    Details   amLODIPine (NORVASC) 5 MG tablet Take 5 mg by mouth every morning   Qty: 90 tablet, Refills: 3    Associated Diagnoses: Benign essential hypertension      aspirin (ASA) 81 MG EC tablet Take 1 tablet (81 mg) by mouth daily  Qty: 90 tablet, Refills: 3    Associated Diagnoses: Status post percutaneous transluminal coronary angioplasty      atorvastatin (LIPITOR) 20 MG tablet Take 1 tablet (20 mg) by mouth every evening  Qty: 90 tablet, Refills: 1    Associated Diagnoses: NSTEMI (non-ST elevated myocardial infarction) (H)      Calcium-Magnesium-Zinc 500-250-12.5 MG TABS  Take 1 tablet by mouth daily      cyclobenzaprine (FLEXERIL) 10 MG tablet TAKE 1/2 TO 1 TABLET BY MOUTH 3 TIMES DAILY AS NEEDED  Qty: 21 tablet, Refills: 0    Comments: Dx code:  M43.10  Associated Diagnoses: Degenerative spondylolisthesis      losartan (COZAAR) 25 MG tablet Take 1 tablet (25 mg) by mouth daily Take 1 tablet daily for 1 week, then 1 pill twice a day if systolic BP >140-150  Qty: 90 tablet, Refills: 3    Associated Diagnoses: Benign essential hypertension      metoprolol succinate ER (TOPROL XL) 25 MG 24 hr tablet Take 0.5 tablets (12.5 mg) by mouth daily  Qty: 45 tablet, Refills: 3    Associated Diagnoses: NSTEMI (non-ST elevated myocardial infarction) (H)      nitroGLYcerin (NITROSTAT) 0.4 MG sublingual tablet For chest pain place 1 tablet under the tongue every 5 minutes for 3 doses. If symptoms persist 5 minutes after 1st dose call 911.  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

## 2022-12-13 NOTE — H&P
[facility]    History and Physical - Hospitalist Service       Date of Admission:  12/12/2022    Assessment & Plan      Augie Powell is a 87 year old male with medical history significant for CAD s/p PCI, HTN, HL, CKD stage IV, RCC s/p right nephrectomy was sent to the ER from urgent care for evaluation of chest pain and is being registered under observation on 12/12/2022 for further management.      Chest pain  Patient with somewhat atypical chest pain, associated fatigue and somewhat better with nitroglycerine in ER.  Has underlying history of CAD as below with residual diseases.  No recent stress test noted.  EKG shows sinus rhythm, no acutely ischemic ST-T changes.  CXR normal.  -Sims under observation  -Serial troponin, telemetry, fasting lipid panel  -Will get nuc med stress test in a.m.  -Resume PTA cardiac regimen including aspirin, metoprolol, losartan, amlodipine.  -resume statin at discharge    CAD, H/o MI in 2018, s/p PCI  HTN, HL  Mild aortic stenosis and ascending aortic dilatation  Grade 1 diastolic CHF  Patient had coronary angiogram in 2018 and CORBIN was placed to mid LAD.  Noted residual diseases of 50% in left circumflex and 60 to 70% in RCA.  Patient follows up with Dr. Floyd.  -Continue PTA regimen including aspirin, losartan, metoprolol and amlodipine.  -Resume statin at discharge.  -Gentle IV hydration overnight and hold torsemide.  -Daily weight and monitor intake and output.    CKD stage III  Patient is s/p unilateral nephrectomy on right due to RCC.  Creatinine is 1.7 which is at his baseline.  -Monitor and avoid nephrotoxic medication    Hypercalcemia  Calcium 10.4  -Patient is on both vitamin D and calcium supplement and this could be causing this.  I will check vitamin D level.  Also IV hydration overnight.   - If vitamin D level not elevated, but calcium level remains high, will get further work-up.    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  Recommend outpatient follow-up unless significant drop on recheck or any signs of bleeding.    Chronic back pain and knee pain  Resume PTA Flexeril.  As needed Tylenol as well ordered.     Diet:  Cardiac, n.p.o. after midnight  DVT Prophylaxis: Anticipate short stay, low risk, encourage ambulation  Cerda Catheter: Not present  Central Lines: None  Cardiac Monitoring: None  Code Status:  Full code, discussed with patient and his wife    Clinically Significant Risk Factors Present on Admission         # Hyponatremia: Lowest Na = 133 mmol/L in last 2 days, will monitor as appropriate   # Hypercalcemia: Highest Ca = 10.4 mg/dL in last 2 days, will monitor as appropriate        # Hypertension: home medication list includes antihypertensive(s)              Disposition Plan      Expected Discharge Date: 12/13/2022                The patient's care was discussed with the Patient and Patient's Family.    Viola Kelley MD  Hospitalist Service  [facility]  Securely message with the Vocera Web Console (learn more here)  Text page via [facility] Paging/Directory         ______________________________________________________________________    Chief Complaint   Chest pain    History is obtained from the patient, spouse, chart review, discussion with ER physician.    History of Present Illness   Augie Powell is a 87 year old male with medical history significant for CAD s/p PCI, HTN, HL, CKD stage IV, RCC s/p right nephrectomy was sent to the ER from urgent care for evaluation of chest pain.    Patient states he woke up last night from sleep to go to the bathroom and realized he had substernal chest pain.  It was sharp, 8/10 and was nonradiating.  Patient was subsequently able to sleep despite ongoing pain.  He took Tylenol in the morning with some improvement in pain.  Patient had shoveled some snow yesterday but he denies any arm pain or chest wall pain.  Patient also  reports increased pain with deep breaths but no fever, cough, shortness of breath.  Denies nausea vomiting, abdominal pain, dysuria.  Patient states he deals with anxiety and he feels which also contributes to the pain.  He went to the [place] urgent care from where he was sent to ER for further evaluation.  Patient reports his pain was already improving this morning after Tylenol and is currently only a mild discomfort since receiving nitroglycerin and aspirin in ER.    In ER, patient was evaluated by Dr. Leija.  Patient was mildly hypertensive.  CBC showed mild anemia.  BMP showed elevated creatinine but it is baseline, mild hypercalcemia.  Troponin and D-dimer negative.  Chest x-ray unremarkable.  Patient received sublingual nitroglycerin and aspirin after which he felt pain slightly improved further.  Hospitalist service was contacted for observation admission and further work-up.    Review of Systems    The 10 point Review of Systems is negative other than noted in the HPI or here.      Past Medical History    I have reviewed this patient's medical history and updated it with pertinent information if needed.   Past Medical History:   Diagnosis Date     Aortic stenosis, mild      Ascending aorta dilatation (H)      CKD (chronic kidney disease) stage 4, GFR 15-29 ml/min (H) 09/17/2018     Coronary artery disease 10/2018    nSTEMI- Isis to mLAD, distal lad mild, Lcx-small 50-60%, RCA 60-70% normal ifR (despite echo showing poss old IMI)     Hyperlipidemia      Hypertension      Renal cell cancer, unspecified laterality (H) 05/10/2017     Renal disease     CRD--creatinine in upper ones to 2, right kdney removed 2000 for canncer     Rheumatic fever      Spondylosis with myelopathy, lumbar region        Past Surgical History   I have reviewed this patient's surgical history and updated it with pertinent information if needed.  Past Surgical History:   Procedure Laterality Date     BACK SURGERY       CHOLECYSTECTOMY        DECOMPRESSION LUMBAR TWO LEVELS  12/01/2014    Procedure: DECOMPRESSION LUMBAR TWO LEVELS;  Surgeon: Remy Harmon MD;  Location: [facility] OR     HERNIA REPAIR       LAMINECTOMY, FUSION LUMBAR ONE LEVEL, COMBINED N/A 12/01/2014    Procedure: COMBINED LAMINECTOMY, FUSION LUMBAR ONE LEVEL;  Surgeon: Remy Harmon MD;  Location: [facility] OR     PHACOEMULSIFICATION CLEAR CORNEA WITH STANDARD INTRAOCULAR LENS IMPLANT  10/22/2012    Procedure: PHACOEMULSIFICATION CLEAR CORNEA WITH STANDARD INTRAOCULAR LENS IMPLANT;  RIGHT EYE PHACOEMULSIFICATION CLEAR CORNEA WITH STANDARD INTRAOCULAR LENS IMPLANT ;  Surgeon: Grupo Granger MD;  Location: [facility]     right nephrectomy[       STENT,CORONARY, S660 24/30  10/2018    mLAD  mod RCA, Lcx       Social History   I have reviewed this patient's social history and updated it with pertinent information if needed.  Social History     Tobacco Use     Smoking status: Former     Smokeless tobacco: Never   Substance Use Topics     Alcohol use: Yes     Alcohol/week: 2.0 standard drinks     Types: 2 Shots of liquor per week     Comment: rare     Drug use: No       Family History   I have reviewed this patient's family history and updated it with pertinent information if needed.  Family History   Problem Relation Age of Onset     No Known Problems Mother      Heart Surgery Father      No Known Problems Sister        Prior to Admission Medications   Prior to Admission Medications   Prescriptions Last Dose Informant Patient Reported? Taking?   Calcium-Magnesium-Zinc 500-250-12.5 MG TABS   Yes No   Sig: Take 1 tablet by mouth daily   amLODIPine (NORVASC) 5 MG tablet   Yes No   Sig: Take 5 mg by mouth every morning    aspirin (ASA) 81 MG EC tablet   No No   Sig: Take 1 tablet (81 mg) by mouth daily   atorvastatin (LIPITOR) 20 MG tablet   No No   Sig: Take 1 tablet (20 mg) by mouth every evening   cholecalciferol (VITAMIN D3) 1250 mcg (44116 units) capsule   Yes No   Sig: Take 1,250  mcg by mouth daily    cyclobenzaprine (FLEXERIL) 10 MG tablet   No No   Sig: TAKE 1/2 TO 1 TABLET BY MOUTH 3 TIMES DAILY AS NEEDED   losartan (COZAAR) 25 MG tablet   No No   Sig: Take 1 tablet (25 mg) by mouth daily Take 1 tablet daily for 1 week, then 1 pill twice a day if systolic BP >140-150   metoprolol succinate ER (TOPROL XL) 25 MG 24 hr tablet   No No   Sig: Take 0.5 tablets (12.5 mg) by mouth daily   nitroGLYcerin (NITROSTAT) 0.4 MG sublingual tablet   No No   Sig: For chest pain place 1 tablet under the tongue every 5 minutes for 3 doses. If symptoms persist 5 minutes after 1st dose call 911.   oxyCODONE (ROXICODONE) 5 MG tablet   No No   Sig: Take 1 tablet (5 mg) by mouth daily as needed for pain   torsemide (DEMADEX) 5 MG tablet   Yes No   Sig: Take 5 mg by mouth daily      Facility-Administered Medications: None     Allergies   Allergies   Allergen Reactions     No Known Allergies        Physical Exam   Vital Signs: Temp: 98  F (36.7  C) Temp src: Temporal BP: (!) 140/70 Pulse: 78   Resp: (!) 8 SpO2: 98 % O2 Device: None (Room air)    Weight: 0 lbs 0 oz    General: AAOx3, very pleasant, appears comfortable.  HEENT: PERRLA EOMI. Mucosa moist.   Lungs: Bilateral equal air entry.  Diminished but clear to auscultation, normal work of breathing.   CVS: S1S2 regular, no tachycardia. Systolic murmur 2/6 noted.   Abdomen: Soft, NT, ND. BS heard.  MSK: bilateral leg edema (+).  Neuro: AAOX3. CN 2-12 normal. Strength symmetrical.  Skin: No rash.       Data   Data reviewed today: I reviewed all medications, new labs and imaging results over the last 24 hours. I personally reviewed the EKG tracing showing Sinus rhythm, no acute ST-T changes.  CXR without pulmonary edema..    Recent Labs   Lab 12/12/22  1500   WBC 12.5*   HGB 11.5*   MCV 95         POTASSIUM 4.0   CHLORIDE 103   CO2 22   BUN 48*   CR 1.70*   ANIONGAP 8   RICCARDO 10.4*   *     No results found for this or any previous visit (from the  past 24 hour(s)).

## 2022-12-13 NOTE — PROGRESS NOTES
"SPIRITUAL HEALTH SERVICES Progress Note  [facility]. [address] short stay    Saw pt Augie ROCHE [place] per Pt request for spiritual care on admission.  Visit with Augie and wife Khushboo.     Patient/Family Understanding of Illness and Goals of Care - Augie stated \"I feel pretty much back to normal\" and his hopes of being able to return home soon, and that he was busy with a number of tests this morning.    Distress and Loss -     Strengths, Coping, and Resources - Augie & Khushboo find great julianne in their children and \"too many granddaughters and great-grandchildren that we loose count.\"    Meaning, Beliefs, and Spirituality - Augie & Khushboo clarified their marci as \"Mormonism\" and not [employer] as is stated in the EMR.  Augie asked for prayers for his family, speaking especially about one granddaughter who has significant health issues.    Plan of Care - Spiritual Health remains available at patient's request for the duration of admission.    Sugey Aguilera MDiv  Staff   [facility] routine referrals *[phone]  [facility] available 24/7 for emergent requests/referrals, either by having the on-call  paged or by entering an ASAP/STAT consult in Epic (this will also page the on-call ).    "

## 2022-12-13 NOTE — PROGRESS NOTES
Lexiscan Stress: Pt tolerated well. VSS. Pt denied chest pain.     Pt transferred back to Rm 5510-1 per w/c.

## 2022-12-13 NOTE — PHARMACY-ADMISSION MEDICATION HISTORY
Pharmacy Medication History  Admission medication history interview status for the 12/12/2022  admission is complete. See EPIC admission navigator for prior to admission medications     Location of Interview: Patient room  Medication history sources: Patient, Patient's family/friend (spouse) and Surescripts    Significant changes made to the medication list:  Removed oxycodone  Updated vitamin D dose    In the past week, patient estimated taking medication this percent of the time: greater than 90%    Additional medication history information:   None    Medication reconciliation completed by provider prior to medication history? No    Time spent in this activity: 10 minutes     Prior to Admission medications    Medication Sig Last Dose Taking? Auth Provider Long Term End Date   amLODIPine (NORVASC) 5 MG tablet Take 5 mg by mouth every morning  12/12/2022 Yes Noel Villarreal MD Yes    aspirin (ASA) 81 MG EC tablet Take 1 tablet (81 mg) by mouth daily 12/12/2022 Yes Noel Villarreal MD     atorvastatin (LIPITOR) 20 MG tablet Take 1 tablet (20 mg) by mouth every evening 12/11/2022 Yes Jannet Mendez APRN CNP Yes    Calcium-Magnesium-Zinc 500-250-12.5 MG TABS Take 1 tablet by mouth daily 12/12/2022 Yes Reported, Patient     cyclobenzaprine (FLEXERIL) 10 MG tablet TAKE 1/2 TO 1 TABLET BY MOUTH 3 TIMES DAILY AS NEEDED PRN Yes Eusebio Oliveira MD     losartan (COZAAR) 25 MG tablet Take 1 tablet (25 mg) by mouth daily Take 1 tablet daily for 1 week, then 1 pill twice a day if systolic BP >140-150  Patient taking differently: Take 25 mg by mouth daily 12/12/2022 Yes Jannet Mendez APRN CNP Yes    metoprolol succinate ER (TOPROL XL) 25 MG 24 hr tablet Take 0.5 tablets (12.5 mg) by mouth daily 12/11/2022 Yes Jannet Mendez APRN CNP Yes    nitroGLYcerin (NITROSTAT) 0.4 MG sublingual tablet For chest pain place 1 tablet under the tongue every 5 minutes for 3 doses. If symptoms persist 5 minutes after 1st dose call 911. PRN  Yes Noel Villarreal MD Yes    torsemide (DEMADEX) 5 MG tablet Take 5 mg by mouth daily 12/12/2022 Yes Noel Villarreal MD Yes    Vitamin D3 (CHOLECALCIFEROL) 125 MCG (5000 UT) tablet Take 125 mcg by mouth 2 times daily 12/12/2022 at AM Yes Reported, Patient         The information provided in this note is only as accurate as the sources available at the time of update(s)

## 2022-12-13 NOTE — PROGRESS NOTES
Orientation/Cognitive: A&Ox4  Observation Goals (Met/ Not Met): Partially Met  Mobility Level/Assist Equipment: A-1 w/ GB Walker  Fall Risk (Y/N): Y  Behavior Concerns: None  Pain Management: Has chronic pain to back and knee. C/o of irritation midsternal chest. Prn tylenol provided.  Tele/VS/O2: VSS except b/p slightly elvate 141/61.  ABNL Lab/BG: Cr. 1.70, Mg 2.5,   Diet: NPO @ midnight  Bowel/Bladder: Cont. B&B  Skin Concerns: No skin issues  Drains/Devices: None  Tests/Procedures for next shift: NM Lexiscan Stress Test 12/13  Anticipated DC date & active delays: 12/13/22 pending pt improvement  Patient Stated Goal for Today: None

## 2022-12-13 NOTE — DISCHARGE SUMMARY
AVS printed. Pt discharged to home via wife. Pt discharged with pants, shoes, socks, shirt, coat.

## 2022-12-13 NOTE — PLAN OF CARE
Goal Outcome Evaluation:    Observation goals  PRIOR TO DISCHARGE        Comments: List all goals to be met before discharge home:   - Serial troponins and stress test complete. Not met  - Seen and cleared by consultant if applicable Not Met  - Adequate pain control on oral analgesia - Partially Met  - Vital signs normal or at patient baseline - elevated bp 141/61, Partially met  - Safe disposition plan has been identified - Not Met  - Nurse to notify provider when observation goals have been met and patient is ready for discharge.

## 2022-12-13 NOTE — PLAN OF CARE
Shift:[id number] 12/13/22  Summary: chest pain, resolved  Orientation/Cognitive: A/Ox4  Observation Goals (Met/ Not Met): not met  Mobility Level/Assist Equipment: A1 w/ GB/walker with right knee brace   Fall Risk (Y/N): yes  Behavior Concerns:none  Pain Management: denies pain  Tele/VS/O2: Tele SR, amado at times. VSS on room air  ABNL Lab/BG: Lexiscan results pending   Diet: NPO  Bowel/Bladder: voiding  Skin Concerns:knee brace on right knee  Drains/Devices: IV infusing NS 75ml/hr  Tests/Procedures for next shift: pending results   Anticipated DC date & active delays: pending discharge plans   Patient Stated Goal for Today:  Other important info:

## 2022-12-13 NOTE — PROGRESS NOTES
12/13/22 0900   Appointment Info   Signing Clinician's Name / Credentials (PT) Brittany Dressler, PT   Living Environment   People in Home spouse   Current Living Arrangements house  ([facility])   Home Accessibility stairs to enter home;stairs within home   Number of Stairs, Main Entrance 2   Stair Railings, Main Entrance railings on both sides of stairs  (bars)   Number of Stairs, Within Home, Primary greater than 10 stairs  (optional basement)   Stair Railings, Within Home, Primary railings on both sides of stairs   Self-Care   Usual Activity Tolerance good   Current Activity Tolerance good   Equipment Currently Used at Home cane, straight;walker, rolling   Fall history within last six months no   Activity/Exercise/Self-Care Comment Santos cane inside, 4WW outside, shoes with R posterior spring leaf AFO for R foot drop from prior back sx.   General Information   Onset of Illness/Injury or Date of Surgery 12/12/22   Referring Physician Viola Kelley MD   Patient/Family Therapy Goals Statement (PT) To be well.   Pertinent History of Current Problem (include personal factors and/or comorbidities that impact the POC) Chest pain (EKG unremarkable, CXR negative, stress test pending), hx MI '18 s/p PCI, HTN, HLD, aortic with AADilation, CHF1, CKD3, back & knee pain. Chronic LBP, R knee OA.   Existing Precautions/Restrictions fall   Cognition   Affect/Mental Status (Cognition) WFL   Follows Commands (Cognition) WFL   Pain Assessment   Patient Currently in Pain Yes, see Vital Sign flowsheet  (R knee and LBP - to tolerance)   Posture    Posture Comments WFL with ADs   Range of Motion (ROM)   ROM Comment WFL   Strength (Manual Muscle Testing)   Strength Comments Santos   Bed Mobility   Comment, (Bed Mobility) Santos   Transfers   Comment, (Transfers) Santos RW shoes R AFO   Gait/Stairs (Locomotion)   Distance in Feet Santos RW shoes R AFO, slow but steady, pt reports ~his baseline.   Comment, (Gait/Stairs) 4 stairs 2 rails  step-to, slow but steady supervision (wife able to supervise)   Balance   Balance Comments Santos RW, shoes, AFO   Coordination   Coordination Comments Impaired R foot   Muscle Tone   Muscle Tone Comments Impaired R foot   Clinical Impression   Criteria for Skilled Therapeutic Intervention Evaluation only   Clinical Presentation (PT Evaluation Complexity) Stable/Uncomplicated   Clinical Decision Making (Complexity) low complexity   Anticipated Equipment Needs at Discharge (PT)   (Pt has needed equipment)   Risk & Benefits of therapy have been explained evaluation/treatment results reviewed;care plan/treatment goals reviewed;risks/benefits reviewed;current/potential barriers reviewed;participants voiced agreement with care plan;participants included;patient;spouse/significant other   Clinical Impression Comments Ax1 to don shoes - wife helps PRN.   PT Total Evaluation Time   PT Eval, Low Complexity Minutes (54945) 20   PT Discharge Planning   PT Discharge Recommendation (DC Rec) home;home with assist   PT Rationale for DC Rec No acute PT need identified, pt moving his baseline Santos with AD, shoes, R AFO. Supervision on stairs, PRN assist for shoes donning.   PT Brief overview of current status Santos bed mob, tx, amb with RW & shoes & AFO, superivsion on stairs per home set-up.   Total Session Time   Total Session Time (sum of timed and untimed services) 20

## 2022-12-13 NOTE — PLAN OF CARE
"Goal Outcome Evaluation:         Orientation/Cognitive: WDL  Observation Goals (Met/ Not Met): Not met  Mobility Level/Assist Equipment: Assist of 1 with walker/gait belt  Fall Risk (Y/N): Yes  Behavior Concerns: None  Pain Management: At midnight pt had a 1-2/10 \"irritation\" type chest pain that \"is better when I take a deep breath.\" Troponins negative x 3. Pt appeared to sleep well overnight.   Tele/VS/O2: Tele - SR; AVSS; RA  ABNL Lab/BG: Cr 1.70  Diet: NPO x meds  Bowel/Bladder: Voiding; no BM overnight  Skin Concerns: Wear a knee brace for chronic knee pain.  Drains/Devices: None  Tests/Procedures for next shift: Lexiscan and PT consult.  Anticipated DC date & active delays: To be decided.  Patient Stated Goal for Today: None given.                "

## 2022-12-14 ENCOUNTER — TELEPHONE (OUTPATIENT)
Dept: CARDIOLOGY | Facility: CLINIC | Age: 87
End: 2022-12-14

## 2022-12-14 NOTE — TELEPHONE ENCOUNTER
Return Pt call he had been in hospital for sharp CP. Pt said he had been shoveling , but pain would not go away. Pt would like to see DR Villarreal. Informed Pt would message scheduling to call him. Pt had stress test, and all lbs negative for cardiac issue. BERKLEY Howard RN

## 2023-01-01 ENCOUNTER — HEALTH MAINTENANCE LETTER (OUTPATIENT)
Age: 88
End: 2023-01-01

## 2023-01-01 ENCOUNTER — TELEPHONE (OUTPATIENT)
Dept: CARDIOLOGY | Facility: CLINIC | Age: 88
End: 2023-01-01
Payer: COMMERCIAL

## 2023-01-01 ENCOUNTER — OFFICE VISIT (OUTPATIENT)
Dept: CARDIOLOGY | Facility: CLINIC | Age: 88
End: 2023-01-01
Payer: COMMERCIAL

## 2023-01-01 VITALS
BODY MASS INDEX: 26.63 KG/M2 | HEIGHT: 70 IN | SYSTOLIC BLOOD PRESSURE: 158 MMHG | OXYGEN SATURATION: 98 % | WEIGHT: 186 LBS | HEART RATE: 60 BPM | DIASTOLIC BLOOD PRESSURE: 70 MMHG

## 2023-01-01 DIAGNOSIS — I21.4 NSTEMI (NON-ST ELEVATED MYOCARDIAL INFARCTION) (H): ICD-10-CM

## 2023-01-01 DIAGNOSIS — I10 BENIGN ESSENTIAL HYPERTENSION: Primary | ICD-10-CM

## 2023-01-01 DIAGNOSIS — E78.5 HYPERLIPIDEMIA LDL GOAL <130: ICD-10-CM

## 2023-01-01 DIAGNOSIS — I25.118 CORONARY ARTERY DISEASE OF NATIVE ARTERY OF NATIVE HEART WITH STABLE ANGINA PECTORIS (H): ICD-10-CM

## 2023-01-01 DIAGNOSIS — I10 BENIGN ESSENTIAL HYPERTENSION: ICD-10-CM

## 2023-01-01 DIAGNOSIS — I35.0 AORTIC VALVE STENOSIS, ETIOLOGY OF CARDIAC VALVE DISEASE UNSPECIFIED: ICD-10-CM

## 2023-01-01 PROCEDURE — 99213 OFFICE O/P EST LOW 20 MIN: CPT | Performed by: INTERNAL MEDICINE

## 2023-01-01 RX ORDER — METOPROLOL SUCCINATE 25 MG/1
12.5 TABLET, EXTENDED RELEASE ORAL DAILY
Qty: 45 TABLET | Refills: 3 | Status: ON HOLD | OUTPATIENT
Start: 2023-01-01 | End: 2024-01-01

## 2023-01-25 DIAGNOSIS — I21.4 NSTEMI (NON-ST ELEVATED MYOCARDIAL INFARCTION) (H): ICD-10-CM

## 2023-01-25 DIAGNOSIS — E78.5 HYPERLIPIDEMIA LDL GOAL <130: Primary | ICD-10-CM

## 2023-01-25 RX ORDER — ATORVASTATIN CALCIUM 20 MG/1
20 TABLET, FILM COATED ORAL EVERY EVENING
Qty: 90 TABLET | Refills: 1 | Status: ON HOLD | OUTPATIENT
Start: 2023-01-25 | End: 2024-01-01

## 2023-01-31 DIAGNOSIS — I10 BENIGN ESSENTIAL HYPERTENSION: ICD-10-CM

## 2023-01-31 RX ORDER — LOSARTAN POTASSIUM 25 MG/1
25 TABLET ORAL DAILY
Qty: 90 TABLET | Refills: 0 | Status: SHIPPED | OUTPATIENT
Start: 2023-01-31 | End: 2023-03-17

## 2023-01-31 NOTE — TELEPHONE ENCOUNTER
1/31/23 Refill   Future Appointments   Date Time Provider Department Center   2/21/2023  8:15 AM Noel Villarreal MD [facility] Cardiology Refill Guideline reviewed. Medication meets criteria for refill. Refill sent.  Sachi Savage RN 01/31/23 12:55 PM

## 2023-02-21 ENCOUNTER — OFFICE VISIT (OUTPATIENT)
Dept: CARDIOLOGY | Facility: CLINIC | Age: 88
End: 2023-02-21
Attending: NURSE PRACTITIONER
Payer: COMMERCIAL

## 2023-02-21 VITALS
HEART RATE: 72 BPM | SYSTOLIC BLOOD PRESSURE: 133 MMHG | HEIGHT: 70 IN | DIASTOLIC BLOOD PRESSURE: 63 MMHG | WEIGHT: 188 LBS | BODY MASS INDEX: 26.92 KG/M2

## 2023-02-21 DIAGNOSIS — I25.118 CORONARY ARTERY DISEASE OF NATIVE ARTERY OF NATIVE HEART WITH STABLE ANGINA PECTORIS (H): ICD-10-CM

## 2023-02-21 DIAGNOSIS — I25.728 CORONARY ARTERY DISEASE OF AUTOLOGOUS BYPASS GRAFT WITH STABLE ANGINA PECTORIS (H): ICD-10-CM

## 2023-02-21 DIAGNOSIS — I10 BENIGN ESSENTIAL HYPERTENSION: ICD-10-CM

## 2023-02-21 DIAGNOSIS — I35.0 AORTIC VALVE STENOSIS, ETIOLOGY OF CARDIAC VALVE DISEASE UNSPECIFIED: Primary | ICD-10-CM

## 2023-02-21 PROCEDURE — 99214 OFFICE O/P EST MOD 30 MIN: CPT | Performed by: INTERNAL MEDICINE

## 2023-02-21 RX ORDER — NITROGLYCERIN 0.4 MG/1
TABLET SUBLINGUAL
Qty: 25 TABLET | Refills: 3 | Status: ON HOLD | OUTPATIENT
Start: 2023-02-21 | End: 2024-01-01

## 2023-02-21 NOTE — PROGRESS NOTES
HPI and Plan:   See dictation    Orders Placed This Encounter   Procedures     Follow-Up with Cardiology     Orders Placed This Encounter   Medications     nitroGLYcerin (NITROSTAT) 0.4 MG sublingual tablet     Sig: For chest pain place 1 tablet under the tongue every 5 minutes for 3 doses. If symptoms persist 5 minutes after 1st dose call 911.     Dispense:  25 tablet     Refill:  3     Medications Discontinued During This Encounter   Medication Reason     nitroGLYcerin (NITROSTAT) 0.4 MG sublingual tablet Reorder (No AVS / No eCancel)         Encounter Diagnoses   Name Primary?     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)      Aortic valve stenosis, etiology of cardiac valve disease unspecified Yes     Coronary artery disease of native artery of native heart with stable angina pectoris (H)        CURRENT MEDICATIONS:  Current Outpatient Medications   Medication Sig Dispense Refill     amLODIPine (NORVASC) 5 MG tablet Take 5 mg by mouth every morning  90 tablet 3     aspirin (ASA) 81 MG EC tablet Take 1 tablet (81 mg) by mouth daily 90 tablet 3     atorvastatin (LIPITOR) 20 MG tablet Take 1 tablet (20 mg) by mouth every evening 90 tablet 1     Calcium-Magnesium-Zinc 500-250-12.5 MG TABS Take 1 tablet by mouth daily       cyclobenzaprine (FLEXERIL) 10 MG tablet TAKE 1/2 TO 1 TABLET BY MOUTH 3 TIMES DAILY AS NEEDED 21 tablet 0     losartan (COZAAR) 25 MG tablet Take 1 tablet (25 mg) by mouth daily Take 1 tablet daily for 1 week, then 1 pill twice a day if systolic BP >140-150 90 tablet 0     metoprolol succinate ER (TOPROL XL) 25 MG 24 hr tablet Take 0.5 tablets (12.5 mg) by mouth daily 45 tablet 3     nitroGLYcerin (NITROSTAT) 0.4 MG sublingual tablet For chest pain place 1 tablet under the tongue every 5 minutes for 3 doses. If symptoms persist 5 minutes after 1st dose call 911. 25 tablet 3     torsemide (DEMADEX) 5 MG tablet Take 5 mg by  mouth daily 1 tablet 0     Vitamin D3 (CHOLECALCIFEROL) 125 MCG (5000 UT) tablet Take 1 tablet (125 mcg) by mouth daily         ALLERGIES     Allergies   Allergen Reactions     No Known Allergies        PAST MEDICAL HISTORY:  Past Medical History:   Diagnosis Date     Aortic stenosis, mild      Ascending aorta dilatation (H)      CKD (chronic kidney disease) stage 4, GFR 15-29 ml/min (H) 09/17/2018     Coronary artery disease 10/2018    nSTEMI- Isis to mLAD, distal lad mild, Lcx-small 50-60%, RCA 60-70% normal ifR (despite echo showing poss old IMI)     Hyperlipidemia      Hypertension      Renal cell cancer, unspecified laterality (H) 05/10/2017     Renal disease     CRD--creatinine in upper ones to 2, right kdney removed 2000 for canncer     Rheumatic fever      Spondylosis with myelopathy, lumbar region        PAST SURGICAL HISTORY:  Past Surgical History:   Procedure Laterality Date     BACK SURGERY       CHOLECYSTECTOMY       DECOMPRESSION LUMBAR TWO LEVELS  12/01/2014    Procedure: DECOMPRESSION LUMBAR TWO LEVELS;  Surgeon: Remy Harmon MD;  Location:  OR     HERNIA REPAIR       LAMINECTOMY, FUSION LUMBAR ONE LEVEL, COMBINED N/A 12/01/2014    Procedure: COMBINED LAMINECTOMY, FUSION LUMBAR ONE LEVEL;  Surgeon: Remy Harmon MD;  Location:  OR     PHACOEMULSIFICATION CLEAR CORNEA WITH STANDARD INTRAOCULAR LENS IMPLANT  10/22/2012    Procedure: PHACOEMULSIFICATION CLEAR CORNEA WITH STANDARD INTRAOCULAR LENS IMPLANT;  RIGHT EYE PHACOEMULSIFICATION CLEAR CORNEA WITH STANDARD INTRAOCULAR LENS IMPLANT ;  Surgeon: Grupo Granger MD;  Location:  [facility]     right nephrectomy[       STENT,CORONARY, S660 24/30  10/2018    mLAD  mod RCA, Lcx       FAMILY HISTORY:  Family History   Problem Relation Age of Onset     No Known Problems Mother      Heart Surgery Father      No Known Problems Sister        SOCIAL HISTORY:  Social History     Socioeconomic History     Marital status:      Spouse  "name: None     Number of children: None     Years of education: None     Highest education level: None   Tobacco Use     Smoking status: Former     Smokeless tobacco: Never   Substance and Sexual Activity     Alcohol use: Yes     Alcohol/week: 2.0 standard drinks     Types: 2 Shots of liquor per week     Comment: rare     Drug use: No     Sexual activity: Not Currently       Review of Systems:  Skin:        Eyes:       ENT:       Respiratory:       Cardiovascular:       Gastroenterology:      Genitourinary:       Musculoskeletal:       Neurologic:       Psychiatric:       Heme/Lymph/Imm:       Endocrine:         Physical Exam:  Vitals: /63   Pulse 72   Ht 1.778 m (5' 10\")   Wt 85.3 kg (188 lb)   BMI 26.98 kg/m      Constitutional:  cooperative, alert and oriented, well developed, well nourished, in no acute distress        Skin:  warm and dry to the touch, no apparent skin lesions or masses noted          Head:  normocephalic, no masses or lesions        Eyes:  pupils equal and round, conjunctivae and lids unremarkable, sclera white, no xanthalasma, EOMS intact, no nystagmus        Lymph:No Cervical lymphadenopathy present;No thyromegaly     ENT:           Neck:  carotid pulses are full and equal bilaterally, JVP normal, no carotid bruit        Respiratory:    coarse rales  fibrotic crackles  bilat    Cardiac: regular rhythm       systolic ejection murmur;grade 3     known mild AS                                           GI:  abdomen soft, non-tender, BS normoactive, no mass, no HSM, no bruits        Extremities and Muscular Skeletal:  no deformities, clubbing, cyanosis, erythema observed;no edema         wears braces  uses cane    Neurological:  no gross motor deficits        Psych:  Alert and Oriented x 3      Recent Lab Results:  LIPID RESULTS:  Lab Results   Component Value Date    CHOL 102 04/13/2021    HDL 39 04/13/2021    LDL 45 04/13/2021    TRIG 168 (H) 02/07/2023    TRIG 95 04/13/2021 "       LIVER ENZYME RESULTS:  Lab Results   Component Value Date    AST 18 12/13/2022    AST 21 04/13/2021    ALT 17 12/13/2022    ALT 19 04/13/2021       CBC RESULTS:  Lab Results   Component Value Date    WBC 8.0 12/13/2022    WBC n/a 04/13/2021    RBC 3.52 (L) 12/13/2022    RBC 4.14 04/13/2021    HGB 11.7 (L) 12/13/2022    HGB 13.5 04/13/2021    HCT 34.5 (L) 12/13/2022    HCT 39.1 04/13/2021    MCV 98 12/13/2022    MCV 94.4 04/13/2021    MCH 33.2 (H) 12/13/2022    MCH 32.6 04/13/2021    MCHC 33.9 12/13/2022    MCHC 34.5 04/13/2021    RDW 13.0 12/13/2022    RDW 12.4 04/13/2021     12/13/2022     04/13/2021       BMP RESULTS:  Lab Results   Component Value Date     12/13/2022     04/13/2021    POTASSIUM 4.1 12/13/2022    POTASSIUM 5.0 04/13/2021    CHLORIDE 101 02/07/2023    CHLORIDE 103 04/13/2021    CO2 22 12/13/2022    CO2 25 04/13/2021    ANIONGAP 9 12/13/2022    ANIONGAP n/a 04/13/2021     (H) 12/13/2022    GLC 97 04/13/2021    BUN 41 (H) 12/13/2022    BUN 42 04/13/2021    CR 1.69 (H) 12/13/2022    CR 2.23 04/13/2021    GFRESTIMATED 39 (L) 12/13/2022    GFRESTIMATED 26 04/13/2021    GFRESTBLACK 30 04/13/2021    RICCARDO 10.0 12/13/2022    RICCARDO 9.3 04/13/2021        A1C RESULTS:  Lab Results   Component Value Date    A1C 5.5 09/29/2018       INR RESULTS:  Lab Results   Component Value Date    INR 0.89 10/04/2010           BHANU Rowland CNP  [address]  [place] [address]

## 2023-02-21 NOTE — LETTER
2/21/2023    Merlin Emery Brown, MD  [facility] [address]  [place] [address]    RE: Augie Powell       Dear Colleague,     I had the pleasure of seeing Augie Powell in the [facility].  HPI and Plan:   See dictation    Orders Placed This Encounter   Procedures     Follow-Up with Cardiology     Orders Placed This Encounter   Medications     nitroGLYcerin (NITROSTAT) 0.4 MG sublingual tablet     Sig: For chest pain place 1 tablet under the tongue every 5 minutes for 3 doses. If symptoms persist 5 minutes after 1st dose call 911.     Dispense:  25 tablet     Refill:  3     Medications Discontinued During This Encounter   Medication Reason     nitroGLYcerin (NITROSTAT) 0.4 MG sublingual tablet Reorder (No AVS / No eCancel)         Encounter Diagnoses   Name Primary?     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)      Aortic valve stenosis, etiology of cardiac valve disease unspecified Yes     Coronary artery disease of native artery of native heart with stable angina pectoris (H)        CURRENT MEDICATIONS:  Current Outpatient Medications   Medication Sig Dispense Refill     amLODIPine (NORVASC) 5 MG tablet Take 5 mg by mouth every morning  90 tablet 3     aspirin (ASA) 81 MG EC tablet Take 1 tablet (81 mg) by mouth daily 90 tablet 3     atorvastatin (LIPITOR) 20 MG tablet Take 1 tablet (20 mg) by mouth every evening 90 tablet 1     Calcium-Magnesium-Zinc 500-250-12.5 MG TABS Take 1 tablet by mouth daily       cyclobenzaprine (FLEXERIL) 10 MG tablet TAKE 1/2 TO 1 TABLET BY MOUTH 3 TIMES DAILY AS NEEDED 21 tablet 0     losartan (COZAAR) 25 MG tablet Take 1 tablet (25 mg) by mouth daily Take 1 tablet daily for 1 week, then 1 pill twice a day if systolic BP >140-150 90 tablet 0     metoprolol succinate ER (TOPROL XL) 25 MG 24 hr tablet Take 0.5 tablets (12.5 mg) by mouth daily 45 tablet 3      nitroGLYcerin (NITROSTAT) 0.4 MG sublingual tablet For chest pain place 1 tablet under the tongue every 5 minutes for 3 doses. If symptoms persist 5 minutes after 1st dose call 911. 25 tablet 3     torsemide (DEMADEX) 5 MG tablet Take 5 mg by mouth daily 1 tablet 0     Vitamin D3 (CHOLECALCIFEROL) 125 MCG (5000 UT) tablet Take 1 tablet (125 mcg) by mouth daily         ALLERGIES     Allergies   Allergen Reactions     No Known Allergies        PAST MEDICAL HISTORY:  Past Medical History:   Diagnosis Date     Aortic stenosis, mild      Ascending aorta dilatation (H)      CKD (chronic kidney disease) stage 4, GFR 15-29 ml/min (H) 09/17/2018     Coronary artery disease 10/2018    nSTEMI- Isis to mLAD, distal lad mild, Lcx-small 50-60%, RCA 60-70% normal ifR (despite echo showing poss old IMI)     Hyperlipidemia      Hypertension      Renal cell cancer, unspecified laterality (H) 05/10/2017     Renal disease     CRD--creatinine in upper ones to 2, right kdney removed 2000 for canncer     Rheumatic fever      Spondylosis with myelopathy, lumbar region        PAST SURGICAL HISTORY:  Past Surgical History:   Procedure Laterality Date     BACK SURGERY       CHOLECYSTECTOMY       DECOMPRESSION LUMBAR TWO LEVELS  12/01/2014    Procedure: DECOMPRESSION LUMBAR TWO LEVELS;  Surgeon: Remy Harmon MD;  Location:  OR     HERNIA REPAIR       LAMINECTOMY, FUSION LUMBAR ONE LEVEL, COMBINED N/A 12/01/2014    Procedure: COMBINED LAMINECTOMY, FUSION LUMBAR ONE LEVEL;  Surgeon: Remy Harmon MD;  Location:  OR     PHACOEMULSIFICATION CLEAR CORNEA WITH STANDARD INTRAOCULAR LENS IMPLANT  10/22/2012    Procedure: PHACOEMULSIFICATION CLEAR CORNEA WITH STANDARD INTRAOCULAR LENS IMPLANT;  RIGHT EYE PHACOEMULSIFICATION CLEAR CORNEA WITH STANDARD INTRAOCULAR LENS IMPLANT ;  Surgeon: Grupo Granger MD;  Location:  [facility]     right nephrectomy[       STENT,CORONARY, S660 24/30  10/2018    mLAD  mod RCA, Lcx       FAMILY  "HISTORY:  Family History   Problem Relation Age of Onset     No Known Problems Mother      Heart Surgery Father      No Known Problems Sister        SOCIAL HISTORY:  Social History     Socioeconomic History     Marital status:      Spouse name: None     Number of children: None     Years of education: None     Highest education level: None   Tobacco Use     Smoking status: Former     Smokeless tobacco: Never   Substance and Sexual Activity     Alcohol use: Yes     Alcohol/week: 2.0 standard drinks     Types: 2 Shots of liquor per week     Comment: rare     Drug use: No     Sexual activity: Not Currently       Review of Systems:  Skin:        Eyes:       ENT:       Respiratory:       Cardiovascular:       Gastroenterology:      Genitourinary:       Musculoskeletal:       Neurologic:       Psychiatric:       Heme/Lymph/Imm:       Endocrine:         Physical Exam:  Vitals: /63   Pulse 72   Ht 1.778 m (5' 10\")   Wt 85.3 kg (188 lb)   BMI 26.98 kg/m      Constitutional:  cooperative, alert and oriented, well developed, well nourished, in no acute distress        Skin:  warm and dry to the touch, no apparent skin lesions or masses noted          Head:  normocephalic, no masses or lesions        Eyes:  pupils equal and round, conjunctivae and lids unremarkable, sclera white, no xanthalasma, EOMS intact, no nystagmus        Lymph:No Cervical lymphadenopathy present;No thyromegaly     ENT:           Neck:  carotid pulses are full and equal bilaterally, JVP normal, no carotid bruit        Respiratory:    coarse rales  fibrotic crackles  bilat    Cardiac: regular rhythm       systolic ejection murmur;grade 3     known mild AS                                           GI:  abdomen soft, non-tender, BS normoactive, no mass, no HSM, no bruits        Extremities and Muscular Skeletal:  no deformities, clubbing, cyanosis, erythema observed;no edema         wears braces  uses cane    Neurological:  no gross motor " deficits        Psych:  Alert and Oriented x 3      Recent Lab Results:  LIPID RESULTS:  Lab Results   Component Value Date    CHOL 102 04/13/2021    HDL 39 04/13/2021    LDL 45 04/13/2021    TRIG 168 (H) 02/07/2023    TRIG 95 04/13/2021       LIVER ENZYME RESULTS:  Lab Results   Component Value Date    AST 18 12/13/2022    AST 21 04/13/2021    ALT 17 12/13/2022    ALT 19 04/13/2021       CBC RESULTS:  Lab Results   Component Value Date    WBC 8.0 12/13/2022    WBC n/a 04/13/2021    RBC 3.52 (L) 12/13/2022    RBC 4.14 04/13/2021    HGB 11.7 (L) 12/13/2022    HGB 13.5 04/13/2021    HCT 34.5 (L) 12/13/2022    HCT 39.1 04/13/2021    MCV 98 12/13/2022    MCV 94.4 04/13/2021    MCH 33.2 (H) 12/13/2022    MCH 32.6 04/13/2021    MCHC 33.9 12/13/2022    MCHC 34.5 04/13/2021    RDW 13.0 12/13/2022    RDW 12.4 04/13/2021     12/13/2022     04/13/2021       BMP RESULTS:  Lab Results   Component Value Date     12/13/2022     04/13/2021    POTASSIUM 4.1 12/13/2022    POTASSIUM 5.0 04/13/2021    CHLORIDE 101 02/07/2023    CHLORIDE 103 04/13/2021    CO2 22 12/13/2022    CO2 25 04/13/2021    ANIONGAP 9 12/13/2022    ANIONGAP n/a 04/13/2021     (H) 12/13/2022    GLC 97 04/13/2021    BUN 41 (H) 12/13/2022    BUN 42 04/13/2021    CR 1.69 (H) 12/13/2022    CR 2.23 04/13/2021    GFRESTIMATED 39 (L) 12/13/2022    GFRESTIMATED 26 04/13/2021    GFRESTBLACK 30 04/13/2021    RICCARDO 10.0 12/13/2022    RICCARDO 9.3 04/13/2021        A1C RESULTS:  Lab Results   Component Value Date    A1C 5.5 09/29/2018       INR RESULTS:  Lab Results   Component Value Date    INR 0.89 10/04/2010           BHANU Rowland CNP  [address]  [place] [address]    Service Date: 02/21/2023    CARDIOLOGY OFFICE NOTE    OFFICE NOTE:  Augie Powell returns for followup with his wife.  He is an 87-year-old gentleman.  He has coronary artery disease.  We stented his LAD after a myocardial infarction.  He had moderate circumflex  and right coronary disease, which was not thought to be flow limiting.  The patient's ejection fraction was low, and after angioplasty, it returned to near normal.  The last test showed only apical wall motion abnormality.  The rest of the anterior wall recovered.  He had no more than mild to moderate aortic valve stenosis. He was shoveling snow and had some chest pain.  This prompted a visit to the hospital. Today we reviewed their tests. A nuclear test was done on 12/13/2022.  It was a pharmacologic stress test. His blood pressure was slightly high at the beginning of the test.  It reported again apical infarct, but no ischemia.  He has had no further chest pain since that one particular day.  We also reviewed his lipid profile.  He runs a low HDL and a high triglyceride.  His LDL, however, is excellent on Lipitor.  His blood pressure numbers are controlled.  He does have chronic renal insufficiency.  His creatinine is elevated, but it is in the ballpark of where it has been.  We again talked about not using any Advil, Aleve or Motrin and to keep dye studies to a minimum.  Blood work was done through Dr. Yeboah's office. That was reviewed today, including the cholesterol panel I alluded to above and the creatinine.  The liver panel was normal, iron studies were reasonable, and the hemoglobin was low at 11.1, but that may reflect the creatinine. Vitamin D levels were normal.  He had some questions about his medicines, particularly losartan.  Since his blood pressure numbers are normal, his creatinine is stable, although elevated, and potassium is normal, I do not think any changes need to be made there.  I am going to have him come back in 3 months just to check and make sure he is not having more problem with nitroglycerin.  I did give him a prescription for sublingual nitroglycerin.  He stated he did not have any prescriptions for that.  We again reviewed how to take nitroglycerin sublingually, when to take it and  when to call the ambulance.    Today's visit was 34 minutes.  We will see him back in 3 months.    cc:  Merlin E. Brown, MD   [facility]  [address] [place] [address]    Noel Villarreal MD        D: 2023   T: 2023   MT: [email or web address]    Name:     TERI BUCHANAN  MRN:      [id number]        Account:      [id number]   :      1935           Service Date: 2023       Document: [id number]      Thank you for allowing me to participate in the care of your patient.      Sincerely,     Noel Villarreal MD     [facility]  cc:   BHANU Zamora CNP  [address]  [place] [address]

## 2023-02-21 NOTE — PROGRESS NOTES
Service Date: 02/21/2023    CARDIOLOGY OFFICE NOTE    OFFICE NOTE:  Augie Powell returns for followup with his wife.  He is an 87-year-old gentleman.  He has coronary artery disease.  We stented his LAD after a myocardial infarction.  He had moderate circumflex and right coronary disease, which was not thought to be flow limiting.  The patient's ejection fraction was low, and after angioplasty, it returned to near normal.  The last test showed only apical wall motion abnormality.  The rest of the anterior wall recovered.  He had no more than mild to moderate aortic valve stenosis. He was shoveling snow and had some chest pain.  This prompted a visit to the hospital. Today we reviewed their tests. A nuclear test was done on 12/13/2022.  It was a pharmacologic stress test. His blood pressure was slightly high at the beginning of the test.  It reported again apical infarct, but no ischemia.  He has had no further chest pain since that one particular day.  We also reviewed his lipid profile.  He runs a low HDL and a high triglyceride.  His LDL, however, is excellent on Lipitor.  His blood pressure numbers are controlled.  He does have chronic renal insufficiency.  His creatinine is elevated, but it is in the ballpark of where it has been.  We again talked about not using any Advil, Aleve or Motrin and to keep dye studies to a minimum.  Blood work was done through Dr. Yeboah's office. That was reviewed today, including the cholesterol panel I alluded to above and the creatinine.  The liver panel was normal, iron studies were reasonable, and the hemoglobin was low at 11.1, but that may reflect the creatinine. Vitamin D levels were normal.  He had some questions about his medicines, particularly losartan.  Since his blood pressure numbers are normal, his creatinine is stable, although elevated, and potassium is normal, I do not think any changes need to be made there.  I am going to have him come back in 3 months just to  check and make sure he is not having more problem with nitroglycerin.  I did give him a prescription for sublingual nitroglycerin.  He stated he did not have any prescriptions for that.  We again reviewed how to take nitroglycerin sublingually, when to take it and when to call the ambulance.    Today's visit was 34 minutes.  We will see him back in 3 months.    cc:  Merlin [place] [address]    Noel Villarreal MD        D: 2023   T: 2023   MT: [email or web address]    Name:     TERI BUCHANAN  MRN:      [id number]        Account:      [id number]   :      1935           Service Date: 2023       Document: [id number]

## 2023-03-17 DIAGNOSIS — I10 BENIGN ESSENTIAL HYPERTENSION: ICD-10-CM

## 2023-03-17 RX ORDER — LOSARTAN POTASSIUM 25 MG/1
25 TABLET ORAL DAILY
Qty: 100 TABLET | Refills: 3 | Status: ON HOLD | OUTPATIENT
Start: 2023-03-17 | End: 2024-01-01

## 2023-06-12 NOTE — LETTER
6/12/2023    Merlin Emery Brown, MD  [facility] [address]  [place] [address]    RE: Augie Powell       Dear Colleague,     I had the pleasure of seeing Augie Powell in the [facility].  HPI and Plan:   See dictation    No orders of the defined types were placed in this encounter.    No orders of the defined types were placed in this encounter.    There are no discontinued medications.      Encounter Diagnoses   Name Primary?    Benign essential hypertension     Aortic valve stenosis, etiology of cardiac valve disease unspecified     Coronary artery disease of native artery of native heart with stable angina pectoris (H)        CURRENT MEDICATIONS:  Current Outpatient Medications   Medication Sig Dispense Refill    amLODIPine (NORVASC) 5 MG tablet Take 5 mg by mouth every morning  90 tablet 3    aspirin (ASA) 81 MG EC tablet Take 1 tablet (81 mg) by mouth daily 90 tablet 3    atorvastatin (LIPITOR) 20 MG tablet Take 1 tablet (20 mg) by mouth every evening 90 tablet 1    Calcium-Magnesium-Zinc 500-250-12.5 MG TABS Take 1 tablet by mouth daily      cyclobenzaprine (FLEXERIL) 10 MG tablet TAKE 1/2 TO 1 TABLET BY MOUTH 3 TIMES DAILY AS NEEDED 21 tablet 0    losartan (COZAAR) 25 MG tablet Take 1 tablet (25 mg) by mouth daily Take 1 tablet daily for 1 week, then 1 pill twice a day if systolic BP >140-150 100 tablet 3    metoprolol succinate ER (TOPROL XL) 25 MG 24 hr tablet Take 0.5 tablets (12.5 mg) by mouth daily 45 tablet 3    nitroGLYcerin (NITROSTAT) 0.4 MG sublingual tablet For chest pain place 1 tablet under the tongue every 5 minutes for 3 doses. If symptoms persist 5 minutes after 1st dose call 911. 25 tablet 3    torsemide (DEMADEX) 5 MG tablet Take 5 mg by mouth daily 1 tablet 0    Vitamin D3 (CHOLECALCIFEROL) 125 MCG (5000 UT) tablet Take 1 tablet (125 mcg) by mouth daily         ALLERGIES     Allergies   Allergen Reactions    No Known Allergies        PAST  MEDICAL HISTORY:  Past Medical History:   Diagnosis Date    Aortic stenosis, mild     Ascending aorta dilatation (H)     CKD (chronic kidney disease) stage 4, GFR 15-29 ml/min (H) 09/17/2018    Coronary artery disease 10/2018    nSTEMI- Isis to mLAD, distal lad mild, Lcx-small 50-60%, RCA 60-70% normal ifR (despite echo showing poss old IMI)    Hyperlipidemia     Hypertension     Renal cell cancer, unspecified laterality (H) 05/10/2017    Renal disease     CRD--creatinine in upper ones to 2, right kdney removed 2000 for canncer    Rheumatic fever     Spondylosis with myelopathy, lumbar region        PAST SURGICAL HISTORY:  Past Surgical History:   Procedure Laterality Date    BACK SURGERY      CHOLECYSTECTOMY      DECOMPRESSION LUMBAR TWO LEVELS  12/01/2014    Procedure: DECOMPRESSION LUMBAR TWO LEVELS;  Surgeon: Remy Harmon MD;  Location: [facility] OR    HERNIA REPAIR      LAMINECTOMY, FUSION LUMBAR ONE LEVEL, COMBINED N/A 12/01/2014    Procedure: COMBINED LAMINECTOMY, FUSION LUMBAR ONE LEVEL;  Surgeon: Remy Harmon MD;  Location: [facility] OR    PHACOEMULSIFICATION CLEAR CORNEA WITH STANDARD INTRAOCULAR LENS IMPLANT  10/22/2012    Procedure: PHACOEMULSIFICATION CLEAR CORNEA WITH STANDARD INTRAOCULAR LENS IMPLANT;  RIGHT EYE PHACOEMULSIFICATION CLEAR CORNEA WITH STANDARD INTRAOCULAR LENS IMPLANT ;  Surgeon: Grupo Granger MD;  [facility]    right nephrectomy[      STENT,CORONARY, S660 24/30  10/2018    mLAD  mod RCA, Lcx       FAMILY HISTORY:  Family History   Problem Relation Age of Onset    No Known Problems Mother     Heart Surgery Father     No Known Problems Sister        SOCIAL HISTORY:  Social History     Socioeconomic History    Marital status:      Spouse name: None    Number of children: None    Years of education: None    Highest education level: None   Tobacco Use    Smoking status: Former    Smokeless tobacco: Never   Substance and Sexual Activity    Alcohol use: Yes      "Alcohol/week: 2.0 standard drinks of alcohol     Types: 2 Shots of liquor per week    Drug use: No    Sexual activity: Not Currently       Review of Systems:  Skin:        Eyes:       ENT:       Respiratory:  Negative    Cardiovascular:  Negative    Gastroenterology:      Genitourinary:       Musculoskeletal:       Neurologic:       Psychiatric:       Heme/Lymph/Imm:       Endocrine:         Physical Exam:  Vitals: BP (!) 158/70 (BP Location: Right arm, Patient Position: Sitting, Cuff Size: Adult Large)   Pulse 60   Ht 1.778 m (5' 10\")   Wt 84.4 kg (186 lb)   SpO2 98%   BMI 26.69 kg/m      Constitutional:  cooperative, alert and oriented, well developed, well nourished, in no acute distress        Skin:  warm and dry to the touch, no apparent skin lesions or masses noted          Head:  normocephalic, no masses or lesions        Eyes:  pupils equal and round, conjunctivae and lids unremarkable, sclera white, no xanthalasma, EOMS intact, no nystagmus        Lymph:No Cervical lymphadenopathy present;No thyromegaly     ENT:           Neck:  carotid pulses are full and equal bilaterally, JVP normal, no carotid bruit        Respiratory:       fibrotic sounding crackles  R base > L base    Cardiac: regular rhythm       systolic ejection murmur;grade 2                                                 GI:  abdomen soft        Extremities and Muscular Skeletal:  no deformities, clubbing, cyanosis, erythema observed;no edema         brace on R ankle    Neurological:  no gross motor deficits        Psych:  Alert and Oriented x 3      Recent Lab Results:  LIPID RESULTS:  Lab Results   Component Value Date    CHOL 102 04/13/2021    HDL 39 04/13/2021    LDL 45 04/13/2021    TRIG 168 (H) 02/07/2023    TRIG 95 04/13/2021       LIVER ENZYME RESULTS:  Lab Results   Component Value Date    AST 18 12/13/2022    AST 21 04/13/2021    ALT 17 12/13/2022    ALT 19 04/13/2021       CBC RESULTS:  Lab Results   Component Value Date    WBC " 8.0 12/13/2022    WBC n/a 04/13/2021    RBC 3.52 (L) 12/13/2022    RBC 4.14 04/13/2021    HGB 11.7 (L) 12/13/2022    HGB 13.5 04/13/2021    HCT 34.5 (L) 12/13/2022    HCT 39.1 04/13/2021    MCV 98 12/13/2022    MCV 94.4 04/13/2021    MCH 33.2 (H) 12/13/2022    MCH 32.6 04/13/2021    MCHC 33.9 12/13/2022    MCHC 34.5 04/13/2021    RDW 13.0 12/13/2022    RDW 12.4 04/13/2021     12/13/2022     04/13/2021       BMP RESULTS:  Lab Results   Component Value Date     12/13/2022     04/13/2021    POTASSIUM 4.1 12/13/2022    POTASSIUM 5.0 04/13/2021    CHLORIDE 101 02/07/2023    CHLORIDE 103 04/13/2021    CO2 22 12/13/2022    CO2 25 04/13/2021    ANIONGAP 9 12/13/2022    ANIONGAP n/a 04/13/2021     (H) 12/13/2022    GLC 97 04/13/2021    BUN 41 (H) 12/13/2022    BUN 42 04/13/2021    CR 1.69 (H) 12/13/2022    CR 2.23 04/13/2021    GFRESTIMATED 39 (L) 12/13/2022    GFRESTIMATED 26 04/13/2021    GFRESTBLACK 30 04/13/2021    RICCARDO 10.0 12/13/2022    RICCARDO 9.3 04/13/2021        A1C RESULTS:  Lab Results   Component Value Date    A1C 5.5 09/29/2018       INR RESULTS:  Lab Results   Component Value Date    INR 0.89 10/04/2010           CC  Noel Villarreal MD  [address]  [place] [id number]    Service Date: 06/12/2023    Cem Powell returns for followup.  He is a delightful 87-year-old gentleman accompanied by his wife.  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  He has had multiple steroid injections including another one 2 weeks ago.  He does have renal insufficiency, chronic. He has coronary disease.  He had stenting to the LAD with only moderate left circumflex and right coronary disease.  He had chest pain in February after shoveling snow.  Fortunately, the stress test showed apical infarct, no ischemia and he has had no further recurrence of chest pain.  His blood pressure numbers here in the office are high.  I checked both arms and they are symmetric, but his  home numbers of which he brought in an excellent list shows very good control, generally 120 to maybe 130.  His creatinine was last checked by Dr. Yeboah. It was starting to go up.  I asked him to make sure he gets another followup lipids and electrolyte panel sometime in .  I reviewed his echocardiogram.  He has mild aortic valve stenosis from an echo a year ago, so next year I will have him come back for another echo to check the heart function and the valve.  He had some questions about bruising of the skin.  He is only on a baby aspirin.  I am wondering if recurrent steroid injections might partially be contributing to his ecchymosis.  They are in the typical spot in his hands and I explained that is because of the skin being thin from possibly steroids and age.  A slight bump can make it black and blue.  Otherwise, he states he feels great.    Today's visit was 27 minutes.    Noel Villarreal MD        cc:  Merlin E. Brown, MD  [facility]  [address]  [place]  [address]    Noel Villarreal MD        D: 2023   T: 2023   MT: beth    Name:     TERI BUCHANAN  MRN:      [id number]        Account:      [id number]   :      1935           Service Date: 2023       Document: [id number]     Thank you for allowing me to participate in the care of your patient.      Sincerely,     Noel Villarreal MD     [facility]  cc:   Noel Villarreal MD  [address]  [place] [id number]

## 2023-06-12 NOTE — PROGRESS NOTES
HPI and Plan:   See dictation    No orders of the defined types were placed in this encounter.    No orders of the defined types were placed in this encounter.    There are no discontinued medications.      Encounter Diagnoses   Name Primary?     Benign essential hypertension      Aortic valve stenosis, etiology of cardiac valve disease unspecified      Coronary artery disease of native artery of native heart with stable angina pectoris (H)        CURRENT MEDICATIONS:  Current Outpatient Medications   Medication Sig Dispense Refill     amLODIPine (NORVASC) 5 MG tablet Take 5 mg by mouth every morning  90 tablet 3     aspirin (ASA) 81 MG EC tablet Take 1 tablet (81 mg) by mouth daily 90 tablet 3     atorvastatin (LIPITOR) 20 MG tablet Take 1 tablet (20 mg) by mouth every evening 90 tablet 1     Calcium-Magnesium-Zinc 500-250-12.5 MG TABS Take 1 tablet by mouth daily       cyclobenzaprine (FLEXERIL) 10 MG tablet TAKE 1/2 TO 1 TABLET BY MOUTH 3 TIMES DAILY AS NEEDED 21 tablet 0     losartan (COZAAR) 25 MG tablet Take 1 tablet (25 mg) by mouth daily Take 1 tablet daily for 1 week, then 1 pill twice a day if systolic BP >140-150 100 tablet 3     metoprolol succinate ER (TOPROL XL) 25 MG 24 hr tablet Take 0.5 tablets (12.5 mg) by mouth daily 45 tablet 3     nitroGLYcerin (NITROSTAT) 0.4 MG sublingual tablet For chest pain place 1 tablet under the tongue every 5 minutes for 3 doses. If symptoms persist 5 minutes after 1st dose call 911. 25 tablet 3     torsemide (DEMADEX) 5 MG tablet Take 5 mg by mouth daily 1 tablet 0     Vitamin D3 (CHOLECALCIFEROL) 125 MCG (5000 UT) tablet Take 1 tablet (125 mcg) by mouth daily         ALLERGIES     Allergies   Allergen Reactions     No Known Allergies        PAST MEDICAL HISTORY:  Past Medical History:   Diagnosis Date     Aortic stenosis, mild      Ascending aorta dilatation (H)      CKD (chronic kidney disease) stage 4, GFR 15-29 ml/min (H) 09/17/2018     Coronary artery disease  10/2018    nSTEMI- Isis to mLAD, distal lad mild, Lcx-small 50-60%, RCA 60-70% normal ifR (despite echo showing poss old IMI)     Hyperlipidemia      Hypertension      Renal cell cancer, unspecified laterality (H) 05/10/2017     Renal disease     CRD--creatinine in upper ones to 2, right kdney removed 2000 for canncer     Rheumatic fever      Spondylosis with myelopathy, lumbar region        PAST SURGICAL HISTORY:  Past Surgical History:   Procedure Laterality Date     BACK SURGERY       CHOLECYSTECTOMY       DECOMPRESSION LUMBAR TWO LEVELS  12/01/2014    Procedure: DECOMPRESSION LUMBAR TWO LEVELS;  Surgeon: Remy Harmon MD;  Location: [facility] OR     HERNIA REPAIR       LAMINECTOMY, FUSION LUMBAR ONE LEVEL, COMBINED N/A 12/01/2014    Procedure: COMBINED LAMINECTOMY, FUSION LUMBAR ONE LEVEL;  Surgeon: Remy Hamron MD;  Location:  OR     PHACOEMULSIFICATION CLEAR CORNEA WITH STANDARD INTRAOCULAR LENS IMPLANT  10/22/2012    Procedure: PHACOEMULSIFICATION CLEAR CORNEA WITH STANDARD INTRAOCULAR LENS IMPLANT;  RIGHT EYE PHACOEMULSIFICATION CLEAR CORNEA WITH STANDARD INTRAOCULAR LENS IMPLANT ;  Surgeon: Grupo Granger MD;  Location:  [facility]     right nephrectomy[       STENT,CORONARY, S660 24/30  10/2018    mLAD  mod RCA, Lcx       FAMILY HISTORY:  Family History   Problem Relation Age of Onset     No Known Problems Mother      Heart Surgery Father      No Known Problems Sister        SOCIAL HISTORY:  Social History     Socioeconomic History     Marital status:      Spouse name: None     Number of children: None     Years of education: None     Highest education level: None   Tobacco Use     Smoking status: Former     Smokeless tobacco: Never   Substance and Sexual Activity     Alcohol use: Yes     Alcohol/week: 2.0 standard drinks of alcohol     Types: 2 Shots of liquor per week     Drug use: No     Sexual activity: Not Currently       Review of Systems:  Skin:        Eyes:       ENT:      "  Respiratory:  Negative    Cardiovascular:  Negative    Gastroenterology:      Genitourinary:       Musculoskeletal:       Neurologic:       Psychiatric:       Heme/Lymph/Imm:       Endocrine:         Physical Exam:  Vitals: BP (!) 158/70 (BP Location: Right arm, Patient Position: Sitting, Cuff Size: Adult Large)   Pulse 60   Ht 1.778 m (5' 10\")   Wt 84.4 kg (186 lb)   SpO2 98%   BMI 26.69 kg/m      Constitutional:  cooperative, alert and oriented, well developed, well nourished, in no acute distress        Skin:  warm and dry to the touch, no apparent skin lesions or masses noted          Head:  normocephalic, no masses or lesions        Eyes:  pupils equal and round, conjunctivae and lids unremarkable, sclera white, no xanthalasma, EOMS intact, no nystagmus        Lymph:No Cervical lymphadenopathy present;No thyromegaly     ENT:           Neck:  carotid pulses are full and equal bilaterally, JVP normal, no carotid bruit        Respiratory:       fibrotic sounding crackles  R base > L base    Cardiac: regular rhythm       systolic ejection murmur;grade 2                                                 GI:  abdomen soft        Extremities and Muscular Skeletal:  no deformities, clubbing, cyanosis, erythema observed;no edema         brace on R ankle    Neurological:  no gross motor deficits        Psych:  Alert and Oriented x 3      Recent Lab Results:  LIPID RESULTS:  Lab Results   Component Value Date    CHOL 102 04/13/2021    HDL 39 04/13/2021    LDL 45 04/13/2021    TRIG 168 (H) 02/07/2023    TRIG 95 04/13/2021       LIVER ENZYME RESULTS:  Lab Results   Component Value Date    AST 18 12/13/2022    AST 21 04/13/2021    ALT 17 12/13/2022    ALT 19 04/13/2021       CBC RESULTS:  Lab Results   Component Value Date    WBC 8.0 12/13/2022    WBC n/a 04/13/2021    RBC 3.52 (L) 12/13/2022    RBC 4.14 04/13/2021    HGB 11.7 (L) 12/13/2022    HGB 13.5 04/13/2021    HCT 34.5 (L) 12/13/2022    HCT 39.1 04/13/2021    MCV " 98 12/13/2022    MCV 94.4 04/13/2021    MCH 33.2 (H) 12/13/2022    MCH 32.6 04/13/2021    MCHC 33.9 12/13/2022    MCHC 34.5 04/13/2021    RDW 13.0 12/13/2022    RDW 12.4 04/13/2021     12/13/2022     04/13/2021       BMP RESULTS:  Lab Results   Component Value Date     12/13/2022     04/13/2021    POTASSIUM 4.1 12/13/2022    POTASSIUM 5.0 04/13/2021    CHLORIDE 101 02/07/2023    CHLORIDE 103 04/13/2021    CO2 22 12/13/2022    CO2 25 04/13/2021    ANIONGAP 9 12/13/2022    ANIONGAP n/a 04/13/2021     (H) 12/13/2022    GLC 97 04/13/2021    BUN 41 (H) 12/13/2022    BUN 42 04/13/2021    CR 1.69 (H) 12/13/2022    CR 2.23 04/13/2021    GFRESTIMATED 39 (L) 12/13/2022    GFRESTIMATED 26 04/13/2021    GFRESTBLACK 30 04/13/2021    RICCARDO 10.0 12/13/2022    RICCARDO 9.3 04/13/2021        A1C RESULTS:  Lab Results   Component Value Date    A1C 5.5 09/29/2018       INR RESULTS:  Lab Results   Component Value Date    INR 0.89 10/04/2010           CC  Noel Villarreal MD  [address]  [place] [id number]

## 2023-06-12 NOTE — PROGRESS NOTES
Service Date: 2023    Cem Powell returns for followup.  He is a delightful 87-year-old gentleman accompanied by his wife.  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  He has had multiple steroid injections including another one 2 weeks ago.  He does have renal insufficiency, chronic. He has coronary disease.  He had stenting to the LAD with only moderate left circumflex and right coronary disease.  He had chest pain in February after shoveling snow.  Fortunately, the stress test showed apical infarct, no ischemia and he has had no further recurrence of chest pain.  His blood pressure numbers here in the office are high.  I checked both arms and they are symmetric, but his home numbers of which he brought in an excellent list shows very good control, generally 120 to maybe 130.  His creatinine was last checked by Dr. Yeboah. It was starting to go up.  I asked him to make sure he gets another followup lipids and electrolyte panel sometime in .  I reviewed his echocardiogram.  He has mild aortic valve stenosis from an echo a year ago, so next year I will have him come back for another echo to check the heart function and the valve.  He had some questions about bruising of the skin.  He is only on a baby aspirin.  I am wondering if recurrent steroid injections might partially be contributing to his ecchymosis.  They are in the typical spot in his hands and I explained that is because of the skin being thin from possibly steroids and age.  A slight bump can make it black and blue.  Otherwise, he states he feels great.    Today's visit was 27 minutes.    Noel Villarreal MD    cc:  Merlin E. Brown, MD  [facility]  [address]  [place]  [address]    Noel Villarreal MD        D: 2023   T: 2023   MT: beth    Name:     TERI POWELL  MRN:      [id number]        Account:      [id number]   :      1935           Service Date: 2023        Document: [id number]

## 2023-07-13 NOTE — TELEPHONE ENCOUNTER
Health Call Center    Phone Message    May a detailed message be left on voicemail: yes     Reason for Call: Other: patients spouse is calling and would like orders for blood draws ( labs) and would like to orders sent to dr aguilar office in [facility] to get them done, please advise, thank you.     Action Taken: Other: cardiology    Travel Screening: Not Applicable   Thank you!  [facility]

## 2023-07-13 NOTE — TELEPHONE ENCOUNTER
Per OV note 6/12/23 with Dr. Villarreal:     I asked him to make sure he gets another followup lipids and electrolyte panel sometime in 2023    Orders placed for BMP, Lipid and ALT and faxed to [facility] at [phone].  Oumou Glynn RN on 7/13/2023 at 11:15 AM

## 2024-01-01 ENCOUNTER — APPOINTMENT (OUTPATIENT)
Dept: SPEECH THERAPY | Facility: CLINIC | Age: 89
DRG: 682 | End: 2024-01-01
Payer: COMMERCIAL

## 2024-01-01 ENCOUNTER — APPOINTMENT (OUTPATIENT)
Dept: OCCUPATIONAL THERAPY | Facility: CLINIC | Age: 89
DRG: 193 | End: 2024-01-01
Payer: COMMERCIAL

## 2024-01-01 ENCOUNTER — APPOINTMENT (OUTPATIENT)
Dept: CT IMAGING | Facility: CLINIC | Age: 89
DRG: 682 | End: 2024-01-01
Attending: EMERGENCY MEDICINE
Payer: COMMERCIAL

## 2024-01-01 ENCOUNTER — APPOINTMENT (OUTPATIENT)
Dept: CT IMAGING | Facility: CLINIC | Age: 89
DRG: 193 | End: 2024-01-01
Attending: STUDENT IN AN ORGANIZED HEALTH CARE EDUCATION/TRAINING PROGRAM
Payer: COMMERCIAL

## 2024-01-01 ENCOUNTER — APPOINTMENT (OUTPATIENT)
Dept: OCCUPATIONAL THERAPY | Facility: CLINIC | Age: 89
DRG: 193 | End: 2024-01-01
Attending: INTERNAL MEDICINE
Payer: COMMERCIAL

## 2024-01-01 ENCOUNTER — HOSPITAL ENCOUNTER (INPATIENT)
Facility: CLINIC | Age: 89
LOS: 10 days | Discharge: NURSING, REHAB OR LONG-TERM CARE | DRG: 193 | End: 2024-03-25
Attending: EMERGENCY MEDICINE | Admitting: INTERNAL MEDICINE
Payer: COMMERCIAL

## 2024-01-01 ENCOUNTER — LAB REQUISITION (OUTPATIENT)
Dept: LAB | Facility: CLINIC | Age: 89
End: 2024-01-01
Payer: COMMERCIAL

## 2024-01-01 ENCOUNTER — APPOINTMENT (OUTPATIENT)
Dept: PHYSICAL THERAPY | Facility: CLINIC | Age: 89
DRG: 682 | End: 2024-01-01
Payer: COMMERCIAL

## 2024-01-01 ENCOUNTER — APPOINTMENT (OUTPATIENT)
Dept: MRI IMAGING | Facility: CLINIC | Age: 89
DRG: 682 | End: 2024-01-01
Attending: STUDENT IN AN ORGANIZED HEALTH CARE EDUCATION/TRAINING PROGRAM
Payer: COMMERCIAL

## 2024-01-01 ENCOUNTER — APPOINTMENT (OUTPATIENT)
Dept: SPEECH THERAPY | Facility: CLINIC | Age: 89
DRG: 193 | End: 2024-01-01
Attending: INTERNAL MEDICINE
Payer: COMMERCIAL

## 2024-01-01 ENCOUNTER — APPOINTMENT (OUTPATIENT)
Dept: GENERAL RADIOLOGY | Facility: CLINIC | Age: 89
DRG: 193 | End: 2024-01-01
Attending: STUDENT IN AN ORGANIZED HEALTH CARE EDUCATION/TRAINING PROGRAM
Payer: COMMERCIAL

## 2024-01-01 ENCOUNTER — APPOINTMENT (OUTPATIENT)
Dept: GENERAL RADIOLOGY | Facility: CLINIC | Age: 89
DRG: 682 | End: 2024-01-01
Attending: INTERNAL MEDICINE
Payer: COMMERCIAL

## 2024-01-01 ENCOUNTER — APPOINTMENT (OUTPATIENT)
Dept: PHYSICAL THERAPY | Facility: CLINIC | Age: 89
DRG: 682 | End: 2024-01-01
Attending: STUDENT IN AN ORGANIZED HEALTH CARE EDUCATION/TRAINING PROGRAM
Payer: COMMERCIAL

## 2024-01-01 ENCOUNTER — APPOINTMENT (OUTPATIENT)
Dept: PHYSICAL THERAPY | Facility: CLINIC | Age: 89
DRG: 193 | End: 2024-01-01
Payer: COMMERCIAL

## 2024-01-01 ENCOUNTER — APPOINTMENT (OUTPATIENT)
Dept: PHYSICAL THERAPY | Facility: CLINIC | Age: 89
DRG: 193 | End: 2024-01-01
Attending: INTERNAL MEDICINE
Payer: COMMERCIAL

## 2024-01-01 ENCOUNTER — HOSPITAL ENCOUNTER (INPATIENT)
Facility: CLINIC | Age: 89
LOS: 10 days | Discharge: HOSPICE | DRG: 682 | End: 2024-04-06
Attending: EMERGENCY MEDICINE | Admitting: STUDENT IN AN ORGANIZED HEALTH CARE EDUCATION/TRAINING PROGRAM
Payer: COMMERCIAL

## 2024-01-01 ENCOUNTER — APPOINTMENT (OUTPATIENT)
Dept: CT IMAGING | Facility: CLINIC | Age: 89
DRG: 682 | End: 2024-01-01
Attending: INTERNAL MEDICINE
Payer: COMMERCIAL

## 2024-01-01 ENCOUNTER — APPOINTMENT (OUTPATIENT)
Dept: SPEECH THERAPY | Facility: CLINIC | Age: 89
DRG: 193 | End: 2024-01-01
Payer: COMMERCIAL

## 2024-01-01 ENCOUNTER — APPOINTMENT (OUTPATIENT)
Dept: CARDIOLOGY | Facility: CLINIC | Age: 89
DRG: 193 | End: 2024-01-01
Attending: INTERNAL MEDICINE
Payer: COMMERCIAL

## 2024-01-01 ENCOUNTER — APPOINTMENT (OUTPATIENT)
Dept: SPEECH THERAPY | Facility: CLINIC | Age: 89
DRG: 682 | End: 2024-01-01
Attending: STUDENT IN AN ORGANIZED HEALTH CARE EDUCATION/TRAINING PROGRAM
Payer: COMMERCIAL

## 2024-01-01 ENCOUNTER — APPOINTMENT (OUTPATIENT)
Dept: GENERAL RADIOLOGY | Facility: CLINIC | Age: 89
DRG: 193 | End: 2024-01-01
Attending: INTERNAL MEDICINE
Payer: COMMERCIAL

## 2024-01-01 ENCOUNTER — APPOINTMENT (OUTPATIENT)
Dept: MRI IMAGING | Facility: CLINIC | Age: 89
DRG: 193 | End: 2024-01-01
Attending: STUDENT IN AN ORGANIZED HEALTH CARE EDUCATION/TRAINING PROGRAM
Payer: COMMERCIAL

## 2024-01-01 ENCOUNTER — MEDICAL CORRESPONDENCE (OUTPATIENT)
Dept: HEALTH INFORMATION MANAGEMENT | Facility: CLINIC | Age: 89
End: 2024-01-01

## 2024-01-01 ENCOUNTER — APPOINTMENT (OUTPATIENT)
Dept: GENERAL RADIOLOGY | Facility: CLINIC | Age: 89
DRG: 682 | End: 2024-01-01
Attending: STUDENT IN AN ORGANIZED HEALTH CARE EDUCATION/TRAINING PROGRAM
Payer: COMMERCIAL

## 2024-01-01 ENCOUNTER — APPOINTMENT (OUTPATIENT)
Dept: CT IMAGING | Facility: CLINIC | Age: 89
DRG: 682 | End: 2024-01-01
Attending: STUDENT IN AN ORGANIZED HEALTH CARE EDUCATION/TRAINING PROGRAM
Payer: COMMERCIAL

## 2024-01-01 ENCOUNTER — APPOINTMENT (OUTPATIENT)
Dept: GENERAL RADIOLOGY | Facility: CLINIC | Age: 89
DRG: 193 | End: 2024-01-01
Attending: EMERGENCY MEDICINE
Payer: COMMERCIAL

## 2024-01-01 ENCOUNTER — APPOINTMENT (OUTPATIENT)
Dept: GENERAL RADIOLOGY | Facility: CLINIC | Age: 89
DRG: 682 | End: 2024-01-01
Attending: EMERGENCY MEDICINE
Payer: COMMERCIAL

## 2024-01-01 VITALS
BODY MASS INDEX: 24.1 KG/M2 | OXYGEN SATURATION: 92 % | DIASTOLIC BLOOD PRESSURE: 61 MMHG | RESPIRATION RATE: 18 BRPM | WEIGHT: 167.99 LBS | HEART RATE: 90 BPM | TEMPERATURE: 97.9 F | SYSTOLIC BLOOD PRESSURE: 142 MMHG

## 2024-01-01 VITALS
TEMPERATURE: 98.9 F | BODY MASS INDEX: 24.84 KG/M2 | OXYGEN SATURATION: 95 % | SYSTOLIC BLOOD PRESSURE: 160 MMHG | WEIGHT: 173.5 LBS | HEIGHT: 70 IN | RESPIRATION RATE: 18 BRPM | DIASTOLIC BLOOD PRESSURE: 57 MMHG | HEART RATE: 94 BPM

## 2024-01-01 DIAGNOSIS — R09.02 HYPOXIA: ICD-10-CM

## 2024-01-01 DIAGNOSIS — E83.52 HYPERCALCEMIA: Primary | ICD-10-CM

## 2024-01-01 DIAGNOSIS — I10 BENIGN ESSENTIAL HYPERTENSION: Primary | ICD-10-CM

## 2024-01-01 DIAGNOSIS — N17.9 AKI (ACUTE KIDNEY INJURY) (H): ICD-10-CM

## 2024-01-01 DIAGNOSIS — R41.82 ALTERED MENTAL STATUS, UNSPECIFIED: ICD-10-CM

## 2024-01-01 DIAGNOSIS — I10 ESSENTIAL (PRIMARY) HYPERTENSION: ICD-10-CM

## 2024-01-01 DIAGNOSIS — N18.9 ACUTE ON CHRONIC RENAL INSUFFICIENCY: ICD-10-CM

## 2024-01-01 DIAGNOSIS — I44.7 NEW ONSET LEFT BUNDLE BRANCH BLOCK (LBBB): ICD-10-CM

## 2024-01-01 DIAGNOSIS — N28.9 ACUTE ON CHRONIC RENAL INSUFFICIENCY: ICD-10-CM

## 2024-01-01 DIAGNOSIS — J10.1 INFLUENZA A: ICD-10-CM

## 2024-01-01 DIAGNOSIS — I10 BENIGN ESSENTIAL HYPERTENSION: ICD-10-CM

## 2024-01-01 DIAGNOSIS — Z51.5 END OF LIFE CARE: ICD-10-CM

## 2024-01-01 DIAGNOSIS — Z87.09 HISTORY OF INFLUENZA: ICD-10-CM

## 2024-01-01 DIAGNOSIS — G93.40 ENCEPHALOPATHY: ICD-10-CM

## 2024-01-01 DIAGNOSIS — D64.9 ANEMIA, UNSPECIFIED: ICD-10-CM

## 2024-01-01 DIAGNOSIS — Z11.1 ENCOUNTER FOR SCREENING FOR RESPIRATORY TUBERCULOSIS: ICD-10-CM

## 2024-01-01 DIAGNOSIS — E86.0 DEHYDRATION: ICD-10-CM

## 2024-01-01 DIAGNOSIS — R79.89 ELEVATED TROPONIN: ICD-10-CM

## 2024-01-01 LAB
1,25(OH)2D SERPL-MCNC: 42.1 PG/ML (ref 19.9–79.3)
ACANTHOCYTES BLD QL SMEAR: NORMAL
ACHR BIND AB SER-SCNC: 0 NMOL/L
ACHR BLOCK AB/ACHR TOTAL SFR SER: 4 %
ACHR MOD AB/ACHR TOTAL SFR SER: 8 %
ALB CSF/SERPL: 11.3 RATIO
ALBUMIN CSF-MCNC: 27 MG/DL
ALBUMIN MFR UR ELPH: 137.9 MG/DL
ALBUMIN MFR UR ELPH: 56.4 %
ALBUMIN SERPL BCG-MCNC: 3 G/DL (ref 3.5–5.2)
ALBUMIN SERPL BCG-MCNC: 3.1 G/DL (ref 3.5–5.2)
ALBUMIN SERPL BCG-MCNC: 3.5 G/DL (ref 3.5–5.2)
ALBUMIN SERPL BCG-MCNC: 3.7 G/DL (ref 3.5–5.2)
ALBUMIN SERPL ELPH-MCNC: 2.8 G/DL (ref 3.7–5.1)
ALBUMIN SERPL-MCNC: 2397 MG/DL
ALBUMIN UR-MCNC: 100 MG/DL
ALBUMIN UR-MCNC: 300 MG/DL
ALP SERPL-CCNC: 54 U/L (ref 40–150)
ALP SERPL-CCNC: 55 U/L (ref 40–150)
ALP SERPL-CCNC: 57 U/L (ref 40–150)
ALP SERPL-CCNC: 65 U/L (ref 40–150)
ALP SERPL-CCNC: 67 U/L (ref 40–150)
ALP SERPL-CCNC: 76 U/L (ref 40–150)
ALPHA1 GLOB MFR UR ELPH: 2.6 %
ALPHA1 GLOB SERPL ELPH-MCNC: 0.4 G/DL (ref 0.2–0.4)
ALPHA2 GLOB MFR UR ELPH: 2.6 %
ALPHA2 GLOB SERPL ELPH-MCNC: 0.9 G/DL (ref 0.5–0.9)
ALT SERPL W P-5'-P-CCNC: 18 U/L (ref 0–70)
ALT SERPL W P-5'-P-CCNC: 19 U/L (ref 0–70)
ALT SERPL W P-5'-P-CCNC: 38 U/L (ref 0–70)
ALT SERPL W P-5'-P-CCNC: 40 U/L (ref 0–70)
ALT SERPL W P-5'-P-CCNC: 42 U/L (ref 0–70)
ALT SERPL W P-5'-P-CCNC: 52 U/L (ref 0–70)
ANION GAP SERPL CALCULATED.3IONS-SCNC: 10 MMOL/L (ref 7–15)
ANION GAP SERPL CALCULATED.3IONS-SCNC: 11 MMOL/L (ref 7–15)
ANION GAP SERPL CALCULATED.3IONS-SCNC: 12 MMOL/L (ref 7–15)
ANION GAP SERPL CALCULATED.3IONS-SCNC: 13 MMOL/L (ref 7–15)
ANION GAP SERPL CALCULATED.3IONS-SCNC: 14 MMOL/L (ref 7–15)
ANION GAP SERPL CALCULATED.3IONS-SCNC: 9 MMOL/L (ref 7–15)
APPEARANCE CSF: CLEAR
APPEARANCE UR: CLEAR
AST SERPL W P-5'-P-CCNC: 24 U/L (ref 0–45)
AST SERPL W P-5'-P-CCNC: 26 U/L (ref 0–45)
AST SERPL W P-5'-P-CCNC: 30 U/L (ref 0–45)
AST SERPL W P-5'-P-CCNC: 31 U/L (ref 0–45)
AST SERPL W P-5'-P-CCNC: 46 U/L (ref 0–45)
AST SERPL W P-5'-P-CCNC: 50 U/L (ref 0–45)
ATRIAL RATE - MUSE: 104 BPM
ATRIAL RATE - MUSE: 106 BPM
ATRIAL RATE - MUSE: 111 BPM
ATRIAL RATE - MUSE: 55 BPM
AUER BODIES BLD QL SMEAR: NORMAL
B-GLOBULIN MFR UR ELPH: 7.3 %
B-GLOBULIN SERPL ELPH-MCNC: 3.2 G/DL (ref 0.6–1)
B-OH-BUTYR SERPL-SCNC: 0.4 MMOL/L
BACTERIA #/AREA URNS HPF: ABNORMAL /HPF
BACTERIA #/AREA URNS HPF: ABNORMAL /HPF
BACTERIA BLD CULT: NO GROWTH
BACTERIA UR CULT: NORMAL
BASE EXCESS BLDV CALC-SCNC: -0.9 MMOL/L (ref -3–3)
BASE EXCESS BLDV CALC-SCNC: -2.1 MMOL/L (ref -3–3)
BASE EXCESS BLDV CALC-SCNC: 0.9 MMOL/L (ref -3–3)
BASO STIPL BLD QL SMEAR: NORMAL
BASOPHILS # BLD AUTO: 0 10E3/UL (ref 0–0.2)
BASOPHILS NFR BLD AUTO: 0 %
BILIRUB DIRECT SERPL-MCNC: <0.2 MG/DL (ref 0–0.3)
BILIRUB SERPL-MCNC: 0.3 MG/DL
BILIRUB SERPL-MCNC: 0.4 MG/DL
BILIRUB SERPL-MCNC: 0.5 MG/DL
BILIRUB UR QL STRIP: NEGATIVE
BITE CELLS BLD QL SMEAR: NORMAL
BLISTER CELLS BLD QL SMEAR: NORMAL
BUN SERPL-MCNC: 25.2 MG/DL (ref 8–23)
BUN SERPL-MCNC: 28.2 MG/DL (ref 8–23)
BUN SERPL-MCNC: 29.1 MG/DL (ref 8–23)
BUN SERPL-MCNC: 31 MG/DL (ref 8–23)
BUN SERPL-MCNC: 31.7 MG/DL (ref 8–23)
BUN SERPL-MCNC: 32.9 MG/DL (ref 8–23)
BUN SERPL-MCNC: 33.6 MG/DL (ref 8–23)
BUN SERPL-MCNC: 35.6 MG/DL (ref 8–23)
BUN SERPL-MCNC: 35.7 MG/DL (ref 8–23)
BUN SERPL-MCNC: 36.5 MG/DL (ref 8–23)
BUN SERPL-MCNC: 36.6 MG/DL (ref 8–23)
BUN SERPL-MCNC: 37.1 MG/DL (ref 8–23)
BUN SERPL-MCNC: 37.3 MG/DL (ref 8–23)
BUN SERPL-MCNC: 38.1 MG/DL (ref 8–23)
BUN SERPL-MCNC: 38.3 MG/DL (ref 8–23)
BUN SERPL-MCNC: 38.9 MG/DL (ref 8–23)
BUN SERPL-MCNC: 39.8 MG/DL (ref 8–23)
BUN SERPL-MCNC: 44.7 MG/DL (ref 8–23)
BUN SERPL-MCNC: 45.8 MG/DL (ref 8–23)
BUN SERPL-MCNC: 46.6 MG/DL (ref 8–23)
BUN SERPL-MCNC: 51.1 MG/DL (ref 8–23)
BUN SERPL-MCNC: 52.6 MG/DL (ref 8–23)
BUN SERPL-MCNC: 52.6 MG/DL (ref 8–23)
BUN SERPL-MCNC: 57.9 MG/DL (ref 8–23)
BURR CELLS BLD QL SMEAR: NORMAL
C GATTII+NEOFOR DNA CSF QL NAA+NON-PROBE: NEGATIVE
CA-I BLD-MCNC: 5.7 MG/DL (ref 4.4–5.2)
CA-I BLD-MCNC: 6.4 MG/DL (ref 4.4–5.2)
CA-I BLD-MCNC: 6.4 MG/DL (ref 4.4–5.2)
CA-I BLD-MCNC: 6.5 MG/DL (ref 4.4–5.2)
CA-I BLD-MCNC: 6.8 MG/DL (ref 4.4–5.2)
CA-I BLD-MCNC: 8.2 MG/DL (ref 4.4–5.2)
CALCIUM SERPL-MCNC: 10.3 MG/DL (ref 8.8–10.2)
CALCIUM SERPL-MCNC: 10.3 MG/DL (ref 8.8–10.2)
CALCIUM SERPL-MCNC: 10.4 MG/DL (ref 8.8–10.2)
CALCIUM SERPL-MCNC: 10.5 MG/DL (ref 8.8–10.2)
CALCIUM SERPL-MCNC: 10.7 MG/DL (ref 8.8–10.2)
CALCIUM SERPL-MCNC: 10.9 MG/DL (ref 8.8–10.2)
CALCIUM SERPL-MCNC: 11 MG/DL (ref 8.8–10.2)
CALCIUM SERPL-MCNC: 11.1 MG/DL (ref 8.8–10.2)
CALCIUM SERPL-MCNC: 11.4 MG/DL (ref 8.8–10.2)
CALCIUM SERPL-MCNC: 11.5 MG/DL (ref 8.8–10.2)
CALCIUM SERPL-MCNC: 11.6 MG/DL (ref 8.8–10.2)
CALCIUM SERPL-MCNC: 11.7 MG/DL (ref 8.8–10.2)
CALCIUM SERPL-MCNC: 12 MG/DL (ref 8.8–10.2)
CALCIUM SERPL-MCNC: 12 MG/DL (ref 8.8–10.2)
CALCIUM SERPL-MCNC: 12.2 MG/DL (ref 8.8–10.2)
CALCIUM SERPL-MCNC: 12.5 MG/DL (ref 8.8–10.2)
CALCIUM SERPL-MCNC: 12.5 MG/DL (ref 8.8–10.2)
CALCIUM SERPL-MCNC: 12.9 MG/DL (ref 8.8–10.2)
CALCIUM SERPL-MCNC: 13 MG/DL (ref 8.8–10.2)
CALCIUM SERPL-MCNC: 14.9 MG/DL (ref 8.8–10.2)
CHLORIDE SERPL-SCNC: 101 MMOL/L (ref 98–107)
CHLORIDE SERPL-SCNC: 102 MMOL/L (ref 98–107)
CHLORIDE SERPL-SCNC: 102 MMOL/L (ref 98–107)
CHLORIDE SERPL-SCNC: 103 MMOL/L (ref 98–107)
CHLORIDE SERPL-SCNC: 103 MMOL/L (ref 98–107)
CHLORIDE SERPL-SCNC: 104 MMOL/L (ref 98–107)
CHLORIDE SERPL-SCNC: 105 MMOL/L (ref 98–107)
CHLORIDE SERPL-SCNC: 106 MMOL/L (ref 98–107)
CHLORIDE SERPL-SCNC: 107 MMOL/L (ref 98–107)
CHLORIDE SERPL-SCNC: 108 MMOL/L (ref 98–107)
CHLORIDE SERPL-SCNC: 108 MMOL/L (ref 98–107)
CHLORIDE SERPL-SCNC: 109 MMOL/L (ref 98–107)
CHLORIDE SERPL-SCNC: 109 MMOL/L (ref 98–107)
CHLORIDE SERPL-SCNC: 110 MMOL/L (ref 98–107)
CHLORIDE SERPL-SCNC: 110 MMOL/L (ref 98–107)
CHLORIDE SERPL-SCNC: 112 MMOL/L (ref 98–107)
CHLORIDE SERPL-SCNC: 112 MMOL/L (ref 98–107)
CHLORIDE SERPL-SCNC: 113 MMOL/L (ref 98–107)
CHLORIDE SERPL-SCNC: 113 MMOL/L (ref 98–107)
CHLORIDE SERPL-SCNC: 118 MMOL/L (ref 98–107)
CHLORIDE SERPL-SCNC: 118 MMOL/L (ref 98–107)
CHLORIDE SERPL-SCNC: 119 MMOL/L (ref 98–107)
CHLORIDE SERPL-SCNC: 120 MMOL/L (ref 98–107)
CHLORIDE SERPL-SCNC: 99 MMOL/L (ref 98–107)
CK SERPL-CCNC: 290 U/L (ref 39–308)
CK SERPL-CCNC: 63 U/L (ref 39–308)
CMV DNA CSF QL NAA+NON-PROBE: NEGATIVE
COLOR CSF: COLORLESS
COLOR UR AUTO: ABNORMAL
CREAT SERPL-MCNC: 1.71 MG/DL (ref 0.67–1.17)
CREAT SERPL-MCNC: 1.74 MG/DL (ref 0.67–1.17)
CREAT SERPL-MCNC: 1.77 MG/DL (ref 0.67–1.17)
CREAT SERPL-MCNC: 1.77 MG/DL (ref 0.67–1.17)
CREAT SERPL-MCNC: 1.8 MG/DL (ref 0.67–1.17)
CREAT SERPL-MCNC: 1.8 MG/DL (ref 0.67–1.17)
CREAT SERPL-MCNC: 1.83 MG/DL (ref 0.67–1.17)
CREAT SERPL-MCNC: 1.84 MG/DL (ref 0.67–1.17)
CREAT SERPL-MCNC: 1.85 MG/DL (ref 0.67–1.17)
CREAT SERPL-MCNC: 1.86 MG/DL (ref 0.67–1.17)
CREAT SERPL-MCNC: 1.93 MG/DL (ref 0.67–1.17)
CREAT SERPL-MCNC: 1.96 MG/DL (ref 0.67–1.17)
CREAT SERPL-MCNC: 1.96 MG/DL (ref 0.67–1.17)
CREAT SERPL-MCNC: 2.1 MG/DL (ref 0.67–1.17)
CREAT SERPL-MCNC: 2.12 MG/DL (ref 0.67–1.17)
CREAT SERPL-MCNC: 2.17 MG/DL (ref 0.67–1.17)
CREAT SERPL-MCNC: 2.2 MG/DL (ref 0.67–1.17)
CREAT SERPL-MCNC: 2.21 MG/DL (ref 0.67–1.17)
CREAT SERPL-MCNC: 2.24 MG/DL (ref 0.67–1.17)
CREAT SERPL-MCNC: 2.26 MG/DL (ref 0.67–1.17)
CREAT SERPL-MCNC: 2.29 MG/DL (ref 0.67–1.17)
CREAT SERPL-MCNC: 2.29 MG/DL (ref 0.67–1.17)
CREAT SERPL-MCNC: 2.31 MG/DL (ref 0.67–1.17)
CREAT SERPL-MCNC: 2.31 MG/DL (ref 0.67–1.17)
CREAT SERPL-MCNC: 2.37 MG/DL (ref 0.67–1.17)
CREAT SERPL-MCNC: 2.76 MG/DL (ref 0.67–1.17)
CREAT UR-MCNC: 119.4 MG/DL
DACRYOCYTES BLD QL SMEAR: NORMAL
DEPRECATED HCO3 PLAS-SCNC: 19 MMOL/L (ref 22–29)
DEPRECATED HCO3 PLAS-SCNC: 20 MMOL/L (ref 22–29)
DEPRECATED HCO3 PLAS-SCNC: 21 MMOL/L (ref 22–29)
DEPRECATED HCO3 PLAS-SCNC: 22 MMOL/L (ref 22–29)
DEPRECATED HCO3 PLAS-SCNC: 23 MMOL/L (ref 22–29)
DEPRECATED HCO3 PLAS-SCNC: 24 MMOL/L (ref 22–29)
DEPRECATED HCO3 PLAS-SCNC: 25 MMOL/L (ref 22–29)
DIASTOLIC BLOOD PRESSURE - MUSE: NORMAL MMHG
E COLI K1 AG CSF QL: NEGATIVE
EGFRCR SERPLBLD CKD-EPI 2021: 21 ML/MIN/1.73M2
EGFRCR SERPLBLD CKD-EPI 2021: 26 ML/MIN/1.73M2
EGFRCR SERPLBLD CKD-EPI 2021: 27 ML/MIN/1.73M2
EGFRCR SERPLBLD CKD-EPI 2021: 28 ML/MIN/1.73M2
EGFRCR SERPLBLD CKD-EPI 2021: 29 ML/MIN/1.73M2
EGFRCR SERPLBLD CKD-EPI 2021: 29 ML/MIN/1.73M2
EGFRCR SERPLBLD CKD-EPI 2021: 30 ML/MIN/1.73M2
EGFRCR SERPLBLD CKD-EPI 2021: 32 ML/MIN/1.73M2
EGFRCR SERPLBLD CKD-EPI 2021: 32 ML/MIN/1.73M2
EGFRCR SERPLBLD CKD-EPI 2021: 33 ML/MIN/1.73M2
EGFRCR SERPLBLD CKD-EPI 2021: 34 ML/MIN/1.73M2
EGFRCR SERPLBLD CKD-EPI 2021: 35 ML/MIN/1.73M2
EGFRCR SERPLBLD CKD-EPI 2021: 36 ML/MIN/1.73M2
EGFRCR SERPLBLD CKD-EPI 2021: 37 ML/MIN/1.73M2
EGFRCR SERPLBLD CKD-EPI 2021: 38 ML/MIN/1.73M2
ELLIPTOCYTES BLD QL SMEAR: NORMAL
EOSINOPHIL # BLD AUTO: 0 10E3/UL (ref 0–0.7)
EOSINOPHIL # BLD AUTO: 0 10E3/UL (ref 0–0.7)
EOSINOPHIL # BLD AUTO: 0.1 10E3/UL (ref 0–0.7)
EOSINOPHIL # BLD AUTO: 0.1 10E3/UL (ref 0–0.7)
EOSINOPHIL NFR BLD AUTO: 1 %
EOSINOPHIL NFR BLD AUTO: 2 %
ERYTHROCYTE [DISTWIDTH] IN BLOOD BY AUTOMATED COUNT: 13.8 % (ref 10–15)
ERYTHROCYTE [DISTWIDTH] IN BLOOD BY AUTOMATED COUNT: 14 % (ref 10–15)
ERYTHROCYTE [DISTWIDTH] IN BLOOD BY AUTOMATED COUNT: 14.1 % (ref 10–15)
ERYTHROCYTE [DISTWIDTH] IN BLOOD BY AUTOMATED COUNT: 14.1 % (ref 10–15)
ERYTHROCYTE [DISTWIDTH] IN BLOOD BY AUTOMATED COUNT: 14.2 % (ref 10–15)
ERYTHROCYTE [DISTWIDTH] IN BLOOD BY AUTOMATED COUNT: 14.3 % (ref 10–15)
ERYTHROCYTE [DISTWIDTH] IN BLOOD BY AUTOMATED COUNT: 14.4 % (ref 10–15)
ERYTHROCYTE [DISTWIDTH] IN BLOOD BY AUTOMATED COUNT: 14.4 % (ref 10–15)
ERYTHROCYTE [DISTWIDTH] IN BLOOD BY AUTOMATED COUNT: 14.5 % (ref 10–15)
EV RNA SPEC QL NAA+PROBE: NEGATIVE
FLUAV RNA SPEC QL NAA+PROBE: POSITIVE
FLUBV RNA RESP QL NAA+PROBE: NEGATIVE
FRAGMENTS BLD QL SMEAR: NORMAL
GAMMA GLOB MFR UR ELPH: 31.1 %
GAMMA GLOB SERPL ELPH-MCNC: 0.2 G/DL (ref 0.7–1.6)
GAMMA INTERFERON BACKGROUND BLD IA-ACNC: 0.04 IU/ML
GLUCOSE BLDC GLUCOMTR-MCNC: 103 MG/DL (ref 70–99)
GLUCOSE BLDC GLUCOMTR-MCNC: 106 MG/DL (ref 70–99)
GLUCOSE BLDC GLUCOMTR-MCNC: 110 MG/DL (ref 70–99)
GLUCOSE BLDC GLUCOMTR-MCNC: 120 MG/DL (ref 70–99)
GLUCOSE BLDC GLUCOMTR-MCNC: 122 MG/DL (ref 70–99)
GLUCOSE BLDC GLUCOMTR-MCNC: 124 MG/DL (ref 70–99)
GLUCOSE BLDC GLUCOMTR-MCNC: 130 MG/DL (ref 70–99)
GLUCOSE BLDC GLUCOMTR-MCNC: 133 MG/DL (ref 70–99)
GLUCOSE BLDC GLUCOMTR-MCNC: 137 MG/DL (ref 70–99)
GLUCOSE BLDC GLUCOMTR-MCNC: 140 MG/DL (ref 70–99)
GLUCOSE BLDC GLUCOMTR-MCNC: 144 MG/DL (ref 70–99)
GLUCOSE BLDC GLUCOMTR-MCNC: 276 MG/DL (ref 70–99)
GLUCOSE BLDC GLUCOMTR-MCNC: 96 MG/DL (ref 70–99)
GLUCOSE BLDC GLUCOMTR-MCNC: 97 MG/DL (ref 70–99)
GLUCOSE CSF-MCNC: 82 MG/DL (ref 40–70)
GLUCOSE SERPL-MCNC: 100 MG/DL (ref 70–99)
GLUCOSE SERPL-MCNC: 103 MG/DL (ref 70–99)
GLUCOSE SERPL-MCNC: 104 MG/DL (ref 70–99)
GLUCOSE SERPL-MCNC: 108 MG/DL (ref 70–99)
GLUCOSE SERPL-MCNC: 114 MG/DL (ref 70–99)
GLUCOSE SERPL-MCNC: 116 MG/DL (ref 70–99)
GLUCOSE SERPL-MCNC: 119 MG/DL (ref 70–99)
GLUCOSE SERPL-MCNC: 120 MG/DL (ref 70–99)
GLUCOSE SERPL-MCNC: 121 MG/DL (ref 70–99)
GLUCOSE SERPL-MCNC: 121 MG/DL (ref 70–99)
GLUCOSE SERPL-MCNC: 122 MG/DL (ref 70–99)
GLUCOSE SERPL-MCNC: 124 MG/DL (ref 70–99)
GLUCOSE SERPL-MCNC: 124 MG/DL (ref 70–99)
GLUCOSE SERPL-MCNC: 127 MG/DL (ref 70–99)
GLUCOSE SERPL-MCNC: 127 MG/DL (ref 70–99)
GLUCOSE SERPL-MCNC: 129 MG/DL (ref 70–99)
GLUCOSE SERPL-MCNC: 131 MG/DL (ref 70–99)
GLUCOSE SERPL-MCNC: 133 MG/DL (ref 70–99)
GLUCOSE SERPL-MCNC: 137 MG/DL (ref 70–99)
GLUCOSE SERPL-MCNC: 138 MG/DL (ref 70–99)
GLUCOSE SERPL-MCNC: 147 MG/DL (ref 70–99)
GLUCOSE SERPL-MCNC: 148 MG/DL (ref 70–99)
GLUCOSE SERPL-MCNC: 210 MG/DL (ref 70–99)
GLUCOSE SERPL-MCNC: 300 MG/DL (ref 70–99)
GLUCOSE UR STRIP-MCNC: 300 MG/DL
GLUCOSE UR STRIP-MCNC: NEGATIVE MG/DL
GP B STREP DNA CSF QL NAA+NON-PROBE: NEGATIVE
GROUP A STREP BY PCR: NOT DETECTED
HAEM INFLU DNA CSF QL NAA+NON-PROBE: NEGATIVE
HBA1C MFR BLD: 5.5 %
HCO3 BLDV-SCNC: 22 MMOL/L (ref 21–28)
HCO3 BLDV-SCNC: 23 MMOL/L (ref 21–28)
HCO3 BLDV-SCNC: 25 MMOL/L (ref 21–28)
HCO3 BLDV-SCNC: 26 MMOL/L (ref 21–28)
HCT VFR BLD AUTO: 27.8 % (ref 40–53)
HCT VFR BLD AUTO: 27.9 % (ref 40–53)
HCT VFR BLD AUTO: 28.7 % (ref 40–53)
HCT VFR BLD AUTO: 28.8 % (ref 40–53)
HCT VFR BLD AUTO: 30 % (ref 40–53)
HCT VFR BLD AUTO: 30.2 % (ref 40–53)
HCT VFR BLD AUTO: 30.3 % (ref 40–53)
HCT VFR BLD AUTO: 30.6 % (ref 40–53)
HCT VFR BLD AUTO: 32.9 % (ref 40–53)
HCT VFR BLD AUTO: 33.7 % (ref 40–53)
HCT VFR BLD AUTO: 33.8 % (ref 40–53)
HCT VFR BLD AUTO: 38 % (ref 40–53)
HGB BLD-MCNC: 10.8 G/DL (ref 13.3–17.7)
HGB BLD-MCNC: 11 G/DL (ref 13.3–17.7)
HGB BLD-MCNC: 11.3 G/DL (ref 13.3–17.7)
HGB BLD-MCNC: 12.5 G/DL (ref 13.3–17.7)
HGB BLD-MCNC: 8.9 G/DL (ref 13.3–17.7)
HGB BLD-MCNC: 9.1 G/DL (ref 13.3–17.7)
HGB BLD-MCNC: 9.1 G/DL (ref 13.3–17.7)
HGB BLD-MCNC: 9.4 G/DL (ref 13.3–17.7)
HGB BLD-MCNC: 9.6 G/DL (ref 13.3–17.7)
HGB BLD-MCNC: 9.6 G/DL (ref 13.3–17.7)
HGB BLD-MCNC: 9.7 G/DL (ref 13.3–17.7)
HGB BLD-MCNC: 9.9 G/DL (ref 13.3–17.7)
HGB C CRYSTALS: NORMAL
HGB UR QL STRIP: ABNORMAL
HHV6 DNA CSF QL NAA+NON-PROBE: NEGATIVE
HOLD SPECIMEN: NORMAL
HOWELL-JOLLY BOD BLD QL SMEAR: NORMAL
HSV1 DNA CSF QL NAA+NON-PROBE: NEGATIVE
HSV2 DNA CSF QL NAA+NON-PROBE: NEGATIVE
HYALINE CASTS: 1 /LPF
HYALINE CASTS: 5 /LPF
IGG CSF-MCNC: 1.6 MG/DL
IGG SERPL-MCNC: 309 MG/DL
IGG SYNTH RATE SER+CSF CALC-MRATE: <0 MG/D
IGG/ALB CLEAR SER+CSF-RTO: 0.46 RATIO
IGG/ALB CSF: 0.06 RATIO
IMM GRANULOCYTES # BLD: 0 10E3/UL
IMM GRANULOCYTES # BLD: 0.1 10E3/UL
IMM GRANULOCYTES NFR BLD: 0 %
IMM GRANULOCYTES NFR BLD: 1 %
INTERPRETATION ECG - MUSE: NORMAL
KAPPA LC FREE SER-MCNC: 53.93 MG/DL (ref 0.33–1.94)
KAPPA LC FREE/LAMBDA FREE SER NEPH: 38.25 {RATIO} (ref 0.26–1.65)
KETONES UR STRIP-MCNC: NEGATIVE MG/DL
L MONOCYTOG DNA CSF QL NAA+NON-PROBE: NEGATIVE
LACTATE BLD-SCNC: 1 MMOL/L
LACTATE SERPL-SCNC: 0.9 MMOL/L (ref 0.7–2)
LACTATE SERPL-SCNC: 1 MMOL/L (ref 0.7–2)
LACTATE SERPL-SCNC: 1.4 MMOL/L (ref 0.7–2)
LACTATE SERPL-SCNC: 1.6 MMOL/L (ref 0.7–2)
LAMBDA LC FREE SERPL-MCNC: 1.41 MG/DL (ref 0.57–2.63)
LEUKOCYTE ESTERASE UR QL STRIP: NEGATIVE
LOCATION OF TASK: ABNORMAL
LVEF ECHO: NORMAL
LYMPHOCYTES # BLD AUTO: 0.6 10E3/UL (ref 0.8–5.3)
LYMPHOCYTES # BLD AUTO: 0.7 10E3/UL (ref 0.8–5.3)
LYMPHOCYTES # BLD AUTO: 0.7 10E3/UL (ref 0.8–5.3)
LYMPHOCYTES # BLD AUTO: 1.1 10E3/UL (ref 0.8–5.3)
LYMPHOCYTES NFR BLD AUTO: 11 %
LYMPHOCYTES NFR BLD AUTO: 12 %
LYMPHOCYTES NFR BLD AUTO: 16 %
LYMPHOCYTES NFR BLD AUTO: 24 %
M PROTEIN MFR UR ELPH: 19.8 %
M PROTEIN SERPL ELPH-MCNC: 1.8 G/DL
M TB IFN-G BLD-IMP: NEGATIVE
M TB IFN-G CD4+ BCKGRND COR BLD-ACNC: 6.15 IU/ML
MAGNESIUM SERPL-MCNC: 1.7 MG/DL (ref 1.7–2.3)
MAGNESIUM SERPL-MCNC: 1.7 MG/DL (ref 1.7–2.3)
MAGNESIUM SERPL-MCNC: 1.8 MG/DL (ref 1.7–2.3)
MAGNESIUM SERPL-MCNC: 1.9 MG/DL (ref 1.7–2.3)
MAGNESIUM SERPL-MCNC: 2 MG/DL (ref 1.7–2.3)
MAGNESIUM SERPL-MCNC: 2.1 MG/DL (ref 1.7–2.3)
MAGNESIUM SERPL-MCNC: 2.2 MG/DL (ref 1.7–2.3)
MAGNESIUM SERPL-MCNC: 2.3 MG/DL (ref 1.7–2.3)
MAGNESIUM SERPL-MCNC: 2.6 MG/DL (ref 1.7–2.3)
MCH RBC QN AUTO: 32 PG (ref 26.5–33)
MCH RBC QN AUTO: 32 PG (ref 26.5–33)
MCH RBC QN AUTO: 32.3 PG (ref 26.5–33)
MCH RBC QN AUTO: 32.4 PG (ref 26.5–33)
MCH RBC QN AUTO: 32.5 PG (ref 26.5–33)
MCH RBC QN AUTO: 32.6 PG (ref 26.5–33)
MCH RBC QN AUTO: 32.6 PG (ref 26.5–33)
MCH RBC QN AUTO: 32.8 PG (ref 26.5–33)
MCH RBC QN AUTO: 32.8 PG (ref 26.5–33)
MCH RBC QN AUTO: 32.9 PG (ref 26.5–33)
MCH RBC QN AUTO: 33 PG (ref 26.5–33)
MCH RBC QN AUTO: 33 PG (ref 26.5–33)
MCHC RBC AUTO-ENTMCNC: 30.9 G/DL (ref 31.5–36.5)
MCHC RBC AUTO-ENTMCNC: 31.4 G/DL (ref 31.5–36.5)
MCHC RBC AUTO-ENTMCNC: 31.8 G/DL (ref 31.5–36.5)
MCHC RBC AUTO-ENTMCNC: 32.3 G/DL (ref 31.5–36.5)
MCHC RBC AUTO-ENTMCNC: 32.5 G/DL (ref 31.5–36.5)
MCHC RBC AUTO-ENTMCNC: 32.6 G/DL (ref 31.5–36.5)
MCHC RBC AUTO-ENTMCNC: 32.7 G/DL (ref 31.5–36.5)
MCHC RBC AUTO-ENTMCNC: 32.7 G/DL (ref 31.5–36.5)
MCHC RBC AUTO-ENTMCNC: 32.8 G/DL (ref 31.5–36.5)
MCHC RBC AUTO-ENTMCNC: 32.8 G/DL (ref 31.5–36.5)
MCHC RBC AUTO-ENTMCNC: 32.9 G/DL (ref 31.5–36.5)
MCHC RBC AUTO-ENTMCNC: 33.5 G/DL (ref 31.5–36.5)
MCV RBC AUTO: 100 FL (ref 78–100)
MCV RBC AUTO: 101 FL (ref 78–100)
MCV RBC AUTO: 102 FL (ref 78–100)
MCV RBC AUTO: 102 FL (ref 78–100)
MCV RBC AUTO: 104 FL (ref 78–100)
MCV RBC AUTO: 98 FL (ref 78–100)
MCV RBC AUTO: 99 FL (ref 78–100)
MITOGEN IGNF BCKGRD COR BLD-ACNC: 0.01 IU/ML
MITOGEN IGNF BCKGRD COR BLD-ACNC: 0.01 IU/ML
MONOCYTES # BLD AUTO: 0.2 10E3/UL (ref 0–1.3)
MONOCYTES # BLD AUTO: 0.6 10E3/UL (ref 0–1.3)
MONOCYTES # BLD AUTO: 0.7 10E3/UL (ref 0–1.3)
MONOCYTES # BLD AUTO: 0.8 10E3/UL (ref 0–1.3)
MONOCYTES NFR BLD AUTO: 14 %
MONOCYTES NFR BLD AUTO: 15 %
MONOCYTES NFR BLD AUTO: 7 %
MONOCYTES NFR BLD AUTO: 7 %
MUCOUS THREADS #/AREA URNS LPF: PRESENT /LPF
N MEN DNA CSF QL NAA+NON-PROBE: NEGATIVE
NEUTROPHILS # BLD AUTO: 1.6 10E3/UL (ref 1.6–8.3)
NEUTROPHILS # BLD AUTO: 2.9 10E3/UL (ref 1.6–8.3)
NEUTROPHILS # BLD AUTO: 3.8 10E3/UL (ref 1.6–8.3)
NEUTROPHILS # BLD AUTO: 7.7 10E3/UL (ref 1.6–8.3)
NEUTROPHILS NFR BLD AUTO: 67 %
NEUTROPHILS NFR BLD AUTO: 68 %
NEUTROPHILS NFR BLD AUTO: 71 %
NEUTROPHILS NFR BLD AUTO: 80 %
NEUTS HYPERSEG BLD QL SMEAR: NORMAL
NITRATE UR QL: NEGATIVE
NRBC # BLD AUTO: 0 10E3/UL
NRBC BLD AUTO-RTO: 0 /100
NT-PROBNP SERPL-MCNC: 2409 PG/ML (ref 0–1800)
O2/TOTAL GAS SETTING VFR VENT: 10 %
O2/TOTAL GAS SETTING VFR VENT: 2 %
O2/TOTAL GAS SETTING VFR VENT: 21 %
OLIGOCLONAL BANDS CSF ELPH-IMP: ABNORMAL
OLIGOCLONAL BANDS CSF ELPH-IMP: NEGATIVE
OLIGOCLONAL BANDS CSF IEF: ABNORMAL BANDS
OXYHGB MFR BLDV: 60 % (ref 70–75)
OXYHGB MFR BLDV: 69 % (ref 70–75)
OXYHGB MFR BLDV: 88 % (ref 70–75)
P AXIS - MUSE: -10 DEGREES
P AXIS - MUSE: 50 DEGREES
P AXIS - MUSE: 53 DEGREES
P AXIS - MUSE: 65 DEGREES
PARECHOVIRUS A RNA CSF QL NAA+NON-PROBE: NEGATIVE
PATH REPORT.COMMENTS IMP SPEC: NORMAL
PCO2 BLDV: 34 MM HG (ref 40–50)
PCO2 BLDV: 35 MM HG (ref 40–50)
PCO2 BLDV: 42 MM HG (ref 40–50)
PCO2 BLDV: 46 MM HG (ref 40–50)
PH BLDV: 7.34 [PH] (ref 7.32–7.43)
PH BLDV: 7.4 [PH] (ref 7.32–7.43)
PH BLDV: 7.41 [PH] (ref 7.32–7.43)
PH BLDV: 7.44 [PH] (ref 7.32–7.43)
PH UR STRIP: 5.5 [PH] (ref 5–7)
PH UR STRIP: 6 [PH] (ref 5–7)
PH UR STRIP: 6.5 [PH] (ref 5–7)
PHOSPHATE SERPL-MCNC: 2.6 MG/DL (ref 2.5–4.5)
PHOSPHATE SERPL-MCNC: 2.6 MG/DL (ref 2.5–4.5)
PHOSPHATE SERPL-MCNC: 3 MG/DL (ref 2.5–4.5)
PHOSPHATE SERPL-MCNC: 3.5 MG/DL (ref 2.5–4.5)
PHOSPHATE SERPL-MCNC: 3.6 MG/DL (ref 2.5–4.5)
PLAT MORPH BLD: NORMAL
PLATELET # BLD AUTO: 107 10E3/UL (ref 150–450)
PLATELET # BLD AUTO: 109 10E3/UL (ref 150–450)
PLATELET # BLD AUTO: 126 10E3/UL (ref 150–450)
PLATELET # BLD AUTO: 127 10E3/UL (ref 150–450)
PLATELET # BLD AUTO: 128 10E3/UL (ref 150–450)
PLATELET # BLD AUTO: 129 10E3/UL (ref 150–450)
PLATELET # BLD AUTO: 134 10E3/UL (ref 150–450)
PLATELET # BLD AUTO: 135 10E3/UL (ref 150–450)
PLATELET # BLD AUTO: 135 10E3/UL (ref 150–450)
PLATELET # BLD AUTO: 173 10E3/UL (ref 150–450)
PLATELET # BLD AUTO: 195 10E3/UL (ref 150–450)
PLATELET # BLD AUTO: 245 10E3/UL (ref 150–450)
PLATELET # BLD AUTO: 276 10E3/UL (ref 150–450)
PLATELET # BLD AUTO: 298 10E3/UL (ref 150–450)
PLATELET # BLD AUTO: 312 10E3/UL (ref 150–450)
PLATELET # BLD AUTO: 342 10E3/UL (ref 150–450)
PO2 BLDV: 32 MM HG (ref 25–47)
PO2 BLDV: 34 MM HG (ref 25–47)
PO2 BLDV: 36 MM HG (ref 25–47)
PO2 BLDV: 61 MM HG (ref 25–47)
POLYCHROMASIA BLD QL SMEAR: NORMAL
POTASSIUM SERPL-SCNC: 3 MMOL/L (ref 3.4–5.3)
POTASSIUM SERPL-SCNC: 3.1 MMOL/L (ref 3.4–5.3)
POTASSIUM SERPL-SCNC: 3.5 MMOL/L (ref 3.4–5.3)
POTASSIUM SERPL-SCNC: 3.5 MMOL/L (ref 3.4–5.3)
POTASSIUM SERPL-SCNC: 3.6 MMOL/L (ref 3.4–5.3)
POTASSIUM SERPL-SCNC: 3.7 MMOL/L (ref 3.4–5.3)
POTASSIUM SERPL-SCNC: 3.8 MMOL/L (ref 3.4–5.3)
POTASSIUM SERPL-SCNC: 3.9 MMOL/L (ref 3.4–5.3)
POTASSIUM SERPL-SCNC: 3.9 MMOL/L (ref 3.4–5.3)
POTASSIUM SERPL-SCNC: 4 MMOL/L (ref 3.4–5.3)
POTASSIUM SERPL-SCNC: 4 MMOL/L (ref 3.4–5.3)
POTASSIUM SERPL-SCNC: 4.1 MMOL/L (ref 3.4–5.3)
POTASSIUM SERPL-SCNC: 4.1 MMOL/L (ref 3.4–5.3)
POTASSIUM SERPL-SCNC: 4.2 MMOL/L (ref 3.4–5.3)
POTASSIUM SERPL-SCNC: 4.2 MMOL/L (ref 3.4–5.3)
POTASSIUM SERPL-SCNC: 4.3 MMOL/L (ref 3.4–5.3)
POTASSIUM SERPL-SCNC: 4.4 MMOL/L (ref 3.4–5.3)
POTASSIUM SERPL-SCNC: 4.5 MMOL/L (ref 3.4–5.3)
POTASSIUM SERPL-SCNC: 4.7 MMOL/L (ref 3.4–5.3)
POTASSIUM SERPL-SCNC: 4.7 MMOL/L (ref 3.4–5.3)
PR INTERVAL - MUSE: 124 MS
PR INTERVAL - MUSE: 180 MS
PR INTERVAL - MUSE: 196 MS
PR INTERVAL - MUSE: 210 MS
PROCALCITONIN SERPL IA-MCNC: 13.18 NG/ML
PROT CSF-MCNC: 44.4 MG/DL (ref 15–45)
PROT PATTERN SERPL ELPH-IMP: ABNORMAL
PROT PATTERN SERPL IFE-IMP: NORMAL
PROT PATTERN UR ELPH-IMP: ABNORMAL
PROT SERPL-MCNC: 8 G/DL (ref 6.4–8.3)
PROT SERPL-MCNC: 8.3 G/DL (ref 6.4–8.3)
PROT SERPL-MCNC: 8.4 G/DL (ref 6.4–8.3)
PROT SERPL-MCNC: 8.4 G/DL (ref 6.4–8.3)
PROT SERPL-MCNC: 8.5 G/DL (ref 6.4–8.3)
PROT SERPL-MCNC: 9.1 G/DL (ref 6.4–8.3)
PROT/CREAT 24H UR: 1.15 MG/MG CR (ref 0–0.2)
PSA SERPL DL<=0.01 NG/ML-MCNC: 2.28 NG/ML
PTH RELATED PROT SERPL-SCNC: 5.4 PMOL/L
PTH-INTACT SERPL-MCNC: 7 PG/ML (ref 15–65)
PTH-INTACT SERPL-MCNC: 7 PG/ML (ref 15–65)
QRS DURATION - MUSE: 144 MS
QRS DURATION - MUSE: 158 MS
QRS DURATION - MUSE: 160 MS
QRS DURATION - MUSE: 96 MS
QT - MUSE: 338 MS
QT - MUSE: 366 MS
QT - MUSE: 370 MS
QT - MUSE: 410 MS
QTC - MUSE: 392 MS
QTC - MUSE: 459 MS
QTC - MUSE: 486 MS
QTC - MUSE: 486 MS
QUANTIFERON MITOGEN: 6.19 IU/ML
QUANTIFERON NIL TUBE: 0.04 IU/ML
QUANTIFERON TB1 TUBE: 0.05 IU/ML
QUANTIFERON TB2 TUBE: 0.05
R AXIS - MUSE: -10 DEGREES
R AXIS - MUSE: -11 DEGREES
R AXIS - MUSE: 3 DEGREES
R AXIS - MUSE: 33 DEGREES
RBC # BLD AUTO: 2.76 10E6/UL (ref 4.4–5.9)
RBC # BLD AUTO: 2.78 10E6/UL (ref 4.4–5.9)
RBC # BLD AUTO: 2.81 10E6/UL (ref 4.4–5.9)
RBC # BLD AUTO: 2.85 10E6/UL (ref 4.4–5.9)
RBC # BLD AUTO: 2.96 10E6/UL (ref 4.4–5.9)
RBC # BLD AUTO: 2.97 10E6/UL (ref 4.4–5.9)
RBC # BLD AUTO: 3 10E6/UL (ref 4.4–5.9)
RBC # BLD AUTO: 3.04 10E6/UL (ref 4.4–5.9)
RBC # BLD AUTO: 3.32 10E6/UL (ref 4.4–5.9)
RBC # BLD AUTO: 3.34 10E6/UL (ref 4.4–5.9)
RBC # BLD AUTO: 3.45 10E6/UL (ref 4.4–5.9)
RBC # BLD AUTO: 3.83 10E6/UL (ref 4.4–5.9)
RBC # CSF MANUAL: 1 /UL (ref 0–2)
RBC AGGLUT BLD QL: NORMAL
RBC MORPH BLD: NORMAL
RBC URINE: 1 /HPF
RBC URINE: 2 /HPF
RBC URINE: <1 /HPF
ROULEAUX BLD QL SMEAR: NORMAL
RSV RNA SPEC NAA+PROBE: NEGATIVE
S PNEUM DNA CSF QL NAA+NON-PROBE: NEGATIVE
SAO2 % BLDV: 61.1 % (ref 70–75)
SAO2 % BLDV: 70.2 % (ref 70–75)
SAO2 % BLDV: 72 % (ref 70–75)
SAO2 % BLDV: 90 % (ref 70–75)
SARS-COV-2 RNA RESP QL NAA+PROBE: NEGATIVE
SICKLE CELLS BLD QL SMEAR: NORMAL
SMUDGE CELLS BLD QL SMEAR: NORMAL
SODIUM SERPL-SCNC: 133 MMOL/L (ref 135–145)
SODIUM SERPL-SCNC: 133 MMOL/L (ref 135–145)
SODIUM SERPL-SCNC: 134 MMOL/L (ref 135–145)
SODIUM SERPL-SCNC: 135 MMOL/L (ref 135–145)
SODIUM SERPL-SCNC: 136 MMOL/L (ref 135–145)
SODIUM SERPL-SCNC: 138 MMOL/L (ref 135–145)
SODIUM SERPL-SCNC: 139 MMOL/L (ref 135–145)
SODIUM SERPL-SCNC: 140 MMOL/L (ref 135–145)
SODIUM SERPL-SCNC: 141 MMOL/L (ref 135–145)
SODIUM SERPL-SCNC: 141 MMOL/L (ref 135–145)
SODIUM SERPL-SCNC: 142 MMOL/L (ref 135–145)
SODIUM SERPL-SCNC: 144 MMOL/L (ref 135–145)
SODIUM SERPL-SCNC: 146 MMOL/L (ref 135–145)
SODIUM SERPL-SCNC: 146 MMOL/L (ref 135–145)
SODIUM SERPL-SCNC: 147 MMOL/L (ref 135–145)
SODIUM SERPL-SCNC: 149 MMOL/L (ref 135–145)
SODIUM SERPL-SCNC: 149 MMOL/L (ref 135–145)
SODIUM SERPL-SCNC: 151 MMOL/L (ref 135–145)
SODIUM SERPL-SCNC: 152 MMOL/L (ref 135–145)
SP GR UR STRIP: 1.01 (ref 1–1.03)
SP GR UR STRIP: 1.01 (ref 1–1.03)
SP GR UR STRIP: 1.02 (ref 1–1.03)
SPHEROCYTES BLD QL SMEAR: NORMAL
SQUAMOUS EPITHELIAL: <1 /HPF
STOMATOCYTES BLD QL SMEAR: NORMAL
STRIA MUS IGG SER QL IF: NORMAL
SYSTOLIC BLOOD PRESSURE - MUSE: NORMAL MMHG
T AXIS - MUSE: 103 DEGREES
T AXIS - MUSE: 109 DEGREES
T AXIS - MUSE: 14 DEGREES
T AXIS - MUSE: 82 DEGREES
TARGETS BLD QL SMEAR: NORMAL
TOTAL PROTEIN SERUM FOR ELP: 7.5 G/DL (ref 6.4–8.3)
TOXIC GRANULES BLD QL SMEAR: NORMAL
TROPONIN T SERPL HS-MCNC: 106 NG/L
TROPONIN T SERPL HS-MCNC: 108 NG/L
TROPONIN T SERPL HS-MCNC: 75 NG/L
TROPONIN T SERPL HS-MCNC: 89 NG/L
TROPONIN T SERPL HS-MCNC: 89 NG/L
TSH SERPL DL<=0.005 MIU/L-ACNC: 1.15 UIU/ML (ref 0.3–4.2)
TUBE # CSF: 4
UROBILINOGEN UR STRIP-MCNC: NORMAL MG/DL
VARIANT LYMPHS BLD QL SMEAR: NORMAL
VENTRICULAR RATE- MUSE: 104 BPM
VENTRICULAR RATE- MUSE: 106 BPM
VENTRICULAR RATE- MUSE: 111 BPM
VENTRICULAR RATE- MUSE: 55 BPM
VIT D+METAB SERPL-MCNC: 71 NG/ML (ref 20–50)
VZV DNA CSF QL NAA+NON-PROBE: NEGATIVE
WBC # BLD AUTO: 2.2 10E3/UL (ref 4–11)
WBC # BLD AUTO: 2.4 10E3/UL (ref 4–11)
WBC # BLD AUTO: 3.6 10E3/UL (ref 4–11)
WBC # BLD AUTO: 4 10E3/UL (ref 4–11)
WBC # BLD AUTO: 4.3 10E3/UL (ref 4–11)
WBC # BLD AUTO: 5.1 10E3/UL (ref 4–11)
WBC # BLD AUTO: 5.3 10E3/UL (ref 4–11)
WBC # BLD AUTO: 5.4 10E3/UL (ref 4–11)
WBC # BLD AUTO: 7.2 10E3/UL (ref 4–11)
WBC # BLD AUTO: 7.7 10E3/UL (ref 4–11)
WBC # BLD AUTO: 8.3 10E3/UL (ref 4–11)
WBC # BLD AUTO: 9.6 10E3/UL (ref 4–11)
WBC # CSF MANUAL: 1 /UL (ref 0–5)
WBC URINE: 1 /HPF
WBC URINE: 2 /HPF
WBC URINE: <1 /HPF
WNV IGG CSF IA-ACNC: 0.04 IV
WNV IGM CSF IA-ACNC: 0 IV

## 2024-01-01 PROCEDURE — 85041 AUTOMATED RBC COUNT: CPT | Performed by: STUDENT IN AN ORGANIZED HEALTH CARE EDUCATION/TRAINING PROGRAM

## 2024-01-01 PROCEDURE — 250N000013 HC RX MED GY IP 250 OP 250 PS 637: Performed by: STUDENT IN AN ORGANIZED HEALTH CARE EDUCATION/TRAINING PROGRAM

## 2024-01-01 PROCEDURE — 83735 ASSAY OF MAGNESIUM: CPT | Performed by: INTERNAL MEDICINE

## 2024-01-01 PROCEDURE — 84155 ASSAY OF PROTEIN SERUM: CPT | Performed by: STUDENT IN AN ORGANIZED HEALTH CARE EDUCATION/TRAINING PROGRAM

## 2024-01-01 PROCEDURE — 86334 IMMUNOFIX E-PHORESIS SERUM: CPT | Mod: 26 | Performed by: PATHOLOGY

## 2024-01-01 PROCEDURE — 80048 BASIC METABOLIC PNL TOTAL CA: CPT | Performed by: STUDENT IN AN ORGANIZED HEALTH CARE EDUCATION/TRAINING PROGRAM

## 2024-01-01 PROCEDURE — 120N000001 HC R&B MED SURG/OB

## 2024-01-01 PROCEDURE — 85027 COMPLETE CBC AUTOMATED: CPT | Performed by: INTERNAL MEDICINE

## 2024-01-01 PROCEDURE — 84100 ASSAY OF PHOSPHORUS: CPT | Performed by: STUDENT IN AN ORGANIZED HEALTH CARE EDUCATION/TRAINING PROGRAM

## 2024-01-01 PROCEDURE — 83605 ASSAY OF LACTIC ACID: CPT | Performed by: STUDENT IN AN ORGANIZED HEALTH CARE EDUCATION/TRAINING PROGRAM

## 2024-01-01 PROCEDURE — 83735 ASSAY OF MAGNESIUM: CPT | Performed by: STUDENT IN AN ORGANIZED HEALTH CARE EDUCATION/TRAINING PROGRAM

## 2024-01-01 PROCEDURE — 92526 ORAL FUNCTION THERAPY: CPT | Mod: GN | Performed by: SPEECH-LANGUAGE PATHOLOGIST

## 2024-01-01 PROCEDURE — 80048 BASIC METABOLIC PNL TOTAL CA: CPT | Performed by: INTERNAL MEDICINE

## 2024-01-01 PROCEDURE — 258N000003 HC RX IP 258 OP 636: Performed by: INTERNAL MEDICINE

## 2024-01-01 PROCEDURE — 250N000011 HC RX IP 250 OP 636: Performed by: INTERNAL MEDICINE

## 2024-01-01 PROCEDURE — 85025 COMPLETE CBC W/AUTO DIFF WBC: CPT | Performed by: INTERNAL MEDICINE

## 2024-01-01 PROCEDURE — 99232 SBSQ HOSP IP/OBS MODERATE 35: CPT | Performed by: INTERNAL MEDICINE

## 2024-01-01 PROCEDURE — 82784 ASSAY IGA/IGD/IGG/IGM EACH: CPT | Performed by: STUDENT IN AN ORGANIZED HEALTH CARE EDUCATION/TRAINING PROGRAM

## 2024-01-01 PROCEDURE — 92610 EVALUATE SWALLOWING FUNCTION: CPT | Mod: GN

## 2024-01-01 PROCEDURE — 250N000013 HC RX MED GY IP 250 OP 250 PS 637: Performed by: INTERNAL MEDICINE

## 2024-01-01 PROCEDURE — 84100 ASSAY OF PHOSPHORUS: CPT | Performed by: INTERNAL MEDICINE

## 2024-01-01 PROCEDURE — 36415 COLL VENOUS BLD VENIPUNCTURE: CPT | Performed by: INTERNAL MEDICINE

## 2024-01-01 PROCEDURE — 250N000011 HC RX IP 250 OP 636: Performed by: STUDENT IN AN ORGANIZED HEALTH CARE EDUCATION/TRAINING PROGRAM

## 2024-01-01 PROCEDURE — 36415 COLL VENOUS BLD VENIPUNCTURE: CPT | Performed by: STUDENT IN AN ORGANIZED HEALTH CARE EDUCATION/TRAINING PROGRAM

## 2024-01-01 PROCEDURE — 81003 URINALYSIS AUTO W/O SCOPE: CPT | Performed by: EMERGENCY MEDICINE

## 2024-01-01 PROCEDURE — 82805 BLOOD GASES W/O2 SATURATION: CPT | Performed by: INTERNAL MEDICINE

## 2024-01-01 PROCEDURE — 99233 SBSQ HOSP IP/OBS HIGH 50: CPT | Performed by: INTERNAL MEDICINE

## 2024-01-01 PROCEDURE — 97530 THERAPEUTIC ACTIVITIES: CPT | Mod: GO

## 2024-01-01 PROCEDURE — 200N000001 HC R&B ICU

## 2024-01-01 PROCEDURE — 62328 DX LMBR SPI PNXR W/FLUOR/CT: CPT

## 2024-01-01 PROCEDURE — 97530 THERAPEUTIC ACTIVITIES: CPT | Mod: GP | Performed by: PHYSICAL THERAPIST

## 2024-01-01 PROCEDURE — 999N000040 HC STATISTIC CONSULT NO CHARGE VASC ACCESS

## 2024-01-01 PROCEDURE — 97161 PT EVAL LOW COMPLEX 20 MIN: CPT | Mod: GP | Performed by: PHYSICAL THERAPIST

## 2024-01-01 PROCEDURE — 82010 KETONE BODYS QUAN: CPT | Performed by: STUDENT IN AN ORGANIZED HEALTH CARE EDUCATION/TRAINING PROGRAM

## 2024-01-01 PROCEDURE — 94640 AIRWAY INHALATION TREATMENT: CPT | Mod: 76

## 2024-01-01 PROCEDURE — 99239 HOSP IP/OBS DSCHRG MGMT >30: CPT | Performed by: HOSPITALIST

## 2024-01-01 PROCEDURE — 99418 PROLNG IP/OBS E/M EA 15 MIN: CPT | Performed by: INTERNAL MEDICINE

## 2024-01-01 PROCEDURE — 99223 1ST HOSP IP/OBS HIGH 75: CPT | Performed by: INTERNAL MEDICINE

## 2024-01-01 PROCEDURE — C8929 TTE W OR WO FOL WCON,DOPPLER: HCPCS

## 2024-01-01 PROCEDURE — 70551 MRI BRAIN STEM W/O DYE: CPT

## 2024-01-01 PROCEDURE — 86481 TB AG RESPONSE T-CELL SUSP: CPT | Mod: ORL | Performed by: FAMILY MEDICINE

## 2024-01-01 PROCEDURE — 71046 X-RAY EXAM CHEST 2 VIEWS: CPT

## 2024-01-01 PROCEDURE — 258N000003 HC RX IP 258 OP 636: Performed by: STUDENT IN AN ORGANIZED HEALTH CARE EDUCATION/TRAINING PROGRAM

## 2024-01-01 PROCEDURE — 250N000009 HC RX 250: Performed by: STUDENT IN AN ORGANIZED HEALTH CARE EDUCATION/TRAINING PROGRAM

## 2024-01-01 PROCEDURE — 999N000157 HC STATISTIC RCP TIME EA 10 MIN

## 2024-01-01 PROCEDURE — 99233 SBSQ HOSP IP/OBS HIGH 50: CPT | Performed by: STUDENT IN AN ORGANIZED HEALTH CARE EDUCATION/TRAINING PROGRAM

## 2024-01-01 PROCEDURE — P9604 ONE-WAY ALLOW PRORATED TRIP: HCPCS | Mod: ORL | Performed by: FAMILY MEDICINE

## 2024-01-01 PROCEDURE — 82330 ASSAY OF CALCIUM: CPT | Performed by: INTERNAL MEDICINE

## 2024-01-01 PROCEDURE — 72156 MRI NECK SPINE W/O & W/DYE: CPT

## 2024-01-01 PROCEDURE — 85027 COMPLETE CBC AUTOMATED: CPT | Performed by: STUDENT IN AN ORGANIZED HEALTH CARE EDUCATION/TRAINING PROGRAM

## 2024-01-01 PROCEDURE — 92610 EVALUATE SWALLOWING FUNCTION: CPT | Mod: GN | Performed by: SPEECH-LANGUAGE PATHOLOGIST

## 2024-01-01 PROCEDURE — 92526 ORAL FUNCTION THERAPY: CPT | Mod: GN

## 2024-01-01 PROCEDURE — 84153 ASSAY OF PSA TOTAL: CPT | Performed by: INTERNAL MEDICINE

## 2024-01-01 PROCEDURE — 250N000012 HC RX MED GY IP 250 OP 636 PS 637: Performed by: STUDENT IN AN ORGANIZED HEALTH CARE EDUCATION/TRAINING PROGRAM

## 2024-01-01 PROCEDURE — 83605 ASSAY OF LACTIC ACID: CPT | Performed by: HOSPITALIST

## 2024-01-01 PROCEDURE — 81001 URINALYSIS AUTO W/SCOPE: CPT | Performed by: STUDENT IN AN ORGANIZED HEALTH CARE EDUCATION/TRAINING PROGRAM

## 2024-01-01 PROCEDURE — 84484 ASSAY OF TROPONIN QUANT: CPT | Performed by: EMERGENCY MEDICINE

## 2024-01-01 PROCEDURE — 72148 MRI LUMBAR SPINE W/O DYE: CPT

## 2024-01-01 PROCEDURE — 99223 1ST HOSP IP/OBS HIGH 75: CPT | Mod: AI | Performed by: INTERNAL MEDICINE

## 2024-01-01 PROCEDURE — 250N000013 HC RX MED GY IP 250 OP 250 PS 637: Performed by: EMERGENCY MEDICINE

## 2024-01-01 PROCEDURE — 87483 CNS DNA AMP PROBE TYPE 12-25: CPT | Performed by: STUDENT IN AN ORGANIZED HEALTH CARE EDUCATION/TRAINING PROGRAM

## 2024-01-01 PROCEDURE — 85049 AUTOMATED PLATELET COUNT: CPT | Performed by: STUDENT IN AN ORGANIZED HEALTH CARE EDUCATION/TRAINING PROGRAM

## 2024-01-01 PROCEDURE — 97110 THERAPEUTIC EXERCISES: CPT | Mod: GP

## 2024-01-01 PROCEDURE — 82542 COL CHROMOTOGRAPHY QUAL/QUAN: CPT | Performed by: INTERNAL MEDICINE

## 2024-01-01 PROCEDURE — 96360 HYDRATION IV INFUSION INIT: CPT | Mod: 59

## 2024-01-01 PROCEDURE — 83880 ASSAY OF NATRIURETIC PEPTIDE: CPT | Performed by: EMERGENCY MEDICINE

## 2024-01-01 PROCEDURE — 87070 CULTURE OTHR SPECIMN AEROBIC: CPT | Performed by: STUDENT IN AN ORGANIZED HEALTH CARE EDUCATION/TRAINING PROGRAM

## 2024-01-01 PROCEDURE — 36415 COLL VENOUS BLD VENIPUNCTURE: CPT | Performed by: EMERGENCY MEDICINE

## 2024-01-01 PROCEDURE — 87205 SMEAR GRAM STAIN: CPT | Performed by: STUDENT IN AN ORGANIZED HEALTH CARE EDUCATION/TRAINING PROGRAM

## 2024-01-01 PROCEDURE — 36415 COLL VENOUS BLD VENIPUNCTURE: CPT | Mod: ORL | Performed by: FAMILY MEDICINE

## 2024-01-01 PROCEDURE — 94640 AIRWAY INHALATION TREATMENT: CPT

## 2024-01-01 PROCEDURE — 84295 ASSAY OF SERUM SODIUM: CPT | Performed by: INTERNAL MEDICINE

## 2024-01-01 PROCEDURE — 74176 CT ABD & PELVIS W/O CONTRAST: CPT

## 2024-01-01 PROCEDURE — 82805 BLOOD GASES W/O2 SATURATION: CPT | Performed by: STUDENT IN AN ORGANIZED HEALTH CARE EDUCATION/TRAINING PROGRAM

## 2024-01-01 PROCEDURE — 83516 IMMUNOASSAY NONANTIBODY: CPT | Performed by: STUDENT IN AN ORGANIZED HEALTH CARE EDUCATION/TRAINING PROGRAM

## 2024-01-01 PROCEDURE — 93010 ELECTROCARDIOGRAM REPORT: CPT | Performed by: INTERNAL MEDICINE

## 2024-01-01 PROCEDURE — 84156 ASSAY OF PROTEIN URINE: CPT | Performed by: STUDENT IN AN ORGANIZED HEALTH CARE EDUCATION/TRAINING PROGRAM

## 2024-01-01 PROCEDURE — 71045 X-RAY EXAM CHEST 1 VIEW: CPT

## 2024-01-01 PROCEDURE — 82550 ASSAY OF CK (CPK): CPT | Performed by: STUDENT IN AN ORGANIZED HEALTH CARE EDUCATION/TRAINING PROGRAM

## 2024-01-01 PROCEDURE — 85048 AUTOMATED LEUKOCYTE COUNT: CPT | Performed by: STUDENT IN AN ORGANIZED HEALTH CARE EDUCATION/TRAINING PROGRAM

## 2024-01-01 PROCEDURE — 83970 ASSAY OF PARATHORMONE: CPT | Performed by: INTERNAL MEDICINE

## 2024-01-01 PROCEDURE — 99221 1ST HOSP IP/OBS SF/LOW 40: CPT | Mod: FS | Performed by: UROLOGY

## 2024-01-01 PROCEDURE — 81001 URINALYSIS AUTO W/SCOPE: CPT | Performed by: INTERNAL MEDICINE

## 2024-01-01 PROCEDURE — 999N000111 HC STATISTIC OT IP EVAL DEFER

## 2024-01-01 PROCEDURE — 84166 PROTEIN E-PHORESIS/URINE/CSF: CPT | Mod: 26 | Performed by: PATHOLOGY

## 2024-01-01 PROCEDURE — 255N000002 HC RX 255 OP 636: Performed by: STUDENT IN AN ORGANIZED HEALTH CARE EDUCATION/TRAINING PROGRAM

## 2024-01-01 PROCEDURE — 87086 URINE CULTURE/COLONY COUNT: CPT | Performed by: EMERGENCY MEDICINE

## 2024-01-01 PROCEDURE — 93005 ELECTROCARDIOGRAM TRACING: CPT

## 2024-01-01 PROCEDURE — 84484 ASSAY OF TROPONIN QUANT: CPT | Performed by: INTERNAL MEDICINE

## 2024-01-01 PROCEDURE — 83519 RIA NONANTIBODY: CPT | Performed by: STUDENT IN AN ORGANIZED HEALTH CARE EDUCATION/TRAINING PROGRAM

## 2024-01-01 PROCEDURE — 272N000433 HC KIT CATH IV 18 OR 20G CM, POWERGLIDE W MAX BARRIER

## 2024-01-01 PROCEDURE — 999N000156 HC STATISTIC RCP CONSULT EA 30 MIN

## 2024-01-01 PROCEDURE — 84166 PROTEIN E-PHORESIS/URINE/CSF: CPT | Performed by: PATHOLOGY

## 2024-01-01 PROCEDURE — 250N000009 HC RX 250: Performed by: INTERNAL MEDICINE

## 2024-01-01 PROCEDURE — 83735 ASSAY OF MAGNESIUM: CPT | Performed by: HOSPITALIST

## 2024-01-01 PROCEDURE — 84443 ASSAY THYROID STIM HORMONE: CPT | Performed by: INTERNAL MEDICINE

## 2024-01-01 PROCEDURE — 87637 SARSCOV2&INF A&B&RSV AMP PRB: CPT | Performed by: EMERGENCY MEDICINE

## 2024-01-01 PROCEDURE — 80053 COMPREHEN METABOLIC PANEL: CPT | Performed by: STUDENT IN AN ORGANIZED HEALTH CARE EDUCATION/TRAINING PROGRAM

## 2024-01-01 PROCEDURE — 99232 SBSQ HOSP IP/OBS MODERATE 35: CPT | Performed by: STUDENT IN AN ORGANIZED HEALTH CARE EDUCATION/TRAINING PROGRAM

## 2024-01-01 PROCEDURE — 82565 ASSAY OF CREATININE: CPT | Performed by: STUDENT IN AN ORGANIZED HEALTH CARE EDUCATION/TRAINING PROGRAM

## 2024-01-01 PROCEDURE — 81001 URINALYSIS AUTO W/SCOPE: CPT | Performed by: EMERGENCY MEDICINE

## 2024-01-01 PROCEDURE — 97530 THERAPEUTIC ACTIVITIES: CPT | Mod: GP

## 2024-01-01 PROCEDURE — 250N000011 HC RX IP 250 OP 636: Mod: JZ | Performed by: INTERNAL MEDICINE

## 2024-01-01 PROCEDURE — 99223 1ST HOSP IP/OBS HIGH 75: CPT | Mod: AI | Performed by: STUDENT IN AN ORGANIZED HEALTH CARE EDUCATION/TRAINING PROGRAM

## 2024-01-01 PROCEDURE — A9585 GADOBUTROL INJECTION: HCPCS | Performed by: STUDENT IN AN ORGANIZED HEALTH CARE EDUCATION/TRAINING PROGRAM

## 2024-01-01 PROCEDURE — 44500 INTRO GASTROINTESTINAL TUBE: CPT

## 2024-01-01 PROCEDURE — 84132 ASSAY OF SERUM POTASSIUM: CPT | Performed by: INTERNAL MEDICINE

## 2024-01-01 PROCEDURE — 99239 HOSP IP/OBS DSCHRG MGMT >30: CPT | Performed by: STUDENT IN AN ORGANIZED HEALTH CARE EDUCATION/TRAINING PROGRAM

## 2024-01-01 PROCEDURE — 87040 BLOOD CULTURE FOR BACTERIA: CPT | Performed by: INTERNAL MEDICINE

## 2024-01-01 PROCEDURE — 83605 ASSAY OF LACTIC ACID: CPT | Performed by: EMERGENCY MEDICINE

## 2024-01-01 PROCEDURE — 80053 COMPREHEN METABOLIC PANEL: CPT | Performed by: EMERGENCY MEDICINE

## 2024-01-01 PROCEDURE — 36569 INSJ PICC 5 YR+ W/O IMAGING: CPT | Mod: 52

## 2024-01-01 PROCEDURE — 86255 FLUORESCENT ANTIBODY SCREEN: CPT | Performed by: STUDENT IN AN ORGANIZED HEALTH CARE EDUCATION/TRAINING PROGRAM

## 2024-01-01 PROCEDURE — 85025 COMPLETE CBC W/AUTO DIFF WBC: CPT | Performed by: EMERGENCY MEDICINE

## 2024-01-01 PROCEDURE — 97535 SELF CARE MNGMENT TRAINING: CPT | Mod: GO

## 2024-01-01 PROCEDURE — 83521 IG LIGHT CHAINS FREE EACH: CPT | Performed by: STUDENT IN AN ORGANIZED HEALTH CARE EDUCATION/TRAINING PROGRAM

## 2024-01-01 PROCEDURE — 82945 GLUCOSE OTHER FLUID: CPT | Performed by: STUDENT IN AN ORGANIZED HEALTH CARE EDUCATION/TRAINING PROGRAM

## 2024-01-01 PROCEDURE — 255N000002 HC RX 255 OP 636: Performed by: INTERNAL MEDICINE

## 2024-01-01 PROCEDURE — 999N000208 ECHOCARDIOGRAM COMPLETE

## 2024-01-01 PROCEDURE — 83605 ASSAY OF LACTIC ACID: CPT | Performed by: INTERNAL MEDICINE

## 2024-01-01 PROCEDURE — 97116 GAIT TRAINING THERAPY: CPT | Mod: GP | Performed by: PHYSICAL THERAPIST

## 2024-01-01 PROCEDURE — 250N000011 HC RX IP 250 OP 636: Mod: JZ | Performed by: STUDENT IN AN ORGANIZED HEALTH CARE EDUCATION/TRAINING PROGRAM

## 2024-01-01 PROCEDURE — 99207 PR NO CHARGE LOS: CPT | Performed by: INTERNAL MEDICINE

## 2024-01-01 PROCEDURE — 82374 ASSAY BLOOD CARBON DIOXIDE: CPT | Performed by: INTERNAL MEDICINE

## 2024-01-01 PROCEDURE — 89050 BODY FLUID CELL COUNT: CPT | Performed by: STUDENT IN AN ORGANIZED HEALTH CARE EDUCATION/TRAINING PROGRAM

## 2024-01-01 PROCEDURE — 97166 OT EVAL MOD COMPLEX 45 MIN: CPT | Mod: GO | Performed by: OCCUPATIONAL THERAPIST

## 2024-01-01 PROCEDURE — 87040 BLOOD CULTURE FOR BACTERIA: CPT | Performed by: EMERGENCY MEDICINE

## 2024-01-01 PROCEDURE — 85049 AUTOMATED PLATELET COUNT: CPT | Performed by: INTERNAL MEDICINE

## 2024-01-01 PROCEDURE — 97530 THERAPEUTIC ACTIVITIES: CPT | Mod: GO | Performed by: OCCUPATIONAL THERAPIST

## 2024-01-01 PROCEDURE — 84165 PROTEIN E-PHORESIS SERUM: CPT | Mod: TC | Performed by: PATHOLOGY

## 2024-01-01 PROCEDURE — 73700 CT LOWER EXTREMITY W/O DYE: CPT | Mod: RT

## 2024-01-01 PROCEDURE — 74230 X-RAY XM SWLNG FUNCJ C+: CPT

## 2024-01-01 PROCEDURE — 86334 IMMUNOFIX E-PHORESIS SERUM: CPT | Performed by: PATHOLOGY

## 2024-01-01 PROCEDURE — 70491 CT SOFT TISSUE NECK W/DYE: CPT

## 2024-01-01 PROCEDURE — 250N000012 HC RX MED GY IP 250 OP 636 PS 637: Performed by: INTERNAL MEDICINE

## 2024-01-01 PROCEDURE — 999N000128 HC STATISTIC PERIPHERAL IV START W/O US GUIDANCE

## 2024-01-01 PROCEDURE — 97116 GAIT TRAINING THERAPY: CPT | Mod: GP

## 2024-01-01 PROCEDURE — 999N000065 XR ABDOMEN PORT 1 VIEW

## 2024-01-01 PROCEDURE — 36415 COLL VENOUS BLD VENIPUNCTURE: CPT | Performed by: HOSPITALIST

## 2024-01-01 PROCEDURE — 83735 ASSAY OF MAGNESIUM: CPT | Performed by: EMERGENCY MEDICINE

## 2024-01-01 PROCEDURE — 84132 ASSAY OF SERUM POTASSIUM: CPT | Performed by: STUDENT IN AN ORGANIZED HEALTH CARE EDUCATION/TRAINING PROGRAM

## 2024-01-01 PROCEDURE — 86789 WEST NILE VIRUS ANTIBODY: CPT | Performed by: STUDENT IN AN ORGANIZED HEALTH CARE EDUCATION/TRAINING PROGRAM

## 2024-01-01 PROCEDURE — 258N000003 HC RX IP 258 OP 636: Performed by: EMERGENCY MEDICINE

## 2024-01-01 PROCEDURE — 71260 CT THORAX DX C+: CPT

## 2024-01-01 PROCEDURE — 87040 BLOOD CULTURE FOR BACTERIA: CPT | Performed by: STUDENT IN AN ORGANIZED HEALTH CARE EDUCATION/TRAINING PROGRAM

## 2024-01-01 PROCEDURE — 82803 BLOOD GASES ANY COMBINATION: CPT

## 2024-01-01 PROCEDURE — 97161 PT EVAL LOW COMPLEX 20 MIN: CPT | Mod: GP

## 2024-01-01 PROCEDURE — 999N000127 HC STATISTIC PERIPHERAL IV START W US GUIDANCE

## 2024-01-01 PROCEDURE — 93306 TTE W/DOPPLER COMPLETE: CPT | Mod: 26 | Performed by: INTERNAL MEDICINE

## 2024-01-01 PROCEDURE — 250N000009 HC RX 250

## 2024-01-01 PROCEDURE — 83036 HEMOGLOBIN GLYCOSYLATED A1C: CPT | Performed by: INTERNAL MEDICINE

## 2024-01-01 PROCEDURE — 82306 VITAMIN D 25 HYDROXY: CPT | Performed by: INTERNAL MEDICINE

## 2024-01-01 PROCEDURE — 84157 ASSAY OF PROTEIN OTHER: CPT | Performed by: STUDENT IN AN ORGANIZED HEALTH CARE EDUCATION/TRAINING PROGRAM

## 2024-01-01 PROCEDURE — 99285 EMERGENCY DEPT VISIT HI MDM: CPT | Mod: 25

## 2024-01-01 PROCEDURE — 87651 STREP A DNA AMP PROBE: CPT | Performed by: EMERGENCY MEDICINE

## 2024-01-01 PROCEDURE — 258N000002 HC RX IP 258 OP 250: Performed by: STUDENT IN AN ORGANIZED HEALTH CARE EDUCATION/TRAINING PROGRAM

## 2024-01-01 PROCEDURE — 84165 PROTEIN E-PHORESIS SERUM: CPT | Mod: 26 | Performed by: PATHOLOGY

## 2024-01-01 PROCEDURE — 84145 PROCALCITONIN (PCT): CPT | Performed by: STUDENT IN AN ORGANIZED HEALTH CARE EDUCATION/TRAINING PROGRAM

## 2024-01-01 PROCEDURE — 92611 MOTION FLUOROSCOPY/SWALLOW: CPT | Mod: GN | Performed by: SPEECH-LANGUAGE PATHOLOGIST

## 2024-01-01 PROCEDURE — 82652 VIT D 1 25-DIHYDROXY: CPT | Performed by: INTERNAL MEDICINE

## 2024-01-01 PROCEDURE — 82805 BLOOD GASES W/O2 SATURATION: CPT | Performed by: EMERGENCY MEDICINE

## 2024-01-01 PROCEDURE — 84484 ASSAY OF TROPONIN QUANT: CPT | Performed by: STUDENT IN AN ORGANIZED HEALTH CARE EDUCATION/TRAINING PROGRAM

## 2024-01-01 PROCEDURE — 70450 CT HEAD/BRAIN W/O DYE: CPT

## 2024-01-01 PROCEDURE — 009U3ZX DRAINAGE OF SPINAL CANAL, PERCUTANEOUS APPROACH, DIAGNOSTIC: ICD-10-PCS | Performed by: PHYSICIAN ASSISTANT

## 2024-01-01 RX ORDER — NALOXONE HYDROCHLORIDE 0.4 MG/ML
0.4 INJECTION, SOLUTION INTRAMUSCULAR; INTRAVENOUS; SUBCUTANEOUS
Status: DISCONTINUED | OUTPATIENT
Start: 2024-01-01 | End: 2024-01-01 | Stop reason: HOSPADM

## 2024-01-01 RX ORDER — ONDANSETRON 2 MG/ML
4 INJECTION INTRAMUSCULAR; INTRAVENOUS EVERY 6 HOURS PRN
Status: DISCONTINUED | OUTPATIENT
Start: 2024-01-01 | End: 2024-01-01 | Stop reason: HOSPADM

## 2024-01-01 RX ORDER — HYDRALAZINE HYDROCHLORIDE 20 MG/ML
10 INJECTION INTRAMUSCULAR; INTRAVENOUS EVERY 6 HOURS PRN
Status: DISCONTINUED | OUTPATIENT
Start: 2024-01-01 | End: 2024-01-01 | Stop reason: HOSPADM

## 2024-01-01 RX ORDER — OLANZAPINE 10 MG/2ML
5 INJECTION, POWDER, FOR SOLUTION INTRAMUSCULAR ONCE
Status: COMPLETED | OUTPATIENT
Start: 2024-01-01 | End: 2024-01-01

## 2024-01-01 RX ORDER — DOXAZOSIN 1 MG/1
1 TABLET ORAL DAILY
Status: DISCONTINUED | OUTPATIENT
Start: 2024-01-01 | End: 2024-01-01

## 2024-01-01 RX ORDER — NALOXONE HYDROCHLORIDE 0.4 MG/ML
0.2 INJECTION, SOLUTION INTRAMUSCULAR; INTRAVENOUS; SUBCUTANEOUS
Status: DISCONTINUED | OUTPATIENT
Start: 2024-01-01 | End: 2024-01-01 | Stop reason: HOSPADM

## 2024-01-01 RX ORDER — MAGNESIUM OXIDE 400 MG/1
400 TABLET ORAL EVERY 4 HOURS
Status: COMPLETED | OUTPATIENT
Start: 2024-01-01 | End: 2024-01-01

## 2024-01-01 RX ORDER — MAGNESIUM SULFATE HEPTAHYDRATE 40 MG/ML
2 INJECTION, SOLUTION INTRAVENOUS ONCE
Status: COMPLETED | OUTPATIENT
Start: 2024-01-01 | End: 2024-01-01

## 2024-01-01 RX ORDER — ONDANSETRON 4 MG/1
4 TABLET, ORALLY DISINTEGRATING ORAL EVERY 6 HOURS PRN
Status: DISCONTINUED | OUTPATIENT
Start: 2024-01-01 | End: 2024-01-01 | Stop reason: HOSPADM

## 2024-01-01 RX ORDER — IPRATROPIUM BROMIDE AND ALBUTEROL SULFATE 2.5; .5 MG/3ML; MG/3ML
3 SOLUTION RESPIRATORY (INHALATION)
Status: DISCONTINUED | OUTPATIENT
Start: 2024-01-01 | End: 2024-01-01

## 2024-01-01 RX ORDER — ONDANSETRON 4 MG/1
4 TABLET, ORALLY DISINTEGRATING ORAL EVERY 6 HOURS PRN
Status: DISCONTINUED | OUTPATIENT
Start: 2024-01-01 | End: 2024-01-01

## 2024-01-01 RX ORDER — MORPHINE SULFATE 20 MG/ML
5 SOLUTION ORAL
Qty: 30 ML | Refills: 0 | Status: SHIPPED | OUTPATIENT
Start: 2024-01-01

## 2024-01-01 RX ORDER — OXYCODONE HYDROCHLORIDE 5 MG/1
5 TABLET ORAL EVERY 4 HOURS PRN
Status: DISCONTINUED | OUTPATIENT
Start: 2024-01-01 | End: 2024-01-01

## 2024-01-01 RX ORDER — LIDOCAINE HYDROCHLORIDE 20 MG/ML
JELLY TOPICAL ONCE
Status: COMPLETED | OUTPATIENT
Start: 2024-01-01 | End: 2024-01-01

## 2024-01-01 RX ORDER — NICOTINE POLACRILEX 4 MG
15-30 LOZENGE BUCCAL
Status: DISCONTINUED | OUTPATIENT
Start: 2024-01-01 | End: 2024-01-01

## 2024-01-01 RX ORDER — AMINO ACIDS/PROTEIN HYDROLYS 11G-40/45
1 LIQUID IN PACKET (ML) ORAL DAILY
Status: DISCONTINUED | OUTPATIENT
Start: 2024-01-01 | End: 2024-01-01

## 2024-01-01 RX ORDER — AMOXICILLIN 250 MG
2 CAPSULE ORAL 2 TIMES DAILY PRN
Status: DISCONTINUED | OUTPATIENT
Start: 2024-01-01 | End: 2024-01-01

## 2024-01-01 RX ORDER — NYSTATIN 100000/ML
500000 SUSPENSION, ORAL (FINAL DOSE FORM) ORAL 4 TIMES DAILY
Status: DISCONTINUED | OUTPATIENT
Start: 2024-01-01 | End: 2024-01-01

## 2024-01-01 RX ORDER — LORAZEPAM 1 MG/1
1 TABLET ORAL
Status: DISCONTINUED | OUTPATIENT
Start: 2024-01-01 | End: 2024-01-01 | Stop reason: HOSPADM

## 2024-01-01 RX ORDER — LORAZEPAM 2 MG/ML
1 INJECTION INTRAMUSCULAR
Status: DISCONTINUED | OUTPATIENT
Start: 2024-01-01 | End: 2024-01-01 | Stop reason: HOSPADM

## 2024-01-01 RX ORDER — HYDROXYZINE HYDROCHLORIDE 50 MG/1
50 TABLET, FILM COATED ORAL EVERY 6 HOURS PRN
Status: DISCONTINUED | OUTPATIENT
Start: 2024-01-01 | End: 2024-01-01

## 2024-01-01 RX ORDER — NALOXONE HYDROCHLORIDE 0.4 MG/ML
0.1 INJECTION, SOLUTION INTRAMUSCULAR; INTRAVENOUS; SUBCUTANEOUS
Status: DISCONTINUED | OUTPATIENT
Start: 2024-01-01 | End: 2024-01-01 | Stop reason: HOSPADM

## 2024-01-01 RX ORDER — AMLODIPINE BESYLATE 5 MG/1
5 TABLET ORAL DAILY
Status: DISCONTINUED | OUTPATIENT
Start: 2024-01-01 | End: 2024-01-01 | Stop reason: HOSPADM

## 2024-01-01 RX ORDER — LIDOCAINE HYDROCHLORIDE 10 MG/ML
5 INJECTION, SOLUTION EPIDURAL; INFILTRATION; INTRACAUDAL; PERINEURAL ONCE
Status: COMPLETED | OUTPATIENT
Start: 2024-01-01 | End: 2024-01-01

## 2024-01-01 RX ORDER — OLANZAPINE 5 MG/1
5 TABLET, ORALLY DISINTEGRATING ORAL EVERY 6 HOURS PRN
Qty: 10 TABLET | Refills: 0 | Status: SHIPPED | OUTPATIENT
Start: 2024-01-01

## 2024-01-01 RX ORDER — GADOBUTROL 604.72 MG/ML
9 INJECTION INTRAVENOUS ONCE
Status: COMPLETED | OUTPATIENT
Start: 2024-01-01 | End: 2024-01-01

## 2024-01-01 RX ORDER — DEXTROSE MONOHYDRATE 50 MG/ML
INJECTION, SOLUTION INTRAVENOUS CONTINUOUS
Status: DISCONTINUED | OUTPATIENT
Start: 2024-01-01 | End: 2024-01-01

## 2024-01-01 RX ORDER — MORPHINE SULFATE 20 MG/ML
5 SOLUTION ORAL
Status: DISCONTINUED | OUTPATIENT
Start: 2024-01-01 | End: 2024-01-01 | Stop reason: HOSPADM

## 2024-01-01 RX ORDER — CODEINE PHOSPHATE AND GUAIFENESIN 10; 100 MG/5ML; MG/5ML
5 SOLUTION ORAL EVERY 4 HOURS PRN
Qty: 180 ML | Refills: 0 | Status: SHIPPED | OUTPATIENT
Start: 2024-01-01 | End: 2024-01-01

## 2024-01-01 RX ORDER — METOCLOPRAMIDE HYDROCHLORIDE 5 MG/ML
10 INJECTION INTRAMUSCULAR; INTRAVENOUS ONCE
Status: COMPLETED | OUTPATIENT
Start: 2024-01-01 | End: 2024-01-01

## 2024-01-01 RX ORDER — ACETAMINOPHEN 325 MG/1
650 TABLET ORAL 2 TIMES DAILY
Status: DISCONTINUED | OUTPATIENT
Start: 2024-01-01 | End: 2024-01-01

## 2024-01-01 RX ORDER — IOPAMIDOL 755 MG/ML
90 INJECTION, SOLUTION INTRAVASCULAR ONCE
Status: COMPLETED | OUTPATIENT
Start: 2024-01-01 | End: 2024-01-01

## 2024-01-01 RX ORDER — ACETAMINOPHEN 325 MG/1
650 TABLET ORAL 2 TIMES DAILY
COMMUNITY

## 2024-01-01 RX ORDER — SODIUM CHLORIDE 9 MG/ML
INJECTION, SOLUTION INTRAVENOUS CONTINUOUS
Status: DISCONTINUED | OUTPATIENT
Start: 2024-01-01 | End: 2024-01-01

## 2024-01-01 RX ORDER — AMLODIPINE BESYLATE 5 MG/1
5 TABLET ORAL DAILY
Status: ON HOLD | DISCHARGE
Start: 2024-01-01 | End: 2024-01-01

## 2024-01-01 RX ORDER — VITAMIN B COMPLEX
125 TABLET ORAL DAILY
Status: DISCONTINUED | OUTPATIENT
Start: 2024-01-01 | End: 2024-01-01 | Stop reason: HOSPADM

## 2024-01-01 RX ORDER — HYDRALAZINE HYDROCHLORIDE 20 MG/ML
10 INJECTION INTRAMUSCULAR; INTRAVENOUS EVERY 4 HOURS PRN
Status: DISCONTINUED | OUTPATIENT
Start: 2024-01-01 | End: 2024-01-01

## 2024-01-01 RX ORDER — ACETAMINOPHEN 325 MG/1
650 TABLET ORAL EVERY 4 HOURS PRN
Status: DISCONTINUED | OUTPATIENT
Start: 2024-01-01 | End: 2024-01-01

## 2024-01-01 RX ORDER — CODEINE PHOSPHATE AND GUAIFENESIN 10; 100 MG/5ML; MG/5ML
5 SOLUTION ORAL EVERY 4 HOURS PRN
Status: DISCONTINUED | OUTPATIENT
Start: 2024-01-01 | End: 2024-01-01 | Stop reason: HOSPADM

## 2024-01-01 RX ORDER — BISACODYL 10 MG
10 SUPPOSITORY, RECTAL RECTAL
Status: DISCONTINUED | OUTPATIENT
Start: 2024-01-01 | End: 2024-01-01 | Stop reason: HOSPADM

## 2024-01-01 RX ORDER — GLYCOPYRROLATE 0.2 MG/ML
0.2 INJECTION, SOLUTION INTRAMUSCULAR; INTRAVENOUS EVERY 4 HOURS PRN
Status: DISCONTINUED | OUTPATIENT
Start: 2024-01-01 | End: 2024-01-01 | Stop reason: HOSPADM

## 2024-01-01 RX ORDER — LIDOCAINE 4 G/G
1 PATCH TOPICAL
Status: DISCONTINUED | OUTPATIENT
Start: 2024-01-01 | End: 2024-01-01

## 2024-01-01 RX ORDER — AMOXICILLIN 250 MG
1 CAPSULE ORAL 2 TIMES DAILY PRN
Status: DISCONTINUED | OUTPATIENT
Start: 2024-01-01 | End: 2024-01-01 | Stop reason: HOSPADM

## 2024-01-01 RX ORDER — HYDRALAZINE HYDROCHLORIDE 10 MG/1
10 TABLET, FILM COATED ORAL EVERY 4 HOURS PRN
Status: DISCONTINUED | OUTPATIENT
Start: 2024-01-01 | End: 2024-01-01

## 2024-01-01 RX ORDER — FUROSEMIDE 10 MG/ML
10 INJECTION INTRAMUSCULAR; INTRAVENOUS ONCE
Status: DISCONTINUED | OUTPATIENT
Start: 2024-01-01 | End: 2024-01-01

## 2024-01-01 RX ORDER — SODIUM CHLORIDE 450 MG/100ML
INJECTION, SOLUTION INTRAVENOUS CONTINUOUS
Status: DISCONTINUED | OUTPATIENT
Start: 2024-01-01 | End: 2024-01-01

## 2024-01-01 RX ORDER — TAMSULOSIN HYDROCHLORIDE 0.4 MG/1
0.4 CAPSULE ORAL DAILY
Status: DISCONTINUED | OUTPATIENT
Start: 2024-01-01 | End: 2024-01-01

## 2024-01-01 RX ORDER — LIDOCAINE 40 MG/G
CREAM TOPICAL
Status: DISCONTINUED | OUTPATIENT
Start: 2024-01-01 | End: 2024-01-01

## 2024-01-01 RX ORDER — ASPIRIN 81 MG/1
324 TABLET, CHEWABLE ORAL ONCE
Status: COMPLETED | OUTPATIENT
Start: 2024-01-01 | End: 2024-01-01

## 2024-01-01 RX ORDER — ATROPINE SULFATE 10 MG/ML
2 SOLUTION/ DROPS OPHTHALMIC EVERY 4 HOURS PRN
Status: DISCONTINUED | OUTPATIENT
Start: 2024-01-01 | End: 2024-01-01 | Stop reason: HOSPADM

## 2024-01-01 RX ORDER — HYDROXYZINE HYDROCHLORIDE 25 MG/1
25 TABLET, FILM COATED ORAL EVERY 6 HOURS PRN
Status: DISCONTINUED | OUTPATIENT
Start: 2024-01-01 | End: 2024-01-01

## 2024-01-01 RX ORDER — GUAIFENESIN 200 MG/10ML
10 LIQUID ORAL EVERY 4 HOURS PRN
Status: DISCONTINUED | OUTPATIENT
Start: 2024-01-01 | End: 2024-01-01

## 2024-01-01 RX ORDER — POLYETHYLENE GLYCOL 3350 17 G/17G
17 POWDER, FOR SOLUTION ORAL DAILY
Status: DISCONTINUED | OUTPATIENT
Start: 2024-01-01 | End: 2024-01-01 | Stop reason: HOSPADM

## 2024-01-01 RX ORDER — HALOPERIDOL 5 MG/ML
2 INJECTION INTRAMUSCULAR ONCE
Status: COMPLETED | OUTPATIENT
Start: 2024-01-01 | End: 2024-01-01

## 2024-01-01 RX ORDER — SENNOSIDES 8.6 MG
1 TABLET ORAL 2 TIMES DAILY PRN
Status: DISCONTINUED | OUTPATIENT
Start: 2024-01-01 | End: 2024-01-01 | Stop reason: HOSPADM

## 2024-01-01 RX ORDER — BARIUM SULFATE 400 MG/ML
SUSPENSION ORAL ONCE
Status: COMPLETED | OUTPATIENT
Start: 2024-01-01 | End: 2024-01-01

## 2024-01-01 RX ORDER — CALCITONIN SALMON 200 [USP'U]/ML
4 INJECTION, SOLUTION INTRAMUSCULAR; SUBCUTANEOUS ONCE
Qty: 1.57 ML | Refills: 0 | Status: COMPLETED | OUTPATIENT
Start: 2024-01-01 | End: 2024-01-01

## 2024-01-01 RX ORDER — CARBOXYMETHYLCELLULOSE SODIUM 5 MG/ML
1-2 SOLUTION/ DROPS OPHTHALMIC
Status: DISCONTINUED | OUTPATIENT
Start: 2024-01-01 | End: 2024-01-01 | Stop reason: HOSPADM

## 2024-01-01 RX ORDER — OSELTAMIVIR PHOSPHATE 30 MG/1
30 CAPSULE ORAL DAILY
Status: DISCONTINUED | OUTPATIENT
Start: 2024-01-01 | End: 2024-01-01

## 2024-01-01 RX ORDER — HEPARIN SODIUM 5000 [USP'U]/.5ML
5000 INJECTION, SOLUTION INTRAVENOUS; SUBCUTANEOUS EVERY 8 HOURS
Status: DISCONTINUED | OUTPATIENT
Start: 2024-01-01 | End: 2024-01-01 | Stop reason: HOSPADM

## 2024-01-01 RX ORDER — DEXTROSE MONOHYDRATE 25 G/50ML
25-50 INJECTION, SOLUTION INTRAVENOUS
Status: DISCONTINUED | OUTPATIENT
Start: 2024-01-01 | End: 2024-01-01

## 2024-01-01 RX ORDER — FUROSEMIDE 10 MG/ML
20 INJECTION INTRAMUSCULAR; INTRAVENOUS ONCE
Status: DISCONTINUED | OUTPATIENT
Start: 2024-01-01 | End: 2024-01-01

## 2024-01-01 RX ORDER — QUETIAPINE FUMARATE 25 MG/1
25 TABLET, FILM COATED ORAL
Status: DISCONTINUED | OUTPATIENT
Start: 2024-01-01 | End: 2024-01-01

## 2024-01-01 RX ORDER — CALCITONIN SALMON 200 [USP'U]/ML
4 INJECTION, SOLUTION INTRAMUSCULAR; SUBCUTANEOUS EVERY 12 HOURS
Qty: 3.04 ML | Refills: 0 | Status: DISCONTINUED | OUTPATIENT
Start: 2024-01-01 | End: 2024-01-01

## 2024-01-01 RX ORDER — ACETAMINOPHEN 650 MG/1
650 SUPPOSITORY RECTAL EVERY 4 HOURS PRN
Status: DISCONTINUED | OUTPATIENT
Start: 2024-01-01 | End: 2024-01-01 | Stop reason: HOSPADM

## 2024-01-01 RX ORDER — LOSARTAN POTASSIUM 25 MG/1
25 TABLET ORAL DAILY
Status: ON HOLD | COMMUNITY
End: 2024-01-01

## 2024-01-01 RX ORDER — PIPERACILLIN SODIUM, TAZOBACTAM SODIUM 3; .375 G/15ML; G/15ML
3.38 INJECTION, POWDER, LYOPHILIZED, FOR SOLUTION INTRAVENOUS EVERY 6 HOURS
Status: DISCONTINUED | OUTPATIENT
Start: 2024-01-01 | End: 2024-01-01

## 2024-01-01 RX ORDER — AZITHROMYCIN 500 MG/5ML
500 INJECTION, POWDER, LYOPHILIZED, FOR SOLUTION INTRAVENOUS EVERY 24 HOURS
Status: DISCONTINUED | OUTPATIENT
Start: 2024-01-01 | End: 2024-01-01

## 2024-01-01 RX ORDER — POTASSIUM CHLORIDE 1.5 G/1.58G
20 POWDER, FOR SOLUTION ORAL ONCE
Status: COMPLETED | OUTPATIENT
Start: 2024-01-01 | End: 2024-01-01

## 2024-01-01 RX ORDER — HYDRALAZINE HYDROCHLORIDE 20 MG/ML
10 INJECTION INTRAMUSCULAR; INTRAVENOUS EVERY 6 HOURS PRN
Status: DISCONTINUED | OUTPATIENT
Start: 2024-01-01 | End: 2024-01-01

## 2024-01-01 RX ORDER — AMOXICILLIN 250 MG
1 CAPSULE ORAL 2 TIMES DAILY PRN
Status: DISCONTINUED | OUTPATIENT
Start: 2024-01-01 | End: 2024-01-01

## 2024-01-01 RX ORDER — ASPIRIN 81 MG/1
81 TABLET ORAL DAILY
Status: DISCONTINUED | OUTPATIENT
Start: 2024-01-01 | End: 2024-01-01

## 2024-01-01 RX ORDER — FLUCONAZOLE 2 MG/ML
400 INJECTION, SOLUTION INTRAVENOUS ONCE
Status: COMPLETED | OUTPATIENT
Start: 2024-01-01 | End: 2024-01-01

## 2024-01-01 RX ORDER — OLANZAPINE 5 MG/1
5 TABLET, ORALLY DISINTEGRATING ORAL EVERY 6 HOURS PRN
Status: DISCONTINUED | OUTPATIENT
Start: 2024-01-01 | End: 2024-01-01 | Stop reason: HOSPADM

## 2024-01-01 RX ORDER — ACETAMINOPHEN 325 MG/1
650 TABLET ORAL EVERY 4 HOURS PRN
Status: DISCONTINUED | OUTPATIENT
Start: 2024-01-01 | End: 2024-01-01 | Stop reason: HOSPADM

## 2024-01-01 RX ORDER — METOPROLOL SUCCINATE 25 MG/1
12.5 TABLET, EXTENDED RELEASE ORAL DAILY
Status: ON HOLD | COMMUNITY
End: 2024-01-01

## 2024-01-01 RX ORDER — MINERAL OIL/HYDROPHIL PETROLAT
OINTMENT (GRAM) TOPICAL
Status: DISCONTINUED | OUTPATIENT
Start: 2024-01-01 | End: 2024-01-01 | Stop reason: HOSPADM

## 2024-01-01 RX ORDER — MORPHINE SULFATE 20 MG/ML
10 SOLUTION ORAL
Status: DISCONTINUED | OUTPATIENT
Start: 2024-01-01 | End: 2024-01-01 | Stop reason: HOSPADM

## 2024-01-01 RX ORDER — LIDOCAINE 4 G/G
1 PATCH TOPICAL
Status: DISCONTINUED | OUTPATIENT
Start: 2024-01-01 | End: 2024-01-01 | Stop reason: HOSPADM

## 2024-01-01 RX ORDER — HALOPERIDOL 5 MG/ML
2 INJECTION INTRAMUSCULAR
Status: DISCONTINUED | OUTPATIENT
Start: 2024-01-01 | End: 2024-01-01

## 2024-01-01 RX ORDER — WATER 10 ML/10ML
INJECTION INTRAMUSCULAR; INTRAVENOUS; SUBCUTANEOUS
Status: COMPLETED
Start: 2024-01-01 | End: 2024-01-01

## 2024-01-01 RX ORDER — ATROPINE SULFATE 10 MG/ML
2 SOLUTION/ DROPS OPHTHALMIC EVERY 4 HOURS PRN
Qty: 5 ML | Refills: 0 | Status: SHIPPED | OUTPATIENT
Start: 2024-01-01

## 2024-01-01 RX ORDER — GUAIFENESIN 200 MG/10ML
400 LIQUID ORAL EVERY 4 HOURS PRN
Status: ON HOLD | COMMUNITY
End: 2024-01-01

## 2024-01-01 RX ORDER — CALCIUM CARBONATE 500 MG/1
1000 TABLET, CHEWABLE ORAL 4 TIMES DAILY PRN
Status: DISCONTINUED | OUTPATIENT
Start: 2024-01-01 | End: 2024-01-01

## 2024-01-01 RX ORDER — ALBUTEROL SULFATE 0.83 MG/ML
2.5 SOLUTION RESPIRATORY (INHALATION) EVERY 6 HOURS PRN
Status: DISCONTINUED | OUTPATIENT
Start: 2024-01-01 | End: 2024-01-01 | Stop reason: HOSPADM

## 2024-01-01 RX ORDER — HALOPERIDOL 5 MG/ML
1 INJECTION INTRAMUSCULAR EVERY 6 HOURS PRN
Status: DISCONTINUED | OUTPATIENT
Start: 2024-01-01 | End: 2024-01-01

## 2024-01-01 RX ORDER — HYDROMORPHONE HCL IN WATER/PF 6 MG/30 ML
0.2 PATIENT CONTROLLED ANALGESIA SYRINGE INTRAVENOUS
Status: DISCONTINUED | OUTPATIENT
Start: 2024-01-01 | End: 2024-01-01

## 2024-01-01 RX ORDER — QUETIAPINE FUMARATE 25 MG/1
6.25 TABLET, FILM COATED ORAL AT BEDTIME
DISCHARGE
Start: 2024-01-01

## 2024-01-01 RX ORDER — ACETAMINOPHEN 650 MG/1
650 SUPPOSITORY RECTAL EVERY 6 HOURS PRN
Status: DISCONTINUED | OUTPATIENT
Start: 2024-01-01 | End: 2024-01-01 | Stop reason: HOSPADM

## 2024-01-01 RX ORDER — GUAIFENESIN 200 MG/10ML
400 LIQUID ORAL EVERY 4 HOURS PRN
Status: DISCONTINUED | OUTPATIENT
Start: 2024-01-01 | End: 2024-01-01

## 2024-01-01 RX ORDER — NITROGLYCERIN 0.4 MG/1
0.4 TABLET SUBLINGUAL EVERY 5 MIN PRN
Status: DISCONTINUED | OUTPATIENT
Start: 2024-01-01 | End: 2024-01-01

## 2024-01-01 RX ORDER — PREDNISONE 20 MG/1
40 TABLET ORAL DAILY
Status: COMPLETED | OUTPATIENT
Start: 2024-01-01 | End: 2024-01-01

## 2024-01-01 RX ORDER — IPRATROPIUM BROMIDE AND ALBUTEROL SULFATE 2.5; .5 MG/3ML; MG/3ML
3 SOLUTION RESPIRATORY (INHALATION)
Status: DISCONTINUED | OUTPATIENT
Start: 2024-01-01 | End: 2024-01-01 | Stop reason: HOSPADM

## 2024-01-01 RX ORDER — CEFEPIME HYDROCHLORIDE 1 G/1
1 INJECTION, POWDER, FOR SOLUTION INTRAMUSCULAR; INTRAVENOUS EVERY 12 HOURS
Status: DISCONTINUED | OUTPATIENT
Start: 2024-01-01 | End: 2024-01-01

## 2024-01-01 RX ORDER — METHOCARBAMOL 500 MG/1
500 TABLET, FILM COATED ORAL 3 TIMES DAILY PRN
Status: DISCONTINUED | OUTPATIENT
Start: 2024-01-01 | End: 2024-01-01

## 2024-01-01 RX ORDER — POTASSIUM CHLORIDE 20MEQ/15ML
10 LIQUID (ML) ORAL ONCE
Status: COMPLETED | OUTPATIENT
Start: 2024-01-01 | End: 2024-01-01

## 2024-01-01 RX ORDER — AMOXICILLIN 250 MG
1 CAPSULE ORAL 2 TIMES DAILY PRN
Status: ON HOLD | DISCHARGE
Start: 2024-01-01 | End: 2024-01-01

## 2024-01-01 RX ORDER — LOSARTAN POTASSIUM 25 MG/1
25 TABLET ORAL DAILY
Qty: 90 TABLET | Refills: 1 | OUTPATIENT
Start: 2024-01-01

## 2024-01-01 RX ORDER — AMLODIPINE BESYLATE 5 MG/1
5 TABLET ORAL DAILY
Status: DISCONTINUED | OUTPATIENT
Start: 2024-01-01 | End: 2024-01-01

## 2024-01-01 RX ORDER — HEPARIN SODIUM 5000 [USP'U]/.5ML
5000 INJECTION, SOLUTION INTRAVENOUS; SUBCUTANEOUS EVERY 8 HOURS
Status: DISCONTINUED | OUTPATIENT
Start: 2024-01-01 | End: 2024-01-01

## 2024-01-01 RX ORDER — ONDANSETRON 4 MG/1
4 TABLET, ORALLY DISINTEGRATING ORAL EVERY 6 HOURS PRN
Qty: 10 TABLET | Refills: 0 | Status: SHIPPED | OUTPATIENT
Start: 2024-01-01

## 2024-01-01 RX ORDER — AMOXICILLIN 250 MG
2 CAPSULE ORAL 2 TIMES DAILY PRN
Status: DISCONTINUED | OUTPATIENT
Start: 2024-01-01 | End: 2024-01-01 | Stop reason: HOSPADM

## 2024-01-01 RX ORDER — SALIVA STIMULANT COMB. NO.3
2 SPRAY, NON-AEROSOL (ML) MUCOUS MEMBRANE
Status: DISCONTINUED | OUTPATIENT
Start: 2024-01-01 | End: 2024-01-01 | Stop reason: HOSPADM

## 2024-01-01 RX ORDER — ALBUMIN (HUMAN) 12.5 G/50ML
12.5 SOLUTION INTRAVENOUS ONCE
Status: DISCONTINUED | OUTPATIENT
Start: 2024-01-01 | End: 2024-01-01

## 2024-01-01 RX ORDER — ACETAMINOPHEN 325 MG/1
650 TABLET ORAL ONCE
Status: COMPLETED | OUTPATIENT
Start: 2024-01-01 | End: 2024-01-01

## 2024-01-01 RX ORDER — ONDANSETRON 2 MG/ML
4 INJECTION INTRAMUSCULAR; INTRAVENOUS EVERY 6 HOURS PRN
Status: DISCONTINUED | OUTPATIENT
Start: 2024-01-01 | End: 2024-01-01

## 2024-01-01 RX ORDER — ACETAMINOPHEN 650 MG/1
650 SUPPOSITORY RECTAL EVERY 4 HOURS PRN
Status: DISCONTINUED | OUTPATIENT
Start: 2024-01-01 | End: 2024-01-01

## 2024-01-01 RX ORDER — ASPIRIN 81 MG/1
81 TABLET, CHEWABLE ORAL DAILY
Status: DISCONTINUED | OUTPATIENT
Start: 2024-01-01 | End: 2024-01-01

## 2024-01-01 RX ORDER — POTASSIUM CHLORIDE 1500 MG/1
40 TABLET, EXTENDED RELEASE ORAL ONCE
Status: COMPLETED | OUTPATIENT
Start: 2024-01-01 | End: 2024-01-01

## 2024-01-01 RX ORDER — DEXTROSE MONOHYDRATE 100 MG/ML
INJECTION, SOLUTION INTRAVENOUS CONTINUOUS PRN
Status: DISCONTINUED | OUTPATIENT
Start: 2024-01-01 | End: 2024-01-01

## 2024-01-01 RX ORDER — BENZONATATE 100 MG/1
100 CAPSULE ORAL 3 TIMES DAILY PRN
Status: DISCONTINUED | OUTPATIENT
Start: 2024-01-01 | End: 2024-01-01

## 2024-01-01 RX ORDER — FLUCONAZOLE 200 MG/1
200 TABLET ORAL DAILY
Status: DISCONTINUED | OUTPATIENT
Start: 2024-01-01 | End: 2024-01-01

## 2024-01-01 RX ORDER — LORAZEPAM 2 MG/ML
1 CONCENTRATE ORAL EVERY 4 HOURS PRN
Qty: 30 ML | Refills: 0 | Status: SHIPPED | OUTPATIENT
Start: 2024-01-01

## 2024-01-01 RX ORDER — AMOXICILLIN 250 MG
1 CAPSULE ORAL 2 TIMES DAILY
Status: DISCONTINUED | OUTPATIENT
Start: 2024-01-01 | End: 2024-01-01

## 2024-01-01 RX ORDER — ASPIRIN 81 MG/1
81 TABLET ORAL EVERY EVENING
Status: DISCONTINUED | OUTPATIENT
Start: 2024-01-01 | End: 2024-01-01 | Stop reason: HOSPADM

## 2024-01-01 RX ORDER — ACETAMINOPHEN 500 MG
1000 TABLET ORAL EVERY 8 HOURS
Status: DISCONTINUED | OUTPATIENT
Start: 2024-01-01 | End: 2024-01-01

## 2024-01-01 RX ORDER — CODEINE PHOSPHATE AND GUAIFENESIN 10; 100 MG/5ML; MG/5ML
5 SOLUTION ORAL EVERY 4 HOURS PRN
Status: SHIPPED | DISCHARGE
Start: 2024-01-01 | End: 2024-01-01

## 2024-01-01 RX ORDER — AMOXICILLIN 250 MG
1 CAPSULE ORAL 2 TIMES DAILY
Status: ON HOLD | COMMUNITY
End: 2024-01-01

## 2024-01-01 RX ADMIN — POTASSIUM CHLORIDE 20 MEQ: 1.5 POWDER, FOR SOLUTION ORAL at 10:05

## 2024-01-01 RX ADMIN — PIPERACILLIN AND TAZOBACTAM 3.38 G: 3; .375 INJECTION, POWDER, FOR SOLUTION INTRAVENOUS at 23:21

## 2024-01-01 RX ADMIN — MAGNESIUM SULFATE HEPTAHYDRATE 2 G: 2 INJECTION, SOLUTION INTRAVENOUS at 12:26

## 2024-01-01 RX ADMIN — Medication 125 MCG: at 09:02

## 2024-01-01 RX ADMIN — ASPIRIN 81 MG: 81 TABLET, COATED ORAL at 08:29

## 2024-01-01 RX ADMIN — PIPERACILLIN AND TAZOBACTAM 3.38 G: 3; .375 INJECTION, POWDER, FOR SOLUTION INTRAVENOUS at 06:00

## 2024-01-01 RX ADMIN — ASPIRIN 81 MG: 81 TABLET, COATED ORAL at 08:45

## 2024-01-01 RX ADMIN — DOXAZOSIN 1 MG: 1 TABLET ORAL at 11:10

## 2024-01-01 RX ADMIN — HEPARIN SODIUM 5000 UNITS: 5000 INJECTION, SOLUTION INTRAVENOUS; SUBCUTANEOUS at 21:31

## 2024-01-01 RX ADMIN — HUMAN ALBUMIN MICROSPHERES AND PERFLUTREN 4 ML: 10; .22 INJECTION, SOLUTION INTRAVENOUS at 13:36

## 2024-01-01 RX ADMIN — DOCUSATE SODIUM 50 MG AND SENNOSIDES 8.6 MG 1 TABLET: 8.6; 5 TABLET, FILM COATED ORAL at 08:06

## 2024-01-01 RX ADMIN — ACETAMINOPHEN 650 MG: 325 TABLET, FILM COATED ORAL at 06:26

## 2024-01-01 RX ADMIN — HALOPERIDOL LACTATE 2 MG: 5 INJECTION, SOLUTION INTRAMUSCULAR at 03:59

## 2024-01-01 RX ADMIN — IPRATROPIUM BROMIDE AND ALBUTEROL SULFATE 3 ML: .5; 3 SOLUTION RESPIRATORY (INHALATION) at 15:37

## 2024-01-01 RX ADMIN — ACETAMINOPHEN 650 MG: 325 TABLET, FILM COATED ORAL at 02:21

## 2024-01-01 RX ADMIN — ACETAMINOPHEN 650 MG: 325 TABLET, FILM COATED ORAL at 07:52

## 2024-01-01 RX ADMIN — METOPROLOL SUCCINATE 12.5 MG: 25 TABLET, EXTENDED RELEASE ORAL at 12:41

## 2024-01-01 RX ADMIN — DEXTROSE MONOHYDRATE: 50 INJECTION, SOLUTION INTRAVENOUS at 20:54

## 2024-01-01 RX ADMIN — Medication 1 PACKET: at 08:03

## 2024-01-01 RX ADMIN — Medication 125 MCG: at 08:57

## 2024-01-01 RX ADMIN — ACETAMINOPHEN 650 MG: 325 TABLET, FILM COATED ORAL at 11:10

## 2024-01-01 RX ADMIN — POLYETHYLENE GLYCOL 3350 17 G: 17 POWDER, FOR SOLUTION ORAL at 09:15

## 2024-01-01 RX ADMIN — IPRATROPIUM BROMIDE AND ALBUTEROL SULFATE 3 ML: .5; 3 SOLUTION RESPIRATORY (INHALATION) at 07:43

## 2024-01-01 RX ADMIN — SODIUM CHLORIDE: 4.5 INJECTION, SOLUTION INTRAVENOUS at 15:55

## 2024-01-01 RX ADMIN — PREDNISONE 40 MG: 20 TABLET ORAL at 10:37

## 2024-01-01 RX ADMIN — AZITHROMYCIN MONOHYDRATE 500 MG: 500 INJECTION, POWDER, LYOPHILIZED, FOR SOLUTION INTRAVENOUS at 23:26

## 2024-01-01 RX ADMIN — Medication 125 MCG: at 08:29

## 2024-01-01 RX ADMIN — Medication 125 MCG: at 12:41

## 2024-01-01 RX ADMIN — ZOLEDRONIC ACID 4 MG: 4 INJECTION, SOLUTION, CONCENTRATE INTRAVENOUS at 12:54

## 2024-01-01 RX ADMIN — PIPERACILLIN AND TAZOBACTAM 3.38 G: 3; .375 INJECTION, POWDER, FOR SOLUTION INTRAVENOUS at 11:10

## 2024-01-01 RX ADMIN — ACETAMINOPHEN 650 MG: 325 TABLET, FILM COATED ORAL at 09:00

## 2024-01-01 RX ADMIN — ACETAMINOPHEN 650 MG: 325 TABLET, FILM COATED ORAL at 12:08

## 2024-01-01 RX ADMIN — DEXTROSE MONOHYDRATE: 50 INJECTION, SOLUTION INTRAVENOUS at 23:06

## 2024-01-01 RX ADMIN — Medication 1 PACKET: at 11:44

## 2024-01-01 RX ADMIN — ACETAMINOPHEN 650 MG: 325 TABLET, FILM COATED ORAL at 09:15

## 2024-01-01 RX ADMIN — MAGNESIUM OXIDE TAB 400 MG (241.3 MG ELEMENTAL MG) 400 MG: 400 (241.3 MG) TAB at 14:06

## 2024-01-01 RX ADMIN — Medication 125 MCG: at 07:58

## 2024-01-01 RX ADMIN — METOPROLOL SUCCINATE 12.5 MG: 25 TABLET, EXTENDED RELEASE ORAL at 08:17

## 2024-01-01 RX ADMIN — PIPERACILLIN AND TAZOBACTAM 3.38 G: 3; .375 INJECTION, POWDER, FOR SOLUTION INTRAVENOUS at 05:10

## 2024-01-01 RX ADMIN — CALCITONIN SALMON 304 UNITS: 200 INJECTION, SOLUTION INTRAMUSCULAR; SUBCUTANEOUS at 12:47

## 2024-01-01 RX ADMIN — HEPARIN SODIUM 5000 UNITS: 5000 INJECTION, SOLUTION INTRAVENOUS; SUBCUTANEOUS at 12:26

## 2024-01-01 RX ADMIN — POTASSIUM CHLORIDE 10 MEQ: 20 SOLUTION ORAL at 14:06

## 2024-01-01 RX ADMIN — HEPARIN SODIUM 5000 UNITS: 5000 INJECTION, SOLUTION INTRAVENOUS; SUBCUTANEOUS at 04:13

## 2024-01-01 RX ADMIN — ACETAMINOPHEN 1000 MG: 500 TABLET, FILM COATED ORAL at 19:42

## 2024-01-01 RX ADMIN — METOPROLOL SUCCINATE 12.5 MG: 25 TABLET, EXTENDED RELEASE ORAL at 08:44

## 2024-01-01 RX ADMIN — HEPARIN SODIUM 5000 UNITS: 5000 INJECTION, SOLUTION INTRAVENOUS; SUBCUTANEOUS at 21:36

## 2024-01-01 RX ADMIN — QUETIAPINE FUMARATE 6.25 MG: 25 TABLET ORAL at 22:45

## 2024-01-01 RX ADMIN — METOPROLOL SUCCINATE 12.5 MG: 25 TABLET, EXTENDED RELEASE ORAL at 08:31

## 2024-01-01 RX ADMIN — ACETAMINOPHEN 650 MG: 325 TABLET, FILM COATED ORAL at 21:36

## 2024-01-01 RX ADMIN — TAMSULOSIN HYDROCHLORIDE 0.4 MG: 0.4 CAPSULE ORAL at 08:29

## 2024-01-01 RX ADMIN — OXYCODONE HYDROCHLORIDE 2.5 MG: 5 TABLET ORAL at 04:13

## 2024-01-01 RX ADMIN — HEPARIN SODIUM 5000 UNITS: 5000 INJECTION, SOLUTION INTRAVENOUS; SUBCUTANEOUS at 11:39

## 2024-01-01 RX ADMIN — SODIUM CHLORIDE 60 ML: 9 INJECTION, SOLUTION INTRAVENOUS at 10:44

## 2024-01-01 RX ADMIN — IPRATROPIUM BROMIDE AND ALBUTEROL SULFATE 3 ML: .5; 3 SOLUTION RESPIRATORY (INHALATION) at 07:45

## 2024-01-01 RX ADMIN — SODIUM CHLORIDE 1000 ML: 9 INJECTION, SOLUTION INTRAVENOUS at 19:42

## 2024-01-01 RX ADMIN — ACETAMINOPHEN 650 MG: 325 TABLET, FILM COATED ORAL at 13:24

## 2024-01-01 RX ADMIN — FLUCONAZOLE 200 MG: 200 TABLET ORAL at 08:05

## 2024-01-01 RX ADMIN — IPRATROPIUM BROMIDE AND ALBUTEROL SULFATE 3 ML: .5; 3 SOLUTION RESPIRATORY (INHALATION) at 15:55

## 2024-01-01 RX ADMIN — METOPROLOL SUCCINATE 12.5 MG: 25 TABLET, EXTENDED RELEASE ORAL at 09:11

## 2024-01-01 RX ADMIN — Medication 125 MCG: at 09:15

## 2024-01-01 RX ADMIN — AMLODIPINE BESYLATE 5 MG: 5 TABLET ORAL at 10:19

## 2024-01-01 RX ADMIN — HEPARIN SODIUM 5000 UNITS: 5000 INJECTION, SOLUTION INTRAVENOUS; SUBCUTANEOUS at 11:35

## 2024-01-01 RX ADMIN — Medication 125 MCG: at 10:19

## 2024-01-01 RX ADMIN — OSELTAMIVIR PHOSPHATE 30 MG: 30 CAPSULE ORAL at 08:00

## 2024-01-01 RX ADMIN — DOXAZOSIN 1 MG: 1 TABLET ORAL at 11:42

## 2024-01-01 RX ADMIN — METOPROLOL SUCCINATE 12.5 MG: 25 TABLET, EXTENDED RELEASE ORAL at 15:05

## 2024-01-01 RX ADMIN — FLUCONAZOLE 200 MG: 200 TABLET ORAL at 07:53

## 2024-01-01 RX ADMIN — MAGNESIUM OXIDE TAB 400 MG (241.3 MG ELEMENTAL MG) 400 MG: 400 (241.3 MG) TAB at 16:37

## 2024-01-01 RX ADMIN — METOPROLOL SUCCINATE 12.5 MG: 25 TABLET, EXTENDED RELEASE ORAL at 08:57

## 2024-01-01 RX ADMIN — ASPIRIN 81 MG CHEWABLE TABLET 81 MG: 81 TABLET CHEWABLE at 08:05

## 2024-01-01 RX ADMIN — HEPARIN SODIUM 5000 UNITS: 5000 INJECTION, SOLUTION INTRAVENOUS; SUBCUTANEOUS at 03:59

## 2024-01-01 RX ADMIN — IPRATROPIUM BROMIDE AND ALBUTEROL SULFATE 3 ML: .5; 3 SOLUTION RESPIRATORY (INHALATION) at 07:15

## 2024-01-01 RX ADMIN — ACETAMINOPHEN 650 MG: 325 TABLET, FILM COATED ORAL at 00:58

## 2024-01-01 RX ADMIN — ACETAMINOPHEN 650 MG: 325 TABLET, FILM COATED ORAL at 01:00

## 2024-01-01 RX ADMIN — NYSTATIN 500000 UNITS: 100000 SUSPENSION ORAL at 17:22

## 2024-01-01 RX ADMIN — HYDRALAZINE HYDROCHLORIDE 10 MG: 20 INJECTION INTRAMUSCULAR; INTRAVENOUS at 17:11

## 2024-01-01 RX ADMIN — POLYETHYLENE GLYCOL 3350 17 G: 17 POWDER, FOR SOLUTION ORAL at 10:24

## 2024-01-01 RX ADMIN — PIPERACILLIN AND TAZOBACTAM 3.38 G: 3; .375 INJECTION, POWDER, FOR SOLUTION INTRAVENOUS at 12:03

## 2024-01-01 RX ADMIN — IPRATROPIUM BROMIDE AND ALBUTEROL SULFATE 3 ML: .5; 3 SOLUTION RESPIRATORY (INHALATION) at 16:11

## 2024-01-01 RX ADMIN — FLUCONAZOLE 200 MG: 200 TABLET ORAL at 11:42

## 2024-01-01 RX ADMIN — ACETAMINOPHEN 1000 MG: 500 TABLET, FILM COATED ORAL at 12:59

## 2024-01-01 RX ADMIN — SODIUM CHLORIDE: 9 INJECTION, SOLUTION INTRAVENOUS at 05:51

## 2024-01-01 RX ADMIN — IPRATROPIUM BROMIDE AND ALBUTEROL SULFATE 3 ML: .5; 3 SOLUTION RESPIRATORY (INHALATION) at 08:59

## 2024-01-01 RX ADMIN — NYSTATIN 500000 UNITS: 100000 SUSPENSION ORAL at 11:42

## 2024-01-01 RX ADMIN — Medication 125 MCG: at 15:05

## 2024-01-01 RX ADMIN — QUETIAPINE FUMARATE 12.5 MG: 25 TABLET ORAL at 21:31

## 2024-01-01 RX ADMIN — Medication 1 LOZENGE: at 15:05

## 2024-01-01 RX ADMIN — HEPARIN SODIUM 5000 UNITS: 5000 INJECTION, SOLUTION INTRAVENOUS; SUBCUTANEOUS at 12:45

## 2024-01-01 RX ADMIN — NYSTATIN 500000 UNITS: 100000 SUSPENSION ORAL at 08:05

## 2024-01-01 RX ADMIN — NYSTATIN 500000 UNITS: 100000 SUSPENSION ORAL at 19:23

## 2024-01-01 RX ADMIN — HEPARIN SODIUM 5000 UNITS: 5000 INJECTION, SOLUTION INTRAVENOUS; SUBCUTANEOUS at 20:37

## 2024-01-01 RX ADMIN — HEPARIN SODIUM 5000 UNITS: 5000 INJECTION, SOLUTION INTRAVENOUS; SUBCUTANEOUS at 12:47

## 2024-01-01 RX ADMIN — IPRATROPIUM BROMIDE AND ALBUTEROL SULFATE 3 ML: .5; 3 SOLUTION RESPIRATORY (INHALATION) at 12:37

## 2024-01-01 RX ADMIN — ASPIRIN 81 MG: 81 TABLET, COATED ORAL at 21:02

## 2024-01-01 RX ADMIN — DOCUSATE SODIUM 50 MG AND SENNOSIDES 8.6 MG 1 TABLET: 8.6; 5 TABLET, FILM COATED ORAL at 21:41

## 2024-01-01 RX ADMIN — NYSTATIN 500000 UNITS: 100000 SUSPENSION ORAL at 11:10

## 2024-01-01 RX ADMIN — VANCOMYCIN HYDROCHLORIDE 750 MG: 5 INJECTION, POWDER, LYOPHILIZED, FOR SOLUTION INTRAVENOUS at 18:59

## 2024-01-01 RX ADMIN — HYDROXYZINE HYDROCHLORIDE 25 MG: 25 TABLET, FILM COATED ORAL at 19:43

## 2024-01-01 RX ADMIN — METOPROLOL SUCCINATE 12.5 MG: 25 TABLET, EXTENDED RELEASE ORAL at 09:40

## 2024-01-01 RX ADMIN — Medication 1 PACKET: at 08:13

## 2024-01-01 RX ADMIN — HEPARIN SODIUM 5000 UNITS: 5000 INJECTION, SOLUTION INTRAVENOUS; SUBCUTANEOUS at 20:51

## 2024-01-01 RX ADMIN — HEPARIN SODIUM 5000 UNITS: 5000 INJECTION, SOLUTION INTRAVENOUS; SUBCUTANEOUS at 12:59

## 2024-01-01 RX ADMIN — HEPARIN SODIUM 5000 UNITS: 5000 INJECTION, SOLUTION INTRAVENOUS; SUBCUTANEOUS at 07:16

## 2024-01-01 RX ADMIN — AMLODIPINE BESYLATE 5 MG: 5 TABLET ORAL at 08:50

## 2024-01-01 RX ADMIN — DOCUSATE SODIUM 50 MG AND SENNOSIDES 8.6 MG 1 TABLET: 8.6; 5 TABLET, FILM COATED ORAL at 11:42

## 2024-01-01 RX ADMIN — IPRATROPIUM BROMIDE AND ALBUTEROL SULFATE 3 ML: .5; 3 SOLUTION RESPIRATORY (INHALATION) at 11:27

## 2024-01-01 RX ADMIN — DEXTROSE MONOHYDRATE: 50 INJECTION, SOLUTION INTRAVENOUS at 08:14

## 2024-01-01 RX ADMIN — PIPERACILLIN AND TAZOBACTAM 3.38 G: 3; .375 INJECTION, POWDER, FOR SOLUTION INTRAVENOUS at 04:33

## 2024-01-01 RX ADMIN — AZITHROMYCIN MONOHYDRATE 500 MG: 500 INJECTION, POWDER, LYOPHILIZED, FOR SOLUTION INTRAVENOUS at 23:57

## 2024-01-01 RX ADMIN — ACETAMINOPHEN 650 MG: 325 TABLET, FILM COATED ORAL at 21:44

## 2024-01-01 RX ADMIN — OSELTAMIVIR PHOSPHATE 30 MG: 30 CAPSULE ORAL at 08:40

## 2024-01-01 RX ADMIN — ACETAMINOPHEN 650 MG: 325 TABLET, FILM COATED ORAL at 09:29

## 2024-01-01 RX ADMIN — HEPARIN SODIUM 5000 UNITS: 5000 INJECTION, SOLUTION INTRAVENOUS; SUBCUTANEOUS at 12:02

## 2024-01-01 RX ADMIN — ACETAMINOPHEN 650 MG: 325 TABLET, FILM COATED ORAL at 20:37

## 2024-01-01 RX ADMIN — SODIUM CHLORIDE, PRESERVATIVE FREE: 5 INJECTION INTRAVENOUS at 23:10

## 2024-01-01 RX ADMIN — HEPARIN SODIUM 5000 UNITS: 5000 INJECTION, SOLUTION INTRAVENOUS; SUBCUTANEOUS at 04:41

## 2024-01-01 RX ADMIN — Medication 1 PACKET: at 11:09

## 2024-01-01 RX ADMIN — HEPARIN SODIUM 5000 UNITS: 5000 INJECTION, SOLUTION INTRAVENOUS; SUBCUTANEOUS at 04:28

## 2024-01-01 RX ADMIN — NYSTATIN 500000 UNITS: 100000 SUSPENSION ORAL at 16:00

## 2024-01-01 RX ADMIN — QUETIAPINE FUMARATE 6.25 MG: 25 TABLET ORAL at 21:36

## 2024-01-01 RX ADMIN — SENNOSIDES AND DOCUSATE SODIUM 2 TABLET: 8.6; 5 TABLET ORAL at 14:05

## 2024-01-01 RX ADMIN — SODIUM CHLORIDE 1000 ML: 9 INJECTION, SOLUTION INTRAVENOUS at 11:11

## 2024-01-01 RX ADMIN — IPRATROPIUM BROMIDE AND ALBUTEROL SULFATE 3 ML: .5; 3 SOLUTION RESPIRATORY (INHALATION) at 07:39

## 2024-01-01 RX ADMIN — IPRATROPIUM BROMIDE AND ALBUTEROL SULFATE 3 ML: .5; 3 SOLUTION RESPIRATORY (INHALATION) at 19:35

## 2024-01-01 RX ADMIN — ACETAMINOPHEN 1000 MG: 500 TABLET, FILM COATED ORAL at 12:08

## 2024-01-01 RX ADMIN — HEPARIN SODIUM 5000 UNITS: 5000 INJECTION, SOLUTION INTRAVENOUS; SUBCUTANEOUS at 19:49

## 2024-01-01 RX ADMIN — DEXTROSE MONOHYDRATE: 50 INJECTION, SOLUTION INTRAVENOUS at 05:58

## 2024-01-01 RX ADMIN — METHOCARBAMOL 500 MG: 500 TABLET ORAL at 20:24

## 2024-01-01 RX ADMIN — ACETAMINOPHEN 650 MG: 325 TABLET, FILM COATED ORAL at 08:31

## 2024-01-01 RX ADMIN — HEPARIN SODIUM 5000 UNITS: 5000 INJECTION, SOLUTION INTRAVENOUS; SUBCUTANEOUS at 22:33

## 2024-01-01 RX ADMIN — WATER 10 ML: 1 INJECTION INTRAMUSCULAR; INTRAVENOUS; SUBCUTANEOUS at 02:15

## 2024-01-01 RX ADMIN — NYSTATIN 500000 UNITS: 100000 SUSPENSION ORAL at 12:08

## 2024-01-01 RX ADMIN — Medication 125 MCG: at 08:50

## 2024-01-01 RX ADMIN — POLYETHYLENE GLYCOL 3350 17 G: 17 POWDER, FOR SOLUTION ORAL at 10:38

## 2024-01-01 RX ADMIN — ASPIRIN 81 MG CHEWABLE TABLET 81 MG: 81 TABLET CHEWABLE at 11:42

## 2024-01-01 RX ADMIN — HEPARIN SODIUM 5000 UNITS: 5000 INJECTION, SOLUTION INTRAVENOUS; SUBCUTANEOUS at 04:33

## 2024-01-01 RX ADMIN — PIPERACILLIN AND TAZOBACTAM 3.38 G: 3; .375 INJECTION, POWDER, FOR SOLUTION INTRAVENOUS at 17:11

## 2024-01-01 RX ADMIN — AMLODIPINE BESYLATE 5 MG: 5 TABLET ORAL at 09:41

## 2024-01-01 RX ADMIN — CALCITONIN SALMON 314 UNITS: 200 INJECTION, SOLUTION INTRAMUSCULAR; SUBCUTANEOUS at 00:18

## 2024-01-01 RX ADMIN — DOCUSATE SODIUM 50 MG AND SENNOSIDES 8.6 MG 1 TABLET: 8.6; 5 TABLET, FILM COATED ORAL at 07:52

## 2024-01-01 RX ADMIN — DOCUSATE SODIUM 50 MG AND SENNOSIDES 8.6 MG 1 TABLET: 8.6; 5 TABLET, FILM COATED ORAL at 08:45

## 2024-01-01 RX ADMIN — ASPIRIN 81 MG CHEWABLE TABLET 81 MG: 81 TABLET CHEWABLE at 07:52

## 2024-01-01 RX ADMIN — HYDROXYZINE HYDROCHLORIDE 50 MG: 50 TABLET, FILM COATED ORAL at 07:58

## 2024-01-01 RX ADMIN — ACETAMINOPHEN 650 MG: 325 TABLET, FILM COATED ORAL at 12:16

## 2024-01-01 RX ADMIN — ASPIRIN 81 MG: 81 TABLET, COATED ORAL at 08:31

## 2024-01-01 RX ADMIN — NYSTATIN 500000 UNITS: 100000 SUSPENSION ORAL at 12:58

## 2024-01-01 RX ADMIN — GADOBUTROL 9 ML: 604.72 INJECTION INTRAVENOUS at 21:16

## 2024-01-01 RX ADMIN — SODIUM CHLORIDE: 4.5 INJECTION, SOLUTION INTRAVENOUS at 10:35

## 2024-01-01 RX ADMIN — PIPERACILLIN AND TAZOBACTAM 3.38 G: 3; .375 INJECTION, POWDER, FOR SOLUTION INTRAVENOUS at 00:20

## 2024-01-01 RX ADMIN — DOXAZOSIN 1 MG: 1 TABLET ORAL at 07:53

## 2024-01-01 RX ADMIN — DEXTROSE MONOHYDRATE: 50 INJECTION, SOLUTION INTRAVENOUS at 08:53

## 2024-01-01 RX ADMIN — HEPARIN SODIUM 5000 UNITS: 5000 INJECTION, SOLUTION INTRAVENOUS; SUBCUTANEOUS at 06:01

## 2024-01-01 RX ADMIN — POTASSIUM CHLORIDE 20 MEQ: 1.5 POWDER, FOR SOLUTION ORAL at 10:24

## 2024-01-01 RX ADMIN — PREDNISONE 40 MG: 20 TABLET ORAL at 09:15

## 2024-01-01 RX ADMIN — HEPARIN SODIUM 5000 UNITS: 5000 INJECTION, SOLUTION INTRAVENOUS; SUBCUTANEOUS at 16:00

## 2024-01-01 RX ADMIN — DOCUSATE SODIUM 50 MG AND SENNOSIDES 8.6 MG 1 TABLET: 8.6; 5 TABLET, FILM COATED ORAL at 09:11

## 2024-01-01 RX ADMIN — DEXTROSE MONOHYDRATE 100 ML/HR: 50 INJECTION, SOLUTION INTRAVENOUS at 06:33

## 2024-01-01 RX ADMIN — HEPARIN SODIUM 5000 UNITS: 5000 INJECTION, SOLUTION INTRAVENOUS; SUBCUTANEOUS at 21:39

## 2024-01-01 RX ADMIN — IPRATROPIUM BROMIDE AND ALBUTEROL SULFATE 3 ML: .5; 3 SOLUTION RESPIRATORY (INHALATION) at 20:04

## 2024-01-01 RX ADMIN — ACETAMINOPHEN 650 MG: 650 SUPPOSITORY RECTAL at 22:37

## 2024-01-01 RX ADMIN — IPRATROPIUM BROMIDE AND ALBUTEROL SULFATE 3 ML: .5; 3 SOLUTION RESPIRATORY (INHALATION) at 19:04

## 2024-01-01 RX ADMIN — HEPARIN SODIUM 5000 UNITS: 5000 INJECTION, SOLUTION INTRAVENOUS; SUBCUTANEOUS at 12:15

## 2024-01-01 RX ADMIN — POLYETHYLENE GLYCOL 3350 17 G: 17 POWDER, FOR SOLUTION ORAL at 08:59

## 2024-01-01 RX ADMIN — IPRATROPIUM BROMIDE AND ALBUTEROL SULFATE 3 ML: .5; 3 SOLUTION RESPIRATORY (INHALATION) at 15:49

## 2024-01-01 RX ADMIN — HEPARIN SODIUM 5000 UNITS: 5000 INJECTION, SOLUTION INTRAVENOUS; SUBCUTANEOUS at 14:26

## 2024-01-01 RX ADMIN — ASPIRIN 81 MG: 81 TABLET, COATED ORAL at 09:10

## 2024-01-01 RX ADMIN — SODIUM CHLORIDE: 9 INJECTION, SOLUTION INTRAVENOUS at 13:20

## 2024-01-01 RX ADMIN — DOCUSATE SODIUM 50 MG AND SENNOSIDES 8.6 MG 1 TABLET: 8.6; 5 TABLET, FILM COATED ORAL at 20:37

## 2024-01-01 RX ADMIN — METOPROLOL SUCCINATE 12.5 MG: 25 TABLET, EXTENDED RELEASE ORAL at 08:29

## 2024-01-01 RX ADMIN — DEXTROSE MONOHYDRATE: 50 INJECTION, SOLUTION INTRAVENOUS at 20:29

## 2024-01-01 RX ADMIN — HEPARIN SODIUM 5000 UNITS: 5000 INJECTION, SOLUTION INTRAVENOUS; SUBCUTANEOUS at 12:42

## 2024-01-01 RX ADMIN — Medication 125 MCG: at 09:40

## 2024-01-01 RX ADMIN — QUETIAPINE FUMARATE 6.25 MG: 25 TABLET ORAL at 21:03

## 2024-01-01 RX ADMIN — HEPARIN SODIUM 5000 UNITS: 5000 INJECTION, SOLUTION INTRAVENOUS; SUBCUTANEOUS at 21:27

## 2024-01-01 RX ADMIN — ACETAMINOPHEN 1000 MG: 500 TABLET, FILM COATED ORAL at 04:18

## 2024-01-01 RX ADMIN — NYSTATIN 500000 UNITS: 100000 SUSPENSION ORAL at 21:35

## 2024-01-01 RX ADMIN — ACETAMINOPHEN 650 MG: 325 TABLET, FILM COATED ORAL at 10:24

## 2024-01-01 RX ADMIN — AMLODIPINE BESYLATE 5 MG: 5 TABLET ORAL at 07:53

## 2024-01-01 RX ADMIN — PIPERACILLIN AND TAZOBACTAM 3.38 G: 3; .375 INJECTION, POWDER, FOR SOLUTION INTRAVENOUS at 16:17

## 2024-01-01 RX ADMIN — DEXTROSE MONOHYDRATE: 50 INJECTION, SOLUTION INTRAVENOUS at 05:24

## 2024-01-01 RX ADMIN — MAGNESIUM OXIDE TAB 400 MG (241.3 MG ELEMENTAL MG) 400 MG: 400 (241.3 MG) TAB at 14:05

## 2024-01-01 RX ADMIN — Medication 125 MCG: at 08:17

## 2024-01-01 RX ADMIN — PIPERACILLIN AND TAZOBACTAM 3.38 G: 3; .375 INJECTION, POWDER, FOR SOLUTION INTRAVENOUS at 11:26

## 2024-01-01 RX ADMIN — ACETAMINOPHEN 650 MG: 325 TABLET, FILM COATED ORAL at 11:42

## 2024-01-01 RX ADMIN — DEXTROSE MONOHYDRATE: 50 INJECTION, SOLUTION INTRAVENOUS at 12:24

## 2024-01-01 RX ADMIN — NYSTATIN 500000 UNITS: 100000 SUSPENSION ORAL at 08:34

## 2024-01-01 RX ADMIN — DOCUSATE SODIUM 50 MG AND SENNOSIDES 8.6 MG 1 TABLET: 8.6; 5 TABLET, FILM COATED ORAL at 08:31

## 2024-01-01 RX ADMIN — DEXTROSE MONOHYDRATE 1000 ML: 50 INJECTION, SOLUTION INTRAVENOUS at 16:04

## 2024-01-01 RX ADMIN — IPRATROPIUM BROMIDE AND ALBUTEROL SULFATE 3 ML: .5; 3 SOLUTION RESPIRATORY (INHALATION) at 11:39

## 2024-01-01 RX ADMIN — HEPARIN SODIUM 5000 UNITS: 5000 INJECTION, SOLUTION INTRAVENOUS; SUBCUTANEOUS at 05:18

## 2024-01-01 RX ADMIN — METOPROLOL SUCCINATE 12.5 MG: 25 TABLET, EXTENDED RELEASE ORAL at 08:28

## 2024-01-01 RX ADMIN — OSELTAMIVIR PHOSPHATE 30 MG: 30 CAPSULE ORAL at 08:17

## 2024-01-01 RX ADMIN — IPRATROPIUM BROMIDE AND ALBUTEROL SULFATE 3 ML: .5; 3 SOLUTION RESPIRATORY (INHALATION) at 19:39

## 2024-01-01 RX ADMIN — IPRATROPIUM BROMIDE AND ALBUTEROL SULFATE 3 ML: .5; 3 SOLUTION RESPIRATORY (INHALATION) at 11:26

## 2024-01-01 RX ADMIN — HEPARIN SODIUM 5000 UNITS: 5000 INJECTION, SOLUTION INTRAVENOUS; SUBCUTANEOUS at 21:10

## 2024-01-01 RX ADMIN — IPRATROPIUM BROMIDE AND ALBUTEROL SULFATE 3 ML: .5; 3 SOLUTION RESPIRATORY (INHALATION) at 11:59

## 2024-01-01 RX ADMIN — AZITHROMYCIN MONOHYDRATE 500 MG: 500 INJECTION, POWDER, LYOPHILIZED, FOR SOLUTION INTRAVENOUS at 23:35

## 2024-01-01 RX ADMIN — LIDOCAINE 1 PATCH: 4 PATCH TOPICAL at 09:02

## 2024-01-01 RX ADMIN — ASPIRIN 81 MG CHEWABLE TABLET 81 MG: 81 TABLET CHEWABLE at 11:10

## 2024-01-01 RX ADMIN — POLYETHYLENE GLYCOL 3350 17 G: 17 POWDER, FOR SOLUTION ORAL at 09:01

## 2024-01-01 RX ADMIN — CEFEPIME 1 G: 1 INJECTION, POWDER, FOR SOLUTION INTRAMUSCULAR; INTRAVENOUS at 18:23

## 2024-01-01 RX ADMIN — AMLODIPINE BESYLATE 5 MG: 5 TABLET ORAL at 09:02

## 2024-01-01 RX ADMIN — OLANZAPINE 5 MG: 10 INJECTION, POWDER, FOR SOLUTION INTRAMUSCULAR at 02:15

## 2024-01-01 RX ADMIN — NYSTATIN 500000 UNITS: 100000 SUSPENSION ORAL at 07:51

## 2024-01-01 RX ADMIN — BENZOCAINE 6 MG-MENTHOL 10 MG LOZENGES 1 LOZENGE: at 13:49

## 2024-01-01 RX ADMIN — OSELTAMIVIR PHOSPHATE 30 MG: 30 CAPSULE ORAL at 08:29

## 2024-01-01 RX ADMIN — DEXTROSE MONOHYDRATE: 50 INJECTION, SOLUTION INTRAVENOUS at 09:53

## 2024-01-01 RX ADMIN — ACETAMINOPHEN 1000 MG: 500 TABLET, FILM COATED ORAL at 04:45

## 2024-01-01 RX ADMIN — ACETAMINOPHEN 1000 MG: 500 TABLET, FILM COATED ORAL at 20:22

## 2024-01-01 RX ADMIN — AMLODIPINE BESYLATE 5 MG: 5 TABLET ORAL at 12:08

## 2024-01-01 RX ADMIN — HEPARIN SODIUM 5000 UNITS: 5000 INJECTION, SOLUTION INTRAVENOUS; SUBCUTANEOUS at 05:02

## 2024-01-01 RX ADMIN — HEPARIN SODIUM 5000 UNITS: 5000 INJECTION, SOLUTION INTRAVENOUS; SUBCUTANEOUS at 14:45

## 2024-01-01 RX ADMIN — OSELTAMIVIR PHOSPHATE 30 MG: 30 CAPSULE ORAL at 12:41

## 2024-01-01 RX ADMIN — HEPARIN SODIUM 5000 UNITS: 5000 INJECTION, SOLUTION INTRAVENOUS; SUBCUTANEOUS at 21:42

## 2024-01-01 RX ADMIN — HEPARIN SODIUM 5000 UNITS: 5000 INJECTION, SOLUTION INTRAVENOUS; SUBCUTANEOUS at 20:29

## 2024-01-01 RX ADMIN — MAGNESIUM OXIDE TAB 400 MG (241.3 MG ELEMENTAL MG) 400 MG: 400 (241.3 MG) TAB at 11:29

## 2024-01-01 RX ADMIN — ACETAMINOPHEN 650 MG: 325 TABLET, FILM COATED ORAL at 08:45

## 2024-01-01 RX ADMIN — OXYCODONE HYDROCHLORIDE 5 MG: 5 TABLET ORAL at 12:45

## 2024-01-01 RX ADMIN — DEXTROSE MONOHYDRATE: 50 INJECTION, SOLUTION INTRAVENOUS at 18:58

## 2024-01-01 RX ADMIN — QUETIAPINE FUMARATE 6.25 MG: 25 TABLET ORAL at 21:30

## 2024-01-01 RX ADMIN — HEPARIN SODIUM 5000 UNITS: 5000 INJECTION, SOLUTION INTRAVENOUS; SUBCUTANEOUS at 12:30

## 2024-01-01 RX ADMIN — ACETAMINOPHEN 650 MG: 325 TABLET, FILM COATED ORAL at 21:41

## 2024-01-01 RX ADMIN — NYSTATIN 500000 UNITS: 100000 SUSPENSION ORAL at 18:08

## 2024-01-01 RX ADMIN — CEFEPIME 1 G: 1 INJECTION, POWDER, FOR SOLUTION INTRAMUSCULAR; INTRAVENOUS at 05:15

## 2024-01-01 RX ADMIN — PREDNISONE 40 MG: 20 TABLET ORAL at 08:57

## 2024-01-01 RX ADMIN — ACETAMINOPHEN 650 MG: 325 TABLET, FILM COATED ORAL at 08:56

## 2024-01-01 RX ADMIN — SODIUM CHLORIDE: 4.5 INJECTION, SOLUTION INTRAVENOUS at 05:25

## 2024-01-01 RX ADMIN — DEXTROSE MONOHYDRATE: 50 INJECTION, SOLUTION INTRAVENOUS at 19:23

## 2024-01-01 RX ADMIN — ASPIRIN 81 MG: 81 TABLET, COATED ORAL at 19:59

## 2024-01-01 RX ADMIN — SODIUM CHLORIDE 500 ML: 9 INJECTION, SOLUTION INTRAVENOUS at 03:14

## 2024-01-01 RX ADMIN — HEPARIN SODIUM 5000 UNITS: 5000 INJECTION, SOLUTION INTRAVENOUS; SUBCUTANEOUS at 12:03

## 2024-01-01 RX ADMIN — IPRATROPIUM BROMIDE AND ALBUTEROL SULFATE 3 ML: .5; 3 SOLUTION RESPIRATORY (INHALATION) at 07:48

## 2024-01-01 RX ADMIN — OSELTAMIVIR PHOSPHATE 30 MG: 30 CAPSULE ORAL at 09:40

## 2024-01-01 RX ADMIN — IPRATROPIUM BROMIDE AND ALBUTEROL SULFATE 3 ML: .5; 3 SOLUTION RESPIRATORY (INHALATION) at 11:40

## 2024-01-01 RX ADMIN — HEPARIN SODIUM 5000 UNITS: 5000 INJECTION, SOLUTION INTRAVENOUS; SUBCUTANEOUS at 20:10

## 2024-01-01 RX ADMIN — HEPARIN SODIUM 5000 UNITS: 5000 INJECTION, SOLUTION INTRAVENOUS; SUBCUTANEOUS at 05:10

## 2024-01-01 RX ADMIN — ACETAMINOPHEN 650 MG: 325 TABLET, FILM COATED ORAL at 18:50

## 2024-01-01 RX ADMIN — SENNOSIDES AND DOCUSATE SODIUM 2 TABLET: 8.6; 5 TABLET ORAL at 16:37

## 2024-01-01 RX ADMIN — DOCUSATE SODIUM 50 MG AND SENNOSIDES 8.6 MG 1 TABLET: 8.6; 5 TABLET, FILM COATED ORAL at 11:10

## 2024-01-01 RX ADMIN — METOPROLOL SUCCINATE 12.5 MG: 25 TABLET, EXTENDED RELEASE ORAL at 08:00

## 2024-01-01 RX ADMIN — DEXTROSE MONOHYDRATE 500 ML: 50 INJECTION, SOLUTION INTRAVENOUS at 10:34

## 2024-01-01 RX ADMIN — OXYCODONE HYDROCHLORIDE 2.5 MG: 5 TABLET ORAL at 08:54

## 2024-01-01 RX ADMIN — ACETAMINOPHEN 650 MG: 325 TABLET, FILM COATED ORAL at 04:14

## 2024-01-01 RX ADMIN — SODIUM CHLORIDE: 9 INJECTION, SOLUTION INTRAVENOUS at 22:43

## 2024-01-01 RX ADMIN — LIDOCAINE HYDROCHLORIDE: 20 JELLY TOPICAL at 13:24

## 2024-01-01 RX ADMIN — HEPARIN SODIUM 5000 UNITS: 5000 INJECTION, SOLUTION INTRAVENOUS; SUBCUTANEOUS at 05:15

## 2024-01-01 RX ADMIN — MAGNESIUM OXIDE TAB 400 MG (241.3 MG ELEMENTAL MG) 400 MG: 400 (241.3 MG) TAB at 12:59

## 2024-01-01 RX ADMIN — FLUCONAZOLE 200 MG: 200 TABLET ORAL at 11:10

## 2024-01-01 RX ADMIN — ASPIRIN 81 MG: 81 TABLET, COATED ORAL at 20:24

## 2024-01-01 RX ADMIN — HEPARIN SODIUM 5000 UNITS: 5000 INJECTION, SOLUTION INTRAVENOUS; SUBCUTANEOUS at 04:37

## 2024-01-01 RX ADMIN — ACETAMINOPHEN 650 MG: 325 TABLET, FILM COATED ORAL at 18:23

## 2024-01-01 RX ADMIN — MORPHINE SULFATE 5 MG: 100 SOLUTION ORAL at 22:36

## 2024-01-01 RX ADMIN — ASPIRIN 81 MG: 81 TABLET, COATED ORAL at 20:54

## 2024-01-01 RX ADMIN — NYSTATIN 500000 UNITS: 100000 SUSPENSION ORAL at 14:45

## 2024-01-01 RX ADMIN — NYSTATIN 500000 UNITS: 100000 SUSPENSION ORAL at 22:45

## 2024-01-01 RX ADMIN — IOPAMIDOL 90 ML: 755 INJECTION, SOLUTION INTRAVENOUS at 10:44

## 2024-01-01 RX ADMIN — FLUCONAZOLE 400 MG: 400 INJECTION, SOLUTION INTRAVENOUS at 14:18

## 2024-01-01 RX ADMIN — HEPARIN SODIUM 5000 UNITS: 5000 INJECTION, SOLUTION INTRAVENOUS; SUBCUTANEOUS at 14:09

## 2024-01-01 RX ADMIN — LIDOCAINE HYDROCHLORIDE 4 ML: 20 JELLY TOPICAL at 09:48

## 2024-01-01 RX ADMIN — HEPARIN SODIUM 5000 UNITS: 5000 INJECTION, SOLUTION INTRAVENOUS; SUBCUTANEOUS at 04:14

## 2024-01-01 RX ADMIN — QUETIAPINE FUMARATE 25 MG: 25 TABLET ORAL at 00:58

## 2024-01-01 RX ADMIN — HEPARIN SODIUM 5000 UNITS: 5000 INJECTION, SOLUTION INTRAVENOUS; SUBCUTANEOUS at 20:01

## 2024-01-01 RX ADMIN — DEXTROSE MONOHYDRATE: 50 INJECTION, SOLUTION INTRAVENOUS at 16:31

## 2024-01-01 RX ADMIN — IPRATROPIUM BROMIDE AND ALBUTEROL SULFATE 3 ML: .5; 3 SOLUTION RESPIRATORY (INHALATION) at 19:53

## 2024-01-01 RX ADMIN — HYDROXYZINE HYDROCHLORIDE 25 MG: 25 TABLET, FILM COATED ORAL at 20:24

## 2024-01-01 RX ADMIN — HEPARIN SODIUM 5000 UNITS: 5000 INJECTION, SOLUTION INTRAVENOUS; SUBCUTANEOUS at 20:54

## 2024-01-01 RX ADMIN — DOXAZOSIN 1 MG: 1 TABLET ORAL at 08:06

## 2024-01-01 RX ADMIN — MAGNESIUM OXIDE TAB 400 MG (241.3 MG ELEMENTAL MG) 400 MG: 400 (241.3 MG) TAB at 17:52

## 2024-01-01 RX ADMIN — DEXTROSE MONOHYDRATE: 50 INJECTION, SOLUTION INTRAVENOUS at 10:13

## 2024-01-01 RX ADMIN — ACETAMINOPHEN 650 MG: 325 TABLET, FILM COATED ORAL at 08:06

## 2024-01-01 RX ADMIN — DOCUSATE SODIUM 50 MG AND SENNOSIDES 8.6 MG 1 TABLET: 8.6; 5 TABLET, FILM COATED ORAL at 08:34

## 2024-01-01 RX ADMIN — OXYCODONE HYDROCHLORIDE 5 MG: 5 TABLET ORAL at 16:11

## 2024-01-01 RX ADMIN — SODIUM CHLORIDE 1000 MG: 9 INJECTION, SOLUTION INTRAVENOUS at 18:08

## 2024-01-01 RX ADMIN — NYSTATIN 500000 UNITS: 100000 SUSPENSION ORAL at 14:09

## 2024-01-01 RX ADMIN — POTASSIUM CHLORIDE 40 MEQ: 1500 TABLET, EXTENDED RELEASE ORAL at 10:53

## 2024-01-01 RX ADMIN — ASPIRIN 81 MG: 81 TABLET, COATED ORAL at 19:48

## 2024-01-01 RX ADMIN — ACETAMINOPHEN 650 MG: 650 SUPPOSITORY RECTAL at 23:41

## 2024-01-01 RX ADMIN — ASPIRIN 81 MG: 81 TABLET, COATED ORAL at 20:29

## 2024-01-01 RX ADMIN — ASPIRIN 81 MG: 81 TABLET, COATED ORAL at 21:31

## 2024-01-01 RX ADMIN — METHOCARBAMOL 500 MG: 500 TABLET ORAL at 04:25

## 2024-01-01 RX ADMIN — HEPARIN SODIUM 5000 UNITS: 5000 INJECTION, SOLUTION INTRAVENOUS; SUBCUTANEOUS at 12:08

## 2024-01-01 RX ADMIN — HALOPERIDOL LACTATE 2 MG: 5 INJECTION, SOLUTION INTRAMUSCULAR at 23:34

## 2024-01-01 RX ADMIN — ASPIRIN 81 MG CHEWABLE TABLET 324 MG: 81 TABLET CHEWABLE at 03:50

## 2024-01-01 RX ADMIN — BARIUM SULFATE 15 ML: 400 SUSPENSION ORAL at 09:23

## 2024-01-01 RX ADMIN — AZITHROMYCIN MONOHYDRATE 500 MG: 500 INJECTION, POWDER, LYOPHILIZED, FOR SOLUTION INTRAVENOUS at 22:52

## 2024-01-01 RX ADMIN — LIDOCAINE HYDROCHLORIDE 5 ML: 10 INJECTION, SOLUTION EPIDURAL; INFILTRATION; INTRACAUDAL; PERINEURAL at 11:30

## 2024-01-01 ASSESSMENT — ACTIVITIES OF DAILY LIVING (ADL)
ADLS_ACUITY_SCORE: 42
ADLS_ACUITY_SCORE: 44
ADLS_ACUITY_SCORE: 47
ADLS_ACUITY_SCORE: 41
ADLS_ACUITY_SCORE: 51
ADLS_ACUITY_SCORE: 42
ADLS_ACUITY_SCORE: 49
ADLS_ACUITY_SCORE: 46
ADLS_ACUITY_SCORE: 51
ADLS_ACUITY_SCORE: 44
ADLS_ACUITY_SCORE: 46
ADLS_ACUITY_SCORE: 57
ADLS_ACUITY_SCORE: 57
ADLS_ACUITY_SCORE: 75
ADLS_ACUITY_SCORE: 75
ADLS_ACUITY_SCORE: 53
ADLS_ACUITY_SCORE: 41
ADLS_ACUITY_SCORE: 55
ADLS_ACUITY_SCORE: 44
ADLS_ACUITY_SCORE: 52
ADLS_ACUITY_SCORE: 75
ADLS_ACUITY_SCORE: 49
ADLS_ACUITY_SCORE: 44
ADLS_ACUITY_SCORE: 42
NUMBER_OF_TIMES_PATIENT_HAS_FALLEN_WITHIN_LAST_SIX_MONTHS: 1
ADLS_ACUITY_SCORE: 75
ADLS_ACUITY_SCORE: 47
ADLS_ACUITY_SCORE: 46
ADLS_ACUITY_SCORE: 66
ADLS_ACUITY_SCORE: 47
ADLS_ACUITY_SCORE: 66
ADLS_ACUITY_SCORE: 71
ADLS_ACUITY_SCORE: 49
ADLS_ACUITY_SCORE: 52
ADLS_ACUITY_SCORE: 69
ADLS_ACUITY_SCORE: 55
ADLS_ACUITY_SCORE: 75
ADLS_ACUITY_SCORE: 52
ADLS_ACUITY_SCORE: 65
ADLS_ACUITY_SCORE: 57
ADLS_ACUITY_SCORE: 51
ADLS_ACUITY_SCORE: 41
ADLS_ACUITY_SCORE: 46
ADLS_ACUITY_SCORE: 51
ADLS_ACUITY_SCORE: 47
ADLS_ACUITY_SCORE: 55
ADLS_ACUITY_SCORE: 52
ADLS_ACUITY_SCORE: 47
ADLS_ACUITY_SCORE: 41
ADLS_ACUITY_SCORE: 55
ADLS_ACUITY_SCORE: 40
ADLS_ACUITY_SCORE: 42
ADLS_ACUITY_SCORE: 75
ADLS_ACUITY_SCORE: 75
ADLS_ACUITY_SCORE: 52
ADLS_ACUITY_SCORE: 50
ADLS_ACUITY_SCORE: 52
ADLS_ACUITY_SCORE: 50
ADLS_ACUITY_SCORE: 67
ADLS_ACUITY_SCORE: 46
ADLS_ACUITY_SCORE: 55
ADLS_ACUITY_SCORE: 75
ADLS_ACUITY_SCORE: 69
ADLS_ACUITY_SCORE: 72
ADLS_ACUITY_SCORE: 52
ADLS_ACUITY_SCORE: 50
ADLS_ACUITY_SCORE: 41
ADLS_ACUITY_SCORE: 57
ADLS_ACUITY_SCORE: 75
ADLS_ACUITY_SCORE: 68
ADLS_ACUITY_SCORE: 45
ADLS_ACUITY_SCORE: 53
ADLS_ACUITY_SCORE: 75
ADLS_ACUITY_SCORE: 69
ADLS_ACUITY_SCORE: 75
ADLS_ACUITY_SCORE: 57
ADLS_ACUITY_SCORE: 42
ADLS_ACUITY_SCORE: 53
ADLS_ACUITY_SCORE: 46
ADLS_ACUITY_SCORE: 71
ADLS_ACUITY_SCORE: 66
ADLS_ACUITY_SCORE: 75
ADLS_ACUITY_SCORE: 52
ADLS_ACUITY_SCORE: 67
ADLS_ACUITY_SCORE: 69
ADLS_ACUITY_SCORE: 41
ADLS_ACUITY_SCORE: 69
ADLS_ACUITY_SCORE: 75
ADLS_ACUITY_SCORE: 55
ADLS_ACUITY_SCORE: 65
ADLS_ACUITY_SCORE: 75
ADLS_ACUITY_SCORE: 68
ADLS_ACUITY_SCORE: 75
ADLS_ACUITY_SCORE: 46
ADLS_ACUITY_SCORE: 52
ADLS_ACUITY_SCORE: 54
ADLS_ACUITY_SCORE: 75
ADLS_ACUITY_SCORE: 65
ADLS_ACUITY_SCORE: 52
ADLS_ACUITY_SCORE: 57
ADLS_ACUITY_SCORE: 75
ADLS_ACUITY_SCORE: 46
ADLS_ACUITY_SCORE: 75
ADLS_ACUITY_SCORE: 41
ADLS_ACUITY_SCORE: 55
ADLS_ACUITY_SCORE: 70
ADLS_ACUITY_SCORE: 75
ADLS_ACUITY_SCORE: 70
ADLS_ACUITY_SCORE: 74
ADLS_ACUITY_SCORE: 43
ADLS_ACUITY_SCORE: 52
ADLS_ACUITY_SCORE: 65
ADLS_ACUITY_SCORE: 54
ADLS_ACUITY_SCORE: 68
ADLS_ACUITY_SCORE: 41
ADLS_ACUITY_SCORE: 41
ADLS_ACUITY_SCORE: 68
ADLS_ACUITY_SCORE: 65
ADLS_ACUITY_SCORE: 75
ADLS_ACUITY_SCORE: 41
ADLS_ACUITY_SCORE: 75
ADLS_ACUITY_SCORE: 57
ADLS_ACUITY_SCORE: 75
ADLS_ACUITY_SCORE: 67
ADLS_ACUITY_SCORE: 66
ADLS_ACUITY_SCORE: 50
ADLS_ACUITY_SCORE: 75
ADLS_ACUITY_SCORE: 57
ADLS_ACUITY_SCORE: 57
ADLS_ACUITY_SCORE: 75
ADLS_ACUITY_SCORE: 43
ADLS_ACUITY_SCORE: 41
ADLS_ACUITY_SCORE: 41
ADLS_ACUITY_SCORE: 65
ADLS_ACUITY_SCORE: 51
ADLS_ACUITY_SCORE: 51
ADLS_ACUITY_SCORE: 69
ADLS_ACUITY_SCORE: 71
ADLS_ACUITY_SCORE: 55
ADLS_ACUITY_SCORE: 52
ADLS_ACUITY_SCORE: 68
ADLS_ACUITY_SCORE: 46
ADLS_ACUITY_SCORE: 53
ADLS_ACUITY_SCORE: 70
ADLS_ACUITY_SCORE: 68
ADLS_ACUITY_SCORE: 75
ADLS_ACUITY_SCORE: 75
ADLS_ACUITY_SCORE: 41
ADLS_ACUITY_SCORE: 55
ADLS_ACUITY_SCORE: 51
ADLS_ACUITY_SCORE: 75
ADLS_ACUITY_SCORE: 67
ADLS_ACUITY_SCORE: 55
ADLS_ACUITY_SCORE: 55
ADLS_ACUITY_SCORE: 41
ADLS_ACUITY_SCORE: 52
ADLS_ACUITY_SCORE: 52
ADLS_ACUITY_SCORE: 75
ADLS_ACUITY_SCORE: 47
ADLS_ACUITY_SCORE: 52
ADLS_ACUITY_SCORE: 47
ADLS_ACUITY_SCORE: 66
ADLS_ACUITY_SCORE: 46
ADLS_ACUITY_SCORE: 47
ADLS_ACUITY_SCORE: 72
ADLS_ACUITY_SCORE: 46
ADLS_ACUITY_SCORE: 68
ADLS_ACUITY_SCORE: 52
ADLS_ACUITY_SCORE: 75
ADLS_ACUITY_SCORE: 41
ADLS_ACUITY_SCORE: 46
ADLS_ACUITY_SCORE: 69
ADLS_ACUITY_SCORE: 50
ADLS_ACUITY_SCORE: 74
ADLS_ACUITY_SCORE: 53
ADLS_ACUITY_SCORE: 50
ADLS_ACUITY_SCORE: 46
ADLS_ACUITY_SCORE: 51
ADLS_ACUITY_SCORE: 44
ADLS_ACUITY_SCORE: 57
ADLS_ACUITY_SCORE: 49
ADLS_ACUITY_SCORE: 46
ADLS_ACUITY_SCORE: 69
ADLS_ACUITY_SCORE: 75
ADLS_ACUITY_SCORE: 50
ADLS_ACUITY_SCORE: 42
ADLS_ACUITY_SCORE: 75
ADLS_ACUITY_SCORE: 75
ADLS_ACUITY_SCORE: 49
ADLS_ACUITY_SCORE: 51
ADLS_ACUITY_SCORE: 40
ADLS_ACUITY_SCORE: 51
ADLS_ACUITY_SCORE: 55
ADLS_ACUITY_SCORE: 75
ADLS_ACUITY_SCORE: 66
ADLS_ACUITY_SCORE: 68
ADLS_ACUITY_SCORE: 46
ADLS_ACUITY_SCORE: 65
ADLS_ACUITY_SCORE: 40
ADLS_ACUITY_SCORE: 44
ADLS_ACUITY_SCORE: 70
ADLS_ACUITY_SCORE: 44
ADLS_ACUITY_SCORE: 68
DEPENDENT_IADLS:: INDEPENDENT
ADLS_ACUITY_SCORE: 44
ADLS_ACUITY_SCORE: 41
ADLS_ACUITY_SCORE: 44
ADLS_ACUITY_SCORE: 75
ADLS_ACUITY_SCORE: 65
ADLS_ACUITY_SCORE: 52
ADLS_ACUITY_SCORE: 75
ADLS_ACUITY_SCORE: 75
ADLS_ACUITY_SCORE: 52
ADLS_ACUITY_SCORE: 66
ADLS_ACUITY_SCORE: 68
ADLS_ACUITY_SCORE: 50
ADLS_ACUITY_SCORE: 53
ADLS_ACUITY_SCORE: 42
ADLS_ACUITY_SCORE: 44
ADLS_ACUITY_SCORE: 68
ADLS_ACUITY_SCORE: 42
ADLS_ACUITY_SCORE: 41
ADLS_ACUITY_SCORE: 65
ADLS_ACUITY_SCORE: 46
ADLS_ACUITY_SCORE: 70
ADLS_ACUITY_SCORE: 46
ADLS_ACUITY_SCORE: 47
ADLS_ACUITY_SCORE: 51
ADLS_ACUITY_SCORE: 68
ADLS_ACUITY_SCORE: 55
ADLS_ACUITY_SCORE: 69
ADLS_ACUITY_SCORE: 44
ADLS_ACUITY_SCORE: 70
ADLS_ACUITY_SCORE: 75
ADLS_ACUITY_SCORE: 65
ADLS_ACUITY_SCORE: 75
ADLS_ACUITY_SCORE: 75
ADLS_ACUITY_SCORE: 55
ADLS_ACUITY_SCORE: 65
ADLS_ACUITY_SCORE: 47
ADLS_ACUITY_SCORE: 52
ADLS_ACUITY_SCORE: 55
ADLS_ACUITY_SCORE: 75
ADLS_ACUITY_SCORE: 49
ADLS_ACUITY_SCORE: 52
ADLS_ACUITY_SCORE: 45
ADLS_ACUITY_SCORE: 42
ADLS_ACUITY_SCORE: 77
ADLS_ACUITY_SCORE: 75
ADLS_ACUITY_SCORE: 53
ADLS_ACUITY_SCORE: 47
ADLS_ACUITY_SCORE: 55
ADLS_ACUITY_SCORE: 67
ADLS_ACUITY_SCORE: 68
ADLS_ACUITY_SCORE: 57
ADLS_ACUITY_SCORE: 75
ADLS_ACUITY_SCORE: 42
ADLS_ACUITY_SCORE: 41
ADLS_ACUITY_SCORE: 47
ADLS_ACUITY_SCORE: 45
ADLS_ACUITY_SCORE: 52
ADLS_ACUITY_SCORE: 68
ADLS_ACUITY_SCORE: 47
ADLS_ACUITY_SCORE: 53
ADLS_ACUITY_SCORE: 46
ADLS_ACUITY_SCORE: 42
ADLS_ACUITY_SCORE: 68
ADLS_ACUITY_SCORE: 52
ADLS_ACUITY_SCORE: 55
ADLS_ACUITY_SCORE: 68
ADLS_ACUITY_SCORE: 68
ADLS_ACUITY_SCORE: 49
ADLS_ACUITY_SCORE: 75
ADLS_ACUITY_SCORE: 65
ADLS_ACUITY_SCORE: 68
ADLS_ACUITY_SCORE: 44
ADLS_ACUITY_SCORE: 75
ADLS_ACUITY_SCORE: 72
ADLS_ACUITY_SCORE: 75
ADLS_ACUITY_SCORE: 46
ADLS_ACUITY_SCORE: 74
ADLS_ACUITY_SCORE: 52
ADLS_ACUITY_SCORE: 49
ADLS_ACUITY_SCORE: 65
ADLS_ACUITY_SCORE: 53
ADLS_ACUITY_SCORE: 52
ADLS_ACUITY_SCORE: 47
ADLS_ACUITY_SCORE: 75
ADLS_ACUITY_SCORE: 47
ADLS_ACUITY_SCORE: 68
ADLS_ACUITY_SCORE: 68
ADLS_ACUITY_SCORE: 49
ADLS_ACUITY_SCORE: 52
ADLS_ACUITY_SCORE: 47
ADLS_ACUITY_SCORE: 51
ADLS_ACUITY_SCORE: 50
ADLS_ACUITY_SCORE: 55
ADLS_ACUITY_SCORE: 57
ADLS_ACUITY_SCORE: 53
ADLS_ACUITY_SCORE: 55
ADLS_ACUITY_SCORE: 41
ADLS_ACUITY_SCORE: 51
ADLS_ACUITY_SCORE: 75
ADLS_ACUITY_SCORE: 50
ADLS_ACUITY_SCORE: 46
ADLS_ACUITY_SCORE: 75
ADLS_ACUITY_SCORE: 75
ADLS_ACUITY_SCORE: 67
ADLS_ACUITY_SCORE: 55
ADLS_ACUITY_SCORE: 47
ADLS_ACUITY_SCORE: 68
ADLS_ACUITY_SCORE: 42
ADLS_ACUITY_SCORE: 52
ADLS_ACUITY_SCORE: 52
ADLS_ACUITY_SCORE: 75
ADLS_ACUITY_SCORE: 70
ADLS_ACUITY_SCORE: 75
ADLS_ACUITY_SCORE: 42
ADLS_ACUITY_SCORE: 66
ADLS_ACUITY_SCORE: 41
ADLS_ACUITY_SCORE: 74
ADLS_ACUITY_SCORE: 47
ADLS_ACUITY_SCORE: 66
ADLS_ACUITY_SCORE: 46
ADLS_ACUITY_SCORE: 50
ADLS_ACUITY_SCORE: 66
ADLS_ACUITY_SCORE: 52
ADLS_ACUITY_SCORE: 47
ADLS_ACUITY_SCORE: 66
ADLS_ACUITY_SCORE: 68
ADLS_ACUITY_SCORE: 49
ADLS_ACUITY_SCORE: 46
ADLS_ACUITY_SCORE: 65
ADLS_ACUITY_SCORE: 72
ADLS_ACUITY_SCORE: 55
ADLS_ACUITY_SCORE: 46
ADLS_ACUITY_SCORE: 55
ADLS_ACUITY_SCORE: 52
ADLS_ACUITY_SCORE: 75
ADLS_ACUITY_SCORE: 46
ADLS_ACUITY_SCORE: 50
ADLS_ACUITY_SCORE: 73
ADLS_ACUITY_SCORE: 52
ADLS_ACUITY_SCORE: 75
ADLS_ACUITY_SCORE: 44
ADLS_ACUITY_SCORE: 42
ADLS_ACUITY_SCORE: 47
ADLS_ACUITY_SCORE: 57
ADLS_ACUITY_SCORE: 51
ADLS_ACUITY_SCORE: 47
ADLS_ACUITY_SCORE: 51
ADLS_ACUITY_SCORE: 51
ADLS_ACUITY_SCORE: 71
ADLS_ACUITY_SCORE: 41
ADLS_ACUITY_SCORE: 51
ADLS_ACUITY_SCORE: 51
ADLS_ACUITY_SCORE: 67
ADLS_ACUITY_SCORE: 42
ADLS_ACUITY_SCORE: 76
ADLS_ACUITY_SCORE: 68
ADLS_ACUITY_SCORE: 49
ADLS_ACUITY_SCORE: 42
ADLS_ACUITY_SCORE: 68
ADLS_ACUITY_SCORE: 75
ADLS_ACUITY_SCORE: 53
ADLS_ACUITY_SCORE: 75
ADLS_ACUITY_SCORE: 46
ADLS_ACUITY_SCORE: 65
ADLS_ACUITY_SCORE: 68
ADLS_ACUITY_SCORE: 50
ADLS_ACUITY_SCORE: 47
ADLS_ACUITY_SCORE: 40
ADLS_ACUITY_SCORE: 66
ADLS_ACUITY_SCORE: 76
ADLS_ACUITY_SCORE: 46
ADLS_ACUITY_SCORE: 47
ADLS_ACUITY_SCORE: 68
ADLS_ACUITY_SCORE: 41
ADLS_ACUITY_SCORE: 49
ADLS_ACUITY_SCORE: 75
ADLS_ACUITY_SCORE: 75
ADLS_ACUITY_SCORE: 55
ADLS_ACUITY_SCORE: 52
ADLS_ACUITY_SCORE: 69
ADLS_ACUITY_SCORE: 44
ADLS_ACUITY_SCORE: 68
ADLS_ACUITY_SCORE: 53
ADLS_ACUITY_SCORE: 68
ADLS_ACUITY_SCORE: 50
ADLS_ACUITY_SCORE: 74
ADLS_ACUITY_SCORE: 56
ADLS_ACUITY_SCORE: 52
ADLS_ACUITY_SCORE: 66
ADLS_ACUITY_SCORE: 46
ADLS_ACUITY_SCORE: 74
ADLS_ACUITY_SCORE: 55
ADLS_ACUITY_SCORE: 47
ADLS_ACUITY_SCORE: 75
ADLS_ACUITY_SCORE: 75
ADLS_ACUITY_SCORE: 68
ADLS_ACUITY_SCORE: 44
ADLS_ACUITY_SCORE: 49
ADLS_ACUITY_SCORE: 55
ADLS_ACUITY_SCORE: 75
ADLS_ACUITY_SCORE: 69
ADLS_ACUITY_SCORE: 53
ADLS_ACUITY_SCORE: 52
ADLS_ACUITY_SCORE: 49
ADLS_ACUITY_SCORE: 46
ADLS_ACUITY_SCORE: 68
ADLS_ACUITY_SCORE: 75
ADLS_ACUITY_SCORE: 46
ADLS_ACUITY_SCORE: 47
ADLS_ACUITY_SCORE: 55
ADLS_ACUITY_SCORE: 75
ADLS_ACUITY_SCORE: 68
ADLS_ACUITY_SCORE: 52
ADLS_ACUITY_SCORE: 70
ADLS_ACUITY_SCORE: 47
ADLS_ACUITY_SCORE: 51
ADLS_ACUITY_SCORE: 52
ADLS_ACUITY_SCORE: 49
ADLS_ACUITY_SCORE: 68
ADLS_ACUITY_SCORE: 66
ADLS_ACUITY_SCORE: 57
ADLS_ACUITY_SCORE: 75
ADLS_ACUITY_SCORE: 52
ADLS_ACUITY_SCORE: 41
ADLS_ACUITY_SCORE: 50
ADLS_ACUITY_SCORE: 68
ADLS_ACUITY_SCORE: 75
ADLS_ACUITY_SCORE: 75
ADLS_ACUITY_SCORE: 46
ADLS_ACUITY_SCORE: 46
ADLS_ACUITY_SCORE: 55
ADLS_ACUITY_SCORE: 47
ADLS_ACUITY_SCORE: 72
ADLS_ACUITY_SCORE: 41
ADLS_ACUITY_SCORE: 55
ADLS_ACUITY_SCORE: 69
ADLS_ACUITY_SCORE: 46
ADLS_ACUITY_SCORE: 69
ADLS_ACUITY_SCORE: 68
ADLS_ACUITY_SCORE: 47
ADLS_ACUITY_SCORE: 52
ADLS_ACUITY_SCORE: 66
ADLS_ACUITY_SCORE: 41
ADLS_ACUITY_SCORE: 46
ADLS_ACUITY_SCORE: 66
ADLS_ACUITY_SCORE: 46
ADLS_ACUITY_SCORE: 52
ADLS_ACUITY_SCORE: 52
ADLS_ACUITY_SCORE: 53
ADLS_ACUITY_SCORE: 75
ADLS_ACUITY_SCORE: 55
ADLS_ACUITY_SCORE: 68
FALL_HISTORY_WITHIN_LAST_SIX_MONTHS: YES
ADLS_ACUITY_SCORE: 49
ADLS_ACUITY_SCORE: 76
ADLS_ACUITY_SCORE: 55
ADLS_ACUITY_SCORE: 68
ADLS_ACUITY_SCORE: 75
ADLS_ACUITY_SCORE: 47
ADLS_ACUITY_SCORE: 75
ADLS_ACUITY_SCORE: 69
ADLS_ACUITY_SCORE: 42
ADLS_ACUITY_SCORE: 52
ADLS_ACUITY_SCORE: 41
ADLS_ACUITY_SCORE: 50
ADLS_ACUITY_SCORE: 75
ADLS_ACUITY_SCORE: 46

## 2024-01-01 ASSESSMENT — COLUMBIA-SUICIDE SEVERITY RATING SCALE - C-SSRS
6. HAVE YOU EVER DONE ANYTHING, STARTED TO DO ANYTHING, OR PREPARED TO DO ANYTHING TO END YOUR LIFE?: NO
1. IN THE PAST MONTH, HAVE YOU WISHED YOU WERE DEAD OR WISHED YOU COULD GO TO SLEEP AND NOT WAKE UP?: NO
2. HAVE YOU ACTUALLY HAD ANY THOUGHTS OF KILLING YOURSELF IN THE PAST MONTH?: NO

## 2024-02-12 NOTE — TELEPHONE ENCOUNTER
[facility] Refill Guideline reviewed.  Medication does not meet criteria for refill due to need to verify dose. Is he taking daily or BID.  Messaged to providers team for further review.

## 2024-03-15 PROBLEM — J10.1 INFLUENZA A: Status: ACTIVE | Noted: 2024-01-01

## 2024-03-15 PROBLEM — E86.0 DEHYDRATION: Status: ACTIVE | Noted: 2024-01-01

## 2024-03-15 PROBLEM — N17.9 AKI (ACUTE KIDNEY INJURY) (H): Status: ACTIVE | Noted: 2024-01-01

## 2024-03-15 PROBLEM — I44.7 NEW ONSET LEFT BUNDLE BRANCH BLOCK (LBBB): Status: ACTIVE | Noted: 2024-01-01

## 2024-03-15 PROBLEM — R79.89 ELEVATED TROPONIN: Status: ACTIVE | Noted: 2024-01-01

## 2024-03-15 PROBLEM — R09.02 HYPOXIA: Status: ACTIVE | Noted: 2024-01-01

## 2024-03-15 NOTE — ED NOTES
[facility]  ED Nurse Handoff Report    ED Chief complaint: Generalized Weakness  . ED Diagnosis:   Final diagnoses:   KENNA (acute kidney injury) (H24)   Dehydration   Influenza A   Hypoxia   New onset left bundle branch block (LBBB)   Elevated troponin       Allergies:   Allergies   Allergen Reactions    No Known Allergies        Code Status: Full Code    Activity level - Baseline/Home:  independent.  Activity Level - Current:   assist of 1.   Lift room needed: No.   Bariatric: No   Needed: No   Isolation: Yes.   Infection:Influenza.     Respiratory status: Room air    Vital Signs (within 30 minutes):   Vitals:    03/15/24 0309 03/15/24 0319 03/15/24 0329 03/15/24 0339   BP:  136/63 136/66    Pulse:   95    Resp:       Temp:       SpO2: 93% 92% 95% 95%       Cardiac Rhythm:  ,      Pain level:    Patient confused: No.   Patient Falls Risk: patient and family education.   Elimination Status: Has voided     Patient Report - Initial Complaint: Generalized weakness.   Focused Assessment: Chief Complaint:  Generalized Weakness        SABRA Powell is a 88 year old male who presents via EMS due to weakness.  The patient states that over the last 2 days he has had cough and sore throat.  His cough is nonproductive.  He also states over the last 4 to 5 days he has felt increasingly generally weak.  There has not been documented fever, abdominal pain, nausea, vomiting, diarrhea, ear pain, or runny nose.     The patient's wife notes that the patient reached out of bed to  a pillow and slid out of bed but did not have the strength to get back in.  There also has been report of urinary incontinence which is uncommon for the patient.  The patient states that over the last couple of days he has been feeling the need to urinate more frequently than usual but has not noticed any blood.        Abnormal Results:   Labs Ordered and Resulted from Time of ED Arrival to Time of ED  Departure   INFLUENZA A/B, RSV, & SARS-COV2 PCR - Abnormal       Result Value    Influenza A PCR Positive (*)     Influenza B PCR Negative      RSV PCR Negative      SARS CoV2 PCR Negative     ROUTINE UA WITH MICROSCOPIC REFLEX TO CULTURE - Abnormal    Color Urine Light Yellow      Appearance Urine Clear      Glucose Urine Negative      Bilirubin Urine Negative      Ketones Urine Negative      Specific Gravity Urine 1.015      Blood Urine Small (*)     pH Urine 6.0      Protein Albumin Urine 100 (*)     Urobilinogen Urine Normal      Nitrite Urine Negative      Leukocyte Esterase Urine Negative      RBC Urine 1      WBC Urine <1     COMPREHENSIVE METABOLIC PANEL - Abnormal    Sodium 133 (*)     Potassium 4.2      Carbon Dioxide (CO2) 20 (*)     Anion Gap 14      Urea Nitrogen 46.6 (*)     Creatinine 2.20 (*)     GFR Estimate 28 (*)     Calcium 10.7 (*)     Chloride 99      Glucose 119 (*)     Alkaline Phosphatase 65      AST 24      ALT 18      Protein Total 8.3      Albumin 3.7      Bilirubin Total 0.3     BLOOD GAS VENOUS - Abnormal    pH Venous 7.41      pCO2 Venous 35 (*)     pO2 Venous 34      Bicarbonate Venous 22      Base Excess/Deficit Venous -2.1      FIO2 21      Oxyhemoglobin Venous 69 (*)     O2 Sat, Venous 70.2     TROPONIN T, HIGH SENSITIVITY - Abnormal    Troponin T, High Sensitivity 89 (*)    NT PROBNP INPATIENT - Abnormal    N terminal Pro BNP Inpatient 2,409 (*)    CBC WITH PLATELETS AND DIFFERENTIAL - Abnormal    WBC Count 5.4      RBC Count 3.04 (*)     Hemoglobin 9.9 (*)     Hematocrit 30.3 (*)           MCH 32.6      MCHC 32.7      RDW 14.2      Platelet Count 134 (*)     % Neutrophils 71      % Lymphocytes 12      % Monocytes 15      % Eosinophils 1      % Basophils 0      % Immature Granulocytes 1      NRBCs per 100 WBC 0      Absolute Neutrophils 3.8      Absolute Lymphocytes 0.7 (*)     Absolute Monocytes 0.8      Absolute Eosinophils 0.0      Absolute Basophils 0.0      Absolute  Immature Granulocytes 0.0      Absolute NRBCs 0.0     ISTAT GASES LACTATE VENOUS POCT - Abnormal    Lactic Acid POCT 1.0      Bicarbonate Venous POCT 23      O2 Sat, Venous POCT 72      pCO2 Venous POCT 34 (*)     pH Venous POCT 7.44 (*)     pO2 Venous POCT 36     MAGNESIUM - Normal    Magnesium 1.9     GROUP A STREPTOCOCCUS PCR THROAT SWAB - Normal    Group A strep by PCR Not Detected     TROPONIN T, HIGH SENSITIVITY   URINE CULTURE   BLOOD CULTURE   BLOOD CULTURE        Chest XR,  PA & LAT   Final Result   IMPRESSION: Interstitial thickening in the periphery of bilateral lung appears mildly increased, likely progressive fibrosis. No confluent airspace opacities, pleural effusion or pneumothorax. Stable mild cardiomegaly without pulmonary vascular    congestion.          Treatments provided: See MAR. Frequent monitoring of pt.  Family Comments: Wife present at bedside. Involved and supportive in pt plan of care.  OBS brochure/video discussed/provided to patient:  No  ED Medications:   Medications   sodium chloride (PF) 0.9% PF flush 3 mL (has no administration in time range)   sodium chloride (PF) 0.9% PF flush 3 mL (3 mLs Intracatheter $Given 3/15/24 0221)   sodium chloride 0.9% BOLUS 500 mL (500 mLs Intravenous $New Bag 3/15/24 0314)   aspirin (ASA) chewable tablet 324 mg (has no administration in time range)   acetaminophen (TYLENOL) tablet 650 mg (650 mg Oral $Given 3/15/24 0221)       Drips infusing:  No  For the majority of the shift this patient was Green.   Interventions performed were N/a.    Sepsis treatment initiated: No    Cares/treatment/interventions/medications to be completed following ED care: N/a    ED Nurse Name: Rain Foster RN  3:39 AM

## 2024-03-15 NOTE — PHARMACY-ADMISSION MEDICATION HISTORY
Pharmacist Admission Medication History    Admission medication history is complete. The information provided in this note is only as accurate as the sources available at the time of the update.    Information Source(s): Patient's spouse, Dimas Cuello/Dmitriy and call to [facility]  via phone    Pertinent Information:     Patient's spouse notes that he no longer takes amlodipine (last picked up 12/2023), losartan (last picked up 12/2023), or torsemide (last picked up 06/2023) - called pharmacy for last  dates (not just insurance billed/filled dates).  She notes he was having lower BPs.    Changes made to PTA medication list:  Added: None  Deleted: Amlodipine, Losartan, Torsemide  Changed: None    Allergies reviewed with patient and updates made in EHR: yes    Medication History Completed By: Jonel Childers [facility] 3/15/2024 2:36 PM    PTA Med List   Medication Sig Last Dose    aspirin (ASA) 81 MG EC tablet Take 1 tablet (81 mg) by mouth daily 3/13/2024 at PM    Calcium-Magnesium-Zinc 500-250-12.5 MG TABS Take 1 tablet by mouth daily 3/13/2024    metoprolol succinate ER (TOPROL XL) 25 MG 24 hr tablet Take 0.5 tablets (12.5 mg) by mouth daily 3/13/2024 at PM    nitroGLYcerin (NITROSTAT) 0.4 MG sublingual tablet For chest pain place 1 tablet under the tongue every 5 minutes for 3 doses. If symptoms persist 5 minutes after 1st dose call 911.     Vitamin D3 (CHOLECALCIFEROL) 125 MCG (5000 UT) tablet Take 1 tablet (125 mcg) by mouth daily 3/13/2024

## 2024-03-15 NOTE — PROGRESS NOTES
Brief progress note:    Augie Powell is an 89 yo with PMHx significant for CAD with NSTEMI s/p PCI of LAD in 2018, HTN, HLD, CKD stage IV, RCC s/p right nephrectomy in 2010 who presented with fever, weakness, sore throat and urinary incontinence. Positive for influenza A. Mild troponin elevation and new LBBB without chest pain. Agree with plan as outlined by Dr. Harrell in H&P. Pt reports right leg pain with palpation and movement. Resolved with 2.5 mg of oxycodone. Pt with history of radiculopathy, postlaminectomy syndrome s/p several epidural steroid injections, neuralgias. Monitor for worsening discomfort, paresthesias, worsening incontinence.   -- IV Dilaudid and PO oxycodone available prn    Annabel Cedeno, MS3  [employer]    ADDENDUM:  His leg pain worsened,  10/10.  Unbearable.  Will increase add dilaudid and increase oxycodone.  Given new urinary incontinence I will order MRI lumbar spine and CT right femur to rule out etiology.     TTE unremarkable.  Low concern for ACS    PT/OT for pain and weakness.     Hardy Allen, DO

## 2024-03-15 NOTE — PLAN OF CARE
ICU End of Shift Summary.  For vital signs and complete assessments, please see documentation flowsheets.      Pertinent assessments: Pt admitted to ICU as medical tele overflow. Pt A&O, able to make needs known. Initially on RA, now on 2L NC. Tele SR with BBB. VSS. Voiding via urinal. BS+. Clear liquid diet. Pt c/o pain in R leg - sciatica/nerve pain. Pt also shivering - afebrile. Tylenol given.     0630: pt started having new ectopy and trop resulted at 108- crosscover notified  EKG, mag and ionized calcium ordered. EKG showed ST with 1 degree AVB, left BBB, and PVCs      Plan (Upcoming Events): echo today, trending trops      Bedside Shift Report Completed : yes  Bedside Safety Check Completed: yes

## 2024-03-15 NOTE — H&P
[facility]    History and Physical - Hospitalist Service       Date of Admission:  3/15/2024    Assessment & Plan      Augie Powell is a 88 year old male admitted on 3/15/2024. He has a medical history significant for CAD s/p PCI of LAD 2018, HTN, HL, CKD stage IV, RCC s/p right nephrectomy who presents with fever, weakness, sore throat and urinary incontinence.  This has been going for roughly 2 days.  His wife stated that he reached out of the bed to  a pillow and stayed down but did not have the strength to get back up.  In the ER is noted to be positive for influenza.  His troponin is elevated and his EKG shows a left bundle branch block.  I am asked to admit him for further evaluation.  His cough is nonproductive.  He denies any chest pain or shortness of breath.    Fever sore throat and weakness.  -He is positive for influenza A.  -He is probably within the window frame for Tamiflu, will start.  -Isolation.    2.  Elevated troponin in the setting of a new left bundle branch block.  -Denies any chest pain.  -Monitor him on telemetry.  -Will get an echocardiogram and trend troponin.    3.  CKD stage IV.  -Avoid nephrotoxins.    4.  History of coronary artery disease s/p PCI to his LAD.  -Resume home meds once reconciled.    5. Acute on chronic anemia.  - Monitor.  - can consider outpatient work-up if desired after discussion.        Diet:  regular.  DVT Prophylaxis: Pneumatic Compression Devices  Cerda Catheter: Not present  Lines: None     Cardiac Monitoring: None  Code Status:  Full Code.    Clinically Significant Risk Factors Present on Admission           # Hypercalcemia: Highest Ca = 10.7 mg/dL in last 2 days, will monitor as appropriate      # Drug Induced Platelet Defect: home medication list includes an antiplatelet medication   # Hypertension: Noted on problem list                 Disposition Plan           Linn Harrell MD  Hospitalist Service  [facility]  [facility]  Securely message with [employer] (more info)  Text page via [facility] Paging/Directory     ______________________________________________________________________    Chief Complaint     Fever, weakness, sore throat.    History is obtained from the patient    History of Present Illness   Augie Powell is a 88 year old male who has a medical history significant for CAD s/p PCI of LAD 2018, HTN, HL, CKD stage IV, RCC s/p right nephrectomy who presents with fever, weakness, sore throat and urinary incontinence.  This has been going for roughly 2 days.  His wife stated that he reached out of the bed to  a pillow and stayed down but did not have the strength to get back up.  In the ER is noted to be positive for influenza.  His troponin is elevated and his EKG shows a left bundle branch block.  I am asked to admit him for further evaluation.      Past Medical History    Past Medical History:   Diagnosis Date    Aortic stenosis, mild     Ascending aorta dilatation (H)     CKD (chronic kidney disease) stage 4, GFR 15-29 ml/min (H) 09/17/2018    Coronary artery disease 10/2018    nSTEMI- Isis to mLAD, distal lad mild, Lcx-small 50-60%, RCA 60-70% normal ifR (despite echo showing poss old IMI)    Hyperlipidemia     Hypertension     Renal cell cancer, unspecified laterality (H) 05/10/2017    Renal disease     CRD--creatinine in upper ones to 2, right kdney removed 2000 for canncer    Rheumatic fever     Spondylosis with myelopathy, lumbar region        Past Surgical History   Past Surgical History:   Procedure Laterality Date    BACK SURGERY      CHOLECYSTECTOMY      DECOMPRESSION LUMBAR TWO LEVELS  12/01/2014    Procedure: DECOMPRESSION LUMBAR TWO LEVELS;  Surgeon: Remy Harmon MD;  Location:  OR    HERNIA REPAIR      LAMINECTOMY, FUSION LUMBAR ONE LEVEL, COMBINED N/A 12/01/2014    Procedure: COMBINED LAMINECTOMY, FUSION LUMBAR ONE LEVEL;  Surgeon: eRmy Harmon MD;  Location:  OR     PHACOEMULSIFICATION CLEAR CORNEA WITH STANDARD INTRAOCULAR LENS IMPLANT  10/22/2012    Procedure: PHACOEMULSIFICATION CLEAR CORNEA WITH STANDARD INTRAOCULAR LENS IMPLANT;  RIGHT EYE PHACOEMULSIFICATION CLEAR CORNEA WITH STANDARD INTRAOCULAR LENS IMPLANT ;  Surgeon: [place], Grupo NEVAREZ MD;  Location:  [facility]    right nephrectomy[      STENT,CORONARY, S660 24/30  10/2018    mLAD  mod RCA, Lcx       Prior to Admission Medications   Prior to Admission Medications   Prescriptions Last Dose Informant Patient Reported? Taking?   Calcium-Magnesium-Zinc 500-250-12.5 MG TABS   Yes No   Sig: Take 1 tablet by mouth daily   Vitamin D3 (CHOLECALCIFEROL) 125 MCG (5000 UT) tablet   No No   Sig: Take 1 tablet (125 mcg) by mouth daily   amLODIPine (NORVASC) 5 MG tablet   Yes No   Sig: Take 5 mg by mouth every morning    aspirin (ASA) 81 MG EC tablet   No No   Sig: Take 1 tablet (81 mg) by mouth daily   atorvastatin (LIPITOR) 20 MG tablet   No No   Sig: Take 1 tablet (20 mg) by mouth every evening   cyclobenzaprine (FLEXERIL) 10 MG tablet   No No   Sig: TAKE 1/2 TO 1 TABLET BY MOUTH 3 TIMES DAILY AS NEEDED   losartan (COZAAR) 25 MG tablet   No No   Sig: Take 1 tablet (25 mg) by mouth daily Take 1 tablet daily for 1 week, then 1 pill twice a day if systolic BP >140-150   metoprolol succinate ER (TOPROL XL) 25 MG 24 hr tablet   No No   Sig: Take 0.5 tablets (12.5 mg) by mouth daily   nitroGLYcerin (NITROSTAT) 0.4 MG sublingual tablet   No No   Sig: For chest pain place 1 tablet under the tongue every 5 minutes for 3 doses. If symptoms persist 5 minutes after 1st dose call 911.   torsemide (DEMADEX) 5 MG tablet   Yes No   Sig: Take 5 mg by mouth daily      Facility-Administered Medications: None        Review of Systems    The 10 point Review of Systems is negative other than noted in the HPI or here.     Social History   I have reviewed this patient's social history and updated it with pertinent information if needed.  Social History      Tobacco Use    Smoking status: Former    Smokeless tobacco: Never   Substance Use Topics    Alcohol use: Yes     Alcohol/week: 2.0 standard drinks of alcohol     Types: 2 Shots of liquor per week    Drug use: No         Family History   I have reviewed this patient's family history and updated it with pertinent information if needed.  Family History   Problem Relation Age of Onset    No Known Problems Mother     Heart Surgery Father     No Known Problems Sister          Allergies   Allergies   Allergen Reactions    No Known Allergies         Physical Exam   Vital Signs: Temp: 99.4  F (37.4  C)   BP: (!) 143/66 Pulse: 65   Resp: 18 SpO2: 95 %      Weight: 0 lbs 0 oz    Gen - AAO x 3.  Lungs - coarse BS.  Heart - RR,S1+S2 nml, 3/6 systolic murmur.  Abd - soft, NT, ND, + BS.  Ext - trace edema.    EKG shows sinus tachycardia with occasional PAC and a left bundle branch block which is new.    Medical Decision Making       80 MINUTES SPENT BY ME on the date of service doing chart review, history, exam, documentation & further activities per the note.      Data     I have personally reviewed the following data over the past 24 hrs:    5.4  \   9.9 (L)   / 134 (L)     133 (L) 99 46.6 (H) /  119 (H)   4.2 20 (L) 2.20 (H) \     ALT: 18 AST: 24 AP: 65 TBILI: 0.3   ALB: 3.7 TOT PROTEIN: 8.3 LIPASE: N/A     Trop: 89 (H) BNP: 2,409 (H)     Procal: N/A CRP: N/A Lactic Acid: 1.0         Imaging results reviewed over the past 24 hrs:   Recent Results (from the past 24 hour(s))   Chest XR,  PA & LAT    Narrative    EXAM: XR CHEST 2 VIEWS  LOCATION: [facility]  DATE: 3/15/2024    INDICATION: cough  COMPARISON: 12/12/2022      Impression    IMPRESSION: Interstitial thickening in the periphery of bilateral lung appears mildly increased, likely progressive fibrosis. No confluent airspace opacities, pleural effusion or pneumothorax. Stable mild cardiomegaly without pulmonary vascular   congestion.

## 2024-03-15 NOTE — ED PROVIDER NOTES
History     Chief Complaint:  Generalized Weakness       HPI   Augie Powell is a 88 year old male who presents via EMS due to weakness.  The patient states that over the last 2 days he has had cough and sore throat.  His cough is nonproductive.  He also states over the last 4 to 5 days he has felt increasingly generally weak.  There has not been documented fever, abdominal pain, nausea, vomiting, diarrhea, ear pain, or runny nose.    The patient's wife notes that the patient reached out of bed to  a pillow and slid out of bed but did not have the strength to get back in.  There also has been report of urinary incontinence which is uncommon for the patient.  The patient states that over the last couple of days he has been feeling the need to urinate more frequently than usual but has not noticed any blood.      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    Allergies:  No Known Allergies     Physical Exam   Patient Vitals for the past 24 hrs:   BP Temp Pulse Resp SpO2   03/15/24 0429 137/61 -- -- 20 --   03/15/24 0348 -- -- -- -- 93 %   03/15/24 0347 -- -- -- -- 93 %   03/15/24 0346 -- -- -- -- 93 %   03/15/24 0345 135/62 -- 87 -- --   03/15/24 0339 -- -- -- -- 95 %   03/15/24 0329 136/66 -- 95 -- 95 %   03/15/24 0319 136/63 -- -- -- 92 %   03/15/24 0309 -- -- -- -- 93 %   03/15/24 0259 124/64 -- 93 -- 92 %   03/15/24 0249 131/65 -- -- -- 92 %   03/15/24 0240 -- -- -- -- 95 %   03/15/24 0238 (!) 143/66 -- 65 -- 91 %   03/15/24 0226 -- -- -- -- 95 %   03/15/24 0224 -- -- -- -- 90 %   03/15/24 0223 -- -- -- -- 93 %   03/15/24 0222 -- -- -- -- 93 %   03/15/24 0220 (!) 146/73 -- -- -- 93 %   03/15/24  0200 138/63 -- 97 -- --   03/15/24 0134 (!) 149/91 -- -- -- --   03/15/24 0129 -- 99.4  F (37.4  C) 110 18 94 %        Physical Exam  Constitutional: Vital signs reviewed as above .   Eyes: PEERL, EOMI B/L  Neck: No JVD noted. FROM   Cardiovascular: tachycardic rate, Regular rhythm and normal heart sounds.  No murmur heard. Equal B/L peripheral pulses.  Pulmonary/Chest: Effort normal and breath sounds normal. No respiratory distress. Patient has no wheezes. Patient has no rales.   Gastrointestinal: Soft. There is no tenderness.   Musculoskeletal/Extremities:  +1 B/L ankle pitting edema noted. Normal tone.  Neurological: Alert  Skin: Skin is warm and dry. There is no diaphoresis noted.   Psychiatric: The patient appears calm.     Emergency Department Course   ECG  ECG results from 03/15/24   EKG 12-lead, tracing only     Value    Systolic Blood Pressure     Diastolic Blood Pressure     Ventricular Rate 104    Atrial Rate 104    IA Interval 196    QRS Duration 160        QTc 486    P Axis 53    R AXIS -11    T Axis 82    Interpretation ECG      Sinus tachycardia with Premature atrial complexes  Left bundle branch block  Abnormal ECG  When compared with ECG of 12-DEC-2022 14:49,  Premature atrial complexes are now Present  Left bundle branch block is now Present  Interpreted by me at 0157         Laboratory: Imaging:   Labs Ordered and Resulted from Time of ED Arrival to Time of ED Departure   INFLUENZA A/B, RSV, & SARS-COV2 PCR - Abnormal       Result Value    Influenza A PCR Positive (*)     Influenza B PCR Negative      RSV PCR Negative      SARS CoV2 PCR Negative     ROUTINE UA WITH MICROSCOPIC REFLEX TO CULTURE - Abnormal    Color Urine Light Yellow      Appearance Urine Clear      Glucose Urine Negative      Bilirubin Urine Negative      Ketones Urine Negative      Specific Gravity Urine 1.015      Blood Urine Small (*)     pH Urine 6.0      Protein Albumin Urine 100 (*)     Urobilinogen Urine Normal       Nitrite Urine Negative      Leukocyte Esterase Urine Negative      RBC Urine 1      WBC Urine <1     COMPREHENSIVE METABOLIC PANEL - Abnormal    Sodium 133 (*)     Potassium 4.2      Carbon Dioxide (CO2) 20 (*)     Anion Gap 14      Urea Nitrogen 46.6 (*)     Creatinine 2.20 (*)     GFR Estimate 28 (*)     Calcium 10.7 (*)     Chloride 99      Glucose 119 (*)     Alkaline Phosphatase 65      AST 24      ALT 18      Protein Total 8.3      Albumin 3.7      Bilirubin Total 0.3     BLOOD GAS VENOUS - Abnormal    pH Venous 7.41      pCO2 Venous 35 (*)     pO2 Venous 34      Bicarbonate Venous 22      Base Excess/Deficit Venous -2.1      FIO2 21      Oxyhemoglobin Venous 69 (*)     O2 Sat, Venous 70.2     TROPONIN T, HIGH SENSITIVITY - Abnormal    Troponin T, High Sensitivity 89 (*)    NT PROBNP INPATIENT - Abnormal    N terminal Pro BNP Inpatient 2,409 (*)    CBC WITH PLATELETS AND DIFFERENTIAL - Abnormal    WBC Count 5.4      RBC Count 3.04 (*)     Hemoglobin 9.9 (*)     Hematocrit 30.3 (*)           MCH 32.6      MCHC 32.7      RDW 14.2      Platelet Count 134 (*)     % Neutrophils 71      % Lymphocytes 12      % Monocytes 15      % Eosinophils 1      % Basophils 0      % Immature Granulocytes 1      NRBCs per 100 WBC 0      Absolute Neutrophils 3.8      Absolute Lymphocytes 0.7 (*)     Absolute Monocytes 0.8      Absolute Eosinophils 0.0      Absolute Basophils 0.0      Absolute Immature Granulocytes 0.0      Absolute NRBCs 0.0     ISTAT GASES LACTATE VENOUS POCT - Abnormal    Lactic Acid POCT 1.0      Bicarbonate Venous POCT 23      O2 Sat, Venous POCT 72      pCO2 Venous POCT 34 (*)     pH Venous POCT 7.44 (*)     pO2 Venous POCT 36     TROPONIN T, HIGH SENSITIVITY - Abnormal    Troponin T, High Sensitivity 106 (*)    MAGNESIUM - Normal    Magnesium 1.9     GROUP A STREPTOCOCCUS PCR THROAT SWAB - Normal    Group A strep by PCR Not Detected     URINE CULTURE   BLOOD CULTURE   BLOOD CULTURE     Chest XR,  PA &  LAT   Final Result   IMPRESSION: Interstitial thickening in the periphery of bilateral lung appears mildly increased, likely progressive fibrosis. No confluent airspace opacities, pleural effusion or pneumothorax. Stable mild cardiomegaly without pulmonary vascular    congestion.      Echocardiogram Complete    (Results Pending)           Procedures       Emergency Department Course & Assessments:    Interventions:  Medications   sodium chloride (PF) 0.9% PF flush 3 mL (has no administration in time range)   sodium chloride (PF) 0.9% PF flush 3 mL (3 mLs Intracatheter $Given 3/15/24 0221)   senna-docusate (SENOKOT-S/PERICOLACE) 8.6-50 MG per tablet 1 tablet (has no administration in time range)     Or   senna-docusate (SENOKOT-S/PERICOLACE) 8.6-50 MG per tablet 2 tablet (has no administration in time range)   ondansetron (ZOFRAN ODT) ODT tab 4 mg (has no administration in time range)     Or   ondansetron (ZOFRAN) injection 4 mg (has no administration in time range)   acetaminophen (TYLENOL) tablet 650 mg (has no administration in time range)     Or   acetaminophen (TYLENOL) Suppository 650 mg (has no administration in time range)   guaiFENesin-codeine (ROBITUSSIN AC) 100-10 MG/5ML solution 5 mL (has no administration in time range)   oseltamivir (TAMIFLU) capsule 30 mg (has no administration in time range)   acetaminophen (TYLENOL) tablet 650 mg (650 mg Oral $Given 3/15/24 0221)   sodium chloride 0.9% BOLUS 500 mL (500 mLs Intravenous $New Bag 3/15/24 0314)   aspirin (ASA) chewable tablet 324 mg (324 mg Oral $Given 3/15/24 0350)        Assessments, Independent Interpretation, Consult/Discussion of ManagementTests:  ED Course as of 03/15/24 0457   Fri Mar 15, 2024   0132 Initial evaluation.   0302 Rechecked and updated.   0319 D/W Dr. Harrell       Social Determinants of Health affecting care:  None    Disposition:  See ED Course and MDM    Impression & Plan    CMS Diagnoses: None    Code Status: Full Code    MIPS (If  applicable):  N/A    Medical Decision Making:  This 88-year-old male patient presents to the ED due to multiple symptoms but overall weakness seems to be the primary symptom.  Please see the HPI and exam for specifics.  A broad differential was considered including cardiac sources, infectious etiologies, dehydration, etc.  The above workup was undertaken.    Findings are most notable for elevated troponin and BNP (primarily ordered due to the swelling in the patient's lower extremities.  Patient reportedly only has 1 kidney and I also note elevation in creatinine and depressed GFR today.  Urinalysis does not appear infected, lactic acid is normal, and venous blood gas is reassuring (though it was slightly alkalotic on the i-STAT sample).    The patient was also found to be positive for influenza A.    It is certainly possible that the patient could have a primary cardiac issue as I do note a new left bundle branch block.  The patient has no chest pain currently.  It is also possible he could have some degree of myocarditis due to influenza A.  The patient's second troponin is uptrending.  He was given aspirin and after discussion with the hospitalist service we will admit him for ongoing monitoring and care.    Critical Care:  None.    Diagnosis:    ICD-10-CM    1. KENNA (acute kidney injury) (H24)  N17.9       2. Dehydration  E86.0       3. Influenza A  J10.1       4. Hypoxia  R09.02       5. New onset left bundle branch block (LBBB)  I44.7       6. Elevated troponin  R79.89            Discharge Medications:  Current Discharge Medication List             3/15/2024   Armond Mcneil DO Burns, Bradley Joseph, DO  03/15/24 4026

## 2024-03-16 NOTE — PLAN OF CARE
A&Ox4. Very sleepy today, sometimes falls asleep mid sentence. A1 and walker to transfer to chair. 2 LPM NC. Ex cath with good urine output. No BM. A1/tray set up with eating. See Flowsheet charting. Transfer to the floor.       Goal Outcome Evaluation:      Plan of Care Reviewed With: patient, spouse    Overall Patient Progress: improvingOverall Patient Progress: improving    Outcome Evaluation: transfer to floor.

## 2024-03-16 NOTE — PROGRESS NOTES
03/16/24 1418   Appointment Info   Signing Clinician's Name / Credentials (PT) Ashley Oropeza DPT   Living Environment   People in Home spouse   Current Living Arrangements house   Home Accessibility stairs to enter home   Number of Stairs, Main Entrance 3   Stair Railings, Main Entrance railings safe and in good condition  (with 1 rail)   Transportation Anticipated car, drives self   Living Environment Comments Reports using walker at baseline; IND with ADLs and IADLs; pt appears to be questionable historian, will need to verify with spouse   Self-Care   Usual Activity Tolerance good   Current Activity Tolerance fair   Equipment Currently Used at Home walker, rolling  (R AFO)   Fall history within last six months no   General Information   Onset of Illness/Injury or Date of Surgery 03/15/24   Referring Physician Linn Harrell MD, MD   Patient/Family Therapy Goals Statement (PT) none stated   Pertinent History of Current Problem (include personal factors and/or comorbidities that impact the POC) 88 year old male admitted on 3/15/2024. He has a medical history significant for CAD s/p PCI of LAD 2018, HTN, HL, CKD stage IV, RCC s/p right nephrectomy who was admitted on 3/15/2024 with fever, weakness, sore throat and urinary incontinence     In the ER is noted to be positive for influenza   Existing Precautions/Restrictions fall   General Observations Supine, NAD   Cognition   Affect/Mental Status (Cognition) flat/blunted affect   Orientation Status (Cognition) oriented x 3   Follows Commands (Cognition) WFL   Pain Assessment   Patient Currently in Pain No   Integumentary/Edema   Integumentary/Edema Comments see RN assessment, no significant concerns identified with brief assessment by PT   Posture    Posture Forward head position;Protracted shoulders;Scoliosis   Range of Motion (ROM)   Range of Motion ROM is WFL   Strength (Manual Muscle Testing)   Strength (Manual Muscle Testing) Deficits observed during  functional mobility   Strength Comments decreased activity tolerance and baseline R foot drop   Bed Mobility   Bed Mobility supine-sit   Supine-Sit [place] (Bed Mobility) contact guard   Comment, (Bed Mobility) HOB elevated and used rails   Transfers   Transfers sit-stand transfer   Sit-Stand Transfer   Sit-Stand [place] (Transfers) contact guard;verbal cues   Assistive Device (Sit-Stand Transfers) walker, front-wheeled   Comment, (Sit-Stand Transfer) initially pulls to walker   Gait/Stairs (Locomotion)   [place] Level (Gait) contact guard   Assistive Device (Gait) walker, front-wheeled   Distance in Feet (Gait) 5' for eval; add'l for treat   Comment, (Gait/Stairs) even with R AFO donned, pt demos steppage gait pattern on R side   Balance   Balance Comments Fair, no overt LOB but demos flexed forward posture and requires AD for safety   Clinical Impression   Criteria for Skilled Therapeutic Intervention Yes, treatment indicated   PT Diagnosis (PT) decreased functional mobility   Influenced by the following impairments weakness, impaired balance   Functional limitations due to impairments bed mobility, transfers, ambulation, stair climbing   Clinical Presentation (PT Evaluation Complexity) stable   Clinical Presentation Rationale clinical judgement   Clinical Decision Making (Complexity) low complexity   Planned Therapy Interventions (PT) balance training;bed mobility training;gait training;home exercise program;neuromuscular re-education;ROM (range of motion);stair training;strengthening;stretching;transfer training;progressive activity/exercise;risk factor education;home program guidelines   Risk & Benefits of therapy have been explained evaluation/treatment results reviewed;care plan/treatment goals reviewed;risks/benefits reviewed;current/potential barriers reviewed;participants voiced agreement with care plan;participants included;patient   PT Total Evaluation Time   PT Eval, Low Complexity  Minutes (81657) 10   Physical Therapy Goals   PT Frequency Daily   PT Predicted Duration/Target Date for Goal Attainment 03/23/24   PT Goals Bed Mobility;Transfers;Gait;Stairs   PT: Bed Mobility Supervision/stand-by assist;Supine to/from sit   PT: Transfers Supervision/stand-by assist;Sit to/from stand;Bed to/from chair;Assistive device   PT: Gait Supervision/stand-by assist;Rolling walker;100 feet   PT: Stairs Supervision/stand-by assist;3 stairs  (with 1 rail)   PT Discharge Planning   PT Plan assess flat bed mobility once out of ICU, repeated STS, inc amb distance with 2WW, trial stairs   PT Discharge Recommendation (DC Rec) home with assist;home with home care physical therapy   PT Rationale for DC Rec Pt did fairly well with mobility this date and required only CGA for bed mobility, transfers, and short distance ambulation.  Anticipate with further medical management and IP PT, pt will be able to discharge home with HHPT pending his ability to safely perform stairs to enter his home.   PT Brief overview of current status CGA with 2WW   PT Equipment Needed at Discharge walker, rolling   Total Session Time   Timed Code Treatment Minutes 26   Total Session Time (sum of timed and untimed services) 36

## 2024-03-16 NOTE — PROGRESS NOTES
[facility]    Medicine Progress Note - Hospitalist Service    Date of Admission:  3/15/2024    Assessment & Plan   Augie Powell is a 88 year old male admitted on 3/15/2024. He has a medical history significant for CAD s/p PCI of LAD 2018, HTN, HL, CKD stage IV, RCC s/p right nephrectomy who was admitted on 3/15/2024 with fever, weakness, sore throat and urinary incontinence    In the ER is noted to be positive for influenza.  His troponin is elevated and his EKG shows a left bundle branch block.  TTE completed which was unchanged compared to 2022.  Patient denies chest pain.     His hospitalization has been complicated by severe right hip/thigh pain.  This is reportedly acute on chronic.  Will need to make sure that he is ambulatory and independent prior to return home.       Generalized weakness  Acute hypoxemic respiratory failure 2/2 influenza A infection:  Continues to intermittently required 1-2L NC for mild hypoxemia.  CXR on admission shows possible progression of peripheral fibrosis but there was no evidence for infection.    -Continue tamiflu  -Continuous oximetry, wean as able  -PT/OT for generalized weakness and dispo planning  -Formal oxygen evaluation    Acute on chronic lumbar and right hip pain:  Patient reportedly has chronic lumbar and hip pain.  It seems like he has gotten various injections for this in the past.  On admission, the patient had severe 10/10 pain extending to the hip.  He was so uncomfortable that he was shaking.  He underwent an lumbar MRI that shows moderate spinal canal stenosis L1-L2 worse on the left, mild spinal canal stenosis L2-L3, moderate narrowing of the lateral recesses at L3-L4, multiforaminal narrowing (worse and severe at L3-L4 bilaterally), and mild modic type 1 degenerative change at L1-L2 and L3-L4 which may contribute to low back pain.   -Will trial PT/OT to see if these symptoms improve  -Robaxin seems to help quite significantly but  "does make the patient drowsy  -Oxycodone 2.5 mg prn is ordered for severe pain  -Tylenol 1000 mg TID scheduled  -Consider NSG consultation if not improving with the above     Elevated troponin in the setting of a new left bundle branch block:  Most likely type 2 MI related to demand ischemia in the setting of influenza infection.  Patient denies chest pain.  TTE is unchanged from 2022.    -Monitor him on telemetry     CKD stage IV:  -Avoid nephrotoxins     History of coronary artery disease s/p PCI to his LAD:  -Resume home meds once reconciled     Acute on chronic anemia:  -Monitor  -Can consider outpatient work-up if desired after discussion          Diet: Advance Diet as Tolerated: Clear Liquid Diet    DVT Prophylaxis: Heparin SQ  Cerda Catheter: Not present  Lines: None     Cardiac Monitoring: ACTIVE order. Indication: Chest pain/ ACS rule out (24 hours)  Code Status: Full Code      Clinically Significant Risk Factors           # Hypercalcemia: Highest Ca = 11.5 mg/dL in last 2 days, will monitor as appropriate      # Thrombocytopenia: Lowest platelets = 107 in last 2 days, will monitor for bleeding   # Hypertension: Noted on problem list        # Overweight: Estimated body mass index is 26.76 kg/m  as calculated from the following:    Height as of this encounter: 1.778 m (5' 10\").    Weight as of this encounter: 84.6 kg (186 lb 8.2 oz)., PRESENT ON ADMISSION            Disposition Plan      Expected Discharge Date: 03/17/2024        Discharge Comments: Pending PT/OT, oxygen evaluation, pain control            Hardy Allen DO  Hospitalist Service  [facility]  Securely message with Mobile Ads (more info)  Text page via [employer] Paging/Directory   ______________________________________________________________________    Interval History   NAEO.  Patient's pain is improved.  He is drowsy.  Denies any shortness of breath.     Physical Exam   Vital Signs: Temp: 98.7  F (37.1  C) Temp src: Temporal " BP: 131/56 Pulse: 71   Resp: 29 SpO2: 98 % O2 Device: Nasal cannula Oxygen Delivery: 2 LPM  Weight: 186 lbs 8.15 oz    General Appearance: A bit lethargic but wakes up easily and answers questions with ease.   Respiratory: CTAB, WOB is normal on RA.   Cardiovascular: RRR no mrg.  No edema.  GI: Benign, soft, no TTP.   Skin: No rashes or lesions on exposed  Other: Appropriate.  Fatigued.      Medical Decision Making       50 MINUTES SPENT BY ME on the date of service doing chart review, history, exam, documentation & further activities per the note.      Data   ------------------------- PAST 24 HR DATA REVIEWED -----------------------------------------------    I have personally reviewed the following data over the past 24 hrs:    4.0  \   9.7 (L)   / 107 (L)     134 (L) 103 35.7 (H) /  122 (H)   4.3 22 2.12 (H) \       Imaging results reviewed over the past 24 hrs:   Recent Results (from the past 24 hour(s))   Echocardiogram Complete   Result Value    LVEF  55-60%    Narrative    [id number]  [id number]  [id number]  [id number]^ALFIE^AJAY^ERINN     [facility]  Echocardiography Laboratory  [address] [place] [address]     Name: TERI BUCHANAN  MRN: [id number]  : 1935  Study Date: 03/15/2024 01:02 PM  Age: 88 yrs  Gender: Male  Patient Location: [facility]  Reason For Study: LBBB  Ordering Physician: AJAY JUDGE  Referring Physician: Brown, Merlin Emery  Performed By: Deborah Mendez     BSA: 2.0 m2  Height: 70 in  Weight: 188 lb  HR: 87  BP: 135/62 mmHg  ______________________________________________________________________________  Procedure  Complete Portable Echo Adult. Optison (NDC #[id number]) given intravenously.  Technically difficult study.  ______________________________________________________________________________  Interpretation Summary     Technically difficult study     Left ventricular systolic function is normal.The visual ejection fraction is  55-60%.Septal motion is  consistent with conduction abnormality.  The right ventricular systolic function is normal.  Mild to moderate valvular aortic stenosis- Mean gradient 15 mmHg aortic valve  area 1.4 cmÂ      Compared to prior study dated 04/04/2022 no significant changes  ______________________________________________________________________________  Left Ventricle  Diastolic Doppler findings (E/E' ratio and/or other parameters) suggest left  ventricular filling pressures are indeterminate. Left ventricular systolic  function is normal. The visual ejection fraction is 55-60%. Septal motion is  consistent with conduction abnormality.     Right Ventricle  The right ventricle is normal size. The right ventricular systolic function is  normal.     Atria  Normal left atrial size. Right atrium not well visualized. Right atrial size  is normal. There is no color Doppler evidence of an atrial shunt.     Mitral Valve  There is mild mitral annular calcification. There is trace mitral  regurgitation. There is mild mitral stenosis. The mean mitral valve gradient  is 3.8 mmHg.     Tricuspid Valve  There is trace tricuspid regurgitation. Right ventricular systolic pressure  could not be approximated due to inadequate tricuspid regurgitation.     Aortic Valve  The aortic valve is not well visualized. There is trace aortic regurgitation.  Mild to moderate valvular aortic stenosis. The mean AoV pressure gradient is  15.0 mmHg. JESSICA 1.4 cm2.     Pulmonic Valve  The pulmonic valve is not well visualized. There is trace pulmonic valvular  regurgitation. There is no pulmonic valvular stenosis.     Vessels  The aortic root is normal size. Normal size ascending aorta.     Pericardium  There is no pericardial effusion.     Rhythm  Sinus rhythm was noted.  ______________________________________________________________________________  MMode/2D Measurements & Calculations  Ao root diam: 3.6 cm     asc Aorta Diam: 3.5 cm  LVOT diam: 2.0 cm  LVOT area: 3.2  cm2  Ao root diam index Ht(cm/m): 2.0  Ao root diam index BSA (cm/m2): 1.8  Asc Ao diam index BSA (cm/m2): 1.7  Asc Ao diam index Ht(cm/m): 2.0  LA Volume (BP): 47.9 ml  LA Volume Index (BP): 23.6 ml/m2  RV Base: 4.1 cm     Doppler Measurements & Calculations  MV E max sarwat: 99.1 cm/sec  MV A max sarwat: 122.0 cm/sec  MV E/A: 0.81  MV max P.5 mmHg  MV mean PG: 3.8 mmHg  MV V2 VTI: 33.6 cm  MVA(VTI): 2.0 cm2  MV dec time: 0.18 sec  Ao V2 max: 247.3 cm/sec  Ao max P.0 mmHg  Ao V2 mean: 170.0 cm/sec  Ao mean PG: 15.0 mmHg  Ao V2 VTI: 41.8 cm  JESSICA(I,D): 1.6 cm2  JESSICA(V,D): 1.6 cm2  LV V1 max P.0 mmHg  LV V1 max: 122.3 cm/sec  LV V1 VTI: 20.4 cm  SV(LVOT): 66.0 ml  SI(LVOT): 32.5 ml/m2  AV Sarwat Ratio (DI): 0.49  JESSICA Index (cm2/m2): 0.78  E/E' avg: 10.7  Lateral E/e': 8.4  Medial E/e': 13.0     ______________________________________________________________________________  Report approved by: Сергей Baldwin 03/15/2024 02:05 PM         MR LUMBAR SPINE W/O CONTRAST    Narrative    EXAM: MR LUMBAR SPINE W/O CONTRAST  LOCATION: [facility]  DATE: 3/15/2024    INDICATION: Lumbar radiculopathy, urinary incontinence. Severe back and right leg pain.  COMPARISON: None.  TECHNIQUE: Routine Lumbar Spine MRI without IV contrast.    FINDINGS:   Nomenclature is based on 5 lumbar type vertebral bodies. Straightening of the normal lumbar lordosis and mild levoconvex curvature, the latter centered at L3. Stepwise grade 1 retrolisthesis at L1-L3 measuring 2-3 mm and at L3-L4 measuring 4 mm,   degenerative. Maintained vertebral body heights. Multilevel intervertebral disc height loss and desiccation, worst and advanced at L1-L3, to a lesser extent at L3-L4 and L5-S1. Postoperative changes related to bilateral decompressive laminectomies and as   well as posterior and interbody instrumented fusion at L4-L5. Baastrup's configuration at L2-L3 without significant interspinous edema. Mild Modic type I  degenerative change at L1-L2, trace at L3-L4. Several small Schmorl's nodes, most notably involving   the inferior L1 endplate. No suspicious bone marrow signal intensity. Unremarkable conus medullaris terminating at L1. Crowding of the cauda equina nerve roots with areas spinal canal stenosis, as detailed below.    Mild to moderate degenerative change of the sacroiliac joints. Partial fatty atrophy of the dorsal paraspinal musculature. A moderately distended urinary bladder. Status post right nephrectomy. Multiple homogeneously T2-hyperintense partially imaged   peripelvic and cortical left renal lesions, most likely simple renal cysts although inadequately characterized on this exam due to technique.    T12-L1: Disc bulge, endplate osteophytic spurring, and mild bilateral facet arthrosis with mild left foraminal narrowing. No significant spinal canal or right foraminal stenosis.     L1-L2: A disc bulge and a superimposed left subarticular/foraminal disc extrusion measuring up to 3 mm in its radial dimension with an underlying annular fissure. Along with prominent epidural fat, endplate osteophytic spurring, as well as mild bilateral   facet arthrosis, this contributes to moderate spinal canal stenosis with asymmetrically greater narrowing of the left lateral recess. Mild to moderate right and moderate left foraminal narrowing.    L2-L3: Disc bulge, endplate osteophytic spurring, and mild bilateral facet arthrosis with mild spinal canal stenosis as well as mild right and moderate left foraminal narrowing.     L3-L4: Disc bulge, hypertrophy of the ligamenta flava, and mild to moderate bilateral facet arthrosis contributing to moderate narrowing of the lateral recesses and mild central spinal canal stenosis. Severe bilateral foraminal narrowing.    L4-L5: No significant spinal canal stenosis status post decompressive laminectomies. Residual disc bulge, endplate osteophytic spurring, and hypertrophic dorsal element  changes contributing to mild to moderate bilateral foraminal narrowing, slightly   worse on the right.    L5-S1: No significant spinal canal stenosis status post decompressive laminectomies. Disc bulge/height loss, endplate osteophytic spurring, and mild to moderate bilateral facet arthrosis contributing to likely moderate bilateral foraminal narrowing,   although portions of the neural foramina are obscured by metallic susceptibility artifact.      Impression    IMPRESSION:  1.  Scoliosis, postoperative changes, and multilevel spondylosis, as detailed.  2.  Moderate spinal canal stenosis at L1-L2 with asymmetrically greater LEFT lateral recess narrowing. Mild spinal canal stenosis at L2-L3. Moderate narrowing of the lateral recesses at L3-L4 with mild central spinal canal stenosis.  3.  Multilevel foraminal narrowing, worst and severe at L3-L4 bilaterally.  4.  Mild Modic type I degenerative change at L1-L2 and L3-L4, which may contribute to symptoms of low back pain.   CT Femur Right w/o Contrast    Narrative    EXAM: CT FEMUR THIGH RIGHT W/O CONTRAST  LOCATION: [facility]  DATE: 3/15/2024    INDICATION: Severe thigh pain.  Rule out fracture, fluid collection, etc.  COMPARISON: None.  TECHNIQUE: Noncontrast. Axial, sagittal and coronal thin-section reconstruction. Dose reduction techniques were used.     FINDINGS:     BONES/JOINTS:  -No acute fracture or malalignment. Moderate right hip joint degenerative changes. Tricompartmental degenerative changes of the knee, severe in the medial compartment. Osteopenia. Moderate knee joint effusion. There is a 4 mm intra-articular body in the   knee joint anteriorly.    SOFT TISSUES:  -No significant soft tissue swelling. No discrete fluid collection. Fatty muscle atrophy of the semimembranosus muscle. Minimal calcification overlying the right greater trochanter suggests calcific tendinopathy. Atherosclerosis.      Impression    IMPRESSION:  1.  No  acute osseous or soft tissue abnormality.  2.  Minimal calcification overlying the right greater trochanter suggests calcific tendinopathy.  3.  Moderate hip joint degenerative changes.  4.  Severe knee joint degenerative changes medially.  5.  Moderate knee joint effusion with a small intra-articular body.  6.  Other ancillary findings as described above.

## 2024-03-16 NOTE — UTILIZATION REVIEW
Admission Status; Secondary Review Determination     Under the authority of the Utilization Management Committee, the utilization review process indicated a secondary review on the above patient. The review outcome is based on review of the medical records, discussions with staff, and applying clinical experience noted on the date of the review.   (X) Inpatient Status Appropriate - This patient's medical care is consistent with medical management for inpatient care and reasonable inpatient medical practice.     RATIONALE FOR DETERMINATION    88-year-old male admitted on 3/15/2024 with fever, weakness, sore throat, and urinary incontinence, diagnosed with influenza A, presents multiple concerning findings, including elevated troponin levels in the setting of a new left bundle branch block, severe hypoxia with oxygen saturation as low as 75%, tachypnea with a respiratory rate up to 32, and recurrent tachycardia in the context of known coronary artery disease, necessitating urgent inpatient admission for further evaluation and management to prevent potential adverse cardiac events and respiratory compromise. Multiple concerning findings this admission including significant tachypnea with respiratory rate up to 32, severe hypoxia with saturation as low as 75% documented requiring oxygen supplementation, significant persistent episodes of recurrent tachycardia in a patient with known coronary artery disease, high-grade fever up to 102.7  Elderly patients with underlying pulmonary disease are at significant risks of complications and death from influenza. [Centers for Disease Control and Prevention. 5304-9960 Influenza antiviral medications: Summary for clinicians.]   The expected length of stay at the time of admission was more than 2 midnights because of the severity of illness, intensity of service provided, and risk for adverse outcome. Inpatient admission is recommended based on the Medicare guidelines.       The  information on this document is developed by the utilization review team in order for the business office to ensure compliance. This only denotes the appropriateness of proper admission status and does not reflect the quality of care rendered.   The definitions of Inpatient Status and Observation Status used in making the determination above are those provided in the CMS Coverage Manual, Chapter 1 and Chapter 6, section 70.4.   Sincerely,   JEROMY PATEL MD   System Medical Director   Utilization Review/ Case Management   [facility].

## 2024-03-17 NOTE — PROGRESS NOTES
03/16/24 [id number]   Appointment Info   Signing Clinician's Name / Credentials (OT) Luke Yoder EdD, .OTR/L   Living Environment   People in Home spouse   Current Living Arrangements house   Home Accessibility stairs to enter home   Number of Stairs, Main Entrance 3   Stair Railings, Main Entrance railings safe and in good condition   Transportation Anticipated car, drives self   Living Environment Comments States he uses a walker, gets help from wife.  Did not elaborate on this, will need to clarify with pt and family   Self-Care   Usual Activity Tolerance good   Current Activity Tolerance fair   Equipment Currently Used at Home walker, rolling;shower chair  (mentioned shower chair as well, not sure if he uses it or not)   Fall history within last six months no   General Information   Onset of Illness/Injury or Date of Surgery 03/16/24   Referring Physician Linn Harrell   Patient/Family Therapy Goal Statement (OT) to get better and get home   Additional Occupational Profile Info/Pertinent History of Current Problem Pt is a 68 year old male admitted with fever, weakness, sore throat, urinary incontinence - tested postive for influenza A.  PMH includes CAD s/p PCI of LAD, HTN, HL, CKD4, RCC s/p Right nephrectomy   Performance Patterns (Routines, Roles, Habits) Pt states he does all his ADLS himself but vague and lethargic.  States wife will help him with whatever he needs her to help with.   Existing Precautions/Restrictions fall;oxygen therapy device and L/min  (2L O2)   General Observations and Info pt in recliner, lethargic, needing some reorientation and stimulation   Cognitive Status Examination   Orientation Status person  (partial place and date.  Lethargic at times)   Follows Commands follows one-step commands;50-74% accuracy   Cognitive Status Comments Will need to check if pt becomes more alert.  May need more help than he states   Visual Perception   Visual Impairment/Limitations corrective lenses  full-time   Range of Motion Comprehensive   General Range of Motion upper extremity range of motion deficits identified   Comment, General Range of Motion Pt appears to have WFL UE ROM but complaining of some pain with his left arm.  Has AFO in right leg   Strength Comprehensive (MMT)   General Manual Muscle Testing (MMT) Assessment upper extremity strength deficits identified   Comment, General Manual Muscle Testing (MMT) Assessment generalized weakness, may be affected by lethargy   Coordination   Upper Extremity Coordination No deficits were identified   Coordination Comments pt is slow to move but is able to manipulate things with his hands   Transfers   Transfers other (see comments)   Transfer Comments Pt too fatigued to try to get out of chair.  Did do some sitting forward in chair.  Ambulated around the room with PT during their eval   Clinical Impression   Criteria for Skilled Therapeutic Interventions Met (OT) Yes, treatment indicated   OT Diagnosis decreased independence and safety for ADLS   OT Problem List-Impairments impacting ADL activity tolerance impaired;problems related to;cognition;range of motion (ROM);strength   Assessment of Occupational Performance 5 or more Performance Deficits   Identified Performance Deficits decreased independence in dressing, bathing, functional mobility, household chores, medication management   Planned Therapy Interventions (OT) ADL retraining;strengthening;ROM;progressive activity/exercise   Clinical Decision Making Complexity (OT) detailed assessment/moderate complexity   Risk & Benefits of therapy have been explained evaluation/treatment results reviewed;care plan/treatment goals reviewed   OT Total Evaluation Time   OT Stevie, Moderate Complexity Minutes (76432) 15   OT Goals   Therapy Frequency (OT) Daily   OT Predicted Duration/Target Date for Goal Attainment 03/24/24   OT Goals Hygiene/Grooming;Upper Body Dressing;Lower Body Dressing;Toilet  Transfer/Toileting;Cognition   OT: Hygiene/Grooming modified independent;using adaptive equipment;within precautions   OT: Upper Body Dressing Modified independent;using adaptive equipment;within precautions;including set-up/clothing retrieval   OT: Lower Body Dressing Modified independent;within precautions;including set-up/clothing retrieval;using adaptive equipment   OT: Toilet Transfer/Toileting Modified independent;cleaning and garment management;using adaptive equipment;toilet transfer;within precautions   OT: Cognitive Patient/caregiver will verbalize understanding of cognitive assessment results/recommendations as needed for safe discharge planning   Interventions   Interventions Quick Adds Self-Care/Home Management;Therapeutic Procedures/Exercise;Therapeutic Activity   Therapeutic Activities   Therapeutic Activity Minutes (77938) 25   Symptoms noted during/after treatment fatigue   Treatment Detail/Skilled Intervention Pt sitting up in recliner, sleeping.  Did wake to his name but was lethargic, needed to wake him up 2x dureing session.  Talked in a low voice with some generalities, not giving me any specific examples when asked.  States he does all his ADLs but may be getting help from his spouse.  Reports using 4WW with brakes at baseline.  Has good UE movment when asked, some trouble getting complete pronation\/supeination of arms.   OT Discharge Planning   OT Plan increase activity bathroom and grooming, LE dressing   OT Discharge Recommendation (DC Rec) home with assist;home with home care occupational therapy;Transitional Care Facility   OT Rationale for DC Rec Is generally weak and feverish, appears to need at least Min A for ADLs at this time. Will be able to know more when pt is not so lethargic. Has good home setup but likely gets significant help from spouse. If pt makes progress and becomes more active and energetic pt may be able to return home with help of spouse. If progress is slow pt may  need home health OT or TCU pending his status. Will provided updated recommenstions with subsequent sessions.   OT Brief overview of current status Lethargic with limited endurance.  Able to follow most single step commands.  Mod A for getting out of recliner.   Total Session Time   Timed Code Treatment Minutes 25   Total Session Time (sum of timed and untimed services) 40

## 2024-03-17 NOTE — PROGRESS NOTES
"                                                            [facility]    Date:3/17/24    Admission Dx: Elevated troponin    Pulmonary History: former smoker, no other pertinent hx    Home Nebulizer/MDI Use: none    Home Oxygen: none    Acuity Level (RCAT flow sheet): 4    Aerosol Therapy initiated: Albuterol Q6 prn    Pulmonary Hygiene initiated: not indicated pt has a dry NP cough    Volume Expansion initiated: incentive spirometry    Current Oxygen Requirements:1L NC    Current SpO2:93%    Re-evaluation date: 3/20/24    Patient Education: Will educate pt on the indications and benefits of nebulizer's as well as deep breathing and coughing techniques.    See \"RT Assessments\" flow sheet for patient assessment scoring and Acuity Level Details.           "

## 2024-03-17 NOTE — PROGRESS NOTES
[facility]    Medicine Progress Note - Hospitalist Service    Date of Admission:  3/15/2024    Assessment & Plan   Augie Powell is a 88 year old male admitted on 3/15/2024. He has a medical history significant for CAD s/p PCI of LAD 2018, HTN, HL, CKD stage IV, RCC s/p right nephrectomy who was admitted on 3/15/2024 with fever, weakness, sore throat and urinary incontinence    In the ER is noted to be positive for influenza.  His troponin is elevated and his EKG shows a left bundle branch block.  TTE completed which was unchanged compared to 2022.  Patient denies chest pain.     His hospitalization has been complicated by severe right hip/thigh pain.  This is reportedly acute on chronic.  Will need to make sure that he is ambulatory and independent prior to return home.       Generalized weakness  Acute hypoxemic respiratory failure 2/2 influenza A infection, improving:  Continues to intermittently required 1-2L NC for mild hypoxemia.  CXR on admission shows possible progression of peripheral fibrosis but there was no evidence for infection.    -Continue tamiflu  -Continuous oximetry, wean as able  -PT/OT for generalized weakness and dispo planning  -Formal oxygen evaluation    Acute on chronic lumbar and right hip pain, improved:  Patient reportedly has chronic lumbar and hip pain.  It seems like he has gotten various injections for this in the past.  On admission, the patient had severe 10/10 pain extending to the hip.  He was so uncomfortable that he was shaking.  He underwent an lumbar MRI that shows moderate spinal canal stenosis L1-L2 worse on the left, mild spinal canal stenosis L2-L3, moderate narrowing of the lateral recesses at L3-L4, multiforaminal narrowing (worse and severe at L3-L4 bilaterally), and mild modic type 1 degenerative change at L1-L2 and L3-L4 which may contribute to low back pain.   -Will trial PT/OT to see if these symptoms improve  -Robaxin seems to help quite  "significantly but does make the patient drowsy  -Oxycodone 2.5 mg prn is ordered for severe pain  -Tylenol 1000 mg TID scheduled  -Consider NSG consultation if not improving with the above    Acute metabolic encephalopathy:  Suspect related to infection, hospital delirium, component medications given for pain.  At baseline is able to hold normal conversations and is not confused.   -Delirium precautions  -Stop oxycodone & robaxin  -Anticipate discharge once more steady on feet and independent as he lives home with his wife who is also elderly  -PT/OT     Elevated troponin in the setting of a new left bundle branch block:  Most likely type 2 MI related to demand ischemia in the setting of influenza infection.  Patient denies chest pain.  TTE is unchanged from 2022.    -Stop tele     CKD stage IV:  Baseline Cr around 2-2.2 in the past couple of years.   -Avoid nephrotoxins     History of coronary artery disease s/p PCI to his LAD:  -Resume home meds once reconciled     Acute on chronic anemia:  -Monitor  -Can consider outpatient work-up if desired after discussion          Diet: Advance Diet as Tolerated: Regular Diet Adult    DVT Prophylaxis: Heparin SQ  Cerda Catheter: Not present  Lines: None     Cardiac Monitoring: None  Code Status: Full Code      Clinically Significant Risk Factors           # Hypercalcemia: Highest Ca = 11.5 mg/dL in last 2 days, will monitor as appropriate      # Thrombocytopenia: Lowest platelets = 107 in last 2 days, will monitor for bleeding   # Hypertension: Noted on problem list        # Overweight: Estimated body mass index is 26.26 kg/m  as calculated from the following:    Height as of this encounter: 1.778 m (5' 10\").    Weight as of this encounter: 83 kg (182 lb 15.7 oz)., PRESENT ON ADMISSION            Disposition Plan      Expected Discharge Date: 03/18/2024        Discharge Comments: Pending stability off oxygen, improvement in independence as he lives at home with his wife and " is a 2 person assist.            Hardy Allen DO  Hospitalist Service  [facility]  Securely message with [employer] (more info)  Text page via [employer] Paging/Directory   ______________________________________________________________________    Interval History   NAEO.  Patient more confused this morning.  Thought a mob was coming for him per nursing.  For me he does not show signs of hallucinations but does seem mildly confused.     Physical Exam   Vital Signs: Temp: 97.6  F (36.4  C) Temp src: Oral BP: (!) 149/53 Pulse: 73   Resp: 16 SpO2: 92 % O2 Device: Nasal cannula Oxygen Delivery: 1 LPM  Weight: 182 lbs 15.71 oz    General Appearance: Awake, up and eating breakfast.  Oriented.  Respiratory: CTAB, WOB is normal on RA.   Cardiovascular: RRR no mrg.  No edema.  GI: Benign, soft, no TTP.   Skin: No rashes or lesions on exposed  Other: Appropriate.  Fatigued.      Medical Decision Making       45 MINUTES SPENT BY ME on the date of service doing chart review, history, exam, documentation & further activities per the note.      Data   ------------------------- PAST 24 HR DATA REVIEWED -----------------------------------------------    I have personally reviewed the following data over the past 24 hrs:    N/A  \   N/A   / N/A     N/A N/A N/A /  N/A   4.3 N/A N/A \       Imaging results reviewed over the past 24 hrs:   No results found for this or any previous visit (from the past 24 hour(s)).

## 2024-03-17 NOTE — PLAN OF CARE
Disoriented to situation this morning. Perhaps starting to have ICU delirium? Asked if the mob was after him, and other strange questions that do not make sense. Update MD. Weaned to RA. Eating well. Incont urine. A2 walker, belt, brace for RLL and shoes to transfer. Needs lots of help to follow directions with transfer. Sometimes stops talking mid sentence. Transferring to floor now with all belongings after giving report to MS3 RN. See Flowsheet charting.     Updated Wife of move to floor       Goal Outcome Evaluation:      Plan of Care Reviewed With: patient    Overall Patient Progress: improvingOverall Patient Progress: improving    Outcome Evaluation: tasnfer to floor, on RA this morning

## 2024-03-17 NOTE — PLAN OF CARE
"Goal Outcome Evaluation:      Plan of Care Reviewed With: patient    Overall Patient Progress: no changeOverall Patient Progress: no change    Outcome Evaluation: A&Ox3. Disoriented to situation. On RA.    A&Ox3. Disoriented to situation. On RA. Wife at bedside. Pills crushed with pudding. Scheduled tylenol given. Lethargic. Magnesium replaced, Potassium WNL, next check AM draw tomorrow. Senna given, last BM 3/17. Very slow with eating, requires small bites and cueing.     Problem: Adult Inpatient Plan of Care  Goal: Plan of Care Review  Description: The Plan of Care Review/Shift note should be completed every shift.  The Outcome Evaluation is a brief statement about your assessment that the patient is improving, declining, or no change.  This information will be displayed automatically on your shift  note.  Outcome: Not Progressing  Flowsheets (Taken 3/17/2024 1849)  Outcome Evaluation: A&Ox3. Disoriented to situation. On RA.  Plan of Care Reviewed With: patient  Overall Patient Progress: no change  Goal: Patient-Specific Goal (Individualized)  Description: You can add care plan individualizations to a care plan. Examples of Individualization might be:  \"Parent requests to be called daily at 9am for status\", \"I have a hard time hearing out of my right ear\", or \"Do not touch me to wake me up as it startles  me\".  Outcome: Not Progressing  Goal: Absence of Hospital-Acquired Illness or Injury  Outcome: Not Progressing  Intervention: Identify and Manage Fall Risk  Recent Flowsheet Documentation  Taken 3/17/2024 1645 by Junie Cabrera RN  Safety Promotion/Fall Prevention: safety round/check completed  Intervention: Prevent Skin Injury  Recent Flowsheet Documentation  Taken 3/17/2024 1748 by Junie Cabrera RN  Body Position:   turned   left  Taken 3/17/2024 1645 by Junie Cabrera RN  Body Position: neutral body alignment  Taken 3/17/2024 1448 by Junie Cabrera RN  Body Position:   turned   left   side-lying  Taken " 3/17/2024 1415 by Junie Cabrera RN  Body Position:   turned   right  Taken 3/17/2024 1057 by Junie Cabrera RN  Body Position: neutral body alignment  Intervention: Prevent and Manage VTE (Venous Thromboembolism) Risk  Recent Flowsheet Documentation  Taken 3/17/2024 1059 by Junie Cabrera RN  VTE Prevention/Management: SCDs (sequential compression devices) off  Goal: Optimal Comfort and Wellbeing  Outcome: Not Progressing  Goal: Readiness for Transition of Care  Outcome: Not Progressing     Problem: Skin Injury Risk Increased  Goal: Skin Health and Integrity  Outcome: Not Progressing  Intervention: Plan: Nurse Driven Intervention: Moisture Management  Recent Flowsheet Documentation  Taken 3/17/2024 1057 by Junie Cabrera RN  Bathing/Skin Care: electrode patches/site rotation  Intervention: Optimize Skin Protection  Recent Flowsheet Documentation  Taken 3/17/2024 1748 by Junie Cabrera RN  Head of Bed (HOB) Positioning: HOB at 20 degrees  Taken 3/17/2024 1448 by Junie Cabrera RN  Head of Bed (HOB) Positioning: HOB at 20 degrees  Taken 3/17/2024 1057 by Junie Cabrera RN  Activity Management: activity adjusted per tolerance  Head of Bed (HOB) Positioning: HOB at 20-30 degrees     Problem: Gas Exchange Impaired  Goal: Optimal Gas Exchange  Outcome: Not Progressing  Intervention: Optimize Oxygenation and Ventilation  Recent Flowsheet Documentation  Taken 3/17/2024 1748 by Junie Cabrera RN  Head of Bed (HOB) Positioning: HOB at 20 degrees  Taken 3/17/2024 1448 by Junie Cabrera RN  Head of Bed (HOB) Positioning: HOB at 20 degrees  Taken 3/17/2024 1057 by Junie Cabrera RN  Head of Bed (HOB) Positioning: HOB at 20-30 degrees     Problem: Sepsis/Septic Shock  Goal: Optimal Coping  Outcome: Not Progressing  Goal: Absence of Bleeding  Outcome: Not Progressing  Goal: Blood Glucose Level Within Targeted Range  Outcome: Not Progressing  Goal: Absence of Infection Signs and Symptoms  Outcome: Not Progressing  Intervention:  Promote Recovery  Recent Flowsheet Documentation  Taken 3/17/2024 1057 by Junie Cabrera, RN  Activity Management: activity adjusted per tolerance  Goal: Optimal Nutrition Intake  Outcome: Not Progressing

## 2024-03-17 NOTE — PLAN OF CARE
Goal Outcome Evaluation:      Plan of Care Reviewed With: patient              ICU End of Shift Summary.  For vital signs and complete assessments, please see documentation flowsheets.      Pertinent assessments:   Neuro: Alert,follows commands, able to make needs known  Cardiac:SR with BBB, BP stable  Resp:Regular non labored, good 02 Sats on 2L NC ,switched to FM when sleeping 2/2 mouth breathing  GI:Abdomen rounded,no BM   Incontinent of urine, External catheter in place                    Skin some bruising no breakdown           Lines: SL  Drips:O    Major Shift Events: Uneventful    Plan (Upcoming Events): ? Tx to floor  Discharge/Transfer Needs: TBD     Bedside Shift Report Completed : yes  Bedside Safety Check Completed: yes

## 2024-03-18 NOTE — PROGRESS NOTES
Rapid response note.    Rapid response called for fever, rigors, altered mental status.    Patient seen at bedside.      Fevers to 103.5.  Having rigors.  Will open eyes briefly to sternal rub.    Chart reviewed.  Here with influenza A.  Previously requiring intermittent BiPAP therapy.    EKG obtained.  Does show a left bundle branch block.  Appears quite similar to EKG from 3/15.    Check chest x-ray.  Check blood cultures.  Check VBG.  Check labs with troponin, lactic acid, CBC, metabolic panel, blood cultures, urinalysis.    Start IV fluids.  Start IV Zosyn.  Continue to monitor closely.    Wife updated by phone.

## 2024-03-18 NOTE — PROGRESS NOTES
[facility]    Medicine Progress Note - Hospitalist Service    Date of Admission:  3/15/2024    Assessment & Plan   Augie Powell is a 88 year old male admitted on 3/15/2024. He has a medical history significant for CAD s/p PCI of LAD 2018, HTN, HL, CKD stage IV, RCC s/p right nephrectomy who was admitted on 3/15/2024 with fever, weakness, sore throat and urinary incontinence    In the ER is noted to be positive for influenza.  His troponin is elevated and his EKG shows a left bundle branch block.  TTE completed which was unchanged compared to 2022.  Patient denies chest pain.     His hospitalization has been complicated by severe right hip/thigh pain.  This is reportedly acute on chronic.  Will need to make sure that he is ambulatory and independent prior to return home; though suspect he may require TCU on discharge.       Generalized weakness  Acute hypoxemic respiratory failure 2/2 influenza A infection, improving:  Continues to intermittently required 1-2L NC for mild hypoxemia.  CXR on admission shows possible progression of peripheral fibrosis but there was no evidence for infection.    -Continue tamiflu  -Continuous oximetry, wean as able  -PT/OT for generalized weakness and dispo planning; significant amount of difficulty standing at bedside with PT  -Formal oxygen evaluation    Acute on chronic lumbar and right hip pain, improved:  Patient reportedly has chronic lumbar and hip pain.  It seems like he has gotten various injections for this in the past.  On admission, the patient had severe 10/10 pain extending to the hip.  He was so uncomfortable that he was shaking.  He underwent an lumbar MRI that shows moderate spinal canal stenosis L1-L2 worse on the left, mild spinal canal stenosis L2-L3, moderate narrowing of the lateral recesses at L3-L4, multiforaminal narrowing (worse and severe at L3-L4 bilaterally), and mild modic type 1 degenerative change at L1-L2 and L3-L4 which may  contribute to low back pain.   -Will trial PT/OT to see if these symptoms improve  -Robaxin seems to help quite significantly but does make the patient drowsy  -Oxycodone 2.5 mg prn is ordered for severe pain  -Tylenol 1000 mg TID scheduled  -Consider NSG consultation if not improving with the above  -Did have significant difficulty standing at bedside, which I am uncertain if it is due to influenza associated weakness of back pain. Continue to monitor over coming days.    Acute metabolic encephalopathy, improving:  Suspect related to infection, hospital delirium, component medications given for pain.  At baseline is able to hold normal conversations and is not confused.   -Delirium precautions  -Stop oxycodone & robaxin  -Anticipate discharge once more steady on feet and independent as he lives home with his wife who is also elderly vs TCU  -PT/OT     Elevated troponin in the setting of a new left bundle branch block:  Most likely type 2 MI related to demand ischemia in the setting of influenza infection.  Patient denies chest pain.  TTE is unchanged from 2022.    -Stop tele     CKD stage IV:  Baseline Cr around 2-2.2 in the past couple of years.   -Avoid nephrotoxins     History of coronary artery disease s/p PCI to his LAD:  -Resume home meds once reconciled     Acute on chronic anemia:  -Monitor  -Can consider outpatient work-up if desired after discussion          Diet: Advance Diet as Tolerated: Regular Diet Adult    DVT Prophylaxis: Heparin SQ  Cerda Catheter: Not present  Lines: None     Cardiac Monitoring: ACTIVE order. Indication: Tachyarrhythmias, acute (48 hours)  Code Status: Full Code      Clinically Significant Risk Factors           # Hypercalcemia: Highest Ca = 12.5 mg/dL in last 2 days, will monitor as appropriate    # Hypoalbuminemia: Lowest albumin = 3.1 g/dL at 3/17/2024 10:27 PM, will monitor as appropriate   # Thrombocytopenia: Lowest platelets = 127 in last 2 days, will monitor for bleeding  "  # Hypertension: Noted on problem list        # Overweight: Estimated body mass index is 26.48 kg/m  as calculated from the following:    Height as of this encounter: 1.778 m (5' 10\").    Weight as of this encounter: 83.7 kg (184 lb 8.4 oz)., PRESENT ON ADMISSION            Disposition Plan      Expected Discharge Date: 03/20/2024        Discharge Comments: Pending stability off oxygen, improvement in independence as he lives at home with his wife and is a 2 person assist.            Zion Rojas MD  Hospitalist Service  [facility]  Securely message with [employer] (more info)  Text page via [facility] Paging/Directory   ______________________________________________________________________    Interval History   NAEO. Still slightly confused, but improved. Significant weakness while working with PT. Oxygen requirements down today.    Physical Exam   Temp: 99.7  F (37.6  C) Temp src: Axillary BP: (!) 151/59 Pulse: 65   Resp: 24 SpO2: 98 % O2 Device: Nasal cannula Oxygen Delivery: 2 LPM Height: 177.8 cm (5' 10\") Weight: 83.7 kg (184 lb 8.4 oz)  Estimated body mass index is 26.48 kg/m  as calculated from the following:    Height as of this encounter: 1.778 m (5' 10\").    Weight as of this encounter: 83.7 kg (184 lb 8.4 oz).    General: Very pleasant male resting comfortably in hospital bed.  Awake, alert, interactive. Family at bedside whom I updated.  HEENT: Normocephalic, atraumatic.  PERRL, EOMI.  Conjunctiva clear, sclerae anicteric.  Mucous membranes moist.  Cardiac: Regular rate and rhythm without murmur, gallop, or rub.  No peripheral edema.  Respiratory: Normal work of breathing. NC in place. Clear to auscultation bilaterally without wheezing, rales, or rhonchi.  Musculoskeletal: Moving all extremities appropriately.  Skin: No rashes or abrasions on exposed skin.  Neurologic: Alert and oriented x4.  Cranial nerves II through XII grossly intact.  Psychologic: Appropriate mood and affect.      Medical " Decision Making       55 MINUTES SPENT BY ME on the date of service doing chart review, history, exam, documentation & further activities per the note.      Data   ------------------------- PAST 24 HR DATA REVIEWED -----------------------------------------------    I have personally reviewed the following data over the past 24 hrs:    5.3  \   9.4 (L)   / 128 (L)     141 108 (H) 36.5 (H) /  120 (H)   4.5 24 2.26 (H) \     ALT: 19 AST: 31 AP: 54 TBILI: 0.4   ALB: 3.1 (L) TOT PROTEIN: 8.4 (H) LIPASE: N/A     Trop: 89 (H) BNP: N/A     Procal: N/A CRP: N/A Lactic Acid: 0.9         Imaging results reviewed over the past 24 hrs:   Recent Results (from the past 24 hour(s))   XR Chest Port 1 View    Narrative    EXAM: XR CHEST PORT 1 VIEW  LOCATION: [facility]  DATE: 3/17/2024    INDICATION: Hypoxia, AMS.  COMPARISON: 03/15/2024.      Impression    IMPRESSION: There are interstitial infiltrates in the mid and lower lungs unchanged. Stable heart size.

## 2024-03-18 NOTE — PLAN OF CARE
"Goal Outcome Evaluation:      Plan of Care Reviewed With: patient    Overall Patient Progress: no change    Outcome Evaluation: .    VSS on 8L Oxymask. Alert to self only. Denies pain. Denies SOB. K and Mg protocol. Incontinent. On Tamiflu. Ax2.     RRT called at 2205- pt only briefly responding to sternal rub and shaking, altered level of consciousness. Temp 103.5- given PRN Rectal Tylenol with relief of fever. Started NS @ 75 ml/hr, IV Zosyn, and IV Zithromax. Tele: SR w/BBB. Chest Xray completed.       Problem: Adult Inpatient Plan of Care  Goal: Plan of Care Review  Description: The Plan of Care Review/Shift note should be completed every shift.  The Outcome Evaluation is a brief statement about your assessment that the patient is improving, declining, or no change.  This information will be displayed automatically on your shift  note.  Outcome: Not Progressing  Flowsheets (Taken 3/18/2024 0559)  Outcome Evaluation: .  Plan of Care Reviewed With: patient  Overall Patient Progress: no change  Goal: Patient-Specific Goal (Individualized)  Description: You can add care plan individualizations to a care plan. Examples of Individualization might be:  \"Parent requests to be called daily at 9am for status\", \"I have a hard time hearing out of my right ear\", or \"Do not touch me to wake me up as it startles  me\".  Outcome: Not Progressing  Goal: Absence of Hospital-Acquired Illness or Injury  Outcome: Not Progressing  Intervention: Identify and Manage Fall Risk  Recent Flowsheet Documentation  Taken 3/17/2024 2044 by Debbi Harden RN  Safety Promotion/Fall Prevention:   assistive device/personal items within reach   clutter free environment maintained   increased rounding and observation   increase visualization of patient   lighting adjusted   mobility aid in reach   patient and family education   nonskid shoes/slippers when out of bed   room organization consistent   safety round/check completed  Intervention: " Prevent Skin Injury  Recent Flowsheet Documentation  Taken 3/18/2024 0110 by Debbi Harden RN  Body Position:   turned   right   log-rolled   supine, head elevated  Taken 3/18/2024 0018 by Debbi Harden RN  Body Position:   neutral body alignment   supine, legs elevated   supine, head elevated  Taken 3/17/2024 2158 by Debbi Harden RN  Body Position:   turned   log-rolled   supine, legs elevated   supine, head elevated  Intervention: Prevent and Manage VTE (Venous Thromboembolism) Risk  Recent Flowsheet Documentation  Taken 3/17/2024 2044 by Debbi Harden RN  VTE Prevention/Management: SCDs (sequential compression devices) off  Intervention: Prevent Infection  Recent Flowsheet Documentation  Taken 3/17/2024 2044 by Debbi Harden RN  Infection Prevention:   equipment surfaces disinfected   hand hygiene promoted   personal protective equipment utilized   rest/sleep promoted   single patient room provided  Goal: Optimal Comfort and Wellbeing  Outcome: Not Progressing  Goal: Readiness for Transition of Care  Outcome: Not Progressing     Problem: Skin Injury Risk Increased  Goal: Skin Health and Integrity  Outcome: Not Progressing  Intervention: Plan: Nurse Driven Intervention: Moisture Management  Recent Flowsheet Documentation  Taken 3/18/2024 0110 by Debbi Harden RN  Bathing/Skin Care:   bath, partial   incontinence care  Taken 3/17/2024 2044 by Debbi Harden RN  Moisture Interventions:   No brief in bed   Incontinence pad  Intervention: Plan: Nurse Driven Intervention: Friction and Shear  Recent Flowsheet Documentation  Taken 3/17/2024 2044 by Debbi Harden RN  Friction/Shear Interventions:   HOB 30 degrees or less   Pad bony prominence (elbow pads, heel pads, ear protectors)  Intervention: Optimize Skin Protection  Recent Flowsheet Documentation  Taken 3/18/2024 0110 by Debbi Harden RN  Activity Management: activity adjusted per tolerance  Head of Bed (HOB) Positioning:  HOB at 20-30 degrees  Taken 3/18/2024 0018 by Debbi Harden RN  Head of Bed ([facility]) Positioning: HOB at 20-30 degrees     Problem: Gas Exchange Impaired  Goal: Optimal Gas Exchange  Outcome: Not Progressing  Intervention: Optimize Oxygenation and Ventilation  Recent Flowsheet Documentation  Taken 3/18/2024 0110 by Debbi Harden RN  Head of Bed ([facility]) Positioning: HOB at 20-30 degrees  Taken 3/18/2024 0018 by Debbi Harden RN  Head of Bed ([facility]) Positioning: HOB at 20-30 degrees     Problem: Sepsis/Septic Shock  Goal: Optimal Coping  Outcome: Not Progressing  Goal: Absence of Bleeding  Outcome: Not Progressing  Goal: Blood Glucose Level Within Targeted Range  Outcome: Not Progressing  Goal: Absence of Infection Signs and Symptoms  Outcome: Not Progressing  Intervention: Initiate Sepsis Management  Recent Flowsheet Documentation  Taken 3/17/2024 2044 by Debbi Harden RN  Infection Prevention:   equipment surfaces disinfected   hand hygiene promoted   personal protective equipment utilized   rest/sleep promoted   single patient room provided  Isolation Precautions: droplet precautions maintained  Intervention: Promote Recovery  Recent Flowsheet Documentation  Taken 3/18/2024 0110 by Debbi Harden RN  Activity Management: activity adjusted per tolerance  Goal: Optimal Nutrition Intake  Outcome: Not Progressing

## 2024-03-18 NOTE — PLAN OF CARE
"Pt lethargic this AM, more awake this afternoon. Confused. VSS. Afebrile. Weaned to 2L NC. Tele SR/SR BBB. K/mag protocol, mag replaced PO. On tamiflu. On IV zosyn and zithro. IVF infusing. A2 gb/walker. PT/OT following. UA sent. Needs sputum sample.    Problem: Adult Inpatient Plan of Care  Goal: Plan of Care Review  Description: The Plan of Care Review/Shift note should be completed every shift.  The Outcome Evaluation is a brief statement about your assessment that the patient is improving, declining, or no change.  This information will be displayed automatically on your shift  note.  Outcome: Progressing  Flowsheets (Taken 3/18/2024 1434)  Outcome Evaluation: Afebrile. IVF infusing. On IV zosyn and zithro. UA sent.  Plan of Care Reviewed With: patient  Overall Patient Progress: no change  Goal: Patient-Specific Goal (Individualized)  Description: You can add care plan individualizations to a care plan. Examples of Individualization might be:  \"Parent requests to be called daily at 9am for status\", \"I have a hard time hearing out of my right ear\", or \"Do not touch me to wake me up as it startles  me\".  Outcome: Progressing  Goal: Absence of Hospital-Acquired Illness or Injury  Outcome: Progressing  Intervention: Identify and Manage Fall Risk  Recent Flowsheet Documentation  Taken 3/18/2024 0925 by Harriet Scott, RN  Safety Promotion/Fall Prevention:   activity supervised   assistive device/personal items within reach   clutter free environment maintained   nonskid shoes/slippers when out of bed   safety round/check completed  Intervention: Prevent Skin Injury  Recent Flowsheet Documentation  Taken 3/18/2024 0925 by Harriet Scott, RN  Body Position: supine, head elevated  Goal: Optimal Comfort and Wellbeing  Outcome: Progressing  Goal: Readiness for Transition of Care  Outcome: Progressing     Problem: Skin Injury Risk Increased  Goal: Skin Health and Integrity  Outcome: Progressing  Intervention: Plan: Nurse " Driven Intervention: Moisture Management  Recent Flowsheet Documentation  Taken 3/18/2024 0925 by Harriet Scott, RN  Moisture Interventions: Incontinence pad  Intervention: Plan: Nurse Driven Intervention: Friction and Shear  Recent Flowsheet Documentation  Taken 3/18/2024 0925 by Harriet Scott, RN  Friction/Shear Interventions: HOB 30 degrees or less  Intervention: Optimize Skin Protection  Recent Flowsheet Documentation  Taken 3/18/2024 0925 by Harriet Scott, RN  Activity Management: activity adjusted per tolerance  Head of Bed (HOB) Positioning: HOB at 20-30 degrees     Problem: Gas Exchange Impaired  Goal: Optimal Gas Exchange  Outcome: Progressing  Intervention: Optimize Oxygenation and Ventilation  Recent Flowsheet Documentation  Taken 3/18/2024 0925 by Harriet Scott RN  Head of Bed (HOB) Positioning: HOB at 20-30 degrees     Problem: Sepsis/Septic Shock  Goal: Optimal Coping  Outcome: Progressing  Goal: Absence of Bleeding  Outcome: Progressing  Goal: Blood Glucose Level Within Targeted Range  Outcome: Progressing  Goal: Absence of Infection Signs and Symptoms  Outcome: Progressing  Intervention: Promote Recovery  Recent Flowsheet Documentation  Taken 3/18/2024 0925 by Harriet Scott, RN  Activity Management: activity adjusted per tolerance  Goal: Optimal Nutrition Intake  Outcome: Progressing   Goal Outcome Evaluation:      Plan of Care Reviewed With: patient    Overall Patient Progress: no changeOverall Patient Progress: no change    Outcome Evaluation: Afebrile. IVF infusing. On IV zosyn and zithro. UA sent.

## 2024-03-18 NOTE — CONSULTS
Care Management Initial Consult    General Information  Assessment completed with: Patient,    Type of CM/SW Visit: Initial Assessment    Primary Care Provider verified and updated as needed:     Readmission within the last 30 days: no previous admission in last 30 days      Reason for Consult: discharge planning  Advance Care Planning:            Communication Assessment  Patient's communication style: spoken language (English or Bilingual)    Hearing Difficulty or Deaf: no   Wear Glasses or Blind: yes    Cognitive  Cognitive/Neuro/Behavioral: .WDL except  Level of Consciousness: lethargic  Arousal Level: opens eyes spontaneously  Orientation: person  Mood/Behavior: calm, cooperative  Best Language: 0 - No aphasia  Speech: garbled    Living Environment:   People in home: spouse     Current living Arrangements: house      Able to return to prior arrangements: other (see comments)  Living Arrangement Comments: May need TCU prior to return home    Family/Social Support:  Care provided by: self  Provides care for: no one, unable/limited ability to care for self  Marital Status:   Children          Description of Support System: Supportive, Involved    Support Assessment: Adequate family and caregiver support    Current Resources:   Patient receiving home care services: No     Community Resources: None  Equipment currently used at home: walker, rolling, shower chair (mentioned shower chair as well, not sure if he uses it or not)  Supplies currently used at home: None    Employment/Financial:  Employment Status:          Financial Concerns:             Does the patient's insurance plan have a 3 day qualifying hospital stay waiver?  Yes     Which insurance plan 3 day waiver is available? Alternative insurance waiver    Will the waiver be used for post-acute placement? Yes    Lifestyle & Psychosocial Needs:  Social Determinants of Health     Food Insecurity: Not on file   Depression: Not at risk (6/12/2023)    PHQ-2      PHQ-2 Score: 1   Housing Stability: Not on file   Tobacco Use: Medium Risk (6/12/2023)    Patient History     Smoking Tobacco Use: Former     Smokeless Tobacco Use: Never     Passive Exposure: Not on file   Financial Resource Strain: Not on file   Alcohol Use: Not on file   Transportation Needs: Not on file   Physical Activity: Not on file   Interpersonal Safety: Not on file   Stress: Not on file   Social Connections: Not on file       Functional Status:  Prior to admission patient needed assistance:   Dependent ADLs:: Ambulation-walker     Assesssment of Functional Status: Not at baseline with ADL Functioning    Mental Health Status:          Chemical Dependency Status:                Values/Beliefs:  Spiritual, Cultural Beliefs, Cheondoism Practices, Values that affect care:                 Additional Information:    SW met with pt/spouse/daughter Meredith per request to discuss discharge planning. Pt spouse reports that she is very concerned with how pt is doing and feels he has declined in the past days. Pt spouse inquired about pt medical status, SW referred to MD. Rosalio REYNOLDS with request to update pt spouse. Pt spouse states that she highly prefers to bring pt home but is aware that he will need TCU with how he is currently doing.  Pt spouse would like a referral to [facility] once appropriate and does not want any other referrals at this time. Explained that if [facility] cannot accept, additional options will need to be obtained. Pt spouse expressed understanding. Explained that SW will send TCU referral once pt is closer to being medically ready, pt currently on 8L O2 which cannot be accepted at TCU. SW following.     Bette Hartman/CRISTOPHER Gill, LGLUKE  Inpatient Care Coordination  Bette Hartman, LUKE

## 2024-03-18 NOTE — PHARMACY-CONSULT NOTE
Pharmacy Delirium Chart Review  After consulting with provider, pharmacist to review medications and provide recommendations on potentially high risk medications for development of delirium.    Upon chart review, the following medications may contribute to possible patient delirium:     Methocarbamol: CNS effects including confusion  Metoprolol: infrequent confusion reported in postmarketing (case reports of delirium)  Oseltamivir: confusion (rare: <1%), delirium (rare: <1%)    Oxycodone: opioid-induced neurotoxicity including delirium    Please consult unit pharmacist with further questions.    Liz Flaherty RP

## 2024-03-19 NOTE — PROGRESS NOTES
Care Management Follow Up    Length of Stay (days): 4    Expected Discharge Date: 03/20/2024     Concerns to be Addressed:       Patient plan of care discussed at interdisciplinary rounds: Yes    Anticipated Discharge Disposition:       Anticipated Discharge Services:    Anticipated Discharge DME:      Patient/family educated on Medicare website which has current facility and service quality ratings:    Education Provided on the Discharge Plan:    Patient/Family in Agreement with the Plan: yes    Referrals Placed by CM/SW:    Private pay costs discussed: Not applicable    Additional Information:    [facility] as pt is no longer requiring 8L O2. SW following.     Bette Hartman/CRISTOPHER Gill, CINDY  Inpatient Care Coordination  Emergency Room /Bette Barrett, CINDY

## 2024-03-19 NOTE — PLAN OF CARE
"Goal Outcome Evaluation:      Plan of Care Reviewed With: patient, spouse    Overall Patient Progress: no change    Restless in bed. Incontinent of bladder. Purwick intact.  Problem: Adult Inpatient Plan of Care  Goal: Plan of Care Review  Description: The Plan of Care Review/Shift note should be completed every shift.  The Outcome Evaluation is a brief statement about your assessment that the patient is improving, declining, or no change.  This information will be displayed automatically on your shift  note.  3/19/2024 0203 by Kiya Bahena RN  Outcome: Not Progressing  Flowsheets (Taken 3/19/2024 0203)  Plan of Care Reviewed With:   patient   spouse  Overall Patient Progress: no change  3/19/2024 0201 by Kiya Bahena RN  Outcome: Progressing  Flowsheets (Taken 3/19/2024 0201)  Plan of Care Reviewed With: patient  3/19/2024 0155 by Kiya Bahena RN  Flowsheets (Taken 3/19/2024 0155)  Plan of Care Reviewed With: patient  Overall Patient Progress: no change  Goal: Patient-Specific Goal (Individualized)  Description: You can add care plan individualizations to a care plan. Examples of Individualization might be:  \"Parent requests to be called daily at 9am for status\", \"I have a hard time hearing out of my right ear\", or \"Do not touch me to wake me up as it startles  me\".  3/19/2024 0203 by Kiya Bahena RN  Outcome: Not Progressing  3/19/2024 0201 by Kiya Bahena RN  Outcome: Progressing  Goal: Absence of Hospital-Acquired Illness or Injury  3/19/2024 0203 by Kiya Bahena RN  Outcome: Not Progressing  3/19/2024 0201 by Kiya Bahena RN  Outcome: Progressing  Intervention: Prevent and Manage VTE (Venous Thromboembolism) Risk  Recent Flowsheet Documentation  Taken 3/19/2024 0012 by Kiya Bahena RN  VTE Prevention/Management: SCDs (sequential compression devices) off  Intervention: Prevent Infection  Recent Flowsheet Documentation  Taken 3/19/2024 0012 by Kiya Bahena" RN  Infection Prevention: single patient room provided  Goal: Optimal Comfort and Wellbeing  3/19/2024 0203 by Kiya Bahena RN  Outcome: Not Progressing  3/19/2024 0201 by Kiya Bahena RN  Outcome: Progressing  Intervention: Provide Person-Centered Care  Recent Flowsheet Documentation  Taken 3/19/2024 0012 by Kiya Bahena RN  Trust Relationship/Rapport:   thoughts/feelings acknowledged   care explained  Goal: Readiness for Transition of Care  3/19/2024 0203 by Kiya Bahena RN  Outcome: Not Progressing  3/19/2024 0201 by Kiya Bahena RN  Outcome: Progressing

## 2024-03-19 NOTE — PROGRESS NOTES
[facility]    Medicine Progress Note - Hospitalist Service    Date of Admission:  3/15/2024    Assessment & Plan   Augie Powell is a 88 year old male admitted on 3/15/2024. He has a medical history significant for CAD s/p PCI of LAD 2018, HTN, HL, CKD stage IV, RCC s/p right nephrectomy who was admitted on 3/15/2024 with fever, weakness, sore throat and urinary incontinence    In the ER is noted to be positive for influenza.  His troponin is elevated and his EKG shows a left bundle branch block.  TTE completed which was unchanged compared to 2022.  Patient denies chest pain.     His hospitalization has been complicated by severe right hip/thigh pain.  This is reportedly acute on chronic.  Will need to make sure that he is ambulatory and independent prior to return home; though suspect he may require TCU on discharge.     Plan for the day:  -Delirium precautions  -Stop IVF in setting of hypertension  -Continue all other cares  -Miralax + senna    Generalized weakness  Acute hypoxemic respiratory failure 2/2 influenza A infection, improving:  Continues to intermittently required 1-2L NC for mild hypoxemia.  CXR on admission shows possible progression of peripheral fibrosis but there was no evidence for infection.    -Continue tamiflu  -Continuous oximetry, wean as able  -PT/OT for generalized weakness and dispo planning; significant amount of difficulty standing at bedside with PT  -Formal oxygen evaluation    Acute on chronic lumbar and right hip pain, improved:  Patient reportedly has chronic lumbar and hip pain.  It seems like he has gotten various injections for this in the past.  On admission, the patient had severe 10/10 pain extending to the hip.  He was so uncomfortable that he was shaking.  He underwent an lumbar MRI that shows moderate spinal canal stenosis L1-L2 worse on the left, mild spinal canal stenosis L2-L3, moderate narrowing of the lateral recesses at L3-L4,  multiforaminal narrowing (worse and severe at L3-L4 bilaterally), and mild modic type 1 degenerative change at L1-L2 and L3-L4 which may contribute to low back pain.   -Will trial PT/OT to see if these symptoms improve  -Robaxin seems to help quite significantly but does make the patient drowsy  -Oxycodone 2.5 mg prn is ordered for severe pain  -Tylenol 1000 mg TID scheduled  -Consider NSG consultation if not improving with the above  -Did have significant difficulty standing at bedside, which I am uncertain if it is due to influenza associated weakness of back pain. Continue to monitor over coming days.    Acute metabolic encephalopathy  Suspect related to infection, hospital delirium, component medications given for pain. Continues to wax and wane more suggestive of delirium playing a major role. At baseline is able to hold normal conversations and is not confused.   -Delirium precautions  -No sedating medication  -Anticipate discharge once more steady on feet and independent as he lives home with his wife who is also elderly vs TCU  -PT/OT     Elevated troponin in the setting of a new left bundle branch block:  Most likely type 2 MI related to demand ischemia in the setting of influenza infection.  Patient denies chest pain.  TTE is unchanged from 2022.    -Stop tele     CKD stage IV:  Baseline Cr around 2-2.2 in the past couple of years.   -Avoid nephrotoxins     History of coronary artery disease s/p PCI to his LAD:  -Resume home meds once reconciled     Acute on chronic anemia:  -Monitor  -Can consider outpatient work-up if desired after discussion          Diet: Advance Diet as Tolerated: Regular Diet Adult    DVT Prophylaxis: Heparin SQ  Cerda Catheter: Not present  Lines: None     Cardiac Monitoring: ACTIVE order. Indication: Tachyarrhythmias, acute (48 hours)  Code Status: Full Code      Clinically Significant Risk Factors         # Hypernatremia: Highest Na = 146 mmol/L in last 2 days, will monitor as  "appropriate   # Hypercalcemia: Highest Ca = 12.5 mg/dL in last 2 days, will monitor as appropriate    # Hypoalbuminemia: Lowest albumin = 3.1 g/dL at 3/17/2024 10:27 PM, will monitor as appropriate   # Thrombocytopenia: Lowest platelets = 127 in last 2 days, will monitor for bleeding   # Hypertension: Noted on problem list        # Overweight: Estimated body mass index is 27.01 kg/m  as calculated from the following:    Height as of this encounter: 1.778 m (5' 10\").    Weight as of this encounter: 85.4 kg (188 lb 4.4 oz).             Disposition Plan      Expected Discharge Date: 03/20/2024        Discharge Comments: Pending stability off oxygen, improvement in independence as he lives at home with his wife and is a 2 person assist.            Zion Rojas MD  Hospitalist Service  [facility]  Securely message with [employer] (more info)  Text page via [employer] Paging/Directory   ______________________________________________________________________    Interval History   NAEO. States he is feeling poor still. Wife at bedside reporting he has not had a bowel movement in several days.    Physical Exam   Temp: 97.9  F (36.6  C) Temp src: Axillary BP: (!) 179/76 Pulse: 85   Resp: 22 SpO2: 100 % O2 Device: Nasal cannula Oxygen Delivery: 2 LPM Height: 177.8 cm (5' 10\") Weight: 85.4 kg (188 lb 4.4 oz)  Estimated body mass index is 27.01 kg/m  as calculated from the following:    Height as of this encounter: 1.778 m (5' 10\").    Weight as of this encounter: 85.4 kg (188 lb 4.4 oz).    General: Very pleasant male resting comfortably in hospital bed.  Awake, alert, interactive. Wife at bedside,  HEENT: Normocephalic, atraumatic.  PERRL, EOMI.  Conjunctiva clear, sclerae anicteric.  Mucous membranes moist.  Cardiac: Regular rate and rhythm without murmur, gallop, or rub.  No peripheral edema.  Respiratory: Normal work of breathing. NC in place. Clear to auscultation bilaterally without wheezing, rales, or " rhonchi.  Musculoskeletal: Moving all extremities appropriately.  Skin: No rashes or abrasions on exposed skin.  Neurologic: Alert and oriented x4.  Cranial nerves II through XII grossly intact.  Psychologic: Appropriate mood and affect.      Medical Decision Making       55 MINUTES SPENT BY ME on the date of service doing chart review, history, exam, documentation & further activities per the note.      Data   ------------------------- PAST 24 HR DATA REVIEWED -----------------------------------------------    I have personally reviewed the following data over the past 24 hrs:    5.1  \   8.9 (L)   / 135 (L); 135 (L)     146 (H) 110 (H) 38.3 (H) /  108 (H)   4.2; 4.3 22 2.29 (H) \       Imaging results reviewed over the past 24 hrs:   No results found for this or any previous visit (from the past 24 hour(s)).

## 2024-03-19 NOTE — PROVIDER NOTIFICATION
Paged Dr. Rojas via [employer] messaging.  Notified him that patient refusing all PO medications.     FYI Patient repeatedly refusing PO medication. Has AM metoprolol, Tamiflu, and vitamin D3. Has AM metoprolol, Tamiflu, and vitamin D3. Has been refusing cares and becoming agitated. Still continues to disoriented to place, time, and situation.

## 2024-03-20 NOTE — PROGRESS NOTES
Care Management Follow Up    Length of Stay (days): 5    Expected Discharge Date: 03/20/2024     Concerns to be Addressed:  discharge planning     Patient plan of care discussed at interdisciplinary rounds: Yes    Anticipated Discharge Disposition: TCU      Anticipated Discharge Services:  possible transportation  Anticipated Discharge DME:  none    Patient/family educated on Medicare website which has current facility and service quality ratings:  yes  Education Provided on the Discharge Plan: yes   Patient/Family in Agreement with the Plan: yes    Referrals Placed by CM/SW:  TCU      Additional Information:  SW following for discharge planning and need for Transitional Care Unit. SW met with patient and wife regarding Transitional Care Unit recommendation and referral was sent to [facility] only. They have declined. Call placed to patient's wife to update her on decline and needs for more TCU choices. Updated her on plan of care. She realizes that she will not be able to care for patient in his current state and is willing to have additional referrals sent. Referrals were sent to [facility]. Will cont to follow for discharge plan of care.           Cecille Steele RN BSN CM  Inpatient Care Coordination  [facility]  [phone]

## 2024-03-20 NOTE — PLAN OF CARE
Goal Outcome Evaluation:      Plan of Care Reviewed With: patient, spouse    Overall Patient Progress: no changeOverall Patient Progress: no change    Outcome Evaluation: .      Orientation: Self Only  VS: Temp: (!) 102.4  F (39.1  C) Temp src: Axillary BP: (!) 168/70 Pulse: 104   Resp: 22 SpO2: 95 % O2 Device: Nasal cannula Oxygen Delivery: 2 LPM   Resp: 2L NC  Tele: SR w/PVCs  Activity: CL x2  Diet: Regular, Meds whole with apple sauce  : External Cath  GI: Incontinent  Skin: Cornelio extremities, bilateral bruises on arms.  LDAs: Left PIV Saline Locked.  Labs/Protocols: K+ & Mag Protocols    Plan: Eventual discharge home with wife

## 2024-03-20 NOTE — PROGRESS NOTES
"[facility]     Date:  Admission Dx: fever and weakness  Pulmonary History N/A  Home Nebulizer/MDI Use:N/A  Home Oxygen:N/A  Acuity Level (RCAT flow sheet): 3  Aerosol Therapy initiated:Duoneb       Pulmonary Hygiene initiated:N/A      Volume Expansion initiated:IS      Current Oxygen Requirements:2 L NC  Current SpO2:96%    Re-evaluation date:3/23/2024    Patient Education: patient not able to follow commends        See \"RT Assessments\" flow sheet for patient assessment scoring and Acuity Level Details.         "

## 2024-03-20 NOTE — PLAN OF CARE
Goal Outcome Evaluation:      Plan of Care Reviewed With: spouse    Overall Patient Progress: no changeOverall Patient Progress: no change    Outcome Evaluation: Restless & Lethargic at time.  Switchd to Full Liquid Diet due to lethargy.  Added scheduled Nebs.  Critical Procalcitonin: Added Vannco, Zithromax, and Cefepime.      Orientation: Self Only, Very Lethargic, does not follow commands.  VS: Temp: 98.1  F (36.7  C) Temp src: Axillary BP: (!) 147/64 Pulse: 67   Resp: 18 SpO2: 99 % O2 Device: Nasal cannula Oxygen Delivery: 2 LPM   Tele: SR    Activity: CL x2  Diet: Full Liquid, Meds whole with apple sauce  : Incontinent   GI: Incontinent  Skin: Cornelio extremities, bilateral bruises on arms.  Bottom looks good.  LDAs: Left PIV Saline Locked.  Labs/Protocols: K+ & Mag Protocols AM Recheck.     Plan: Eventual discharge to TCU.  Pending improvements in oxygen needs and encephalopathy.       Problem: Gas Exchange Impaired  Goal: Optimal Gas Exchange  Outcome: Not Progressing     Problem: Sepsis/Septic Shock  Goal: Optimal Coping  Outcome: Not Progressing  Goal: Absence of Infection Signs and Symptoms  Outcome: Not Progressing  Goal: Optimal Nutrition Intake  Outcome: Not Progressing     Problem: Adult Inpatient Plan of Care  Goal: Plan of Care Review  Description: The Plan of Care Review/Shift note should be completed every shift.  The Outcome Evaluation is a brief statement about your assessment that the patient is improving, declining, or no change.  This information will be displayed automatically on your shift  note.  Outcome: Progressing  Flowsheets (Taken 3/20/2024 1333)  Outcome Evaluation: Restless & Lethargic at time.  Switchd to Full Liquid Diet due to lethargy.  Added scheduled Nebs.  Plan of Care Reviewed With: spouse  Overall Patient Progress: no change  Goal: Patient-Specific Goal (Individualized)  Description: You can add care plan individualizations to a care plan. Examples of Individualization might  "be:  \"Parent requests to be called daily at 9am for status\", \"I have a hard time hearing out of my right ear\", or \"Do not touch me to wake me up as it startles  me\".  Outcome: Progressing  Goal: Absence of Hospital-Acquired Illness or Injury  Outcome: Progressing  Intervention: Identify and Manage Fall Risk  Recent Flowsheet Documentation  Taken 3/20/2024 0830 by Yonatan Dawson RN  Safety Promotion/Fall Prevention:   safety round/check completed   supervised activity   room near nurse's station  Intervention: Prevent and Manage VTE (Venous Thromboembolism) Risk  Recent Flowsheet Documentation  Taken 3/20/2024 0830 by Yonatan Dawson RN  VTE Prevention/Management: SCDs (sequential compression devices) off  Goal: Optimal Comfort and Wellbeing  Outcome: Progressing  Goal: Readiness for Transition of Care  Outcome: Progressing     Problem: Skin Injury Risk Increased  Goal: Skin Health and Integrity  Outcome: Progressing  Intervention: Plan: Nurse Driven Intervention: Moisture Management  Recent Flowsheet Documentation  Taken 3/20/2024 0830 by Yonatan Dawson RN  Moisture Interventions: Urinary collection device  Intervention: Plan: Nurse Driven Intervention: Friction and Shear  Recent Flowsheet Documentation  Taken 3/20/2024 0830 by Yonatan Dawson RN  Friction/Shear Interventions: HOB 30 degrees or less     Problem: Sepsis/Septic Shock  Goal: Absence of Bleeding  Outcome: Progressing  Goal: Blood Glucose Level Within Targeted Range  Outcome: Progressing     "

## 2024-03-20 NOTE — PLAN OF CARE
Goal Outcome Evaluation:      Plan of Care Reviewed With: patient    Overall Patient Progress: no changeOverall Patient Progress: no change    Outcome Evaluation: Restless. Lethargic at times. Confused. Hard to access orientation, does not answer questions. INC. 2L O2 NC.      Problem: Adult Inpatient Plan of Care  Goal: Plan of Care Review  Description: The Plan of Care Review/Shift note should be completed every shift.  The Outcome Evaluation is a brief statement about your assessment that the patient is improving, declining, or no change.  This information will be displayed automatically on your shift  note.  Outcome: Progressing  Flowsheets (Taken 3/20/2024 0255)  Outcome Evaluation: Restless. Lethargic at times. Confused. Hard to access orientation, does not answer questions. INC. 2L O2 NC.  Plan of Care Reviewed With: patient  Overall Patient Progress: no change  Goal: Absence of Hospital-Acquired Illness or Injury  Intervention: Identify and Manage Fall Risk  Recent Flowsheet Documentation  Taken 3/20/2024 0000 by Chhaya Segovia RN  Safety Promotion/Fall Prevention:   activity supervised   assistive device/personal items within reach   clutter free environment maintained   increased rounding and observation   increase visualization of patient   lighting adjusted   nonskid shoes/slippers when out of bed   room organization consistent   safety round/check completed   toileting scheduled   supervised activity   treat reversible contributory factors   treat underlying cause  Intervention: Prevent Skin Injury  Recent Flowsheet Documentation  Taken 3/20/2024 0200 by Chhaya Segovia RN  Body Position: weight shifting  Taken 3/20/2024 0100 by Chhaya Segovia RN  Body Position: weight shifting  Taken 3/20/2024 0000 by Chhaya Segovia RN  Body Position:   turned   right   left   supine, legs elevated   supine, head elevated  Taken 3/19/2024 2056 by Chhaya Segovia RN  Body Position:   upper extremity elevated   supine, legs elevated   supine, head  elevated   turned  Intervention: Prevent Infection  Recent Flowsheet Documentation  Taken 3/20/2024 0000 by Chhaya Segovia RN  Infection Prevention: single patient room provided     Problem: Skin Injury Risk Increased  Goal: Skin Health and Integrity  Intervention: Plan: Nurse Driven Intervention: Moisture Management  Recent Flowsheet Documentation  Taken 3/20/2024 0200 by Chhaya Segovia RN  Bathing/Skin Care: incontinence care  Taken 3/20/2024 0100 by Chhaya Segovia RN  Bathing/Skin Care: incontinence care  Taken 3/20/2024 0000 by Chhaya Segovia RN  Moisture Interventions:   Urinary collection device   Incontinence pad  Bathing/Skin Care: incontinence care  Intervention: Plan: Nurse Driven Intervention: Friction and Shear  Recent Flowsheet Documentation  Taken 3/20/2024 0000 by Chhaya Segovia RN  Friction/Shear Interventions: HOB 30 degrees or less  Intervention: Optimize Skin Protection  Recent Flowsheet Documentation  Taken 3/20/2024 0200 by Chhaya Segovia RN  Head of Bed (HOB) Positioning: HOB at 30-45 degrees  Taken 3/20/2024 0100 by Chhaya Segovia RN  Head of Bed (HOB) Positioning: HOB at 30-45 degrees  Taken 3/20/2024 0000 by Chhaya Segovia RN  Activity Management: activity adjusted per tolerance  Head of Bed (HOB) Positioning: HOB at 30-45 degrees  Taken 3/19/2024 2056 by Chhaya Segovia RN  Head of Bed (HOB) Positioning: HOB at 30-45 degrees     Problem: Gas Exchange Impaired  Goal: Optimal Gas Exchange  Intervention: Optimize Oxygenation and Ventilation  Recent Flowsheet Documentation  Taken 3/20/2024 0200 by Chhaya Segovia RN  Head of Bed (HOB) Positioning: HOB at 30-45 degrees  Taken 3/20/2024 0100 by Chhaya Segovia RN  Head of Bed (HOB) Positioning: HOB at 30-45 degrees  Taken 3/20/2024 0000 by Chhaya Segovia RN  Head of Bed (HOB) Positioning: HOB at 30-45 degrees  Taken 3/19/2024 2056 by Chhaya Segovia RN  Head of Bed (HOB) Positioning: HOB at 30-45 degrees     Problem: Sepsis/Septic Shock  Goal: Absence of Infection Signs and Symptoms  Intervention: Initiate Sepsis  Management  Recent Flowsheet Documentation  Taken 3/20/2024 0000 by Chhaya Segovia, RN  Infection Prevention: single patient room provided  Isolation Precautions: droplet precautions maintained  Intervention: Promote Recovery  Recent Flowsheet Documentation  Taken 3/20/2024 0000 by Chhaya Segovia, RN  Activity Management: activity adjusted per tolerance

## 2024-03-20 NOTE — PROGRESS NOTES
[facility]    Medicine Progress Note - Hospitalist Service    Date of Admission:  3/15/2024    Assessment & Plan   Augie Powell is a 88 year old male admitted on 3/15/2024. He has a medical history significant for CAD s/p PCI of LAD 2018, HTN, HL, CKD stage IV, RCC s/p right nephrectomy who was admitted on 3/15/2024 with fever, weakness, sore throat and urinary incontinence    In the ER is noted to be positive for influenza.  His troponin is elevated and his EKG shows a left bundle branch block.  TTE completed which was unchanged compared to 2022.  Patient denies chest pain.     His hospitalization has been complicated by severe right hip/thigh pain.  This is reportedly acute on chronic.  Will need to make sure that he is ambulatory and independent prior to return home; though suspect he may require TCU on discharge.     Plan for the day:  -VBG  -Troponin  -BMP, CBC  -Hepatic panel  -Lactic acid  -Repeat CXR  -Bcx  -Fluids    Intermittent Sinus Bradycardia  Sinus rhythm between high 40-60s per bedside RN. ECG without ischemic changes. Checking troponin.TTE from 3/15/2024 without significant findings concerning of ischemia. If still intermittently amado tonight, hold evening metoprolol dose.    Generalized weakness  Acute hypoxemic respiratory failure 2/2 influenza A infection, improving:  Continues to intermittently required 1-2L NC for mild hypoxemia.  CXR on admission shows possible progression of peripheral fibrosis but there was no evidence for infection.    -Continue tamiflu  -Continuous oximetry, wean as able  -PT/OT for generalized weakness and dispo planning; significant amount of difficulty standing at bedside with PT  -Formal oxygen evaluation    Acute on chronic lumbar and right hip pain, improved:  Patient reportedly has chronic lumbar and hip pain.  It seems like he has gotten various injections for this in the past.  On admission, the patient had severe 10/10 pain extending  to the hip.  He was so uncomfortable that he was shaking.  He underwent an lumbar MRI that shows moderate spinal canal stenosis L1-L2 worse on the left, mild spinal canal stenosis L2-L3, moderate narrowing of the lateral recesses at L3-L4, multiforaminal narrowing (worse and severe at L3-L4 bilaterally), and mild modic type 1 degenerative change at L1-L2 and L3-L4 which may contribute to low back pain.   -Will trial PT/OT to see if these symptoms improve  -Robaxin seems to help quite significantly but does make the patient drowsy  -Oxycodone 2.5 mg prn is ordered for severe pain  -Tylenol 1000 mg TID scheduled  -Consider NSG consultation if not improving with the above  -Did have significant difficulty standing at bedside, which I am uncertain if it is due to influenza associated weakness of back pain. Continue to monitor over coming days.    Acute metabolic encephalopathy, worsening  Hypernatremia  Suspect related to infection, hospital delirium, component medications given for pain. Had been waxing and waning but now more somnolent today. Do wonder about CO2 retention. Na is high, as well, which is likely contributing - giving D5W. At baseline is able to hold normal conversations and is not confused.   -Delirium precautions  -No sedating medication  -Anticipate discharge once more steady on feet and independent as he lives home with his wife who is also elderly vs TCU  -PT/OT     Elevated troponin in the setting of a new left bundle branch block:  Most likely type 2 MI related to demand ischemia in the setting of influenza infection.  Patient denies chest pain.  TTE is unchanged from 2022.    -Stop tele     CKD stage IV:  Baseline Cr around 2-2.2 in the past couple of years.   -Avoid nephrotoxins     History of coronary artery disease s/p PCI to his LAD:  -Resume home meds once reconciled     Acute on chronic anemia:  -Monitor  -Can consider outpatient work-up if desired after discussion          Diet: Full Liquid  "Diet    DVT Prophylaxis: Heparin SQ  Cerda Catheter: Not present  Lines: None     Cardiac Monitoring: ACTIVE order. Indication: Tachyarrhythmias, acute (48 hours)  Code Status: Full Code      Clinically Significant Risk Factors         # Hypernatremia: Highest Na = 149 mmol/L in last 2 days, will monitor as appropriate   # Hypercalcemia: Highest Ca = 12.5 mg/dL in last 2 days, will monitor as appropriate    # Hypoalbuminemia: Lowest albumin = 3.1 g/dL at 3/17/2024 10:27 PM, will monitor as appropriate   # Thrombocytopenia: Lowest platelets = 129 in last 2 days, will monitor for bleeding   # Hypertension: Noted on problem list                   Disposition Plan      Expected Discharge Date: 03/22/2024        Discharge Comments: Pending stability off oxygen, improvement in independence as he lives at home with his wife and is a 2 person assist.            Zion Rojas MD  Hospitalist Service  [facility]  Securely message with [employer] (more info)  Text page via [employer] Paging/Directory   ______________________________________________________________________    Interval History   Patient more somnolent today, especially this afternoon. Patient wakes to sternal rub and able to indicate he is cold, but cannot provide much more detail.    Physical Exam   Temp: 98.8  F (37.1  C) Temp src: Axillary BP: (!) 177/63 Pulse: 62   Resp: 20 SpO2: 98 % O2 Device: Nasal cannula Oxygen Delivery: 2 LPM Height: 177.8 cm (5' 10\") Weight: 77.9 kg (171 lb 11.8 oz)  Estimated body mass index is 24.64 kg/m  as calculated from the following:    Height as of this encounter: 1.778 m (5' 10\").    Weight as of this encounter: 77.9 kg (171 lb 11.8 oz).    General: Very pleasant male resting comfortably in hospital bed.  Wakes to sternal rub; minimally interactive. daughter at bedside,  HEENT: Normocephalic, atraumatic.  PERRL, EOMI.  Conjunctiva clear, sclerae anicteric.  Mucous membranes dry  Cardiac: Regular rate and rhythm " without murmur, gallop, or rub.  No peripheral edema.  Respiratory: Increased work of breathing. NC in place. Rhonchorous breath sounds.   Musculoskeletal: Moving all extremities appropriately.  Skin: No rashes or abrasions on exposed skin.  Neurologic: Alert and oriented x1.       Medical Decision Making       55 MINUTES SPENT BY ME on the date of service doing chart review, history, exam, documentation & further activities per the note.      Data   ------------------------- PAST 24 HR DATA REVIEWED -----------------------------------------------    I have personally reviewed the following data over the past 24 hrs:    3.6 (L)  \   11.3 (L)   / 129 (L)     149 (H) 113 (H) 39.8 (H) /  121 (H)   4.0 25 2.37 (H) \     Procal: N/A CRP: N/A Lactic Acid: 1.0         Imaging results reviewed over the past 24 hrs:   No results found for this or any previous visit (from the past 24 hour(s)).

## 2024-03-20 NOTE — PROGRESS NOTES
"[facility] RCAT     Date: 3/20/24  Admission Dx: Weakness  Pulmonary History None  Home Nebulizer/MDI Use: None  Home Oxygen: None  Acuity Level (RCAT flow sheet): 4  Aerosol Therapy initiated: Albuterol neb PRN      Pulmonary Hygiene initiated: NA      Volume Expansion initiated: IS      Current Oxygen Requirements: 2L NC  Current SpO2: 98%    Re-evaluation date: 3/27/24    Patient Education: Completed      See \"RT Assessments\" flow sheet for patient assessment scoring and Acuity Level Details.        Luke Rodriguez, RT on 3/20/2024 at 12:45 AM   "

## 2024-03-20 NOTE — PHARMACY-VANCOMYCIN DOSING SERVICE
Pharmacy Vancomycin Initial Note  Date of Service 2024  Patient's  1935  88 year old, male    Indication: Sepsis    Current estimated CrCl = Estimated Creatinine Clearance: 25.9 mL/min (A) (based on SCr of 2.17 mg/dL (H)).    Creatinine for last 3 days  3/17/2024: 10:27 PM Creatinine 2.24 mg/dL  3/18/2024: 12:56 PM Creatinine 2.26 mg/dL  3/19/2024:  6:28 AM Creatinine 2.29 mg/dL  3/20/2024: 10:28 AM Creatinine 2.37 mg/dL;  4:29 PM Creatinine 2.17 mg/dL    Recent Vancomycin Level(s) for last 3 days  No results found for requested labs within last 3 days.      Vancomycin IV Administrations (past 72 hours)        No vancomycin orders with administrations in past 72 hours.                    Nephrotoxins and other renal medications (From now, onward)      Start     Dose/Rate Route Frequency Ordered Stop    24 1830  vancomycin (VANCOCIN) 750 mg in sodium chloride 0.9 % 250 mL intermittent infusion         750 mg  over 90 Minutes Intravenous EVERY 24 HOURS 24 1805              Contrast Orders - past 72 hours (72h ago, onward)      None            [employer] Prediction of Planned Initial Vancomycin Regimen  Loading dose: N/A  Regimen: 750 mg IV every 24 hours.  Start time: 17:57 on 2024  Exposure target: AUC24 (range)400-600 mg/L.hr   AUC24,ss: 520 mg/L.hr  Probability of AUC24 > 400: 79 %  Ctrough,ss: 18.1 mg/L  Probability of Ctrough,ss > 20: 39 %  Probability of nephrotoxicity (Lodise CHERYLE ): 14 %          Plan:  Start vancomycin  750 mg IV q24h.   Vancomycin monitoring method: AUC  Vancomycin therapeutic monitoring goal: 400-600 mg*h/L  Pharmacy will check vancomycin levels as appropriate in 1-3 Days.    Serum creatinine levels will be ordered a minimum of twice weekly.      Kermit Severson, formerly [facility]

## 2024-03-20 NOTE — PROGRESS NOTES
Antimicrobial Stewardship Team Note    Antimicrobial Stewardship Program - A joint venture between [facility] and [facility] [employer] to optimize antibiotic management.     Patient: Augie Powell  MRN: [id number]  Allergies: Patient has no known allergies.    Brief Summary:  Augie Powell is a 88 year old male with PMH of CAD s/p PCI of LAD 2018, HTN, HL, CKD stage IV, RCC s/p right nephrectomy who was admitted on 3/15/2024 with fever, weakness, sore throat and urinary incontinence and found to have inlfuenza A infection.  He was started on Tamiflu and has intermittently required 1-2L of oxygen via nasal cannula. CXR on admission demonstrated progressive fibrosis.  On the night of 03/17, patient developed fever to 103.5F, rigors and change in mental status.  Zosyn and Zithromax were added for CAP/sepsis.        Active Anti-infective Medications   (From admission, onward)                 Start     Stop    03/17/24 2300  piperacillin-tazobactam  3.375 g,   Intravenous,   EVERY 6 HOURS        Sepsis       --    03/17/24 2300  azithromycin 500 mg  500 mg,   Intravenous,   EVERY 24 HOURS        Community Acquired Pneumonia       --    03/17/24 0900  oseltamivir  30 mg,   Oral,   DAILY        Influenza       03/22/24 0859                Assessment/Recommendations:  Influenza A infection, treatment course considered complete at this point. Stop Tamiflu.    Negative blood cultures and no other positive micro; outside of intermittent fevers, no other signs of infection. Stop Zithromax (day 4 of 500mg dose).  Consider stopping Zosyn.      Actions taken: Paged hospitalist to review recommendations.    Discussed with ID Staff - Dr. Lorie Childers, PharmD BCPS  [phone]        Vital Signs/Clinical Features:  Vitals         03/18 0700  03/19 0659 03/19 0700  03/20 0659 03/20 0700  03/20 1323   Most Recent      Temp ( F) 97.9 -  99.7    97.9 -  102.4    97.6 -  97.7     97.6  (36.4) 03/20 1106    Pulse 65 -  82    52 -  104    66 -  70     66 03/20 1106    Resp 20 -  27      22    20 -  22     20 03/20 1106    /55 -  155/54    160/66 -  187/56    174/70 -  177/74     177/74 03/20 1106    SpO2 (%) 93 -  99    92 -  100    95 -  97     96 03/20 1144            Labs  Estimated Creatinine Clearance: 23.7 mL/min (A) (based on SCr of 2.37 mg/dL (H)).  Recent Labs   Lab Test 03/15/24  0534 03/16/24  0541 03/17/24 2227 03/18/24  1256 03/19/24  0628 03/20/24  1028   CR 2.21* 2.12* 2.24* 2.26* 2.29* 2.37*       Recent Labs   Lab Test 09/09/18  1219 09/28/18 2120 09/29/18  0110 02/11/19  1137 10/07/20  1405 03/15/24  0534 03/16/24  0541 03/17/24 2227 03/18/24  1256 03/19/24  0628 03/20/24  0956   WBC 8.2 10.1   < > 9.1   < > 4.3 4.0 2.4* 5.3 5.1 3.6*   ANEU 5.9 7.2  --  7.2  --   --   --   --   --   --   --    ALYM 1.5 1.9  --  1.0  --   --   --   --   --   --   --    YASMEEN 0.6 0.7  --  0.7  --   --   --   --   --   --   --    AEOS 0.2 0.2  --  0.1  --   --   --   --   --   --   --    HGB 14.3 13.4   < > 13.1*   < > 9.1* 9.7* 10.8* 9.4* 8.9* 11.3*   HCT 41.2 38.4*   < > 39.6*   < > 27.9* 30.0* 32.9* 28.7* 28.8* 33.7*   MCV 92 92   < > 98   < > 101* 101* 99 101* 104* 98    168   < > 242   < > 126* 107* 127* 128* 135*  135* 129*    < > = values in this interval not displayed.       Recent Labs   Lab Test 02/11/19  1137 01/09/20  1524 04/13/21  0000 12/13/22  0156 03/15/24  0153 03/17/24  2227   BILITOTAL 0.6 0.4 0.6 0.6 0.3 0.4   ALKPHOS 73 64 52 48 65 54   ALBUMIN 4.1 3.9 4.3 2.8* 3.7 3.1*   AST 21 18 21 18 24 31   ALT 29 36 19 17 18 19       Recent Labs   Lab Test 03/15/24  0158 03/17/24  2227 03/18/24  1256 03/19/24 2047 03/20/24  1028   LACT 1.0 1.6 0.9 0.9 1.0       Culture Results:  7-Day Micro Results       Procedure Component Value Units Date/Time    Respiratory Aerobic Bacterial Culture with Gram Stain     Order Status: Sent Lab Status: No result     Specimen: Sputum from  Expectorate     Blood Culture Hand, Left [[id number]]  (Normal) Collected: 03/17/24 2246    Order Status: Completed Lab Status: Preliminary result Updated: 03/20/24 0031    Specimen: Blood from Hand, Left      Culture No growth after 2 days    Blood Culture Arm, Left [[id number]]  (Normal) Collected: 03/17/24 2227    Order Status: Completed Lab Status: Preliminary result Updated: 03/20/24 0031    Specimen: Blood from Arm, Left      Culture No growth after 2 days    Urine Culture Aerobic Bacterial [[id number]] Collected: 03/15/24 0212    Order Status: Completed Lab Status: Final result Updated: 03/16/24 0649    Specimen: Urine, Midstream      Culture <10,000 CFU/mL Mixture of Urogenital Linnette    Blood Culture Peripheral Blood [[id number]]  (Normal) Collected: 03/15/24 0153    Order Status: Completed Lab Status: Final result Updated: 03/20/24 0431    Specimen: Peripheral Blood      Culture No Growth    Blood Culture Peripheral Blood [[id number]]  (Normal) Collected: 03/15/24 0153    Order Status: Completed Lab Status: Final result Updated: 03/20/24 0431    Specimen: Peripheral Blood      Culture No Growth    Group A Streptococcus PCR Throat Swab [[id number]]  (Normal) Collected: 03/15/24 0138    Order Status: Completed Lab Status: Final result Updated: 03/15/24 0226    Specimen: Swab from Throat      Group A strep by PCR Not Detected    Narrative:      The Xpert Xpress Strep A test, performed on the [employer], is a rapid, qualitative in vitro diagnostic test for the detection of Streptococcus pyogenes (Group A ß-hemolytic Streptococcus, Strep A) in throat swab specimens from patients with signs and symptoms of pharyngitis. The Xpert Xpress Strep A test can be used as an aid in the diagnosis of Group A Streptococcal pharyngitis. The assay is not intended to monitor treatment for Group A Streptococcus infections. The Xpert Xpress Strep A test utilizes an automated real-time polymerase  chain reaction (PCR) to detect Streptococcus pyogenes DNA.            Recent Labs   Lab Test 09/30/18  1820 02/11/19  1137 01/09/20  1412 03/15/24  0212 03/18/24  0103 03/18/24  1318   URINEPH 6.5 7.0 6.0 6.0 6.0 6.5   NITRITE Negative Negative Negative Negative Negative Negative   LEUKEST Negative Negative Negative Negative Negative Negative   WBCU <1 <1  --  <1 1 1       Imaging: XR Chest Port 1 View    Result Date: 3/17/2024  EXAM: XR CHEST PORT 1 VIEW LOCATION: [facility] DATE: 3/17/2024 INDICATION: Hypoxia, AMS. COMPARISON: 03/15/2024.     IMPRESSION: There are interstitial infiltrates in the mid and lower lungs unchanged. Stable heart size.      Chest XR,  PA & LAT    Result Date: 3/15/2024  EXAM: XR CHEST 2 VIEWS LOCATION: [facility] DATE: 3/15/2024 INDICATION: cough COMPARISON: 12/12/2022     IMPRESSION: Interstitial thickening in the periphery of bilateral lung appears mildly increased, likely progressive fibrosis. No confluent airspace opacities, pleural effusion or pneumothorax. Stable mild cardiomegaly without pulmonary vascular congestion.

## 2024-03-21 NOTE — PLAN OF CARE
"/48 (BP Location: Left arm)   Pulse 77   Temp 97.5  F (36.4  C) (Oral)   Resp 16   Ht 1.778 m (5' 10\")   Wt 80.9 kg (178 lb 5.6 oz)   SpO2 94%   BMI 25.59 kg/m      Neuro: Alert/Oriented to self only  Cardiac: SR 40-50s, On exertion 100s  Lungs: Wheezy Lungs, 2L NC to RA; 95% SpO2  GI: Brief Bowel  : Brief Bladder  Pain: 0/10  IV: New LPIV, pulled two PIVs off  Meds: Aspirin, Heparin, Haldol, azithromycin, cefepime  Labs/tests: K-mg protocol  Diet: Full Liquid Diet  Activity: Bedrest  Plan: Discharge to TCU pending improvement     Currently resting in bed, able to make need known.    Problem: Adult Inpatient Plan of Care  Goal: Plan of Care Review  Description: The Plan of Care Review/Shift note should be completed every shift.  The Outcome Evaluation is a brief statement about your assessment that the patient is improving, declining, or no change.  This information will be displayed automatically on your shift  note.  Outcome: Progressing  Goal: Patient-Specific Goal (Individualized)  Description: You can add care plan individualizations to a care plan. Examples of Individualization might be:  \"Parent requests to be called daily at 9am for status\", \"I have a hard time hearing out of my right ear\", or \"Do not touch me to wake me up as it startles  me\".  Outcome: Progressing  Goal: Absence of Hospital-Acquired Illness or Injury  Outcome: Progressing  Intervention: Identify and Manage Fall Risk  Recent Flowsheet Documentation  Taken 3/20/2024 2036 by Lenka Porter RN  Safety Promotion/Fall Prevention:   activity supervised   clutter free environment maintained   increase visualization of patient   nonskid shoes/slippers when out of bed   room near nurse's station   safety round/check completed  Intervention: Prevent Skin Injury  Recent Flowsheet Documentation  Taken 3/20/2024 2100 by Lenka Porter RN  Body Position: turned  Taken 3/20/2024 2036 by Lenka Porter, RN  Body Position: weight " shifting  Intervention: Prevent and Manage VTE (Venous Thromboembolism) Risk  Recent Flowsheet Documentation  Taken 3/20/2024 2036 by Lenka Porter RN  VTE Prevention/Management: SCDs (sequential compression devices) off  Intervention: Prevent Infection  Recent Flowsheet Documentation  Taken 3/20/2024 2036 by Lenka Porter RN  Infection Prevention: single patient room provided  Goal: Optimal Comfort and Wellbeing  Outcome: Progressing  Intervention: Monitor Pain and Promote Comfort  Recent Flowsheet Documentation  Taken 3/20/2024 2036 by Lenka Porter RN  Pain Management Interventions: medication (see MAR)  Intervention: Provide Person-Centered Care  Recent Flowsheet Documentation  Taken 3/20/2024 2036 by Lenka Porter RN  Trust Relationship/Rapport:   thoughts/feelings acknowledged   care explained  Goal: Readiness for Transition of Care  Outcome: Progressing   Goal Outcome Evaluation:

## 2024-03-21 NOTE — PROGRESS NOTES
[facility]    Medicine Progress Note - Hospitalist Service    Date of Admission:  3/15/2024    Assessment & Plan     Augie Powell is an 88 year old male with history of CAD s/p PCI of LAD 2018, HTN, HL, CKD stage IV, and RCC s/p right nephrectomy. He presented to the ED on 3/15/2024 with fever, weakness, sore throat and urinary incontinence. ED evaluation showed low grade fever, HR in 90s, and need of oxygen. Laboratory evaluation showed sodium 133, BUN 46.6, creatinine 2.2, calcium 10.7, BNP 2409, troponin 89 and 106, hemoglobin 9.9, platelets 134, and positive influenza A test. ECG showed a left bundle branch block.  TTE completed which was unchanged compared to 2022. Patient denied chest pain. He was admitted for further treatment of influenza A. He was treated with Tamiflu. He had acute on chronic low back and right hip pain. He also had significant confusion and lethargy consistent with infectious encephalopathy. He developed fever for which he was started on broad spectrum antibiotics- Zosyn and Zithromax. Zosyn was changed to cefepime and vancomycin was added. Blood and cultures were no growth. He began to improve slowly but was quite weak. PT recommended TCU on discharge. Hospital course was complicated by intermittent bradycardia which seemed to improve without interven tion.      Problem list:    Influenza A  Acute  hypoxic respiratory failure, resovled  Infectious encephalopathy  Physical decondtioning  -Completed Tamiflu  -No other infection found  -Stop cefepime, vancomycin, and zithromax and monitor.   -Anticipate discharge to TCU Saturday if improvement continues     Intermittent sinus bradycardia  -Improved     Acute on chronic lumbar and right hip pain, improved:  -Suspect acute illness is exacerbating chronic low back and right hip pain  -Pain scores are better  -PT/OT appreciated  -Robaxin has helped but does make the patient drowsy  -Continue scheduled  "Tylenol  -Oxycodone 2.5 mg prn is ordered for severe pain     Hypernatremia  -Monitor sodium on am labs     Elevated troponin  Left bundle branch block  -Most likely type 2 MI related to demand ischemia in the setting of influenza infection.  Patient denied chest pain.  TTE is unchanged from 2022.       CKD stage IV:  -Baseline Cr around 2-2.2 in the past couple of years.   -Has been stable here  -Avoid nephrotoxins     History of coronary artery disease s/p PCI to his LAD:  -Continue PTA aspirin and metoprolol     Acute on chronic anemia:  -Monitor          Diet: Full Liquid Diet    DVT Prophylaxis: Heparin SQ  Cerda Catheter: Not present  Lines: None     Cardiac Monitoring: None  Code Status: Full Code      Clinically Significant Risk Factors         # Hypernatremia: Highest Na = 149 mmol/L in last 2 days, will monitor as appropriate   # Hypercalcemia: Highest Ca = 12.5 mg/dL in last 2 days, will monitor as appropriate    # Hypoalbuminemia: Lowest albumin = 3 g/dL at 3/20/2024  5:51 PM, will monitor as appropriate   # Thrombocytopenia: Lowest platelets = 109 in last 2 days, will monitor for bleeding   # Hypertension: Noted on problem list        # Overweight: Estimated body mass index is 25.59 kg/m  as calculated from the following:    Height as of this encounter: 1.778 m (5' 10\").    Weight as of this encounter: 80.9 kg (178 lb 5.6 oz).             Disposition Plan     Expected Discharge Date: 03/22/2024        Discharge Comments: Pending stability off oxygen, improvement in independence as he lives at home with his wife and is a 2 person assist.            Rohan Leroy MD  Hospitalist Service  [facility]  Securely message with [employer] (more info)  Text page via [facility] Paging/Directory   ______________________________________________________________________    Interval History   No new problems. More awake today. Back and right hip pain seem better. Still some confusion.    Physical " Exam   Vital Signs: Temp: 97.8  F (36.6  C) Temp src: Oral BP: (!) 142/52 Pulse: 63   Resp: 20 SpO2: 98 % O2 Device: None (Room air) Oxygen Delivery: 2 LPM  Weight: 178 lbs 5.63 oz    GENERAL:  Comfortable. Cooperative.  PSYCH: pleasant, oriented to person and place, No acute distress.  EYES: PERRLA, Normal conjunctiva.  HEART:  Regular rate and rhythm. No JVD. Pulses normal. No edema.  LUNGS:  Clear to auscultation, normal Respiratory effort.  ABDOMEN:  Soft, no hepatosplenomegaly, normal bowel sounds.  EXTREMETIES: No clubbing, cyanosis or ischemia  SKIN:  Dry to touch, No rash.      Medical Decision Making       45 MINUTES SPENT BY ME on the date of service doing chart review, history, exam, documentation & further activities per the note.      Data     I have personally reviewed the following data over the past 24 hrs:    2.2 (L)  \   12.5 (L)   / 109 (L)     146 (H) 112 (H) 38.9 (H) /  210 (H)   3.5 22 2.10 (H) \     ALT: 38 AST: 46 (H) AP: 55 TBILI: 0.4   ALB: 3.0 (L) TOT PROTEIN: 8.4 (H) LIPASE: N/A     Trop: 75 (H) BNP: N/A     Procal: N/A CRP: N/A Lactic Acid: 1.4         Imaging results reviewed over the past 24 hrs:   Recent Results (from the past 24 hour(s))   XR Chest Port 1 View    Narrative    EXAM: XR CHEST PORT 1 VIEW  LOCATION: [facility]  DATE: 3/20/2024    INDICATION: increased somnolence  COMPARISON: 03/17/2024      Impression    IMPRESSION: Stable cardiomediastinal silhouette. Bilateral infiltrates with slight increase in the lung bases. No significant effusion. Degenerative change osseous structures.     Recent Labs   Lab 03/21/24  0610 03/20/24  1751 03/20/24  1629 03/20/24  1028 03/20/24  0956 03/19/24  0628   WBC  --   --  2.2*  --  3.6* 5.1   HGB  --   --  12.5*  --  11.3* 8.9*   MCV  --   --  99  --  98 104*   PLT  --   --  109*  --  129* 135*  135*   NA  --   --  146* 149*  --  146*   POTASSIUM 3.5  --  4.4 4.0  --  4.3  4.2   CHLORIDE  --   --  112* 113*  --   110*   CO2  --   --  22 25  --  22   BUN  --   --  38.9* 39.8*  --  38.3*   CR 2.10*  --  2.17* 2.37*  --  2.29*   ANIONGAP  --   --  12 11  --  14   RICCARDO  --   --  12.2* 12.5*  --  11.6*   GLC  --   --  210* 121*  --  108*   ALBUMIN  --  3.0* 3.0*  --   --   --    PROTTOTAL  --  8.4* 8.5*  --   --   --    BILITOTAL  --  0.4 0.5  --   --   --    ALKPHOS  --  55 57  --   --   --    ALT  --  38 40  --   --   --    AST  --  46* 50*  --   --   --

## 2024-03-22 NOTE — PROGRESS NOTES
[facility]    Medicine Progress Note - Hospitalist Service    Date of Admission:  3/15/2024    Assessment & Plan   Augie Powell is an 88 year old male with history of CAD s/p PCI of LAD 2018, HTN, HL, CKD stage IV, and RCC s/p right nephrectomy. He presented to the ED on 3/15/2024 with fever, weakness, sore throat and urinary incontinence. ED evaluation showed low grade fever, HR in 90s, and need of oxygen. Laboratory evaluation showed sodium 133, BUN 46.6, creatinine 2.2, calcium 10.7, BNP 2409, troponin 89 and 106, hemoglobin 9.9, platelets 134, and positive influenza A test. ECG showed a left bundle branch block.  TTE completed which was unchanged compared to 2022. Patient denied chest pain. He was admitted for further treatment of influenza A. He was treated with Tamiflu. He had acute on chronic low back and right hip pain. He also had significant confusion and lethargy consistent with infectious encephalopathy. He developed fever for which he was started on broad spectrum antibiotics- Zosyn and Zithromax. Zosyn was changed to cefepime and vancomycin was added. Blood and cultures were no growth. He began to improve slowly but was quite weak. PT recommended TCU on discharge. Hospital course was complicated by intermittent bradycardia.  Prior to admission metoprolol was ultimately discontinued.  Patient continued to have intermittent agitation and somnolence.     Problem list:    Influenza A  Acute  hypoxic respiratory failure, resovled  Infectious encephalopathy  Physical decondtioning  -Completed Tamiflu  -No other infection found  -Stopped cefepime, vancomycin, and zithromax on 3/21/2024  -Infectious encephalopathy persist.  Patient had some increased agitation last night for which Seroquel 25 mg was given at about 1 this morning.  Patient has been intermittently sleepy during the day.  -Schedule Seroquel 12.5 mg at at bedtime.  -I also ordered Seroquel 12.5 mg at at bedtime as  needed for agitation  -There is a dose of IV Haldol available if Seroquel is ineffective..     Intermittent sinus bradycardia  -This has persisted.  I stopped prior to admission metoprolol.    Hypertension  -I stopped prior to admission metoprolol on 3/22/2024.  I ordered Norvasc 5 mg daily.  Adjust as needed.     Acute on chronic lumbar and right hip pain, improved:  -Suspect acute illness is exacerbating chronic low back and right hip pain  -Pain scores are better  -PT/OT appreciated  -Robaxin helped but does make the patient drowsy  -Continue scheduled Tylenol  -Oxycodone 2.5 mg prn is ordered for severe pain     Hypernatremia  -Monitor sodium on am labs     Elevated troponin  Left bundle branch block  -Most likely type 2 MI related to demand ischemia in the setting of influenza infection.  Patient denied chest pain.  TTE is unchanged from 2022.       CKD stage IV:  -Baseline Cr around 2-2.2 in the past couple of years.   -Has been stable here  -Avoid nephrotoxins     History of coronary artery disease s/p PCI to his LAD:  -Continue PTA aspirin and metoprolol     Acute on chronic anemia:  -Monitor    Disposition  -Accepted by TCU.  Anticipate it will be a couple more days until his encephalopathy allows him to discharge to TCU.        Diet: Full Liquid Diet  Snacks/Supplements Adult: Nepro Oral Supplement; With Meals    DVT Prophylaxis: Heparin SQ  Cerda Catheter: Not present  Lines: None     Cardiac Monitoring: ACTIVE order. Indication: Bradycardias (48 hours)  Code Status: Full Code      Clinically Significant Risk Factors        # Hypokalemia: Lowest K = 3 mmol/L in last 2 days, will replace as needed    # Hypercalcemia: Highest Ca = 10.3 mg/dL in last 2 days, will monitor as appropriate    # Hypoalbuminemia: Lowest albumin = 3 g/dL at 3/20/2024  5:51 PM, will monitor as appropriate     # Hypertension: Noted on problem list        # Overweight: Estimated body mass index is 25.59 kg/m  as calculated from the  "following:    Height as of this encounter: 1.778 m (5' 10\").    Weight as of this encounter: 80.9 kg (178 lb 5.6 oz).             Disposition Plan      Expected Discharge Date: 03/23/2024        Discharge Comments: Pending stability off oxygen, improvement in independence as he lives at home with his wife and is a 2 person assist.            Rohan Leroy MD  Hospitalist Service  [facility]  Securely message with [employer] (more info)  Text page via [employer] Paging/Directory   ______________________________________________________________________    Interval History   Had some agitation last night.  He got a dose of Seroquel.  He has been intermittently sleepy and hard to arouse today.    Physical Exam   Vital Signs: Temp: 98.7  F (37.1  C) Temp src: Axillary BP: (!) 157/59 Pulse: 63   Resp: 22 SpO2: 98 % O2 Device: None (Room air)    Weight: 178 lbs 5.63 oz    GENERAL:  Comfortable.  Intermittently sleepy.  PSYCH: pleasant, oriented, No acute distress.  EYES: PERRLA, Normal conjunctiva.  HEART:  Regular rate and rhythm. No JVD. Pulses normal. No edema.  LUNGS:  Clear to auscultation, normal Respiratory effort.  ABDOMEN:  Soft, no hepatosplenomegaly, normal bowel sounds.  EXTREMETIES: No clubbing, cyanosis or ischemia  SKIN:  Dry to touch, No rash.      Medical Decision Making       40 MINUTES SPENT BY ME on the date of service doing chart review, history, exam, documentation & further activities per the note.      Data     I have personally reviewed the following data over the past 24 hrs:    N/A  \   N/A   / 173     139 106 38.1 (H) /  133 (H)   4.0 19 (L) 1.83 (H); 1.84 (H) \       Imaging results reviewed over the past 24 hrs:   No results found for this or any previous visit (from the past 24 hour(s)).  Recent Labs   Lab 03/22/24  1535 03/22/24  0841 03/21/24  0610 03/20/24  1751 03/20/24  1629 03/20/24  1028 03/20/24  0956 03/19/24  0628   WBC  --   --   --   --  2.2*  --  3.6* 5.1   HGB  " --   --   --   --  12.5*  --  11.3* 8.9*   MCV  --   --   --   --  99  --  98 104*   PLT  --  173  --   --  109*  --  129* 135*  135*   NA  --  139  --   --  146* 149*  --  146*   POTASSIUM 4.0 3.0*  3.1* 3.5  --  4.4 4.0  --  4.3  4.2   CHLORIDE  --  106  --   --  112* 113*  --  110*   CO2  --  19*  --   --  22 25  --  22   BUN  --  38.1*  --   --  38.9* 39.8*  --  38.3*   CR  --  1.84*  1.83* 2.10*  --  2.17* 2.37*  --  2.29*   ANIONGAP  --  14  --   --  12 11  --  14   RICCARDO  --  10.3*  --   --  12.2* 12.5*  --  11.6*   GLC  --  133*  --   --  210* 121*  --  108*   ALBUMIN  --   --   --  3.0* 3.0*  --   --   --    PROTTOTAL  --   --   --  8.4* 8.5*  --   --   --    BILITOTAL  --   --   --  0.4 0.5  --   --   --    ALKPHOS  --   --   --  55 57  --   --   --    ALT  --   --   --  38 40  --   --   --    AST  --   --   --  46* 50*  --   --   --

## 2024-03-22 NOTE — PROVIDER NOTIFICATION
0846: Paged MD clarifying if tele is needed. MD stated tele is not needed - ok to remove.     0911: Paged MD notifying that HR has been below 50 on vitals machine, gone off 3x in an hour, clarifying if it is okay to removed tele. Tele order placed.     1101: Pagekiera REYNOLDS notifying that patient is extremely hard to wake out of sleep, requesting labs to be drawn. BMP ordered.    1403: Pagekiera REYNOLDS an FYI that patient is much easier to wake now. MD aware.

## 2024-03-22 NOTE — PROGRESS NOTES
Paged Dr Ruth as pt has increasing agitation and confusion. He broke IV tubing and has been attempting to crawl out of bed. No PRN's or scheduled meds for agitation/acute confusion. Pt has been A&O to self since 3/17. Sitter will be available in the hour.     Awaiting orders/recommendations.

## 2024-03-22 NOTE — PLAN OF CARE
"  Problem: Adult Inpatient Plan of Care  Goal: Plan of Care Review  Description: The Plan of Care Review/Shift note should be completed every shift.  The Outcome Evaluation is a brief statement about your assessment that the patient is improving, declining, or no change.  This information will be displayed automatically on your shift  note.  Outcome: Progressing  Flowsheets (Taken 3/22/2024 1840)  Outcome Evaluation: Pt more alert after being up in the chair, on tele. SR Daughter and wife updated today.  Plan of Care Reviewed With:   child   spouse  Overall Patient Progress: no change  Goal: Patient-Specific Goal (Individualized)  Description: You can add care plan individualizations to a care plan. Examples of Individualization might be:  \"Parent requests to be called daily at 9am for status\", \"I have a hard time hearing out of my right ear\", or \"Do not touch me to wake me up as it startles  me\".  Outcome: Progressing  Goal: Absence of Hospital-Acquired Illness or Injury  Outcome: Progressing  Intervention: Prevent Skin Injury  Recent Flowsheet Documentation  Taken 3/22/2024 1700 by Nisa Wood RN  Body Position:   legs elevated   side-lying 90 degrees  Goal: Optimal Comfort and Wellbeing  Outcome: Progressing  Goal: Readiness for Transition of Care  Outcome: Progressing   Goal Outcome Evaluation:      Plan of Care Reviewed With: child, spouse    Overall Patient Progress: no changeOverall Patient Progress: no change    Outcome Evaluation: Pt more alert after being up in the chair, on tele. SR Daughter and wife updated today.      "

## 2024-03-22 NOTE — PROGRESS NOTES
CLINICAL NUTRITION SERVICES  -  ASSESSMENT NOTE    RECOMMENDATIONS FOR MD/PROVIDER TO ORDER:   - Advance diet as medically appropriate     Recommendations Ordered by Registered Dietitian (RD):   - Will send nepro for pt to trial given poor intake and limited options r/t FLD     MALNUTRITION:  % Weight Loss:  Weight loss does not meet criteria for malnutrition -- noted wt has gone down over the past year  % Intake:  <75% for > 7 days   Subcutaneous Fat Loss:  unable to assess at this time  Muscle Loss:  unable to assess at this time  Fluid Retention:  None noted    Malnutrition Diagnosis: unable to assess at this time d/t lack of NFPE and wt history. Will complete at appropriate time.         REASON FOR ASSESSMENT  Augie Powell is a 88 year old male seen by Registered Dietitian for LOS.     Augie Powell admitted for fever, weakness, sore throat and urinary incontinence.   -Treated influenza with Tamiflu. Started to improve but has become quite weak, and hospital course has been complicated by intermittent bradycardia.     PMH:  CAD s/p PCI of LAD 2018, HTN, HL, CKD stage IV, and RCC s/p right nephrectomy     - noted increased confusion/agitation over the night (3/21)  - plan to discharge to TCU pending improvement     NUTRITION HISTORY  Information obtained from chart review. Unable to reach pt/family or nurse on 2 attempts. Will complete history when able.   - lives at home with wife  - RDN note from 2018 for heart healthy diet education     CURRENT NUTRITION ORDERS  Diet Order: Full liquid   - provider noted full liquid diet d/t lethargy (started 3/20)  - was previously regular diet     Current Intake/Tolerance:   - Per [facility]: orders 2-3 meals/day   - Per Flowsheet: 25-75% intakes noted   - Per pt report: unable to assess, pt busy at 2x attempts    Food Allergies/Intolerances: NKFA    Labs: reviewed; Pot 3.0 (L), glucose 210    Medications: reviewed; miralax, prednisone, vitamin D3, D5 infusion  "    Skin: no edema or wounds noted     I/Os: last BM noted 3/21      ANTHROPOMETRICS  Height: 5' 10\"  Weight: 178 lbs 5.63 oz  Body mass index is 25.59 kg/m .  IBW: 166 lbs  %IBW: 107%  Weight History:   Wt Readings from Last 10 Encounters:   03/21/24 80.9 kg (178 lb 5.6 oz)   06/12/23 84.4 kg (186 lb)   02/21/23 85.3 kg (188 lb)   05/31/22 82.9 kg (182 lb 11.2 oz)   04/05/22 85.7 kg (189 lb)   04/22/21 86.2 kg (190 lb)   10/07/20 84.9 kg (187 lb 1.6 oz)   09/21/20 83.9 kg (185 lb)   02/19/20 81.2 kg (179 lb)   01/30/20 81.3 kg (179 lb 4.8 oz)   - limited wt history per chart and CE review. Pt appears to have UBW around 180-190 lbs. From weight in June 2023, pt is down 4.3%.       ASSESSED NUTRITION NEEDS PER APPROVED PRACTICE GUIDELINES:  Dosing Weight 80.9 kg (3/21 bed scale wt)   Estimated Energy Needs: 9102-9418 kcals (25-30 Kcal/Kg)  Justification: maintenance and CKD4  Estimated Protein Needs: 48-65 grams protein (0.6-0.8 g pro/Kg)  Justification: maintenance and CKD4  Estimated Fluid Needs: 1 mL/Kcal  Justification: maintenance OR per provider pending fluid status    NUTRITION DIAGNOSIS:  Predicted suboptimal nutrient intake related to acute illness and confusion as evidenced by 4.3% wt loss over the past 10 months, documentation of intake during admission.       NUTRITION INTERVENTIONS  Recommendations / Nutrition Prescription  See above.       Implementation  Nutrition education: Not appropriate at this time due to patient condition  Medical Food Supplement and Collaboration of Nutrition care      Nutrition Goals  Consume % of meals and/or supplements TID  Advance diet as medically appropriate      MONITORING AND EVALUATION:  Progress towards goals will be monitored and evaluated per protocol and Practice Guidelines      Stella Jensen, MS, RD, LD  Clinical Dietitian  3rd floor/ICU: [phone]  All other floors: [phone]  Weekend/holiday: [phone]  Office: [phone]          "

## 2024-03-22 NOTE — PROGRESS NOTES
Care Management Follow Up    Length of Stay (days): 7    Expected Discharge Date: 03/23/2024     Concerns to be Addressed:     Disposition    Patient plan of care discussed at interdisciplinary rounds: Yes    Anticipated Discharge Disposition:  TCU accepted at [facility]     Anticipated Discharge Services:  TCU  Anticipated Discharge DME:  TCU will provide    Patient/family educated on Medicare website which has current facility and service quality ratings:  yes  Education Provided on the Discharge Plan:  yes with wife  Patient/Family in Agreement with the Plan: yes    Referrals Placed by CM/SW:  yes accepted at [facility]  Private pay costs discussed: private room/amenity fees and transportation costs    Additional Information:  Patient has been accepted at [facility] they do not do weekend admissions. They are able to take patient but not until medically stable and off PRNs (Haldol ) IV.    Spoke with patient's wife, Khushboo she is comfortable with this placement but she understands that he can not go until medically stable.    Plan: is to transfer patient when medically stable.    AUGUSTO Hamilton   Inpatient Care Coordination   Supervisor  [facility]  [phone]          AUGUSTO Godfrey

## 2024-03-22 NOTE — PLAN OF CARE
Goal Outcome Evaluation:      Plan of Care Reviewed With: patient    Overall Patient Progress: declining    Outcome Evaluation: VSS. No evidence of skin breakdown or sepsis. Pt had increased confusion/agitation and attempts to get out of bed overnight. Pt managed to tear IV tubing leading to bleeding all over bedding. see progress note. 1:1 obtained for safety. Pt remained awake much of the night. Pt has pain in abd and in back Tylenol did not work, aqua K pad tried and repositioning tried.      Problem: Adult Inpatient Plan of Care  Goal: Plan of Care Review  Description: The Plan of Care Review/Shift note should be completed every shift.  The Outcome Evaluation is a brief statement about your assessment that the patient is improving, declining, or no change.  This information will be displayed automatically on your shift  note.  Outcome: Not Progressing  Flowsheets (Taken 3/22/2024 0533)  Outcome Evaluation: VSS. No evidence of skin breakdown or sepsis. Pt had increased confusion/agitation and attempts to get out of bed overnight. Pt managed to tear IV tubing leading to bleeding all over bedding. see progress note. 1:1 obtained for safety. Pt remained awake much of the night. Pt has pain in abd and in back Tylenol did not work, aqua K pad tried and repositioning tried.  Plan of Care Reviewed With: patient  Overall Patient Progress: declining  Goal: Optimal Comfort and Wellbeing  Outcome: Not Progressing  Intervention: Monitor Pain and Promote Comfort  Recent Flowsheet Documentation  Taken 3/22/2024 0145 by Meredith Ontiveros RN  Pain Management Interventions:   distraction   rest   repositioned  Taken 3/22/2024 0058 by Meredith Ontiveros RN  Pain Management Interventions: medication (see MAR)  Taken 3/22/2024 0030 by Meredith Ontiveros RN  Pain Management Interventions: medication (see MAR)  Intervention: Provide Person-Centered Care  Recent Flowsheet Documentation  Taken 3/22/2024 0030 by Meredith Ontiveros RN  Trust  "Relationship/Rapport:   care explained   reassurance provided     Problem: Sepsis/Septic Shock  Goal: Optimal Coping  Outcome: Not Progressing  Intervention: Optimize Psychosocial Adjustment to Illness  Recent Flowsheet Documentation  Taken 3/22/2024 0030 by Meredith Ontiveros RN  Supportive Measures: positive reinforcement provided  Goal: Optimal Nutrition Intake  Outcome: Not Progressing     Problem: Delirium  Goal: Optimal Coping  Outcome: Not Progressing  Intervention: Optimize Psychosocial Adjustment to Delirium  Recent Flowsheet Documentation  Taken 3/22/2024 0030 by Meredith Ontiveros RN  Supportive Measures: positive reinforcement provided  Goal: Improved Behavioral Control  Outcome: Not Progressing  Intervention: Minimize Safety Risk  Recent Flowsheet Documentation  Taken 3/22/2024 0453 by Meredith Ontiveros RN  Enhanced Safety Measures:  at bedside  Taken 3/22/2024 0151 by Meredith Ontiveros RN  Enhanced Safety Measures:  at bedside  Taken 3/22/2024 0030 by Meredith Ontiveros RN  Enhanced Safety Measures:   room near unit station   review medications for side effects with activity  Trust Relationship/Rapport:   care explained   reassurance provided  Goal: Improved Attention and Thought Clarity  Outcome: Not Progressing  Goal: Improved Sleep  Outcome: Not Progressing     Problem: Adult Inpatient Plan of Care  Goal: Patient-Specific Goal (Individualized)  Description: You can add care plan individualizations to a care plan. Examples of Individualization might be:  \"Parent requests to be called daily at 9am for status\", \"I have a hard time hearing out of my right ear\", or \"Do not touch me to wake me up as it startles  me\".  Outcome: Progressing  Goal: Absence of Hospital-Acquired Illness or Injury  Outcome: Progressing  Intervention: Identify and Manage Fall Risk  Recent Flowsheet Documentation  Taken 3/22/2024 0030 by Meredith Ontiveros RN  Safety Promotion/Fall Prevention:   safety " round/check completed   room near nurse's station   nonskid shoes/slippers when out of bed   lighting adjusted  Intervention: Prevent Skin Injury  Recent Flowsheet Documentation  Taken 3/22/2024 0030 by Meredith Ontiveros RN  Body Position: supine  Device Skin Pressure Protection: absorbent pad utilized/changed  Intervention: Prevent and Manage VTE (Venous Thromboembolism) Risk  Recent Flowsheet Documentation  Taken 3/22/2024 0030 by Meredith Ontiveros RN  VTE Prevention/Management: SCDs (sequential compression devices) off  Intervention: Prevent Infection  Recent Flowsheet Documentation  Taken 3/22/2024 0030 by Meredith Ontiveros RN  Infection Prevention:   single patient room provided   rest/sleep promoted   hand hygiene promoted  Goal: Readiness for Transition of Care  Outcome: Progressing     Problem: Skin Injury Risk Increased  Goal: Skin Health and Integrity  Outcome: Progressing  Intervention: Plan: Nurse Driven Intervention: Moisture Management  Recent Flowsheet Documentation  Taken 3/22/2024 0030 by Meredith Ontiveros RN  Moisture Interventions:   Encourage regular toileting   Incontinence pad   Skin sealant (Cavilon Advanced)  Products: Skin Paste (barrier cream)  Frequency of Application: with each episode of incontinence  Plan: Moisture Management: standard incontinence management  Intervention: Plan: Nurse Driven Intervention: Friction and Shear  Recent Flowsheet Documentation  Taken 3/22/2024 0030 by Meredith Ontiveros RN  Friction/Shear Interventions: HOB 30 degrees or less  Intervention: Optimize Skin Protection  Recent Flowsheet Documentation  Taken 3/22/2024 0030 by Meredith Ontiveros RN  Pressure Reduction Techniques:   frequent weight shift encouraged   weight shift assistance provided  Pressure Reduction Devices: positioning supports utilized  Activity Management: activity adjusted per tolerance  Head of Bed (HOB) Positioning: HOB at 20-30 degrees     Problem: Sepsis/Septic Shock  Goal: Absence of  Infection Signs and Symptoms  Outcome: Progressing  Intervention: Initiate Sepsis Management  Recent Flowsheet Documentation  Taken 3/22/2024 0030 by Meredith Ontiveros RN  Infection Prevention:   single patient room provided   rest/sleep promoted   hand hygiene promoted  Isolation Precautions: droplet precautions maintained  Intervention: Promote Stabilization  Recent Flowsheet Documentation  Taken 3/22/2024 0030 by Meredith Ontiveros RN  Fluid/Electrolyte Management: fluids provided  Intervention: Promote Recovery  Recent Flowsheet Documentation  Taken 3/22/2024 0030 by Meredith Ontiveros RN  Activity Management: activity adjusted per tolerance     Problem: Fall Injury Risk  Goal: Absence of Fall and Fall-Related Injury  Outcome: Progressing  Intervention: Identify and Manage Contributors  Recent Flowsheet Documentation  Taken 3/22/2024 0030 by Meredith Ontiveros RN  Medication Review/Management:   medications reviewed   high-risk medications identified  Intervention: Promote Injury-Free Environment  Recent Flowsheet Documentation  Taken 3/22/2024 0030 by Meredith Ontiveros RN  Safety Promotion/Fall Prevention:   safety round/check completed   room near nurse's station   nonskid shoes/slippers when out of bed   lighting adjusted     Problem: Sepsis/Septic Shock  Goal: Blood Glucose Level Within Targeted Range  Outcome: Met     Problem: Gas Exchange Impaired  Goal: Optimal Gas Exchange  Outcome: Adequate for Care Transition  Intervention: Optimize Oxygenation and Ventilation  Recent Flowsheet Documentation  Taken 3/22/2024 0030 by Meredith Ontiveros RN  Head of Bed (HOB) Positioning: HOB at 20-30 degrees     Problem: Sepsis/Septic Shock  Goal: Absence of Bleeding  Outcome: Adequate for Care Transition  Intervention: Monitor and Manage Bleeding  Recent Flowsheet Documentation  Taken 3/22/2024 0030 by Meredith Ontiveros RN  Bleeding Precautions: monitored for signs of bleeding

## 2024-03-22 NOTE — PROGRESS NOTES
Increasing agitation throughout the night patient with increasing agitation and confusion throughout the night.  He broke the IV tubing, but the IV was not damaged.  Continues to try to get out of bed and was not redirectable.  Very high fall risk.  Received 2 mg IV Haldol last night for similar.  -Ordered to 25 mg oral quetiapine as needed for agitation.  If he will not take quetiapine then will give 2 mg IV Haldol

## 2024-03-22 NOTE — PLAN OF CARE
"Goal Outcome Evaluation:      Plan of Care Reviewed With: patient, spouse    Overall Patient Progress: improvingOverall Patient Progress: improving    Outcome Evaluation: Tamiflu completed. Patient less lethargic today. Fully oriented to self, can make needs known.  No agitation.  Labs negative for active infection, IV Abx discontinued.  Intermittent right leg & hip pain controlled with PRN tylenol.    Patient more alert today. Oriented to self and can make needs known.  Tamiflu completed, no longer requiring oxygen.  A2 Christiane Steady.  Left PIV w/ D5 @ 100 ml/hr. Full Liquid diet, meds whole with apple sauce. Discharge once independence improves.      Problem: Adult Inpatient Plan of Care  Goal: Readiness for Transition of Care  Outcome: Not Progressing     Problem: Adult Inpatient Plan of Care  Goal: Plan of Care Review  Description: The Plan of Care Review/Shift note should be completed every shift.  The Outcome Evaluation is a brief statement about your assessment that the patient is improving, declining, or no change.  This information will be displayed automatically on your shift  note.  Outcome: Progressing  Flowsheets (Taken 3/21/2024 2699)  Outcome Evaluation: Tamiflu completed. Patient less lethargic today. Fully oriented to self, can make needs known.  No agitation.  Labs negative for active infection, IV Abx discontinued.  Intermittent right leg & hip pain controlled with PRN tylenol.  Plan of Care Reviewed With:   patient   spouse  Overall Patient Progress: improving  Goal: Patient-Specific Goal (Individualized)  Description: You can add care plan individualizations to a care plan. Examples of Individualization might be:  \"Parent requests to be called daily at 9am for status\", \"I have a hard time hearing out of my right ear\", or \"Do not touch me to wake me up as it startles  me\".  Outcome: Progressing  Goal: Absence of Hospital-Acquired Illness or Injury  Outcome: Progressing  Intervention: Identify and " Manage Fall Risk  Recent Flowsheet Documentation  Taken 3/21/2024 1013 by Yonatan Dawson RN  Safety Promotion/Fall Prevention: safety round/check completed  Intervention: Prevent and Manage VTE (Venous Thromboembolism) Risk  Recent Flowsheet Documentation  Taken 3/21/2024 1013 by Yonatan Dawson RN  VTE Prevention/Management: SCDs (sequential compression devices) off  Goal: Optimal Comfort and Wellbeing  Outcome: Progressing  Intervention: Monitor Pain and Promote Comfort  Recent Flowsheet Documentation  Taken 3/21/2024 0857 by Yonatan Dawson RN  Pain Management Interventions: medication (see MAR)     Problem: Skin Injury Risk Increased  Goal: Skin Health and Integrity  Outcome: Progressing  Intervention: Plan: Nurse Driven Intervention: Moisture Management  Recent Flowsheet Documentation  Taken 3/21/2024 1013 by Yonatan Dawson RN  Moisture Interventions: Urinary collection device  Intervention: Plan: Nurse Driven Intervention: Friction and Shear  Recent Flowsheet Documentation  Taken 3/21/2024 1013 by Yonatan Dawson RN  Friction/Shear Interventions: HOB 30 degrees or less     Problem: Gas Exchange Impaired  Goal: Optimal Gas Exchange  Outcome: Progressing     Problem: Sepsis/Septic Shock  Goal: Optimal Coping  Outcome: Progressing  Goal: Absence of Bleeding  Outcome: Progressing  Goal: Blood Glucose Level Within Targeted Range  Outcome: Progressing  Goal: Absence of Infection Signs and Symptoms  Outcome: Progressing  Goal: Optimal Nutrition Intake  Outcome: Progressing

## 2024-03-22 NOTE — PLAN OF CARE
"Goal Outcome Evaluation:      Plan of Care Reviewed With: patient    Overall Patient Progress: no changeOverall Patient Progress: no change    Outcome Evaluation: Confusion present. Hard to wake out of sleep, MD aware. Tele obtained as HR drops below 50 at times.    Patient calm but very lethargic. VS, BP higher 150's-170's and HR has dropped several times below 50, MD paged and aware. Metoprolol discontinued and administered Amlodipine. Tele is SR. Denies chest pain. Maintaining O2 sats >90% on RA. Denies SOB. Patient c/o pain in back and right leg 7/10. Tylenol given and pain improved to 2/10. Patient is alert to self only. Incontinent of bowel bladder, bowel movement today. Incontinence pad placed, but no brief in bed. Left PIV CDI with D5 running at 100mL/hr. K replaced, redraw at 1539 and Mg replaced IV with recheck tomorrow AM. Continuing full liquid diet. Accepted at [facility] and awaiting on transfer when medically stable. Social work following.       Problem: Adult Inpatient Plan of Care  Goal: Plan of Care Review  Description: The Plan of Care Review/Shift note should be completed every shift.  The Outcome Evaluation is a brief statement about your assessment that the patient is improving, declining, or no change.  This information will be displayed automatically on your shift  note.  Outcome: Progressing  Flowsheets (Taken 3/22/2024 1435)  Outcome Evaluation: Confusion present. Hard to wake out of sleep, MD aware. Tele obtained as HR drops below 50 at times.  Plan of Care Reviewed With: patient  Overall Patient Progress: no change  Goal: Patient-Specific Goal (Individualized)  Description: You can add care plan individualizations to a care plan. Examples of Individualization might be:  \"Parent requests to be called daily at 9am for status\", \"I have a hard time hearing out of my right ear\", or \"Do not touch me to wake me up as it startles  me\".  Outcome: Progressing  Goal: Absence of " Hospital-Acquired Illness or Injury  Outcome: Progressing  Intervention: Identify and Manage Fall Risk  Recent Flowsheet Documentation  Taken 3/22/2024 0915 by Agnes Jennings RN  Safety Promotion/Fall Prevention:   activity supervised   assistive device/personal items within reach   bedside attendant   clutter free environment maintained   increased rounding and observation   increase visualization of patient   lighting adjusted   nonskid shoes/slippers when out of bed   patient and family education   room near nurse's station   room organization consistent   safety round/check completed  Taken 3/22/2024 0733 by Agnes Jennings RN  Safety Promotion/Fall Prevention: safety round/check completed  Intervention: Prevent Skin Injury  Recent Flowsheet Documentation  Taken 3/22/2024 0915 by Agnes Jennings RN  Body Position: position changed independently  Intervention: Prevent and Manage VTE (Venous Thromboembolism) Risk  Recent Flowsheet Documentation  Taken 3/22/2024 0915 by Agnes Jennings RN  VTE Prevention/Management: (Heparin injection administered) SCDs (sequential compression devices) off  Intervention: Prevent Infection  Recent Flowsheet Documentation  Taken 3/22/2024 0915 by Agnes Jennings RN  Infection Prevention:   equipment surfaces disinfected   hand hygiene promoted   rest/sleep promoted   single patient room provided  Taken 3/22/2024 0733 by Agnes Jennings RN  Infection Prevention:   equipment surfaces disinfected   hand hygiene promoted   rest/sleep promoted   single patient room provided  Goal: Optimal Comfort and Wellbeing  Outcome: Progressing  Intervention: Monitor Pain and Promote Comfort  Recent Flowsheet Documentation  Taken 3/22/2024 0915 by Agnes Jennings RN  Pain Management Interventions:   medication (see MAR)   heat applied  Intervention: Provide Person-Centered Care  Recent Flowsheet Documentation  Taken 3/22/2024 0915 by Agnes Jennings RN  Trust  Relationship/Rapport:   care explained   choices provided   emotional support provided   empathic listening provided   questions answered   questions encouraged   reassurance provided   thoughts/feelings acknowledged  Goal: Readiness for Transition of Care  Outcome: Progressing     Problem: Skin Injury Risk Increased  Goal: Skin Health and Integrity  Outcome: Progressing  Intervention: Plan: Nurse Driven Intervention: Moisture Management  Recent Flowsheet Documentation  Taken 3/22/2024 0915 by Agnes Jennings RN  Moisture Interventions: Incontinence pad  Intervention: Plan: Nurse Driven Intervention: Friction and Shear  Recent Flowsheet Documentation  Taken 3/22/2024 0915 by Agnes Jennings RN  Friction/Shear Interventions: HOB 30 degrees or less  Intervention: Optimize Skin Protection  Recent Flowsheet Documentation  Taken 3/22/2024 0915 by Agnes Jennings RN  Activity Management: activity adjusted per tolerance     Problem: Sepsis/Septic Shock  Goal: Optimal Coping  Outcome: Progressing  Intervention: Optimize Psychosocial Adjustment to Illness  Recent Flowsheet Documentation  Taken 3/22/2024 0915 by Agnes Jennings RN  Supportive Measures:   positive reinforcement provided   relaxation techniques promoted  Goal: Absence of Infection Signs and Symptoms  Outcome: Progressing  Intervention: Initiate Sepsis Management  Recent Flowsheet Documentation  Taken 3/22/2024 0915 by Agnes Jennings RN  Infection Prevention:   equipment surfaces disinfected   hand hygiene promoted   rest/sleep promoted   single patient room provided  Isolation Precautions: droplet precautions maintained  Taken 3/22/2024 0733 by Agnes Jennings RN  Infection Prevention:   equipment surfaces disinfected   hand hygiene promoted   rest/sleep promoted   single patient room provided  Isolation Precautions: droplet precautions maintained  Intervention: Promote Recovery  Recent Flowsheet Documentation  Taken 3/22/2024  0915 by Agnes Jennings RN  Activity Management: activity adjusted per tolerance  Goal: Optimal Nutrition Intake  Outcome: Progressing     Problem: Delirium  Goal: Optimal Coping  Outcome: Progressing  Intervention: Optimize Psychosocial Adjustment to Delirium  Recent Flowsheet Documentation  Taken 3/22/2024 0915 by Agnes Jennings RN  Supportive Measures:   positive reinforcement provided   relaxation techniques promoted  Goal: Improved Behavioral Control  Outcome: Progressing  Intervention: Prevent and Manage Agitation  Recent Flowsheet Documentation  Taken 3/22/2024 0915 by Agnes Jennings RN  Complementary Therapy:   aromatherapy utilized   essential oils utilized  Environment Familiarity/Consistency: familiar objects from home provided  Intervention: Minimize Safety Risk  Recent Flowsheet Documentation  Taken 3/22/2024 0915 by Agnes Jennings RN  Communication Enhancement Strategies:   extra time allowed for response   nonverbal strategies used   one-step directions provided   repetition utilized  Enhanced Safety Measures:  at bedside  Trust Relationship/Rapport:   care explained   choices provided   emotional support provided   empathic listening provided   questions answered   questions encouraged   reassurance provided   thoughts/feelings acknowledged  Goal: Improved Attention and Thought Clarity  Outcome: Progressing  Intervention: Maximize Cognitive Function  Recent Flowsheet Documentation  Taken 3/22/2024 0915 by Agnes Jennings RN  Sensory Stimulation Regulation:   care clustered   lighting decreased   quiet environment promoted  Reorientation Measures:   clock in view   family pictures in room   reorientation provided  Goal: Improved Sleep  Outcome: Progressing     Problem: Fall Injury Risk  Goal: Absence of Fall and Fall-Related Injury  Outcome: Progressing  Intervention: Identify and Manage Contributors  Recent Flowsheet Documentation  Taken 3/22/2024 0915 by  Agnes Jennings, RN  Medication Review/Management:   medications reviewed   high-risk medications identified  Intervention: Promote Injury-Free Environment  Recent Flowsheet Documentation  Taken 3/22/2024 0915 by Agnes Jennings, RN  Safety Promotion/Fall Prevention:   activity supervised   assistive device/personal items within reach   bedside attendant   clutter free environment maintained   increased rounding and observation   increase visualization of patient   lighting adjusted   nonskid shoes/slippers when out of bed   patient and family education   room near nurse's station   room organization consistent   safety round/check completed  Taken 3/22/2024 0733 by Agnes Jennings, RN  Safety Promotion/Fall Prevention: safety round/check completed

## 2024-03-23 NOTE — PROGRESS NOTES
[facility]    Medicine Progress Note - Hospitalist Service    Date of Admission:  3/15/2024    Assessment & Plan   Aguie Powell is an 88 year old male with history of CAD s/p PCI of LAD 2018, HTN, HL, CKD stage IV, and RCC s/p right nephrectomy. He presented to the ED on 3/15/2024 with fever, weakness, sore throat and urinary incontinence. ED evaluation showed low grade fever, HR in 90s, and need of oxygen. Laboratory evaluation showed sodium 133, BUN 46.6, creatinine 2.2, calcium 10.7, BNP 2409, troponin 89 and 106, hemoglobin 9.9, platelets 134, and positive influenza A test. ECG showed a left bundle branch block.  TTE completed which was unchanged compared to 2022. Patient denied chest pain. He was admitted for further treatment of influenza A. He was treated with Tamiflu. He had acute on chronic low back and right hip pain. He also had significant confusion and lethargy consistent with infectious encephalopathy. He developed fever for which he was started on broad spectrum antibiotics- Zosyn and Zithromax. Zosyn was changed to cefepime and vancomycin was added. Blood and cultures were no growth. He began to improve slowly but was quite weak. PT recommended TCU on discharge. Hospital course was complicated by intermittent bradycardia.  Prior to admission metoprolol was ultimately discontinued.  Patient continued to have intermittent agitation and somnolence.    His wife feels that his agitation is due to her not being present all the time and has found if she is able to talk to him by phone this helps significantly with agitation.     Problem list:    Influenza A  Acute  hypoxic respiratory failure, resolved  Physical deconditioning  -Completed Tamiflu  -No other infection found  -Stopped cefepime, vancomycin, and Zithromax on 3/21/2024       Acute Infectious Encephalopathy: Had intermittent agitation followed by somnolence. He was given Seroquel 25 mg overnights but then had  significant sedation during the day. This was decreased to 12.5 last night, this morning still has significant somnolence. His wife reports that he seems agitated because he is not able to be with her, she has found if he is able to talk to her by phone he calms dramatically. Will try to minimize medications and rely more on family providing reassurance by phone if needed.  - Decrease Seroquel to 6.25 mg QHS  - Stop IV Haldol (has not received this over the past few days)  - If patient becomes agitated, please help him call his wife as she can talk by phone and calm him down - she can be called at any time    Intermittent sinus bradycardia  -This has persisted.  PTA Metoprolol discontinued.    Hypertension  -I stopped prior to admission metoprolol on 3/22/2024.  I ordered Norvasc 5 mg daily.  Continue for now.     Acute on chronic lumbar and right hip pain, improved:  -Suspect acute illness is exacerbating chronic low back and right hip pain  -Pain scores are better  -PT/OT appreciated  -Robaxin helped but does make the patient drowsy  -Continue scheduled Tylenol  -Oxycodone 2.5 mg prn is ordered for severe pain     Hypernatremia - Resolved  -Monitor sodium on am labs     Elevated troponin  Left bundle branch block  -Most likely type 2 MI related to demand ischemia in the setting of influenza infection.  Patient denied chest pain.  TTE is unchanged from 2022.       CKD stage IV: Creatinine stable.  -Baseline Cr around 2-2.2 in the past couple of years.   -Has been stable here  -Avoid nephrotoxins     History of coronary artery disease s/p PCI to his LAD:  -Continue PTA aspirin and metoprolol. As above Metoprolol discontinued due to bradycardia and started on Amlodipine.     Acute on chronic anemia:  -Monitor    Disposition  -Accepted by TCU.  Anticipate it will be a couple more days until his encephalopathy allows him to discharge to TCU.        Diet: Full Liquid Diet  Snacks/Supplements Adult: Nepro Oral  "Supplement; With Meals    DVT Prophylaxis: Heparin SQ  Cerda Catheter: Not present  Lines: None     Cardiac Monitoring: ACTIVE order. Indication: Bradycardias (48 hours)  Code Status: Full Code      Clinically Significant Risk Factors        # Hypokalemia: Lowest K = 3 mmol/L in last 2 days, will replace as needed    # Hypercalcemia: Highest Ca = 10.5 mg/dL in last 2 days, will monitor as appropriate    # Hypoalbuminemia: Lowest albumin = 3 g/dL at 3/20/2024  5:51 PM, will monitor as appropriate     # Hypertension: Noted on problem list        # Overweight: Estimated body mass index is 26.16 kg/m  as calculated from the following:    Height as of this encounter: 1.778 m (5' 10\").    Weight as of this encounter: 82.7 kg (182 lb 5.1 oz).             Disposition Plan     Expected Discharge Date: 03/23/2024        Discharge Comments: Pending stability off oxygen, improvement in independence as he lives at home with his wife and is a 2 person assist.            Georgette Chong MD  Hospitalist Service  [facility]  Securely message with [employer] (more info)  Text page via [facility] Paging/Directory   ______________________________________________________________________    Interval History   Seen this morning. He was sleeping deeply and I could not awaken him. He appears comfortable at this time.    I spoke with his wife, Khushboo, by phone. She thinks a lot of his agitation is related to family not being there all the time. He calms down a lot when he is able to talk to family.    She was able to speak to him by phone last night and he was able to calm down.     Physical Exam   Vital Signs: Temp: 98  F (36.7  C) Temp src: Oral BP: (!) 167/62 Pulse: 62   Resp: 20 SpO2: 94 % O2 Device: None (Room air)    Weight: 182 lbs 5.13 oz    GENERAL:  Comfortable. Sleeping  PSYCH: Sleeping  EYES: Closed  HEART:  Regular rate and rhythm. No JVD. Pulses normal. No edema.  LUNGS:  Very faint expiratory wheezing with " occasional rhonchi, normal Respiratory effort.  ABDOMEN:  Soft, non tender, normal bowel sounds.  EXTREMETIES: Warm, well perfused, no edema  SKIN:  Dry to touch, No rash.      Medical Decision Making       40 MINUTES SPENT BY ME on the date of service doing chart review, history, exam, documentation & further activities per the note.      Data     I have personally reviewed the following data over the past 24 hrs:    N/A  \   N/A   / N/A     140 108 (H) 37.3 (H) /  114 (H)   3.6 23 1.74 (H) \       Imaging results reviewed over the past 24 hrs:   No results found for this or any previous visit (from the past 24 hour(s)).  Recent Labs   Lab 03/23/24  0653 03/22/24  1535 03/22/24  0841 03/21/24  0610 03/20/24  1751 03/20/24  1629 03/20/24  1028 03/20/24  0956 03/19/24  0628   WBC  --   --   --   --   --  2.2*  --  3.6* 5.1   HGB  --   --   --   --   --  12.5*  --  11.3* 8.9*   MCV  --   --   --   --   --  99  --  98 104*   PLT  --   --  173  --   --  109*  --  129* 135*  135*     --  139  --   --  146*   < >  --  146*   POTASSIUM 3.6 4.0 3.0*  3.1* 3.5  --  4.4   < >  --  4.3  4.2   CHLORIDE 108*  --  106  --   --  112*   < >  --  110*   CO2 23  --  19*  --   --  22   < >  --  22   BUN 37.3*  --  38.1*  --   --  38.9*   < >  --  38.3*   CR 1.74*  --  1.84*  1.83* 2.10*  --  2.17*   < >  --  2.29*   ANIONGAP 9  --  14  --   --  12   < >  --  14   RICCARDO 10.5*  --  10.3*  --   --  12.2*   < >  --  11.6*   *  --  133*  --   --  210*   < >  --  108*   ALBUMIN  --   --   --   --  3.0* 3.0*  --   --   --    PROTTOTAL  --   --   --   --  8.4* 8.5*  --   --   --    BILITOTAL  --   --   --   --  0.4 0.5  --   --   --    ALKPHOS  --   --   --   --  55 57  --   --   --    ALT  --   --   --   --  38 40  --   --   --    AST  --   --   --   --  46* 50*  --   --   --     < > = values in this interval not displayed.

## 2024-03-23 NOTE — PLAN OF CARE
Goal Outcome Evaluation:      Plan of Care Reviewed With: patient    Overall Patient Progress: improvingOverall Patient Progress: improving    Outcome Evaluation: A/O x3, patient more alert today. Spent time in the chair. C/o of back pain, lidocaine patch ordered per MD.    Patient calm and cooperative, A/O x3 but confused. BP was high 160's, administered scheduled amlodipine and BP improved to 112/55. Maintaining O2 sats >90% on RA. Denies SOB and chest pain. Fine crackles heard in lower lobes, candy REYNOLDS and MD stated it's mostly caused by influenza and not fluid overload. Tele is SB. Patient c/o back pain which radiates to RLE and foot, Tylenol given and lidocaine patch ordered for tomorrow per MD. Tolerating full liquid diet. Patient had x1 smear bowel movement and incontinent of bowel bladder. Ast 2 gait belt and sera steady. Patient tolerated sitting in chair majority of the day with weight shifting. Left PIV CDI and saline locked. K replaced and redraw ordered for tomorrow AM, Mg in range with recheck for tomorrow AM. No behaviors/impulsiveness on shift, MD discontinued seroquel and haldol - MD stated to call wife if agitation occurs. Plan to continue POC and accepted at [facility] when able to transfer. Social work following.       Problem: Adult Inpatient Plan of Care  Goal: Plan of Care Review  Description: The Plan of Care Review/Shift note should be completed every shift.  The Outcome Evaluation is a brief statement about your assessment that the patient is improving, declining, or no change.  This information will be displayed automatically on your shift  note.  Outcome: Progressing  Flowsheets (Taken 3/23/2024 1618)  Outcome Evaluation: A/O x3, patient more alert today. Spent time in the chair. C/o of back pain, lidocaine patch ordered per MD.  Plan of Care Reviewed With: patient  Overall Patient Progress: improving  Goal: Patient-Specific Goal (Individualized)  Description: You can add care  "plan individualizations to a care plan. Examples of Individualization might be:  \"Parent requests to be called daily at 9am for status\", \"I have a hard time hearing out of my right ear\", or \"Do not touch me to wake me up as it startles  me\".  Outcome: Progressing  Goal: Absence of Hospital-Acquired Illness or Injury  Outcome: Progressing  Intervention: Identify and Manage Fall Risk  Recent Flowsheet Documentation  Taken 3/23/2024 1025 by Agnes Jennings RN  Safety Promotion/Fall Prevention:   activity supervised   assistive device/personal items within reach   clutter free environment maintained   increased rounding and observation   increase visualization of patient   lighting adjusted   nonskid shoes/slippers when out of bed   patient and family education   room near nurse's station   room organization consistent  Taken 3/23/2024 0730 by Agnes Jennings RN  Safety Promotion/Fall Prevention: safety round/check completed  Intervention: Prevent Skin Injury  Recent Flowsheet Documentation  Taken 3/23/2024 1235 by Agnes Jennings RN  Body Position:   supine, legs elevated   supine, head elevated  Taken 3/23/2024 1115 by Agnes Jennings RN  Body Position: weight shifting  Taken 3/23/2024 1025 by Agnes Jennings RN  Device Skin Pressure Protection: absorbent pad utilized/changed  Intervention: Prevent and Manage VTE (Venous Thromboembolism) Risk  Recent Flowsheet Documentation  Taken 3/23/2024 1025 by Agnes Jennings RN  VTE Prevention/Management: SCDs (sequential compression devices) off  Intervention: Prevent Infection  Recent Flowsheet Documentation  Taken 3/23/2024 1025 by Agnes Jennings RN  Infection Prevention:   equipment surfaces disinfected   hand hygiene promoted   rest/sleep promoted   single patient room provided  Taken 3/23/2024 0730 by Agnes Jennings RN  Infection Prevention:   equipment surfaces disinfected   hand hygiene promoted   rest/sleep promoted   single " patient room provided  Goal: Optimal Comfort and Wellbeing  Outcome: Progressing  Intervention: Monitor Pain and Promote Comfort  Recent Flowsheet Documentation  Taken 3/23/2024 1024 by Agnes Jennings RN  Pain Management Interventions: medication (see MAR)  Intervention: Provide Person-Centered Care  Recent Flowsheet Documentation  Taken 3/23/2024 1025 by Agnes Jennings RN  Trust Relationship/Rapport:   care explained   choices provided   emotional support provided   empathic listening provided   questions answered   questions encouraged   reassurance provided   thoughts/feelings acknowledged  Goal: Readiness for Transition of Care  Outcome: Progressing     Problem: Skin Injury Risk Increased  Goal: Skin Health and Integrity  Outcome: Progressing  Intervention: Plan: Nurse Driven Intervention: Moisture Management  Recent Flowsheet Documentation  Taken 3/23/2024 1025 by Agnes Jennings RN  Moisture Interventions:   No brief in bed   Incontinence pad  Incontinence Protocol in Use: Active  Products: Skin Paste (barrier cream)  Frequency of Application: with each episode of incontinence  Plan: Moisture Management: moisturize skin BID  Taken 3/23/2024 1024 by Agnes Jennings RN  Bathing/Skin Care: incontinence care  Intervention: Plan: Nurse Driven Intervention: Friction and Shear  Recent Flowsheet Documentation  Taken 3/23/2024 1025 by Agnes Jennings RN  Friction/Shear Interventions: HOB 30 degrees or less  Intervention: Optimize Skin Protection  Recent Flowsheet Documentation  Taken 3/23/2024 1235 by Agnes Jennings RN  Activity Management: back to bed  Head of Bed (HOB) Positioning: HOB at 30 degrees  Taken 3/23/2024 1115 by Agnes Jennings RN  Activity Management: up in chair  Taken 3/23/2024 1025 by Agnes Jennings RN  Pressure Reduction Techniques:   frequent weight shift encouraged   weight shift assistance provided  Pressure Reduction Devices: positioning supports  utilized  Taken 3/23/2024 1024 by Agnes Jennings RN  Activity Management: up in chair     Problem: Sepsis/Septic Shock  Goal: Optimal Coping  Outcome: Progressing  Intervention: Optimize Psychosocial Adjustment to Illness  Recent Flowsheet Documentation  Taken 3/23/2024 1025 by Agnes Jennings RN  Supportive Measures:   positive reinforcement provided   relaxation techniques promoted   self-care encouraged   verbalization of feelings encouraged  Goal: Absence of Infection Signs and Symptoms  Outcome: Progressing  Intervention: Initiate Sepsis Management  Recent Flowsheet Documentation  Taken 3/23/2024 1025 by Agnes Jennings RN  Infection Prevention:   equipment surfaces disinfected   hand hygiene promoted   rest/sleep promoted   single patient room provided  Isolation Precautions: droplet precautions maintained  Taken 3/23/2024 0730 by Agnes Jennings RN  Infection Prevention:   equipment surfaces disinfected   hand hygiene promoted   rest/sleep promoted   single patient room provided  Isolation Precautions: droplet precautions maintained  Intervention: Promote Stabilization  Recent Flowsheet Documentation  Taken 3/23/2024 1025 by Agnes Jennings RN  Fluid/Electrolyte Management: fluids provided  Intervention: Promote Recovery  Recent Flowsheet Documentation  Taken 3/23/2024 1235 by Agnes Jennings RN  Activity Management: back to bed  Taken 3/23/2024 1115 by Agnes Jennings RN  Activity Management: up in chair  Taken 3/23/2024 1024 by Agnes Jennings RN  Activity Management: up in chair  Goal: Optimal Nutrition Intake  Outcome: Progressing     Problem: Delirium  Goal: Optimal Coping  Outcome: Progressing  Intervention: Optimize Psychosocial Adjustment to Delirium  Recent Flowsheet Documentation  Taken 3/23/2024 1025 by Agnes Jennings RN  Supportive Measures:   positive reinforcement provided   relaxation techniques promoted   self-care encouraged   verbalization of  feelings encouraged  Goal: Improved Behavioral Control  Outcome: Progressing  Intervention: Minimize Safety Risk  Recent Flowsheet Documentation  Taken 3/23/2024 1025 by Agnes Jennings RN  Communication Enhancement Strategies:   call light answered in person   extra time allowed for response  Enhanced Safety Measures:   pain management   room near unit station  Trust Relationship/Rapport:   care explained   choices provided   emotional support provided   empathic listening provided   questions answered   questions encouraged   reassurance provided   thoughts/feelings acknowledged  Goal: Improved Attention and Thought Clarity  Outcome: Progressing  Intervention: Maximize Cognitive Function  Recent Flowsheet Documentation  Taken 3/23/2024 1025 by Agnes Jennings, RN  Reorientation Measures:   clock in view   family pictures in room   reorientation provided  Goal: Improved Sleep  Outcome: Progressing     Problem: Fall Injury Risk  Goal: Absence of Fall and Fall-Related Injury  Outcome: Progressing  Intervention: Identify and Manage Contributors  Recent Flowsheet Documentation  Taken 3/23/2024 1025 by Agnes Jennings, RN  Medication Review/Management:   medications reviewed   high-risk medications identified  Intervention: Promote Injury-Free Environment  Recent Flowsheet Documentation  Taken 3/23/2024 1025 by Agnes Jennings, RN  Safety Promotion/Fall Prevention:   activity supervised   assistive device/personal items within reach   clutter free environment maintained   increased rounding and observation   increase visualization of patient   lighting adjusted   nonskid shoes/slippers when out of bed   patient and family education   room near nurse's station   room organization consistent  Taken 3/23/2024 0730 by Agnes Jennings, RN  Safety Promotion/Fall Prevention: safety round/check completed

## 2024-03-23 NOTE — PROVIDER NOTIFICATION
1147: Paged MD notifying that patient has fine crackles in lower lobes. MD aware.     1150: Paged MD notifying that patient is continuing to have pain in back with radiating pain in right leg. Tylenol administered this AM with slight improvement, requesting other pain management. MD ordered lidocaine patch.

## 2024-03-23 NOTE — PLAN OF CARE
Plan of Care Reviewed With: patient    Overall Patient Progress: improvingOverall Patient Progress: improving    Care from [id number]  Admit Date: 3/15/2024  Admitting Diagnosis: KENNA    Isolation Precautions:   Patient is on Droplet precuations for Influenza A.    Neuro: Disorientated to and Situation  Activity: are 2 assist with Christiane Steady  Telemetry Monitoring: Yes - normal sinus rhythm  Pain: complaining of 2-4/10 pain in their Right foot.  Tylenol given for pain.  Labs / Tests: Mag and K protocol. No replacement given. AM recheck  GI: bowel sounds audible, incontinent x2, incontinent cares provided  : voiding spontaneously, incontinent, incontinent cares provided  O2: RA  LDA's: Peripheral  Fluids: is Saline locked.  Diet: Full liquid  Living Situation:   Consults: PT, OT, and Social Work or Care Coordination  Discharge Disposition: Transitional Care Unit

## 2024-03-24 NOTE — PLAN OF CARE
"Goal Outcome Evaluation:      Plan of Care Reviewed With: patient    Overall Patient Progress: improving    Outcome Evaluation: A/O x4, alert today. Intermittent confusion. Diet changed to mince and moist with level 2 thickened liquids. Speech consulted due to dysphagia. Patient c/o of sore throat, Lozenge ordered.    Patient calm and cooperative, A/O x4 with intermittent confusion. VS stable, maintaining O2 sats >90% on RA. Lungs sounds are coarse. Slight dyspnea on exertion, denies chest pain. Patient c/o back pain that radiates down to right leg and foot, Tylenol administered and Lidocaine patch placed. Diet changed to mechanical soft, but patient experienced slight dysphagia due to sore throat, speech consulted and changed diet to minced and moist with level 2 thickened liquids. Throat lozenge ordered. Replaced potassium with recheck tomorrow AM, Mg in range and recheck tomorrow AM. Tele discontinued. Patient had x1 loose stool, incontinent. Plan to continue POC.     Problem: Adult Inpatient Plan of Care  Goal: Plan of Care Review  Description: The Plan of Care Review/Shift note should be completed every shift.  The Outcome Evaluation is a brief statement about your assessment that the patient is improving, declining, or no change.  This information will be displayed automatically on your shift  note.  Outcome: Progressing  Flowsheets (Taken 3/24/2024 1550)  Outcome Evaluation: A/O x4, alert today. Intermittent confusion. Diet changed to mince and moist with level 2 thickened liquids. Speech consulted due to dysphagia. Patient c/o of sore throat, Lozenge ordered.  Plan of Care Reviewed With: patient  Overall Patient Progress: improving  Goal: Patient-Specific Goal (Individualized)  Description: You can add care plan individualizations to a care plan. Examples of Individualization might be:  \"Parent requests to be called daily at 9am for status\", \"I have a hard time hearing out of my right ear\", or \"Do not touch " "me to wake me up as it startles  me\".  Outcome: Progressing  Goal: Absence of Hospital-Acquired Illness or Injury  Outcome: Progressing  Intervention: Identify and Manage Fall Risk  Recent Flowsheet Documentation  Taken 3/24/2024 1502 by Agnes Jennings RN  Safety Promotion/Fall Prevention:   activity supervised   assistive device/personal items within reach   clutter free environment maintained   increase visualization of patient   lighting adjusted   mobility aid in reach   nonskid shoes/slippers when out of bed   patient and family education   room near nurse's station   room organization consistent   safety round/check completed  Taken 3/24/2024 1205 by Agnes Jennings RN  Safety Promotion/Fall Prevention:   activity supervised   assistive device/personal items within reach   clutter free environment maintained   increase visualization of patient   lighting adjusted   mobility aid in reach   nonskid shoes/slippers when out of bed   patient and family education   room near nurse's station   room organization consistent   safety round/check completed  Taken 3/24/2024 0850 by Agnes Jennings RN  Safety Promotion/Fall Prevention:   activity supervised   assistive device/personal items within reach   clutter free environment maintained   lighting adjusted   mobility aid in reach   nonskid shoes/slippers when out of bed  Taken 3/24/2024 0745 by Agnes Jennings RN  Safety Promotion/Fall Prevention: safety round/check completed  Intervention: Prevent Skin Injury  Recent Flowsheet Documentation  Taken 3/24/2024 1502 by Agnes Jennings RN  Body Position: neutral body alignment  Taken 3/24/2024 1300 by Agnes Jennings RN  Body Position:   weight shifting   neutral body alignment   legs elevated  Taken 3/24/2024 1205 by Agnes Jennings RN  Body Position: neutral body alignment  Intervention: Prevent and Manage VTE (Venous Thromboembolism) Risk  Recent Flowsheet Documentation  Taken " 3/24/2024 0850 by Agnes Jennings RN  VTE Prevention/Management: SCDs (sequential compression devices) off  Intervention: Prevent Infection  Recent Flowsheet Documentation  Taken 3/24/2024 1502 by Agnes Jennings RN  Infection Prevention:   environmental surveillance performed   cohorting utilized  Taken 3/24/2024 1205 by Agnes Jennings RN  Infection Prevention:   environmental surveillance performed   cohorting utilized  Taken 3/24/2024 0850 by Agnes Jennings RN  Infection Prevention:   equipment surfaces disinfected   hand hygiene promoted   rest/sleep promoted   single patient room provided  Goal: Optimal Comfort and Wellbeing  Outcome: Progressing  Intervention: Monitor Pain and Promote Comfort  Recent Flowsheet Documentation  Taken 3/24/2024 1300 by Agnes Jennings RN  Pain Management Interventions:   repositioned   rest  Taken 3/24/2024 0850 by Agnes Jennings RN  Pain Management Interventions: medication (see MAR)  Intervention: Provide Person-Centered Care  Recent Flowsheet Documentation  Taken 3/24/2024 0902 by Agnes Jennings RN  Trust Relationship/Rapport:   care explained   choices provided   emotional support provided   empathic listening provided   questions answered   questions encouraged   reassurance provided   thoughts/feelings acknowledged  Goal: Readiness for Transition of Care  Outcome: Progressing     Problem: Skin Injury Risk Increased  Goal: Skin Health and Integrity  Outcome: Progressing  Intervention: Plan: Nurse Driven Intervention: Moisture Management  Recent Flowsheet Documentation  Taken 3/24/2024 1502 by Agnes Jennings RN  Bathing/Skin Care: incontinence care  Taken 3/24/2024 0850 by Agnes Jennings RN  Moisture Interventions:   Incontinence pad   Perineal cleanser  Intervention: Plan: Nurse Driven Intervention: Friction and Shear  Recent Flowsheet Documentation  Taken 3/24/2024 0850 by Agnes Jennings RN  Friction/Shear  Interventions: HOB 30 degrees or less  Intervention: Optimize Skin Protection  Recent Flowsheet Documentation  Taken 3/24/2024 1502 by Agnes Jennings RN  Activity Management:   ambulated to bathroom   back to bed  Head of Bed (HOB) Positioning: HOB at 30 degrees  Taken 3/24/2024 1300 by Agnes Jennings RN  Activity Management: activity adjusted per tolerance  Head of Bed (HOB) Positioning: HOB at 30 degrees  Taken 3/24/2024 1205 by Agnes Jennings RN  Activity Management: back to bed  Taken 3/24/2024 1005 by Agnes Jennings RN  Activity Management: activity adjusted per tolerance  Taken 3/24/2024 0850 by Agnes Jennings RN  Activity Management: activity adjusted per tolerance  Intervention: Promote and Optimize Oral Intake  Recent Flowsheet Documentation  Taken 3/24/2024 1300 by Agnes Jennings RN  Nutrition Interventions:   diet adjustment recommended   diet adjusted   meal set-up provided   supplemental drinks provided  Taken 3/24/2024 0850 by Agnes Jennings RN  Nutrition Interventions:   meal set-up provided   supplemental drinks provided   supplemental foods provided  Oral Nutrition Promotion:   physical activity promoted   rest periods promoted   social interaction promoted     Problem: Sepsis/Septic Shock  Goal: Optimal Coping  Outcome: Progressing  Intervention: Optimize Psychosocial Adjustment to Illness  Recent Flowsheet Documentation  Taken 3/24/2024 0902 by Agnes Jennings RN  Supportive Measures:   active listening utilized   positive reinforcement provided   self-care encouraged  Goal: Absence of Infection Signs and Symptoms  Outcome: Progressing  Intervention: Initiate Sepsis Management  Recent Flowsheet Documentation  Taken 3/24/2024 1502 by Agnes Jennings RN  Infection Prevention:   environmental surveillance performed   cohorting utilized  Isolation Precautions: droplet precautions maintained  Taken 3/24/2024 1205 by Agnes Jennings RN  Infection  Prevention:   environmental surveillance performed   cohorting utilized  Isolation Precautions: droplet precautions maintained  Taken 3/24/2024 0850 by Agnes Jennings RN  Infection Prevention:   equipment surfaces disinfected   hand hygiene promoted   rest/sleep promoted   single patient room provided  Isolation Precautions: droplet precautions maintained  Intervention: Promote Stabilization  Recent Flowsheet Documentation  Taken 3/24/2024 0850 by Agnes Jennings RN  Fluid/Electrolyte Management: fluids provided  Intervention: Promote Recovery  Recent Flowsheet Documentation  Taken 3/24/2024 1502 by Agnes Jennings RN  Activity Management:   ambulated to bathroom   back to bed  Taken 3/24/2024 1300 by Agnes Jennings RN  Activity Management: activity adjusted per tolerance  Taken 3/24/2024 1205 by Agnes Jennings RN  Activity Management: back to bed  Taken 3/24/2024 1005 by Agnes Jennings RN  Activity Management: activity adjusted per tolerance  Taken 3/24/2024 0850 by Agnes Jennings RN  Activity Management: activity adjusted per tolerance  Goal: Optimal Nutrition Intake  Outcome: Progressing  Intervention: Optimize Nutrition Delivery  Recent Flowsheet Documentation  Taken 3/24/2024 1300 by Agnes Jennings RN  Nutrition Interventions:   diet adjustment recommended   diet adjusted   meal set-up provided   supplemental drinks provided  Taken 3/24/2024 0850 by Agnes Jennings RN  Nutrition Interventions:   meal set-up provided   supplemental drinks provided   supplemental foods provided     Problem: Delirium  Goal: Optimal Coping  Outcome: Progressing  Intervention: Optimize Psychosocial Adjustment to Delirium  Recent Flowsheet Documentation  Taken 3/24/2024 0902 by Agnes Jennings RN  Supportive Measures:   active listening utilized   positive reinforcement provided   self-care encouraged  Goal: Improved Behavioral Control  Outcome: Progressing  Intervention:  Prevent and Manage Agitation  Recent Flowsheet Documentation  Taken 3/24/2024 0902 by Agnes Jennings RN  Complementary Therapy: aromatherapy utilized  Intervention: Minimize Safety Risk  Recent Flowsheet Documentation  Taken 3/24/2024 1502 by Agnes Jennings RN  Enhanced Safety Measures:   review medications for side effects with activity   room near unit station  Taken 3/24/2024 1205 by Agnes Jennings RN  Enhanced Safety Measures:   review medications for side effects with activity   room near unit station  Taken 3/24/2024 0902 by Agnes Jennings RN  Trust Relationship/Rapport:   care explained   choices provided   emotional support provided   empathic listening provided   questions answered   questions encouraged   reassurance provided   thoughts/feelings acknowledged  Taken 3/24/2024 0850 by Agnes Jennings RN  Enhanced Safety Measures:   pain management   review medications for side effects with activity   room near unit station  Goal: Improved Attention and Thought Clarity  Outcome: Progressing  Goal: Improved Sleep  Outcome: Progressing     Problem: Fall Injury Risk  Goal: Absence of Fall and Fall-Related Injury  Outcome: Progressing  Intervention: Identify and Manage Contributors  Recent Flowsheet Documentation  Taken 3/24/2024 1502 by Agnes Jennings RN  Medication Review/Management:   medications reviewed   high-risk medications identified  Taken 3/24/2024 1205 by Agnes Jennings RN  Medication Review/Management:   medications reviewed   high-risk medications identified  Taken 3/24/2024 0850 by Agnes Jennings RN  Medication Review/Management:   medications reviewed   high-risk medications identified  Intervention: Promote Injury-Free Environment  Recent Flowsheet Documentation  Taken 3/24/2024 1502 by Agnes Jennings RN  Safety Promotion/Fall Prevention:   activity supervised   assistive device/personal items within reach   clutter free environment  maintained   increase visualization of patient   lighting adjusted   mobility aid in reach   nonskid shoes/slippers when out of bed   patient and family education   room near nurse's station   room organization consistent   safety round/check completed  Taken 3/24/2024 1205 by Agnes Jennings, RN  Safety Promotion/Fall Prevention:   activity supervised   assistive device/personal items within reach   clutter free environment maintained   increase visualization of patient   lighting adjusted   mobility aid in reach   nonskid shoes/slippers when out of bed   patient and family education   room near nurse's station   room organization consistent   safety round/check completed  Taken 3/24/2024 0850 by Agnes Jennings, RN  Safety Promotion/Fall Prevention:   activity supervised   assistive device/personal items within reach   clutter free environment maintained   lighting adjusted   mobility aid in reach   nonskid shoes/slippers when out of bed  Taken 3/24/2024 0745 by Agnes Jennings, RN  Safety Promotion/Fall Prevention: safety round/check completed

## 2024-03-24 NOTE — PROGRESS NOTES
[facility]    Medicine Progress Note - Hospitalist Service    Date of Admission:  3/15/2024    Assessment & Plan   Augie Powell is an 88 year old male with history of CAD s/p PCI of LAD 2018, HTN, HL, CKD stage IV, and RCC s/p right nephrectomy. He presented to the ED on 3/15/2024 with fever, weakness, sore throat and urinary incontinence. ED evaluation showed low grade fever, HR in 90s, and need of oxygen. Laboratory evaluation showed sodium 133, BUN 46.6, creatinine 2.2, calcium 10.7, BNP 2409, troponin 89 and 106, hemoglobin 9.9, platelets 134, and positive influenza A test. ECG showed a left bundle branch block.  TTE completed which was unchanged compared to 2022. Patient denied chest pain. He was admitted for further treatment of influenza A. He was treated with Tamiflu. He had acute on chronic low back and right hip pain. He also had significant confusion and lethargy consistent with infectious encephalopathy. He developed fever for which he was started on broad spectrum antibiotics- Zosyn and Zithromax. Zosyn was changed to cefepime and vancomycin was added. Blood and cultures were no growth. He began to improve slowly but was quite weak. PT recommended TCU on discharge. Hospital course was complicated by intermittent bradycardia.  Prior to admission metoprolol was ultimately discontinued.  Patient continued to have intermittent agitation and somnolence.    His wife feels that his agitation is due to her not being present all the time and has found if she is able to talk to him by phone this helps significantly with agitation.    He is much better and now medically ready to discharge to TCU when bed is available.     Problem list:    Influenza A  Acute  hypoxic respiratory failure, resolved  Physical deconditioning  -Completed Tamiflu  -No other infection found  -Stopped cefepime, vancomycin, and Zithromax on 3/21/2024    Acute Infectious Encephalopathy - Greatly Improved: Had  intermittent agitation followed by somnolence. He was given Seroquel 25 mg overnights but then had significant sedation during the day. This was decreased to 12.5 last night, this morning still has significant somnolence. His wife reports that he seems agitated because he is not able to be with her, she has found if he is able to talk to her by phone he calms dramatically. Will try to minimize medications and rely more on family providing reassurance by phone if needed. He is greatly improved from his mental standpoint and able to have a good conversation today.  - Continue Seroquel to 6.25 mg QHS - could stop in the next day or so as long as he continues to have improved mental status  - If patient becomes agitated, please help him call his wife as she can talk by phone and calm him down - she can be called at any time    Intermittent sinus bradycardia  -This has persisted.  PTA Metoprolol discontinued.    Hypertension  -As above metoprolol discontinued on 3/22/2024.  I ordered Norvasc 5 mg daily.  Continue for now.     Acute on chronic lumbar and right hip pain, improved:  -Suspect acute illness is exacerbating chronic low back and right hip pain  -Pain scores are better  -PT/OT appreciated  -Robaxin helped but does make the patient drowsy  -Continue scheduled Tylenol  -Oxycodone 2.5 mg prn is ordered for severe pain    Dysphagia: Notes mild dysphagia. Will order soft diet and have speech therapy assess now that mental status has improved significantly.     Hypernatremia - Resolved  -Monitor sodium on am labs     Elevated troponin  Left bundle branch block  -Most likely type 2 MI related to demand ischemia in the setting of influenza infection.  Patient denied chest pain.  TTE is unchanged from 2022.       CKD stage IV: Creatinine stable.  -Baseline Cr around 2-2.2 in the past couple of years.   -Has been stable here  -Avoid nephrotoxins     History of coronary artery disease s/p PCI to his LAD:  -Continue PTA  "aspirin and metoprolol. As above Metoprolol discontinued due to bradycardia and started on Amlodipine.     Acute on chronic anemia:  -Monitor    Disposition  -Medically ready for discharge to TCU when bed is available.        Diet: Snacks/Supplements Adult: Nepro Oral Supplement; With Meals  Combination Diet Regular Diet; Mechanical/Dental Soft Diet; Mildly Thick (level 2)    DVT Prophylaxis: Heparin SQ  Cerda Catheter: Not present  Lines: None     Cardiac Monitoring: None  Code Status: Full Code      Clinically Significant Risk Factors           # Hypercalcemia: Highest Ca = 10.5 mg/dL in last 2 days, will monitor as appropriate    # Hypoalbuminemia: Lowest albumin = 3 g/dL at 3/20/2024  5:51 PM, will monitor as appropriate     # Hypertension: Noted on problem list        # Overweight: Estimated body mass index is 26.16 kg/m  as calculated from the following:    Height as of this encounter: 1.778 m (5' 10\").    Weight as of this encounter: 82.7 kg (182 lb 5.1 oz).             Disposition Plan     Expected Discharge Date: 03/24/2024        Discharge Comments: Pending stability off oxygen, improvement in independence as he lives at home with his wife and is a 2 person assist.            Georgette Chong MD  Hospitalist Service  [facility]  Securely message with [employer] (more info)  Text page via [employer] Paging/Directory   ______________________________________________________________________    Interval History   Seen this morning and discussed with bedside nurse. He was sitting up in a chair. States he is feeling good today. Can tell me about the fall last evening. He had to go to the bathroom and tried to go by himself. He did ok but as he was waking back to bed he fell. Did not hit his head and did not lose consciousness. He does not think that he injured himself. He notes he had some back pain overnight but feels better so far this morning.     I spoke with his wife, Khushboo, by phone. She was " able to visit for a short time this morning and is very happy to see that he is back to his normal mental status. Reviewed that he is ready to go to TCU when he has a bed available.        Physical Exam   Vital Signs: Temp: 97.8  F (36.6  C) Temp src: Oral BP: (!) 142/53 Pulse: 71   Resp: 18 SpO2: 98 % O2 Device: None (Room air)    Weight: 182 lbs 5.13 oz    GENERAL:  Sitting up in a chair, answering questions, pleasant, NAD  PSYCH: Alert and oriented x3  EYES: EOMI, no icterus or injection  HEART:  Regular rate and rhythm. No JVD. Pulses normal. No edema.  LUNGS:  Very faint expiratory wheezing with occasional rhonchi, normal Respiratory effort.  ABDOMEN:  Soft, non tender, normal bowel sounds.  EXTREMITIES: Warm, well perfused, no edema  SKIN:  Dry to touch, No rash.      Medical Decision Making       45 MINUTES SPENT BY ME on the date of service doing chart review, history, exam, documentation & further activities per the note.      Data     I have personally reviewed the following data over the past 24 hrs:    N/A  \   N/A   / N/A     142 109 (H) 32.9 (H) /  100 (H)   3.6 23 1.71 (H) \       Imaging results reviewed over the past 24 hrs:   No results found for this or any previous visit (from the past 24 hour(s)).  Recent Labs   Lab 03/24/24  0807 03/23/24  0653 03/22/24  1535 03/22/24  0841 03/21/24  0610 03/20/24  1751 03/20/24  1629 03/20/24  1028 03/20/24  0956 03/19/24  0628   WBC  --   --   --   --   --   --  2.2*  --  3.6* 5.1   HGB  --   --   --   --   --   --  12.5*  --  11.3* 8.9*   MCV  --   --   --   --   --   --  99  --  98 104*   PLT  --   --   --  173  --   --  109*  --  129* 135*  135*    140  --  139  --   --  146*   < >  --  146*   POTASSIUM 3.6 3.6 4.0 3.0*  3.1*   < >  --  4.4   < >  --  4.3  4.2   CHLORIDE 109* 108*  --  106  --   --  112*   < >  --  110*   CO2 23 23  --  19*  --   --  22   < >  --  22   BUN 32.9* 37.3*  --  38.1*  --   --  38.9*   < >  --  38.3*   CR 1.71* 1.74*   --  1.84*  1.83*   < >  --  2.17*   < >  --  2.29*   ANIONGAP 10 9  --  14  --   --  12   < >  --  14   RICCARDO 10.3* 10.5*  --  10.3*  --   --  12.2*   < >  --  11.6*   * 114*  --  133*  --   --  210*   < >  --  108*   ALBUMIN  --   --   --   --   --  3.0* 3.0*  --   --   --    PROTTOTAL  --   --   --   --   --  8.4* 8.5*  --   --   --    BILITOTAL  --   --   --   --   --  0.4 0.5  --   --   --    ALKPHOS  --   --   --   --   --  55 57  --   --   --    ALT  --   --   --   --   --  38 40  --   --   --    AST  --   --   --   --   --  46* 50*  --   --   --     < > = values in this interval not displayed.

## 2024-03-24 NOTE — PLAN OF CARE
Goal Outcome Evaluation:      Plan of Care Reviewed With: patient, family           Patient was found on floor at 1545 during safety check. Patient stating he walked to BR, sit on toilet and on way back to bed fell. Patient denied hip or shoulder pain, denied hitting head. VS stable, neuro assessment unchanged. MD page and reported. Daughter Tamika notified.

## 2024-03-24 NOTE — PROGRESS NOTES
"Clinical Swallow Evaluation (CSE):     03/24/24 5395   Appointment Info   Signing Clinician's Name / Credentials (SLP) Elizabeth Bartlett MS CCC-SLP   General Information   Onset of Illness/Injury or Date of Surgery 03/15/24   Referring Physician DR. Chong   Patient/Family Therapy Goal Statement (SLP) to improve sore throat   Pertinent History of Current Problem Per provider \"Augie Powell is an 88 year old male with history of CAD s/p PCI of LAD 2018, HTN, HL, CKD stage IV, and RCC s/p right nephrectomy. He presented to the ED on 3/15/2024 with fever, weakness, sore throat and urinary incontinence.\" Admitted with Influenza A; Acute  hypoxic respiratory failure, resolved; Physical deconditioning; Acute Infectious Encephalopathy; etc. RN noting concerns re: swallowing and SLP consulted.   General Observations Pt alert, pleasant, upright in bed, hoarse vocal quality and sore throat   Pain Assessment   Patient Currently in Pain   (Yes- did not given #/10 but sore throat)   Type of Evaluation   Type of Evaluation Swallow Evaluation   Oral Motor   Oral Musculature   (generalized weakness/reduced ROM)   Structural Abnormalities none present   Mucosal Quality dry   Dentition (Oral Motor)   Dentition (Oral Motor) natural dentition;adequate dentition   Vocal Quality/Secretion Management (Oral Motor)   Vocal Quality (Oral Motor) dysphonic;hoarse;hypophonic   Secretion Management (Oral Motor) difficulty swallowing secretions   General Swallowing Observations   Past History of Dysphagia None in EMR and pt denies any difficulty in past   Respiratory Support room air   Current Diet/Method of Nutritional Intake (General Swallowing Observations, NIS) mildly thick (nectar-thick) liquids (level 2)  ([facility] soft -- adjusted by hospitalist this AM)   Swallowing Evaluation Clinical swallow evaluation   Clinical Swallow Evaluation   Feeding Assistance no assistance needed   Clinical Swallow Evaluation Textures Trialed thin liquids;mildly thick " liquids;pureed;soft & bite-sized   Clinical Swallow Eval: Thin Liquid Texture Trial   Mode of Presentation, Thin Liquids cup;self-fed   Volume of Liquid or Food Presented sips   Oral Phase of Swallow premature pharyngeal entry   Pharyngeal Phase of Swallow impaired;reduction in laryngeal movement;coughing/choking   Diagnostic Statement odynophagia + sore throat, overt congestive coughing   Clinical Swallow Eval: Mildly Thick Liquids   Mode of Presentation cup;spoon   Volume Presented 2 oz   Oral Phase premature pharyngeal entry   Pharyngeal Phase impaired;reduction in laryngeal movement   Diagnostic Statement odynophagia + sore throat, no overt signs/sx aspiration noted   Clinical Swallow Evaluation: Puree Solid Texture Trial   Mode of Presentation, Puree spoon   Volume of Puree Presented x2 bites   Oral Phase, Puree WFL   Pharyngeal Phase, Puree impaired;reduction in laryngeal movement   Diagnostic Statement odynophagia + sore throat, no overt signs/sx aspiration noted   Clinical Swallow Eval: Soft & Bite Sized   Mode of Presentation spoon;self-fed   Volume Presented x2 bites, peaches   Oral Phase delayed AP movement;residue in oral cavity  (mild lingual residue)   Pharyngeal Phase impaired;reduction in laryngeal movement   Diagnostic Statement odynophagia + sore throat, no overt signs/sx aspiration noted   Swallowing Recommendations   Diet Consistency Recommendations minced & moist (level 5);mildly thick liquids (level 2)   Supervision Level for Intake close supervision needed   Mode of Delivery Recommendations bolus size, small;slow rate of intake;food moistened   Swallowing Maneuver Recommendations alternate food and liquid intake   Monitoring/Assistance Required (Eating/Swallowing) check mouth frequently for oral residue/pocketing;cue for finger/lingual sweep if oral pocketing present;stop eating activities when fatigue is present;monitor for cough or change in vocal quality with intake   Recommended  Feeding/Eating Techniques (Swallow Eval) maintain upright sitting position for eating;maintain upright posture during/after eating for 30 minutes;minimize distractions during oral intake   Medication Administration Recommendations, Swallowing (SLP) as tolerated   Instrumental Assessment Recommendations   (monitor need for VFSS)   General Therapy Interventions   Planned Therapy Interventions Dysphagia Treatment   Clinical Impression   Criteria for Skilled Therapeutic Interventions Met (SLP Eval) Yes, treatment indicated   SLP Diagnosis mild oral, suspected pharyngeal dysphagia   Risks & Benefits of therapy have been explained evaluation/treatment results reviewed;care plan/treatment goals reviewed;risks/benefits reviewed;current/potential barriers reviewed;participants voiced agreement with care plan;participants included;patient   Clinical Impression Comments Clinical swallow evaluation completed with small amounts of thin liquids, mildly thick liquids, puree solids, soft/bite sized solids; limited evaluation given odynophagia + sore throat, deconditioning + fatigue. Oromotor exam reveals generalized reduced ROM/weakness, voice is hoarse/dysphonic. WFL labial seal/oral containment. Mild prolonged mastication and mild oral residue with soft/bite sized solids. ?mild oral holding though appears d/t hesitancy to swallow with pain vs dysphagia. Overt congested coughing with thin liquids, no overt signs/sx aspiration with mildly thick or soft solids.   SLP Total Evaluation Time   Eval: oral/pharyngeal swallow function, clinical swallow Minutes (89990) 8   SLP Goals   Therapy Frequency (SLP Eval) 5 times/week   SLP Predicted Duration/Target Date for Goal Attainment 03/31/24   SLP Goals Swallow   SLP: Safely tolerate diet without signs/symptoms of aspiration Regular diet;Thin liquids;With use of swallow precautions;Independently   Interventions   Interventions Quick Adds Swallowing Dysfunction   Swallowing Dysfunction &/or  Oral Function for Feeding   Treatment of Swallowing Dysfunction &/or Oral Function for Feeding Minutes (32477) 8   Symptoms Noted During/After Treatment Fatigue   Treatment Detail/Skilled Intervention Educated pt on current deficits, signs/sx aspiration to monitor for, current diet recommendations. Trained in safe swallow strategies and xerostomia reduction straregies - will need reinforcement.   SLP Discharge Planning   SLP Plan Po tolerance, ADAT   SLP Discharge Recommendation Transitional Care Facility   SLP Rationale for DC Rec Pt welll below baseline re: swallow function   SLP Brief overview of current status  minced/moist solids, mildly thick liquids when fully upright, slow rate, small single bites/sips, alternate between solids/liquids; frequent liquid intake for oropharyngeal mucosal moisture/comfort. Monitor sore throat/dysphagia symptoms and need for potential VFSS pending clinical progression.   Total Session Time   Total Session Time (sum of timed and untimed services) 16

## 2024-03-24 NOTE — PROVIDER NOTIFICATION
0930: Paged MD notifying that patient seems to do well with thicker foods, but struggles with thin liquids. Recommended thickened liquids and/or speech consult. MD aware. Thickened liquids ordered and speech consulted.    5313: Paged MD STEIN that patient stated he has a sore throat so he barely ate lunch. MD aware.

## 2024-03-24 NOTE — PLAN OF CARE
Care from [id number]    Admit Date: 3/15/2024  Admitting Diagnosis: Influenza, KENNA    Isolation Precautions:   Patient is on Droplet precuations for Influenza A.    Neuro: Disorientated to, Time, and Situation  Activity: are 2 assist with Christiane Steady  Telemetry Monitoring: Yes - normal sinus rhythm. Ha 13 beats of Vtach, provider notified  Pain: complaining of 5-8/10 pain in their back radiating down right leg/foot.  Tylenol and Oxycodone given for pain.  Labs / Tests: Mag and K protocol. Recheck AM  GI: bowel sounds audible  : voiding incontinent. Incontinent cares provided.  O2 RA  LDA's: Peripheral  Fluids: is Saline locked.  Diet: Full liquid  Living Situation:   Consults: PT and OT  Discharge Disposition: Transitional Care Unit     Goal Outcome Evaluation:      Plan of Care Reviewed With: patient    Overall Patient Progress: improvingOverall Patient Progress: improving

## 2024-03-24 NOTE — PROGRESS NOTES
Care Management Follow Up    Length of Stay (days): 9    Expected Discharge Date: 03/24/2024     Concerns to be Addressed:  pt has been off all PRN medications since Saturday.   Still working on labs yet.      Patient plan of care discussed at interdisciplinary rounds: Yes    Anticipated Discharge Disposition:  [facility] following for TRANSITIONAL CARE UNIT needs.      Anticipated Discharge Services: Rehab        Patient/Family in Agreement with the Plan: yes    Referrals Placed by CM/SW: yes   Private pay costs discussed: Not applicable    Additional Information:  Called [facility]  left voice mail message to update of possible ready for TRANSITIONAL CARE UNIT on Monday. Requested they speak with Care mgt team tomorrow     Corinne C. White, LSW  Care mgt team  [phone]

## 2024-03-25 NOTE — PLAN OF CARE
Patient weak, heavy assist of two, confused. Stopped feeding due to cough. Up in chair for meal. Continue to monitor.      Problem: Adult Inpatient Plan of Care  Goal: Plan of Care Review  Description: The Plan of Care Review/Shift note should be completed every shift.  The Outcome Evaluation is a brief statement about your assessment that the patient is improving, declining, or no change.  This information will be displayed automatically on your shift  note.  Outcome: Not Progressing  Flowsheets (Taken 3/24/2024 2203)  Outcome Evaluation: Confused. Weak, heavy assist of two.  Plan of Care Reviewed With: patient  Overall Patient Progress: declining  Goal: Patient-Specific Goal (Individualized)    Outcome: Not Progressing  Goal: Absence of Hospital-Acquired Illness or Injury  Outcome: Not Progressing  Intervention: Prevent Skin Injury  Recent Flowsheet Documentation  Taken 3/24/2024 1635 by Alethea Garcia RN  Body Position: (up in chair) other (see comments)  Goal: Optimal Comfort and Wellbeing  Outcome: Not Progressing  Intervention: Provide Person-Centered Care  Recent Flowsheet Documentation  Taken 3/24/2024 1635 by Alethea Garcia RN  Trust Relationship/Rapport:   care explained   choices provided   emotional support provided   empathic listening provided   questions answered   questions encouraged   reassurance provided   thoughts/feelings acknowledged  Goal: Readiness for Transition of Care  Outcome: Not Progressing     Problem: Sepsis/Septic Shock  Goal: Absence of Infection Signs and Symptoms  Outcome: Not Progressing  Goal: Optimal Nutrition Intake  Outcome: Not Progressing     Problem: Delirium  Goal: Optimal Coping  Outcome: Not Progressing  Goal: Improved Behavioral Control  Outcome: Not Progressing  Intervention: Minimize Safety Risk  Recent Flowsheet Documentation  Taken 3/24/2024 1635 by Alethea Garcia RN  Communication Enhancement Strategies:   call light answered in person   extra time  allowed for response  Enhanced Safety Measures: pain management  Trust Relationship/Rapport:   care explained   choices provided   emotional support provided   empathic listening provided   questions answered   questions encouraged   reassurance provided   thoughts/feelings acknowledged  Goal: Improved Attention and Thought Clarity  Outcome: Not Progressing  Intervention: Maximize Cognitive Function  Recent Flowsheet Documentation  Taken 3/24/2024 1635 by Alethea Garcia, RN  Sensory Stimulation Regulation:   care clustered   lighting decreased   quiet environment promoted  Reorientation Measures:   clock in view   family pictures in room   reorientation provided  Goal: Improved Sleep  Outcome: Not Progressing     Problem: Fall Injury Risk  Goal: Absence of Fall and Fall-Related Injury  Outcome: Not Progressing  Intervention: Identify and Manage Contributors  Recent Flowsheet Documentation  Taken 3/24/2024 1635 by Alethea Garcia, RN  Medication Review/Management:   medications reviewed   high-risk medications identified   Goal Outcome Evaluation:      Plan of Care Reviewed With: patient    Overall Patient Progress: decliningOverall Patient Progress: declining    Outcome Evaluation: Confused. Weak, heavy assist of two.

## 2024-03-25 NOTE — PLAN OF CARE
"  Problem: Adult Inpatient Plan of Care  Goal: Plan of Care Review  Description: The Plan of Care Review/Shift note should be completed every shift.  The Outcome Evaluation is a brief statement about your assessment that the patient is improving, declining, or no change.  This information will be displayed automatically on your shift  note.  Outcome: Adequate for Care Transition  Goal: Patient-Specific Goal (Individualized)  Description: You can add care plan individualizations to a care plan. Examples of Individualization might be:  \"Parent requests to be called daily at 9am for status\", \"I have a hard time hearing out of my right ear\", or \"Do not touch me to wake me up as it startles  me\".  Outcome: Adequate for Care Transition  Goal: Absence of Hospital-Acquired Illness or Injury  Outcome: Adequate for Care Transition  Goal: Optimal Comfort and Wellbeing  Outcome: Adequate for Care Transition  Goal: Readiness for Transition of Care  Outcome: Adequate for Care Transition     Problem: Skin Injury Risk Increased  Goal: Skin Health and Integrity  Outcome: Adequate for Care Transition     Problem: Sepsis/Septic Shock  Goal: Optimal Coping  Outcome: Adequate for Care Transition  Goal: Absence of Infection Signs and Symptoms  Outcome: Adequate for Care Transition  Goal: Optimal Nutrition Intake  Outcome: Adequate for Care Transition     Problem: Delirium  Goal: Optimal Coping  Outcome: Adequate for Care Transition  Goal: Improved Behavioral Control  Outcome: Adequate for Care Transition  Goal: Improved Attention and Thought Clarity  Outcome: Adequate for Care Transition  Goal: Improved Sleep  Outcome: Adequate for Care Transition     Problem: Fall Injury Risk  Goal: Absence of Fall and Fall-Related Injury  Outcome: Adequate for Care Transition   Goal Outcome Evaluation:                        "

## 2024-03-25 NOTE — PROVIDER NOTIFICATION
Paged Dr. Salgado to come see patient as his is set to discharge but seems more lethargic upon speech consult.    Patient set to discharge here shortly to TCU. Speech just saw patient and he seems more lethargic than he was this morning. Wife concerned if he is ready to go to TCU. Could you come see him? THanks.

## 2024-03-25 NOTE — PLAN OF CARE
Occupational Therapy Discharge Summary    Reason for therapy discharge:    Discharged to transitional care facility.    Progress towards therapy goal(s). See goals on Care Plan in [facility] electronic health record for goal details.  Goals partially met.  Barriers to achieving goals:   discharge from facility.    Therapy recommendation(s):    Continued therapy is recommended.  Rationale/Recommendations:  Per treating therapist, patient recommended further OT at TCU to return to prior level of function. See OT note for details

## 2024-03-25 NOTE — PROGRESS NOTES
Speech Language Therapy Discharge Summary    Reason for therapy discharge:    Discharged to transitional care facility.    Progress towards therapy goal(s). See goals on Care Plan in [facility] electronic health record for goal details.  Goals partially met.  Barriers to achieving goals:   discharge from facility.    Therapy recommendation(s):    See below  Re-assess swallow function and trial upgraded diet textures during SLP swallow Tx at TCU as able.          03/25/24 1420   SLP Discharge Planning   SLP Plan PO tolerance, ADAT   SLP Discharge Recommendation Transitional Care Facility   SLP Rationale for DC Rec Pt welll below baseline re: swallow function; Consider OP VFSS if continued swallowing difficulty noted after discharge   SLP Brief overview of current status  Increased fatigue, decreased swallow awareness, and mod-max cues needed to swallow in Tx this pm.  Rec: downgrade to a Pureed Diet (level 4); Mildly Thick (level 2), 1:1 assist/supervision, only when alert, sit at 90 degrees, liquids by spoon only, verify/cue swallows, hold po if increased aspiration signs noted/respiratory status declines

## 2024-03-25 NOTE — PLAN OF CARE
"  Problem: Adult Inpatient Plan of Care  Goal: Plan of Care Review  Description: The Plan of Care Review/Shift note should be completed every shift.  The Outcome Evaluation is a brief statement about your assessment that the patient is improving, declining, or no change.  This information will be displayed automatically on your shift  note.  Outcome: Progressing  Flowsheets (Taken 3/25/2024 0646)  Outcome Evaluation: Alert and oriented with Intermittent confusion.  Plan of Care Reviewed With: patient  Overall Patient Progress: no change  Goal: Patient-Specific Goal (Individualized)  Description: You can add care plan individualizations to a care plan. Examples of Individualization might be:  \"Parent requests to be called daily at 9am for status\", \"I have a hard time hearing out of my right ear\", or \"Do not touch me to wake me up as it startles  me\".  Outcome: Progressing  Goal: Absence of Hospital-Acquired Illness or Injury  Outcome: Progressing  Intervention: Identify and Manage Fall Risk  Recent Flowsheet Documentation  Taken 3/25/2024 0600 by Kirsty Agarwal RN  Safety Promotion/Fall Prevention: safety round/check completed  Taken 3/25/2024 0500 by Kirsty Agarwal RN  Safety Promotion/Fall Prevention: safety round/check completed  Taken 3/25/2024 0400 by Kirsty Agarwal RN  Safety Promotion/Fall Prevention: safety round/check completed  Taken 3/25/2024 0300 by Kirsty Agarwal RN  Safety Promotion/Fall Prevention: safety round/check completed  Taken 3/25/2024 0200 by Kirsty Agarwal RN  Safety Promotion/Fall Prevention:   safety round/check completed   nonskid shoes/slippers when out of bed   increase visualization of patient   assistive device/personal items within reach   activity supervised  Taken 3/25/2024 0100 by Kirsty Agarwal RN  Safety Promotion/Fall Prevention: safety round/check completed  Taken 3/25/2024 0000 by Kirsty Agarwal RN  Safety Promotion/Fall Prevention: safety round/check " completed  Taken 3/24/2024 2300 by Kirsty Agarwal RN  Safety Promotion/Fall Prevention: safety round/check completed  Intervention: Prevent Skin Injury  Recent Flowsheet Documentation  Taken 3/25/2024 0200 by Kirsty Agarwal RN  Device Skin Pressure Protection:   absorbent pad utilized/changed   positioning supports utilized  Intervention: Prevent and Manage VTE (Venous Thromboembolism) Risk  Recent Flowsheet Documentation  Taken 3/25/2024 0200 by Kirsty Agarwal RN  VTE Prevention/Management: SCDs (sequential compression devices) off  Intervention: Prevent Infection  Recent Flowsheet Documentation  Taken 3/25/2024 0200 by Kirsty Agarwal RN  Infection Prevention:   single patient room provided   rest/sleep promoted   personal protective equipment utilized   hand hygiene promoted  Goal: Optimal Comfort and Wellbeing  Outcome: Progressing  Goal: Readiness for Transition of Care  Outcome: Progressing     Problem: Skin Injury Risk Increased  Goal: Skin Health and Integrity  Outcome: Progressing  Intervention: Plan: Nurse Driven Intervention: Moisture Management  Recent Flowsheet Documentation  Taken 3/25/2024 0200 by Kirsty Agarwal RN  Moisture Interventions: Incontinence pad  Plan: Moisture Management: moisturize skin BID  Intervention: Plan: Nurse Driven Intervention: Friction and Shear  Recent Flowsheet Documentation  Taken 3/25/2024 0200 by Kirsty Agarwal RN  Friction/Shear Interventions: HOB 30 degrees or less  Intervention: Optimize Skin Protection  Recent Flowsheet Documentation  Taken 3/25/2024 0200 by Kirsty Agarwal RN  Pressure Reduction Techniques: frequent weight shift encouraged  Pressure Reduction Devices: positioning supports utilized     Problem: Sepsis/Septic Shock  Goal: Optimal Coping  Outcome: Progressing  Goal: Absence of Infection Signs and Symptoms  Outcome: Progressing  Intervention: Initiate Sepsis Management  Recent Flowsheet Documentation  Taken 3/25/2024 0200 by Stefano  Kirsty GONZALEZ RN  Infection Prevention:   single patient room provided   rest/sleep promoted   personal protective equipment utilized   hand hygiene promoted  Isolation Precautions: droplet precautions maintained  Goal: Optimal Nutrition Intake  Outcome: Progressing     Problem: Delirium  Goal: Optimal Coping  Outcome: Progressing  Goal: Improved Behavioral Control  Outcome: Progressing  Intervention: Minimize Safety Risk  Recent Flowsheet Documentation  Taken 3/25/2024 0200 by Kirsty Agarwal RN  Communication Enhancement Strategies:   extra time allowed for response   call light answered in person  Enhanced Safety Measures:   room near unit station   pain management  Goal: Improved Attention and Thought Clarity  Outcome: Progressing  Intervention: Maximize Cognitive Function  Recent Flowsheet Documentation  Taken 3/25/2024 0200 by Kirsty Agarwal RN  Reorientation Measures: reorientation provided  Goal: Improved Sleep  Outcome: Progressing     Problem: Fall Injury Risk  Goal: Absence of Fall and Fall-Related Injury  Outcome: Progressing  Intervention: Identify and Manage Contributors  Recent Flowsheet Documentation  Taken 3/25/2024 0200 by Kirsty Agarwal RN  Medication Review/Management: medications reviewed  Intervention: Promote Injury-Free Environment  Recent Flowsheet Documentation  Taken 3/25/2024 0600 by Kirsty Agarwal RN  Safety Promotion/Fall Prevention: safety round/check completed  Taken 3/25/2024 0500 by Kirsty Agarwal RN  Safety Promotion/Fall Prevention: safety round/check completed  Taken 3/25/2024 0400 by Kirsty Agarwal RN  Safety Promotion/Fall Prevention: safety round/check completed  Taken 3/25/2024 0300 by Kirsty Agarwal RN  Safety Promotion/Fall Prevention: safety round/check completed  Taken 3/25/2024 0200 by Kirsty Agarwal RN  Safety Promotion/Fall Prevention:   safety round/check completed   nonskid shoes/slippers when out of bed   increase visualization of patient    "assistive device/personal items within reach   activity supervised  Taken 3/25/2024 0100 by Kirsty Agarwal RN  Safety Promotion/Fall Prevention: safety round/check completed  Taken 3/25/2024 0000 by Kirsty Agarwal RN  Safety Promotion/Fall Prevention: safety round/check completed  Taken 3/24/2024 2300 by Kirsty Agarwal RN  Safety Promotion/Fall Prevention: safety round/check completed   Goal Outcome Evaluation:      Plan of Care Reviewed With: patient    Overall Patient Progress: no changeOverall Patient Progress: no change    Outcome Evaluation: Alert and oriented with Intermittent confusion.    Pt is alert and oriented with intermittent confusion. Pt denies any pain. Diminished lung sounds with crackles and O2 sat @ 95% RA.     Pt is 2 assist pivot; incontinent of urine and care completed.Ongoing monitoring.    Plan: Possible discharge to TCU today.    BP (!) 165/55 (BP Location: Left arm)   Pulse 77   Temp 97.7  F (36.5  C) (Oral)   Resp 18   Ht 1.778 m (5' 10\")   Wt 78.7 kg (173 lb 8 oz)   SpO2 95%   BMI 24.89 kg/m       "

## 2024-03-25 NOTE — DISCHARGE SUMMARY
[facility]  Hospitalist Discharge Summary      Date of Admission:  3/15/2024  Date of Discharge:  3/25/2024  3:01 PM  Discharging Provider: Jovita Salgado DO  Discharge Service: Hospitalist Service    Discharge Diagnoses   Influenza A  Acute  hypoxic respiratory failure, resolved  Physical deconditioning  Acute Infectious Encephalopathy, resolved   Intermittent sinus bradycardia  Hypertension  Acute on chronic lumbar and right hip pain, improved  Dysphagia  Hypernatremia - Resolved  Elevated troponin  Left bundle branch block  CKD stage IV  History of coronary artery disease s/p PCI to his LAD  Acute on chronic anemia    Clinically Significant Risk Factors          Follow-ups Needed After Discharge   Follow-up Appointments     Follow Up and recommended labs and tests      Follow up with senior care physician.  The following labs/tests are   recommended: BMP.            Discharge Disposition   Discharged to short-term rehab facility  Condition at discharge: Stable    Hospital Course   Augie Powell is an 87yo male with PMH of CAD s/p PCI of LAD 2018, HTN, HL, CKD IV, and RCC s/p right nephrectomy, who was admitted on 3/15/2024 with fever, weakness, sore throat and urinary incontinence. Found to have influenza A.      TTE completed which was unchanged compared to 2022. Patient denied chest pain. He was admitted for further treatment of influenza A. He was treated with Tamiflu. He had acute on chronic low back and right hip pain. He also had significant confusion and lethargy consistent with infectious encephalopathy. He developed fever for which he was started on broad spectrum antibiotics- Zosyn and Zithromax. Zosyn was changed to cefepime and vancomycin was added. Blood and cultures were no growth. He began to improve slowly but was quite weak. PT recommended TCU on discharge. Hospital course was complicated by intermittent bradycardia.  Prior to admission metoprolol was ultimately  discontinued.  Patient continued to have intermittent agitation and somnolence.    His wife feels that his agitation is due to her not being present all the time and has found if she is able to talk to him by phone this helps significantly with agitation.     Problem list:    Influenza A  Acute  hypoxic respiratory failure, resolved  Physical deconditioning  -Completed Tamiflu  -No other infection found  -Stopped cefepime, vancomycin, and Zithromax on 3/21/2024    Acute Infectious Encephalopathy - Greatly Improved: Had intermittent agitation followed by somnolence. He was given Seroquel 25 mg overnights but then had significant sedation during the day. This was decreased to 12.5 last night, still has significant somnolence. His wife reports that he seems agitated because he is not able to be with her, she has found if he is able to talk to her by phone he calms dramatically. Will try to minimize medications and rely more on family providing reassurance by phone if needed. He is greatly improved from his mental standpoint.  - Continue Seroquel to 6.25 mg QHS - could stop in the next day or so as long as he continues to have improved mental status  - If patient becomes agitated, please help him call his wife as she can talk by phone and calm him down - she can be called at any time    Intermittent sinus bradycardia  -This has persisted.  PTA Metoprolol discontinued.    Hypertension  -As above metoprolol discontinued on 3/22/2024.  I ordered Norvasc 5 mg daily.  Continue for now.     Acute on chronic lumbar and right hip pain, improved:  -Suspect acute illness is exacerbating chronic low back and right hip pain  -Pain scores are better  -PT/OT appreciated  -Robaxin helped but does make the patient drowsy  -Continue scheduled Tylenol    Dysphagia: modified diet per SLP, continue SLP      Hypernatremia - Resolved     Elevated troponin  Left bundle branch block  -Most likely type 2 MI related to demand ischemia in the  setting of influenza infection.  Patient denied chest pain.  TTE is unchanged from 2022.       CKD stage IV: Creatinine stable.     History of coronary artery disease s/p PCI to his LAD:  -Continue PTA aspirin and metoprolol. As above Metoprolol discontinued due to bradycardia and started on Amlodipine.     Acute on chronic anemia    Consultations This Hospital Stay   PHYSICAL THERAPY ADULT IP CONSULT  OCCUPATIONAL THERAPY ADULT IP CONSULT  PHARMACY IP CONSULT  CARE MANAGEMENT / SOCIAL WORK IP CONSULT  PHARMACY TO DOSE VANCO  SPEECH LANGUAGE PATH ADULT IP CONSULT  PHYSICAL THERAPY ADULT IP CONSULT  OCCUPATIONAL THERAPY ADULT IP CONSULT  SPEECH LANGUAGE PATH ADULT IP CONSULT    Code Status   Prior    Time Spent on this Encounter   IJovita DO, personally saw the patient today and spent greater than 30 minutes discharging this patient.       Jovita Salgado DO  [place] [address] [facility]  [address] [place] [address]  Phone: [phone]  Fax: [phone]  ______________________________________________________________________    Physical Exam   Vital Signs:                    Weight: 173 lbs 8.03 oz  Constitutional: Awake, alert, no distress, and cooperative  Cardiovascular: Regular rate and rhythm, normal S1 and S2, no S3 or S4, and no murmur noted  Respiratory: No increased work of breathing, good air exchange, clear to auscultation bilaterally, no crackles or wheezing  Gastrointestinal: Abdomen soft, non-tender, non-distended. BS normal. No masses, organomegaly        Primary Care Physician   Merlin Emery Brown    Discharge Orders      General info for SNF    Length of Stay Estimate: Short Term Care: Estimated # of Days <30  Condition at Discharge: Stable  Level of care:skilled   Rehabilitation Potential: Fair  Admission H&P remains valid and up-to-date: Yes  Recent Chemotherapy: N/A  Use Nursing Home Standing Orders: Yes     Mantoux instructions    Give  two-step Mantoux (PPD) Per Facility Policy Yes     Follow Up and recommended labs and tests    Follow up with custodial physician.  The following labs/tests are recommended: BMP.     Reason for your hospital stay    You were in the hospital for an influenza infection.     Activity - Up with nursing assistance     Physical Therapy Adult Consult    Evaluate and treat as clinically indicated.    Reason:  deconditioning     Occupational Therapy Adult Consult    Evaluate and treat as clinically indicated.    Reason:  deconditioning     Speech Language Path Adult Consult    Evaluate and treat as clinically indicated.    Reason:  dysphagia     Droplet Isolation     Fall precautions     Diet    Follow this diet upon discharge: Orders Placed This Encounter      Snacks/Supplements Adult: Nepro Oral Supplement; With Meals      Diet      Combination Diet Pureed Diet (level 4); Mildly Thick (level 2) (1:1 assist/supervision, only when alert, sit at 90 degrees, liquids by spoon only, verify/cue swallows, hold po if increased aspiration signs noted/respiratory status declines)       Significant Results and Procedures   Results for orders placed or performed during the hospital encounter of 03/15/24   Chest XR,  PA & LAT    Narrative    EXAM: XR CHEST 2 VIEWS  LOCATION: [facility]  DATE: 3/15/2024    INDICATION: cough  COMPARISON: 12/12/2022      Impression    IMPRESSION: Interstitial thickening in the periphery of bilateral lung appears mildly increased, likely progressive fibrosis. No confluent airspace opacities, pleural effusion or pneumothorax. Stable mild cardiomegaly without pulmonary vascular   congestion.   MR LUMBAR SPINE W/O CONTRAST    Narrative    EXAM: MR LUMBAR SPINE W/O CONTRAST  LOCATION: [facility]  DATE: 3/15/2024    INDICATION: Lumbar radiculopathy, urinary incontinence. Severe back and right leg pain.  COMPARISON: None.  TECHNIQUE: Routine Lumbar Spine MRI  without IV contrast.    FINDINGS:   Nomenclature is based on 5 lumbar type vertebral bodies. Straightening of the normal lumbar lordosis and mild levoconvex curvature, the latter centered at L3. Stepwise grade 1 retrolisthesis at L1-L3 measuring 2-3 mm and at L3-L4 measuring 4 mm,   degenerative. Maintained vertebral body heights. Multilevel intervertebral disc height loss and desiccation, worst and advanced at L1-L3, to a lesser extent at L3-L4 and L5-S1. Postoperative changes related to bilateral decompressive laminectomies and as   well as posterior and interbody instrumented fusion at L4-L5. Baastrup's configuration at L2-L3 without significant interspinous edema. Mild Modic type I degenerative change at L1-L2, trace at L3-L4. Several small Schmorl's nodes, most notably involving   the inferior L1 endplate. No suspicious bone marrow signal intensity. Unremarkable conus medullaris terminating at L1. Crowding of the cauda equina nerve roots with areas spinal canal stenosis, as detailed below.    Mild to moderate degenerative change of the sacroiliac joints. Partial fatty atrophy of the dorsal paraspinal musculature. A moderately distended urinary bladder. Status post right nephrectomy. Multiple homogeneously T2-hyperintense partially imaged   peripelvic and cortical left renal lesions, most likely simple renal cysts although inadequately characterized on this exam due to technique.    T12-L1: Disc bulge, endplate osteophytic spurring, and mild bilateral facet arthrosis with mild left foraminal narrowing. No significant spinal canal or right foraminal stenosis.     L1-L2: A disc bulge and a superimposed left subarticular/foraminal disc extrusion measuring up to 3 mm in its radial dimension with an underlying annular fissure. Along with prominent epidural fat, endplate osteophytic spurring, as well as mild bilateral   facet arthrosis, this contributes to moderate spinal canal stenosis with asymmetrically greater  narrowing of the left lateral recess. Mild to moderate right and moderate left foraminal narrowing.    L2-L3: Disc bulge, endplate osteophytic spurring, and mild bilateral facet arthrosis with mild spinal canal stenosis as well as mild right and moderate left foraminal narrowing.     L3-L4: Disc bulge, hypertrophy of the ligamenta flava, and mild to moderate bilateral facet arthrosis contributing to moderate narrowing of the lateral recesses and mild central spinal canal stenosis. Severe bilateral foraminal narrowing.    L4-L5: No significant spinal canal stenosis status post decompressive laminectomies. Residual disc bulge, endplate osteophytic spurring, and hypertrophic dorsal element changes contributing to mild to moderate bilateral foraminal narrowing, slightly   worse on the right.    L5-S1: No significant spinal canal stenosis status post decompressive laminectomies. Disc bulge/height loss, endplate osteophytic spurring, and mild to moderate bilateral facet arthrosis contributing to likely moderate bilateral foraminal narrowing,   although portions of the neural foramina are obscured by metallic susceptibility artifact.      Impression    IMPRESSION:  1.  Scoliosis, postoperative changes, and multilevel spondylosis, as detailed.  2.  Moderate spinal canal stenosis at L1-L2 with asymmetrically greater LEFT lateral recess narrowing. Mild spinal canal stenosis at L2-L3. Moderate narrowing of the lateral recesses at L3-L4 with mild central spinal canal stenosis.  3.  Multilevel foraminal narrowing, worst and severe at L3-L4 bilaterally.  4.  Mild Modic type I degenerative change at L1-L2 and L3-L4, which may contribute to symptoms of low back pain.   CT Femur Right w/o Contrast    Narrative    EXAM: CT FEMUR THIGH RIGHT W/O CONTRAST  LOCATION: [facility]  DATE: 3/15/2024    INDICATION: Severe thigh pain.  Rule out fracture, fluid collection, etc.  COMPARISON: None.  TECHNIQUE: Noncontrast.  Axial, sagittal and coronal thin-section reconstruction. Dose reduction techniques were used.     FINDINGS:     BONES/JOINTS:  -No acute fracture or malalignment. Moderate right hip joint degenerative changes. Tricompartmental degenerative changes of the knee, severe in the medial compartment. Osteopenia. Moderate knee joint effusion. There is a 4 mm intra-articular body in the   knee joint anteriorly.    SOFT TISSUES:  -No significant soft tissue swelling. No discrete fluid collection. Fatty muscle atrophy of the semimembranosus muscle. Minimal calcification overlying the right greater trochanter suggests calcific tendinopathy. Atherosclerosis.      Impression    IMPRESSION:  1.  No acute osseous or soft tissue abnormality.  2.  Minimal calcification overlying the right greater trochanter suggests calcific tendinopathy.  3.  Moderate hip joint degenerative changes.  4.  Severe knee joint degenerative changes medially.  5.  Moderate knee joint effusion with a small intra-articular body.  6.  Other ancillary findings as described above.     XR Chest Port 1 View    Narrative    EXAM: XR CHEST PORT 1 VIEW  LOCATION: [facility]  DATE: 3/17/2024    INDICATION: Hypoxia, AMS.  COMPARISON: 03/15/2024.      Impression    IMPRESSION: There are interstitial infiltrates in the mid and lower lungs unchanged. Stable heart size.   XR Chest Port 1 View    Narrative    EXAM: XR CHEST PORT 1 VIEW  LOCATION: [facility]  DATE: 3/20/2024    INDICATION: increased somnolence  COMPARISON: 03/17/2024      Impression    IMPRESSION: Stable cardiomediastinal silhouette. Bilateral infiltrates with slight increase in the lung bases. No significant effusion. Degenerative change osseous structures.   Echocardiogram Complete     Value    LVEF  55-60%    Narrative    [id number]  [id number]  [id number]  [id number]^ALFIE^AJAY^ERINN     [facility]  Echocardiography Laboratory  [address]  [place] [address]     Name: TERI BUCHANAN  MRN: [id number]  : 1935  Study Date: 03/15/2024 01:02 PM  Age: 88 yrs  Gender: Male  Patient Location: [facility]  Reason For Study: LBBB  Ordering Physician: AJAY JUDGE  Referring Physician: Brown, Merlin Emery  Performed By: Deborah Mendez     BSA: 2.0 m2  Height: 70 in  Weight: 188 lb  HR: 87  BP: 135/62 mmHg  ______________________________________________________________________________  Procedure  Complete Portable Echo Adult. Optison (NDC #[id number]) given intravenously.  Technically difficult study.  ______________________________________________________________________________  Interpretation Summary     Technically difficult study     Left ventricular systolic function is normal.The visual ejection fraction is  55-60%.Septal motion is consistent with conduction abnormality.  The right ventricular systolic function is normal.  Mild to moderate valvular aortic stenosis- Mean gradient 15 mmHg aortic valve  area 1.4 cmÂ      Compared to prior study dated 2022 no significant changes  ______________________________________________________________________________  Left Ventricle  Diastolic Doppler findings (E/E' ratio and/or other parameters) suggest left  ventricular filling pressures are indeterminate. Left ventricular systolic  function is normal. The visual ejection fraction is 55-60%. Septal motion is  consistent with conduction abnormality.     Right Ventricle  The right ventricle is normal size. The right ventricular systolic function is  normal.     Atria  Normal left atrial size. Right atrium not well visualized. Right atrial size  is normal. There is no color Doppler evidence of an atrial shunt.     Mitral Valve  There is mild mitral annular calcification. There is trace mitral  regurgitation. There is mild mitral stenosis. The mean mitral valve gradient  is 3.8 mmHg.     Tricuspid Valve  There is trace tricuspid regurgitation.  Right ventricular systolic pressure  could not be approximated due to inadequate tricuspid regurgitation.     Aortic Valve  The aortic valve is not well visualized. There is trace aortic regurgitation.  Mild to moderate valvular aortic stenosis. The mean AoV pressure gradient is  15.0 mmHg. JESSICA 1.4 cm2.     Pulmonic Valve  The pulmonic valve is not well visualized. There is trace pulmonic valvular  regurgitation. There is no pulmonic valvular stenosis.     Vessels  The aortic root is normal size. Normal size ascending aorta.     Pericardium  There is no pericardial effusion.     Rhythm  Sinus rhythm was noted.  ______________________________________________________________________________  MMode/2D Measurements & Calculations  Ao root diam: 3.6 cm     asc Aorta Diam: 3.5 cm  LVOT diam: 2.0 cm  LVOT area: 3.2 cm2  Ao root diam index Ht(cm/m): 2.0  Ao root diam index BSA (cm/m2): 1.8  Asc Ao diam index BSA (cm/m2): 1.7  Asc Ao diam index Ht(cm/m): 2.0  LA Volume (BP): 47.9 ml  LA Volume Index (BP): 23.6 ml/m2  RV Base: 4.1 cm     Doppler Measurements & Calculations  MV E max sarwat: 99.1 cm/sec  MV A max sarwat: 122.0 cm/sec  MV E/A: 0.81  MV max P.5 mmHg  MV mean PG: 3.8 mmHg  MV V2 VTI: 33.6 cm  MVA(VTI): 2.0 cm2  MV dec time: 0.18 sec  Ao V2 max: 247.3 cm/sec  Ao max P.0 mmHg  Ao V2 mean: 170.0 cm/sec  Ao mean PG: 15.0 mmHg  Ao V2 VTI: 41.8 cm  JESSICA(I,D): 1.6 cm2  JESSICA(V,D): 1.6 cm2  LV V1 max P.0 mmHg  LV V1 max: 122.3 cm/sec  LV V1 VTI: 20.4 cm  SV(LVOT): 66.0 ml  SI(LVOT): 32.5 ml/m2  AV Sarwat Ratio (DI): 0.49  JESSICA Index (cm2/m2): 0.78  E/E' avg: 10.7  Lateral E/e': 8.4  Medial E/e': 13.0     ______________________________________________________________________________  Report approved by: Сергей Baldwin 03/15/2024 02:05 PM             Discharge Medications   Discharge Medication List as of 3/25/2024  2:23 PM        START taking these medications    Details   amLODIPine (NORVASC) 5 MG tablet Take 1  tablet (5 mg) by mouth daily, Transitional      benzocaine-menthol (CHLORASEPTIC) 6-10 MG lozenge Place 1 lozenge inside cheek every hour as needed for sore throat, Transitional      QUEtiapine (SEROQUEL) 25 MG tablet Take 0.25 tablets (6.25 mg) by mouth at bedtime, Transitional      senna-docusate (SENOKOT-S/PERICOLACE) 8.6-50 MG tablet Take 1 tablet by mouth 2 times daily as needed for constipation, Transitional           CONTINUE these medications which have CHANGED    Details   guaiFENesin-codeine (ROBITUSSIN AC) 100-10 MG/5ML solution Take 5 mLs by mouth every 4 hours as needed for cough, Disp-180 mL, R-0, Local Print           CONTINUE these medications which have NOT CHANGED    Details   aspirin (ASA) 81 MG EC tablet Take 1 tablet (81 mg) by mouth daily, Disp-90 tablet, R-3, E-Prescribe      Calcium-Magnesium-Zinc 500-250-12.5 MG TABS Take 1 tablet by mouth daily, Historical      nitroGLYcerin (NITROSTAT) 0.4 MG sublingual tablet For chest pain place 1 tablet under the tongue every 5 minutes for 3 doses. If symptoms persist 5 minutes after 1st dose call 911., Disp-25 tablet, R-3, E-Prescribe      Vitamin D3 (CHOLECALCIFEROL) 125 MCG (5000 UT) tablet Take 1 tablet (125 mcg) by mouth daily, Transitional           STOP taking these medications       metoprolol succinate ER (TOPROL XL) 25 MG 24 hr tablet Comments:   Reason for Stopping:             Allergies   No Known Allergies

## 2024-03-25 NOTE — PROGRESS NOTES
Care Management Discharge Note    Discharge Date: 03/25/2024       [facility]    Discharge Services: [facility]      Discharge DME:      Discharge Transportation: family     Private pay costs discussed: transportation costs    Does the patient's insurance plan have a 3 day qualifying hospital stay waiver?  Yes     Which insurance plan 3 day waiver is available? Alternative insurance waiver    Will the waiver be used for post-acute placement? Yes     PAS Confirmation Code: [id number]  Patient/family educated on Medicare website which has current facility and service quality ratings:      Education Provided on the Discharge Plan:    Persons Notified of Discharge Plans: TCU, Wife, patient   Patient/Family in Agreement with the Plan: yes    Handoff Referral Completed: No    Additional Information:  SW spoke with patient. He gave  permission to call his wife about transportation. Patient's wife will transport at 1330. Updated TCU. Updated RN and MD.     Addendum 1129: Orders were sent to TCU    Addendum 1327: SW was informed that patient needs a ride. Discussed costs with spouse. Set ride up for 1415. Updated RN, wife, and TCU    CRISTOPHER Richmond, SW  Emergency Room   Please contact the SW on the floor in which the patient is staying for any questions or concerns

## 2024-03-25 NOTE — PLAN OF CARE
Physical Therapy Discharge Summary    Reason for therapy discharge:    Discharged to transitional care facility.    Progress towards therapy goal(s). See goals on Care Plan in [facility] electronic health record for goal details.  Goals not met.  Barriers to achieving goals:   discharge from facility.    Therapy recommendation(s):    Continued therapy is recommended.  Rationale/Recommendations:  PT as indicated at TCU.

## 2024-03-25 NOTE — PROGRESS NOTES
Care Management Discharge Note    Discharge Date: 03/25/2024       Discharge Disposition:      Discharge Services:      Discharge DME:      Discharge Transportation: car, drives self    Private pay costs discussed: Not applicable    Does the patient's insurance plan have a 3 day qualifying hospital stay waiver?  No    PAS Confirmation Code: [id number]  Patient/family educated on Medicare website which has current facility and service quality ratings:      Education Provided on the Discharge Plan:    Persons Notified of Discharge Plans: Pt, spouse, facility  Patient/Family in Agreement with the Plan: yes    Handoff Referral Completed: No    Additional Information:  Sw following for discharge planning.     [facility] Auth complete. Auth # [id number]    [id number]    [facility] provided with auth information. They requested that pt arrive at 1pm or later. Sw to see pt and discuss transportation options.    Social work will continue to follow and assist with discharge planning as needed.    CRISTOPHER Trevino, [facility]  Inpatient Care Coordination  [employer]  [phone]      CRISTOPHER Trevino

## 2024-03-27 PROBLEM — Z87.09 HISTORY OF INFLUENZA: Status: ACTIVE | Noted: 2024-01-01

## 2024-03-27 PROBLEM — N28.9 ACUTE ON CHRONIC RENAL INSUFFICIENCY: Status: ACTIVE | Noted: 2024-01-01

## 2024-03-27 PROBLEM — G93.40 ENCEPHALOPATHY: Status: ACTIVE | Noted: 2024-01-01

## 2024-03-27 PROBLEM — N18.9 ACUTE ON CHRONIC RENAL INSUFFICIENCY: Status: ACTIVE | Noted: 2024-01-01

## 2024-03-27 NOTE — ED TRIAGE NOTES
Pt present via EMS, per EMS pt is from [facility] tcu admitted 3/24 for encephalopathy for acute infection     Pt has had severe cognitive decline where he is aphasic and right sided weakness, which staff at facility noted on Monday    Pt also fell yesterday and has bruising to abdomen unwitnessed fall not sure if hit head.    Staff report since Monday and he has had cognitive decline in demeanor personality and ability to talk since Monday

## 2024-03-27 NOTE — ED NOTES
Bed: ED06  Expected date:   Expected time:   Means of arrival:   Comments:  596 Failure to thrive

## 2024-03-27 NOTE — ED PROVIDER NOTES
History     Chief Complaint:  Altered Mental Status       HPI   Augie Powell is a 88 year old male who presents to the ER at 1752 from [facility].  The patient had been sent there from [facility] 2 days ago on 3/25/2024.  Patient has a history of severe cognitive decline.  He has a history of right-sided foot drop.    The patient had a fall yesterday and sustained bruising to his abdomen.  It was an unwitnessed fall and thus it is unsure whether or not he hit his head.  The [facility] staff report that over the last 2 days he has experienced worsening cognitive decline in demeanor, personality and the ability to talk.      Independent Historian:   Due to his dementia the patient cannot provide any history.  History is gathered by the EMS report of the care facility hand off.    Review of External Notes:   [facility] Hospitalist Discharge Summary       Date of Admission:  3/15/2024 Date of Discharge:  3/25/2024  3:01 PM  Discharging Provider: Jovita Salgado,    Discharge Service: Hospitalist Service        Discharge Diagnoses  Influenza A  Acute  hypoxic respiratory failure, resolved  Physical deconditioning  Acute Infectious Encephalopathy, resolved   Intermittent sinus bradycardia  Hypertension  Acute on chronic lumbar and right hip pain, improved  Dysphagia  Hypernatremia - Resolved  Elevated troponin  Left bundle branch block  CKD stage IV  History of coronary artery disease s/p PCI to his LAD  Acute on chronic anemia      Discharge Disposition   Discharged to short-term rehab facility  Condition at discharge: Stable     Hospital Course  Augie Powell is an 87yo male with PMH of CAD s/p PCI of LAD 2018, HTN, HL, CKD IV, and RCC s/p right nephrectomy, who was admitted on 3/15/2024 with fever, weakness, sore throat and urinary incontinence. Found to have influenza A.      TTE completed which was unchanged compared to 2022. Patient denied chest pain. He was admitted for  further treatment of influenza A. He was treated with Tamiflu. He had acute on chronic low back and right hip pain. He also had significant confusion and lethargy consistent with infectious encephalopathy. He developed fever for which he was started on broad spectrum antibiotics- Zosyn and Zithromax. Zosyn was changed to cefepime and vancomycin was added. Blood and cultures were no growth. He began to improve slowly but was quite weak. PT recommended TCU on discharge. Hospital course was complicated by intermittent bradycardia.  Prior to admission metoprolol was ultimately discontinued.  Patient continued to have intermittent agitation and somnolence.     His wife feels that his agitation is due to her not being present all the time and has found if she is able to talk to him by phone this helps significantly with agitation.    Problem list:     Influenza A  Acute  hypoxic respiratory failure, resolved  Physical deconditioning  -Completed Tamiflu  -No other infection found  -Stopped cefepime, vancomycin, and Zithromax on 3/21/2024     Acute Infectious Encephalopathy - Greatly Improved: Had intermittent agitation followed by somnolence. He was given Seroquel 25 mg overnights but then had significant sedation during the day. This was decreased to 12.5 last night, still has significant somnolence. His wife reports that he seems agitated because he is not able to be with her, she has found if he is able to talk to her by phone he calms dramatically. Will try to minimize medications and rely more on family providing reassurance by phone if needed. He is greatly improved from his mental standpoint.  - Continue Seroquel to 6.25 mg QHS - could stop in the next day or so as long as he continues to have improved mental status  - If patient becomes agitated, please help him call his wife as she can talk by phone and calm him down - she can be called at any time     Intermittent sinus bradycardia  -This has persisted.  PTA  Metoprolol discontinued.     Hypertension  -As above metoprolol discontinued on 3/22/2024.  I ordered Norvasc 5 mg daily.  Continue for now.     Acute on chronic lumbar and right hip pain, improved:  -Suspect acute illness is exacerbating chronic low back and right hip pain  -Pain scores are better  -PT/OT appreciated  -Robaxin helped but does make the patient drowsy  -Continue scheduled Tylenol     Dysphagia: modified diet per SLP, continue SLP      Hypernatremia - Resolved     Elevated troponin  Left bundle branch block  -Most likely type 2 MI related to demand ischemia in the setting of influenza infection.  Patient denied chest pain.  TTE is unchanged from 2022.       CKD stage IV: Creatinine stable.     History of coronary artery disease s/p PCI to his LAD:  -Continue PTA aspirin and metoprolol. As above Metoprolol discontinued due to bradycardia and started on Amlodipine.     Acute on chronic anemia                        Medications:    amLODIPine (NORVASC) 5 MG tablet  aspirin (ASA) 81 MG EC tablet  benzocaine-menthol (CHLORASEPTIC) 6-10 MG lozenge  Calcium-Magnesium-Zinc 500-250-12.5 MG TABS  guaiFENesin-codeine (ROBITUSSIN AC) 100-10 MG/5ML solution  nitroGLYcerin (NITROSTAT) 0.4 MG sublingual tablet  QUEtiapine (SEROQUEL) 25 MG tablet  senna-docusate (SENOKOT-S/PERICOLACE) 8.6-50 MG tablet  Vitamin D3 (CHOLECALCIFEROL) 125 MCG (5000 UT) tablet        Past Medical History:    Past Medical History:   Diagnosis Date     Aortic stenosis, mild      Ascending aorta dilatation (H)      CKD (chronic kidney disease) stage 4, GFR 15-29 ml/min (H) 09/17/2018     Coronary artery disease 10/2018     Hyperlipidemia      Hypertension      Renal cell cancer, unspecified laterality (H) 05/10/2017     Renal disease      Rheumatic fever      Spondylosis with myelopathy, lumbar region        Past Surgical History:    Past Surgical History:   Procedure Laterality Date     BACK SURGERY       CHOLECYSTECTOMY       DECOMPRESSION  LUMBAR TWO LEVELS  12/01/2014    Procedure: DECOMPRESSION LUMBAR TWO LEVELS;  Surgeon: Remy Harmon MD;  Location:  OR     HERNIA REPAIR       LAMINECTOMY, FUSION LUMBAR ONE LEVEL, COMBINED N/A 12/01/2014    Procedure: COMBINED LAMINECTOMY, FUSION LUMBAR ONE LEVEL;  Surgeon: Remy Harmon MD;  Location:  OR     PHACOEMULSIFICATION CLEAR CORNEA WITH STANDARD INTRAOCULAR LENS IMPLANT  10/22/2012    Procedure: PHACOEMULSIFICATION CLEAR CORNEA WITH STANDARD INTRAOCULAR LENS IMPLANT;  RIGHT EYE PHACOEMULSIFICATION CLEAR CORNEA WITH STANDARD INTRAOCULAR LENS IMPLANT ;  Surgeon: Grupo Granger MD;  Location:  [facility]     right nephrectomy[       STENT,CORONARY, S660 24/30  10/2018    mLAD  mod RCA, Lcx        Physical Exam     Patient Vitals for the past 24 hrs:   BP Temp Temp src Pulse Resp SpO2   03/27/24 2251 (!) 148/69 98.2  F (36.8  C) Axillary 86 22 95 %   03/27/24 2200 (!) 146/81 -- -- 84 23 --   03/27/24 2130 (!) 141/79 -- -- 92 23 --   03/27/24 2030 (!) 145/69 -- -- 90 26 92 %   03/27/24 2009 -- 97.5  F (36.4  C) Temporal -- -- --   03/27/24 2000 (!) 172/132 -- -- 90 19 93 %   03/27/24 1800 (!) 113/90 -- -- 87 16 --   03/27/24 1759 -- -- -- -- -- 95 %        Physical Exam  General: Alert, No distress. Nontoxic appearance.    Head: No signs of trauma.   Mouth/Throat: Oropharynx moist.   Eyes: Conjunctivae are normal. Pupils are equal..   Neck: Normal range of motion.    CV: Appears well perfused.  Resp:No respiratory distress.   MSK: Normal range of motion. No obvious deformity.   Neuro: The patient is alert and interactive. SCOTT.  The patient has a history of right-sided foot drop x 8 years.   strengths are equal.  The patient's speech is thickened.  The patient is following commands  Skin: No lesion or sign of trauma noted.   Psych: normal mood and affect. behavior is normal.       Emergency Department Course     Imaging:  XR Chest 2 Views   Final Result   IMPRESSION: Stable cardiac  size. No airspace opacity, pleural effusion or pneumothorax.      Head CT w/o contrast   Final Result   IMPRESSION:   1.  No acute intracranial abnormality. Chronic changes as above.             Laboratory:  Labs Ordered and Resulted from Time of ED Arrival to Time of ED Departure   COMPREHENSIVE METABOLIC PANEL - Abnormal       Result Value    Sodium 149 (*)     Potassium 4.3      Carbon Dioxide (CO2) 23      Anion Gap 13      Urea Nitrogen 57.9 (*)     Creatinine 2.76 (*)     GFR Estimate 21 (*)     Calcium 13.0 (*)     Chloride 113 (*)     Glucose 121 (*)     Alkaline Phosphatase 76      AST 30      ALT 52      Protein Total 9.1 (*)     Albumin 3.5      Bilirubin Total 0.4     CBC WITH PLATELETS AND DIFFERENTIAL - Abnormal    WBC Count 9.6      RBC Count 3.34 (*)     Hemoglobin 11.0 (*)     Hematocrit 33.8 (*)      (*)     MCH 32.9      MCHC 32.5      RDW 14.5      Platelet Count 342      % Neutrophils 80      % Lymphocytes 11      % Monocytes 7      % Eosinophils 1      % Basophils 0      % Immature Granulocytes 1      NRBCs per 100 WBC 0      Absolute Neutrophils 7.7      Absolute Lymphocytes 1.1      Absolute Monocytes 0.7      Absolute Eosinophils 0.1      Absolute Basophils 0.0      Absolute Immature Granulocytes 0.1      Absolute NRBCs 0.0     ROUTINE UA WITH MICROSCOPIC REFLEX TO CULTURE - Abnormal    Color Urine Light Yellow      Appearance Urine Clear      Glucose Urine Negative      Bilirubin Urine Negative      Ketones Urine Negative      Specific Gravity Urine 1.017      Blood Urine Small (*)     pH Urine 5.5      Protein Albumin Urine 100 (*)     Urobilinogen Urine Normal      Nitrite Urine Negative      Leukocyte Esterase Urine Negative      Mucus Urine Present (*)     RBC Urine <1      WBC Urine 1      Squamous Epithelials Urine <1      Hyaline Casts Urine 5 (*)    PROTEIN RANDOM URINE - Abnormal    Total Protein Urine mg/dL 137.9      Total Protein Urine mg/mg Creat 1.15 (*)     Creatinine  Urine mg/dL 119.4     MAGNESIUM - Abnormal    Magnesium 2.6 (*)    LACTIC ACID WHOLE BLOOD - Normal    Lactic Acid 0.9     CK TOTAL - Normal    CK 63     PHOSPHORUS - Normal    Phosphorus 3.6            Emergency Department Course & Assessments:    Interventions:  Medications   sodium chloride 0.9 % infusion ( Intravenous $New Bag 3/27/24 6550)   sodium chloride 0.9% BOLUS 1,000 mL (1,000 mLs Intravenous $New Bag 3/27/24 1942)        Assessments:  The patient was assessed upon arrival to the ED and several times thereafter.    Independent Interpretation (X-rays, CTs, rhythm strip):  Head CT shows no evidence of intracerebral hemorrhage    Consultations/Discussion of Management or Tests:  I consulted with the admitting hospitalist    Social Determinants of Health affecting care:   Stress/Adjustment Disorders    Disposition:  The patient was admitted to the hospital under the care of Dr. Tidwell.     Impression & Plan        Medical Decision Making:  The evaluation of altered mental status in this situation involved consideration of a broad differential which includes the following:    A primary neurologic cause such as, Stroke, Seizure, or Bleed  Systemic Disease in broad catergories such as,    Cardiovascular (Hypotension, low cardiac output),    Pulmonary (Hypoxia),    Renal (Uremia, Hypo/Hypernatremia, Hypercalcemia),    Liver (Hepatic encephalopathy),    Endocrine (hypoglycemia, thyroid dysfunction)   Infection: CNS (meningitis/encephalitis), or systemic infection (anything - PNA, UTIs, radha in the elderly)  Drug Intoxication or Withdrawal: Opiates, BZDs, illicit drugs, EtOH intoxication or withdrawal    A psychiatric disorder or dementia are diagnoses of exclusion.    The patient has just been discharged from the [facility] 2 days prior.  His wife states that the care facility is not able to care for him adequately.  The patient has stopped eating and drinking.  He has become dehydrated.  His creatinine is  elevated from baseline consistent with acute on chronic renal insufficiency.  There is no clinical evidence of an acute stroke.  Head CT is negative for bleed or infarct.  There is no evidence for a systemic infection.  Chest x-ray, urinalysis, white blood cell count, and lactic acid are negative.  Given the patient's deterioration in the outside care facility, the patient will be admitted to the hospital for further evaluation and treatment.      Diagnosis:    ICD-10-CM    1. Encephalopathy  G93.40       2. Acute on chronic renal insufficiency  N28.9     N18.9       3. History of influenza  Z87.09              3/27/2024   Danny Billings MD Joing, Todd Roger, MD  03/27/24 4093

## 2024-03-28 NOTE — PHARMACY-ADMISSION MEDICATION HISTORY
Pharmacist Admission Medication History    Admission medication history is complete. The information provided in this note is only as accurate as the sources available at the time of the update.    Information Source(s): Facility ([facility]/) medication list/MAR via N/A    Pertinent Information:   - Patient presents from [facility] (ph: [phone])  - Multiple medications were just started today, do not have last doses documented, including losartan and metoprolol    Changes made to PTA medication list:  Added:   Tylenol, Losartan, Metoprolol, scheduled Senna-doc  Deleted: None  Changed:   Guaifenesin-codeine --> Guaifenesin 400mg q4h prn    Allergies reviewed with patient and updates made in EHR: unable to assess    Medication History Completed By: Casandra Estrella [facility] 3/27/2024 9:37 PM    PTA Med List   Medication Sig Last Dose    acetaminophen (TYLENOL) 325 MG tablet Take 650 mg by mouth 2 times daily . May also take 2 tablets (650mg) by mouth twice daily as needed.     amLODIPine (NORVASC) 5 MG tablet Take 1 tablet (5 mg) by mouth daily 3/27/2024 at am    aspirin (ASA) 81 MG EC tablet Take 1 tablet (81 mg) by mouth daily 3/27/2024 at am    benzocaine-menthol (CHLORASEPTIC) 6-10 MG lozenge Place 1 lozenge inside cheek every hour as needed for sore throat prn at prn    Calcium-Magnesium-Zinc 500-250-12.5 MG TABS Take 1 tablet by mouth daily     guaiFENesin (ROBITUSSIN) 20 mg/mL liquid Take 400 mg by mouth every 4 hours as needed for cough prn at prn    losartan (COZAAR) 25 MG tablet Take 25 mg by mouth daily     metoprolol succinate ER (TOPROL XL) 25 MG 24 hr tablet Take 12.5 mg by mouth daily     nitroGLYcerin (NITROSTAT) 0.4 MG sublingual tablet For chest pain place 1 tablet under the tongue every 5 minutes for 3 doses. If symptoms persist 5 minutes after 1st dose call 911. prn at prn    QUEtiapine (SEROQUEL) 25 MG tablet Take 0.25 tablets (6.25 mg) by mouth at bedtime 3/26/2024 at pm     senna-docusate (SENOKOT-S/PERICOLACE) 8.6-50 MG tablet Take 1 tablet by mouth 2 times daily     senna-docusate (SENOKOT-S/PERICOLACE) 8.6-50 MG tablet Take 1 tablet by mouth 2 times daily as needed for constipation prn at prn    Vitamin D3 (CHOLECALCIFEROL) 125 MCG (5000 UT) tablet Take 1 tablet (125 mcg) by mouth daily 3/27/2024 at am

## 2024-03-28 NOTE — SIGNIFICANT EVENT
Significant Event Note    Time of event: 4:50 AM March 28, 2024    Description of event:  Labs showing Uptrending Na, downtrending plts/Hb likely dilutional, improvements in Calcium and creatinine    Plan:  Continue 200cc per hr of fluid but changed to Half NS    Haydee Tidwell MD

## 2024-03-28 NOTE — H&P
Assessment/Plan    88M hx of CAD s/p PCI, HTN HLD, Dyspahgia, chronic Anemia, CKD s/p Nephrectomy due to RCC, presenting with AMS.     Acute Encephalopathy, likely metabolic  Severe Dehydration  Hypernatremia  Hypercalcemia  Physical deconditioning  ``Hx: Patient recently hospitalized for  sepsis 2/2 influenza causing AMS (likely infectious encephalopathy). Documentation states that patient grossly improved in mentation with some episodes of agitation (likely delerium), but on the last day worsened and was still discharged. Wife at bedside offers a different story that patient since his admission at [facility] was progressively worsening in cognition and strength. TCU stated to [facility] ER staff and wife that pt fell from the bed unwitnessed day prior to current visit, but stated didn't hurt self (unclear how they knew he didn't hurt self as patient nonberal). Currently, patient is altered, nonverbal but follows commands. Wife states that patient at baseline is Aox3, active with a walker.  Of note, patient on vit D and Ca supplements, as well as torsemide  ``Vitals/Exam: SBP 140s, patient altered, responds to commands but nonverbal, chronic bruising on abdomen/arms, no LE edema, no focal deficits, pupils are small and reactive bilaterally  ``Labs: Na 149, Cr 2.8, Ca 13  ``Imaging: CTH and CXR wnl  ``ER Course: 1L fluids  --IVF 200cc/hr, NPO  --PT OT SW SLP  --Hold home seroquel for now  --Neuro exam q4  --trend Na/Ca in AM  --Add on Mg, Phos  --Drug screen  --Holding on starting bisphosphonates for the hypercalcemia given kidney disease  --MRI brain w/o contrast  --CT abdomen/pelvis given wincing on abdominal palpation  --Cerda  --Fall precautions  --Delerium protocol in place (holding home seroquel)  --Calcitonin x1 dose given AMS  >>AMS could be 2/2 dehdyration causing hyperNa/HyperCa. If no improvement in dayteam with high IVF, would recommend LP.     KENNA on CKD3a  Hx RCC s/p R nephrectomy  ``Baseline is 1.7, currently  2.8  ``Could be 2/2 recent influenza infection and patient being on torsemide  --UA, protein/creatinine ratio  --IVF  --CK  --avoid nsaids, limit contrast  --trend creatinine  --Renal diet once NPO completed    Hyperproteinemia  ``Given confluence of anemia, elevated protein levels and chronic hypercalcemia, should rule out MM  --SPEP UPEP and FLC    CAD, H/o MI in 2018, s/p PCI mLAD  HTN  HLD  Mild-Mod Aortic Stenosis  Hx of bradycardia  ``TTE 3/2024> EF 55-60, Mild to moderate valvular aortic stenosis  --PRN hydralazine  --resumed aspirin, and amlodipine  --resume losartan once Creatinine improved  --resume statin/torsemide at discharge given present KENNA    Chronic Anemia  ``suspected to being 2/2 CKD vs chronic inflammatory disease  ``Currently 11, baseline 9-11  --Trend for now     Chronic back pain  --tylenol    Chronic dysphagia  --NPO for now, SLP ordered    Supportive Care  DVT ppx: SCDs  Diet: NPO  Pain Control: tylenol  Upper GI ppx: zofran  Lower GI ppx: senna  Lines/Catheters: IV  TTE/Dopplers: None  Contact/Seizure/Fall/Delerium Precautions: Fall  PT/OT/Social/Wound/Palliative Consults: PT OT SW  Code Status: DNR DNI    History of Present Illness  Subjective: Patient recently hospitalized for  sepsis 2/2 influenza causing AMS. Documentation states that patient improved in mentation, but on the last day worsened and was still discharged. Wife at bedside offers a different story that patient since his admission at [facility] was progressively worsening in cognition and strength. TCU stated to [facility] ER staff and wife that pt fell from the bed unwitnessed day prior to current visit, but stated didn't hurt self (unclear how they knew he didn't hurt self as patient nonberal). Currently, patient is altered, nonverbal but follows commands. Wife states that patient at baseline is Aox3, active with a walker.  Of note, patient on vit D and Ca supplements, as well as torsemide    ROS: 10 point ROS neg other than the  symptoms noted above in the HPI.     Physical Exam  BP (!) 145/69   Pulse 90   Temp 97.5  F (36.4  C) (Temporal)   Resp 26   SpO2 92%     Constitutional: Nonverbal, alert and following commands  HEENT: Normocephalic, no scleral icterus  Abdomen: soft, no distention/guarding, wincing on palpation of epigastric/suprapubic area  Lungs: lungs clear to auscultation bilaterally  Heart: Regular s1s2, no evidence of murmurs  Extremities/Neuro:No focal strength/sensation deficits, no LE edema  Skin: brusing on abdomen/arms, confirmed to be chronic  Psychiatric: Couldn't be assessed    I have personally spent 80 minutes total time today in preparing to see the patient (eg, review of tests), obtaining and/or reviewing separately obtained history, performing a medically appropriate examination and/or evaluation, counseling and educating the patient/family/caregiver, ordering medications, tests, or procedures, referring and communicating with other health care professionals, documenting clinical information in the electronic or other health record, independently interpreting results and communicating results to the patient/ family/caregiver and care coordination.    EMR History    Past Medical Hx:   Past Medical History:   Diagnosis Date    Aortic stenosis, mild     Ascending aorta dilatation (H)     CKD (chronic kidney disease) stage 4, GFR 15-29 ml/min (H) 09/17/2018    Coronary artery disease 10/2018    nSTEMI- Isis to mLAD, distal lad mild, Lcx-small 50-60%, RCA 60-70% normal ifR (despite echo showing poss old IMI)    Hyperlipidemia     Hypertension     Renal cell cancer, unspecified laterality (H) 05/10/2017    Renal disease     CRD--creatinine in upper ones to 2, right kdney removed 2000 for canncer    Rheumatic fever     Spondylosis with myelopathy, lumbar region         Home Medications: Present in Med Rec    Allergies: No Known Allergies     Pertinent Family Hx:   Family History   Problem Relation Age of Onset    No  Known Problems Mother     Heart Surgery Father     No Known Problems Sister         Surgical Hx:   Past Surgical History:   Procedure Laterality Date    BACK SURGERY      CHOLECYSTECTOMY      DECOMPRESSION LUMBAR TWO LEVELS  12/01/2014    Procedure: DECOMPRESSION LUMBAR TWO LEVELS;  Surgeon: Remy Harmon MD;  Location: [facility] OR    HERNIA REPAIR      LAMINECTOMY, FUSION LUMBAR ONE LEVEL, COMBINED N/A 12/01/2014    Procedure: COMBINED LAMINECTOMY, FUSION LUMBAR ONE LEVEL;  Surgeon: Remy Harmon MD;  Location:  OR    PHACOEMULSIFICATION CLEAR CORNEA WITH STANDARD INTRAOCULAR LENS IMPLANT  10/22/2012    Procedure: PHACOEMULSIFICATION CLEAR CORNEA WITH STANDARD INTRAOCULAR LENS IMPLANT;  RIGHT EYE PHACOEMULSIFICATION CLEAR CORNEA WITH STANDARD INTRAOCULAR LENS IMPLANT ;  Surgeon: Grupo Granger MD;  Location:  [facility]    right nephrectomy[      STENT,CORONARY, S660 24/30  10/2018    mLAD  mod RCA, Lcx        Substance Hx:   History   Drug Use No   ,  Social History    Substance and Sexual Activity      Alcohol use: Yes        Alcohol/week: 2.0 standard drinks of alcohol        Types: 2 Shots of liquor per week  ,   History   Smoking Status    Former   Smokeless Tobacco    Never   ,   History   Sexual Activity    Sexual activity: Not Currently        Imaging/Labs    Imaging: Head CT w/o contrast    Result Date: 3/27/2024  EXAM: CT HEAD W/O CONTRAST LOCATION: [facility] DATE: 3/27/2024 INDICATION: AMS COMPARISON: None. TECHNIQUE: Routine CT Head without IV contrast. Multiplanar reformats. Dose reduction techniques were used. FINDINGS: INTRACRANIAL CONTENTS: No intracranial hemorrhage, extraaxial collection, or mass effect.  No CT evidence of acute infarct. Mild presumed chronic small vessel ischemic changes. Remote ischemic findings in the left frontal lobe. Moderate generalized volume loss. No hydrocephalus.  VISUALIZED ORBITS/SINUSES/MASTOIDS: No intraorbital abnormality. No  paranasal sinus mucosal disease. No middle ear or mastoid effusion. BONES/SOFT TISSUES: No acute abnormality.     IMPRESSION: 1.  No acute intracranial abnormality. Chronic changes as above.    XR Chest 2 Views    Result Date: 3/27/2024  EXAM: XR CHEST 2 VIEWS LOCATION: [facility] DATE: 3/27/2024 INDICATION: AMS COMPARISON: 03/20/2024     IMPRESSION: Stable cardiac size. No airspace opacity, pleural effusion or pneumothorax.       Recent Labs   Lab Test 03/27/24 1822 03/25/24  0733   * 144   POTASSIUM 4.3 3.8   CHLORIDE 113* 109*   CO2 23 22   ANIONGAP 13 13   * 127*   BUN 57.9* 33.6*   CR 2.76* 1.77*   RICCARDO 13.0* 11.1*     CBC RESULTS:   Recent Labs   Lab Test 03/27/24 1822   WBC 9.6   RBC 3.34*   HGB 11.0*   HCT 33.8*   *   MCH 32.9   MCHC 32.5   RDW 14.5         Liver Function Studies -   Recent Labs   Lab Test 03/27/24 1822   PROTTOTAL 9.1*   ALBUMIN 3.5   BILITOTAL 0.4   ALKPHOS 76   AST 30   ALT 52      Troponin I ES   Date Value Ref Range Status   02/11/2019 <0.015 0.000 - 0.045 ug/L Final     Comment:     The 99th percentile for upper reference range is 0.045 ug/L.  Troponin values   in the range of 0.045 - 0.120 ug/L may be associated with risks of adverse   clinical events.

## 2024-03-28 NOTE — PLAN OF CARE
Goal Outcome Evaluation:  Summary:  Altered mental status; possibly secondary to dehydration/KENNA, unwitnessed fall at TCU   DATE & TIME: 03/28/24 [id number]  Cognitive Concerns/ Orientation : LATIA, opens eyes to voice, touch. Nonverbal. Follows some commands.  BEHAVIOR & AGGRESSION TOOL COLOR: Green   CIWA SCORE: N/A  ABNL VS/O2: VSS on RA ex HTN  MOBILITY: Not OOB, weakness, can turn independently but needs weight shifting.   PAIN MANAGMENT: No signs of pain   DIET: strict NPO. No meds or ice chips.  BOWEL/BLADDER: Incontinent. Removed vizcarra this shift   ABNL LAB/BG: Na 151 (q6hr), Ca 10.7, Cr 1.96, Hgb 9.6  DRAIN/DEVICES: IVF D5 100 mL/hr. Loss IV access - consult placed.  TELEMETRY RHYTHM: N/A  SKIN: Scattered bruising to abdomen   TESTS/PROCEDURES: MRI, CT completed - see results  D/C DAY/GOALS/PLACE: Pending hydration, labs  OTHER IMPORTANT INFO: Pt has nonproductive cough. Required suctioning of thick yellow mucus.

## 2024-03-28 NOTE — PLAN OF CARE
Goal Outcome Evaluation:  Summary:  Altered mental status; possibly secondary to dehydration, unwitnessed fall at TCU   DATE & TIME: 03/27-03/28/24 7635-9483   Cognitive Concerns/ Orientation : Alert and confused; disoriented to time, situation and place.  BEHAVIOR & AGGRESSION TOOL COLOR: Green   CIWA SCORE: N/A  ABNL VS/O2: VSS on RA  MOBILITY: Not  OOB, weakness, can turn independently but needs weight shifting.   PAIN MANAGMENT: No signs of pain, has some restless  DIET: NPO until speech   BOWEL/BLADDER: Vizcarra in place; No BM this shift   ABNL LAB/BG: Na 151, Ca 11.7, Cr 2.31  DRAIN/DEVICES: PIV running 0.45 NS@ 200ml/hr  TELEMETRY RHYTHM: N/A  SKIN: Scattered bruising to abdomen   TESTS/PROCEDURES: MRI completed  D/C DAY/GOALS/PLACE: Pending  OTHER IMPORTANT INFO: Pt has been restless with some shivering/tremors since arrival from ED. Vitals have been stable. Pulling on vizcarra.

## 2024-03-28 NOTE — ED NOTES
[facility]  ED Nurse Handoff Report    ED Chief complaint: Altered Mental Status      ED Diagnosis:   Final diagnoses:   Encephalopathy   Acute on chronic renal insufficiency   History of influenza       Code Status: Admitting MD to discuss    Allergies: No Known Allergies    Patient Story: Pt present via EMS, per EMS pt is from [facility] tcu admitted 3/24 for encephalopathy for acute infection      Pt has had severe cognitive decline where he is aphasic and right sided weakness, which staff at facility noted on Monday     Pt also fell yesterday and has bruising to abdomen unwitnessed fall not sure if hit head.     Staff report since Monday and he has had cognitive decline in demeanor personality and ability to talk since Monday   Focused Assessment:  Pt speaks in whisper,unable to understand. Not oriented, pulling at lines. Needs frequent reorientation. 18 rt AC    Labs Ordered and Resulted from Time of ED Arrival to Time of ED Departure   COMPREHENSIVE METABOLIC PANEL - Abnormal       Result Value    Sodium 149 (*)     Potassium 4.3      Carbon Dioxide (CO2) 23      Anion Gap 13      Urea Nitrogen 57.9 (*)     Creatinine 2.76 (*)     GFR Estimate 21 (*)     Calcium 13.0 (*)     Chloride 113 (*)     Glucose 121 (*)     Alkaline Phosphatase 76      AST 30      ALT 52      Protein Total 9.1 (*)     Albumin 3.5      Bilirubin Total 0.4     CBC WITH PLATELETS AND DIFFERENTIAL - Abnormal    WBC Count 9.6      RBC Count 3.34 (*)     Hemoglobin 11.0 (*)     Hematocrit 33.8 (*)      (*)     MCH 32.9      MCHC 32.5      RDW 14.5      Platelet Count 342      % Neutrophils 80      % Lymphocytes 11      % Monocytes 7      % Eosinophils 1      % Basophils 0      % Immature Granulocytes 1      NRBCs per 100 WBC 0      Absolute Neutrophils 7.7      Absolute Lymphocytes 1.1      Absolute Monocytes 0.7      Absolute Eosinophils 0.1      Absolute Basophils 0.0      Absolute Immature Granulocytes 0.1       Absolute NRBCs 0.0     ROUTINE UA WITH MICROSCOPIC REFLEX TO CULTURE - Abnormal    Color Urine Light Yellow      Appearance Urine Clear      Glucose Urine Negative      Bilirubin Urine Negative      Ketones Urine Negative      Specific Gravity Urine 1.017      Blood Urine Small (*)     pH Urine 5.5      Protein Albumin Urine 100 (*)     Urobilinogen Urine Normal      Nitrite Urine Negative      Leukocyte Esterase Urine Negative      Mucus Urine Present (*)     RBC Urine <1      WBC Urine 1      Squamous Epithelials Urine <1      Hyaline Casts Urine 5 (*)    LACTIC ACID WHOLE BLOOD - Normal    Lactic Acid 0.9     CK TOTAL     XR Chest 2 Views   Final Result   IMPRESSION: Stable cardiac size. No airspace opacity, pleural effusion or pneumothorax.      Head CT w/o contrast   Final Result   IMPRESSION:   1.  No acute intracranial abnormality. Chronic changes as above.            To be done/followed up on inpatient unit:  Admitting MD     Does this patient have any cognitive concerns?:  Independent    Activity level - Baseline/Home:   unknown  Activity Level - Current:   Total Care    Patient's Preferred language: English   Needed?: No    Isolation: None  Infection: Not Applicable  Patient tested for COVID 19 prior to admission: NO  Bariatric?: No    Vital Signs:   Vitals:    03/27/24 1800 03/27/24 2000 03/27/24 2009 03/27/24 2030   BP: (!) 113/90 (!) 172/132  (!) 145/69   Pulse: 87 90  90   Resp: 16 19  26   Temp:   97.5  F (36.4  C)    TempSrc:   Temporal    SpO2:  93%  92%       Cardiac Rhythm:     Was the PSS-3 completed:   Yes  What interventions are required if any?               Family Comments: none present  OBS brochure/video discussed/provided to patient/family: No                For the majority of the shift this patient's behavior was Yellow.   Behavioral interventions performed were frequent rounding.    ED NURSE PHONE NUMBER: [phone]

## 2024-03-28 NOTE — PROGRESS NOTES
RECEIVING UNIT ED HANDOFF REVIEW    ED Nurse Handoff Report was reviewed by: Poonam Yang RN on March 27, 2024 at 10:15 PM

## 2024-03-28 NOTE — PROGRESS NOTES
"   Clinical Swallow Evaluation  03/28/24 0910   Appointment Info   Signing Clinician's Name / Credentials (SLP) Jessica Penaloza MS CCC-SLP   General Information   Onset of Illness/Injury or Date of Surgery 03/27/24   Referring Physician Jayashree Miller MD   Patient/Family Therapy Goal Statement (SLP) Pt unable to state   Pertinent History of Current Problem \"88M hx of CAD s/p PCI, HTN HLD, Dyspahgia, chronic Anemia, CKD s/p Nephrectomy due to RCC, presenting with AMS. AMS could be 2/2 dehdyration causing hyperNa/HyperCa.\"   General Observations Pt asleep, but easily aroused. Unable to follow one step commands, vocalize, or cough/swallow on command, but did nod/shake head appropriately. Appears significantly more impaired compared to SLP evaluation on 3/24 that noted pt to be alert and vocalizing.   Type of Evaluation   Type of Evaluation Swallow Evaluation   Oral Motor   Oral Musculature unable to assess due to poor participation/comprehension   Dentition (Oral Motor)   Dentition (Oral Motor) dental appliance/dentures   Comment, Dentition (Oral Motor) Did not place dentures d/t mental status; oral cares performed   Dental Appliance/Denture (Oral Motor) upper;lower   Facial Symmetry (Oral Motor)   Facial Symmetry (Oral Motor) unable/difficult to assess   Vocal Quality/Secretion Management (Oral Motor)   Vocal Quality (Oral Motor) aphonia   Secretion Management (Oral Motor) difficulty swallowing secretions   General Swallowing Observations   Current Diet/Method of Nutritional Intake (General Swallowing Observations, NIS) regular diet;thin liquids (level 0)   Respiratory Support room air   Past History of Dysphagia None per EMR prior to recent hopsitalization; 3/25 SLP DC \"Rec: downgrade to a Pureed Diet (level 4); Mildly Thick (level 2), 1:1 assist/supervision, only when alert, sit at 90 degrees, liquids by spoon only, verify/cue swallows, hold po if increased aspiration signs noted/respiratory status declines\" "   Swallowing Evaluation Clinical swallow evaluation   Clinical Swallow Evaluation   Feeding Assistance dependent  (note that pt previously independent during recent hospitalization)   Clinical Swallow Evaluation Textures Trialed moderately thick liquids/liquidized   Clinical Swallow Eval: Moderately Thick Liquids   Mode of Presentation spoon;fed by clinician   Volume Presented tsp x5   Oral Phase delayed AP movement;premature pharyngeal entry;effortful AP movement;residue in oral cavity   Oral Residue left lip drooling;right lip drooling   Pharyngeal Phase impaired;coughing/choking;reduction in laryngeal movement;repeated swallows   Diagnostic Statement Anterior spillage and delayed swallow on first two tsp sips. Improved oral control as trials continued, however, new wheeze and coughing noted; high risk of aspiration   Clinical Swallow Evaluation: Puree Solid Texture Trial   Mode of Presentation, Puree spoon   Volume of Puree Presented x2 bites   Pharyngeal Phase, Puree impaired;coughing/choking;reduction in laryngeal movement;repeated swallows   Diagnostic Statement Good oral control and timing; suspect significant pharyngeal dysphagia with coughing episode; further trials not provided d/t high risk of aspiration   Swallowing Recommendations   Diet Consistency Recommendations NPO   Clinical Impression   Criteria for Skilled Therapeutic Interventions Met (SLP Eval) Yes, treatment indicated   SLP Diagnosis Dysphagia   Risks & Benefits of therapy have been explained evaluation/treatment results reviewed;care plan/treatment goals reviewed;risks/benefits reviewed;current/potential barriers reviewed;participants included;patient   Clinical Impression Comments Pt currently presents with a high risk of aspiration and inability to tolerate PO trials on clinical swallow evaluation. Based on SLP evaluation on 3/24, pt currently appears significantly more impaired with inability to vocalize, orally control bolus and coughing  episode with PO trials. Cough weak and unproductive.   SLP Total Evaluation Time   Eval: oral/pharyngeal swallow function, clinical swallow Minutes (93496) 20   SLP Goals   Therapy Frequency (SLP Eval) daily   SLP Predicted Duration/Target Date for Goal Attainment 04/11/24   SLP Goals Swallow   SLP: Safely tolerate diet without signs/symptoms of aspiration Regular diet;Thin liquids;With use of swallow precautions;Independently   Swallowing Dysfunction &/or Oral Function for Feeding   Treatment of Swallowing Dysfunction &/or Oral Function for Feeding Minutes (48274) 10   Symptoms Noted During/After Treatment Fatigue   Treatment Detail/Skilled Intervention Max cues required to utilize swallow strategies. Targeted effortful swallow, supraglottic swallow and double swallows. Unable to determine if pt following vs spontaneous double swallows. Education provided on NPO. Pt nodded head yes in agreement.   SLP Discharge Planning   SLP Plan PO trials   SLP Discharge Recommendation Transitional Care Facility   SLP Rationale for DC Rec Pt well below baseline re: swallow function   SLP Brief overview of current status  Pt not currently tolerating PO trials; recommend NPO with frequent oral cares and suction as needed; closely monitor respiratory status   Total Session Time   Total Session Time (sum of timed and untimed services) 30

## 2024-03-28 NOTE — PROGRESS NOTES
[facility]    Medicine Progress Note - Hospitalist Service    Date of Admission:  3/27/2024    Assessment & Plan     88M hx of CAD s/p PCI, HTN HLD, Dyspahgia, chronic Anemia, CKD s/p Nephrectomy due to RCC, presenting with AMS.      Acute Encephalopathy, likely metabolic with severe dehydration, hypernatremia acute kidney injury contributing  Severe Dehydration  Hypernatremia  Hypercalcemia  Physical deconditioning  ``Hx: Patient recently hospitalized for  sepsis 2/2 influenza causing AMS (likely infectious encephalopathy). Documentation states that patient grossly improved in mentation with some episodes of agitation (likely delerium), but on the last day worsened and was still discharged. Wife at bedside offers a different story that patient since his admission at [facility] was progressively worsening in cognition and strength. [facility] and wife that pt fell from the bed unwitnessed day prior to current visit, but stated didn't hurt self (unclear how they knew he didn't hurt self as patient nonberal). Currently, patient is altered, nonverbal but follows commands. Wife states that patient at baseline is Aox3, active with a walker.  Of note, patient on vit D and Ca supplements, as well as torsemide  ``Vitals/Exam: SBP 140s, patient altered, responds to commands but nonverbal, chronic bruising on abdomen/arms, no LE edema, no focal deficits, pupils are small and reactive bilaterally  ``Labs: Na 149, Cr 2.8, Ca 13  ``Imaging: CTH and CXR wnl  ``ER Course: 1L fluids  --IVF 200cc/hr, NPO  CT scan of abdomen pelvis without contrast was done and it showed no acute intra-abdominal process identified.  Right-sided kidney is surgically absent.  Left kidney is unchanged in size and appearance.  No significant mass stones or hydronephrosis    MRI of the brain done showed moderate brain atrophy with no acute findings of stroke or hemorrhage    Sodium went up to 151 with normal saline so  fluids changed to half-normal saline at 200 mL/h  Recheck BMP at 4 PM today and adjust fluids as needed    Speech path consultation requested to assess the safety of oral intake and they made him  n.p.o. on 3/28/2024    Calcium level still elevated at 11.7.  PTA vitamin D3 and Tums supplements discontinued    Monitor BMP and calcium levels after hydration    KENNA on CKD3a  Hx RCC s/p R nephrectomy  ``Baseline is 1.7, elevated 2.8 on admission  ``Could be 2/2 recent influenza infection and patient being on torsemide  With IV fluids creatinine slightly improved to 2.3 on 3/28/2024  Avoid nephrotoxins and NSAIDs     CAD, H/o MI in 2018, s/p PCI mLAD  HTN  HLD  Mild-Mod Aortic Stenosis  Hx of bradycardia  ``TTE 3/2024> EF 55-60, Mild to moderate valvular aortic stenosis  --PRN hydralazine  --resumed aspirin, and amlodipine  --resume losartan once Creatinine improved  --resume statin/torsemide at discharge given present KENNA     Chronic Anemia  ``suspected to being 2/2 CKD vs chronic inflammatory disease  ``Currently 11, baseline 9-11  --Trend for now     Chronic back pain  --tylenol     Chronic dysphagia  --NPO for now, SLP ordered        Diet: Renal Diet (dialysis)    DVT Prophylaxis: Pneumatic Compression Devices  Cerda Catheter: Not present  Lines: None     Cardiac Monitoring: None  Code Status: No CPR- Do NOT Intubate      Clinically Significant Risk Factors Present on Admission         # Hypernatremia: Highest Na = 151 mmol/L in last 2 days, will monitor as appropriate   # Hypercalcemia: Highest Ca = 13 mg/dL in last 2 days, will monitor as appropriate    # Hypoalbuminemia: Lowest albumin = 3 g/dL at 3/28/2024  3:50 AM, will monitor as appropriate   # Drug Induced Platelet Defect: home medication list includes an antiplatelet medication   # Hypertension: Noted on problem list                 Disposition Plan      Expected Discharge Date: 03/29/2024                Discussed with bedside RN, patient and his wife at  bedside today    Jayashree Miller MD  Hospitalist Service  [facility]  Securely message with [employer] (more info)  Text page via [employer] Paging/Directory   ______________________________________________________________________    Interval History   Patient is pleasantly confused.  Trying to pull out his Cerda catheter constantly.  So catheter to be removed and will use external catheter as needed.  Looks severely dehydrated with parched mouth.  Getting IV fluids.  Creatinine slightly improved today.  Sodium was high at 151 so IV fluids switched to half-normal saline.  Patient's wife at bedside.  No other acute issues    Physical Exam   Vital Signs: Temp: 98  F (36.7  C) Temp src: Axillary BP: (!) 140/56 Pulse: 82   Resp: 20 SpO2: 97 % O2 Device: None (Room air)    Weight: 0 lbs 0 oz    General Appearance: Pleasantly confused, resting in bed, not in acute distress, very dry mucous membranes  Respiratory: Diminished in the bases otherwise clear to auscultation  Cardiovascular: Normal rate rhythm regular, 2+ murmur present  GI: Soft, nontender nondistended bowel sounds positive  Skin: Warm and dry  Other: No lower extremity edema    Medical Decision Making       60 MINUTES SPENT BY ME on the date of service doing chart review, history, exam, documentation & further activities per the note.      Data     I have personally reviewed the following data over the past 24 hrs:    8.3  \   9.6 (L)   / 298     151 (H); 151 (H) 118 (H); 118 (H) 52.6 (H); 52.6 (H) /  124 (H); 124 (H)   3.9; 3.9 21 (L); 21 (L) 2.31 (H); 2.31 (H) \     ALT: 42 AST: 26 AP: 67 TBILI: 0.4   ALB: 3.0 (L) TOT PROTEIN: 8.0 LIPASE: N/A     Procal: N/A CRP: N/A Lactic Acid: 0.9         Imaging results reviewed over the past 24 hrs:   Recent Results (from the past 24 hour(s))   Head CT w/o contrast    Narrative    EXAM: CT HEAD W/O CONTRAST  LOCATION: [facility]  DATE: 3/27/2024    INDICATION: AMS  COMPARISON:  None.  TECHNIQUE: Routine CT Head without IV contrast. Multiplanar reformats. Dose reduction techniques were used.    FINDINGS:  INTRACRANIAL CONTENTS: No intracranial hemorrhage, extraaxial collection, or mass effect.  No CT evidence of acute infarct. Mild presumed chronic small vessel ischemic changes. Remote ischemic findings in the left frontal lobe. Moderate generalized   volume loss. No hydrocephalus.      VISUALIZED ORBITS/SINUSES/MASTOIDS: No intraorbital abnormality. No paranasal sinus mucosal disease. No middle ear or mastoid effusion.    BONES/SOFT TISSUES: No acute abnormality.      Impression    IMPRESSION:  1.  No acute intracranial abnormality. Chronic changes as above.   XR Chest 2 Views    Narrative    EXAM: XR CHEST 2 VIEWS  LOCATION: [facility]  DATE: 3/27/2024    INDICATION: AMS  COMPARISON: 03/20/2024      Impression    IMPRESSION: Stable cardiac size. No airspace opacity, pleural effusion or pneumothorax.   CT Abdomen Pelvis w/o Contrast    Narrative    EXAM: CT ABDOMEN PELVIS W/O CONTRAST  LOCATION: [facility]  DATE: 3/27/2024    INDICATION: patient reacting to abdominal palpation with bruising, AMS, cant use contrast due to KENNA  COMPARISON: 02/11/2019  TECHNIQUE: CT scan of the abdomen and pelvis was performed without IV contrast. Multiplanar reformats were obtained. Dose reduction techniques were used.  CONTRAST: None.    FINDINGS:   LOWER CHEST: Fibrotic changes are seen in the lung bases bilaterally most pronounced in the deep and lower lung zones, worsened from 02/11/2019. Severe coronary atherosclerotic disease with evidence of prior stenting noted.    HEPATOBILIARY: The GallBladder surgically absent, otherwise unchanged appearance from prior exam.    PANCREAS: No significant mass, duct dilatation, or inflammatory change.    SPLEEN: Normal size.    ADRENAL GLANDS: The right Adrenal gland is surgically absent the left adrenal gland is  unremarkable.    KIDNEYS/BLADDER: The right kidney is surgically absent, unchanged from prior exam. The left kidney is unchanged size and appearance. No significant mass, stones, or hydronephrosis. There are simple or benign cysts. No follow up is needed.    BOWEL: No obstruction or inflammatory change.    LYMPH NODES: No lymphadenopathy.    VASCULATURE: No abdominal aortic aneurysm.    PELVIC ORGANS: No pelvic masses.    MUSCULOSKELETAL: Multilevel discogenic and posterior facet degenerative changes are seen throughout the lumbar spine with postsurgical changes from prior discectomy and fusion and posterior spinal fixation at the L4-L5 level, unchanged from prior exam.   No worrisome osseous lesions are identified.      Impression    IMPRESSION:   1.  No acute intra-abdominal process identified.       MR Brain w/o Contrast    Narrative    EXAM: MR BRAIN W/O CONTRAST  LOCATION: [facility]  DATE: 3/28/2024    INDICATION: AMS for 1 week, no focal deficits, cant use contrast due to Kidney disease  COMPARISON: 03/27/2024  TECHNIQUE: Routine multiplanar multisequence head MRI without intravenous contrast.    FINDINGS: Motion degraded exam.  INTRACRANIAL CONTENTS: No acute or subacute infarct. Chronic encephalomalacia and gliosis in the anterior left frontal lobe. No mass, acute hemorrhage, or extra-axial fluid collections. Patchy nonspecific T2/FLAIR hyperintensities within the cerebral   white matter most consistent with mild to moderate chronic microvascular ischemic change. Moderate generalized cerebral atrophy. No hydrocephalus. Normal position of the cerebellar tonsils.     SELLA: No abnormality accounting for technique.    OSSEOUS STRUCTURES/SOFT TISSUES: Normal marrow signal. The major intracranial vascular flow voids are maintained.     ORBITS: Prior bilateral cataract surgery. Visualized portions of the orbits are otherwise unremarkable.     SINUSES/MASTOIDS: No paranasal sinus mucosal  disease. No middle ear or mastoid effusion.       Impression    IMPRESSION:  1.  Motion degraded exam. No acute intracranial process.  2.  Chronic changes as detailed above.

## 2024-03-29 NOTE — CONSULTS
Care Management Initial Consult    General Information  Assessment completed with: Spouse or significant other, Khushboo  Type of CM/SW Visit: Initial Assessment    Primary Care Provider verified and updated as needed: Yes   Readmission within the last 30 days:        Reason for Consult: discharge planning  Advance Care Planning: Advance Care Planning Reviewed: no concerns identified          Communication Assessment  Patient's communication style: spoken language (English or Bilingual)    Hearing Difficulty or Deaf: yes   Wear Glasses or Blind: no    Cognitive  Cognitive/Neuro/Behavioral: .WDL except  Level of Consciousness: somnolent  Arousal Level: opens eyes spontaneously  Orientation: other (see comments)  Mood/Behavior: withdrawn  Best Language: 0 - No aphasia  Speech: garbled, incoherent    Living Environment:   People in home: spouse     Current living Arrangements: house      Able to return to prior arrangements: yes       Family/Social Support:  Care provided by: self  Provides care for: no one  Marital Status:   Wife, Children          Description of Support System: Supportive, Involved    Support Assessment: Adequate family and caregiver support, Adequate social supports    Current Resources:   Patient receiving home care services: No     Community Resources: None  Equipment currently used at home: cane, straight  Supplies currently used at home: None    Employment/Financial:  Employment Status: retired        Financial Concerns: none   Referral to Financial Worker: No       Does the patient's insurance plan have a 3 day qualifying hospital stay waiver?  No    Lifestyle & Psychosocial Needs:  Social Determinants of Health     Food Insecurity: Not on file   Depression: Not at risk (6/12/2023)    PHQ-2     PHQ-2 Score: 1   Housing Stability: Not on file   Tobacco Use: Medium Risk (6/12/2023)    Patient History     Smoking Tobacco Use: Former     Smokeless Tobacco Use: Never     Passive Exposure: Not  on file   Financial Resource Strain: Not on file   Alcohol Use: Not on file   Transportation Needs: Not on file   Physical Activity: Not on file   Interpersonal Safety: Not on file   Stress: Not on file   Social Connections: Not on file       Functional Status:  Prior to admission patient needed assistance:   Dependent ADLs:: Ambulation-cane  Dependent IADLs:: Independent       Mental Health Status:  Mental Health Status: No Current Concerns       Chemical Dependency Status:  Chemical Dependency Status: No Current Concerns             Values/Beliefs:  Spiritual, Cultural Beliefs, Anabaptism Practices, Values that affect care: no               Additional Information:  Per care management/social work consult for discharge planning. Patient admitted on 03/27 due to altered mental status. SW reviewed chart and spoke with patients spouse Yvonne. Per yvonne's report patent [facility]. Prior to patients previous hospitalization patient lived in a house with spouse with 2 PURVI and was independent with all cares and would use a cane or walker for mobility.  SW confirmed patient has a bed hold at [facility] but would need insurance auth prior to returning. SW discussed bed hold with spouse and she is in agreement with patient returning but is hoping patient will progress to be able to go home after hospital stay. SW inquired if spouse has any additional support at home for assistance and informed she does not. If returning home patient would have to be at a level where spouse can provide assistance with all care needs by herself. LUKE will continue to follow and will pan for likely discharge back [facility].     GARRETT Payne    [facility]

## 2024-03-29 NOTE — PLAN OF CARE
Goal Outcome Evaluation:  Acute Encephalopathy, likely metabolic with severe dehydration, hypernatremia acute kidney injury contributing  Severe Dehydration  Hypernatremia  Hypercalcemia  Physical deconditioning       Date & Time: 2953-3485  Surgery/POD#: None   Behavior & Aggression: green risky falls due to impulsiveness and general weakness   Fall Risk: yes high falls   Orientation: A/O x3 forgetful/confused at times due to encephalopathy   ABNL VS/O2:vss on R A except mild high B/P 147/66  ABNL Labs: NA- 152, creatinine 180, hemoglobin 9.6  Pain Management: PRN tylenol with pill crushed with applesauce   Bowel/Bladder: both incontinent brief on   Drains: PIV to anterior right leg Dextrose  infusing at 100 ml/hr   Wounds/incisions: Scattered bruises and scabs multiple places   Diet: NPO, swallowing team following   Activity Level: x2  G.B/W   Tests/Procedures: SP, SW  Anticipated  DC Date: Pending   Significant Information:

## 2024-03-29 NOTE — PROGRESS NOTES
[facility]    Medicine Progress Note - Hospitalist Service    Date of Admission:  3/27/2024    Assessment & Plan     88M hx of CAD s/p PCI, HTN HLD, Dyspahgia, chronic Anemia, CKD s/p Nephrectomy due to RCC, presenting with AMS.      Acute Encephalopathy, likely metabolic with severe dehydration, hypernatremia acute kidney injury contributing  Severe Dehydration  Hypernatremia  Hypercalcemia  Physical deconditioning  ``Hx: Patient recently hospitalized for  sepsis 2/2 influenza causing AMS (likely infectious encephalopathy). Documentation states that patient grossly improved in mentation with some episodes of agitation (likely delerium), but on the last day worsened and was still discharged. Wife at bedside offers a different story that patient since his admission at [facility] was progressively worsening in cognition and strength. TCU stated to [facility] ER staff and wife that pt fell from the bed unwitnessed day prior to current visit, but stated didn't hurt self (unclear how they knew he didn't hurt self as patient nonberal). Currently, patient is altered, nonverbal but follows commands. Wife states that patient at baseline is Aox3, active with a walker.  Of note, patient on vit D and Ca supplements, as well as torsemide  ``Vitals/Exam: SBP 140s, patient altered, responds to commands but nonverbal, chronic bruising on abdomen/arms, no LE edema, no focal deficits, pupils are small and reactive bilaterally  ``Labs: Na 149, Cr 2.8, Ca 13  ``Imaging: CTH and CXR wnl  ``ER Course: 1L fluids  CT scan of abdomen pelvis without contrast was done and it showed no acute intra-abdominal process identified.  Right-sided kidney is surgically absent.  Left kidney is unchanged in size and appearance.  No significant mass stones or hydronephrosis    MRI of the brain done showed moderate brain atrophy with no acute findings of stroke or hemorrhage    Sodium went up to 151 with normal saline so fluids changed to  half-normal saline at 200 mL/h  Recheck BMP at 4 PM today and adjust fluids as needed    Speech path consultation requested to assess the safety of oral intake and they made him  n.p.o. on 3/28/2024    Calcium level still elevated at 11.7.  PTA vitamin D3 and Tums supplements discontinued  Patient's creatinine improved to 1.8  Sodium remains elevated at 152.  So patient was started on D5W at 100 mL/h.    Will let sodium trend down slowly    KENNA on CKD3a  Hx RCC s/p R nephrectomy  ``Baseline is 1.7, elevated 2.8 on admission  ``Could be 2/2 recent influenza infection and patient being on torsemide  With IV fluids creatinine improved to 1.8  Avoid nephrotoxins and NSAIDs     CAD, H/o MI in 2018, s/p PCI mLAD  HTN  HLD  Mild-Mod Aortic Stenosis  Hx of bradycardia  ``TTE 3/2024> EF 55-60, Mild to moderate valvular aortic stenosis  --PRN hydralazine  --resumed aspirin, and amlodipine  --resume losartan once Creatinine improved  --resume statin/torsemide at discharge given present KENNA     Chronic Anemia  ``suspected to being 2/2 CKD vs chronic inflammatory disease  Hemoglobin stable 11-9 0.6-9.6       Chronic back pain  --tylenol     Chronic dysphagia  --NPO for now, SLP consultation requested they are following        Diet: Renal Diet (dialysis)    DVT Prophylaxis: Pneumatic Compression Devices  Cerda Catheter: Not present  Lines: None     Cardiac Monitoring: None  Code Status: No CPR- Do NOT Intubate      Clinically Significant Risk Factors         # Hypernatremia: Highest Na = 152 mmol/L in last 2 days, will monitor as appropriate   # Hypercalcemia: Highest Ca = 13 mg/dL in last 2 days, will monitor as appropriate    # Hypoalbuminemia: Lowest albumin = 3 g/dL at 3/28/2024  3:50 AM, will monitor as appropriate     # Hypertension: Noted on problem list                   Disposition Plan      Expected Discharge Date: 04/01/2024                Discussed with bedside RN, patient and his wife at bedside today    Jayashree  MD Angela  Hospitalist Service  [facility]  Securely message with [employer] (more info)  Text page via [employer] Paging/Directory   ______________________________________________________________________    Interval History   Patient is slightly a little more alert and awake today.  Creatinine much improved.  Sodium remains elevated at 152.  Patient was not able to get IV fluids due to lack of IV access overnight.  On D5W at 100 100 mL/h.  Continue the same for now.  No other acute issues    Physical Exam   Vital Signs: Temp: 97.8  F (36.6  C) Temp src: Axillary BP: (!) 147/66 Pulse: 65   Resp: 16 SpO2: 96 % O2 Device: None (Room air)    Weight: 0 lbs 0 oz    General Appearance: Pleasantly confused, resting in bed, not in acute distress, very dry mucous membranes  Respiratory: Diminished in the bases otherwise clear to auscultation  Cardiovascular: Normal rate rhythm regular, 2+ murmur present  GI: Soft, nontender nondistended bowel sounds positive  Skin: Warm and dry  Other: No lower extremity edema    Medical Decision Making       52 minutes were spent in taking care of him today      Data     I have personally reviewed the following data over the past 24 hrs:    7.2  \   9.6 (L)   / 312     152 (H) 119 (H) 35.6 (H) /  131 (H)   3.8 21 (L) 1.80 (H) \       Imaging results reviewed over the past 24 hrs:   No results found for this or any previous visit (from the past 24 hour(s)).

## 2024-03-29 NOTE — PROGRESS NOTES
"   03/29/24 1600   Appointment Info   Signing Clinician's Name / Credentials (PT) Florecita Kim, SHEBA   Student Supervision Line of sight supervision provided   Living Environment   People in Home spouse   Current Living Arrangements   ([facility])   Home Accessibility stairs to enter home   Number of Stairs, Main Entrance 2   Stair Railings, Main Entrance   (no rails, but grab bar)   Living Environment Comments lives with wife in [facility], 2 [address] with all needs met on main level, has basement that pt doesn't use   Self-Care   Usual Activity Tolerance good   Current Activity Tolerance moderate   Equipment Currently Used at Home walker, rolling;grab bar, toilet;grab bar, tub/shower   Fall history within last six months yes   Number of times patient has fallen within last six months 1   Activity/Exercise/Self-Care Comment wife assisting with ADLs for 2 weeks otherwise IND for self-cares and use of 4WW for mobility   General Information   Onset of Illness/Injury or Date of Surgery 03/27/24   Referring Physician Haydee Tidwell MD   Patient/Family Therapy Goals Statement (PT) To return home   Pertinent History of Current Problem (include personal factors and/or comorbidities that impact the POC) Per chart \"Pt present via EMS, per EMS pt is from [facility] tcu admitted 3/24 for encephalopathy for acute infection      Pt has had severe cognitive decline where he is aphasic and right sided weakness, which staff at facility noted on Monday     Pt also fell yesterday and has bruising to abdomen unwitnessed fall not sure if hit head.     Staff report since Monday and he has had cognitive decline in demeanor personality and ability to talk since Monday\"   Existing Precautions/Restrictions fall   Cognition   Follows Commands (Cognition) follows one-step commands;over 90% accuracy;repetition of directions required   Safety Deficit (Cognition) insight into deficits/self-awareness   Pain Assessment   Patient Currently in Pain Yes, " see Vital Sign flowsheet  (R leg and LBP varies in intensity)   Posture    Posture Forward head position;Protracted shoulders   Posture Comments forward flexed posture on FWW   Range of Motion (ROM)   Range of Motion ROM is WFL   Strength (Manual Muscle Testing)   Strength Comments limited activity tolerance, R foot drop at baseline   Bed Mobility   Bed Mobility supine-sit   Supine-Sit [place] (Bed Mobility) supervision   Comment, (Bed Mobility) HOB raised, use of bed rails, no physical assist   Transfers   Transfers sit-stand transfer   Sit-Stand Transfer   Sit-Stand [place] (Transfers) contact guard   Assistive Device (Sit-Stand Transfers) walker, standard   Comment, (Sit-Stand Transfer) BUE on walker despite verbal cues, LE bracing on bed   Gait/Stairs (Locomotion)   [place] Level (Gait) contact guard   Assistive Device (Gait) walker, standard   Distance in Feet (Gait) 10   Deviations/Abnormal Patterns (Gait) festinating/shuffling;gait speed decreased;stride length decreased;weight shifting decreased   Comment, (Gait/Stairs) heavy reliance on FWW, increased AD proximity, L step-to pattern   Balance   Balance Comments benefits from UE support during upright mobility   Sensory Examination   Sensory Perception patient reports no sensory changes   Clinical Impression   Criteria for Skilled Therapeutic Intervention Yes, treatment indicated   PT Diagnosis (PT) Impaired functional mobility   Influenced by the following impairments Decreased activity tolerance, impaired balance, and posture   Functional limitations due to impairments transfers, gait, stairs   Clinical Presentation (PT Evaluation Complexity) stable   Clinical Presentation Rationale clinical judgement   Clinical Decision Making (Complexity) low complexity   Planned Therapy Interventions (PT) balance training;bed mobility training;gait training;home exercise program;neuromuscular re-education;ROM (range of motion);stair  training;strengthening;stretching;transfer training;progressive activity/exercise;risk factor education;home program guidelines   Risk & Benefits of therapy have been explained evaluation/treatment results reviewed;care plan/treatment goals reviewed;risks/benefits reviewed;current/potential barriers reviewed;participants voiced agreement with care plan;participants included;patient;spouse/significant other   PT Total Evaluation Time   PT Eval, Low Complexity Minutes (17735) 8   Physical Therapy Goals   PT Frequency 5x/week   PT Predicted Duration/Target Date for Goal Attainment 04/05/24   PT Goals Bed Mobility;Transfers;Gait;Stairs   PT: Bed Mobility Independent;Supine to/from sit   PT: Transfers Modified independent;Sit to/from stand;Assistive device   PT: Gait Supervision/stand-by assist;Rolling walker;50 feet   PT: Stairs Supervision/stand-by assist;3 stairs   Therapeutic Activity   Therapeutic Activities: dynamic activities to improve functional performance Minutes (61099) 8   Symptoms Noted During/After Treatment Fatigue   Treatment Detail/Skilled Intervention Pt in bed upon arrival. Wife present throughout session. Pt agreeable to session, wife reporting he is eager to move and get home. After evaluation, pt desired to sit in recliner. Upon return to room, facilitated stand to sit with FWW and CGA. Verbal and visual cues needed for alignment to surface before sitting. Discussed discharge recommendations - pt would benefit from return to TCU to improve strength and IND before returning home with wife. Wife agreeable if different TCU available. Pt remained in chair, alarm on, and all needs within reach.   Gait Training   Gait Training Minutes (41441) 8   Symptoms Noted During/After Treatment (Gait Training) fatigue;shortness of breath   Treatment Detail/Skilled Intervention After evaluation, pt desired to continue ambulation into downing. Tolerated total of 60' with FWW and CGA. Cued for upright posture and AD  proximity. Intermittent improvement though does not maintain. With navigating turns, increased AD proximity and forward flexed posture. Pt also demonstrates altered gait pattern, with increased R steppage, and L step to. Of note, pt did not have his shoes or AFO, wife plans to bring later today.   Distance in Feet 60'   PT Discharge Planning   PT Plan repeated STS, increase amb distance, standing exercise   PT Discharge Recommendation (DC Rec) Transitional Care Facility   PT Rationale for DC Rec Pt coming from TCU bout, is Mod IND with 4WW at baseline. Pt currently able to tolerate short distance with FWW and needs CGA. Pt would benefit from return to TCU to improve strength and IND before returning home with wife. Anticipate pt currently requires more assistance than spouse can provide, pt would be high falls risk if returning home.   PT Brief overview of current status Ax1 with FWW, should be getting up to chair for all meals   Total Session Time   Timed Code Treatment Minutes 16   Total Session Time (sum of timed and untimed services) 24

## 2024-03-29 NOTE — PLAN OF CARE
Shift: [id number] 3/28/2024  Summary:  Altered mental status; possibly secondary to dehydration/KENNA, unwitnessed fall at TCU   DATE & TIME: 3/28/24, 4733 - 5383    Cognitive Concerns/ Orientation : LATIA, mostly nonverbal, minimal speech is garbled and incoherent   BEHAVIOR & AGGRESSION TOOL COLOR: Green  CIWA SCORE: NA   ABNL VS/O2: BP elevated. Other VSS on room air  MOBILITY: Not OOB this shift. Moved self at times in bed. Assisted with repositioning   PAIN MANAGMENT: Absence of nonverbal indicators of pain  DIET: Strict NPO, no exceptions  BOWEL/BLADDER: Incontinent of urine. No BM this shift  ABNL LAB/BG: AM labs pending  DRAIN/DEVICES: PIV to anterior right leg infusing at 100 ml/hr  TELEMETRY RHYTHM: NA  SKIN: Scattered bruises and scabs  TESTS/PROCEDURES: NA  D/C DATE: Pending  OTHER IMPORTANT INFO: SLP, Ot/PT and SW following    Goal Outcome Evaluation:      Plan of Care Reviewed With: patient    Overall Patient Progress: no changeOverall Patient Progress: no change

## 2024-03-29 NOTE — PLAN OF CARE
Occupational Therapy: Orders received. Chart reviewed and discussed with care team.? Occupational Therapy not indicated due to per PT, pt requiring TCU. Per chart, pt has bed hold at TCU but needs authorization. Per social work, will not need two therapy disciplines to see pt while during IP stay for auth.? PT will cont to see pt for mobility. Defer discharge recommendations to care team.? Will complete orders.

## 2024-03-29 NOTE — PROGRESS NOTES
Multiple attempts for access by staff, VAT, this hospitalization and previous. Patient's arms heavily bruised, dehydrated.  Receive order for midline.  Able to place in right brachial, but secondary to equipment failure, midline pushed from insertion site.  Unable to establish midline access again.  Contact provider, order for lower extremity PIV received from Dr. Rivera.  Established, see flowsheet.  When patient better hydrated, VAT will assess for upper extremity access.

## 2024-03-29 NOTE — PROGRESS NOTES
Shift: [id number] 3/28/2024  Summary:  Altered mental status; possibly secondary to dehydration/KENNA, unwitnessed fall at TCU   Orientation: LATIA; Opens eyes to voice, and touch; Mostly non-verbal, garbled and incoherent otherwise   CIWA SCORE: N/A  Vitals/Tele: VSS on RA ex HTN  PAIN MANAGMENT: No signs of pain   IV Access/drains: PIV R Anterior Leg D5 100 mL/hr  Diet: Strict NPO. No meds or ice chips.  Mobility: Not OOB, weakness, can turn independently but needs weight shifting.   GI/: Incontinent; No BM this shift;   Wound/Skin: Scattered bruising to abdomen; Scattered scabs   Consults: Vascular Access   Discharge Plan: Pending       See Flow sheets for assessment

## 2024-03-29 NOTE — PROGRESS NOTES
SW spoke with [facility] Tcu and confirmed patient has a bed hold but will need a new insurance auth prior to returning to them. If patient were to discharge over the weekend the w/e contact for admissions is [phone]. LUKE called spouse Khushboo and left a voicemail requesting a call back to complete consult.     GARRETT Payne    [facility]

## 2024-03-30 NOTE — PLAN OF CARE
Goal Outcome Evaluation:       DATE & TIME: 3/29/2024 1900   Cognitive Concerns/ Orientation : alert to self  BEHAVIOR & AGGRESSION TOOL COLOR: Green  CIWA SCORE: NA   ABNL VS/O2: RA  MOBILITY: up with 2 GBW  PAIN MANAGMENT: Denies pain  DIET: NPO  BOWEL/BLADDER: incontinent  ABNL LAB/BG: NA  DRAIN/DEVICES: NA  TELEMETRY RHYTHM: NA  SKIN: intact  TESTS/PROCEDURES: Swallow test completed  D/C DATE: TBD  Discharge Barriers:   OTHER IMPORTANT INFO: Patient alert to self denies pain up incontinent X1, on continuous IV fluid oral care provided, patient on NPO, plan to discharge pending

## 2024-03-30 NOTE — PLAN OF CARE
Shift: [id number] 3/29/2024  Summary:  Altered mental status; possibly secondary to dehydration/KENNA, unwitnessed fall at TCU      Cognitive Concerns/ Orientation : LATIA, mostly nonverbal, minimal speech is garbled and incoherent   BEHAVIOR & AGGRESSION TOOL COLOR: Green  CIWA SCORE: NA   ABNL VS/O2: BP elevated. Other VSS on room air  MOBILITY: Not OOB this shift. Moved self at times in bed. Assisted with repositioning   PAIN MANAGMENT: Absence of nonverbal indicators of pain  DIET: Strict NPO, no exceptions  BOWEL/BLADDER: Incontinent of urine. No BM this shift  ABNL LAB/BG: AM labs pending  DRAIN/DEVICES: PIV to anterior right leg infusing at 100 ml/hr  TELEMETRY RHYTHM: NA  SKIN: Scattered bruises and scabs  TESTS/PROCEDURES: NA  D/C DATE: Pending (04/01) back to TCU

## 2024-03-30 NOTE — PLAN OF CARE
Goal Outcome Evaluation:  Summary:  Altered mental status; possibly secondary to dehydration/KENNA, unwitnessed fall at TCU   DATE & TIME:  03/30/24 7695-4221  Cognitive Concerns/ Orientation : AxOx2-3. Unable to fully assess. Patients speech is garbled/ incoherent, hard to understand. Patient was able to state that it is March 2024 & that he is in the hospital. Towards end of shift, patient much more somnolent and hard to arouse.   BEHAVIOR & AGGRESSION TOOL COLOR: Green   ABNL VS/O2: Hypertensive at times. Other VSS on RA.   MOBILITY: Assistx2 GB/W. Ambulated in room, up in chair and up to commode.   PAIN MANAGMENT: c/o of headache - tylenol given with relief.   DIET: Speech changed to Combination full liquid, Moderately think (level 3) liquids. Spoon only. 1:1 assist. Stop if coughing.   BOWEL/BLADDER: Incont. Little to no output this shift, bladder scanned 284mL  ABNL LAB/BG: Na 144, Creat 1.77,   DRAIN/DEVICES: L PIV infusing D5 @ 60m  L/hr  SKIN: Scattered bruises and scabs. Redness silverio on coccyx.   TESTS/PROCEDURES: NA  D/C DATE: Pending. Back to TCU when medically ready.   OTHER IMPORTANT INFO: SLP, Ot/PT and SW following

## 2024-03-30 NOTE — PROGRESS NOTES
[facility]    Medicine Progress Note - Hospitalist Service    Date of Admission:  3/27/2024    Assessment & Plan     88M hx of CAD s/p PCI, HTN HLD, Dyspahgia, chronic Anemia, CKD s/p Nephrectomy due to RCC, presenting with AMS.      Acute Encephalopathy, likely metabolic with severe dehydration, hypernatremia acute kidney injury contributing  Severe Dehydration  Hypernatremia  Hypercalcemia  Physical deconditioning  ``Hx: Patient recently hospitalized for  sepsis 2/2 influenza causing AMS (likely infectious encephalopathy). Documentation states that patient grossly improved in mentation with some episodes of agitation (likely delerium), but on the last day worsened and was still discharged. Wife at bedside offers a different story that patient since his admission at [facility] was progressively worsening in cognition and strength. TCU stated to [facility] ER staff and wife that pt fell from the bed unwitnessed day prior to current visit, but stated didn't hurt self (unclear how they knew he didn't hurt self as patient nonberal). Currently, patient is altered, nonverbal but follows commands. Wife states that patient at baseline is Aox3, active with a walker.  Of note, patient on vit D and Ca supplements, as well as torsemide  ``Vitals/Exam: SBP 140s, patient altered, responds to commands but nonverbal, chronic bruising on abdomen/arms, no LE edema, no focal deficits, pupils are small and reactive bilaterally  ``Labs: Na 149, Cr 2.8, Ca 13  ``Imaging: CTH and CXR wnl  ``ER Course: 1L fluids  CT scan of abdomen pelvis without contrast was done and it showed no acute intra-abdominal process identified.  Right-sided kidney is surgically absent.  Left kidney is unchanged in size and appearance.  No significant mass stones or hydronephrosis    MRI of the brain done showed moderate brain atrophy with no acute findings of stroke or hemorrhage    Sodium went up to 151 with normal saline so fluids changed to  half-normal saline at 200 mL/h  Recheck BMP at 4 PM today and adjust fluids as needed    Speech path consultation requested to assess the safety of oral intake and they made him  n.p.o. on 3/28/2024    PTA vitamin D3 and Tums supplements discontinued due to hypercalcemia  Patient's creatinine improved to 1.8  Sodium improved to 147 from 152  Reduce D5W to 60 mill per hour  Speech path saw him and put him on full liquid diet with moderate thickened liquids on 3/30/2024  Aspiration precautions ordered    KENNA on CKD3a  Hx RCC s/p R nephrectomy  ``Baseline is 1.7, elevated 2.8 on admission  ``Could be 2/2 recent influenza infection and patient being on torsemide  With IV fluids creatinine improved to 1.8-1.77  Avoid nephrotoxins and NSAIDs     CAD, H/o MI in 2018, s/p PCI mLAD  HTN  HLD  Mild-Mod Aortic Stenosis  Hx of bradycardia  ``TTE 3/2024> EF 55-60, Mild to moderate valvular aortic stenosis  --PRN hydralazine  --resumed aspirin, and amlodipine  Torsemide,  losartan on hold due to KENNA on admission     Chronic Anemia  ``suspected to being 2/2 CKD vs chronic inflammatory disease  Hemoglobin stable 11-9 0.6-9.6       Chronic back pain  --tylenol     Chronic dysphagia  Speech path son saw him and started him on full liquid diet with moderate thickened liquids        Diet: Combination Diet Full Liquid Diet; Liquidized/Moderately Thick (level 3) (Spoon only, 1-1 assist, stop if coughing)    DVT Prophylaxis: Pneumatic Compression Devices  Cerda Catheter: Not present  Lines: None     Cardiac Monitoring: None  Code Status: No CPR- Do NOT Intubate      Clinically Significant Risk Factors         # Hypernatremia: Highest Na = 152 mmol/L in last 2 days, will monitor as appropriate   # Hypercalcemia: Highest Ca = 11 mg/dL in last 2 days, will monitor as appropriate    # Hypoalbuminemia: Lowest albumin = 3 g/dL at 3/28/2024  3:50 AM, will monitor as appropriate     # Hypertension: Noted on problem list            #  Financial/Environmental Concerns: none         Disposition Plan      Expected Discharge Date: 04/02/2024      Destination: other (comment) (TCU)          Discussed with bedside RN, patient     Jayashree Miller MD  Hospitalist Service  [facility]  Securely message with [employer] (more info)  Text page via [employer] Paging/Directory   ______________________________________________________________________    Interval History   Patient is more alert and awake today.  Sodium improved to 144 on 3/30/2024.  Encourage oral fluids will DC IV fluids.  Speech path started him on full liquid diet with moderate thickened liquids.  No other acute issues.  Discussed with bedside RN patient    Physical Exam   Vital Signs: Temp: 98  F (36.7  C) Temp src: Axillary BP: (!) 149/64 Pulse: 70   Resp: 18 SpO2: 98 % O2 Device: None (Room air)    Weight: 0 lbs 0 oz    General Appearance: Much more alert awake today  Respiratory: Diminished in the bases otherwise clear to auscultation  Cardiovascular: Normal rate rhythm regular, 2+ murmur present  GI: Soft, nontender nondistended bowel sounds positive  Skin: Warm and dry  Other: No lower extremity edema    Medical Decision Making       52 minutes were spent in taking care of him today      Data     I have personally reviewed the following data over the past 24 hrs:    N/A  \   N/A   / N/A     144 110 (H) 28.2 (H) /  138 (H)   3.6 21 (L) 1.77 (H) \       Imaging results reviewed over the past 24 hrs:   No results found for this or any previous visit (from the past 24 hour(s)).

## 2024-03-31 NOTE — PLAN OF CARE
Goal Outcome Evaluation:  Summary:  Altered mental status; possibly secondary to dehydration/KENNA, unwitnessed fall at TCU   DATE & TIME:  3/31/24 3123-9331  Cognitive Concerns/ Orientation : AxOx2-3. Unable to fully assess. Patients speech is garbled/ incoherent/hoarse, difficult to understand. Has been somnolent this shift.   BEHAVIOR & AGGRESSION TOOL COLOR: Green   ABNL VS/O2: VSS on RA  MOBILITY: Heavy Assistx of 2 - GB/W. Unsteady, especially if not fully alert, Pt not oob this shift due to increased somnolence. Weight shifting q2hr.  PAIN MANAGMENT: C/o back pain. Tylenol given x1. Absence of non verbal que's the rest of shift.   DIET: Patient was Combination full liquid, Moderately think (level 3) liquids. Spoon only. 1:1 assist. Stop if coughing but was tolerating very poorly. Unable to give afternoon meds due to patient not being alert enough for oral intake. Speech saw around 1400 and decided to make patient NPO again.   BOWEL/BLADDER: Incontinent. No urine occurrence or BM this shift. Bladder scanned for 184mL. Per MD if need to straight cath again, will need to access for inserting vizcarra.   ABNL LAB/BG: Na 144,, Hbg 9.1, Creat 1.86  DRAIN/DEVICES: L PIV infusing. Fluids restarted. D5 @ 50mL/hr.   SKIN: Scattered bruises and scabs. Blanchable redness on coccyx. Slight edema to bilateral upper extremities.    TESTS/PROCEDURES: NA  D/C DATE: Pending. Back to TCU when medically ready.   OTHER IMPORTANT INFO: SLP, OT/PT and SW following.

## 2024-03-31 NOTE — PROGRESS NOTES
[facility]    Medicine Progress Note - Hospitalist Service    Date of Admission:  3/27/2024    Assessment & Plan     88M hx of CAD s/p PCI, HTN HLD, Dyspahgia, chronic Anemia, CKD s/p Nephrectomy due to RCC, presenting with AMS.      Acute Encephalopathy, likely metabolic with severe dehydration, hypernatremia acute kidney injury contributing  Severe Dehydration  Hypernatremia  Hypercalcemia  Physical deconditioning  ``Hx: Patient recently hospitalized for  sepsis 2/2 influenza causing AMS (likely infectious encephalopathy). Documentation states that patient grossly improved in mentation with some episodes of agitation (likely delerium), but on the last day worsened and was still discharged. Wife at bedside offers a different story that patient since his admission at [facility] was progressively worsening in cognition and strength. TCU stated to [facility] ER staff and wife that pt fell from the bed unwitnessed day prior to current visit, but stated didn't hurt self (unclear how they knew he didn't hurt self as patient nonberal). Currently, patient is altered, nonverbal but follows commands. Wife states that patient at baseline is Aox3, active with a walker.  Of note, patient on vit D and Ca supplements, as well as torsemide  ``Vitals/Exam: SBP 140s, patient altered, responds to commands but nonverbal, chronic bruising on abdomen/arms, no LE edema, no focal deficits, pupils are small and reactive bilaterally  ``Labs: Na 149, Cr 2.8, Ca 13  ``Imaging: CTH and CXR wnl  ``ER Course: 1L fluids  CT scan of abdomen pelvis without contrast was done and it showed no acute intra-abdominal process identified.  Right-sided kidney is surgically absent.  Left kidney is unchanged in size and appearance.  No significant mass stones or hydronephrosis    MRI of the brain done showed moderate brain atrophy with no acute findings of stroke or hemorrhage    Sodium went up to 151 with normal saline so fluids changed to  half-normal saline at 200 mL/h  Recheck BMP at 4 PM today and adjust fluids as needed    Speech path consultation requested to assess the safety of oral intake and they made him  n.p.o. on 3/28/2024    PTA vitamin D3 and Tums supplements discontinued due to hypercalcemia  Patient's creatinine improved to 1.8  Sodium improved to 147 from 152  Reduce D5W to 60 mill per hour  Speech path saw him and put him on full liquid diet with moderate thickened liquids on 3/30/2024  Aspiration precautions ordered  Patient with very poor oral intake.  Noted significant oral thrush  Nystatin swish and spit ordered  Restarted IV fluids D5W at 50 mill per hour    Patient remains very confused on 3/31/2024.  Very poor swallow function with coughing with every meal and swallow    Goals of care discussion was done with the wife at bedside continue current care versus due to his advanced age and ongoing confusion and poor oral intake keeping him comfortable with hospice care discussed.  Wife wants to continue current cares except his CODE STATUS being DNR/DNI    We also talked about feeding tube placement in case needed going forward if patient is unable to eat well.  Wife wants to hold off on that and does not want that for him    Oral thrush;  Started on nystatin swish and spit solution      Urinary retention   Overnight on 3/30/2024 urinary retention issues needed straight cath x 2 if needs a third straight cath will need to place indwelling Cerda catheter  Started on Flomax on 3/31    KENNA on CKD3a  Hx RCC s/p R nephrectomy  ``Baseline is 1.7, elevated 2.8 on admission  ``Could be 2/2 recent influenza infection and patient being on torsemide  With IV fluids creatinine improved to 1.8-1.77-1.86  Patient with very poor oral intake so restarted D5W at 50 mill per hour  Avoid nephrotoxins and NSAIDs     CAD, H/o MI in 2018, s/p PCI mLAD  HTN  HLD  Mild-Mod Aortic Stenosis  Hx of bradycardia  ``TTE 3/2024> EF 55-60, Mild to moderate  valvular aortic stenosis  --PRN hydralazine  --resumed aspirin, and amlodipine  Torsemide,  losartan on hold due to KENNA on admission     Chronic Anemia  ``suspected to being 2/2 CKD vs chronic inflammatory disease  Hemoglobin stable 11-9 0.6-9.6       Chronic back pain  --tylenol     Chronic dysphagia  Speech path son saw him and started him on full liquid diet with moderate thickened liquids        Diet: Combination Diet Full Liquid Diet; Liquidized/Moderately Thick (level 3) (Spoon only, 1-1 assist, ONLY when awake/alert, HOLD if coughing or overt concerns with swallowing)    DVT Prophylaxis: Subcu heparin ordered  Cerda Catheter: Not present  Lines: None     Cardiac Monitoring: None  Code Status: No CPR- Do NOT Intubate      Clinically Significant Risk Factors         # Hypernatremia: Highest Na = 147 mmol/L in last 2 days, will monitor as appropriate   # Hypercalcemia: Highest Ca = 10.9 mg/dL in last 2 days, will monitor as appropriate    # Hypoalbuminemia: Lowest albumin = 3 g/dL at 3/28/2024  3:50 AM, will monitor as appropriate     # Hypertension: Noted on problem list            # Financial/Environmental Concerns: none         Disposition Plan      Expected Discharge Date: 04/01/2024      Destination: other (comment) (TCU)  Discharge Comments: pending stable Na/creat back to [facility] TCU       PT consultation requested and recommending TCU     Discussed with bedside RN, patient     Jayashree Miller MD  Hospitalist Service  [facility]  Securely message with [employer] (more info)  Text page via [employer] Paging/Directory   ______________________________________________________________________    Interval History   Patient is pleasantly confused .  Very poor oral intake.  Not interested in eating or drinking much as per the bedside RN.  Urinary retention issues needed straight cath x 2 if needs a third straight cath will need to place indwelling Cerda catheter.  Significant oral  thrush noted.  Nystatin ordered.  No other acute issues    Physical Exam   Vital Signs: Temp: 97.2  F (36.2  C) Temp src: Oral BP: 134/60 Pulse: 64   Resp: 18 SpO2: 96 % O2 Device: None (Room air)    Weight: 0 lbs 0 oz    General Appearance: Pleasantly confused, appears very fatigued and weak, oropharyngeal exam showed oral thrush with white patches on the tongue  Respiratory: Diminished in the bases otherwise clear to auscultation  Cardiovascular: Normal rate rhythm regular, 2+ murmur present  GI: Soft, nontender nondistended bowel sounds positive  Skin: Warm and dry  Other: No lower extremity edema    Medical Decision Making       52 minutes were spent in taking care of him today      Data     I have personally reviewed the following data over the past 24 hrs:    7.7  \   9.1 (L)   / 276     144 112 (H) 31.0 (H) /  103 (H)   3.6 20 (L) 1.86 (H) \       Imaging results reviewed over the past 24 hrs:   No results found for this or any previous visit (from the past 24 hour(s)).

## 2024-03-31 NOTE — PLAN OF CARE
Summary:  Altered mental status; possibly secondary to dehydration/KENNA, unwitnessed fall at TCU   DATE & TIME:  3/31/24 3310-0760  Cognitive Concerns/ Orientation : AxOx2-3. Unable to fully assess. Patients speech is garbled/ incoherent, very quiet and hard to understand.   BEHAVIOR & AGGRESSION TOOL COLOR: Green   ABNL VS/O2: HTN on RA  MOBILITY: Assistx2 GB/W. unsteady on feet; Turn/repo  PAIN MANAGMENT: denying this shift  DIET: Combination full liquid, Moderately think (level 3) liquids. Spoon only. 1:1 assist. Stop if coughing.   BOWEL/BLADDER: Incontinent bladder x1,  mL; straight cath 300 mL; no BM this shift, but gets up to BSC  ABNL LAB/BG: None new  DRAIN/DEVICES: L PIV saline locked  SKIN: Scattered bruises and scabs. Blanchable redness on coccyx.   TESTS/PROCEDURES: NA  D/C DATE: Pending. Back to TCU when medically ready.   OTHER IMPORTANT INFO: SLP, OT/PT and SW following.

## 2024-03-31 NOTE — PLAN OF CARE
Summary:  Altered mental status; possibly secondary to dehydration/KENNA, unwitnessed fall at TCU   DATE & TIME:  03/30/24 3691-9470  Cognitive Concerns/ Orientation : AxOx2-3. Unable to fully assess. Patients speech is garbled/ incoherent, very quiet and hard to understand. Lethargic over shift.  BEHAVIOR & AGGRESSION TOOL COLOR: Green   ABNL VS/O2: Hypertensive at times. Other VSS on RA.   MOBILITY: Assistx2 GB/W. Somewhat unsteady on feet, hard for him to push up to stand. Up x1 to commode for small BM.  PAIN MANAGMENT: denying this shift  DIET: Combination full liquid, Moderately think (level 3) liquids. Crush pills. Spoon only. 1:1 assist. Stop if coughing.   BOWEL/BLADDER: incontinent bladder, very little output, bladder scan q4. Straight cath'd 420output after scan of 680. ABNL LAB/BG: Na 144, Creat 1.77,   DRAIN/DEVICES: L PIV SL, fluids d/c  SKIN: Scattered bruises, scattered scabs on limbs. Redness silverio on coccyx.   TESTS/PROCEDURES: NA  D/C DATE: Pending. Back to TCU when medically ready.   OTHER IMPORTANT INFO: SLP, Ot/PT and SW following. Family in room most of shift. Has swabs in room to moisten mouth.

## 2024-03-31 NOTE — PLAN OF CARE
SLP: Per RN, unable to give meds or any oral intake due to pt's level of alertness and concern for swallow safety. Pt unable to arouse for any safe oral intake this afternoon with SLP. Pt remains at high risk for aspiration given LYN and current dysphagia. Recommend NPO with oral cares. ONLY if pt is awake/alert, he can have moderately thick liquid by spoon for comfort. Continue diligent oral cares. Suspect pt will not be able to swish and spit Nystatin.

## 2024-04-01 NOTE — PROVIDER NOTIFICATION
Notified Dr. Jain that we are unable to place NG tube bedside. Stated that there was a little resistance but it advanced. However per XRAY it needed to be advanced further, when attempting to advance tube coiled back up into throat. Recommending IR or XRAY to place tube. Dr Jain will place order for this.     Dr. Jain also said ok to keep D5 running with Na at 141 today

## 2024-04-01 NOTE — PROGRESS NOTES
Notified provider about indwelling vizcarra catheter discussed removal or continued need.    Did provider choose to remove indwelling vizcarra catheter? No    Provider's vizcarra indication for keeping indwelling vizcarra catheter: Retention.    Is there an order for indwelling vizcarra catheter? Yes    *If there is a plan to keep vizcarra catheter in place at discharge daily notification with provider is not necessary, but please add a notation in the treatment team sticky note that the patient will be discharging with the catheter.       Per urology, discharge with catheter and trial void at TCU

## 2024-04-01 NOTE — PLAN OF CARE
Summary:  Altered mental status; possibly secondary to dehydration/KENNA, unwitnessed fall at TCU   DATE & TIME:  3/31/24 4745-7460  Cognitive Concerns/ Orientation : AxOx2-3. Unable to fully assess. Patients speech is garbled/ incoherent/hoarse, difficult to understand. Was able to speak some sentences, but very quiet and inconsistent with answering    BEHAVIOR & AGGRESSION TOOL COLOR: Green   ABNL VS/O2: VSS on RA  MOBILITY: Heavy Assistx of 2 - GB/W. Unsteady, especially if not fully alert, Pt not oob this shift due to increased somnolence. Weight shifting q2hr.  PAIN MANAGMENT: Intermittent back pain. Pt verbalized that he was not in pain this shift. Scheduled pain meds unable to be administered d/t pt not tolerating oral intake.  DIET: NPO. Can have mod thick liquids for comfort and meds, oral swabs. Was able to swab once. Pt not tolerating meds PO or spit/swish meds as begins coughing immediately. Provider notified.  BOWEL/BLADDER: Cerda placed d/t retention. Good output. No Bms this shift. Scheduled Senna not given d/t not tolerating PO intake.   ABNL LAB/BG: Na 144,, Hbg 9.1, Creat 1.86  DRAIN/DEVICES: L PIV infusing. Fluids restarted this mornign. D5 @ 50mL/hr.   SKIN: Scattered bruises and scabs. Blanchable redness on coccyx. Slight edema to bilateral upper extremities.    TESTS/PROCEDURES: NA  D/C DATE: Pending. Back to TCU when medically ready.   OTHER IMPORTANT INFO: SLP, OT/PT and SW following.

## 2024-04-01 NOTE — CONSULTS
Urology Consultation    Name: Augie Powell    MRN: [id number]   YOB: 1935  Date of Admission: 3/27/2024              Impression and Plan:   Impression / Plan:   Augie Powell is a 88 year old male with urinary retention likely related to underlying BPH.       - Maintain indwelling vizcarra.   - Continue Flomax daily at discharge.   - He can undergo TOV @ TCU in 5-7 days.   - Avoid anticholinergic, antihistamine, and alpha-agonist medications as these will exacerbate urinary retention.  - Urology will sign off.     Discussed with Dr. Cloud    Thank you for the opportunity to participate in the care of Augie Powell.     BHANU Dong, CNP  [facility] - Department of Urology  Office: [phone]  After business hours: [phone]            Chief Complaint:   AUR  History is obtained from patient & EMR          History of Present Illness:   Augie Powell is a 88 year old male with hx of CAD s/p PCI, HTN, HLD, Dyspahgia, chronic Anemia, CKD s/p Nephrectomy due to RCC, admitted 3/27 with AMS, CT scan of abdomen pelvis without contrast performed 3/27 showed no acute intra-abdominal process, no significant mass stones or hydronephrosis. MRI of the brain done showed moderate brain atrophy with no acute findings of stroke or hemorrhage. Cause of AMS was felt to be due to Acute Metabolic Encephalopathy with severe dehydration, hypernatremia acute kidney injury contributing. His Cr has now returned to near baseline.     Patient developed urinary retention overnight on 3/30/2024 ultimately requiring vizcarra placement. On 3/31 he was started on Flomax and urology was consulted. Augie has no prior urologic Hx.     Estimated bullet ellipsoid volume of prostate based upon CT review: 40 mL.    Pending stable Na/creat Augie will be discharged to TCU.    Pertinent imaging reviewed:  CT ABDOMEN PELVIS W/O CONTRAST  LOCATION: [facility]  DATE: 3/27/2024          Past  Medical History:     Past Medical History:   Diagnosis Date    Aortic stenosis, mild     Ascending aorta dilatation (H)     CKD (chronic kidney disease) stage 4, GFR 15-29 ml/min (H) 09/17/2018    Coronary artery disease 10/2018    nSTEMI- Isis to mLAD, distal lad mild, Lcx-small 50-60%, RCA 60-70% normal ifR (despite echo showing poss old IMI)    Hyperlipidemia     Hypertension     Renal cell cancer, unspecified laterality (H) 05/10/2017    Renal disease     CRD--creatinine in upper ones to 2, right kdney removed 2000 for canncer    Rheumatic fever     Spondylosis with myelopathy, lumbar region           Past Surgical History:     Past Surgical History:   Procedure Laterality Date    BACK SURGERY      CHOLECYSTECTOMY      DECOMPRESSION LUMBAR TWO LEVELS  12/01/2014    Procedure: DECOMPRESSION LUMBAR TWO LEVELS;  Surgeon: Remy Harmon MD;  Location: [facility] OR    HERNIA REPAIR      LAMINECTOMY, FUSION LUMBAR ONE LEVEL, COMBINED N/A 12/01/2014    Procedure: COMBINED LAMINECTOMY, FUSION LUMBAR ONE LEVEL;  Surgeon: Remy Harmon MD;  Location:  OR    PHACOEMULSIFICATION CLEAR CORNEA WITH STANDARD INTRAOCULAR LENS IMPLANT  10/22/2012    Procedure: PHACOEMULSIFICATION CLEAR CORNEA WITH STANDARD INTRAOCULAR LENS IMPLANT;  RIGHT EYE PHACOEMULSIFICATION CLEAR CORNEA WITH STANDARD INTRAOCULAR LENS IMPLANT ;  Surgeon: Grupo Granger MD;  Location:  [facility]    right nephrectomy[      STENT,CORONARY, S660 24/30  10/2018    mLAD  mod RCA, Lcx          Social History:     Social History     Tobacco Use    Smoking status: Former    Smokeless tobacco: Never   Substance Use Topics    Alcohol use: Yes     Alcohol/week: 2.0 standard drinks of alcohol     Types: 2 Shots of liquor per week          Family History:     Family History   Problem Relation Age of Onset    No Known Problems Mother     Heart Surgery Father     No Known Problems Sister           Allergies:   No Known Allergies       Medications:     Current  Facility-Administered Medications   Medication    acetaminophen (TYLENOL) tablet 650 mg    Or    acetaminophen (TYLENOL) Suppository 650 mg    acetaminophen (TYLENOL) tablet 650 mg    aspirin [facility] tablet 81 mg    benzocaine-menthol (CHLORASEPTIC) 6-10 MG lozenge 1 lozenge    benzonatate (TESSALON) capsule 100 mg    dextrose 5% infusion    guaiFENesin (ROBITUSSIN) 20 mg/mL solution 10 mL    haloperidol lactate (HALDOL) injection 1 mg    heparin ANTICOAGULANT injection 5,000 Units    hydrALAZINE (APRESOLINE) tablet 10 mg    Or    hydrALAZINE (APRESOLINE) injection 10 mg    Lidocaine (LIDOCARE) 4 % Patch 1 patch    lidocaine (LMX4) cream    lidocaine 1 % 0.1-1 mL    metoprolol succinate ER (TOPROL-XL) 24 hr half-tab 12.5 mg    nitroGLYcerin (NITROSTAT) sublingual tablet 0.4 mg    nystatin (MYCOSTATIN) suspension 500,000 Units    ondansetron (ZOFRAN ODT) ODT tab 4 mg    Or    ondansetron (ZOFRAN) injection 4 mg    QUEtiapine (SEROquel) quarter-tab 6.25 mg    senna-docusate (SENOKOT-S/PERICOLACE) 8.6-50 MG per tablet 1 tablet    Or    senna-docusate (SENOKOT-S/PERICOLACE) 8.6-50 MG per tablet 2 tablet    senna-docusate (SENOKOT-S/PERICOLACE) 8.6-50 MG per tablet 1 tablet    sodium chloride (PF) 0.9% PF flush 10 mL    sodium chloride (PF) 0.9% PF flush 10-20 mL    sodium chloride (PF) 0.9% PF flush 3 mL    sodium chloride (PF) 0.9% PF flush 3 mL    tamsulosin (FLOMAX) capsule 0.4 mg          Review of Systems:    ROS: See HPI for pertinent details.  Remainder of 10-point ROS negative.         Physical Exam:   VS:  T: 97.9    HR: 69    BP: 125/40    RR: 16     GEN:  Alert.  NAD. Pt is not diaphoretic.   HEENT:  Sclerae anicteric.    CV:  No obvious jugular venous distension  LUNGS: No respiratory distress, breathing comfortably wo accessory muscle use.  ABD:  ND.  Soft.  NT.  No rebound or guarding.      EXT:  Warm, well perfused.  SKIN:  Warm.  Dry.   NEURO:  CN grossly intact.     URINE: clear yellow           Data:  "  All laboratory data reviewed:  No results found for: \"PSA\"  Recent Labs   Lab 03/31/24  0816 03/29/24  0832 03/28/24  0350 03/27/24  1822   WBC 7.7 7.2 8.3 9.6   HGB 9.1* 9.6* 9.6* 11.0*    312 298 342       Recent Labs   Lab 03/31/24  0816 03/30/24  1048 03/29/24  2217 03/29/24  0832 03/28/24  1915 03/28/24  1546 03/28/24  0350 03/27/24  1822    144 147* 152*   < > 151*   < > 149*   POTASSIUM 3.6 3.6  --  3.8  --  3.8   < > 4.3   CHLORIDE 112* 110*  --  119*  --  120*   < > 113*   CO2 20* 21*  --  21*  --  20*   < > 23   BUN 31.0* 28.2*  --  35.6*  --  44.7*   < > 57.9*   CR 1.86* 1.77*  --  1.80*  --  1.96*   < > 2.76*   * 138*  --  131*  --  104*   < > 121*   RICCARDO 10.9* 10.7*  --  11.0*  --  10.7*   < > 13.0*   MAG  --   --   --   --   --   --   --  2.6*   PHOS  --   --   --   --   --   --   --  3.6    < > = values in this interval not displayed.       Recent Labs   Lab 03/27/24 2004   COLOR Light Yellow   APPEARANCE Clear   URINEGLC Negative   URINEBILI Negative   URINEKETONE Negative   SG 1.017   URINEPH 5.5   PROTEIN 100*   NITRITE Negative   LEUKEST Negative   RBCU <1   WBCU 1     Results for orders placed or performed during the hospital encounter of 03/15/24   Urine Culture Aerobic Bacterial    Specimen: Urine, Midstream   Result Value Ref Range    Culture <10,000 CFU/mL Mixture of Urogenital Linnette             "

## 2024-04-01 NOTE — PLAN OF CARE
Summary: Altered mental status; possibly secondary to dehydration/KENNA, unwitnessed fall at TCU   DATE & TIME:  2024 1520-6360  Cognitive Concerns/ Orientation: A&Ox1 (self only) Unable to fully assess. Patients speech is garbled/ incoherent/hoarse, difficult to understand. Was able to speak some sentences, but very quiet and inconsistent with answering.   BEHAVIOR & AGGRESSION TOOL COLOR: Green   ABNL VS/O2: Hypertensive at times otherwise VSS on RA.  MOBILITY: Heavy Assistx of 2 - GB/W. Unsteady, especially if not fully alert. NOOB this shift. T&Rq2h.  PAIN MANAGMENT: Denies pain this shift.  DIET: NPO. Can have mod thick liquids for comfort and meds, oral swabs. Was able to swab once q2h. Pt not tolerating meds PO or spit/swish meds as begins coughing immediately. Provider notified.  BOWEL/BLADDER: Cerda placed d/t retention. Good output. No BM this shift.   ABNL LAB/BG: Na: 144, Hb.1, Crea: 1.86.  DRAIN/DEVICES: L PIV infusing D5 @ 50mL/hr.  SKIN: Scattered bruises and scabs. Blanchable redness on coccyx mepi in place. Slight edema to bilateral upper extremities.    TESTS/PROCEDURES: NA  D/C DATE: Back to TCU when medically ready.   OTHER IMPORTANT INFO: SLP, OT/PT and SW following.

## 2024-04-01 NOTE — PROGRESS NOTES
Care Management Follow Up    Length of Stay (days): 5    Expected Discharge Date: 04/03/2024     Concerns to be Addressed:       Patient plan of care discussed at interdisciplinary rounds: Yes    Anticipated Discharge Disposition: Transitional Care     Anticipated Discharge Services: None  Anticipated Discharge DME: None    Patient/family educated on Medicare website which has current facility and service quality ratings: yes  Education Provided on the Discharge Plan:    Patient/Family in Agreement with the Plan: yes    Referrals Placed by CM/SW:    Private pay costs discussed: private duty [facility] care and SNF co-payment days.    Additional Information:  Wife requested a meeting today with herself, patient and his two daughters Brad and Meredith.  Prior to the meeting, she shared in private that patient didn't want to return to a TCU and wanted to go home.  Wife favored a TCU if he could relocate.   Prior to the meeting Dr Jain met with family and patient and as a result patient is having a temporary n/g feeding tube to help with nutrition and give time for the candidiasis to improve hoping this will improve his ability to eat orally.  During the meeting, writer spoke directly to patient explaining the cost of private duty care with limited therapy visits vs returning to a TCU to continue working on his rehab.   Wife stated that she does want patient to transfer to a TCU and that patient understands she cannot care for him at this time. Patient verbalized acceptance of this plan. The daughters are in agreement with TCU.   Wife expressed interest in [facility].  Referrals will be in the coming days. The expectation is patient will be off the tube feeding at discharge.       TORY CorcoranSW

## 2024-04-01 NOTE — PROGRESS NOTES
[facility]    Hospitalist Progress Note    Interval History   - Patient is A&O to self, place, reason for hospital stay, confused on the date. He reports poor appetite, poor oral intake. Has been in bed the majority of the day  - Wife Khushboo, two daughters, one son, one granddaughter at bedside, updated. We discussed patient's ongoing poor oral intake for 10 days, and his severe oral thrush which may very extend into the esophagus. We will treat his thrush aggressively but this may take a few days for significant improvement. We will place a NG tube and start tube feedings to jumpstart his recovery. Video swallow tomorrow. Hopefully we will make improvement with tube feeds and antifungals, reassess daily. Family and patient agreed to NGT, plan.  - Up walking 2-3 times a day per family request, discussed with nurse    Assessment & Plan   Summary: Augie Powell is a 88 year old male with PMH CAD s/p PCI, HTN, HLD, dysphagia, chronic anemia, RCC, CKD s/p nephrectomy, who was admitted on 3/27/2024 with prerenal acute kidney injury and confusion.   Patient recently admitted 3/15 to 3/25 for influenza, complicated by hypoxic respiratory failure, infectious encephalopathy, dysphagia, and deconditioning. He was discharged to TCU and readmitted on 3/27.     Acute metabolic encephalopathy, improved  Recently admitted 3/15 to 3/25 for influenza, encephalopathy. Per family patient was A&Ox3 and driving prior to admission and has had a precipitous decline. Patient discharged to TCU, TCU reports patient fell on 3/26. Admitted 3/27 was initially confused and nonverbal. CT scan of abdomen pelvis without contrast was done and it showed no acute intra-abdominal process identified.  Right-sided kidney is surgically absent.  Left kidney is unchanged in size and appearance.  No significant mass stones or hydronephrosis. MRI of the brain done showed moderate brain atrophy with no acute findings of stroke or  hemorrhage. Patient remains very confused on 3/31/2024.  Very poor swallow function with coughing with every meal and swallow. Hospitalist had goals of care conversations on 3/31, family wishes to continue medical management however patient is DNR/DNI.  Improved, A&Ox2-3 on 4/1 however patient continues to have a hoarse voice. Likely patient's ongoing caloric deficit, deconditioning, contributing to his mental status changes. Family denies any sundowning.  - PT/OT  - Monitor for confusion  - Seroquel qHS    Dysphagia  Nutritional status  Physical deconditioning  Hypernatremia, improved  During previous admission 3/15-3/25 patient put on modified due to mild dysphagia. Appears to be worse here--speech path consultation requested to assess the safety of oral intake and they made him  n.p.o. on 3/28/2024. See below; patient has severe oropharyngeal candidiasis which may be contributing to his worsening dysphagia.    Discussed with family given little to no PO intake for 10 days will start feeding tube therapy.  - Antifungals as below  - Speech therapy  - SLP consult  - Aspiration precautions  - Continue PT/OT  - Start tube feeds and nutrition consult    Suspect esophageal candidiasis  Oral thrush  Hoarseness  Patient has severe oral thrush and appears to have oropharyngeal plaques likely has esophageal candidiasis. This is also likely contributing to his presenting dysphagia. Hoarseness could also be due to this, monitor closely.  - Fluconazole treatment  - Continue nystatin swish and spit solution    Urinary retention   Overnight on 3/30/2024 urinary retention issues needed straight cath x 2. Flomax started. Cerda catheter placed  - Flomax started  - Urology consulted  - I&Os    KENNA on CKD3a  Hx RCC s/p R nephrectomy  Hypernatremia, improved  Baseline is 1.7, elevated 2.8 on admission, improved with IVF. Could be 2/2 recent influenza infection and patient being on torsemide. With IV fluids creatinine improved to  ~1.8.  - Continue D5W, stop when tube feeds started  - Avoid nephrotoxins and NSAIDs    Hypercalcemia  PTA patient on vit D and Ca supplements, as well as torsemide. Patient has had hypercalcemia since at least 2022. Patient's ongoing hypoalbuminemia, dehydration, improved, also contributing.  - Check PTH, vit D, PTH-RP levels, TSH  - Monitor, may need further workup including malignancy workup    HTN: /40 on 4/1  - Hold PTA metoprolol given low dose 12.5mg XL daily and switching to tube feeds  - Hold PTA amlodipine    Clinically Significant Risk Factors           # Hypercalcemia: Highest Ca = 10.9 mg/dL in last 2 days, will monitor as appropriate    # Hypoalbuminemia: Lowest albumin = 3 g/dL at 3/28/2024  3:50 AM, will monitor as appropriate     # Hypertension: Noted on problem list            # Financial/Environmental Concerns: none          PT/OT: ordered  Diet: NPO for Medical/Clinical Reasons Except for: Other, Ice Chips, Meds; Specify: ONLY if alert and awake, OK for moderately thick liquid by spoon for comfort and with crushed meds if able.    DVT Prophylaxis: heparin  Cerda Catheter: PRESENT, indication: Acute retention or obstruction, Acute retention or obstruction  Lines: None     Cardiac Monitoring: None  Code Status: No CPR- Do NOT Intubate    Disposition: Anticipated discharge 3+ days    Noel Jain MD  Hospitalist Service  [facility]  Securely message with [employer] (more info)  Text page via [facility] Paging/Directory     - Total time spent on encounter is 75 minutes, which includes counseling, coordination of care, time spent discussing with family, and/or time spent discussing with consultants.    Data reviewed today: I reviewed all new labs and imaging results over the last 24 hours.    Physical Exam   Temp: 97.9  F (36.6  C) Temp src: Axillary BP: 125/40 Pulse: 69   Resp: 16 SpO2: 98 % O2 Device: None (Room air)    There were no vitals filed for this visit.  Vital  Signs with Ranges  Temp:  [97  F (36.1  C)-98.4  F (36.9  C)] 97.9  F (36.6  C)  Pulse:  [69-73] 69  Resp:  [16-18] 16  BP: (111-141)/(40-67) 125/40  SpO2:  [98 %-99 %] 98 %  I/O last 3 completed shifts:  In: 340 [P.O.:340]  Out: 1275 [Urine:1275]  O2 requirements: none    Constitutional: Male appears generally ill, tired, weak  HEENT: Eyes nonicteric, oral mucosa severe thrush with plaques noted oropharynx  Cardiovascular: RRR, normal S1/2, no m/r/g  Respiratory: Basilar to mid crackles  Vascular: No LE pitting edema  GI: Normoactive bowel sounds, nontender  Skin/Integumen: No rashes  Neuro/Psych: Appropriate affect and mood. A&Ox2, moves all extremities    Medications    D5W 75 mL/hr at 04/01/24 0833      acetaminophen  650 mg Oral BID    aspirin  81 mg Oral Daily    heparin ANTICOAGULANT  5,000 Units Subcutaneous Q8H    lidocaine  1 patch Transdermal Q24h    lidocaine   Topical Once    metoclopramide  10 mg Intravenous Once    metoprolol succinate ER  12.5 mg Oral Daily    nystatin  500,000 Units Swish & Spit 4x Daily    QUEtiapine  6.25 mg Oral At Bedtime    senna-docusate  1 tablet Oral BID    sodium chloride (PF)  10 mL Intracatheter Q8H    sodium chloride (PF)  3 mL Intracatheter Q8H    tamsulosin  0.4 mg Oral Daily       Data   Recent Labs   Lab 03/31/24  0816 03/30/24  1048 03/29/24  2217 03/29/24  0832 03/28/24  1546 03/28/24  0350 03/27/24  1822   WBC 7.7  --   --  7.2  --  8.3 9.6   HGB 9.1*  --   --  9.6*  --  9.6* 11.0*   MCV 99  --   --  102*  --  102* 101*     --   --  312  --  298 342    144 147* 152*   < > 151*  151* 149*   POTASSIUM 3.6 3.6  --  3.8   < > 3.9  3.9 4.3   CHLORIDE 112* 110*  --  119*   < > 118*  118* 113*   CO2 20* 21*  --  21*   < > 21*  21* 23   BUN 31.0* 28.2*  --  35.6*   < > 52.6*  52.6* 57.9*   CR 1.86* 1.77*  --  1.80*   < > 2.31*  2.31* 2.76*   ANIONGAP 12 13  --  12   < > 12  12 13   RICCARDO 10.9* 10.7*  --  11.0*   < > 11.7*  11.7* 13.0*   * 138*   --  131*   < > 124*  124* 121*   ALBUMIN  --   --   --   --   --  3.0* 3.5   PROTTOTAL  --   --   --   --   --  8.0 9.1*   BILITOTAL  --   --   --   --   --  0.4 0.4   ALKPHOS  --   --   --   --   --  67 76   ALT  --   --   --   --   --  42 52   AST  --   --   --   --   --  26 30    < > = values in this interval not displayed.       Imaging:   No results found for this or any previous visit (from the past 24 hour(s)).

## 2024-04-01 NOTE — CONSULTS
CLINICAL NUTRITION SERVICES  -  ASSESSMENT NOTE    RECOMMENDATIONS FOR MD/PROVIDER TO ORDER:   Recommend discontinuing D5 IVF as TF titrates to goal   Recommendations Ordered by Registered Dietitian (RD):   TF initiation today ~  Type of Feeding Tube: to be placed  Enteral Frequency:  Continuous  Enteral Regimen: Jevity 1.5 @ 55 mL/hr  --> when FT ok for use. Start @ 25 mL/hr and advance by 15 mL q 12 hours to goal rate.   Provisions: 1320 mL, 1980 kcal, 84 g PRO, 1003 mL free H20, 285 g CHO, and 27 g fiber daily  + 1 pkt Prosource = 80 kcal, 20g protein  Total Enteral Provisions: 2060 kcal (26 kcal/kg), 104g protein (1.3 g/kg), 27g fiber    Free Water Flush: 100 mL q 4 hours for tube patency (600 mL daily)  Total Free Water Provisions: 1920 mL daily    - Ordered updated weight  - Phos and Mg labs   Malnutrition:   % Weight Loss:  Weight loss does not meet criteria for malnutrition  % Intake:  </= 50% for >/= 5 days (severe malnutrition)  Subcutaneous Fat Loss:  None observed  Muscle Loss:  None observed  Fluid Retention:  trace    Malnutrition Diagnosis: Patient does not meet two of the above criteria necessary for diagnosing malnutrition     REASON FOR ASSESSMENT  Augie Powell is a 88 year old male seen by Registered Dietitian for Provider Order - Registered Dietitian to Assess and Order TF per Medical Nutrition Therapy Protocol.    PMH of CAD, HTN HLD, dysphagia, chronic anemia, CKD s/p Nephrectomy due to RCC. Presents with acute encephalopathy, severe dehydration, hypernatremia acute kidney injury contributing.    NUTRITION HISTORY    - Patient alert to self only. NPO. TF start today.  - Spoke with family at bedside. They are on board with TF initiation today.  - Patient was previously seen 3/22 by RD. Was on FLD and was given a trial of Nepro ONS.    CURRENT NUTRITION ORDERS  Diet Order: NPO     Current Intake/Tolerance:  - Ordered 2 meals 3/30 and 2 meals 3/31 per [facility]    - SLP 3/28 = recommend NPO  "  - SLP 3/31 = Recommend NPO with oral cares    - Per MD note 3/31, \"Patient remains very confused on 3/31/2024.  Very poor swallow function with coughing with every meal and swallow\"    - Per suspect wife, pt has been able to eat very little since admit    NUTRITION FOCUSED PHYSICAL ASSESSMENT FOR DIAGNOSING MALNUTRITION)  Yes         Observed:    No nutrition-related physical findings observed    ANTHROPOMETRICS  Height: 5'10\"  Weight: 78.7 kg (173 lb 8 oz)  as of 3/25/2024  BMI 24.8  Weight Status:  Normal BMI  IBW: 75.5 kg  % IBW: 104%  Weight History: 2.7% wt loss from 3/21 to 3/25. Need updated weight - ordered.  Wt Readings from Last 10 Encounters:   03/25/24 78.7 kg (173 lb 8 oz)   06/12/23 84.4 kg (186 lb)   02/21/23 85.3 kg (188 lb)   05/31/22 82.9 kg (182 lb 11.2 oz)   04/05/22 85.7 kg (189 lb)   04/22/21 86.2 kg (190 lb)   10/07/20 84.9 kg (187 lb 1.6 oz)   09/21/20 83.9 kg (185 lb)   02/19/20 81.2 kg (179 lb)   01/30/20 81.3 kg (179 lb 4.8 oz)     Per previous RD note:   03/21/24 80.9 kg (178 lb 5.6 oz)    LABS  BUN 29.1 (H), Cr 1.85 (H), GFR 35 (L)    MEDICATIONS  Reglan, senokot, D5 @ 75 mL/hr    ASSESSED NUTRITION NEEDS PER APPROVED PRACTICE GUIDELINES:  Dosing Weight 78.7 kg  Estimated Energy Needs: 1865-5206 kcals (25-30 Kcal/Kg)  Justification: maintenance  Estimated Protein Needs:  grams protein (1.2-1.5 g pro/Kg)  Justification: preservation of lean body mass  Estimated Fluid Needs: 1 mL/kcal or per provider pending fluid status    NUTRITION DIAGNOSIS:  Inadequate oral intake related to NPO as evidenced by need for nutrition support to be initiated today    NUTRITION INTERVENTIONS  Recommendations / Nutrition Prescription  EN start, see above    Implementation  Nutrition education: Not appropriate at this time due to patient condition  EN Composition, EN Schedule, and Feeding Tube Flush - ordered  Ordered Phos, Mg labs    Nutrition Goals  EN to meet % estimated needs at " goal    MONITORING AND EVALUATION:  Progress towards goals will be monitored and evaluated per protocol and Practice Guidelines    Mariana Aguilera RD, LD  Clinical Dietitian - [facility]

## 2024-04-01 NOTE — PROGRESS NOTES
SLP Update:   04/01/24 1000   SLP Discharge Planning   SLP Plan VFSS 4/2 if indicated per POC   SLP Discharge Recommendation Transitional Care Facility   SLP Rationale for DC Rec Pt well below baseline re: swallow function   SLP Brief overview of current status  Family interested in having pt eat and drink today; however, increased coughing observed as po trials continued this am in Tx.  Difficult to determine source of cough at bedside.  Recommend MD/family discussion to determine overalll POC wishes - initiate po despite potential aspiration today (least hazardous mod thick liquid clear liquid diet by tsp vs. requested diet upgrade despite potential increased aspiration) vs continue NPO except for a few tsps of mod thick liquids for comfort with nursing supervision and complete VFSS 4/2 (radiology unable to complete VFSS today) to fully determine pt's aspiration/silent aspiration risk.

## 2024-04-01 NOTE — PLAN OF CARE
Summary:  Altered mental status; possibly secondary to dehydration/KENNA, unwitnessed fall at TCU.   DATE & TIME:  2024 8052-2316  Cognitive Concerns/ Orientation: A&Ox3, disoriented to situation. Patients speech is garbled/ incoherent/hoarse- but much more clear today. Wife says speech has been like this for a couple weeks but otherwise baseline is clear, possibly related to thrush? This evening seems more delirious, still answers orientation questions but more restless, pulling linen off and asking to go home.    BEHAVIOR & AGGRESSION TOOL COLOR: Green   ABNL VS/O2: VSS on RA  MOBILITY: Ax2 - GB/Walker. T&Rq2hr in bed. Up to chair today. Walked to hallway, on way back was very weak and knees almost buckled. PT/OT following  PAIN MANAGMENT: Denies  DIET: NPO. Can have mod thick liquids teaspoons for comfort, oral swabs-frequent oral cares done. This AM was able to take pills one at a time whole and only coughed a little. SLP saw and recommending video swallow tomorrow. NG tube placed for tube feeds, however it needed to be advanced per Xray. When resource RN came back to advance it was coiled up into patients mouth so pt somehow coughed it up even further. Resoure did not want to reinsert as it seemed to meet resistance a little the first time, recommending IR/Xray to place, Dr. Jain notified to place order  BOWEL/BLADDER: Cerda for retention. Good output. No BM this shift.   ABNL LAB/BG: Na: 141, Hb.1, Crea: 1.85.  DRAIN/DEVICES: L PIV infusing D5 @ 50mL/hr- per MD can SL once tube feed is started  SKIN: Scattered bruises and scabs. Blanchable redness on coccyx mepi in place.   TESTS/PROCEDURES: NA  D/C DATE: Back to TCU when medically ready, need to tolerate tube feeds, family hopeful thrush will improve and SLP can eventually advance diet.   OTHER IMPORTANT INFO: Urology signed off, TOV at TCU. Dose of IV diflucan given, PO to start tomorrow. Thrush in mouth improving slightly, on nystatin. IS given,  instructed, pt needs encouragement everytime you are in room to do this otherwise will not use. More frequent congested cough after NG insertion, LS with crackles in bases. Oral suctioning provided as pt unable to clear secretions himself. Wife bedside most of day and supportive.

## 2024-04-02 NOTE — PROGRESS NOTES
"  SLP VFSS Report 04/02/24 6880   Appointment Info   Signing Clinician's Name / Credentials (SLP) Cecille Clemens MA [facility] SLP   General Information   Onset of Illness/Injury or Date of Surgery 03/27/24   Referring Physician Dr. Jain   Patient/Family Therapy Goal Statement (SLP) Family goal to improve pt function to resume a po diet.   Pertinent History of Current Problem \"88M hx of CAD s/p PCI, HTN HLD, Dyspahgia, chronic Anemia, CKD s/p Nephrectomy due to RCC, presenting with AMS. AMS could be 2/2 dehdyration causing hyperNa/HyperCa.\"   General Observations Pt was alert and cooperative.  Unable to achieve voicing, whisper produced.  Variable ability noted to cough on command.  Cough was weak.  PO trials were limited during the study due to decreased airway protection and repeated aspiration.   General Swallowing Observations   Swallowing Evaluation Videofluoroscopic swallow study (VFSS)   VFSS Evaluation   Radiologist Dr. Carranza   Views Taken left lateral  (Unable to complete AP view due to repeated aspiration/decreased airway protection for additional trials.)   Physical Location of Procedure [facility]   VFSS Textures Trialed thin liquids;mildly thick liquids   VFSS Eval: Thin Liquid Texture Trial   Mode of Presentation, Thin Liquid spoon   Order of Presentation 4, 5   Preparatory Phase poor bolus control   Oral Phase, Thin Liquid impaired AP movement;residue in oral cavity;premature pharyngeal entry   Bolus Location When Swallow Triggered valleculae   Pharyngeal Phase, Thin Liquid impaired epiglottic movement;impaired hyolaryngeal excursion;impaired tongue base retraction;residue in vallecula;residue in pyriform sinus;impaired pharyngoesophageal segment opening   Rosenbek's Penetration Aspiration Scale: Thin Liquid Trial Results 8 - contrast passes glottis, visible subglottic residue remains, absent patient response (aspiration)   Diagnostic Statement Curved epiglottis, no epiglottic inversion, decreased swallow " awareness, cues needed to swallow, mild-mod pharyngeal residue, repeated penetration with residual and silent aspiration on pharyngeal residue during 2nd swallow attempt, tight UES with piecemeal swallows, cough on command only partially effective at clearing penetration/aspiration   VFSS Eval: Mildly Thick Liquids   Mode of Presentation spoon   Order of Presentation 1, 2, 3   Preparatory Phase poor bolus control   Oral Phase impaired AP movement;residue in oral cavity;premature pharyngeal entry   Bolus Location When Swallow Triggered valleculae   Pharyngeal Phase impaired hyolaryngel excursion;impaired epiglottic movement;impaired tongue base retraction;residue in vallecula;residue in pyriform sinus;impaired pharyngoesophageal segment opening   Rosenbek's Penetration Aspiration Scale 8 - contrast passes glottis, visible subglottic residue remains, absent patient response (aspiration)   Diagnostic Statement Curved epiglottis, no epiglottic inversion, decreased swallow awareness, cues needed to swallow, mild-mod to mod pharyngeal residue, repeated penetration with residual and silent aspiration on pharyngeal residue during 2nd swallow attempts, tight UES with piecemeal swallows, cough on command only partially effective at clearing penetration/aspiration; slight chin tuck attempted on trial 3 however pt was unable to follow commands and epiglottic inversion difficulty likely to result in chin tuck not being effective   Esophageal Phase of Swallow   Patient reports or presents with symptoms of esophageal dysphagia Yes   Esophageal comments Piecemeal swallows/tight UES   Swallowing Recommendations   Diet Consistency Recommendations NPO   Clinical Impression   Criteria for Skilled Therapeutic Interventions Met (SLP Eval) Yes, treatment indicated   SLP Diagnosis Moderate-severe oral-pharyngeal dysphagia   Risks & Benefits of therapy have been explained evaluation/treatment results reviewed;care plan/treatment goals  reviewed;risks/benefits reviewed;current/potential barriers reviewed;participants voiced agreement with care plan;participants included;patient   Clinical Impression Comments Moderate-severe oral-pharyngeal dysphagia was observed during today's VFSS.  Pt presents with poor oral phase function and control, decreased swallow awareness with cues needed to swallow, poor vocal fold strength for voicing/cough, delayed swallows, decreased peristalsis, base of tongue function, and hyoid elevation, curved epiglottis with no inversion, piecemeal swallows, and tight UES.  Deficits resulted in mild-mod to mod pharyngeal residude with liquid trials, residue did not clear despite cues for strategies, and repeated penetration with residual and silent aspiration of pharyngeal residue for both thin and mildly thick liquids by spoon despite cues for strategies.  Unable to continue the study with thicker textures due to pt's inability to clear residue, inadequate airway protection, and weak cough on command to clear penetration/aspiration.  Recommend strict NPO and frequent oral cares.  Pt is scheduled to have TF replaced this am after the study  Plan to trial swallow Tx exercises with continued SLP swallow Tx.  If goals remain restorative, recommend repeat VFSS in 1-2 weeks to assess for any improvement in swallow function/safety for po intake.  Education was provided to pt's spouse and RN regarding above.   SLP Total Evaluation Time   Evaluation, videofluoroscopic eval of swallow function Minutes (13929) 15   Swallowing Dysfunction &/or Oral Function for Feeding   Treatment of Swallowing Dysfunction &/or Oral Function for Feeding Minutes (69991) 15   Symptoms Noted During/After Treatment None   Treatment Detail/Skilled Intervention Education was provided to pt, RN, and spouse regarding current swallow deficits and inability to swallow without silent aspiration during today's study.  Spouse verbalized goal to improve pt's status for  po intake.  Educated on plan for trial of swallow Tx exercises and possible repeat VFSS in 1-2 weeks if progress noted in Tx.  Pt verbalized understanding.  Pt was given mod-max repeated cues in Tx to cough and swallow to help clear airway and pharyngeal residue.  Pt was able to complete sequence x 3.  No wet quality to voicing after Tx.   SLP Discharge Planning   SLP Plan Trial swallow exercises, oral cares   SLP Discharge Recommendation Transitional Care Facility   SLP Rationale for DC Rec Pt well below baseline re: swallow and voice function; trial swallow Tx after discharge with possible OP VFSS in 1-2+ weeks   SLP Brief overview of current status  Moderate-severe oral-pharyngeal dysphagia; silent aspiration with thin and mildly thick liquids despite strategies and cues, pt unsafe to trial thicker textures due to pt's inability to clear residue, inadequate airway protection, and weak cough on command to clear penetration/aspiration. Recommend strict NPO and frequent oral cares.   Total Session Time   Total Session Time (sum of timed and untimed services) 30

## 2024-04-02 NOTE — PROGRESS NOTES
"SPIRITUAL HEALTH SERVICES Progress Note  [facility]. [address] medical specialties    Referral Source/Reason for Visit: Initial visit; assessment of spiritual needs/resources.    Summary and Recommendations -  Khushboo requests visit tomorrow morning so Augie can rest this afternoon; stated that her brother  today after having an aneurism.  Plan: I will follow up tomorrow as requested.    Sugey Aguilera MDiv [facility]  Staff   Please place consult order for routine Spiritual Health Services referrals.  [place] [facility] available  for emergent requests either by having the on-call  paged or by entering an ASAP/STAT consult in Epic (this will also page the on-call ).    Assessment    Saw pt Augie Powell and his wife Khushboo at bedside for 5 minutes.     Patient / Family Understanding of Illness and Goals of Care - Not Discussed     Distress and Loss - Khushboo spoke of the past few days as being very challenging, that she has been ill, Augie being hospitalized, and her own brother  today after having an aneurism this past weekend.     Strengths, Coping, and Resources - Khushboo spoke of many phone calls with family, and a family \"zoom call\" this evening.    Meaning, Beliefs, and Spirituality - Khushboo spoke of her sense of peace knowing that her brother's \"marci was strong; he trusted in his salvation.\"    "

## 2024-04-02 NOTE — PROGRESS NOTES
[facility]    Hospitalist Progress Note    Interval History   - Unsuccessful bedside NG tube placement yesterday  - Plan video swallow and NG tube placement at Xray today  - Start tube feeds today  - Encourage ICS  - Up walking with assistance three times daily  - Thrush appears visually improved today, continues to have hoarse voice  - Wife Khushboo at bedside updated      Assessment & Plan   Summary: Augie Powell is a 88 year old male with PMH CAD s/p PCI, HTN, HLD, dysphagia, chronic anemia, RCC, CKD s/p nephrectomy, who was admitted on 3/27/2024 with prerenal acute kidney injury and confusion.   Patient recently admitted 3/15 to 3/25 for influenza, complicated by hypoxic respiratory failure, infectious encephalopathy, dysphagia, and deconditioning. He was discharged to TCU and readmitted on 3/27.     Acute metabolic encephalopathy, improved  Recently admitted 3/15 to 3/25 for influenza, encephalopathy. Per family patient was A&Ox3 and driving prior to admission and has had a precipitous decline. Patient discharged to TCU, TCU reports patient fell on 3/26. Admitted 3/27 was initially confused and nonverbal. CT scan of abdomen pelvis without contrast was done and it showed no acute intra-abdominal process identified.  Right-sided kidney is surgically absent.  Left kidney is unchanged in size and appearance.  No significant mass stones or hydronephrosis. MRI of the brain done showed moderate brain atrophy with no acute findings of stroke or hemorrhage. Patient remains very confused on 3/31/2024.  Very poor swallow function with coughing with every meal and swallow. Hospitalist had goals of care conversations on 3/31, family wishes to continue medical management however patient is DNR/DNI.  Improved, A&Ox2-3 on 4/1 however patient continues to have a hoarse voice. Likely patient's ongoing caloric deficit, deconditioning, contributing to his mental status changes. Family denies any  karl.  - PT/OT  - Monitor for confusion  - Seroquel qHS    Dysphagia  Nutritional status  Physical deconditioning  Hypernatremia, improved  During previous admission 3/15-3/25 patient put on modified due to mild dysphagia. Appears to be worse here--speech path consultation requested to assess the safety of oral intake and they made him  n.p.o. on 3/28/2024. See below; patient has severe oropharyngeal candidiasis which may be contributing to his worsening dysphagia.    Discussed with family given little to no PO intake for 10 days will start feeding tube therapy--starting 4/2.  - Antifungals as below  - SLP consult appreciated   - Swallow study today  - Aspiration precautions  - Continue PT/OT  - NG tube placement xray  - Nutrition consult and tube feeding planned    Suspect esophageal candidiasis  Oral thrush  Hoarseness  Patient has severe oral thrush and appears to have oropharyngeal plaques likely has esophageal candidiasis. This is also likely contributing to his presenting dysphagia. Hoarseness could also be due to this, monitor closely.  - Fluconazole treatment  - Continue nystatin swish and spit solution    Urinary retention   Overnight on 3/30/2024 urinary retention issues needed straight cath x 2. Flomax started. Cerda catheter placed  - Flomax started  - Urology consulted  - I&Os    KENNA on CKD3a  Hx RCC s/p R nephrectomy  Hypernatremia, improved  Baseline is 1.7, elevated 2.8 on admission, improved with IVF. Could be 2/2 recent influenza infection and patient being on torsemide. With IV fluids creatinine improved to ~1.8.  - Continue D5W, stop when tube feeds started  - Avoid nephrotoxins and NSAIDs    Hypercalcemia  PTA patient on vit D and Ca supplements, as well as torsemide. Patient has had hypercalcemia since at least 2022. Patient's ongoing hypoalbuminemia, dehydration, improved, also contributing.  - Check PTH, vit D, PTH-RP levels, TSH  - Monitor, may need further workup including malignancy  workup    HTN: /40 on 4/1  - Hold PTA metoprolol given low dose 12.5mg XL daily and switching to tube feeds  - Hold PTA amlodipine    Clinically Significant Risk Factors           # Hypercalcemia: Highest Ca = 11.7 mg/dL in last 2 days, will monitor as appropriate    # Hypoalbuminemia: Lowest albumin = 3 g/dL at 3/28/2024  3:50 AM, will monitor as appropriate     # Hypertension: Noted on problem list              # Financial/Environmental Concerns: none          PT/OT: ordered  Diet: NPO for Medical/Clinical Reasons Except for: Other, Ice Chips, Meds; Specify: ONLY if alert and awake, OK for moderately thick liquid by spoon for comfort and with crushed meds if able.  Adult Formula Drip Feeding: Continuous Jevity 1.5; Other - Specify in Comment; Goal Rate: 55; mL/hr; when FT ok for use --> start @ 25 mL/hr and advance by 15 mL q 12 hours to goal rate.; Do not advance tube feeding rate unless K+ is = or > 3.0, M...    DVT Prophylaxis: heparin  Cerda Catheter: PRESENT, indication: Acute retention or obstruction, Acute retention or obstruction  Lines: None     Cardiac Monitoring: None  Code Status: No CPR- Do NOT Intubate    Disposition: Anticipated discharge 3+ days    Noel Jain MD  Hospitalist Service  [facility]  Securely message with [employer] (more info)  Text page via [facility] Paging/Directory     - Total time spent on encounter is 55 minutes, which includes counseling, coordination of care, time spent discussing with family, and/or time spent discussing with consultants.    Data reviewed today: I reviewed all new labs and imaging results over the last 24 hours.    Physical Exam   Temp: 97.6  F (36.4  C) Temp src: Axillary BP: 116/58 Pulse: 62   Resp: 16 SpO2: 98 % O2 Device: None (Room air)    Vitals:    04/01/24 1335   Weight: 76.2 kg (167 lb 15.9 oz)     Vital Signs with Ranges  Temp:  [97.2  F (36.2  C)-98.6  F (37  C)] 97.6  F (36.4  C)  Pulse:  [62-67] 62  Resp:  [16-18]  16  BP: (116-145)/(55-67) 116/58  SpO2:  [96 %-98 %] 98 %  I/O last 3 completed shifts:  In: -   Out: 1025 [Urine:1025]  O2 requirements: none    Constitutional: Male appears generally ill, tired, weak  HEENT: Eyes nonicteric, oral mucosa thrush appears improved with plaques noted oropharynx  Cardiovascular: RRR, normal S1/2, no m/r/g  Respiratory: Basilar to mid crackles  Vascular: No LE pitting edema  GI: Normoactive bowel sounds, nontender  Skin/Integumen: No rashes  Neuro/Psych: Appropriate affect and mood. A&Ox2, moves all extremities    Medications    dextrose      D5W 75 mL/hr at 04/02/24 0524      acetaminophen  650 mg Oral BID    aspirin  81 mg Oral or Feeding Tube Daily    barium sulfate  60 mL Oral Once    barium sulfate  20 mL Oral Once    barium sulfate 40%   Oral Once    doxazosin  1 mg Oral or Feeding Tube Daily    fluconazole  200 mg Oral or Feeding Tube Daily    heparin ANTICOAGULANT  5,000 Units Subcutaneous Q8H    lidocaine  1 patch Transdermal Q24h    nystatin  500,000 Units Swish & Spit 4x Daily    protein modular  1 packet Per Feeding Tube Daily    QUEtiapine  6.25 mg Oral or Feeding Tube At Bedtime    senna-docusate  1 tablet Oral or Feeding Tube BID    sodium chloride (PF)  3 mL Intracatheter Q8H       Data   Recent Labs   Lab 04/02/24  0802 04/01/24  1245 03/31/24  0816 03/29/24  2217 03/29/24  0832 03/28/24  1546 03/28/24  0350 03/27/24  1822   WBC  --   --  7.7  --  7.2  --  8.3 9.6   HGB  --   --  9.1*  --  9.6*  --  9.6* 11.0*   MCV  --   --  99  --  102*  --  102* 101*   PLT  --   --  276  --  312  --  298 342    141 144   < > 152*   < > 151*  151* 149*   POTASSIUM 3.6 3.7 3.6   < > 3.8   < > 3.9  3.9 4.3   CHLORIDE 105 107 112*   < > 119*   < > 118*  118* 113*   CO2 22 21* 20*   < > 21*   < > 21*  21* 23   BUN 25.2* 29.1* 31.0*   < > 35.6*   < > 52.6*  52.6* 57.9*   CR 1.80* 1.85* 1.86*   < > 1.80*   < > 2.31*  2.31* 2.76*   ANIONGAP 11 13 12   < > 12   < > 12  12 13   RICCARDO  11.4* 11.7* 10.9*   < > 11.0*   < > 11.7*  11.7* 13.0*   * 129* 103*   < > 131*   < > 124*  124* 121*   ALBUMIN  --   --   --   --   --   --  3.0* 3.5   PROTTOTAL  --   --   --   --   --   --  8.0 9.1*   BILITOTAL  --   --   --   --   --   --  0.4 0.4   ALKPHOS  --   --   --   --   --   --  67 76   ALT  --   --   --   --   --   --  42 52   AST  --   --   --   --   --   --  26 30    < > = values in this interval not displayed.       Imaging:   Recent Results (from the past 24 hour(s))   XR Abdomen Port 1 View    Narrative    ABDOMEN ONE VIEW PORTABLE  4/1/2024 2:27 PM     HISTORY: feeding tube placement    COMPARISON: CT 3/27/2024.       Impression    IMPRESSION: Enteric tube with distal tip projecting at the EG  junction. Recommend advancement by 12 cm followed by repeat  radiograph.    NY CELESTIN MD         SYSTEM ID:  [id number]

## 2024-04-02 NOTE — CONSULTS
[facility]    Neurology Consultation    Augie Powell MRN# [id number]   YOB: 1935 Age: 88 year old    Code Status:No CPR- Do NOT Intubate   Date of Admission: 3/27/2024  Date of Consult:04/02/2024                                                                                       Assessment and Plan:                                         #.  Dysphagia  -- Likely secondary to recent infections both influenza and now candidiasis given the acute onset.  His continued encephalopathy is likely also contributing.  --MRI brain negative for any brainstem abnormality  -- ENT consulted  --No convincing findings on exam for myasthenia, but will send off antibodies as this is a treatable disease.     # Encephalopathy  MRI brain showed moderate atrophy but he was previously AO x 3.  Likely infection contributing.    # Weakness  Patient on exam has weakness throughout where most muscles are just antigravity.  He has hyperreflexia in the arms and legs.  Will evaluate MRI of the cervical spine to rule out stenosis or compression  -MRI C-spine with and without contrast  ----------------------------------------------------------------------------------  ----------------------------------------------------------------------------------  Reason for consult: as above       Chief Complaint:   Dysphagia            History of Present Illness:   This patient is a 88 year old male who presents with confusion and KENNA. PMH CAD s/p PCI, HTN, HLD, dysphagia, chronic anemia, RCC, CKD s/p nephrectomy, who was admitted on 3/27/2024.     To review, patient was admitted from 3/15 to 3/25 for influenza and confusion.  Prior to this admission he had been AO x 3.  He had a significant decline with his infection.  He was discharged to TCU and had a fall on 3/26.  He was confused and nonverbal upon presentation.  MRI brain showed moderate atrophy with no strokes or hemorrhage.  He remains confused with poor  swallowing function and coughing.  Continues to have a hoarse voice.  He also has severe oropharyngeal candidiasis this admission.  SLP study today showed moderate to severe oropharyngeal dysphagia      MEDICAL DOCUMENTATION REVIEWED: Progress note, SLP note           Past Medical History:     Past Medical History:   Diagnosis Date    Aortic stenosis, mild     Ascending aorta dilatation (H)     CKD (chronic kidney disease) stage 4, GFR 15-29 ml/min (H) 09/17/2018    Coronary artery disease 10/2018    nSTEMI- Isis to mLAD, distal lad mild, Lcx-small 50-60%, RCA 60-70% normal ifR (despite echo showing poss old IMI)    Hyperlipidemia     Hypertension     Renal cell cancer, unspecified laterality (H) 05/10/2017    Renal disease     CRD--creatinine in upper ones to 2, right kdney removed 2000 for canncer    Rheumatic fever     Spondylosis with myelopathy, lumbar region          Past Surgical History:     Past Surgical History:   Procedure Laterality Date    BACK SURGERY      CHOLECYSTECTOMY      DECOMPRESSION LUMBAR TWO LEVELS  12/01/2014    Procedure: DECOMPRESSION LUMBAR TWO LEVELS;  Surgeon: Remy Harmon MD;  Location: [facility] OR    HERNIA REPAIR      LAMINECTOMY, FUSION LUMBAR ONE LEVEL, COMBINED N/A 12/01/2014    Procedure: COMBINED LAMINECTOMY, FUSION LUMBAR ONE LEVEL;  Surgeon: Remy Harmon MD;  Location: [facility] OR    PHACOEMULSIFICATION CLEAR CORNEA WITH STANDARD INTRAOCULAR LENS IMPLANT  10/22/2012    Procedure: PHACOEMULSIFICATION CLEAR CORNEA WITH STANDARD INTRAOCULAR LENS IMPLANT;  RIGHT EYE PHACOEMULSIFICATION CLEAR CORNEA WITH STANDARD INTRAOCULAR LENS IMPLANT ;  Surgeon: Grupo Granger MD;  Location:  [facility]    right nephrectomy[      STENT,CORONARY, S660 24/30  10/2018    mLAD  mod RCA, Lcx          Social History:     Social History     Socioeconomic History    Marital status:    Tobacco Use    Smoking status: Former    Smokeless tobacco: Never   Substance and Sexual Activity    Alcohol  use: Yes     Alcohol/week: 2.0 standard drinks of alcohol     Types: 2 Shots of liquor per week    Drug use: No    Sexual activity: Not Currently         Family History:     Family History   Problem Relation Age of Onset    No Known Problems Mother     Heart Surgery Father     No Known Problems Sister        Prior to Admission Medications   Prescriptions Last Dose Informant Patient Reported? Taking?   Calcium-Magnesium-Zinc 500-250-12.5 MG TABS   Yes Yes   Sig: Take 1 tablet by mouth daily   QUEtiapine (SEROQUEL) 25 MG tablet 3/26/2024 at pm  No Yes   Sig: Take 0.25 tablets (6.25 mg) by mouth at bedtime   Vitamin D3 (CHOLECALCIFEROL) 125 MCG (5000 UT) tablet 3/27/2024 at am  No Yes   Sig: Take 1 tablet (125 mcg) by mouth daily   acetaminophen (TYLENOL) 325 MG tablet   Yes Yes   Sig: Take 650 mg by mouth 2 times daily . May also take 2 tablets (650mg) by mouth twice daily as needed.   amLODIPine (NORVASC) 5 MG tablet 3/27/2024 at am  No Yes   Sig: Take 1 tablet (5 mg) by mouth daily   aspirin (ASA) 81 MG [facility] tablet 3/27/2024 at am  No Yes   Sig: Take 1 tablet (81 mg) by mouth daily   benzocaine-menthol (CHLORASEPTIC) 6-10 MG lozenge prn at prn  No Yes   Sig: Place 1 lozenge inside cheek every hour as needed for sore throat   guaiFENesin (ROBITUSSIN) 20 mg/mL liquid prn at prn  Yes Yes   Sig: Take 400 mg by mouth every 4 hours as needed for cough   losartan (COZAAR) 25 MG tablet   Yes Yes   Sig: Take 25 mg by mouth daily   metoprolol succinate ER (TOPROL XL) 25 MG 24 hr tablet   Yes Yes   Sig: Take 12.5 mg by mouth daily   nitroGLYcerin (NITROSTAT) 0.4 MG sublingual tablet prn at prn  No Yes   Sig: For chest pain place 1 tablet under the tongue every 5 minutes for 3 doses. If symptoms persist 5 minutes after 1st dose call 911.   senna-docusate (SENOKOT-S/PERICOLACE) 8.6-50 MG tablet prn at prn  No Yes   Sig: Take 1 tablet by mouth 2 times daily as needed for constipation   senna-docusate (SENOKOT-S/PERICOLACE) 8.6-50  MG tablet   Yes Yes   Sig: Take 1 tablet by mouth 2 times daily      Facility-Administered Medications: None          Allergy:   No Known Allergies       Inpatient Medications:   Scheduled Meds:  Current Facility-Administered Medications   Medication Dose Route Frequency Provider Last Rate Last Admin    acetaminophen (TYLENOL) tablet 650 mg  650 mg Oral BID Jayashree Miller MD   650 mg at 04/02/24 1110    aspirin (ASA) chewable tablet 81 mg  81 mg Oral or Feeding Tube Daily Noel Jain MD   81 mg at 04/02/24 1110    doxazosin (CARDURA) tablet 1 mg  1 mg Oral or Feeding Tube Daily Noel Jain MD   1 mg at 04/02/24 1110    fluconazole (DIFLUCAN) tablet 200 mg  200 mg Oral or Feeding Tube Daily Haydee Tidwell MD   200 mg at 04/02/24 1110    heparin ANTICOAGULANT injection 5,000 Units  5,000 Units Subcutaneous Q8H Jayashree Miller MD   5,000 Units at 04/02/24 1445    Lidocaine (LIDOCARE) 4 % Patch 1 patch  1 patch Transdermal Q24h Nghia Rivera MD        nystatin (MYCOSTATIN) suspension 500,000 Units  500,000 Units Swish & Spit 4x Daily Jayashree Miller MD   500,000 Units at 04/02/24 1445    protein modular (PROSOURCE TF20) packet 1 packet  1 packet Per Feeding Tube Daily Noel Jain MD   1 packet at 04/02/24 1109    QUEtiapine (SEROquel) quarter-tab 6.25 mg  6.25 mg Oral or Feeding Tube At Bedtime Haydee Tidwell MD        senna-docusate (SENOKOT-S/PERICOLACE) 8.6-50 MG per tablet 1 tablet  1 tablet Oral or Feeding Tube BID Haydee Tidwell MD   1 tablet at 04/02/24 1110    sodium chloride (PF) 0.9% PF flush 3 mL  3 mL Intracatheter Q8H Haydee Tidwell MD   3 mL at 04/02/24 0519     PRN Meds:   Current Facility-Administered Medications   Medication Dose Route Frequency Provider Last Rate Last Admin    acetaminophen (TYLENOL) tablet 650 mg  650 mg Oral Q4H PRN Haydee Tidwell MD   650 mg at 03/31/24 0831    Or    acetaminophen (TYLENOL) Suppository 650 mg  650 mg Rectal Q4H  PRN Haydee Tidwell MD        benzocaine-menthol (CHLORASEPTIC) 6-10 MG lozenge 1 lozenge  1 lozenge Buccal Q1H PRN Haydee Tidwell MD   1 lozenge at 04/01/24 1349    benzonatate (TESSALON) capsule 100 mg  100 mg Oral TID PRN Jayashree Miller MD        dextrose 10% infusion   Intravenous Continuous PRN Noel Jain MD        guaiFENesin (ROBITUSSIN) 20 mg/mL solution 10 mL  10 mL Oral or Feeding Tube Q4H PRN Haydee Tidwell MD        haloperidol lactate (HALDOL) injection 1 mg  1 mg Intravenous Q6H PRN Jayashree Miller MD        hydrALAZINE (APRESOLINE) tablet 10 mg  10 mg Oral or Feeding Tube Q4H PRN Haydee Tidwell MD        Or    hydrALAZINE (APRESOLINE) injection 10 mg  10 mg Intravenous Q4H PRN Haydee Tidwell MD        lidocaine (LMX4) cream   Topical Q1H PRN Nghia Rivera MD        lidocaine 1 % 0.1-1 mL  0.1-1 mL Other Q1H PRN Nghia Rivera MD        nitroGLYcerin (NITROSTAT) sublingual tablet 0.4 mg  0.4 mg Sublingual Q5 Min PRN Haydee Tidwell MD        ondansetron (ZOFRAN ODT) ODT tab 4 mg  4 mg Oral Q6H PRN Haydee Tidwell MD        Or    ondansetron (ZOFRAN) injection 4 mg  4 mg Intravenous Q6H PRN Haydee Tidwell MD        senna-docusate (SENOKOT-S/PERICOLACE) 8.6-50 MG per tablet 1 tablet  1 tablet Oral or Feeding Tube BID PRN Haydee Tidwell MD        Or    senna-docusate (SENOKOT-S/PERICOLACE) 8.6-50 MG per tablet 2 tablet  2 tablet Oral or Feeding Tube BID PRN Haydee Tidwell MD        sodium chloride (PF) 0.9% PF flush 10-20 mL  10-20 mL Intracatheter q1 min prn Nghia Rivera MD        sodium chloride (PF) 0.9% PF flush 3 mL  3 mL Intracatheter q1 min prn Haydee Tidwell MD                 Physical Exam:   Physical Exam   Vitals:  Height:Data Unavailable  Weight:167 lbs 15.85 oz   Temp: 98  F (36.7  C) Temp src: Axillary BP: 112/63 Pulse: 68   Resp: 20 SpO2: 96 % O2 Device: None (Room air)    General Appearance: No acute distress, normal body  habitus  Neuro Exam:  Mental Status Exam: Alert and speaks quietly.  Short sentences or 1 or 2 words given.  He knows he is in the hospital.  Follows simple commands of testing.  Cranial Nerves:  he has blink to threat pupils are equal and reactive to light. Extraocular movements intact.  No significant ptosis with upward gaze but patient loses focus.  Intact eyelid closure, lip seal, and cheek puff.  Good neck extension and flexion.  He had difficulty protruding out his tongue all the way.  Motor: Upper extremities are at least antigravity lower extremities move along the bed.  Reflexes: Increased in the upper and lower extremities, few beats of clonus bilaterally  Sensory: He grimaces with pinch in upper and lower extremities  Coordination: No ataxia  Neck: No nuchal rigidity  Extremities: No clubbing, no cyanosis, no edema          Data:   ROUTINE IP LABS   CBC RESULTS:     Recent Labs   Lab 03/31/24  0816 03/29/24  0832 03/28/24  0350   WBC 7.7 7.2 8.3   RBC 2.81* 2.97* 3.00*   HGB 9.1* 9.6* 9.6*   HCT 27.8* 30.2* 30.6*    312 298     Basic Metabolic Panel:   Recent Labs   Lab Test 04/02/24  1626 04/02/24  0802 04/01/24  1245 03/31/24  0816   NA  --  138 141 144   POTASSIUM  --  3.6 3.7 3.6   CHLORIDE  --  105 107 112*   CO2  --  22 21* 20*   BUN  --  25.2* 29.1* 31.0*   CR  --  1.80* 1.85* 1.86*   * 127* 129* 103*   RICCARDO  --  11.4* 11.7* 10.9*     Liver panel:  Recent Labs   Lab Test 03/28/24  0350 03/27/24  1822 03/20/24  1751 03/20/24  1629 03/17/24  2227   PROTTOTAL 8.0 9.1* 8.4* 8.5* 8.4*   ALBUMIN 3.0* 3.5 3.0* 3.0* 3.1*   BILITOTAL 0.4 0.4 0.4 0.5 0.4   ALKPHOS 67 76 55 57 54   AST 26 30 46* 50* 31   ALT 42 52 38 40 19     Lipid Profile:  Recent Labs   Lab Test 02/07/23  1311 01/11/22  0936 04/13/21  0000 08/14/20  0000 07/31/19  0944 01/21/19  0942 10/01/18  0635   CHOL  --   --  102 91 100 86 124   HDL  --   --  39 39* 37* 38* 39*   LDL  --   --  45 34 44 37 62   TRIG 168* 105 95 101 94 57  117     Thyroid Panel:  Recent Labs   Lab Test 04/01/24  1245 12/12/22  1500 09/29/18  0110   TSH 1.15 1.19 1.82      Vitamin B12: No lab results found.        IMAGING:   Independent interpretation of the following studies by myself as part of today's encounter.     Video swallow study  IMPRESSION:    Thin: Silent aspiration..     Mildly thick: Silent aspiration.     This study only includes the cervical esophagus. Please see speech  pathology note for further details.    Narrative & Impression   EXAM: MR BRAIN W/O CONTRAST  LOCATION: [facility]  DATE: 3/28/2024     INDICATION: AMS for 1 week, no focal deficits, cant use contrast due to Kidney disease  COMPARISON: 03/27/2024  TECHNIQUE: Routine multiplanar multisequence head MRI without intravenous contrast.     FINDINGS: Motion degraded exam.  INTRACRANIAL CONTENTS: No acute or subacute infarct. Chronic encephalomalacia and gliosis in the anterior left frontal lobe. No mass, acute hemorrhage, or extra-axial fluid collections. Patchy nonspecific T2/FLAIR hyperintensities within the cerebral   white matter most consistent with mild to moderate chronic microvascular ischemic change. Moderate generalized cerebral atrophy. No hydrocephalus. Normal position of the cerebellar tonsils.      SELLA: No abnormality accounting for technique.     OSSEOUS STRUCTURES/SOFT TISSUES: Normal marrow signal. The major intracranial vascular flow voids are maintained.      ORBITS: Prior bilateral cataract surgery. Visualized portions of the orbits are otherwise unremarkable.      SINUSES/MASTOIDS: No paranasal sinus mucosal disease. No middle ear or mastoid effusion.       Time:  75minutes evaluation and management.     Tracie Hendrickson M.D.  [facility], [facility].  Office [phone]

## 2024-04-02 NOTE — PLAN OF CARE
Goal Outcome Evaluation:       DATE & TIME:  2024 [id number]  Cognitive Concerns/ Orientation: A/Ox1 disoriented to self. Patients speech is garbled/ incoherent/hoarse-  BEHAVIOR & AGGRESSION TOOL COLOR: Green, restless/pulls at tubes, cooperative      ABNL VS/O2: VSS on RA  MOBILITY: Ax2 - GB/Walker. T&Rq2hr in bed. PT/OT following  PAIN MANAGMENT: Denies  DIET: NPO. Can have mod thick liquids teaspoons for comfort, oral swabs-frequent oral cares done. SLP saw and recommending video swallow tomorrow. NG tube placed for tube feeds, however it needed to be advanced per Xray. When resource RN came back to advance it was coiled up into patients mouth so pt somehow coughed it up even further. Resoure did not want to reinsert as it seemed to meet resistance a little the first time, recommending IR/Xray to place, Dr. Jain notified to place order  BOWEL/BLADDER: Cerda for retention. Good output. No BM this shift  ABNL LAB/BG: Na: 141, Hb.1, Cr-1.85.  DRAIN/DEVICES: L PIV infusing D5 @ 75 mL/hr- per MD can SL once tube feed is started  SKIN: Scattered bruises and scabs. Blanchable redness on coccyx mepi in place.   TESTS/PROCEDURES: NA  D/C DATE: Back to TCU when medically ready, need to tolerate tube feeds, family hopeful thrush will improve and SLP can eventually advance diet.   OTHER IMPORTANT INFO: Pt confused, impulsive ar rimes, VSS on RA, Urology signed off, Dose of IV diflucan given, PO to start tomorrow. Thrush in mouth improving slightly, on nystatin. LS with crackles in bases. Oral suctioning given x1.

## 2024-04-03 NOTE — CONSULTS
"[facility]    Otolaryngology Consultation   ENT Specialty Care    Date of Admission:  3/27/2024    Assessment & Plan   Augie Powell is a 88 year old male who was admitted on 3/27/2024 hx of CAD s/p PCI, HTN HLD, Dyspahgia, chronic Anemia, CKD s/p Nephrectomy due to RCC, presenting with AMS.  HIs history is significant for recent influenza and he is currently being treated for oral/oropharyngeal candidiasis. He was evaluated by SLP yesterday and was noted to be \"unable to achieve voicing, whisper produced.  Variable ability noted to cough on command.  Cough was weak.  PO trials were limited during the study due to decreased airway protection and repeated aspiration.\"  He is NPO and a NG feeding tube has been placed.  The otolaryngology service is consulted regarding dypshonia/dysphagia.  Augie was sleepy and inconsistent following commands at the time of exam.  Oral exam shows sticky adherent mucous on his tongue and palate.  Laryngoscopic exam was limited by is inability to swallow when asked with extensive desiccated mucoid secretions precluding good visualization of the endolarynx.  Agree with NPO status/tube feeds.  Discussed with wife that unless he demonstrates significant improvement (in cognition and pharyngeal motor function) he is likely to require PEG placement.     Code Status: No CPR- Do NOT Intubate      Clinically Significant Risk Factors           # Hypercalcemia: Highest Ca = 12 mg/dL in last 2 days, will monitor as appropriate    # Hypoalbuminemia: Lowest albumin = 3 g/dL at 3/28/2024  3:50 AM, will monitor as appropriate     # Hypertension: Noted on problem list            # Financial/Environmental Concerns: none         Ingrid Quiroga MD    ______________________________________________________________________    Reason for Consult   Dyphagia, dysphonia    Primary Care Physician   Merlin Emery Brown    Chief Complaint   Dysphonia, dysphagia      History of " Present Illness   Augie Powell is a 88 year old male who presents with PMH CAD s/p PCI, HTN, HLD, dysphagia, chronic anemia, RCC, CKD s/p nephrectomy, who was admitted on 3/27/2024 with prerenal acute kidney injury and confusion.                Patient recently admitted 3/15 to 3/25 for influenza, complicated by hypoxic respiratory failure, infectious encephalopathy, dysphagia, and deconditioning. He was discharged to TCU and readmitted on 3/27.  SLP consult yesterday.  Minimal ability to vocalize and found to aspirate.  Now with feeding tube (nasal) in place.    Physical Exam   Temp: 97.6  F (36.4  C) Temp src: Axillary BP: (!) 145/54 Pulse: 69   Resp: 18 SpO2: 95 % O2 Device: None (Room air)    Vital Signs with Ranges  Temp:  [97.6  F (36.4  C)-98  F (36.7  C)] 97.6  F (36.4  C)  Pulse:  [68-75] 69  Resp:  [16-20] 18  BP: (112-156)/(54-63) 145/54  SpO2:  [95 %-96 %] 95 %  167 lbs 15.85 oz  Sitting up in chair.  Impaired cognition--inconsistently follows commands.  Sleepy.  Does not vocalize/respond to questioning  OC/OP:  Missing many teeth.  Tongue and palate coated with adherent debris/mucoid secretions.  Neck: no discrete masses  Laryngoscopy- afrin/lidocaine applied to left nasal cavity.  Scope passed through left nasal cavity.  NG tube seen in nasopharynx extending to piriform sinus (appropriate placement).  Thick mucoid secretion throughout oropharynx and endolarynx.  Patient does not swallow or clear secretions when requested--secretions preclude good visualization.  Data

## 2024-04-03 NOTE — PLAN OF CARE
DATE & TIME:  4/2-04/03/24 Night shift  Cognitive Concerns/ Orientation: AxOx2- varies on time of day; garbled speech  ABNL VS/O2: VSS on RA  MOBILITY: Ax2 GB/W or Sarastedy. T&R q2hr in bed  PAIN MANAGMENT: Denies  DIET: Strict NPO. TF running at 40mL/hr, tolerating, plan to advance to goal of 55mL/hr at 1000 AM today; Q4h 100mL FWF  BOWEL/BLADDER: Cerda for retention; Good output; No BM this shift  ABNL LAB/BG: WDL  DRAIN/DEVICES: L PIV infusing D5 @ 75mL/hr  SKIN: Scattered bruises and scabs. Blanchable redness on coccyx; mepilex in place.  TEST/PROCEDURES: Cervical spine MRI completed, see results   D/C DATE: TCU when medically ready, need to tolerate tube feeds, family hopeful thrush will improve and SLP can eventually advance diet.   OTHER IMPORTANT INFO: Thrush in mouth improving slightly, on nystatin. Neuro following, ENT consulted; Slept all shift; weak congested cough at times      Goal Outcome Evaluation:

## 2024-04-03 NOTE — PLAN OF CARE
Goal Outcome Evaluation:    Shift: [id number] 4/3/2024  Summary: Altered mental status; possibly secondary to dehydration/KENNA, unwitnessed fall at TCU.     Orientation: AO x2; Waxes and Wanes; Garbled speech  PAIN MANAGMENT: Denies; Scheduled Tylenol   Vitals/Tele: VSS RA  IV Access/drains: PIV SL; D5 discontinued today  Diet: Strict NPO. TF running at 40mL/hr, tolerating, plan to advance to goal of 55mL/hr at 1000 AM today; Q4h 100mL FWF  Mobility: Ax2 GB/W or Christiane Steady or lift . T&R q2hr in bed  GI/: Cerda in place for retention, good output   Wound/Skin: Scattered Bruises and Scabs   Consults: ENT/PT-OT/Neurology; CT Scan's ordered for today sometime  Discharge Plan: TBD  Additional Info: Tube Feeding tubing needs to be changed tomorrow (4/4)      See Flow sheets for assessment

## 2024-04-03 NOTE — PLAN OF CARE
Goal Outcome Evaluation:      Plan of Care Reviewed With: patient    Overall Patient Progress: no change    DATE & TIME:  4/2/2024 4424-4951  Cognitive Concerns/ Orientation: Oriented to self only.   ABNL VS/O2: VSS on RA  MOBILITY: Ax2 GB/W or Sarastedy. T&R q2hr in bed. PT/OT following  PAIN MANAGMENT: Denies  DIET: NPO. TF running at 40mL/hr, tolerating, plan to advance to goal of 55mL/hr at 1000 AM. Q4h 100mL FWF  BOWEL/BLADDER: Cerda for retention. Good output. No BM this shift  ABNL LAB/BG: Creat 1.8  DRAIN/DEVICES: L PIV infusing D5 @ 75mL/hr  SKIN: Scattered bruises and scabs. Blanchable redness on coccyx mepi in place.  TEST/PROCEDURES: Cervical spine MRI completed, see results   D/C DATE: Back to TCU when medically ready, need to tolerate tube feeds, family hopeful thrush will improve and SLP can eventually advance diet.   OTHER IMPORTANT INFO: Thrush in mouth improving slightly, on nystatin. Neuro following, ENT consulted

## 2024-04-03 NOTE — PROGRESS NOTES
Shift: [id number] 4/2/2024   Summary: Altered mental status; possibly secondary to dehydration/KENNA, unwitnessed fall at TCU.     Orientation: A/Ox1  only oriented to self. Patients speech is garbled/ incoherent/hoarse  BEHAVIOR & AGGRESSION TOOL COLOR: Green, restless/pulls at tubes, cooperative      Vitals/Tele: VSS RA  Pain: Scheduled Tylenol   IV Access/drains: PIV infusing d5 @ 75 mL/hr; TF at 25mL/hr (Needs to be upped to 40 mL/her at 1030 pm and then to 55 mL/hr at 1030 am 4/3)  Diet: Strict NPO   Mobility: T&R q2h; Technically A2 GBW; Patient was up in chair earlier today; Back to Bed w/A2 Christiane Steady   GI/: Cerda in place for retention, good output; 2x BM (Incontinence)   Wound/Skin: Scattered Bruising and Scabs   Consults: SLP swallow study today; PT/OT;   Discharge Plan: TBD      See Flow sheets for assessment

## 2024-04-03 NOTE — CONSULTS
SPIRITUAL HEALTH SERVICES  SPIRITUAL ASSESSMENT Consult Note  [facility]. [address] medical specialties     REFERRAL SOURCE: follow up as requested by wife Khushboo    Brief visit; Khushboo states things are going well today, no needs for spiritual care. Welcomes follow up.  Augie opened his eyes upon my introduction, did not respond verbally.    Plan: unit chaplains will continue to follow.    Sugey Aguilera MD[facility]  Staff   Please place consult order for routine Spiritual Health Services referrals.  SHS available 24/7 for emergent requests either by having the on-call  paged or by entering an ASAP/STAT consult in Epic (this will also page the on-call ).

## 2024-04-03 NOTE — PROGRESS NOTES
[facility]    Hospitalist Progress Note    Interval History   - Failed swallow study yesterday. Discussed with SLP, patient has silent aspiration and very weak oropharyngeal muscle function. This has been progressive over two weeks. The etiology is unclear.  - Wife Khushboo reports that patient did cough with eating food in the weeks/months PTA  - Weaker when working with PT today  - Continue tube feeds and monitor for improvement  - Appreciate all consultant assistance    Assessment & Plan   Summary: Augie Powell is a 88 year old male with PMH CAD s/p PCI, HTN, HLD, dysphagia, chronic anemia, RCC, CKD s/p nephrectomy, who was admitted on 3/27/2024 with prerenal acute kidney injury and confusion. Found to have progressive, severe dysphagia.   Patient recently admitted 3/15 to 3/25 for influenza, complicated by hypoxic respiratory failure, infectious encephalopathy, dysphagia, and deconditioning. He was discharged to TCU and readmitted on 3/27.     Severe dysphagia with silent aspiration  Hoarse voice  Hypernatremia, improved  During previous admission 3/15-3/25 patient put on modified diet due to mild dysphagia. Appears to be worse this admission--Video swallow on 4/2/24 noted silent aspiration with all consistencies and speech path recommended NPO. Discussed with family given little to no PO intake for 10 days will start feeding tube therapy--starting 4/2.  Multiple etiologies of dysphagia was evaluated: Patient has severe oropharyngeal candidiasis which may be contributing to his worsening dysphagia. However per discussion with Speech path, candidiasis is unlikely to contribute to patient's muscle weakness seen on swallow study. Patient also noted to have a hoarse voice past 1-2 weeks which may be related. ENT consulted 4/3 but could not visualize larynx due to poor mucous clearance. Patient had a MRI brain 3/28 which was negative for stroke.   Overall, the causes of patient's  progressive severe dysphagia is unclear. Other etiologies to consider include neurological disorder, throat/neck pathology,  - Appreciate Neurology, ENT consults  - Antifungals as below for candidiasis treatment  - Aspiration precautions  - Continue PT/OT, SLP  - Continue NG tube with tube feeds   - Consider G tube placement next week starting 4/8 if no improvement   - Nutrition to manage tube feeds  - Consider CT neck  - Stop D5 IVF    Acute metabolic encephalopathy, improved  Physical deconditioning  Recently admitted 3/15 to 3/25 for influenza, encephalopathy. Per family patient was A&Ox3 and driving prior to admission and has had a precipitous decline. Patient discharged to TCU on 3/25, TCU reports patient fell on 3/26. Admitted 3/27 was initially confused and nonverbal. CT scan of abdomen pelvis without contrast was done and it showed no acute intra-abdominal process identified. MRI of the brain 3/28 showed moderate brain atrophy with no acute findings of stroke or hemorrhage. Patient remains very confused on 3/31/2024.  Very poor swallow function with coughing with every meal and swallow. Hospitalist had goals of care conversations on 3/31, family wishes to continue medical management however patient is DNR/DNI.  Improved, A&Ox2-3 on 4/1 however patient continues to have a hoarse voice. Likely patient's ongoing caloric deficit, deconditioning, contributing to his mental status changes. Family denies any sundowning.  No significant change in mental status on 4/3, patient continues to nap frequently, patient was weaker with PT on 4/3 than previously.  - PT/OT  - Monitor for confusion  - Seroquel at bedtime    Suspect esophageal candidiasis  Oral thrush  Hoarseness  Patient has severe oral thrush and appears to have oropharyngeal plaques likely has esophageal candidiasis. This is also likely contributing to his presenting dysphagia, but per discussion with speech path, unlikely to be contributing to patient's  significant oropharyngeal muscle weakness.   - Fluconazole treatment  - Continue nystatin swish and spit solution    Urinary retention   Overnight on 3/30/2024 urinary retention issues needed straight cath x 2. Flomax started. Cerda catheter placed  - Flomax started  - Urology consulted  - I&Os    KENNA on CKD3a  Hx RCC s/p R nephrectomy  Hypernatremia, improved  Baseline is 1.7, elevated 2.8 on admission, improved with IVF. Could be 2/2 recent influenza infection and patient being on torsemide. With IV fluids creatinine improved to ~1.8.  - Continue D5W, stop when tube feeds started  - Avoid nephrotoxins and NSAIDs    Hypercalcemia, possibly Vitamin D toxicity  PTA patient on vit D and Ca supplements, as well as torsemide. Patient has had hypercalcemia since at least 2022. Patient's ongoing hypoalbuminemia, dehydration, improved, also contributing.   PTH is appropriately suppressed. Vitamin D level is high at 71, wife reports patient was taking Vit D supplementation PTA. TSH level normal. Etiology of hypercalcemia likely vit D toxicity, also consider occult malignancy, bone disorder.  - PTH-RP levels  - HemOnc consult    HTN: /40 on 4/1  - Hold PTA metoprolol given low dose 12.5mg XL daily and switching to tube feeds  - Hold PTA amlodipine    Clinically Significant Risk Factors           # Hypercalcemia: Highest Ca = 12 mg/dL in last 2 days, will monitor as appropriate    # Hypoalbuminemia: Lowest albumin = 3 g/dL at 3/28/2024  3:50 AM, will monitor as appropriate     # Hypertension: Noted on problem list              # Financial/Environmental Concerns: none          PT/OT: ordered  Diet: Adult Formula Drip Feeding: Continuous Jevity 1.5; Other - Specify in Comment; Goal Rate: 55; mL/hr; when FT ok for use --> start @ 25 mL/hr and advance by 15 mL q 12 hours to goal rate.; Do not advance tube feeding rate unless K+ is = or > 3.0, M...  NPO for Medical/Clinical Reasons Except for: No Exceptions    DVT  Prophylaxis: heparin  Cerda Catheter: PRESENT, indication: Acute retention or obstruction, Acute retention or obstruction  Lines: None     Cardiac Monitoring: None  Code Status: No CPR- Do NOT Intubate    Disposition: Anticipated discharge 3+ days    Noel Jain MD  Hospitalist Service  [facility]  Securely message with [email or web address] (more info)  Text page via [employer] Paging/Directory     - Total time spent on encounter is 60 minutes, which includes counseling, coordination of care, time spent discussing with family, and/or time spent discussing with consultants.    Data reviewed today: I reviewed all new labs and imaging results over the last 24 hours.    Physical Exam   Temp: 97.6  F (36.4  C) Temp src: Axillary BP: (!) 145/54 Pulse: 69   Resp: 18 SpO2: 95 % O2 Device: None (Room air)    Vitals:    04/01/24 1335   Weight: 76.2 kg (167 lb 15.9 oz)     Vital Signs with Ranges  Temp:  [97.6  F (36.4  C)-98  F (36.7  C)] 97.6  F (36.4  C)  Pulse:  [68-75] 69  Resp:  [16-20] 18  BP: (112-156)/(54-63) 145/54  SpO2:  [95 %-96 %] 95 %  I/O last 3 completed shifts:  In: 180 [NG/GT:180]  Out: 1100 [Urine:1100]  O2 requirements: none    Constitutional: Male appears generally ill, tired, weak  HEENT: Eyes nonicteric, oral mucosa thrush appears improved with plaques noted oropharynx  Cardiovascular: RRR, normal S1/2, no m/r/g  Respiratory: Basilar to mid crackles  Vascular: No LE pitting edema  GI: Normoactive bowel sounds, nontender  Skin/Integumen: No rashes  Neuro/Psych: Appropriate affect and mood. A&Ox2, moves all extremities    Medications   Current Facility-Administered Medications   Medication Dose Route Frequency Provider Last Rate Last Admin    dextrose 10% infusion   Intravenous Continuous PRN Noel Jain MD         Current Facility-Administered Medications   Medication Dose Route Frequency Provider Last Rate Last Admin    acetaminophen (TYLENOL) tablet 650 mg  650 mg Oral BID  Jayashree Miller MD   650 mg at 04/02/24 2136    aspirin (ASA) chewable tablet 81 mg  81 mg Oral or Feeding Tube Daily Noel Jain MD   81 mg at 04/02/24 1110    doxazosin (CARDURA) tablet 1 mg  1 mg Oral or Feeding Tube Daily Noel Jain MD   1 mg at 04/02/24 1110    fluconazole (DIFLUCAN) tablet 200 mg  200 mg Oral or Feeding Tube Daily Haydee Tidwell MD   200 mg at 04/02/24 1110    heparin ANTICOAGULANT injection 5,000 Units  5,000 Units Subcutaneous Q8H Jayashree Miller MD   5,000 Units at 04/03/24 0716    Lidocaine (LIDOCARE) 4 % Patch 1 patch  1 patch Transdermal Q24h Nghia Rivera MD        nystatin (MYCOSTATIN) suspension 500,000 Units  500,000 Units Swish & Spit 4x Daily Jayashree Miller MD   500,000 Units at 04/02/24 2135    protein modular (PROSOURCE TF20) packet 1 packet  1 packet Per Feeding Tube Daily Noel Jain MD   1 packet at 04/02/24 1109    QUEtiapine (SEROquel) quarter-tab 6.25 mg  6.25 mg Oral or Feeding Tube At Bedtime Haydee Tidwell MD   6.25 mg at 04/02/24 2136    senna-docusate (SENOKOT-S/PERICOLACE) 8.6-50 MG per tablet 1 tablet  1 tablet Oral or Feeding Tube BID Haydee Tidwell MD   1 tablet at 04/02/24 1110    sodium chloride (PF) 0.9% PF flush 3 mL  3 mL Intracatheter Q8H Haydee Tidwell MD   3 mL at 04/02/24 0519       Data   Recent Labs   Lab 04/03/24  0723 04/03/24  0543 04/02/24  2137 04/02/24  1626 04/02/24  0802 04/01/24  1245 03/31/24  0816 03/29/24  2217 03/29/24  0832 03/28/24  1546 03/28/24  0350 03/27/24  1822   WBC  --   --   --   --   --   --  7.7  --  7.2  --  8.3 9.6   HGB  --   --   --   --   --   --  9.1*  --  9.6*  --  9.6* 11.0*   MCV  --   --   --   --   --   --  99  --  102*  --  102* 101*   PLT  --  195  --   --   --   --  276  --  312  --  298 342   NA  --  133*  --   --  138 141 144   < > 152*   < > 151*  151* 149*   POTASSIUM  --  3.5  --   --  3.6 3.7 3.6   < > 3.8   < > 3.9  3.9 4.3   CHLORIDE  --  101  --   --   105 107 112*   < > 119*   < > 118*  118* 113*   CO2  --  21*  --   --  22 21* 20*   < > 21*   < > 21*  21* 23   BUN  --  31.7*  --   --  25.2* 29.1* 31.0*   < > 35.6*   < > 52.6*  52.6* 57.9*   CR  --  1.96*  --   --  1.80* 1.85* 1.86*   < > 1.80*   < > 2.31*  2.31* 2.76*   ANIONGAP  --  11  --   --  11 13 12   < > 12   < > 12  12 13   RICCARDO  --  12.0*  --   --  11.4* 11.7* 10.9*   < > 11.0*   < > 11.7*  11.7* 13.0*   * 147* 110*   < > 127* 129* 103*   < > 131*   < > 124*  124* 121*   ALBUMIN  --   --   --   --   --   --   --   --   --   --  3.0* 3.5   PROTTOTAL  --   --   --   --   --   --   --   --   --   --  8.0 9.1*   BILITOTAL  --   --   --   --   --   --   --   --   --   --  0.4 0.4   ALKPHOS  --   --   --   --   --   --   --   --   --   --  67 76   ALT  --   --   --   --   --   --   --   --   --   --  42 52   AST  --   --   --   --   --   --   --   --   --   --  26 30    < > = values in this interval not displayed.       Imaging:   Recent Results (from the past 24 hour(s))   MR Cervical Spine w/o & w Contrast    Narrative    EXAM: MR CERVICAL SPINE W/O and W CONTRAST  LOCATION: [facility]  DATE: 4/2/2024    INDICATION: weakness and hyperreflexia  COMPARISON: None.  CONTRAST: 9 [employer]  TECHNIQUE: MRI Cervical Spine without and with IV contrast.    FINDINGS:   4 mm retrolisthesis of C5 on C6. Vertebral body heights are maintained. Modic type II endplate change at C5-C6. No abnormal cord signal. No extraspinal abnormality. Negative for pathologic contrast enhancement.    Craniovertebral junction and C1-C2: Normal.    C2-C3: Disc osteophyte complex. Bilateral facet hypertrophy. No spinal canal or foraminal stenosis.     C3-C4: Disc osteophyte complex. Bilateral facet hypertrophy. No spinal canal stenosis. Moderate bilateral neural foraminal stenosis.     C4-C5: Disc osteophyte complex. Bilateral facet hypertrophy. Mild spinal canal stenosis. Mild bilateral neural foraminal  stenosis.     C5-C6: Disc osteophyte complex with central disc protrusion. Bilateral facet hypertrophy. Mild spinal canal stenosis. Severe bilateral neural foraminal stenosis.     C6-C7: Disc osteophyte complex. Bilateral facet hypertrophy. No spinal canal or foraminal stenosis.     C7-T1: Disc osteophyte complex with central disc protrusion. Bilateral facet hypertrophy. No spinal canal stenosis. Mild bilateral neural foraminal stenosis.      Impression    IMPRESSION:  1.  Multilevel cervical spondylosis.  2.  No abnormal cord signal or enhancement.  3.  No high-grade spinal canal stenosis.  4.  Severe bilateral neural foraminal stenosis at C5-C6.  5.  Moderate bilateral neural foraminal stenosis at C3-C4.

## 2024-04-03 NOTE — CONSULTS
[facility]    Hematology / Oncology Consultation     Date of Admission:  3/27/2024  Date of Consult (When I saw the patient): 04/03/24    Assessment & Plan   Augie Powell is a 88 year old male who was admitted on 3/27/2024. I was asked to see the patient for hypercalcemia.        History of influenza    Encephalopathy    Acute on chronic renal insufficiency    Hypercalcemia    Augie was in the room with his wife.  He was sleeping through the visit.  He does respond to verbal commands but was not participating in any discussion.  His wife noted that he answers very softly.  However he did not wake up easily when we tried.    He has been admitted with acute metabolic encephalopathy which appeared just prior to the hospital admission.  He has also struggled with dysphagia, hoarse voice, hyponatremia, acute renal insufficiency and hypercalcemia.  A lot of the symptoms could be explained by hypercalcemia alone.    I have checked for intact PTH hormone levels for him.  It is below normal as would be expected in the setting of hypercalcemia.  This essentially rules out parathyroid adenoma as the cause for hypercalcemia.  Parathyroid hormone related peptide has also been ordered and is pending at this time.  It is elevated in the setting of malignancies like squamous cell carcinomas from lung, esophagus, and metastatic malignancies where extensive bony metastases are present as an breast or prostate cancers.  He had protein electrophoresis and free light chain checked for multiple myeloma there is a very small monoclonal protein spike in the gamma fraction.  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  He does have elevated kappa light chain although his lambda light chain levels are normal.  He also has elevated kappa to lambda ratio.    I explained them about the broad differential diagnosis for hypercalcemia.  While malignancy is not a common cause of hypercalcemia,  it is on the more serious causes.  He did have CT abdomen pelvis done without contrast on 3/27/2024 which did not show any evidence of malignancy.  The scan was limited without the intravenous contrast.  He did not have CT scan of the chest which would be helpful.    I have ordered for 125 dihydroxy vitamin D and 25-hydroxy vitamin D levels.  This would point towards granulomatous disease as the cause or oral supplementation.    I would recommend treating his hypercalcemia with denosumab weekly until it improves..    Over 80 min spent on day of visit including review of tests, obtaining/reviewing separately obtained history/physical exam, counseling patient, ordering medications/tests/procedures, communicating with PCP/consultants, and documenting in electronic medical record.    Guicho Roth  Hematologist and Medical Oncologist  [facility]     Guicho Roth MD, MD    Code Status    No CPR- Do NOT Intubate    Reason for Consult   Reason for consult: I was asked by Dr Jain to evaluate this patient for hypercalcemia.    Primary Care Physician   Merlin Emery Brown    Chief Complaint   Altered mental status    History is obtained from the patient    History of Present Illness   Augie Powell is a 88 year old male who presents with altered mental status.     Augie was in room with his wife.    Past Medical History   I have reviewed this patient's medical history and updated it with pertinent information if needed.   Past Medical History:   Diagnosis Date    Aortic stenosis, mild     Ascending aorta dilatation (H)     CKD (chronic kidney disease) stage 4, GFR 15-29 ml/min (H) 09/17/2018    Coronary artery disease 10/2018    nSTEMI- Isis to mLAD, distal lad mild, Lcx-small 50-60%, RCA 60-70% normal ifR (despite echo showing poss old IMI)    Hyperlipidemia     Hypertension     Renal cell cancer, unspecified laterality (H) 05/10/2017    Renal disease     CRD--creatinine in upper ones to 2, right kdney removed  2000 for canncer    Rheumatic fever     Spondylosis with myelopathy, lumbar region        Past Surgical History   I have reviewed this patient's surgical history and updated it with pertinent information if needed.  Past Surgical History:   Procedure Laterality Date    BACK SURGERY      CHOLECYSTECTOMY      DECOMPRESSION LUMBAR TWO LEVELS  12/01/2014    Procedure: DECOMPRESSION LUMBAR TWO LEVELS;  Surgeon: Remy Harmon MD;  Location: [facility] OR    HERNIA REPAIR      LAMINECTOMY, FUSION LUMBAR ONE LEVEL, COMBINED N/A 12/01/2014    Procedure: COMBINED LAMINECTOMY, FUSION LUMBAR ONE LEVEL;  Surgeon: Remy Harmon MD;  Location: [facility] OR    PHACOEMULSIFICATION CLEAR CORNEA WITH STANDARD INTRAOCULAR LENS IMPLANT  10/22/2012    Procedure: PHACOEMULSIFICATION CLEAR CORNEA WITH STANDARD INTRAOCULAR LENS IMPLANT;  RIGHT EYE PHACOEMULSIFICATION CLEAR CORNEA WITH STANDARD INTRAOCULAR LENS IMPLANT ;  Surgeon: Grupo Granger MD;  Location:  [facility]    right nephrectomy[      STENT,CORONARY, S660 24/30  10/2018    mLAD  mod RCA, Lcx       Prior to Admission Medications   Prior to Admission Medications   Prescriptions Last Dose Informant Patient Reported? Taking?   Calcium-Magnesium-Zinc 500-250-12.5 MG TABS   Yes Yes   Sig: Take 1 tablet by mouth daily   QUEtiapine (SEROQUEL) 25 MG tablet 3/26/2024 at pm  No Yes   Sig: Take 0.25 tablets (6.25 mg) by mouth at bedtime   Vitamin D3 (CHOLECALCIFEROL) 125 MCG (5000 UT) tablet 3/27/2024 at am  No Yes   Sig: Take 1 tablet (125 mcg) by mouth daily   acetaminophen (TYLENOL) 325 MG tablet   Yes Yes   Sig: Take 650 mg by mouth 2 times daily . May also take 2 tablets (650mg) by mouth twice daily as needed.   amLODIPine (NORVASC) 5 MG tablet 3/27/2024 at am  No Yes   Sig: Take 1 tablet (5 mg) by mouth daily   aspirin (ASA) 81 MG [facility] tablet 3/27/2024 at am  No Yes   Sig: Take 1 tablet (81 mg) by mouth daily   benzocaine-menthol (CHLORASEPTIC) 6-10 MG lozenge prn at prn  No Yes   Sig:  Place 1 lozenge inside cheek every hour as needed for sore throat   guaiFENesin (ROBITUSSIN) 20 mg/mL liquid prn at prn  Yes Yes   Sig: Take 400 mg by mouth every 4 hours as needed for cough   losartan (COZAAR) 25 MG tablet   Yes Yes   Sig: Take 25 mg by mouth daily   metoprolol succinate ER (TOPROL XL) 25 MG 24 hr tablet   Yes Yes   Sig: Take 12.5 mg by mouth daily   nitroGLYcerin (NITROSTAT) 0.4 MG sublingual tablet prn at prn  No Yes   Sig: For chest pain place 1 tablet under the tongue every 5 minutes for 3 doses. If symptoms persist 5 minutes after 1st dose call 911.   senna-docusate (SENOKOT-S/PERICOLACE) 8.6-50 MG tablet prn at prn  No Yes   Sig: Take 1 tablet by mouth 2 times daily as needed for constipation   senna-docusate (SENOKOT-S/PERICOLACE) 8.6-50 MG tablet   Yes Yes   Sig: Take 1 tablet by mouth 2 times daily      Facility-Administered Medications: None     Allergies   No Known Allergies    Social History   I have reviewed this patient's social history and updated it with pertinent information if needed. Augie KALEY Powell  reports that he has quit smoking. He has never used smokeless tobacco. He reports current alcohol use of about 2.0 standard drinks of alcohol per week. He reports that he does not use drugs.    Family History   I have reviewed this patient's family history and updated it with pertinent information if needed.   Family History   Problem Relation Age of Onset    No Known Problems Mother     Heart Surgery Father     No Known Problems Sister        Review of Systems   The 10 point Review of Systems is negative other than noted in the HPI or here.      Physical Exam   Temp: 97.6  F (36.4  C) Temp src: Axillary BP: (!) 145/54 Pulse: 69   Resp: 18 SpO2: 95 % O2 Device: None (Room air)    Vital Signs with Ranges  Temp:  [97.6  F (36.4  C)-98  F (36.7  C)] 97.6  F (36.4  C)  Pulse:  [68-75] 69  Resp:  [16-20] 18  BP: (112-156)/(54-63) 145/54  SpO2:  [95 %-96 %] 95 %  167 lbs 15.85 oz      "    Data   Recent Labs   Lab Test 04/03/24  0723 04/03/24  0543 04/02/24  2137 04/02/24  1626 04/02/24  0802 04/01/24  1245 03/31/24  0816 03/30/24  1048   NA  --  133*  --   --  138 141 144 144   POTASSIUM  --  3.5  --   --  3.6 3.7 3.6 3.6   CHLORIDE  --  101  --   --  105 107 112* 110*   CO2  --  21*  --   --  22 21* 20* 21*   ANIONGAP  --  11  --   --  11 13 12 13   BUN  --  31.7*  --   --  25.2* 29.1* 31.0* 28.2*   CR  --  1.96*  --   --  1.80* 1.85* 1.86* 1.77*   * 147* 110* 124* 127* 129* 103* 138*   RICCARDO  --  12.0*  --   --  11.4* 11.7* 10.9* 10.7*     Recent Labs   Lab Test 04/03/24  0543 04/02/24  0802 04/01/24  1245 03/27/24  1822 03/25/24  0733 03/17/24  0551 03/15/24  0534   MAG 1.7 1.7 1.8 2.6* 2.1   < > 1.9   PHOS 3.0 2.6 2.6 3.6  --   --  3.5    < > = values in this interval not displayed.     Recent Labs   Lab Test 04/03/24  0543 03/31/24  0816 03/29/24  0832 03/28/24  0350 03/27/24  1822 03/22/24  0841 03/20/24  1629 03/18/24  1256 03/17/24  2227 03/16/24  0541 03/15/24  0534 03/15/24  0153 12/13/22  0929   WBC  --  7.7 7.2 8.3 9.6  --  2.2*   < > 2.4*   < > 4.3 5.4 8.0   HGB  --  9.1* 9.6* 9.6* 11.0*  --  12.5*   < > 10.8*   < > 9.1* 9.9* 11.7*    276 312 298 342   < > 109*   < > 127*   < > 126* 134* 180   MCV  --  99 102* 102* 101*  --  99   < > 99   < > 101* 100 98   NEUTROPHIL  --   --   --   --  80  --   --   --  67  --  68 71 77    < > = values in this interval not displayed.     Recent Labs   Lab Test 03/28/24  0350 03/27/24  1822 03/20/24  1751   BILITOTAL 0.4 0.4 0.4   ALKPHOS 67 76 55   ALT 42 52 38   AST 26 30 46*   ALBUMIN 3.0* 3.5 3.0*     TSH   Date Value Ref Range Status   04/01/2024 1.15 0.30 - 4.20 uIU/mL Final   12/12/2022 1.19 0.40 - 4.00 mU/L Final   09/29/2018 1.82 0.40 - 4.00 mU/L Final   04/09/2014 2.330 0.450 - 4.500 uIU/mL Final     No results for input(s): \"CEA\" in the last 65088 hours.  Results for orders placed or performed during the hospital encounter of " 03/27/24   Head CT w/o contrast    Narrative    EXAM: CT HEAD W/O CONTRAST  LOCATION: [facility]  DATE: 3/27/2024    INDICATION: AMS  COMPARISON: None.  TECHNIQUE: Routine CT Head without IV contrast. Multiplanar reformats. Dose reduction techniques were used.    FINDINGS:  INTRACRANIAL CONTENTS: No intracranial hemorrhage, extraaxial collection, or mass effect.  No CT evidence of acute infarct. Mild presumed chronic small vessel ischemic changes. Remote ischemic findings in the left frontal lobe. Moderate generalized   volume loss. No hydrocephalus.      VISUALIZED ORBITS/SINUSES/MASTOIDS: No intraorbital abnormality. No paranasal sinus mucosal disease. No middle ear or mastoid effusion.    BONES/SOFT TISSUES: No acute abnormality.      Impression    IMPRESSION:  1.  No acute intracranial abnormality. Chronic changes as above.   XR Chest 2 Views    Narrative    EXAM: XR CHEST 2 VIEWS  LOCATION: [facility]  DATE: 3/27/2024    INDICATION: AMS  COMPARISON: 03/20/2024      Impression    IMPRESSION: Stable cardiac size. No airspace opacity, pleural effusion or pneumothorax.   MR Brain w/o Contrast    Narrative    EXAM: MR BRAIN W/O CONTRAST  LOCATION: [facility]  DATE: 3/28/2024    INDICATION: AMS for 1 week, no focal deficits, cant use contrast due to Kidney disease  COMPARISON: 03/27/2024  TECHNIQUE: Routine multiplanar multisequence head MRI without intravenous contrast.    FINDINGS: Motion degraded exam.  INTRACRANIAL CONTENTS: No acute or subacute infarct. Chronic encephalomalacia and gliosis in the anterior left frontal lobe. No mass, acute hemorrhage, or extra-axial fluid collections. Patchy nonspecific T2/FLAIR hyperintensities within the cerebral   white matter most consistent with mild to moderate chronic microvascular ischemic change. Moderate generalized cerebral atrophy. No hydrocephalus. Normal position of the cerebellar tonsils.      SELLA: No abnormality accounting for technique.    OSSEOUS STRUCTURES/SOFT TISSUES: Normal marrow signal. The major intracranial vascular flow voids are maintained.     ORBITS: Prior bilateral cataract surgery. Visualized portions of the orbits are otherwise unremarkable.     SINUSES/MASTOIDS: No paranasal sinus mucosal disease. No middle ear or mastoid effusion.       Impression    IMPRESSION:  1.  Motion degraded exam. No acute intracranial process.  2.  Chronic changes as detailed above.   CT Abdomen Pelvis w/o Contrast    Narrative    EXAM: CT ABDOMEN PELVIS W/O CONTRAST  LOCATION: [facility]  DATE: 3/27/2024    INDICATION: patient reacting to abdominal palpation with bruising, AMS, cant use contrast due to KENNA  COMPARISON: 02/11/2019  TECHNIQUE: CT scan of the abdomen and pelvis was performed without IV contrast. Multiplanar reformats were obtained. Dose reduction techniques were used.  CONTRAST: None.    FINDINGS:   LOWER CHEST: Fibrotic changes are seen in the lung bases bilaterally most pronounced in the deep and lower lung zones, worsened from 02/11/2019. Severe coronary atherosclerotic disease with evidence of prior stenting noted.    HEPATOBILIARY: The GallBladder surgically absent, otherwise unchanged appearance from prior exam.    PANCREAS: No significant mass, duct dilatation, or inflammatory change.    SPLEEN: Normal size.    ADRENAL GLANDS: The right Adrenal gland is surgically absent the left adrenal gland is unremarkable.    KIDNEYS/BLADDER: The right kidney is surgically absent, unchanged from prior exam. The left kidney is unchanged size and appearance. No significant mass, stones, or hydronephrosis. There are simple or benign cysts. No follow up is needed.    BOWEL: No obstruction or inflammatory change.    LYMPH NODES: No lymphadenopathy.    VASCULATURE: No abdominal aortic aneurysm.    PELVIC ORGANS: No pelvic masses.    MUSCULOSKELETAL: Multilevel discogenic and  posterior facet degenerative changes are seen throughout the lumbar spine with postsurgical changes from prior discectomy and fusion and posterior spinal fixation at the L4-L5 level, unchanged from prior exam.   No worrisome osseous lesions are identified.      Impression    IMPRESSION:   1.  No acute intra-abdominal process identified.       XR Video Swallow with SLP or OT    Narrative    VIDEO SWALLOWING EVALUATION   4/2/2024 10:07 AM     HISTORY: dysphagia    COMPARISON: None.    FLUOROSCOPY TIME: 2.1 minutes.  SPOT IMAGES OR CINE RUNS: 5      Impression    IMPRESSION:    Thin: Silent aspiration..    Mildly thick: Silent aspiration.    This study only includes the cervical esophagus. Please see speech  pathology note for further details.    ADRIANE EGAN DO         SYSTEM ID:  [id number]   XR Abdomen Port 1 View    Narrative    ABDOMEN ONE VIEW PORTABLE  4/1/2024 2:27 PM     HISTORY: feeding tube placement    COMPARISON: CT 3/27/2024.       Impression    IMPRESSION: Enteric tube with distal tip projecting at the EG  junction. Recommend advancement by 12 cm followed by repeat  radiograph.    NY CELESTIN MD         SYSTEM ID:  [id number]   XR Feeding Tube Placement    Narrative    FEEDING TUBE PLACEMENT  4/2/2024 10:05 AM     HISTORY: Feeding tube.    TECHNIQUE: 2.6 minutes fluoroscopy. No spot images.    COMPARISON: None.      Impression    IMPRESSION: Feeding tube placed with tip at the duodenal jejunal  junction. The tube was flushed with water and is ready for use.    SNOW ORTIZ MD         SYSTEM ID:  [id number]   MR Cervical Spine w/o & w Contrast    Narrative    EXAM: MR CERVICAL SPINE W/O and W CONTRAST  LOCATION: [facility]  DATE: 4/2/2024    INDICATION: weakness and hyperreflexia  COMPARISON: None.  CONTRAST: 9 [employer]avist  TECHNIQUE: MRI Cervical Spine without and with IV contrast.    FINDINGS:   4 mm retrolisthesis of C5 on C6. Vertebral body heights are maintained. Modic  type II endplate change at C5-C6. No abnormal cord signal. No extraspinal abnormality. Negative for pathologic contrast enhancement.    Craniovertebral junction and C1-C2: Normal.    C2-C3: Disc osteophyte complex. Bilateral facet hypertrophy. No spinal canal or foraminal stenosis.     C3-C4: Disc osteophyte complex. Bilateral facet hypertrophy. No spinal canal stenosis. Moderate bilateral neural foraminal stenosis.     C4-C5: Disc osteophyte complex. Bilateral facet hypertrophy. Mild spinal canal stenosis. Mild bilateral neural foraminal stenosis.     C5-C6: Disc osteophyte complex with central disc protrusion. Bilateral facet hypertrophy. Mild spinal canal stenosis. Severe bilateral neural foraminal stenosis.     C6-C7: Disc osteophyte complex. Bilateral facet hypertrophy. No spinal canal or foraminal stenosis.     C7-T1: Disc osteophyte complex with central disc protrusion. Bilateral facet hypertrophy. No spinal canal stenosis. Mild bilateral neural foraminal stenosis.      Impression    IMPRESSION:  1.  Multilevel cervical spondylosis.  2.  No abnormal cord signal or enhancement.  3.  No high-grade spinal canal stenosis.  4.  Severe bilateral neural foraminal stenosis at C5-C6.  5.  Moderate bilateral neural foraminal stenosis at C3-C4.

## 2024-04-04 NOTE — PROGRESS NOTES
CLINICAL NUTRITION SERVICES - REASSESSMENT NOTE    EVALUATION OF PROGRESS TOWARD GOALS   Diet: NPO    Nutrition Support:     Type of Feeding Tube: Nasoenteric - Tip at LOT  Enteral Frequency:  Continuous  Enteral Regimen: Jevity 1.5 @ 55 mL/hr + pkt ProSource TF  Total Enteral Provisions: 2060 kcal (26 kcal/kg), 104 g protein (1.3 g/kg), 285 g CHO, 27 g fiber, 1003 mL free H2O  Free Water Flush: 100 mL q4 hours  TOTAL free water = 1603 mL     Intake/Tolerance:   - Goal TF reached yesterday.     - Labs:   Vitamin D elevated   BUN 36.6 (H), Cr 1.93 (H)  - Stoolin/3 - BM x1   - BM x1  - WeightL   No new wt on file since .     - Meds:   Prosource (as above)  Senokot BID    ASSESSED NUTRITION NEEDS:  Dosing Weight 78.7 kg  Estimated Energy Needs: 1740-2923 kcals (25-30 Kcal/Kg)  Justification: maintenance  Estimated Protein Needs:  grams protein (1.2-1.5 g pro/Kg)  Justification: preservation of lean body mass    NEW FINDINGS:    - SLP eval: below baseline, moderate-severe oral-pharyngeal dysphagia. Strict NPO.   4/3 - Otolaryngology/ENT: pt likely to require PEG.     Previous Goals:   EN to meet % estimated needs at goal   Evaluation: Met since 4/3    Previous Nutrition Diagnosis:   Inadequate oral intake related to NPO as evidenced by need for nutrition support to be initiated today   Evaluation: No change - TF now at goal to meet needs.     CURRENT NUTRITION DIAGNOSIS  No nutrition diagnosis identified at this time - NPO, needs being met by TF.     INTERVENTIONS  Recommendations / Nutrition Prescription  Continue TF and flushes as ordered     Implementation  No change     Goals  TF @ goal to provide % estimated needs .    MONITORING AND EVALUATION:  Progress towards goals will be monitored and evaluated per protocol and Practice Guidelines    Tika Willson RD, LD  Pager: [phone]  Weekend Pager: [phone]

## 2024-04-04 NOTE — PLAN OF CARE
Goal Outcome Evaluation:                      Summary  Altered mental status; possibly secondary to dehydration/KENNA, unwitnessed fall at TCU, Failed swallow study, generalized weakness    Hx  CAD s/p PCI, HTN, HLD, dysphagia, chronic anemia, RCC, CKD s/p nephrectomy, who was admitted on 3/27/2024 with prerenal acute kidney injury and confusion. Found to have progressive, severe dysphagia. Patient recently admitted 3/15 to 3/25 for influenza, complicated by hypoxic respiratory failure, infectious encephalopathy, dysphagia, and deconditioning. He was discharged to TCU and readmitted on 3/27      4/3/2024 [id number]    Orientation Alt to self very lethargic     Pain aid scale looks comfortable on schedule tylenol     Vitals/Tele VSS on RM air     IV Access/drains PIV SL, Cerda Tube feed     Diet NPO, oral swabs, Tube feed at goal rate Q4hr water flush     Mobility T/R     GI/ Cerda good out put, smear BM, passing gas     Wound/Skin No adjustments needed     Consults / Test Lumbar puncture and CT scan planned delayed to determine if CT should be done with contrast dye or not given his GFR 32    Discharge Plan Pending     See Flow sheets for assessment

## 2024-04-04 NOTE — PLAN OF CARE
Goal Outcome Evaluation:                   [id number] 4/3/2024  Summary: Altered mental status; possibly secondary to dehydration/KENNA, unwitnessed fall at TCU.     Orientation: AO x2; Garbled speech  PAIN MANAGMENT: Denies; Scheduled Tylenol   Vitals/Tele: VSS RA  IV Access/drains: PIV SL  Diet: Strict NPO. TF running at 55mL/hr, with Q4h 100mL FWF  Mobility: Ax2 GB/W or Christiane Steady or lift .Not OOB this shift ,T&R q2hr in bed  GI/: Cerda in place for retention, adequate output   Wound/Skin: Scattered Bruises and Scabs   Consults: ENT/PT-OT/Neurology;   Discharge Plan: TBD  Additional Info:

## 2024-04-04 NOTE — PROGRESS NOTES
[facility]    Hospitalist Progress Note    Interval History   - Remains weak, decreased participation with therapies, patient states he's tired without clear explanation. Wife at bedside reports patient not getting stronger.  - Etiology of ongoing weakness, dysphagia unclear. Appreciate Neurology consult, james. Discussed with wife that no clear etiology identified, this could be early evidence of dying process.  Addendum: Impromptu care conference with family, including Khushboo wife, daughters Chelsea and Meredith and their spouses. I described to the family in detail the current workup, and the unclear nature of patient's progressive weakness and severe dysphagia. LP done earlier today and pending. There is still the possibility of empiric steroids. I recommended that we await the LP and consider steroids but I agree that the situation is very concerning here and there is a good possibility that this patient is in the dying process. One of the daughters Chelsea stated that she spoke with her father and her father said to not go any further, I encouraged the family to discuss with the patient in more detail what his wishes are as they have not had goals of care discussion with him prior to this hospital stay. I discussed the role of hospice, the possibility of transitioning comfort cares. At this point since there are still studies pending then we will discuss again tomorrow morning.   Also spoke with Neurology who is suggesting possibility pulse dose steroids today if LP does not show evidence of infection/inflammation.    Assessment & Plan   Summary: Augie Powell is a 88 year old male with PMH CAD s/p PCI, HTN, HLD, dysphagia, chronic anemia, RCC, CKD s/p nephrectomy, who was admitted on 3/27/2024 with prerenal acute kidney injury and confusion. Found to have progressive, severe dysphagia.   Patient recently admitted 3/15 to 3/25 for influenza, complicated by hypoxic respiratory failure,  infectious encephalopathy, dysphagia, and deconditioning. He was discharged to TCU and readmitted on 3/27.     Severe dysphagia with silent aspiration  Hoarse voice  Hypernatremia, improved  During previous admission 3/15-3/25 patient put on modified diet due to mild dysphagia. Appears to be worse this admission--Video swallow on 4/2/24 noted silent aspiration with all consistencies and speech path recommended NPO. Discussed with family given little to no PO intake for 10 days will start feeding tube therapy--starting 4/2.  Multiple etiologies of dysphagia was evaluated: Patient has severe oropharyngeal candidiasis which may be contributing to his worsening dysphagia. However per discussion with Speech path, candidiasis is unlikely to contribute to patient's muscle weakness seen on swallow study. Patient also noted to have a hoarse voice past 1-2 weeks which may be related. ENT consulted 4/3 but could not visualize larynx due to poor mucous clearance. Patient had a MRI brain 3/28 which was negative for stroke.   Overall, the causes of patient's progressive severe dysphagia is unclear. Other etiologies to consider include neurological disorder, throat/neck pathology, acute on chronic decline.  - Appreciate Neurology, ENT consults  - Antifungals as below for candidiasis treatment  - Aspiration precautions  - Continue PT/OT, SLP  - Continue NG tube with tube feeds   - Consider G tube placement next week starting 4/8 if no improvement   - Nutrition to manage tube feeds  - CT neck ordered, pending    Acute metabolic encephalopathy, improved  Physical deconditioning  Recently admitted 3/15 to 3/25 for influenza, encephalopathy. Per family patient was A&Ox3 and driving prior to admission and has had a precipitous decline. Patient discharged to TCU on 3/25, TCU reports patient fell on 3/26. Admitted 3/27 was initially confused and nonverbal. CT scan of abdomen pelvis without contrast was done and it showed no acute  intra-abdominal process identified. MRI of the brain 3/28 showed moderate brain atrophy with no acute findings of stroke or hemorrhage. Patient remains very confused on 3/31/2024.  Very poor swallow function with coughing with every meal and swallow. Hospitalist had goals of care conversations on 3/31, family wishes to continue medical management however patient is DNR/DNI.  Improved, A&Ox2-3 on 4/1 however patient continues to have a hoarse voice. Likely patient's ongoing caloric deficit, deconditioning, contributing to his mental status changes. Family denies any sundowning.  No significant change in mental status on 4/3, patient continues to nap frequently, patient was weaker with PT on 4/3 than previously.  - PT/OT  - Monitor for confusion  - Stop seroquel due to somnolence    Suspect esophageal candidiasis  Oral thrush  Hoarseness  Patient has severe oral thrush and appears to have oropharyngeal plaques likely has esophageal candidiasis. This is also likely contributing to his presenting dysphagia, but per discussion with speech path, unlikely to be contributing to patient's significant oropharyngeal muscle weakness.   - Fluconazole treatment  - Continue nystatin swish and spit solution    Urinary retention   Overnight on 3/30/2024 urinary retention issues needed straight cath x 2. Flomax started. Cerda catheter placed  - Flomax started  - Urology consulted  - I&Os    KENNA on CKD3a  Hx RCC s/p R nephrectomy  Hypernatremia, improved  Baseline is 1.7, elevated 2.8 on admission, improved with IVF. Could be 2/2 recent influenza infection and patient being on torsemide. With IV fluids creatinine improved to ~1.8.  - Avoid nephrotoxins and NSAIDs    Hypercalcemia, possibly Vitamin D toxicity  PTA patient on vit D and Ca supplements, as well as torsemide. Patient has had hypercalcemia since at least 2022. Patient's ongoing hypoalbuminemia, dehydration, improved, also contributing.   PTH is appropriately suppressed.  Vitamin D level is high at 71, wife reports patient was taking Vit D supplementation PTA. TSH level normal. Etiology of hypercalcemia likely vit D toxicity, also consider occult malignancy, bone disorder.  - PTH-RP levels  - HemOnc consult    HTN: /55 on 4.4  - Hold PTA metoprolol  - Resume PTA amlodipine    Clinically Significant Risk Factors           # Hypercalcemia: Highest Ca = 12.9 mg/dL in last 2 days, will monitor as appropriate    # Hypoalbuminemia: Lowest albumin = 3 g/dL at 3/28/2024  3:50 AM, will monitor as appropriate     # Hypertension: Noted on problem list              # Financial/Environmental Concerns: none          PT/OT: ordered  Diet: Adult Formula Drip Feeding: Continuous Jevity 1.5; Other - Specify in Comment; Goal Rate: 55; mL/hr; when FT ok for use --> start @ 25 mL/hr and advance by 15 mL q 12 hours to goal rate.; Do not advance tube feeding rate unless K+ is = or > 3.0, M...  NPO for Medical/Clinical Reasons Except for: No Exceptions    DVT Prophylaxis: heparin SQ  Cerda Catheter: PRESENT, indication: Acute retention or obstruction, Acute retention or obstruction  Lines: None     Cardiac Monitoring: None  Code Status: No CPR- Do NOT Intubate    Disposition: Anticipated discharge 3+ days    Noel Jain MD  Hospitalist Service  [facility]  Securely message with [employer] (more info)  Text page via [facility] Paging/Directory     - Total time spent on encounter is 85 minutes, which includes counseling, coordination of care, time spent discussing with family, and/or time spent discussing with consultants.    Data reviewed today: I reviewed all new labs and imaging results over the last 24 hours.    Physical Exam   Temp: 97.7  F (36.5  C) Temp src: Oral BP: (!) 150/55 Pulse: 74   Resp: 18 SpO2: 97 % O2 Device: None (Room air)    Vitals:    04/01/24 1335   Weight: 76.2 kg (167 lb 15.9 oz)     Vital Signs with Ranges  Temp:  [97.3  F (36.3  C)-98  F (36.7  C)]  97.7  F (36.5  C)  Pulse:  [68-74] 74  Resp:  [18] 18  BP: (135-150)/(50-55) 150/55  SpO2:  [94 %-97 %] 97 %  I/O last 3 completed shifts:  In: 380 [NG/GT:380]  Out: 1525 [Urine:1525]  O2 requirements: none    Constitutional: Male appears generally ill, tired, weak  HEENT: Eyes nonicteric, oral mucosa thrush appears improved with plaques noted oropharynx  Cardiovascular: RRR, normal S1/2, no m/r/g  Respiratory: Basilar to mid crackles  Vascular: No LE pitting edema  GI: Normoactive bowel sounds, nontender  Skin/Integumen: No rashes  Neuro/Psych: Limited eval due to ongoing fatigue, oriented to self, moves all extremities    Medications   Current Facility-Administered Medications   Medication Dose Route Frequency Provider Last Rate Last Admin    dextrose 10% infusion   Intravenous Continuous PRN Noel Jain MD         Current Facility-Administered Medications   Medication Dose Route Frequency Provider Last Rate Last Admin    acetaminophen (TYLENOL) tablet 650 mg  650 mg Oral BID Jayashree Miller MD   650 mg at 04/04/24 0806    aspirin (ASA) chewable tablet 81 mg  81 mg Oral or Feeding Tube Daily Noel Jain MD   81 mg at 04/04/24 0805    doxazosin (CARDURA) tablet 1 mg  1 mg Oral or Feeding Tube Daily Noel Jain MD   1 mg at 04/04/24 0806    fluconazole (DIFLUCAN) tablet 200 mg  200 mg Oral or Feeding Tube Daily Haydee Tidwell MD   200 mg at 04/04/24 0805    heparin ANTICOAGULANT injection 5,000 Units  5,000 Units Subcutaneous Q8H Jayashree Miller MD   5,000 Units at 04/04/24 0515    Lidocaine (LIDOCARE) 4 % Patch 1 patch  1 patch Transdermal Q24h Nghia Rivera MD        lidocaine (PF) (XYLOCAINE) 1 % injection 5 mL  5 mL Subcutaneous Once Haydee Tidwell MD        nystatin (MYCOSTATIN) suspension 500,000 Units  500,000 Units Swish & Spit 4x Daily Jayashree Miller MD   500,000 Units at 04/04/24 0805    protein modular (PROSOURCE TF20) packet 1 packet  1 packet Per Feeding Tube  Daily Noel Jain MD   1 packet at 04/04/24 0813    QUEtiapine (SEROquel) quarter-tab 6.25 mg  6.25 mg Oral or Feeding Tube At Bedtime Haydee Tidwell MD   6.25 mg at 04/03/24 2245    senna-docusate (SENOKOT-S/PERICOLACE) 8.6-50 MG per tablet 1 tablet  1 tablet Oral or Feeding Tube BID Haydee Tidwell MD   1 tablet at 04/04/24 0806    sodium chloride (PF) 0.9% PF flush 3 mL  3 mL Intracatheter Q8H Haydee Tidwell MD   3 mL at 04/04/24 0813       Data   Recent Labs   Lab 04/04/24  0902 04/04/24  0619 04/04/24  0221 04/03/24  0723 04/03/24  0543 04/02/24  1626 04/02/24  0802 04/01/24  1245 03/31/24  0816 03/29/24  2217 03/29/24  0832   WBC  --   --   --   --   --   --   --   --  7.7  --  7.2   HGB  --   --   --   --   --   --   --   --  9.1*  --  9.6*   MCV  --   --   --   --   --   --   --   --  99  --  102*   PLT  --   --   --   --  195  --   --   --  276  --  312   NA  --  138  --   --  133*  --  138   < > 144   < > 152*   POTASSIUM  --  3.6  --   --  3.5  --  3.6   < > 3.6   < > 3.8   CHLORIDE  --  104  --   --  101  --  105   < > 112*   < > 119*   CO2  --  23  --   --  21*  --  22   < > 20*   < > 21*   BUN  --  36.6*  --   --  31.7*  --  25.2*   < > 31.0*   < > 35.6*   CR  --  1.93*  --   --  1.96*  --  1.80*   < > 1.86*   < > 1.80*   ANIONGAP  --  11  --   --  11  --  11   < > 12   < > 12   RICCARDO  --  12.9*  --   --  12.0*  --  11.4*   < > 10.9*   < > 11.0*   * 148* 144*   < > 147*   < > 127*   < > 103*   < > 131*    < > = values in this interval not displayed.       Imaging:   No results found for this or any previous visit (from the past 24 hour(s)).

## 2024-04-04 NOTE — PROGRESS NOTES
[facility]    Neurology Consultation    Augie Powell MRN# [id number]   YOB: 1935 Age: 88 year old    Code Status:No CPR- Do NOT Intubate   Date of Admission: 3/27/2024  Date of Consult:04/04/2024                                                                                       Assessment and Plan:                                         #.  Dysphagia  --Possible it is multifactorial due to recent infections from both influenza and now candidiasis given the acute onset.  Other etiologies such as ALS is unlikely given his acute onset.  Myasthenia gravis considered, does not appear to be in any acute respiratory distress concerning for myasthenic crisis.  Unlikely to be myositis as his CK was normal.  He does have hypercalcemia of unclear etiology that upon chart review does appear to be elevated in the past 2 to 3 weeks and this can lead to lethargy, dysphagia, confusion.  Steroids can be beneficial and if LP is negative for infection would do a trial of steroids to help bring down his calcium and possible any other underlying inflammation to explain his symptoms.  --MRI brain negative for any brainstem abnormality  -- ENT consulted  --No convincing findings on exam for myasthenia, but will send off antibodies as this is a treatable disease.   -- May trial IV steroids to help reduce calcium and see if this helps improve his symptoms.     # Encephalopathy  MRI brain showed moderate atrophy but he was previously AO x 3, wife admits he may have had some more memory issues and difficulty with taxes this year but was still independent. He has had recent infections, prolonged hospital stay.  However, patient has progressively been getting more lethargic, today he was more lethargic, will evaluate for a central infection with an LP.  He has not had any elevated white count or fevers however.   -LP ordered with labs for cell count, glucose, protein, meningitis/encephalitis panel,  oligoclonal bands, and West Nile.     # Weakness  Patient on exam has weakness throughout where most muscles are just antigravity.  CK this admission was normal.  Suspect some of it is deconditioning MRI spine without contrast was unremarkable.  Hypercalcemia could also be contributing.     --------------------------------------------------------------  Reason for consult: as above  History: This patient is a 88 year old male who presents with confusion and KENNA. PMH CAD s/p PCI, HTN, HLD, dysphagia, chronic anemia, RCC, CKD s/p nephrectomy, who was admitted on 3/27/2024. Patient was admitted from 3/15 to 3/25 for influenza and confusion.  Prior to this admission he had been AO x 3.  He had a significant decline with his infection.  He was discharged to TCU and had a fall on 3/26.  He was confused and nonverbal upon presentation.  MRI brain showed moderate atrophy with no strokes or hemorrhage.  He remains confused with poor swallowing function and coughing.  Continues to have a hoarse voice.  He also has severe oropharyngeal candidiasis this admission.  SLP study showed moderate to severe oropharyngeal dysphagia.  MRI cervical spine was negative for any structural abnormality to explain his weakness.  It was found out that he may have had difficulty swallowing leading up to his admission where he would sometimes have food stuck improve his throat, but this was subtle.  SLP has concerns he may have been asked silently aspirating at times.     Today wife reports that he is about the same as yesterday, still very lethargic.  Plan is for LP later today as well as CT of his neck and chest for evaluation of malignancy to explain his elevation in calcium.         Past Medical History:     Past Medical History:   Diagnosis Date    Aortic stenosis, mild     Ascending aorta dilatation (H)     CKD (chronic kidney disease) stage 4, GFR 15-29 ml/min (H) 09/17/2018    Coronary artery disease 10/2018    nSTEMI- Isis to mLAD, distal  lad mild, Lcx-small 50-60%, RCA 60-70% normal ifR (despite echo showing poss old IMI)    Hyperlipidemia     Hypertension     Renal cell cancer, unspecified laterality (H) 05/10/2017    Renal disease     CRD--creatinine in upper ones to 2, right kdney removed 2000 for canncer    Rheumatic fever     Spondylosis with myelopathy, lumbar region          Past Surgical History:     Past Surgical History:   Procedure Laterality Date    BACK SURGERY      CHOLECYSTECTOMY      DECOMPRESSION LUMBAR TWO LEVELS  12/01/2014    Procedure: DECOMPRESSION LUMBAR TWO LEVELS;  Surgeon: Remy Harmon MD;  Location: [facility] OR    HERNIA REPAIR      LAMINECTOMY, FUSION LUMBAR ONE LEVEL, COMBINED N/A 12/01/2014    Procedure: COMBINED LAMINECTOMY, FUSION LUMBAR ONE LEVEL;  Surgeon: Remy Harmon MD;  Location: [facility] OR    PHACOEMULSIFICATION CLEAR CORNEA WITH STANDARD INTRAOCULAR LENS IMPLANT  10/22/2012    Procedure: PHACOEMULSIFICATION CLEAR CORNEA WITH STANDARD INTRAOCULAR LENS IMPLANT;  RIGHT EYE PHACOEMULSIFICATION CLEAR CORNEA WITH STANDARD INTRAOCULAR LENS IMPLANT ;  Surgeon: Grupo Granger MD;  Location:  [facility]    right nephrectomy[      STENT,CORONARY, S660 24/30  10/2018    mLAD  mod RCA, Lcx          Social History:     Social History     Socioeconomic History    Marital status:    Tobacco Use    Smoking status: Former    Smokeless tobacco: Never   Substance and Sexual Activity    Alcohol use: Yes     Alcohol/week: 2.0 standard drinks of alcohol     Types: 2 Shots of liquor per week    Drug use: No    Sexual activity: Not Currently         Family History:     Family History   Problem Relation Age of Onset    No Known Problems Mother     Heart Surgery Father     No Known Problems Sister        Prior to Admission Medications   Prescriptions Last Dose Informant Patient Reported? Taking?   Calcium-Magnesium-Zinc 500-250-12.5 MG TABS   Yes Yes   Sig: Take 1 tablet by mouth daily   QUEtiapine (SEROQUEL) 25 MG tablet  3/26/2024 at pm  No Yes   Sig: Take 0.25 tablets (6.25 mg) by mouth at bedtime   Vitamin D3 (CHOLECALCIFEROL) 125 MCG (5000 UT) tablet 3/27/2024 at am  No Yes   Sig: Take 1 tablet (125 mcg) by mouth daily   acetaminophen (TYLENOL) 325 MG tablet   Yes Yes   Sig: Take 650 mg by mouth 2 times daily . May also take 2 tablets (650mg) by mouth twice daily as needed.   amLODIPine (NORVASC) 5 MG tablet 3/27/2024 at am  No Yes   Sig: Take 1 tablet (5 mg) by mouth daily   aspirin (ASA) 81 MG [facility] tablet 3/27/2024 at am  No Yes   Sig: Take 1 tablet (81 mg) by mouth daily   benzocaine-menthol (CHLORASEPTIC) 6-10 MG lozenge prn at prn  No Yes   Sig: Place 1 lozenge inside cheek every hour as needed for sore throat   guaiFENesin (ROBITUSSIN) 20 mg/mL liquid prn at prn  Yes Yes   Sig: Take 400 mg by mouth every 4 hours as needed for cough   losartan (COZAAR) 25 MG tablet   Yes Yes   Sig: Take 25 mg by mouth daily   metoprolol succinate ER (TOPROL XL) 25 MG 24 hr tablet   Yes Yes   Sig: Take 12.5 mg by mouth daily   nitroGLYcerin (NITROSTAT) 0.4 MG sublingual tablet prn at prn  No Yes   Sig: For chest pain place 1 tablet under the tongue every 5 minutes for 3 doses. If symptoms persist 5 minutes after 1st dose call 911.   senna-docusate (SENOKOT-S/PERICOLACE) 8.6-50 MG tablet prn at prn  No Yes   Sig: Take 1 tablet by mouth 2 times daily as needed for constipation   senna-docusate (SENOKOT-S/PERICOLACE) 8.6-50 MG tablet   Yes Yes   Sig: Take 1 tablet by mouth 2 times daily      Facility-Administered Medications: None          Allergy:   No Known Allergies       Inpatient Medications:   Scheduled Meds:  Current Facility-Administered Medications   Medication Dose Route Frequency Provider Last Rate Last Admin    acetaminophen (TYLENOL) tablet 650 mg  650 mg Oral BID Jayashree Miller MD   650 mg at 04/04/24 0806    aspirin (ASA) chewable tablet 81 mg  81 mg Oral or Feeding Tube Daily Noel Jain MD   81 mg at 04/04/24 0816     doxazosin (CARDURA) tablet 1 mg  1 mg Oral or Feeding Tube Daily Noel Jain MD   1 mg at 04/04/24 0806    fluconazole (DIFLUCAN) tablet 200 mg  200 mg Oral or Feeding Tube Daily Haydee Tidwell MD   200 mg at 04/04/24 0805    heparin ANTICOAGULANT injection 5,000 Units  5,000 Units Subcutaneous Q8H Jayashree Miller MD   5,000 Units at 04/04/24 0515    Lidocaine (LIDOCARE) 4 % Patch 1 patch  1 patch Transdermal Q24h Nghia Rivera MD        nystatin (MYCOSTATIN) suspension 500,000 Units  500,000 Units Swish & Spit 4x Daily Jayashree Miller MD   500,000 Units at 04/04/24 0805    protein modular (PROSOURCE TF20) packet 1 packet  1 packet Per Feeding Tube Daily Noel Jain MD   1 packet at 04/04/24 0813    QUEtiapine (SEROquel) quarter-tab 6.25 mg  6.25 mg Oral or Feeding Tube At Bedtime Haydee Tidwell MD   6.25 mg at 04/03/24 2245    senna-docusate (SENOKOT-S/PERICOLACE) 8.6-50 MG per tablet 1 tablet  1 tablet Oral or Feeding Tube BID Haydee Tidwell MD   1 tablet at 04/04/24 0806    sodium chloride (PF) 0.9% PF flush 3 mL  3 mL Intracatheter Q8H Haydee Tidwell MD   3 mL at 04/04/24 0813     PRN Meds:   Current Facility-Administered Medications   Medication Dose Route Frequency Provider Last Rate Last Admin    acetaminophen (TYLENOL) tablet 650 mg  650 mg Oral Q4H PRN Haydee Tidwell MD   650 mg at 03/31/24 0831    Or    acetaminophen (TYLENOL) Suppository 650 mg  650 mg Rectal Q4H PRN Haydee Tidwell MD        benzocaine-menthol (CHLORASEPTIC) 6-10 MG lozenge 1 lozenge  1 lozenge Buccal Q1H PRN Haydee Tidwell MD   1 lozenge at 04/01/24 1349    benzonatate (TESSALON) capsule 100 mg  100 mg Oral TID PRN Jayashree Miller MD        dextrose 10% infusion   Intravenous Continuous PRN Noel Jain MD        guaiFENesin (ROBITUSSIN) 20 mg/mL solution 10 mL  10 mL Oral or Feeding Tube Q4H PRN Haydee Tidwell MD        haloperidol lactate (HALDOL) injection 1 mg  1 mg  Intravenous Q6H PRN Jayashree Miller MD        hydrALAZINE (APRESOLINE) tablet 10 mg  10 mg Oral or Feeding Tube Q4H PRN Haydee Tidwell MD        Or    hydrALAZINE (APRESOLINE) injection 10 mg  10 mg Intravenous Q4H PRN Haydee Tidwell MD        lidocaine (LMX4) cream   Topical Q1H PRN Nghia Rivera MD        lidocaine 1 % 0.1-1 mL  0.1-1 mL Other Q1H PRN Nghia Rivera MD        nitroGLYcerin (NITROSTAT) sublingual tablet 0.4 mg  0.4 mg Sublingual Q5 Min PRN Haydee Tidwell MD        ondansetron (ZOFRAN ODT) ODT tab 4 mg  4 mg Oral Q6H PRN Haydee Tidwell MD        Or    ondansetron (ZOFRAN) injection 4 mg  4 mg Intravenous Q6H PRN Haydee Tidwell MD        senna-docusate (SENOKOT-S/PERICOLACE) 8.6-50 MG per tablet 1 tablet  1 tablet Oral or Feeding Tube BID PRN Haydee Tidwell MD        Or    senna-docusate (SENOKOT-S/PERICOLACE) 8.6-50 MG per tablet 2 tablet  2 tablet Oral or Feeding Tube BID PRN Haydee Tidwell MD        sodium chloride (PF) 0.9% PF flush 10-20 mL  10-20 mL Intracatheter q1 min prn Nghia Rivera MD        sodium chloride (PF) 0.9% PF flush 3 mL  3 mL Intracatheter q1 min prn Haydee Tidwell MD                 Physical Exam:   Physical Exam   Vitals:  Height:Data Unavailable  Weight:167 lbs 15.85 oz   Temp: 97.7  F (36.5  C) Temp src: Oral BP: (!) 150/55 Pulse: 74   Resp: 18 SpO2: 97 % O2 Device: None (Room air)    General Appearance: No acute distress, normal body habitus  Neuro Exam:  Mental Status Exam: Alert and awakens when spoken to but then immediately goes back to sleep.  Weak voice and does not say more than 1 or 2 words.  Today he is able to say that he is in the hospital, his name, month, and year all correctly. Follows simple commands of testing.  Cranial Nerves:   Extraocular movements intact.  No significant ptosis with upward gaze but patient loses focus and has difficulty maintaining eye contact..   Weak lip seal and cheek puff today as  well.  Poor effort on neck extension and flexion neck extension and flexion.  He had difficulty protruding out his tongue all the way.  Motor: Upper extremities are at least antigravity lower extremities move along the bed.  Reflexes: Normal reflexes in the upper and lower extremities today.  Sensory: He grimaces with pinch in upper and lower extremities  Extremities: No clubbing, no cyanosis, no edema          Data:   ROUTINE IP LABS   CBC RESULTS:     Recent Labs   Lab 04/03/24  0543 03/31/24  0816 03/29/24  0832   WBC  --  7.7 7.2   RBC  --  2.81* 2.97*   HGB  --  9.1* 9.6*   HCT  --  27.8* 30.2*    276 312     Basic Metabolic Panel:   Recent Labs   Lab Test 04/04/24  0619 04/04/24  0221 04/04/24  0026 04/03/24  0723 04/03/24  0543 04/02/24  1626 04/02/24  0802     --   --   --  133*  --  138   POTASSIUM 3.6  --   --   --  3.5  --  3.6   CHLORIDE 104  --   --   --  101  --  105   CO2 23  --   --   --  21*  --  22   BUN 36.6*  --   --   --  31.7*  --  25.2*   CR 1.93*  --   --   --  1.96*  --  1.80*   * 144* 133*   < > 147*   < > 127*   RICCARDO 12.9*  --   --   --  12.0*  --  11.4*    < > = values in this interval not displayed.     Liver panel:  Recent Labs   Lab Test 03/28/24  0350 03/27/24  1822 03/20/24  1751 03/20/24  1629 03/17/24  2227   PROTTOTAL 8.0 9.1* 8.4* 8.5* 8.4*   ALBUMIN 3.0* 3.5 3.0* 3.0* 3.1*   BILITOTAL 0.4 0.4 0.4 0.5 0.4   ALKPHOS 67 76 55 57 54   AST 26 30 46* 50* 31   ALT 42 52 38 40 19     Lipid Profile:  Recent Labs   Lab Test 02/07/23  1311 01/11/22  0936 04/13/21  0000 08/14/20  0000 07/31/19  0944 01/21/19  0942 10/01/18  0635   CHOL  --   --  102 91 100 86 124   HDL  --   --  39 39* 37* 38* 39*   LDL  --   --  45 34 44 37 62   TRIG 168* 105 95 101 94 57 117     Thyroid Panel:  Recent Labs   Lab Test 04/01/24  1245 12/12/22  1500 09/29/18  0110   TSH 1.15 1.19 1.82      Vitamin B12: No lab results found.        IMAGING:   Independent interpretation of the following  studies by myself as part of today's encounter.     Video swallow study  IMPRESSION:    Thin: Silent aspiration..     Mildly thick: Silent aspiration.     This study only includes the cervical esophagus. Please see speech  pathology note for further details.    Narrative & Impression   EXAM: MR BRAIN W/O CONTRAST  LOCATION: [facility]  DATE: 3/28/2024     INDICATION: AMS for 1 week, no focal deficits, cant use contrast due to Kidney disease  COMPARISON: 03/27/2024  TECHNIQUE: Routine multiplanar multisequence head MRI without intravenous contrast.     FINDINGS: Motion degraded exam.  INTRACRANIAL CONTENTS: No acute or subacute infarct. Chronic encephalomalacia and gliosis in the anterior left frontal lobe. No mass, acute hemorrhage, or extra-axial fluid collections. Patchy nonspecific T2/FLAIR hyperintensities within the cerebral   white matter most consistent with mild to moderate chronic microvascular ischemic change. Moderate generalized cerebral atrophy. No hydrocephalus. Normal position of the cerebellar tonsils.      SELLA: No abnormality accounting for technique.     OSSEOUS STRUCTURES/SOFT TISSUES: Normal marrow signal. The major intracranial vascular flow voids are maintained.      ORBITS: Prior bilateral cataract surgery. Visualized portions of the orbits are otherwise unremarkable.      SINUSES/MASTOIDS: No paranasal sinus mucosal disease. No middle ear or mastoid effusion.     MRI C spine wwo  IMPRESSION:  1.  Multilevel cervical spondylosis.  2.  No abnormal cord signal or enhancement.  3.  No high-grade spinal canal stenosis.  4.  Severe bilateral neural foraminal stenosis at C5-C6.  5.  Moderate bilateral neural foraminal stenosis at C3-C4.    Time:  40 minutes evaluation and management.     Tracie Hendrickson M.D.  [facility], [facility].  Office [phone]

## 2024-04-04 NOTE — PROCEDURES
[facility]    Procedure: Lumbar Puncture    Date/Time: 4/4/2024 12:04 PM    Performed by: Elizabeth Webb PA-C  Authorized by: Elizabeth Webb PA-C      UNIVERSAL PROTOCOL   Site Marked: Yes  Prior Images Obtained and Reviewed:  Yes  Required items: Required blood products, implants, devices and special equipment available    Patient identity confirmed:  Verbally with patient  NA - No sedation, light sedation, or local anesthesia  Confirmation Checklist:  Patient's identity using two indicators, relevant allergies, procedure was appropriate and matched the consent or emergent situation and correct equipment/implants were available  Time out: Immediately prior to the procedure a time out was called    Universal Protocol: the Joint Commission Universal Protocol was followed    Preparation: Patient was prepped and draped in usual sterile fashion       ANESTHESIA    Anesthesia:  Local infiltration  Local Anesthetic:  Lidocaine 1% without epinephrine      SEDATION    Patient Sedated: No    See dictated procedure note for full details.    PROCEDURE  Describe Procedure: Lumbar Puncture at L3-L4. 12cc clear, colorless CSF collected.  Patient Tolerance:  Patient tolerated the procedure well with no immediate complications  Length of time physician/provider present for 1:1 monitoring during sedation: 0

## 2024-04-05 NOTE — PLAN OF CARE
Summary: Altered mental status; possibly secondary to dehydration/KENNA, unwitnessed fall at TCU.     DATE & TIME: 4/5/24 [id number]     Cognitive Concerns/ Orientation: A&O to self; garbled speech  ABNL VS/O2: VSS on RA   MOBILITY: Assist x2 gait belt/walker or Christiane Steady/ lift; turned/repositioned Q2hrs   PAIN MANAGMENT: Denies pain; no nonverbal indicators of pain noted  DIET: Strict NPO. TF running at 55mL/hr with Q4h 100mL FWF  BOWEL/BLADDER: Cerda for retention; last BM 4/3  ABNL LAB/BG: Cr 2.29; BUN 51.1; Ca++ 14.9 - MD notified - see new orders  DRAIN/DEVICES: L PIV infusing NS bolus x1  SKIN: Scattered bruises and scabs. Blanchable redness on coccyx - mepilex in place.  TEST/PROCEDURES: CT Spine/Chest and LP completed 4/4 - see results  D/C DATE: TCU when medically ready - needs to tolerate tube feeds. Family hopeful thrush will improve and SLP can eventually advance diet.   OTHER IMPORTANT INFO: Thrush in mouth improving slightly - on nystatin. Neuro/ENT following. Dr. Jain had long discussion with family 4/4 regarding goals of care per family's request. At this time, family would want patient to remain on tube feeding for a couple more days and reassess Monday or Tuesday.

## 2024-04-05 NOTE — PLAN OF CARE
Goal Outcome Evaluation:     DATE & TIME: 4/5/24      Cognitive Concerns/ Orientation: A&O to self; garbled/whispered speech  ABNL VS/O2: VSS on RA   MOBILITY:  Ax2 Christiane Steady/Lift; T/R q2hr  PAIN MANAGMENT: Denies pain; no nonverbal indicators of pain noted  DIET: Strict NPO. TF running at 55mL/hr with Q4h 100mL FWF  BOWEL/BLADDER: Cerda for retention; last BM 4/3  ABNL LAB/BG: Cr 2.29; BUN 51.1; Ca++ 14.9   DRAIN/DEVICES: L PIV SL.   SKIN: Scattered bruises and scabs. Blanchable redness on coccyx - mepilex in place.  TEST/PROCEDURES: n/a  D/C DATE: TBD  OTHER IMPORTANT INFO: Will transition to comfort cares this afternoon.

## 2024-04-05 NOTE — PROGRESS NOTES
[facility]  Hematology-Oncology Follow-up Note      Today's Date: 04/04/24  Date of Admission:  3/27/2024  Reason for Consult: Hypercalcemia       ASSESSMENT:  Augie Powell is a 88 year old malewho was admitted on 3/27/2024. I was asked to see the patient for hypercalcemia.          History of influenza    Encephalopathy    Acute on chronic renal insufficiency    Hypercalcemia     Augie was in the room with his wife.  He was sleeping through the visit.  He does respond to verbal commands but was not participating in any discussion.  His wife noted that he answers very softly.  However he did not wake up easily when we tried.     He has been admitted with acute metabolic encephalopathy which appeared just prior to the hospital admission.  He has also struggled with dysphagia, hoarse voice, hyponatremia, acute renal insufficiency and hypercalcemia.  A lot of the symptoms could be explained by hypercalcemia alone.     I have checked for intact PTH hormone levels for him.  It is below normal as would be expected in the setting of hypercalcemia.  This essentially rules out parathyroid adenoma as the cause for hypercalcemia.  Parathyroid hormone related peptide is marginally elevated. It makes malignancy like squamous cell carcinomas from lung/ esophagus unlikely.    I have ordered for 125 dihydroxy vitamin D and 25-hydroxy vitamin D levels.  This would point towards granulomatous disease as the cause or oral supplementation.     Reference Range & Units 04/04/24 06:19 Interpretation   Parathyroid Hormone Intact 15 - 65 pg/mL 7 (L) Rules out parathyroid adenoma as cause      Latest Reference Range & Units Most Recent Interpretation   PSA Tumor Marker ng/mL 2.28  4/3/24 09:52 Normal PSA; negative CT scan for bony involvement - neg for prostate cancer bony mets   PTH Related Peptide Test 0.0 - 2.3 pmol/L 5.4 (H)  4/2/24 08:02 PTHrP only marginally elevated, malignancy unlikley cause though  possible   Vitamin D, Total (25-Hydroxy) 20 - 50 ng/mL 71 (H)  4/1/24 12:45 Vitamin D supplementation as cause for hypercalcemia   1,25 Dihydroxyvitamin D 19.9 - 79.3 pg/mL 42.1  4/3/24 05:43 Elevated levels support granulomatous disease      Latest Reference Range & Units 03/27/24 22:28 Interpretation   ELP Interpretation:  Monoclonal protein (1.8 g/dL) seen in the beta fraction comigrating with C3 complement, not previously characterized in our laboratory. Small monoclonal protein (0.1 g/dL) seen in the gamma fraction, not previously characterized in our laboratory. Hypoalbuminemia. Marked hypogammaglobulinemia.  Small monoclonal protein in the gamma fraction (0.1g/dl) which is of significance. This is only consistent with MGUS and not clinically significant.    Gamma Fraction 0.7 - 1.6 g/dL 0.2 (L)    [place] Free Lt Chain 0.33 - 1.94 mg/dL 53.93 (H) Elevated kappa light chain    Kappa Lambda Ratio 0.26 - 1.65  38.25 (H)    Lambda Free Lt Chain 0.57 - 2.63 mg/dL 1.41 Normal lambda light chain makes free light chain myeloma unlikely       He had protein electrophoresis and free light chain checked for multiple myeloma there is a very small monoclonal protein spike in the gamma fraction.  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  He does have elevated kappa light chain although his lambda light chain levels are normal.  He also has elevated kappa to lambda ratio.     I explained them about the broad differential diagnosis for hypercalcemia.  While malignancy is not a common cause of hypercalcemia, it is on the more serious causes.  He did have CT abdomen pelvis done without contrast on 3/27/2024 which did not show any evidence of malignancy.  The scan was limited without the intravenous contrast. I did get a CT scan of the chest which is negative for malignancy. In the absence of any clear radiographic finding suggesting malignant disease and without support of any other lab pointing  "towards malignancy, it is unlikely that his hypercalcemia is related to malignancy.      I would recommend treating his hypercalcemia with denosumab weekly until it improves.    We will sign off.       Over 50 min spent on day of visit including review of tests, obtaining/reviewing separately obtained history/physical exam, counseling patient, ordering medications/tests/procedures, communicating with PCP/consultants, and documenting in electronic medical record.     Guicho Roth  Hematologist and Medical Oncologist  [facility]        INTERIM HISTORY:  Patient continues to do poorly. His wife is very concerned about him. It is having a toll on her health. She is unable to sleep well at home and is nervous leaving him alone in the hospital but has no choice.      MEDICATIONS:  Reviewed    ALLERGIES:  No Known Allergies      PHYSICAL EXAM:  Vital signs:  Temp: 98.3  F (36.8  C) Temp src: Axillary BP: (!) 149/53 Pulse: 70   Resp: 18 SpO2: 94 % O2 Device: None (Room air)     Weight: 76.2 kg (167 lb 15.9 oz)  Estimated body mass index is 24.1 kg/m  as calculated from the following:    Height as of 3/15/24: 1.778 m (5' 10\").    Weight as of this encounter: 76.2 kg (167 lb 15.9 oz).    LABS:  Recent Labs   Lab Test 04/04/24  1728 04/04/24  1226 04/04/24  0902 04/04/24  0619 04/04/24  0221 04/03/24  0723 04/03/24  0543 04/02/24  1626 04/02/24  0802 04/01/24  1245 03/31/24  0816   NA  --   --   --  138  --   --  133*  --  138 141 144   POTASSIUM  --   --   --  3.6  --   --  3.5  --  3.6 3.7 3.6   CHLORIDE  --   --   --  104  --   --  101  --  105 107 112*   CO2  --   --   --  23  --   --  21*  --  22 21* 20*   ANIONGAP  --   --   --  11  --   --  11  --  11 13 12   BUN  --   --   --  36.6*  --   --  31.7*  --  25.2* 29.1* 31.0*   CR  --   --   --  1.93*  --   --  1.96*  --  1.80* 1.85* 1.86*   * 103* 122* 148* 144*   < > 147*   < > 127* 129* 103*   RICCARDO  --   --   --  12.9*  --   --  12.0*  --  11.4* 11.7* 10.9*    " < > = values in this interval not displayed.     Recent Labs   Lab Test 04/03/24  0543 04/02/24  0802 04/01/24  1245 03/27/24  1822 03/25/24  0733 03/17/24  0551 03/15/24  0534   MAG 1.7 1.7 1.8 2.6* 2.1   < > 1.9   PHOS 3.0 2.6 2.6 3.6  --   --  3.5    < > = values in this interval not displayed.     Recent Labs   Lab Test 04/03/24  0543 03/31/24  0816 03/29/24  0832 03/28/24  0350 03/27/24  1822 03/22/24  0841 03/20/24  1629 03/18/24  1256 03/17/24  2227 03/16/24  0541 03/15/24  0534 03/15/24  0153 12/13/22  0929   WBC  --  7.7 7.2 8.3 9.6  --  2.2*   < > 2.4*   < > 4.3 5.4 8.0   HGB  --  9.1* 9.6* 9.6* 11.0*  --  12.5*   < > 10.8*   < > 9.1* 9.9* 11.7*    276 312 298 342   < > 109*   < > 127*   < > 126* 134* 180   MCV  --  99 102* 102* 101*  --  99   < > 99   < > 101* 100 98   NEUTROPHIL  --   --   --   --  80  --   --   --  67  --  68 71 77    < > = values in this interval not displayed.     Recent Labs   Lab Test 03/28/24  0350 03/27/24  1822 03/20/24  1751   BILITOTAL 0.4 0.4 0.4   ALKPHOS 67 76 55   ALT 42 52 38   AST 26 30 46*   ALBUMIN 3.0* 3.5 3.0*     TSH   Date Value Ref Range Status   04/01/2024 1.15 0.30 - 4.20 uIU/mL Final   12/12/2022 1.19 0.40 - 4.00 mU/L Final   09/29/2018 1.82 0.40 - 4.00 mU/L Final   04/09/2014 2.330 0.450 - 4.500 uIU/mL Final       PATHOLOGY:  Lab Results   Component Value Date    PATH  10/05/2010     Patient Name: TERI BUCHANAN  MR#: [id number]  Specimen #: [id number]  Collected: 10/5/2010  Received: 10/5/2010  Reported: 10/6/2010 16:06  Ordering Phy(s): TAPAN CRUZ  Additional Phy(s): MERLIN EMERY BROWN BRADLEY ROBERT PIERCE              SPECIMEN(S):  A: Gallbladder and contents  B: Kidney, right    FINAL DIAGNOSIS:  A.  Gallbladder, resection - Acute and chronic cholecystitis and  cholelithiasis.    B.  Kidney cancer:    Specimen/Procedure/Laterality:   Right radical nephrectomy, including  kidney and right adrenal gland  Tumor Size:   9.2 x 9.1 x 7.5  "cm  Tumor Focality:   Unifocal  Macroscopic Extent of Tumor:   Tumor extends into the renal sinus fatty  stroma in the area of the proximal renal vein.  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  Sarcomatoid Features (not identified; present - %):   Not identified.  Histologic Grade (Karma nuclear grade):   2  Microscopic Tumor Extent:   Tumor extension into the renal sinus fatty  stroma  Margins:   Negative  Lymph-Vascular Invasion:   Absent  Lymph Nodes:   None removed  Pathologic Staging (pTNM):   pT3a (tumor extends into renal sinus fat  but not beyond Gerota's fascia), pNX  Pathologic Findings in Non-neoplastic Kidney:   Benign renal cortical  cysts.  CAP Protocol Based on AJCC/UICC TNM, 7th edition; Protocol Effective  Date:  January 2010      Electronically signed out by:    Rohan Marks M.D.      CLINICAL HISTORY:  Cholelithiasis, right kidney mass.      GROSS:  A.  The specimen is labeled \"gallbladder and contents\".  The specimen  consists of a red dull gallbladder (10.1 x 4.3 x 2.5 cm).  No gallstones  are identified.  The gallbladder wall ranges from 0.1 to 0.2 cm in  thickness and has a dark red-purple velvety mucosal surface.  Representative sections are submitted.    B.  The specimen is labeled \"right kidney\".  The specimen consists of an  895 g specimen, including a distorted kidney (16.7 x 10.4 x 7.2 cm) with  ureter (14.2 cm in length x 0.5 cm in diameter), renal artery (3.2 cm in  length x 0.5 cm in diameter), and renal vein (1.5 cm in length x 1 cm in  diameter) and yellow adipose tissue.  There is a well-circumscribed  yellow hemorrhagic tumor in the inferior pole of the kidney measuring  9.2 x 9.1 x 7.5 cm.  Grossly this tumor pushes on the renal vein causing  a bulge within the lumen but does not grossly involve the renal vein.  The bulging of the renal vein is 3.5 cm from the renal vein surgical  resection margin.  Grossly this tumor pushes on the renal capsule but  does not " invade through the renal capsule.  The outer surface is inked  black.  The uninvolved renal cortex measures 0.6 cm in thickness.   The  superior pole has multiple cortical cysts filled with clear fluid  measuring up to 1.2 cm.  The largest cyst measures up to 1.1 cm.  The  soft tissue is palpated for lymph nodes.  No lymph nodes are identified.   There is a previously partially opened adrenal gland measuring 6.2 x  1.3 x 1.2 cm.  Representative sections are submitted.   SI  TRS/tw    Cassettes:  1.  Ureter, renal artery, renal vein surgical resection margins.  2-5.  Sections of tumor with inked outer surface.  6.  Two sections of renal vein with tumor bulging the lumen.  7.  Adrenal gland.  8.  Sections of tumor with renal pelvis.  9.  Uninvolved kidney and kidney with cortical cysts.    MICROSCOPIC:  A.  A formal microscopic examination is performed.    B.  Nests of clear cell renal cell carcinoma are  by  fibrovascular stroma.  Portions of the tumor show a tubular pattern.  The stroma also shows infiltration by areas of plasma cells.  The tumor  cells show abundant clear cytoplasm, intermediate sized nuclei, and  inconspicuous nuclei at the 10X power indicating Karma nuclear grade  2.  Portions of the tumor show hemorrhage and necrosis.  The tumor  bulges toward the outer renal capsule.  At the renal sinus, there are  nests of tumor extending into the fibrofatty stroma without extension  through the Gerota's fascia.  No definite lymphovascular invasion is  identified.  The adrenal gland is benign.  The renal vein, renal artery  and ureter at the resection margins are negative for tumor.    DGB/aaron  10/06/2010      TESTING LAB LOCATION:  [address]  [address] [phone]    COLLECTION SITE:  Client: [facility]  Location: 88 (S)       IMAGING:  Results for orders placed or performed during the hospital encounter of 03/27/24   Head CT  w/o contrast    Narrative    EXAM: CT HEAD W/O CONTRAST  LOCATION: [facility]  DATE: 3/27/2024    INDICATION: AMS  COMPARISON: None.  TECHNIQUE: Routine CT Head without IV contrast. Multiplanar reformats. Dose reduction techniques were used.    FINDINGS:  INTRACRANIAL CONTENTS: No intracranial hemorrhage, extraaxial collection, or mass effect.  No CT evidence of acute infarct. Mild presumed chronic small vessel ischemic changes. Remote ischemic findings in the left frontal lobe. Moderate generalized   volume loss. No hydrocephalus.      VISUALIZED ORBITS/SINUSES/MASTOIDS: No intraorbital abnormality. No paranasal sinus mucosal disease. No middle ear or mastoid effusion.    BONES/SOFT TISSUES: No acute abnormality.      Impression    IMPRESSION:  1.  No acute intracranial abnormality. Chronic changes as above.   XR Chest 2 Views    Narrative    EXAM: XR CHEST 2 VIEWS  LOCATION: [facility]  DATE: 3/27/2024    INDICATION: AMS  COMPARISON: 03/20/2024      Impression    IMPRESSION: Stable cardiac size. No airspace opacity, pleural effusion or pneumothorax.   MR Brain w/o Contrast    Narrative    EXAM: MR BRAIN W/O CONTRAST  LOCATION: [facility]  DATE: 3/28/2024    INDICATION: AMS for 1 week, no focal deficits, cant use contrast due to Kidney disease  COMPARISON: 03/27/2024  TECHNIQUE: Routine multiplanar multisequence head MRI without intravenous contrast.    FINDINGS: Motion degraded exam.  INTRACRANIAL CONTENTS: No acute or subacute infarct. Chronic encephalomalacia and gliosis in the anterior left frontal lobe. No mass, acute hemorrhage, or extra-axial fluid collections. Patchy nonspecific T2/FLAIR hyperintensities within the cerebral   white matter most consistent with mild to moderate chronic microvascular ischemic change. Moderate generalized cerebral atrophy. No hydrocephalus. Normal position of the cerebellar tonsils.     SELLA: No  abnormality accounting for technique.    OSSEOUS STRUCTURES/SOFT TISSUES: Normal marrow signal. The major intracranial vascular flow voids are maintained.     ORBITS: Prior bilateral cataract surgery. Visualized portions of the orbits are otherwise unremarkable.     SINUSES/MASTOIDS: No paranasal sinus mucosal disease. No middle ear or mastoid effusion.       Impression    IMPRESSION:  1.  Motion degraded exam. No acute intracranial process.  2.  Chronic changes as detailed above.   CT Abdomen Pelvis w/o Contrast    Narrative    EXAM: CT ABDOMEN PELVIS W/O CONTRAST  LOCATION: [facility]  DATE: 3/27/2024    INDICATION: patient reacting to abdominal palpation with bruising, AMS, cant use contrast due to KENNA  COMPARISON: 02/11/2019  TECHNIQUE: CT scan of the abdomen and pelvis was performed without IV contrast. Multiplanar reformats were obtained. Dose reduction techniques were used.  CONTRAST: None.    FINDINGS:   LOWER CHEST: Fibrotic changes are seen in the lung bases bilaterally most pronounced in the deep and lower lung zones, worsened from 02/11/2019. Severe coronary atherosclerotic disease with evidence of prior stenting noted.    HEPATOBILIARY: The GallBladder surgically absent, otherwise unchanged appearance from prior exam.    PANCREAS: No significant mass, duct dilatation, or inflammatory change.    SPLEEN: Normal size.    ADRENAL GLANDS: The right Adrenal gland is surgically absent the left adrenal gland is unremarkable.    KIDNEYS/BLADDER: The right kidney is surgically absent, unchanged from prior exam. The left kidney is unchanged size and appearance. No significant mass, stones, or hydronephrosis. There are simple or benign cysts. No follow up is needed.    BOWEL: No obstruction or inflammatory change.    LYMPH NODES: No lymphadenopathy.    VASCULATURE: No abdominal aortic aneurysm.    PELVIC ORGANS: No pelvic masses.    MUSCULOSKELETAL: Multilevel discogenic and posterior  facet degenerative changes are seen throughout the lumbar spine with postsurgical changes from prior discectomy and fusion and posterior spinal fixation at the L4-L5 level, unchanged from prior exam.   No worrisome osseous lesions are identified.      Impression    IMPRESSION:   1.  No acute intra-abdominal process identified.       XR Video Swallow with SLP or OT    Narrative    VIDEO SWALLOWING EVALUATION   4/2/2024 10:07 AM     HISTORY: dysphagia    COMPARISON: None.    FLUOROSCOPY TIME: 2.1 minutes.  SPOT IMAGES OR CINE RUNS: 5      Impression    IMPRESSION:    Thin: Silent aspiration..    Mildly thick: Silent aspiration.    This study only includes the cervical esophagus. Please see speech  pathology note for further details.    ADRIANE EGAN DO         SYSTEM ID:  [id number]   XR Abdomen Port 1 View    Narrative    ABDOMEN ONE VIEW PORTABLE  4/1/2024 2:27 PM     HISTORY: feeding tube placement    COMPARISON: CT 3/27/2024.       Impression    IMPRESSION: Enteric tube with distal tip projecting at the EG  junction. Recommend advancement by 12 cm followed by repeat  radiograph.    NY CELESTIN MD         SYSTEM ID:  [id number]   XR Feeding Tube Placement    Narrative    FEEDING TUBE PLACEMENT  4/2/2024 10:05 AM     HISTORY: Feeding tube.    TECHNIQUE: 2.6 minutes fluoroscopy. No spot images.    COMPARISON: None.      Impression    IMPRESSION: Feeding tube placed with tip at the duodenal jejunal  junction. The tube was flushed with water and is ready for use.    SNOW ORTIZ MD         SYSTEM ID:  [id number]   MR Cervical Spine w/o & w Contrast    Narrative    EXAM: MR CERVICAL SPINE W/O and W CONTRAST  LOCATION: [facility]  DATE: 4/2/2024    INDICATION: weakness and hyperreflexia  COMPARISON: None.  CONTRAST: 9 Gadavist  TECHNIQUE: MRI Cervical Spine without and with IV contrast.    FINDINGS:   4 mm retrolisthesis of C5 on C6. Vertebral body heights are maintained. Modic type II  endplate change at C5-C6. No abnormal cord signal. No extraspinal abnormality. Negative for pathologic contrast enhancement.    Craniovertebral junction and C1-C2: Normal.    C2-C3: Disc osteophyte complex. Bilateral facet hypertrophy. No spinal canal or foraminal stenosis.     C3-C4: Disc osteophyte complex. Bilateral facet hypertrophy. No spinal canal stenosis. Moderate bilateral neural foraminal stenosis.     C4-C5: Disc osteophyte complex. Bilateral facet hypertrophy. Mild spinal canal stenosis. Mild bilateral neural foraminal stenosis.     C5-C6: Disc osteophyte complex with central disc protrusion. Bilateral facet hypertrophy. Mild spinal canal stenosis. Severe bilateral neural foraminal stenosis.     C6-C7: Disc osteophyte complex. Bilateral facet hypertrophy. No spinal canal or foraminal stenosis.     C7-T1: Disc osteophyte complex with central disc protrusion. Bilateral facet hypertrophy. No spinal canal stenosis. Mild bilateral neural foraminal stenosis.      Impression    IMPRESSION:  1.  Multilevel cervical spondylosis.  2.  No abnormal cord signal or enhancement.  3.  No high-grade spinal canal stenosis.  4.  Severe bilateral neural foraminal stenosis at C5-C6.  5.  Moderate bilateral neural foraminal stenosis at C3-C4.     CT Soft Tissue Neck w Contrast    Narrative    CT SCAN OF THE NECK WITH CONTRAST  4/4/2024 11:00 AM     HISTORY: severe dysphagia    TECHNIQUE: Axial CT images of the neck following administration of  intravenous contrast with reformations. Radiation dose for this scan  was reduced using automated exposure control, adjustment of the mA  and/or kV according to patient size, or iterative reconstruction  technique. 90 mL Isovue-370 IV.     COMPARISON: 4/2/2004 cervical spine MRI.     FINDINGS:   Visualized sinuses and orbits: Prior bilateral cataract extractions.  No paranasal sinus inflammatory changes.     Aerodigestive tract: Nasoenteric tube. Relatively symmetric  hyperdensity along  the posterior aspect of the tongue. No mucosal mass  is identified throughout the aerodigestive tract. Evaluation of the  epiglottis is somewhat limited due to motion, although it appears  normal. Normal appearance of the vocal cords. Mild thickening of the  mid esophagus.    Thyroid: No abnormal thyroid gland nodules.     Submandibular glands: Unremarkable.     Parotid glands: Unremarkable.       Lymph nodes: No suspicious enlarged or necrotic cervical lymph nodes.     Vasculature: Vascular calcifications.     Upper mediastinum and lungs: Scattered emphysematous changes in  relation throughout both upper lobes.     Bones: Cervical spine degenerative changes are better characterized on  MRI.       Impression    IMPRESSION:    1. Hyperdensity along the posterior aspect of the tongue, which may  reflect calcification or residual debris from a dense oral medication.  Correlate with direct visualization.   2. Mild thickening of the mid esophagus, which may reflect  esophagitis.  3. Otherwise, no abnormality throughout the aerodigestive tract to  explain symptoms.      TIM CORCORAN MD         SYSTEM ID:  [id number]   XR Lumbar Puncture Spinal Tap Diag    Narrative    EXAM: XR LUMBAR PUNCTURE SPINAL TAP DIAGNOSTIC  4/4/2024 11:56 AM       History:  lethargy, continued weakness.     PROCEDURE: The benefits and risks of the procedure were explained to  the patient's wife, including but not limited to worsening headache,  hemorrhage, infection, lower extremity pain, or nerve root injury.  Informed consent was obtained. The patient was sterilely prepped and  draped with the patient in the prone position, over the lower back.  Under fluoroscopic guidance, the interlaminar spaces were noted. 1%  lidocaine was administered for local anesthetic over the L3-L4  interlaminar space, and a 20 gauge needle was advanced into the thecal  sac under fluoroscopic guidance.  There was initial aspiration of  clear CSF. 12cc's of clear,  colorless CSF was passively obtained and  divided into 4 tubes.  The needle was removed. There were no immediate  complications associated with the procedure.   Estimated blood loss  during the procedure was less than 5 mL.    Fluoro time: 0.1 minutes  Images Obtained: 3  Radiation dose: DAP 13.65 uGym2  Medications used: 5mL Lidocaine 1%      Impression    IMPRESSION: Technically successful fluoroscopic guided lumbar puncture  at L3-L4.    MAYCOL Bryant         SYSTEM ID:  [id number]   CT Chest w Contrast    Narrative    CT CHEST WITH CONTRAST 4/4/2024 10:58 AM     HISTORY: hypercalcemia - rule out squamous cell lung cancer    COMPARISON: Chest x-ray dated 3/27/2024.    TECHNIQUE: Volumetric helical acquisition of CT images of the chest  from the clavicles to the kidneys were acquired after the  administration of 0 mL Isovue-370 IV contrast. Radiation dose for this  scan was reduced using automated exposure control, adjustment of the  mA and/or kV according to patient size, or iterative reconstruction  technique.    FINDINGS:     LUNGS AND PLEURA: No pulmonary mass. Findings interstitial lung  disease with subpleural reticulation and traction bronchiolectasis  with a basilar distribution. Mild honeycombing in the medial right  lower lobe. No pleural effusion. Small amount of fluid within the  trachea and bronchus intermedius with associated small airway  plugging.    MEDIASTINUM/AXILLAE: No thoracic adenopathy. Normal heart size without  pericardial effusion. No aneurysmal dilatation of the thoracic aorta.  Possible filling defect within a right middle lobe segmental pulmonary  artery (series 5, image 76).    CORONARY ARTERY CALCIFICATIONS: Severe.    UPPER ABDOMEN: Enteric tube courses into the duodenum with distal tip  not imaged. Multiple left renal cysts. Partially imaged post surgical  changes of right nephrectomy.    MUSCULOSKELETAL: Degenerative changes of the spine. No aggressive  appearing osseous  lesion.      Impression    IMPRESSION:  1.  No pulmonary mass or thoracic adenopathy to suggest a thoracic  malignancy.  2.  Small amount of fluid within the trachea and bronchus intermedius  with small airway plugging suggesting possible aspiration.  3.  Findings of interstitial lung disease with subpleural  reticulation, traction bronchiolectasis, and mild honeycombing. This  could be followed up with an interstitial lung disease protocol chest  CT in 3 months.  4.  Possible filling defect in a right middle lobe segmental pulmonary  artery versus artifact on this non-CTA study. A segmental pulmonary  embolism cannot be excluded. A CTA of the chest could further evaluate  if clinically indicated.    Impression #4 discussed with Dr. Roth by Dr. Oliveira via telephone on  4/4/24 at 1:12 PM CDT.    NY OLIVEIRA MD         SYSTEM ID:  [id number]

## 2024-04-05 NOTE — PROVIDER NOTIFICATION
MD Notification    Notified Person: MD    Notified Person Name: Dr. Jain     Notification Date/Time: 4/5/2024 1154    Notification Interaction: [employer]- messaging    Purpose of Notification: Ionized Ca++ 8.2.    Orders Received: Awaiting call back    Comments: Received call for Ca++ 14.9 earlier this shift. MD was notified. New orders were placed. NS bolus infusing currently. Patient has no new complaints and appears comfortable in bed. Family at bedside. Will continue to monitor.

## 2024-04-05 NOTE — CONSULTS
"SPIRITUAL HEALTH SERVICES - Consult Note    66    Referral Source: Consult for support  Saw pt. Augie Powell and his wife Khushboo. Introduced myself and [facility] and offered support, recognizing that they've endured a lot of struggles recently. Khushboo acknowledged the difficult medical journey they are on as well as her brother's recent death. She was concerned about Augie's current state of \"agitation\" and wanted to concentrate on making sure he was receiving what he needed. I informed her how to access [facility] for any support in the future.    Plan: Spiritual Health remains available.      Larisa Mcfadden  Chaplain Resident    [facility] routine referrals *[phone]    [facility] available 24/7 for emergent requests/referrals, either by paging the on-call  or by entering an ASAP/STAT consult in Epic (this will also page the on-call ).    "

## 2024-04-05 NOTE — PLAN OF CARE
Summary: Altered mental status; possibly secondary to dehydration/KENNA, unwitnessed fall at TCU.     DATE & TIME: 4/4/24 [id number]    Cognitive Concerns/ Orientation: A&O to self; garbled speech  ABNL VS/O2: VSS on RA except BP elevated at times   MOBILITY: Very lethargic and sleepy this shift otherwise up with Assistx2 gait belt/walker or Christiane Steady/ lift; turned/repositioned Q2hrs and prn  PAIN MANAGMENT: Denies; no nonverbal indicators of pain noted  DIET: Strict NPO. TF running at 55mL/hr with Q4h 100mL FWF  BOWEL/BLADDER: Cerda for retention - adequate output; last BM 4/3  ABNL LAB/BG: Cr 1.93; GFR 33; , 103, 140  DRAIN/DEVICES: L PIV SL  SKIN: Scattered bruises and scabs. Blanchable redness on coccyx - mepilex in place.  TEST/PROCEDURES: CT Spine/Chest and LP completed today - see results  D/C DATE: TCU when medically ready - needs to tolerate tube feeds. Family hopeful thrush will improve and SLP can eventually advance diet.   OTHER IMPORTANT INFO: Thrush in mouth improving slightly - on nystatin. Neuro/ENT following. Dr. Jain had long discussion with family today regarding goals of care per family's request - Dr. Jain to revisit tomorrow.

## 2024-04-05 NOTE — PROGRESS NOTES
[facility]    Hospitalist Progress Note    Interval History   - No significant clinical change today, remains weak, hoarse voice nearly unintelligible  - Discussed with wife Khushboo, she reports that family have spoken and patient has given some mixed messages about whether he wants to proceed with hospice or not, at this point they want him to remain on tube feeding for a couple more days and reassess Monday or Tuesday. I discussed with her that while a G tube is not a high risk procedure, the patient is significantly deconditioned and recovery even with a G tube will be slow and unsteady, it's unclear what his long term baseline will be, and his future quality of life will be significantly affected and worse than prior to admission. Extensive workup so far has not revealed a clear etiology to patient's severe dysphagia, ongoing weakness.    Addendum: BMP shows worsening hypercalcemia now critical at 14.9, recheck ionized calcium, give 1L IVF, give calcitonin 4u/kg x2 doses, give Zometa, reconsult HemOnc who signed off yesterday, monitor calcium closely. Will also start slidings scale insulin for sudden hyperglycemia    Addendum2: Dr. With Dr. Roth, calcium and renal function trend concerning for multiple myeloma. He discussed with family who felt biopsy and treatment of MM would not be in patient's interest. I returned to bedside to speak with patient and Khushboo. I discussed with them that MM is the most likely diagnosis to explain his decline, his hypercalcemia, and the reason why he has continued to decline despite supportive cares. I recommended hospice, and they are both in agreement. Will place orders and transition patient to hospice.  - Continue vizcarra for comfort  - Dysphagia diet for comfort    Assessment & Plan   Summary: Augie Powell is a 88 year old male with PMH CAD s/p PCI, HTN, HLD, dysphagia, chronic anemia, RCC, CKD s/p nephrectomy, who was admitted on 3/27/2024 with  prerenal acute kidney injury and confusion. Found to have progressive, severe dysphagia.   Patient recently admitted 3/15 to 3/25 for influenza, complicated by hypoxic respiratory failure, infectious encephalopathy, dysphagia, and deconditioning. He was discharged to TCU and readmitted on 3/27.   See goals of care conversation 4/4, 4/5.     Severe dysphagia with silent aspiration  Hoarse voice  Hypernatremia, improved  During previous admission 3/15-3/25 patient put on modified diet due to mild dysphagia. Appears to be worse this admission--Video swallow on 4/2/24 noted silent aspiration with all consistencies and speech path recommended NPO. Discussed with family given little to no PO intake for 10 days, started feeding tube therapy on 4/2.  Multiple etiologies of dysphagia was evaluated: Patient has severe oropharyngeal candidiasis which may be contributing to his worsening dysphagia. However per discussion with Speech path, candidiasis is unlikely to contribute to patient's muscle weakness seen on swallow study. Patient also noted to have a hoarse voice past 1-2 weeks which may be related. ENT consulted 4/3 but could not visualize larynx due to poor mucous clearance. CT neck on 4/4 no clear pathology as well to explain.  Neurology consulted. Patient had a MRI brain 3/28 which was negative for stroke. Cervical MRI no clear pathology as well. Patient underwent LP on 4/5/2024 to evaluate for infection, inflammation, cell count was benign, no evidence of infection.   Overall, the causes of patient's progressive severe dysphagia is unclear. Other etiologies to consider include rare neurological disorder, throat/neck pathology, acute on chronic decline due to age. See goals of care discussions below.  - Appreciate Neurology, ENT consults  - Antifungals as below for candidiasis treatment  - Aspiration precautions  - Continue PT/OT, SLP  - Continue NG tube with tube feeds   - Consider G tube placement next week starting  4/8 if no improvement and family wants to continue   - Nutrition to manage tube feeds   - Started patient on pulse dose steroids on 4/4 empirically    Acute metabolic encephalopathy, improved  Physical deconditioning  Recently admitted 3/15 to 3/25 for influenza, encephalopathy. Per family patient was A&Ox3 and driving prior to admission and has had a precipitous decline. Patient discharged to TCU on 3/25, TCU reports patient fell on 3/26. Admitted 3/27 was initially confused and nonverbal. CT scan of abdomen pelvis without contrast was done and it showed no acute intra-abdominal process identified. MRI of the brain 3/28 showed moderate brain atrophy with no acute findings of stroke or hemorrhage. Patient remains very confused on 3/31/2024.  Very poor swallow function with coughing with every meal and swallow. Hospitalist had goals of care conversations on 3/31, family wishes to continue medical management however patient is DNR/DNI.  Improved, A&Ox2-3 on 4/1 however patient continues to have a hoarse voice. Likely patient's ongoing caloric deficit, deconditioning, contributing to his mental status changes. Family denies any sundowning.  No significant change in mental status on 4/3, patient continues to nap frequently, patient was weaker with PT on 4/3 than previously.  - PT/OT  - Monitor for confusion  - Stopped seroquel 4/4 due to somnolence, appears slightly improved in AM on 4/5    Suspect esophageal candidiasis  Oral thrush  Hoarseness  Patient has severe oral thrush and appears to have oropharyngeal plaques likely has esophageal candidiasis. This is also likely contributing to his presenting dysphagia, but per discussion with speech path, unlikely to be contributing to patient's significant oropharyngeal muscle weakness.   - Fluconazole treatment for 2 weeks  - Continue nystatin swish and spit solution    Urinary retention   Overnight on 3/30/2024 urinary retention issues needed straight cath x 2. Flomax  started. Cerda catheter placed  - Flomax started  - Urology consulted  - I&Os    KENNA on CKD3a  Hx RCC s/p R nephrectomy  Hypernatremia, improved  Baseline is 1.7, elevated 2.8 on admission, improved with IVF. Could be 2/2 recent influenza infection and patient being on torsemide. With IV fluids creatinine improved to ~1.8.  - Avoid nephrotoxins and NSAIDs    Hypercalcemia, possibly Vitamin D toxicity  PTA patient on vit D and Ca supplements, as well as torsemide. Patient has had hypercalcemia since at least 2022. Patient's ongoing hypoalbuminemia, dehydration, improved, also contributing.   PTH is appropriately suppressed. Vitamin D level is high at 71, wife reports patient was taking Vit D supplementation PTA. TSH level normal. Etiology of hypercalcemia likely vit D toxicity, also consider occult malignancy, bone disorder.  - PTH-RP levels  - HemOnc consult    HTN: /55 on 4.4  - Hold PTA metoprolol  - Resume PTA amlodipine    Goals of care  Patient with ongoing severe dysphagia, deconditioning, of unclear etiology. Hospitalist on 3/31 had goals of care conversation with family and family was not ready for such discussions at the time. Patient became notably weaker from 3/31 to 4/3 despite starting tube feeds, extensive workup.   Etiology of ongoing weakness, dysphagia unclear. Care conference with family on 4/4/2024, including Khushboo wife, daughters Chelsea and Meredith and their spouses. I described to the family in detail the current workup, and the unclear nature of patient's progressive weakness and severe dysphagia. I discussed that the situation is very concerning here and there is a good possibility that this patient is in the dying process. One of the daughters Chelsea stated that she spoke with her father and her father said to not go any further, I encouraged the family to discuss with the patient in more detail what his wishes are as they have not had goals of care discussion with him prior to this  hospital stay. I discussed the role of hospice, the possibility of transitioning comfort cares. At this point since there are still studies pending further hospice discussions deferred.  4/5--patient unchanged, discussed with family about unremarkable LP, initiation of pulse dose steroids, wife Khushboo wants to continue to monitor for improvement over the weekend    Clinically Significant Risk Factors           # Hypercalcemia: Highest Ca = 12.9 mg/dL in last 2 days, will monitor as appropriate    # Hypoalbuminemia: Lowest albumin = 3 g/dL at 3/28/2024  3:50 AM, will monitor as appropriate     # Hypertension: Noted on problem list              # Financial/Environmental Concerns: none          PT/OT: ordered  Diet: Adult Formula Drip Feeding: Continuous Jevity 1.5; Other - Specify in Comment; Goal Rate: 55; mL/hr; when FT ok for use --> start @ 25 mL/hr and advance by 15 mL q 12 hours to goal rate.; Do not advance tube feeding rate unless K+ is = or > 3.0, M...  NPO for Medical/Clinical Reasons Except for: No Exceptions    DVT Prophylaxis: heparin SQ  Cerda Catheter: PRESENT, indication: Acute retention or obstruction  Lines: None     Cardiac Monitoring: None  Code Status: No CPR- Do NOT Intubate    Disposition: Anticipated discharge 3+ days    Noel Jain MD  Hospitalist Service  [facility]  Securely message with [employer] (more info)  Text page via [facility] Paging/Directory     - Total time spent on encounter is 80 minutes, which includes counseling, coordination of care, time spent discussing with family, and/or time spent discussing with consultants.    Data reviewed today: I reviewed all new labs and imaging results over the last 24 hours.    Physical Exam   Temp: 97.9  F (36.6  C) Temp src: Axillary BP: (!) 142/61 Pulse: 90   Resp: 18 SpO2: 92 % O2 Device: None (Room air)    Vitals:    04/01/24 1335   Weight: 76.2 kg (167 lb 15.9 oz)     Vital Signs with Ranges  Temp:  [97.9  F (36.6   C)-98.3  F (36.8  C)] 97.9  F (36.6  C)  Pulse:  [69-90] 90  Resp:  [18] 18  BP: (132-149)/(50-61) 142/61  SpO2:  [92 %-97 %] 92 %  I/O last 3 completed shifts:  In: 3444 [NG/GT:3444]  Out: 1400 [Urine:1400]  O2 requirements: none    Constitutional: Male appears ill, tired, weak, deconditioned  HEENT: Eyes nonicteric, oral mucosa thrush appears improved with plaques noted oropharynx  Cardiovascular: RRR, normal S1/2, no m/r/g  Respiratory: Basilar to mid crackles  Vascular: No LE pitting edema  GI: Normoactive bowel sounds, nontender  Skin/Integumen: No rashes  Neuro/Psych: Limited eval due to ongoing fatigue, oriented to self, moves all extremities    Medications   Current Facility-Administered Medications   Medication Dose Route Frequency Provider Last Rate Last Admin    dextrose 10% infusion   Intravenous Continuous PRN Noel Jain MD         Current Facility-Administered Medications   Medication Dose Route Frequency Provider Last Rate Last Admin    acetaminophen (TYLENOL) tablet 650 mg  650 mg Oral BID Jayashree Miller MD   650 mg at 04/05/24 0752    amLODIPine (NORVASC) tablet 5 mg  5 mg Oral Daily Noel Jain MD   5 mg at 04/05/24 0753    aspirin (ASA) chewable tablet 81 mg  81 mg Oral or Feeding Tube Daily Noel Jain MD   81 mg at 04/05/24 0752    doxazosin (CARDURA) tablet 1 mg  1 mg Oral or Feeding Tube Daily Noel Jain MD   1 mg at 04/05/24 0753    fluconazole (DIFLUCAN) tablet 200 mg  200 mg Oral or Feeding Tube Daily Haydee Tidwell MD   200 mg at 04/05/24 0753    heparin ANTICOAGULANT injection 5,000 Units  5,000 Units Subcutaneous Q8H Jayashree Miller MD   5,000 Units at 04/05/24 0437    Lidocaine (LIDOCARE) 4 % Patch 1 patch  1 patch Transdermal Q24h Nghia Rivera MD        methylPREDNISolone sodium succinate (solu-MEDROL) 1,000 mg in sodium chloride 0.9 % 283 mL intermittent infusion  1,000 mg Intravenous Q24H Tracie Hendrickson MD   1,000 mg at 04/04/24  1808    nystatin (MYCOSTATIN) suspension 500,000 Units  500,000 Units Swish & Spit 4x Daily Jayashree Miller MD   500,000 Units at 04/05/24 0751    protein modular (PROSOURCE TF20) packet 1 packet  1 packet Per Feeding Tube Daily Noel Jain MD   1 packet at 04/05/24 0803    senna-docusate (SENOKOT-S/PERICOLACE) 8.6-50 MG per tablet 1 tablet  1 tablet Oral or Feeding Tube BID Haydee Tidwell MD   1 tablet at 04/05/24 0752    sodium chloride (PF) 0.9% PF flush 3 mL  3 mL Intracatheter Q8H Haydee Tidwell MD   3 mL at 04/04/24 1458       Data   Recent Labs   Lab 04/04/24  1728 04/04/24  1226 04/04/24  0902 04/04/24  0619 04/03/24  0723 04/03/24  0543 04/02/24  1626 04/02/24  0802 04/01/24  1245 03/31/24  0816   WBC  --   --   --   --   --   --   --   --   --  7.7   HGB  --   --   --   --   --   --   --   --   --  9.1*   MCV  --   --   --   --   --   --   --   --   --  99   PLT  --   --   --   --   --  195  --   --   --  276   NA  --   --   --  138  --  133*  --  138   < > 144   POTASSIUM  --   --   --  3.6  --  3.5  --  3.6   < > 3.6   CHLORIDE  --   --   --  104  --  101  --  105   < > 112*   CO2  --   --   --  23  --  21*  --  22   < > 20*   BUN  --   --   --  36.6*  --  31.7*  --  25.2*   < > 31.0*   CR  --   --   --  1.93*  --  1.96*  --  1.80*   < > 1.86*   ANIONGAP  --   --   --  11  --  11  --  11   < > 12   RICCARDO  --   --   --  12.9*  --  12.0*  --  11.4*   < > 10.9*   * 103* 122* 148*   < > 147*   < > 127*   < > 103*    < > = values in this interval not displayed.       Imaging:   Recent Results (from the past 24 hour(s))   CT Chest w Contrast    Narrative    CT CHEST WITH CONTRAST 4/4/2024 10:58 AM     HISTORY: hypercalcemia - rule out squamous cell lung cancer    COMPARISON: Chest x-ray dated 3/27/2024.    TECHNIQUE: Volumetric helical acquisition of CT images of the chest  from the clavicles to the kidneys were acquired after the  administration of 0 mL Isovue-370 IV contrast. Radiation  dose for this  scan was reduced using automated exposure control, adjustment of the  mA and/or kV according to patient size, or iterative reconstruction  technique.    FINDINGS:     LUNGS AND PLEURA: No pulmonary mass. Findings interstitial lung  disease with subpleural reticulation and traction bronchiolectasis  with a basilar distribution. Mild honeycombing in the medial right  lower lobe. No pleural effusion. Small amount of fluid within the  trachea and bronchus intermedius with associated small airway  plugging.    MEDIASTINUM/AXILLAE: No thoracic adenopathy. Normal heart size without  pericardial effusion. No aneurysmal dilatation of the thoracic aorta.  Possible filling defect within a right middle lobe segmental pulmonary  artery (series 5, image 76).    CORONARY ARTERY CALCIFICATIONS: Severe.    UPPER ABDOMEN: Enteric tube courses into the duodenum with distal tip  not imaged. Multiple left renal cysts. Partially imaged post surgical  changes of right nephrectomy.    MUSCULOSKELETAL: Degenerative changes of the spine. No aggressive  appearing osseous lesion.      Impression    IMPRESSION:  1.  No pulmonary mass or thoracic adenopathy to suggest a thoracic  malignancy.  2.  Small amount of fluid within the trachea and bronchus intermedius  with small airway plugging suggesting possible aspiration.  3.  Findings of interstitial lung disease with subpleural  reticulation, traction bronchiolectasis, and mild honeycombing. This  could be followed up with an interstitial lung disease protocol chest  CT in 3 months.  4.  Possible filling defect in a right middle lobe segmental pulmonary  artery versus artifact on this non-CTA study. A segmental pulmonary  embolism cannot be excluded. A CTA of the chest could further evaluate  if clinically indicated.    Impression #4 discussed with Dr. Roth by Dr. Oliveira via telephone on  4/4/24 at 1:12 PM CDT.    NY OLIVEIRA MD         SYSTEM ID:  [id number]   CT Soft Tissue  Neck w Contrast    Narrative    CT SCAN OF THE NECK WITH CONTRAST  4/4/2024 11:00 AM     HISTORY: severe dysphagia    TECHNIQUE: Axial CT images of the neck following administration of  intravenous contrast with reformations. Radiation dose for this scan  was reduced using automated exposure control, adjustment of the mA  and/or kV according to patient size, or iterative reconstruction  technique. 90 mL Isovue-370 IV.     COMPARISON: 4/2/2004 cervical spine MRI.     FINDINGS:   Visualized sinuses and orbits: Prior bilateral cataract extractions.  No paranasal sinus inflammatory changes.     Aerodigestive tract: Nasoenteric tube. Relatively symmetric  hyperdensity along the posterior aspect of the tongue. No mucosal mass  is identified throughout the aerodigestive tract. Evaluation of the  epiglottis is somewhat limited due to motion, although it appears  normal. Normal appearance of the vocal cords. Mild thickening of the  mid esophagus.    Thyroid: No abnormal thyroid gland nodules.     Submandibular glands: Unremarkable.     Parotid glands: Unremarkable.       Lymph nodes: No suspicious enlarged or necrotic cervical lymph nodes.     Vasculature: Vascular calcifications.     Upper mediastinum and lungs: Scattered emphysematous changes in  relation throughout both upper lobes.     Bones: Cervical spine degenerative changes are better characterized on  MRI.       Impression    IMPRESSION:    1. Hyperdensity along the posterior aspect of the tongue, which may  reflect calcification or residual debris from a dense oral medication.  Correlate with direct visualization.   2. Mild thickening of the mid esophagus, which may reflect  esophagitis.  3. Otherwise, no abnormality throughout the aerodigestive tract to  explain symptoms.      TIM CORCORAN MD         SYSTEM ID:  [id number]   XR Lumbar Puncture Spinal Tap Diag    Narrative    EXAM: XR LUMBAR PUNCTURE SPINAL TAP DIAGNOSTIC  4/4/2024 11:56 AM       History:   lethargy, continued weakness.     PROCEDURE: The benefits and risks of the procedure were explained to  the patient's wife, including but not limited to worsening headache,  hemorrhage, infection, lower extremity pain, or nerve root injury.  Informed consent was obtained. The patient was sterilely prepped and  draped with the patient in the prone position, over the lower back.  Under fluoroscopic guidance, the interlaminar spaces were noted. 1%  lidocaine was administered for local anesthetic over the L3-L4  interlaminar space, and a 20 gauge needle was advanced into the thecal  sac under fluoroscopic guidance.  There was initial aspiration of  clear CSF. 12cc's of clear, colorless CSF was passively obtained and  divided into 4 tubes.  The needle was removed. There were no immediate  complications associated with the procedure.   Estimated blood loss  during the procedure was less than 5 mL.    Fluoro time: 0.1 minutes  Images Obtained: 3  Radiation dose: DAP 13.65 uGym2  Medications used: 5mL Lidocaine 1%      Impression    IMPRESSION: Technically successful fluoroscopic guided lumbar puncture  at L3-L4.    MAYCOL Bryant         SYSTEM ID:  [id number]

## 2024-04-05 NOTE — PROGRESS NOTES
[facility]  Neuroscience and Spine [place]  Neurology Daily Note               Interval History:   As per the patient's family, no clear evidence of improvement in his dysphagia or his dysarthria.    CSF studies were reviewed  MRI cervical spine 45/2/24   Multilevel cervical spondylosis.    No abnormal cord signal or enhancement.  .  No high-grade spinal canal stenosis.    Severe bilateral neural foraminal stenosis at C5-C6.  .  Moderate bilateral neural foraminal stenosis at C3-C4.     Mr. Davis is an 88-year-old patient who was admitted with confusion and has a history for  CAD, hypertension, HDL, dysphagia, chronic anemia, CKD, status post nephrectomy who was admitted on 3/15 to 3/25 for influenza and confusion with recent concerns regarding decline in his health related to his infection and was subsequently transferred to TCU and had a fall on 3/26 and readmitted with concerns regarding confusion, difficulty swallowing and hoarse voice with evidence for severe oropharyngeal candidiasis on admission.       Review of Systems:   The 10 point Review of Systems is negative other than noted in the HPI       Medications:   Scheduled Meds:  Current Facility-Administered Medications   Medication Dose Route Frequency Provider Last Rate Last Admin    acetaminophen (TYLENOL) tablet 650 mg  650 mg Oral BID Jayashree Miller MD   650 mg at 04/05/24 0752    amLODIPine (NORVASC) tablet 5 mg  5 mg Oral Daily Noel Jain MD   5 mg at 04/05/24 0753    aspirin (ASA) chewable tablet 81 mg  81 mg Oral or Feeding Tube Daily Noel Jain MD   81 mg at 04/05/24 0752    calcitonin (MIACALCIN) injection 304 Units  4 Units/kg Subcutaneous Q12H Noel Jain MD   304 Units at 04/05/24 1247    doxazosin (CARDURA) tablet 1 mg  1 mg Oral or Feeding Tube Daily Noel Jain MD   1 mg at 04/05/24 0753    fluconazole (DIFLUCAN) tablet 200 mg  200 mg Oral or Feeding Tube Daily Haydee Tidwell,  MD   200 mg at 04/05/24 0753    heparin ANTICOAGULANT injection 5,000 Units  5,000 Units Subcutaneous Q8H Jayashree Miller MD   5,000 Units at 04/05/24 1247    insulin aspart (NovoLOG) injection (RAPID ACTING)  1-7 Units Subcutaneous Q4H Noel Jain MD        Lidocaine (LIDOCARE) 4 % Patch 1 patch  1 patch Transdermal Q24h Nghia Rivera MD        methylPREDNISolone sodium succinate (solu-MEDROL) 1,000 mg in sodium chloride 0.9 % 283 mL intermittent infusion  1,000 mg Intravenous Q24H Tracie Hendrickson MD   1,000 mg at 04/04/24 1808    nystatin (MYCOSTATIN) suspension 500,000 Units  500,000 Units Swish & Spit 4x Daily Jayashree Miller MD   500,000 Units at 04/05/24 1258    protein modular (PROSOURCE TF20) packet 1 packet  1 packet Per Feeding Tube Daily Noel Jain MD   1 packet at 04/05/24 0803    senna-docusate (SENOKOT-S/PERICOLACE) 8.6-50 MG per tablet 1 tablet  1 tablet Oral or Feeding Tube BID Haydee Tidwell MD   1 tablet at 04/05/24 0752    sodium chloride (PF) 0.9% PF flush 3 mL  3 mL Intracatheter Q8H Haydee Tidwell MD   3 mL at 04/04/24 1458    zoledronic acid (ZOMETA) 4 mg in sodium chloride 0.9 % 110 mL intermittent infusion  4 mg Intravenous Once Noel Jain  mL/hr at 04/05/24 1254 4 mg at 04/05/24 1254     PRN Meds:   Current Facility-Administered Medications   Medication Dose Route Frequency Provider Last Rate Last Admin    acetaminophen (TYLENOL) tablet 650 mg  650 mg Oral Q4H PRN Haydee Tidwell MD   650 mg at 04/04/24 1208    Or    acetaminophen (TYLENOL) Suppository 650 mg  650 mg Rectal Q4H PRN Haydee Tidwell MD        benzocaine-menthol (CHLORASEPTIC) 6-10 MG lozenge 1 lozenge  1 lozenge Buccal Q1H PRN Haydee Tidwell MD   1 lozenge at 04/01/24 1349    benzonatate (TESSALON) capsule 100 mg  100 mg Oral TID PRN Jayashree Miller MD        dextrose 10% infusion   Intravenous Continuous PRN Noel Jain MD        glucose gel 15-30 g  15-30  g Oral Q15 Min PRN Noel Jain MD        Or    dextrose 50 % injection 25-50 mL  25-50 mL Intravenous Q15 Min PRN Noel Jain MD        Or    glucagon injection 1 mg  1 mg Subcutaneous Q15 Min PRN Noel Jain MD        guaiFENesin (ROBITUSSIN) 20 mg/mL solution 10 mL  10 mL Oral or Feeding Tube Q4H PRN Haydee Tidwell MD        hydrALAZINE (APRESOLINE) tablet 10 mg  10 mg Oral or Feeding Tube Q4H PRN Haydee Tidwell MD        Or    hydrALAZINE (APRESOLINE) injection 10 mg  10 mg Intravenous Q4H PRN Haydee Tidwell MD        lidocaine (LMX4) cream   Topical Q1H PRN Nghia Rivera MD        lidocaine 1 % 0.1-1 mL  0.1-1 mL Other Q1H PRN Nghia Rivera MD        nitroGLYcerin (NITROSTAT) sublingual tablet 0.4 mg  0.4 mg Sublingual Q5 Min PRN Haydee Tidwell MD        ondansetron (ZOFRAN ODT) ODT tab 4 mg  4 mg Oral Q6H PRN Haydee Tidwell MD        Or    ondansetron (ZOFRAN) injection 4 mg  4 mg Intravenous Q6H PRN Haydee Tidwell MD        senna-docusate (SENOKOT-S/PERICOLACE) 8.6-50 MG per tablet 1 tablet  1 tablet Oral or Feeding Tube BID PRN Haydee Tidwell MD        Or    senna-docusate (SENOKOT-S/PERICOLACE) 8.6-50 MG per tablet 2 tablet  2 tablet Oral or Feeding Tube BID PRN Haydee Tidwell MD        sodium chloride (PF) 0.9% PF flush 10-20 mL  10-20 mL Intracatheter q1 min prn Nghia Rivera MD        sodium chloride (PF) 0.9% PF flush 3 mL  3 mL Intracatheter q1 min prn Haydee Tidwell MD               Physical Exam:   Vitals: Blood pressure (!) 142/61, pulse 90, temperature 97.9  F (36.6  C), temperature source Axillary, resp. rate 18, weight 76.2 kg (167 lb 15.9 oz), SpO2 92%.  General Appearance:    He was in no apparent respiratory distress and was awake and alert and made good eye contact.  The speech was hypophonic and slightly dysphonic without dysarthria and was difficult to decipher but he was able to follow simple commands fairly  well and appeared to be answering some specific questions with appropriate responses.  Neuro:       Mental Status Exam:    He was awake and alert with dysphonic speech       Cranial Nerves:   Pupils were equal and sluggish reactive to light, there was no ptosis any tract in the horizontal plane with conjugate eye movements without nystagmoid movements, there was no facial asymmetry and the tongue was midline, there were no fasciculations.          Motor:   There was normal tone in all 4 extremities, he was diffusely weak, proximally and distal to the strength of 4/5 in both upper and lower extremities.          Reflexes: 1+ in the biceps and triceps, trace at the patella and ankles       Sensory: Difficult to assess due to his dysphonia                       Gait: Not assessed due to generalized weakness.  Cardiovascular: Regular rate and rhythm, no m/r/g  Lungs: Clear to auscultation  Abdomen: Soft, not tender, not destended   Extremities: No clubbing, no cyanosis, no edema       Data:   ROUTINE IP LABS (Last 3results)  CBC RESULTS:     Recent Labs   Lab 04/03/24  0543 03/31/24  0816   WBC  --  7.7   RBC  --  2.81*   HGB  --  9.1*   HCT  --  27.8*    276     Basic Metabolic Panel:  Recent Labs   Lab 04/05/24  1149 04/05/24  0930 04/04/24  1728 04/04/24  0902 04/04/24  0619 04/03/24  0723 04/03/24  0543   NA  --  138  --   --  138  --  133*   POTASSIUM  --  4.1  --   --  3.6  --  3.5   CHLORIDE  --  103  --   --  104  --  101   CO2  --  22  --   --  23  --  21*   BUN  --  51.1*  --   --  36.6*  --  31.7*   CR  --  2.29*  --   --  1.93*  --  1.96*   * 300* 140*   < > 148*   < > 147*   RICCARDO  --  14.9*  --   --  12.9*  --  12.0*    < > = values in this interval not displayed.     INR:No lab results found in last 7 days.   Lipid Profile:  Recent Labs   Lab Test 02/07/23  1311 01/11/22  0936 04/13/21  0000 08/14/20  0000   CHOL  --   --  102 91   HDL  --   --  39 39*   LDL  --   --  45 34   TRIG 168* 105 95  "101     TSH:  TSH   Date Value Ref Range Status   04/01/2024 1.15 0.30 - 4.20 uIU/mL Final   12/12/2022 1.19 0.40 - 4.00 mU/L Final   09/29/2018 1.82 0.40 - 4.00 mU/L Final   ,   Vitamin B12: No results found for: \"B12\"   A1C:   Lab Results   Component Value Date    A1C 5.5 04/05/2024    A1C 5.5 09/29/2018            Latest Reference Range & Units 04/04/24 11:50 04/04/24 11:51 04/04/24 11:53   MENINGITIS/ENCEPHALITIS PANEL QUAL PCR CSF    Rpt   RBC CSF 0 - 2 /uL  1    Appearance CSF Clear   Clear    Color CSF Colorless   Colorless    Glucose CSF 40 - 70 mg/dL 82 (H)     Protein total CSF 15.0 - 45.0 mg/dL 44.4     (H): Data is abnormally high  Rpt: View report in Results Review for more information    Meningitis/Encephalitis panel negative    MRI Brain : 3/28/2024  Images were degraded by movement artifacts however there was no acute or subacute infarct, there were chronic areas of encephalomalacia and gliosis in the anterior left frontal area, no mass effect, no evidence of acute hemorrhage, no extra-axial fluid collection, there was patchy, nonspecific T2/FLAIR hyperintensities within the cerebral white matter consistent with chronic microvascular ischemic changes.  He had generalized cerebral atrophy, age-related, no hydrocephalus.    MRI Cervical spine 4/2/2024    -  Multilevel cervical spondylosis.  -  No abnormal cord signal or enhancement.  -  No high-grade spinal canal stenosis.  - Severe bilateral neural foraminal stenosis at C5-C6.  -  Moderate bilateral neural foraminal stenosis at C3-C4.           Assessment and Plan:    is an 88-year-old patient who was admitted for assessment presenting with dysphagia and dysarthria and generalized weakness   assessed by neurology with concerns regarding multifactorial etiology in part suspected to be related to recent infection.  Examination again today confirm that although he has generalized weakness, there were no fasciculations seen, there was there " were no upper motor neuron reflexes detected for concerns regarding ALS, although bulbar ALS would be in the differential diagnosis.  -Another concern would be a neuromuscular junctional disorder, myasthenia gravis, again, he does not appear to have any respiratory crisis on examination today.  However, as discussed with the family, panel for myasthenia gravis was requested.  -CSF studies was negative for any infectious process, meningitis/encephalitis panel was negative.  -Brain MRI was reviewed, there was generalized volume loss, with compensatory ventriculomegaly and nonspecific, periventricular white matter changes, consistent with microvascular ischemic disease, no clear evidence for brainstem lesion or brainstem compression.  ---------- oral candidiasis may be a contributing factor to his dysphonia and dysphagia  2. Generalizedweakness  no fasciculations, no evidence of promoting urine signs on examination no clear evidence for peripheral motor neuron disorder, agree that deconditioning may be a contributing factor.  3. Hypercalcelmia  course of steroids was recommended, continue to assess for clinical response,  4.  Encephalopathy  Encephalopathy, probably multifactorial in etiology, no clear evidence for an infectious process, CSF studies were negative, meningitis encephalitis panel was negative.    The assessment and recommendations were discussed with the patient's family, they had questions which were addressed to their satisfaction.    Jess Gross MD  [facility]  Telephone number [phone]

## 2024-04-05 NOTE — PROVIDER NOTIFICATION
MD Notification    Notified Person: MD    Notified Person Name:  Cristobal    Notification Date/Time: 4/5/2024 1037    Notification Interaction: Vocera-messaging    Purpose of Notification: Ca++ 14.9    Orders Received: Awaiting call back    Comments: VSS on RA. Patient has no new complaints; appears comfortable in bed. Wife at bedside. Will continue to monitor.    1045: See new orders including NS bolus, calcitonin, zometa, BGMs, and Heme/Onc consult.

## 2024-04-05 NOTE — PLAN OF CARE
Goal Outcome Evaluation:      Plan of Care Reviewed With: patient    Overall Patient Progress: no changeOverall Patient Progress: no change         Summary: Altered mental status; possibly secondary to dehydration/KENNA, unwitnessed fall at TCU.     DATE & TIME: 04/04/2024- 04/05/2024 4426-0609     Cognitive Concerns/ Orientation: A&O to self; garbled speech  ABNL VS/O2: VSS on RA   MOBILITY: Assist x2 gait belt/walker or Christiane Steady/ lift; turned/repositioned Q2hrs   PAIN MANAGMENT: Denies pain; no nonverbal indicators of pain noted  DIET: Strict NPO. TF running at 55mL/hr with Q4h 100mL FWF  BOWEL/BLADDER: Cerda for retention, No BM this shift   ABNL LAB/BG: Cr 1.93; GFR 33  DRAIN/DEVICES: L PIV SL  SKIN: Scattered bruises and scabs. Blanchable redness on coccyx - mepilex in place.  TEST/PROCEDURES: None this shift   D/C DATE: TCU when medically ready - needs to tolerate tube feeds. Family hopeful thrush will improve and SLP can eventually advance diet.     OTHER IMPORTANT INFO: Thrush in mouth improving slightly - on nystatin. Neuro/ENT following. Dr. Jain had long discussion with family on 4/4 regarding goals of care per family's request - Dr. Jain to revisit 4/5.  CT Spine/Chest and LP completed - see results

## 2024-04-06 NOTE — PROGRESS NOTES
[facility]  Hematology-Oncology Follow-up Note      Today's Date: 04/05/24  Date of Admission:  3/27/2024  Reason for Consult: Hypercalcemia       ASSESSMENT:  Augie Powell is a 88 year old malewho was admitted on 3/27/2024. I was asked to see the patient for hypercalcemia.          History of influenza    Encephalopathy    Acute on chronic renal insufficiency    Hypercalcemia     Augie was in the room with his wife.  He was sleeping through the visit.  He does respond to verbal commands but was not participating in any discussion.  His wife noted that he answers very softly.  However he did not wake up easily when we tried.     He has been admitted with acute metabolic encephalopathy which appeared just prior to the hospital admission.  He has also struggled with dysphagia, hoarse voice, hyponatremia, acute renal insufficiency and hypercalcemia.  A lot of the symptoms could be explained by hypercalcemia alone.     I have checked for intact PTH hormone levels for him.  It is below normal as would be expected in the setting of hypercalcemia.  This essentially rules out parathyroid adenoma as the cause for hypercalcemia.  Parathyroid hormone related peptide is marginally elevated. It makes malignancy like squamous cell carcinomas from lung/ esophagus unlikely.    I have ordered for 125 dihydroxy vitamin D and 25-hydroxy vitamin D levels.  This would point towards granulomatous disease as the cause or oral supplementation.     Reference Range & Units 04/04/24 06:19 Interpretation   Parathyroid Hormone Intact 15 - 65 pg/mL 7 (L) Rules out parathyroid adenoma as cause      Latest Reference Range & Units Most Recent Interpretation   PSA Tumor Marker ng/mL 2.28  4/3/24 09:52 Normal PSA; negative CT scan for bony involvement - neg for prostate cancer bony mets   PTH Related Peptide Test 0.0 - 2.3 pmol/L 5.4 (H)  4/2/24 08:02 PTHrP only marginally elevated, malignancy unlikley cause though  possible   Vitamin D, Total (25-Hydroxy) 20 - 50 ng/mL 71 (H)  4/1/24 12:45 Vitamin D supplementation as cause for hypercalcemia   1,25 Dihydroxyvitamin D 19.9 - 79.3 pg/mL 42.1  4/3/24 05:43 Elevated levels support granulomatous disease      Latest Reference Range & Units 03/27/24 22:28 Interpretation   ELP Interpretation:  Monoclonal protein (1.8 g/dL) seen in the beta fraction comigrating with C3 complement, not previously characterized in our laboratory. Small monoclonal protein (0.1 g/dL) seen in the gamma fraction, not previously characterized in our laboratory. Hypoalbuminemia. Marked hypogammaglobulinemia.  Small monoclonal protein in the gamma fraction (0.1g/dl) which is of significance. This is only consistent with MGUS and not clinically significant.    Gamma Fraction 0.7 - 1.6 g/dL 0.2 (L)    [place] Free Lt Chain 0.33 - 1.94 mg/dL 53.93 (H) Elevated kappa light chain    Kappa Lambda Ratio 0.26 - 1.65  38.25 (H)    Lambda Free Lt Chain 0.57 - 2.63 mg/dL 1.41 Normal lambda light chain makes free light chain myeloma unlikely       He had protein electrophoresis and free light chain checked for multiple myeloma there is a very small monoclonal protein spike in the gamma fraction.  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  He does have elevated kappa light chain although his lambda light chain levels are normal.  He also has elevated kappa to lambda ratio. This does not qualify for multiple myeloma but does not rule it out. We would need a bone marrow biopsy for further evaluation.     He has rising calcium levels which are even higher today. He has dysphagia. I do believe that it is related to his hypercalcemia. However this has been the case for even longer. I have extensively reviewed the procedure for bone marrow biopsy. This can be performed as an outpatient or inpatient under sedation and with local anaesethetic. Bone marrow is the soft tissue inside bones that helps form  blood cells. It is found in the hollow part of most bones. The procedure involves collection of bone marrow and a small amount of marrow fluid aspirate for analysis with a biopsy/aspirate needle. Only the finished cells are released in the circulation and bone marrow biopsy/aspirate helps assess precursor cells for abnormalities. It helps establish primary hematologic disorders like myelodysplastic/ myeloproliferative disorders, leukemia, lymphoma and multiple myeloma. We could also get a bone scan given the rising calcium levels which suggest calcium coming from bone.       He did have CT abdomen pelvis done without contrast on 3/27/2024 which did not show any evidence of malignancy.  The scan was limited without the intravenous contrast. I did get a CT scan of the chest which is negative for malignancy.  The clear challenge is that even if we were to establish a malignancy it would mean metastatic disease at diagnosis or myeloma - both are incurable. He is in no position to tolerate any therapy. He will not be a candidate for either TCU or even a nursing home if he was on a chemotherapy regimen. His wife cannot take care of him. He has a performance status of 4 for a long time. I do feel that hospice would be an appropriate option.     I reviewed the option of hospice and explained it to his wife. She was receptive and agreed. I have reviewed this with primary team and they will follow up with her.     We will sign off.       Medical Decision Making       90 MINUTES SPENT BY ME on the date of service doing chart review, history, exam, documentation & further activities per the note.  Tests ORDERED & REVIEWED in the past 24 hours:  - BMP  Tests personally interpreted in the past 24 hours:  - CHEST CT showing possible Insterstitial disease and embolus  SUPPLEMENTAL HISTORY, in addition to the patient's history, over the past 24 hours obtained from:   - Spouse or significant other  Medical complexity over the past 24  "hours:  - Decision to DE-ESCALATE CARE based on prognosis        Over 90 min spent on day of visit including review of tests, obtaining/reviewing separately obtained history/physical exam, counseling patient, ordering medications/tests/procedures, communicating with PCP/consultants, and documenting in electronic medical record.     Guicho Roth  Hematologist and Medical Oncologist  [facility]        INTERIM HISTORY:  Patient continues to do poorly. His wife is very concerned about him. It is having a toll on her health. She is unable to sleep well at home and is nervous leaving him alone in the hospital but has no choice.      MEDICATIONS:  Reviewed    ALLERGIES:  No Known Allergies      PHYSICAL EXAM:  Vital signs:  Temp: 97.9  F (36.6  C) Temp src: Axillary BP: (!) 142/61 Pulse: 90   Resp: 18 SpO2: 92 % O2 Device: None (Room air)     Weight: 76.2 kg (167 lb 15.9 oz)  Estimated body mass index is 24.1 kg/m  as calculated from the following:    Height as of 3/15/24: 1.778 m (5' 10\").    Weight as of this encounter: 76.2 kg (167 lb 15.9 oz).    LABS:  Recent Labs   Lab Test 04/05/24  1149 04/05/24  0930 04/04/24  1728 04/04/24  1226 04/04/24  0902 04/04/24  0619 04/03/24  0723 04/03/24  0543 04/02/24  1626 04/02/24  0802 04/01/24  1245   NA  --  138  --   --   --  138  --  133*  --  138 141   POTASSIUM  --  4.1  --   --   --  3.6  --  3.5  --  3.6 3.7   CHLORIDE  --  103  --   --   --  104  --  101  --  105 107   CO2  --  22  --   --   --  23  --  21*  --  22 21*   ANIONGAP  --  13  --   --   --  11  --  11  --  11 13   BUN  --  51.1*  --   --   --  36.6*  --  31.7*  --  25.2* 29.1*   CR  --  2.29*  --   --   --  1.93*  --  1.96*  --  1.80* 1.85*   * 300* 140* 103* 122* 148*   < > 147*   < > 127* 129*   RICCARDO  --  14.9*  --   --   --  12.9*  --  12.0*  --  11.4* 11.7*    < > = values in this interval not displayed.     Recent Labs   Lab Test 04/03/24  0543 04/02/24  0802 04/01/24  1245 03/27/24  1822 " "03/25/24  0733 03/17/24  0551 03/15/24  0534   MAG 1.7 1.7 1.8 2.6* 2.1   < > 1.9   PHOS 3.0 2.6 2.6 3.6  --   --  3.5    < > = values in this interval not displayed.     Recent Labs   Lab Test 04/03/24  0543 03/31/24  0816 03/29/24  0832 03/28/24  0350 03/27/24  1822 03/22/24  0841 03/20/24  1629 03/18/24  1256 03/17/24  2227 03/16/24  0541 03/15/24  0534 03/15/24  0153 12/13/22  0929   WBC  --  7.7 7.2 8.3 9.6  --  2.2*   < > 2.4*   < > 4.3 5.4 8.0   HGB  --  9.1* 9.6* 9.6* 11.0*  --  12.5*   < > 10.8*   < > 9.1* 9.9* 11.7*    276 312 298 342   < > 109*   < > 127*   < > 126* 134* 180   MCV  --  99 102* 102* 101*  --  99   < > 99   < > 101* 100 98   NEUTROPHIL  --   --   --   --  80  --   --   --  67  --  68 71 77    < > = values in this interval not displayed.     Recent Labs   Lab Test 03/28/24  0350 03/27/24  1822 03/20/24  1751   BILITOTAL 0.4 0.4 0.4   ALKPHOS 67 76 55   ALT 42 52 38   AST 26 30 46*   ALBUMIN 3.0* 3.5 3.0*     TSH   Date Value Ref Range Status   04/01/2024 1.15 0.30 - 4.20 uIU/mL Final   12/12/2022 1.19 0.40 - 4.00 mU/L Final   09/29/2018 1.82 0.40 - 4.00 mU/L Final   04/09/2014 2.330 0.450 - 4.500 uIU/mL Final     No results for input(s): \"CEA\" in the last 02621 hours.  Results for orders placed or performed during the hospital encounter of 03/27/24   Head CT w/o contrast    Narrative    EXAM: CT HEAD W/O CONTRAST  LOCATION: [facility]  DATE: 3/27/2024    INDICATION: AMS  COMPARISON: None.  TECHNIQUE: Routine CT Head without IV contrast. Multiplanar reformats. Dose reduction techniques were used.    FINDINGS:  INTRACRANIAL CONTENTS: No intracranial hemorrhage, extraaxial collection, or mass effect.  No CT evidence of acute infarct. Mild presumed chronic small vessel ischemic changes. Remote ischemic findings in the left frontal lobe. Moderate generalized   volume loss. No hydrocephalus.      VISUALIZED ORBITS/SINUSES/MASTOIDS: No intraorbital abnormality. No " paranasal sinus mucosal disease. No middle ear or mastoid effusion.    BONES/SOFT TISSUES: No acute abnormality.      Impression    IMPRESSION:  1.  No acute intracranial abnormality. Chronic changes as above.   XR Chest 2 Views    Narrative    EXAM: XR CHEST 2 VIEWS  LOCATION: [facility]  DATE: 3/27/2024    INDICATION: AMS  COMPARISON: 03/20/2024      Impression    IMPRESSION: Stable cardiac size. No airspace opacity, pleural effusion or pneumothorax.   MR Brain w/o Contrast    Narrative    EXAM: MR BRAIN W/O CONTRAST  LOCATION: [facility]  DATE: 3/28/2024    INDICATION: AMS for 1 week, no focal deficits, cant use contrast due to Kidney disease  COMPARISON: 03/27/2024  TECHNIQUE: Routine multiplanar multisequence head MRI without intravenous contrast.    FINDINGS: Motion degraded exam.  INTRACRANIAL CONTENTS: No acute or subacute infarct. Chronic encephalomalacia and gliosis in the anterior left frontal lobe. No mass, acute hemorrhage, or extra-axial fluid collections. Patchy nonspecific T2/FLAIR hyperintensities within the cerebral   white matter most consistent with mild to moderate chronic microvascular ischemic change. Moderate generalized cerebral atrophy. No hydrocephalus. Normal position of the cerebellar tonsils.     SELLA: No abnormality accounting for technique.    OSSEOUS STRUCTURES/SOFT TISSUES: Normal marrow signal. The major intracranial vascular flow voids are maintained.     ORBITS: Prior bilateral cataract surgery. Visualized portions of the orbits are otherwise unremarkable.     SINUSES/MASTOIDS: No paranasal sinus mucosal disease. No middle ear or mastoid effusion.       Impression    IMPRESSION:  1.  Motion degraded exam. No acute intracranial process.  2.  Chronic changes as detailed above.   CT Abdomen Pelvis w/o Contrast    Narrative    EXAM: CT ABDOMEN PELVIS W/O CONTRAST  LOCATION: [facility]  DATE:  3/27/2024    INDICATION: patient reacting to abdominal palpation with bruising, AMS, cant use contrast due to KENNA  COMPARISON: 02/11/2019  TECHNIQUE: CT scan of the abdomen and pelvis was performed without IV contrast. Multiplanar reformats were obtained. Dose reduction techniques were used.  CONTRAST: None.    FINDINGS:   LOWER CHEST: Fibrotic changes are seen in the lung bases bilaterally most pronounced in the deep and lower lung zones, worsened from 02/11/2019. Severe coronary atherosclerotic disease with evidence of prior stenting noted.    HEPATOBILIARY: The GallBladder surgically absent, otherwise unchanged appearance from prior exam.    PANCREAS: No significant mass, duct dilatation, or inflammatory change.    SPLEEN: Normal size.    ADRENAL GLANDS: The right Adrenal gland is surgically absent the left adrenal gland is unremarkable.    KIDNEYS/BLADDER: The right kidney is surgically absent, unchanged from prior exam. The left kidney is unchanged size and appearance. No significant mass, stones, or hydronephrosis. There are simple or benign cysts. No follow up is needed.    BOWEL: No obstruction or inflammatory change.    LYMPH NODES: No lymphadenopathy.    VASCULATURE: No abdominal aortic aneurysm.    PELVIC ORGANS: No pelvic masses.    MUSCULOSKELETAL: Multilevel discogenic and posterior facet degenerative changes are seen throughout the lumbar spine with postsurgical changes from prior discectomy and fusion and posterior spinal fixation at the L4-L5 level, unchanged from prior exam.   No worrisome osseous lesions are identified.      Impression    IMPRESSION:   1.  No acute intra-abdominal process identified.       XR Video Swallow with SLP or OT    Narrative    VIDEO SWALLOWING EVALUATION   4/2/2024 10:07 AM     HISTORY: dysphagia    COMPARISON: None.    FLUOROSCOPY TIME: 2.1 minutes.  SPOT IMAGES OR CINE RUNS: 5      Impression    IMPRESSION:    Thin: Silent aspiration..    Mildly thick: Silent  aspiration.    This study only includes the cervical esophagus. Please see speech  pathology note for further details.    ADRIANE EGAN DO         SYSTEM ID:  [id number]   XR Abdomen Port 1 View    Narrative    ABDOMEN ONE VIEW PORTABLE  4/1/2024 2:27 PM     HISTORY: feeding tube placement    COMPARISON: CT 3/27/2024.       Impression    IMPRESSION: Enteric tube with distal tip projecting at the EG  junction. Recommend advancement by 12 cm followed by repeat  radiograph.    NY CELESTIN MD         SYSTEM ID:  [id number]   XR Feeding Tube Placement    Narrative    FEEDING TUBE PLACEMENT  4/2/2024 10:05 AM     HISTORY: Feeding tube.    TECHNIQUE: 2.6 minutes fluoroscopy. No spot images.    COMPARISON: None.      Impression    IMPRESSION: Feeding tube placed with tip at the duodenal jejunal  junction. The tube was flushed with water and is ready for use.    SNOW ORTIZ MD         SYSTEM ID:  [id number]   MR Cervical Spine w/o & w Contrast    Narrative    EXAM: MR CERVICAL SPINE W/O and W CONTRAST  LOCATION: [facility]  DATE: 4/2/2024    INDICATION: weakness and hyperreflexia  COMPARISON: None.  CONTRAST: 9 Gadavist  TECHNIQUE: MRI Cervical Spine without and with IV contrast.    FINDINGS:   4 mm retrolisthesis of C5 on C6. Vertebral body heights are maintained. Modic type II endplate change at C5-C6. No abnormal cord signal. No extraspinal abnormality. Negative for pathologic contrast enhancement.    Craniovertebral junction and C1-C2: Normal.    C2-C3: Disc osteophyte complex. Bilateral facet hypertrophy. No spinal canal or foraminal stenosis.     C3-C4: Disc osteophyte complex. Bilateral facet hypertrophy. No spinal canal stenosis. Moderate bilateral neural foraminal stenosis.     C4-C5: Disc osteophyte complex. Bilateral facet hypertrophy. Mild spinal canal stenosis. Mild bilateral neural foraminal stenosis.     C5-C6: Disc osteophyte complex with central disc protrusion. Bilateral facet  hypertrophy. Mild spinal canal stenosis. Severe bilateral neural foraminal stenosis.     C6-C7: Disc osteophyte complex. Bilateral facet hypertrophy. No spinal canal or foraminal stenosis.     C7-T1: Disc osteophyte complex with central disc protrusion. Bilateral facet hypertrophy. No spinal canal stenosis. Mild bilateral neural foraminal stenosis.      Impression    IMPRESSION:  1.  Multilevel cervical spondylosis.  2.  No abnormal cord signal or enhancement.  3.  No high-grade spinal canal stenosis.  4.  Severe bilateral neural foraminal stenosis at C5-C6.  5.  Moderate bilateral neural foraminal stenosis at C3-C4.     CT Soft Tissue Neck w Contrast    Narrative    CT SCAN OF THE NECK WITH CONTRAST  4/4/2024 11:00 AM     HISTORY: severe dysphagia    TECHNIQUE: Axial CT images of the neck following administration of  intravenous contrast with reformations. Radiation dose for this scan  was reduced using automated exposure control, adjustment of the mA  and/or kV according to patient size, or iterative reconstruction  technique. 90 mL Isovue-370 IV.     COMPARISON: 4/2/2004 cervical spine MRI.     FINDINGS:   Visualized sinuses and orbits: Prior bilateral cataract extractions.  No paranasal sinus inflammatory changes.     Aerodigestive tract: Nasoenteric tube. Relatively symmetric  hyperdensity along the posterior aspect of the tongue. No mucosal mass  is identified throughout the aerodigestive tract. Evaluation of the  epiglottis is somewhat limited due to motion, although it appears  normal. Normal appearance of the vocal cords. Mild thickening of the  mid esophagus.    Thyroid: No abnormal thyroid gland nodules.     Submandibular glands: Unremarkable.     Parotid glands: Unremarkable.       Lymph nodes: No suspicious enlarged or necrotic cervical lymph nodes.     Vasculature: Vascular calcifications.     Upper mediastinum and lungs: Scattered emphysematous changes in  relation throughout both upper lobes.      Bones: Cervical spine degenerative changes are better characterized on  MRI.       Impression    IMPRESSION:    1. Hyperdensity along the posterior aspect of the tongue, which may  reflect calcification or residual debris from a dense oral medication.  Correlate with direct visualization.   2. Mild thickening of the mid esophagus, which may reflect  esophagitis.  3. Otherwise, no abnormality throughout the aerodigestive tract to  explain symptoms.      TIM CORCORAN MD         SYSTEM ID:  [id number]   XR Lumbar Puncture Spinal Tap Diag    Narrative    EXAM: XR LUMBAR PUNCTURE SPINAL TAP DIAGNOSTIC  4/4/2024 11:56 AM       History:  lethargy, continued weakness.     PROCEDURE: The benefits and risks of the procedure were explained to  the patient's wife, including but not limited to worsening headache,  hemorrhage, infection, lower extremity pain, or nerve root injury.  Informed consent was obtained. The patient was sterilely prepped and  draped with the patient in the prone position, over the lower back.  Under fluoroscopic guidance, the interlaminar spaces were noted. 1%  lidocaine was administered for local anesthetic over the L3-L4  interlaminar space, and a 20 gauge needle was advanced into the thecal  sac under fluoroscopic guidance.  There was initial aspiration of  clear CSF. 12cc's of clear, colorless CSF was passively obtained and  divided into 4 tubes.  The needle was removed. There were no immediate  complications associated with the procedure.   Estimated blood loss  during the procedure was less than 5 mL.    Fluoro time: 0.1 minutes  Images Obtained: 3  Radiation dose: DAP 13.65 uGym2  Medications used: 5mL Lidocaine 1%      Impression    IMPRESSION: Technically successful fluoroscopic guided lumbar puncture  at L3-L4.    MAYCOL Bryant         SYSTEM ID:  [id number]   CT Chest w Contrast    Narrative    CT CHEST WITH CONTRAST 4/4/2024 10:58 AM     HISTORY: hypercalcemia - rule out squamous cell  lung cancer    COMPARISON: Chest x-ray dated 3/27/2024.    TECHNIQUE: Volumetric helical acquisition of CT images of the chest  from the clavicles to the kidneys were acquired after the  administration of 0 mL Isovue-370 IV contrast. Radiation dose for this  scan was reduced using automated exposure control, adjustment of the  mA and/or kV according to patient size, or iterative reconstruction  technique.    FINDINGS:     LUNGS AND PLEURA: No pulmonary mass. Findings interstitial lung  disease with subpleural reticulation and traction bronchiolectasis  with a basilar distribution. Mild honeycombing in the medial right  lower lobe. No pleural effusion. Small amount of fluid within the  trachea and bronchus intermedius with associated small airway  plugging.    MEDIASTINUM/AXILLAE: No thoracic adenopathy. Normal heart size without  pericardial effusion. No aneurysmal dilatation of the thoracic aorta.  Possible filling defect within a right middle lobe segmental pulmonary  artery (series 5, image 76).    CORONARY ARTERY CALCIFICATIONS: Severe.    UPPER ABDOMEN: Enteric tube courses into the duodenum with distal tip  not imaged. Multiple left renal cysts. Partially imaged post surgical  changes of right nephrectomy.    MUSCULOSKELETAL: Degenerative changes of the spine. No aggressive  appearing osseous lesion.      Impression    IMPRESSION:  1.  No pulmonary mass or thoracic adenopathy to suggest a thoracic  malignancy.  2.  Small amount of fluid within the trachea and bronchus intermedius  with small airway plugging suggesting possible aspiration.  3.  Findings of interstitial lung disease with subpleural  reticulation, traction bronchiolectasis, and mild honeycombing. This  could be followed up with an interstitial lung disease protocol chest  CT in 3 months.  4.  Possible filling defect in a right middle lobe segmental pulmonary  artery versus artifact on this non-CTA study. A segmental pulmonary  embolism cannot be  excluded. A CTA of the chest could further evaluate  if clinically indicated.    Impression #4 discussed with Dr. Roth by Dr. Oliveira via telephone on  4/4/24 at 1:12 PM CDT.    NY OLIVEIRA MD         SYSTEM ID:  [id number]

## 2024-04-06 NOTE — PROGRESS NOTES
Care Management Follow Up    Length of Stay (days): 10    Expected Discharge Date: 04/06/2024     Concerns to be Addressed:  (hospice arrangement)     Patient plan of care discussed at interdisciplinary rounds: Yes    Anticipated Discharge Disposition:  (home with family and [facility] support)     Anticipated Discharge Services: None  Anticipated Discharge DME:  (at minimum,. a hospital bed)    Patient/family educated on Medicare website which has current facility and service quality ratings: yes  Education Provided on the Discharge Plan: Yes  Patient/Family in Agreement with the Plan: yes    Referrals Placed by CM/SW:    Private pay costs discussed: transportation costs    Additional Information:  Writer scheduled transportation using [employer] ride due to confusion and comfort for hospice. Time is between 1640 - 1720 [facility] confirmed bed should arrive at the home address prior to admit time of 1800.     GARRETT Lagos

## 2024-04-06 NOTE — CONSULTS
Care Management Follow Up    Length of Stay (days): 10    Expected Discharge Date: 04/06/2024     Concerns to be Addressed:  (hospice arrangement)     Patient plan of care discussed at interdisciplinary rounds: Yes    Anticipated Discharge Disposition:  (home with family and [facility] support)     Anticipated Discharge Services: None  Anticipated Discharge DME:  (at minimum,. a hospital bed)    Patient/family educated on Medicare website which has current facility and service quality ratings: yes  Education Provided on the Discharge Plan: Yes  Patient/Family in Agreement with the Plan: yes    Referrals Placed by CM/SW:    Private pay costs discussed: Not applicable    Additional Information:  Hospice consult previously completed, provider paged for orders and stretcher transport to home will be arranged by writer.     GARRETT Lagos

## 2024-04-06 NOTE — PLAN OF CARE
"Speech Language Therapy Discharge Summary    Reason for therapy discharge:    Change in medical status. Pt transitioned to comfort cares; planned discharge home today with hospice.    Progress towards therapy goal(s). See goals on Care Plan in Epic electronic health record for goal details.  Goals not met.  Barriers to achieving goals:   change in goals of care.    Therapy recommendation(s):      Per last SLP notes \"Pt more alert today, continues to have difficulty communicating, following commands, and swallow trigger is still absent; Recommend strict NPO, frequent oral cares, and suctioning as needed.\"    MD upgraded diet to puree, slightly thick for comfort. Can liberalize further, if pt/family desire.     No further therapy is recommended given change in goals of care. Re-consult SLP services if any needs arise/change in status.   "

## 2024-04-06 NOTE — DISCHARGE SUMMARY
[facility]  Hospitalist Discharge Summary      Date of Admission:  3/27/2024  Date of Discharge:  4/6/2024  Discharging Provider: Crescencio Shrestha MD  Discharge Service: Hospitalist Service    Discharge Diagnoses   Severe dysphagia with silent aspiration  Hoarse voice  Hypernatremia, improved  Acute metabolic encephalopathy, improved  Physical deconditioning  Suspect esophageal candidiasis  Oral thrush  Hoarseness  Urinary retention   Acute kidney injury  Chronic kidney disease, stage 3a  Hx RCC s/p right nephrectomy  Hypernatremia, improved  Hypercalcemia  Suspected Multiple myeloma  Hypertension    Clinically Significant Risk Factors          Follow-ups Needed After Discharge   Follow-up Appointments     Follow-up and recommended labs and tests       Follow up with primary care provider, Merlin Emery Brown, as needed.  Hospice to evaluate and treat patient.            Unresulted Labs Ordered in the Past 30 Days of this Admission       Date and Time Order Name Status Description    4/4/2024 12:01 AM Protein Immunofixation Serum In process     4/3/2024 10:05 PM 25 Hydroxyvitamin D2 and D3 In process     4/3/2024  3:52 PM Serum Collection (Accompanies: [id number] or [id number] CSF Testing) In process     4/3/2024  3:52 PM Oligoclonal Banding: In process     4/3/2024  3:52 PM Oligoclonal banding CSF and Blood In process     4/3/2024  3:52 PM Cerebrospinal fluid Aerobic Bacterial Culture Routine With Gram Stain Preliminary         These results will be followed up by PCP as needed.    Discharge Disposition   Discharged to home with hospice.  Condition at discharge: Guarded    Hospital Course   Augie Powell is a 88 year old male with PMH CAD s/p PCI, HTN, HLD, dysphagia, chronic anemia, RCC, CKD s/p nephrectomy, who was admitted on 3/27/2024 with prerenal acute kidney injury and confusion. He was found to have progressive, severe dysphagia. He was recently admitted 3/15 to 3/25 for influenza,  complicated by hypoxic respiratory failure, infectious encephalopathy, dysphagia, and deconditioning. He was discharged to TCU and readmitted on 3/27. Treated in the hospital as noted below. Ultimately decided to transition to comfort cares. Discharge home with hospice on 4/6/24.     Goals of care  Patient with ongoing severe dysphagia, deconditioning, of unclear etiology. Hospitalist on 3/31 had goals of care conversation with family and family was not ready for such discussions at the time. Patient became notably weaker from 3/31 to 4/3 despite starting tube feeds, extensive workup.   Etiology of ongoing weakness, dysphagia unclear. Care conference with family on 4/4/2024, including Khushboo wife, daughters Chelsea and Meredith and their spouses. Prior hospitalist described to the family in detail the current workup, and the unclear nature of patient's progressive weakness and severe dysphagia. Discussed that the situation is very concerning here and there is a good possibility that this patient is in the dying process. One of the daughters Chelsea stated that she spoke with her father and her father said to not go any further, encouraged the family to discuss with the patient in more detail what his wishes are as they have not had goals of care discussion with him prior to this hospital stay. Discussed the role of hospice, the possibility of transitioning comfort cares. At this point since there are still studies pending further hospice discussions deferred. 4/5 - patient unchanged, discussed with family about unremarkable LP, initiation of pulse dose steroids, wife Khushboo wanted to continue to monitor for improvement over the weekend.    Calcium and renal function trend concerning for multiple myeloma.  Dr. Roth discussed with family who felt biopsy and treatment of MM would not be in patient's interest. Hospitalist returned to bedside to speak with patient and Khushboo. Discussed with them that MM is the most likely  diagnosis to explain his decline, his hypercalcemia, and the reason why he has continued to decline despite supportive cares. Hospitalist and Hematology/Oncology recommended hospice and patient and his wife were both in agreement. Transitioned to comfort cares on the afternoon of 4/5/24. Discharge home with hospice on 4/6/24.    Severe dysphagia with silent aspiration  Hoarse voice  Hypernatremia, improved  During previous admission 3/15-3/25 patient put on modified diet due to mild dysphagia. Appears to be worse this admission--Video swallow on 4/2/24 noted silent aspiration with all consistencies and speech path recommended NPO. Discussed with family given little to no PO intake for 10 days, started feeding tube therapy on 4/2.  Multiple etiologies of dysphagia was evaluated: Patient has severe oropharyngeal candidiasis which may be contributing to his worsening dysphagia. However per discussion with Speech path, candidiasis is unlikely to contribute to patient's muscle weakness seen on swallow study. Patient also noted to have a hoarse voice past 1-2 weeks which may be related. ENT consulted 4/3 but could not visualize larynx due to poor mucous clearance. CT neck on 4/4 no clear pathology as well to explain.  Neurology consulted. Patient had a MRI brain 3/28 which was negative for stroke. Cervical MRI no clear pathology as well. Patient underwent LP on 4/5/2024 to evaluate for infection, inflammation, cell count was benign, no evidence of infection.   Overall, the causes of patient's progressive severe dysphagia is unclear. Other etiologies to consider include rare neurological disorder, throat/neck pathology, acute on chronic decline due to age. See goals of care discussions above.  - Appreciate Neurology, ENT consults.  - Antifungals as below for candidiasis treatment.  - Aspiration precautions.  - PT/OT/SLP consulted.  - NG tube placed for tube feedings.  - No further work-up planned at this time. Plan for  discharge home with hospice today.    Acute metabolic encephalopathy, improved  Physical deconditioning  Recently admitted 3/15 to 3/25 for influenza, encephalopathy. Per family patient was A&Ox3 and driving prior to admission and has had a precipitous decline. Patient discharged to TCU on 3/25, TCU reports patient fell on 3/26. Admitted 3/27 was initially confused and nonverbal. CT scan of abdomen pelvis without contrast was done and it showed no acute intra-abdominal process identified. MRI of the brain 3/28 showed moderate brain atrophy with no acute findings of stroke or hemorrhage. Patient remains very confused on 3/31/2024.  Very poor swallow function with coughing with every meal and swallow. Hospitalist had goals of care conversations on 3/31, family wished to continue medical management however patient is DNR/DNI.  Improved, A&Ox2-3 on 4/1 however patient continues to have a hoarse voice. Likely patient's ongoing caloric deficit, deconditioning, contributing to his mental status changes. Family denies any sundowning. No significant change in mental status on 4/3, patient continues to nap frequently, patient was weaker with PT on 4/3 than previously.  - PT/OT consulted.  - Stopped seroquel 4/4 due to somnolence, appears slightly improved in AM on 4/5    Suspect esophageal candidiasis  Oral thrush  Hoarseness  Patient has severe oral thrush and appears to have oropharyngeal plaques likely has esophageal candidiasis. This is also likely contributing to his presenting dysphagia, but per discussion with speech path, unlikely to be contributing to patient's significant oropharyngeal muscle weakness.   - Treated with fluconazole and nystatin swish & swallow while in the hospital, discontinued upon discharge home with hospice.    Urinary retention   Overnight on 3/30/2024 urinary retention issues needed straight cath x 2. Flomax started. Cerda catheter placed  - Flomax started.  - Urology consulted.  - Plan to  leave Cerda catheter in place for comfort after discharge.    Acute kidney injury  Chronic kidney disease, stage 3a  Hx RCC s/p right nephrectomy  Hypernatremia, improved  Baseline is 1.7, elevated 2.8 on admission, improved with IVF. Could be 2/2 recent influenza infection and patient being on torsemide.  - With IV fluids creatinine improved to ~1.8.    Hypercalcemia  Suspected Multiple myeloma  PTA patient was on vit D and Ca supplements, as well as torsemide. Patient has had hypercalcemia since at least 2022. Patient's ongoing hypoalbuminemia, dehydration, improved, also contributing.   PTH is appropriately suppressed. Vitamin D level is high at 71, wife reports patient was taking Vit D supplementation PTA. TSH level normal. Etiology of hypercalcemia likely vit D toxicity, also consider occult malignancy, bone disorder.  - PTH-RP level increased.  - HemOnc consulted.  - Concern for multiple myeloma as etiology as noted above.  - No further work-up/managed planned due to transition to hospice.    HTN: /55 on 4.4  - PTA metoprolol and amlodipine discontinued with transition to hospice.      Consultations This Hospital Stay   SPEECH LANGUAGE PATH ADULT IP CONSULT  OCCUPATIONAL THERAPY ADULT IP CONSULT  PHYSICAL THERAPY ADULT IP CONSULT  CARE MANAGEMENT / SOCIAL WORK IP CONSULT  SPEECH LANGUAGE PATH ADULT IP CONSULT  VASCULAR ACCESS ADULT IP CONSULT  VASCULAR ACCESS ADULT IP CONSULT  SPEECH LANGUAGE PATH ADULT IP CONSULT  UROLOGY IP CONSULT  NUTRITION SERVICES ADULT IP CONSULT  NUTRITION SERVICES ADULT IP CONSULT  PHARMACY IP CONSULT  ENT IP CONSULT  NEUROLOGY IP CONSULT  SPIRITUAL HEALTH SERVICES IP CONSULT  HEMATOLOGY & ONCOLOGY IP CONSULT  HEMATOLOGY & ONCOLOGY IP CONSULT  SPIRITUAL HEALTH SERVICES IP CONSULT  CARE MANAGEMENT / SOCIAL WORK IP CONSULT    Code Status   No CPR- Do NOT Intubate    Time Spent on this Encounter   I, Crescencio Shrestha MD, personally saw the patient today and spent greater than 30  minutes discharging this patient.       Crescencio Shrestha MD  [place] [address] [facility]  [address] [place] [address]  Phone: [phone]  ______________________________________________________________________    Physical Exam   Vital Signs:                    Weight: 167 lbs 15.85 oz  Constitutional: awake, alert, cooperative, no apparent distress       Primary Care Physician   Merlin Emery Brown    Discharge Orders      Reason for your hospital stay    Severe dysphagia with silent aspiration, Acute metabolic encephalopathy,     Follow-up and recommended labs and tests     Follow up with primary care provider, Merlin Emery Brown, as needed.  Hospice to evaluate and treat patient.     Activity    Your activity upon discharge: activity as tolerated with assistance     Diet    Follow this diet upon discharge: Combination Diet Pureed Diet (level 4); Slightly Thick (level 1), can transition to general diet as tolerated if desired for comfort     Significant Results and Procedures   Most Recent 3 CBC's:  Recent Labs   Lab Test 04/03/24  0543 03/31/24  0816 03/29/24  0832 03/28/24  0350   WBC  --  7.7 7.2 8.3   HGB  --  9.1* 9.6* 9.6*   MCV  --  99 102* 102*    276 312 298     Most Recent 3 BMP's:  Recent Labs   Lab Test 04/05/24  1149 04/05/24  0930 04/04/24  1728 04/04/24  0902 04/04/24  0619 04/03/24  0723 04/03/24  0543   NA  --  138  --   --  138  --  133*   POTASSIUM  --  4.1  --   --  3.6  --  3.5   CHLORIDE  --  103  --   --  104  --  101   CO2  --  22  --   --  23  --  21*   BUN  --  51.1*  --   --  36.6*  --  31.7*   CR  --  2.29*  --   --  1.93*  --  1.96*   ANIONGAP  --  13  --   --  11  --  11   RICCARDO  --  14.9*  --   --  12.9*  --  12.0*   * 300* 140*   < > 148*   < > 147*    < > = values in this interval not displayed.     Most Recent 2 LFT's:  Recent Labs   Lab Test 03/28/24  0350 03/27/24  1822   AST 26 30   ALT 42 52   ALKPHOS 67 76   BILITOTAL 0.4 0.4      Results for orders placed or performed during the hospital encounter of 03/27/24   Head CT w/o contrast    Narrative    EXAM: CT HEAD W/O CONTRAST  LOCATION: [facility]  DATE: 3/27/2024    INDICATION: AMS  COMPARISON: None.  TECHNIQUE: Routine CT Head without IV contrast. Multiplanar reformats. Dose reduction techniques were used.    FINDINGS:  INTRACRANIAL CONTENTS: No intracranial hemorrhage, extraaxial collection, or mass effect.  No CT evidence of acute infarct. Mild presumed chronic small vessel ischemic changes. Remote ischemic findings in the left frontal lobe. Moderate generalized   volume loss. No hydrocephalus.      VISUALIZED ORBITS/SINUSES/MASTOIDS: No intraorbital abnormality. No paranasal sinus mucosal disease. No middle ear or mastoid effusion.    BONES/SOFT TISSUES: No acute abnormality.      Impression    IMPRESSION:  1.  No acute intracranial abnormality. Chronic changes as above.   XR Chest 2 Views    Narrative    EXAM: XR CHEST 2 VIEWS  LOCATION: [facility]  DATE: 3/27/2024    INDICATION: AMS  COMPARISON: 03/20/2024      Impression    IMPRESSION: Stable cardiac size. No airspace opacity, pleural effusion or pneumothorax.   MR Brain w/o Contrast    Narrative    EXAM: MR BRAIN W/O CONTRAST  LOCATION: [facility]  DATE: 3/28/2024    INDICATION: AMS for 1 week, no focal deficits, cant use contrast due to Kidney disease  COMPARISON: 03/27/2024  TECHNIQUE: Routine multiplanar multisequence head MRI without intravenous contrast.    FINDINGS: Motion degraded exam.  INTRACRANIAL CONTENTS: No acute or subacute infarct. Chronic encephalomalacia and gliosis in the anterior left frontal lobe. No mass, acute hemorrhage, or extra-axial fluid collections. Patchy nonspecific T2/FLAIR hyperintensities within the cerebral   white matter most consistent with mild to moderate chronic microvascular ischemic change. Moderate generalized  cerebral atrophy. No hydrocephalus. Normal position of the cerebellar tonsils.     SELLA: No abnormality accounting for technique.    OSSEOUS STRUCTURES/SOFT TISSUES: Normal marrow signal. The major intracranial vascular flow voids are maintained.     ORBITS: Prior bilateral cataract surgery. Visualized portions of the orbits are otherwise unremarkable.     SINUSES/MASTOIDS: No paranasal sinus mucosal disease. No middle ear or mastoid effusion.       Impression    IMPRESSION:  1.  Motion degraded exam. No acute intracranial process.  2.  Chronic changes as detailed above.   CT Abdomen Pelvis w/o Contrast    Narrative    EXAM: CT ABDOMEN PELVIS W/O CONTRAST  LOCATION: [facility]  DATE: 3/27/2024    INDICATION: patient reacting to abdominal palpation with bruising, AMS, cant use contrast due to KENNA  COMPARISON: 02/11/2019  TECHNIQUE: CT scan of the abdomen and pelvis was performed without IV contrast. Multiplanar reformats were obtained. Dose reduction techniques were used.  CONTRAST: None.    FINDINGS:   LOWER CHEST: Fibrotic changes are seen in the lung bases bilaterally most pronounced in the deep and lower lung zones, worsened from 02/11/2019. Severe coronary atherosclerotic disease with evidence of prior stenting noted.    HEPATOBILIARY: The GallBladder surgically absent, otherwise unchanged appearance from prior exam.    PANCREAS: No significant mass, duct dilatation, or inflammatory change.    SPLEEN: Normal size.    ADRENAL GLANDS: The right Adrenal gland is surgically absent the left adrenal gland is unremarkable.    KIDNEYS/BLADDER: The right kidney is surgically absent, unchanged from prior exam. The left kidney is unchanged size and appearance. No significant mass, stones, or hydronephrosis. There are simple or benign cysts. No follow up is needed.    BOWEL: No obstruction or inflammatory change.    LYMPH NODES: No lymphadenopathy.    VASCULATURE: No abdominal aortic  aneurysm.    PELVIC ORGANS: No pelvic masses.    MUSCULOSKELETAL: Multilevel discogenic and posterior facet degenerative changes are seen throughout the lumbar spine with postsurgical changes from prior discectomy and fusion and posterior spinal fixation at the L4-L5 level, unchanged from prior exam.   No worrisome osseous lesions are identified.      Impression    IMPRESSION:   1.  No acute intra-abdominal process identified.       XR Video Swallow with SLP or OT    Narrative    VIDEO SWALLOWING EVALUATION   4/2/2024 10:07 AM     HISTORY: dysphagia    COMPARISON: None.    FLUOROSCOPY TIME: 2.1 minutes.  SPOT IMAGES OR CINE RUNS: 5      Impression    IMPRESSION:    Thin: Silent aspiration..    Mildly thick: Silent aspiration.    This study only includes the cervical esophagus. Please see speech  pathology note for further details.    ADRIANE EGAN DO         SYSTEM ID:  [id number]   XR Abdomen Port 1 View    Narrative    ABDOMEN ONE VIEW PORTABLE  4/1/2024 2:27 PM     HISTORY: feeding tube placement    COMPARISON: CT 3/27/2024.       Impression    IMPRESSION: Enteric tube with distal tip projecting at the EG  junction. Recommend advancement by 12 cm followed by repeat  radiograph.    NY CELESTIN MD         SYSTEM ID:  [id number]   XR Feeding Tube Placement    Narrative    FEEDING TUBE PLACEMENT  4/2/2024 10:05 AM     HISTORY: Feeding tube.    TECHNIQUE: 2.6 minutes fluoroscopy. No spot images.    COMPARISON: None.      Impression    IMPRESSION: Feeding tube placed with tip at the duodenal jejunal  junction. The tube was flushed with water and is ready for use.    SNOW ORTIZ MD         SYSTEM ID:  [id number]   MR Cervical Spine w/o & w Contrast    Narrative    EXAM: MR CERVICAL SPINE W/O and W CONTRAST  LOCATION: [facility]  DATE: 4/2/2024    INDICATION: weakness and hyperreflexia  COMPARISON: None.  CONTRAST: 9 Gadavist  TECHNIQUE: MRI Cervical Spine without and with IV  contrast.    FINDINGS:   4 mm retrolisthesis of C5 on C6. Vertebral body heights are maintained. Modic type II endplate change at C5-C6. No abnormal cord signal. No extraspinal abnormality. Negative for pathologic contrast enhancement.    Craniovertebral junction and C1-C2: Normal.    C2-C3: Disc osteophyte complex. Bilateral facet hypertrophy. No spinal canal or foraminal stenosis.     C3-C4: Disc osteophyte complex. Bilateral facet hypertrophy. No spinal canal stenosis. Moderate bilateral neural foraminal stenosis.     C4-C5: Disc osteophyte complex. Bilateral facet hypertrophy. Mild spinal canal stenosis. Mild bilateral neural foraminal stenosis.     C5-C6: Disc osteophyte complex with central disc protrusion. Bilateral facet hypertrophy. Mild spinal canal stenosis. Severe bilateral neural foraminal stenosis.     C6-C7: Disc osteophyte complex. Bilateral facet hypertrophy. No spinal canal or foraminal stenosis.     C7-T1: Disc osteophyte complex with central disc protrusion. Bilateral facet hypertrophy. No spinal canal stenosis. Mild bilateral neural foraminal stenosis.      Impression    IMPRESSION:  1.  Multilevel cervical spondylosis.  2.  No abnormal cord signal or enhancement.  3.  No high-grade spinal canal stenosis.  4.  Severe bilateral neural foraminal stenosis at C5-C6.  5.  Moderate bilateral neural foraminal stenosis at C3-C4.     CT Soft Tissue Neck w Contrast    Narrative    CT SCAN OF THE NECK WITH CONTRAST  4/4/2024 11:00 AM     HISTORY: severe dysphagia    TECHNIQUE: Axial CT images of the neck following administration of  intravenous contrast with reformations. Radiation dose for this scan  was reduced using automated exposure control, adjustment of the mA  and/or kV according to patient size, or iterative reconstruction  technique. 90 mL Isovue-370 IV.     COMPARISON: 4/2/2004 cervical spine MRI.     FINDINGS:   Visualized sinuses and orbits: Prior bilateral cataract extractions.  No paranasal  sinus inflammatory changes.     Aerodigestive tract: Nasoenteric tube. Relatively symmetric  hyperdensity along the posterior aspect of the tongue. No mucosal mass  is identified throughout the aerodigestive tract. Evaluation of the  epiglottis is somewhat limited due to motion, although it appears  normal. Normal appearance of the vocal cords. Mild thickening of the  mid esophagus.    Thyroid: No abnormal thyroid gland nodules.     Submandibular glands: Unremarkable.     Parotid glands: Unremarkable.       Lymph nodes: No suspicious enlarged or necrotic cervical lymph nodes.     Vasculature: Vascular calcifications.     Upper mediastinum and lungs: Scattered emphysematous changes in  relation throughout both upper lobes.     Bones: Cervical spine degenerative changes are better characterized on  MRI.       Impression    IMPRESSION:    1. Hyperdensity along the posterior aspect of the tongue, which may  reflect calcification or residual debris from a dense oral medication.  Correlate with direct visualization.   2. Mild thickening of the mid esophagus, which may reflect  esophagitis.  3. Otherwise, no abnormality throughout the aerodigestive tract to  explain symptoms.      TIM CORCORAN MD         SYSTEM ID:  [id number]   XR Lumbar Puncture Spinal Tap Diag    Narrative    EXAM: XR LUMBAR PUNCTURE SPINAL TAP DIAGNOSTIC  4/4/2024 11:56 AM       History:  lethargy, continued weakness.     PROCEDURE: The benefits and risks of the procedure were explained to  the patient's wife, including but not limited to worsening headache,  hemorrhage, infection, lower extremity pain, or nerve root injury.  Informed consent was obtained. The patient was sterilely prepped and  draped with the patient in the prone position, over the lower back.  Under fluoroscopic guidance, the interlaminar spaces were noted. 1%  lidocaine was administered for local anesthetic over the L3-L4  interlaminar space, and a 20 gauge needle was advanced  into the thecal  sac under fluoroscopic guidance.  There was initial aspiration of  clear CSF. 12cc's of clear, colorless CSF was passively obtained and  divided into 4 tubes.  The needle was removed. There were no immediate  complications associated with the procedure.   Estimated blood loss  during the procedure was less than 5 mL.    Fluoro time: 0.1 minutes  Images Obtained: 3  Radiation dose: DAP 13.65 uGym2  Medications used: 5mL Lidocaine 1%      Impression    IMPRESSION: Technically successful fluoroscopic guided lumbar puncture  at L3-L4.    MAYCOL Bryant         SYSTEM ID:  [id number]   CT Chest w Contrast    Narrative    CT CHEST WITH CONTRAST 4/4/2024 10:58 AM     HISTORY: hypercalcemia - rule out squamous cell lung cancer    COMPARISON: Chest x-ray dated 3/27/2024.    TECHNIQUE: Volumetric helical acquisition of CT images of the chest  from the clavicles to the kidneys were acquired after the  administration of 0 mL Isovue-370 IV contrast. Radiation dose for this  scan was reduced using automated exposure control, adjustment of the  mA and/or kV according to patient size, or iterative reconstruction  technique.    FINDINGS:     LUNGS AND PLEURA: No pulmonary mass. Findings interstitial lung  disease with subpleural reticulation and traction bronchiolectasis  with a basilar distribution. Mild honeycombing in the medial right  lower lobe. No pleural effusion. Small amount of fluid within the  trachea and bronchus intermedius with associated small airway  plugging.    MEDIASTINUM/AXILLAE: No thoracic adenopathy. Normal heart size without  pericardial effusion. No aneurysmal dilatation of the thoracic aorta.  Possible filling defect within a right middle lobe segmental pulmonary  artery (series 5, image 76).    CORONARY ARTERY CALCIFICATIONS: Severe.    UPPER ABDOMEN: Enteric tube courses into the duodenum with distal tip  not imaged. Multiple left renal cysts. Partially imaged post surgical  changes of  right nephrectomy.    MUSCULOSKELETAL: Degenerative changes of the spine. No aggressive  appearing osseous lesion.      Impression    IMPRESSION:  1.  No pulmonary mass or thoracic adenopathy to suggest a thoracic  malignancy.  2.  Small amount of fluid within the trachea and bronchus intermedius  with small airway plugging suggesting possible aspiration.  3.  Findings of interstitial lung disease with subpleural  reticulation, traction bronchiolectasis, and mild honeycombing. This  could be followed up with an interstitial lung disease protocol chest  CT in 3 months.  4.  Possible filling defect in a right middle lobe segmental pulmonary  artery versus artifact on this non-CTA study. A segmental pulmonary  embolism cannot be excluded. A CTA of the chest could further evaluate  if clinically indicated.    Impression #4 discussed with Dr. Roth by Dr. Oliveira via telephone on  4/4/24 at 1:12 PM CDT.    NY OLIVEIRA MD         SYSTEM ID:  [id number]     Discharge Medications   Current Discharge Medication List        START taking these medications    Details   atropine 1 % ophthalmic solution Place 2 drops under the tongue every 4 hours as needed for secretions  Qty: 5 mL, Refills: 0    Associated Diagnoses: End of life care      LORazepam (ATIVAN) 2 MG/ML (HIGH CONC) oral solution Take 0.5 mLs (1 mg) by mouth every 4 hours as needed for anxiety  Qty: 30 mL, Refills: 0    Associated Diagnoses: End of life care      morphine sulfate (ROXANOL) 20 mg/mL (HIGH CONC) soln Place 0.25 mLs (5 mg) under the tongue every hour as needed for shortness of breath, breakthrough pain or moderate to severe pain  Qty: 30 mL, Refills: 0    Associated Diagnoses: End of life care      OLANZapine zydis (ZYPREXA) 5 MG ODT Take 1 tablet (5 mg) by mouth every 6 hours as needed for other or agitation (delirium)  Qty: 10 tablet, Refills: 0    Associated Diagnoses: End of life care      ondansetron (ZOFRAN ODT) 4 MG ODT tab Take 1 tablet (4 mg)  by mouth every 6 hours as needed for nausea or vomiting  Qty: 10 tablet, Refills: 0    Associated Diagnoses: End of life care           CONTINUE these medications which have NOT CHANGED    Details   acetaminophen (TYLENOL) 325 MG tablet Take 650 mg by mouth 2 times daily . May also take 2 tablets (650mg) by mouth twice daily as needed.      benzocaine-menthol (CHLORASEPTIC) 6-10 MG lozenge Place 1 lozenge inside cheek every hour as needed for sore throat    Associated Diagnoses: Influenza A      QUEtiapine (SEROQUEL) 25 MG tablet Take 0.25 tablets (6.25 mg) by mouth at bedtime    Associated Diagnoses: Influenza A           STOP taking these medications       amLODIPine (NORVASC) 5 MG tablet Comments:   Reason for Stopping:         aspirin (ASA) 81 MG EC tablet Comments:   Reason for Stopping:         Calcium-Magnesium-Zinc 500-250-12.5 MG TABS Comments:   Reason for Stopping:         guaiFENesin (ROBITUSSIN) 20 mg/mL liquid Comments:   Reason for Stopping:         losartan (COZAAR) 25 MG tablet Comments:   Reason for Stopping:         metoprolol succinate ER (TOPROL XL) 25 MG 24 hr tablet Comments:   Reason for Stopping:         nitroGLYcerin (NITROSTAT) 0.4 MG sublingual tablet Comments:   Reason for Stopping:         senna-docusate (SENOKOT-S/PERICOLACE) 8.6-50 MG tablet Comments:   Reason for Stopping:         senna-docusate (SENOKOT-S/PERICOLACE) 8.6-50 MG tablet Comments:   Reason for Stopping:         Vitamin D3 (CHOLECALCIFEROL) 125 MCG (5000 UT) tablet Comments:   Reason for Stopping:             Allergies   No Known Allergies

## 2024-04-06 NOTE — PLAN OF CARE
"Goal Outcome Evaluation:         4/6/2024 NOC     Cognitive Concerns/ Orientation: A&O to self; garbled speech  ABNL VS/O2: VSS on RA   MOBILITY: Comfort cares, turn and reposition for comfort. Up with lift.   PAIN MANAGMENT: When asked if he's having pain states \"a little bit\", Roxanol 5mg given at 10:40 pm   DIET: Pureed with slightly thickened liquids   BOWEL/BLADDER: Cerda for retention; last BM 4/3  ABNL LAB/BG: Cr 2.29; BUN 51.1; Ca++ 14.9 - MD notified - see new orders  DRAIN/DEVICES: Left peripheral IV SL   SKIN: Scattered bruises and scabs. Blanchable redness on coccyx - mepilex in place.  TEST/PROCEDURES: CT Spine/Chest and LP completed 4/4 - see results  D/C DATE: Today home with family, hospice   OTHER IMPORTANT INFO: Dr. Jain had long discussion with family 4/4 regarding goals of care per family's request.  Patient made comfort care at 3pm Friday.                  "

## 2024-04-06 NOTE — PLAN OF CARE
Summary: Altered mental status; possibly secondary to dehydration/KENNA, unwitnessed fall at TCU.     DATE & TIME: 4/6/2024 8370-8177     Cognitive Concerns/ Orientation: A&O to self; opens eyes spontaneously; garbled speech  ABNL VS/O2: Comfort cares  MOBILITY: Turned and repositioned for comfort  PAIN MANAGMENT: No nonverbal indicators of pain; appears comfortable  DIET: Pureed with slightly thickened liquids - refused  BOWEL/BLADDER: Cerda for retention; last BM 4/3  ABNL LAB/BG: None drawn  DRAIN/DEVICES: Left peripheral IV SL   SKIN: Scattered bruises and scabs. Blanchable redness on coccyx - mepilex in place.  TEST/PROCEDURES: CT Spine/Chest and LP completed 4/4 - see results  D/C DATE: Home today with family, hospice -  [facility] stretcher ride arranged for [id number]  OTHER IMPORTANT INFO: Dr. Jain had long discussion with family 4/4 regarding goals of care per family's request.  Patient made comfort care at 3pm Friday, 4/5.

## 2024-04-06 NOTE — PROGRESS NOTES
Hospice care cont. Pt d'c home with hospice care. D'c papers/medication given to family. Cerda home care instruction given.

## 2024-04-06 NOTE — PROGRESS NOTES
Care Management Follow Up    Length of Stay (days): 9    Expected Discharge Date: 04/09/2024     Concerns to be Addressed:  Finalize hospice d/c  Patient plan of care discussed at interdisciplinary rounds: Yes    Anticipated Discharge Disposition: Home with family and hospice support from [facility].      Anticipated Discharge Services:   Anticipated Discharge DME: at minimum, hospital bed.    Patient/family educated on Medicare website which has current facility and service quality ratings: yes  Education Provided on the Discharge Plan: yes   Patient/Family in Agreement with the Plan: yes    Referrals Placed by CM/SW:    Private pay costs discussed:     Additional Information:  Late afternoon, after speaking with Dr Jain,  wife and daughters have decided to transition patient to comfort care. Wife tells writer she and family want to bring patient home tomorrow and they will care for patient.  They are aware they can hire private duty if needed.    A granddtr works for [employer] and she initiated a referral to [facility].   Writer spoke with Margarito at [facility] at [phone]. They have access to EPIC and Margarito retrieved the information he needed.  [facility] has an enrollment appointment tomorrow available at 1800.  We briefly discussed DME needed.  Writer recommended a hospital bed, also explained patient is transferred out of bed either with a lift or 2 person transfer and patient currently has a vizcarra catheter.  Margarito said either he or Saturday staff will speak with family about initial need for DME and when they see patient tomorrow evening they can further assess and have additional DME delivered. The discharge orders are to HOME. The orders need to include [facility] to evaluate and treat.   If patient is taking any comfort medications or if it is anticipated he will need comfort medications over the weekend, [facility] requests a two day supply be sent home with patient.  We need to have orders in  early as pharmacy closes at 1230.   [facility] will call the Saturday  on Saturday AM to finalize plans.  It is expected patient will need to travel by BLS and needs to be home before 1800.        Alba Omalley, TORYSW

## 2024-04-08 NOTE — PLAN OF CARE
Physical Therapy Discharge Summary    Reason for therapy discharge:    Discharged to Home with Hospice    Progress towards therapy goal(s). See goals on Care Plan in Epic electronic health record for goal details.  Goals not met.  Barriers to achieving goals:   discharge from facility.    Therapy recommendation(s):    Continued therapy is recommended.  Rationale/Recommendations:  DC'd on hospice.

## 2024-04-09 LAB
BACTERIA CSF CULT: NO GROWTH
DEPRECATED CALCIDIOL+CALCIFEROL SERPL-MC: <70 UG/L (ref 20–75)
GRAM STAIN RESULT: NORMAL
GRAM STAIN RESULT: NORMAL
VITAMIN D2 SERPL-MCNC: <5 UG/L
VITAMIN D3 SERPL-MCNC: 65 UG/L

## (undated) RX ORDER — VERAPAMIL HYDROCHLORIDE 2.5 MG/ML
INJECTION, SOLUTION INTRAVENOUS
Status: DISPENSED
Start: 2018-10-01

## (undated) RX ORDER — HEPARIN SODIUM 1000 [USP'U]/ML
INJECTION, SOLUTION INTRAVENOUS; SUBCUTANEOUS
Status: DISPENSED
Start: 2018-10-01

## (undated) RX ORDER — REGADENOSON 0.08 MG/ML
INJECTION, SOLUTION INTRAVENOUS
Status: DISPENSED
Start: 2022-12-13

## (undated) RX ORDER — FENTANYL CITRATE 50 UG/ML
INJECTION, SOLUTION INTRAMUSCULAR; INTRAVENOUS
Status: DISPENSED
Start: 2018-10-01

## (undated) RX ORDER — LIDOCAINE HYDROCHLORIDE 10 MG/ML
INJECTION, SOLUTION EPIDURAL; INFILTRATION; INTRACAUDAL; PERINEURAL
Status: DISPENSED
Start: 2024-01-01

## (undated) RX ORDER — LIDOCAINE HYDROCHLORIDE 10 MG/ML
INJECTION, SOLUTION EPIDURAL; INFILTRATION; INTRACAUDAL; PERINEURAL
Status: DISPENSED
Start: 2018-10-01

## (undated) RX ORDER — LIDOCAINE HYDROCHLORIDE 20 MG/ML
JELLY TOPICAL
Status: DISPENSED
Start: 2024-01-01

## (undated) RX ORDER — NITROGLYCERIN 5 MG/ML
VIAL (ML) INTRAVENOUS
Status: DISPENSED
Start: 2018-10-01